# Patient Record
Sex: FEMALE | Race: WHITE | NOT HISPANIC OR LATINO | URBAN - METROPOLITAN AREA
[De-identification: names, ages, dates, MRNs, and addresses within clinical notes are randomized per-mention and may not be internally consistent; named-entity substitution may affect disease eponyms.]

---

## 2017-10-25 ENCOUNTER — INPATIENT (INPATIENT)
Facility: HOSPITAL | Age: 82
LOS: 12 days | Discharge: HOME | End: 2017-11-07
Attending: INTERNAL MEDICINE | Admitting: GENERAL PRACTICE
Payer: MEDICARE

## 2017-10-25 VITALS
OXYGEN SATURATION: 100 % | SYSTOLIC BLOOD PRESSURE: 176 MMHG | HEART RATE: 84 BPM | TEMPERATURE: 101 F | RESPIRATION RATE: 16 BRPM | DIASTOLIC BLOOD PRESSURE: 74 MMHG

## 2017-10-25 DIAGNOSIS — C34.90 MALIGNANT NEOPLASM OF UNSPECIFIED PART OF UNSPECIFIED BRONCHUS OR LUNG: Chronic | ICD-10-CM

## 2017-10-25 LAB
ALBUMIN SERPL ELPH-MCNC: 3.9 G/DL — SIGNIFICANT CHANGE UP (ref 3.3–5)
ALP SERPL-CCNC: 53 U/L — SIGNIFICANT CHANGE UP (ref 40–120)
ALT FLD-CCNC: 25 U/L — SIGNIFICANT CHANGE UP (ref 4–33)
AMYLASE P1 CFR SERPL: 139 U/L — HIGH (ref 25–125)
APPEARANCE UR: SIGNIFICANT CHANGE UP
AST SERPL-CCNC: 48 U/L — HIGH (ref 4–32)
BACTERIA # UR AUTO: HIGH
BASE EXCESS BLDV CALC-SCNC: -3.6 MMOL/L — SIGNIFICANT CHANGE UP
BASE EXCESS BLDV CALC-SCNC: -4.5 MMOL/L — SIGNIFICANT CHANGE UP
BASOPHILS # BLD AUTO: 0 K/UL — SIGNIFICANT CHANGE UP (ref 0–0.2)
BASOPHILS NFR BLD AUTO: 0 % — SIGNIFICANT CHANGE UP (ref 0–2)
BILIRUB SERPL-MCNC: 0.8 MG/DL — SIGNIFICANT CHANGE UP (ref 0.2–1.2)
BILIRUB UR-MCNC: NEGATIVE — SIGNIFICANT CHANGE UP
BLOOD GAS VENOUS - CREATININE: 1.46 MG/DL — HIGH (ref 0.5–1.3)
BLOOD GAS VENOUS - CREATININE: 1.57 MG/DL — HIGH (ref 0.5–1.3)
BLOOD UR QL VISUAL: HIGH
BUN SERPL-MCNC: 42 MG/DL — HIGH (ref 7–23)
CALCIUM SERPL-MCNC: 8.8 MG/DL — SIGNIFICANT CHANGE UP (ref 8.4–10.5)
CHLORIDE BLDV-SCNC: 101 MMOL/L — SIGNIFICANT CHANGE UP (ref 96–108)
CHLORIDE BLDV-SCNC: 105 MMOL/L — SIGNIFICANT CHANGE UP (ref 96–108)
CHLORIDE SERPL-SCNC: 98 MMOL/L — SIGNIFICANT CHANGE UP (ref 98–107)
CK MB BLD-MCNC: 2.65 NG/ML — SIGNIFICANT CHANGE UP (ref 1–4.7)
CK SERPL-CCNC: 96 U/L — SIGNIFICANT CHANGE UP (ref 25–170)
CO2 SERPL-SCNC: 19 MMOL/L — LOW (ref 22–31)
COLOR SPEC: YELLOW — SIGNIFICANT CHANGE UP
CREAT SERPL-MCNC: 1.63 MG/DL — HIGH (ref 0.5–1.3)
EOSINOPHIL # BLD AUTO: 0.01 K/UL — SIGNIFICANT CHANGE UP (ref 0–0.5)
EOSINOPHIL NFR BLD AUTO: 0.2 % — SIGNIFICANT CHANGE UP (ref 0–6)
GAS PNL BLDV: 134 MMOL/L — LOW (ref 136–146)
GAS PNL BLDV: 134 MMOL/L — LOW (ref 136–146)
GLUCOSE BLDV-MCNC: 213 — HIGH (ref 70–99)
GLUCOSE BLDV-MCNC: 244 — HIGH (ref 70–99)
GLUCOSE SERPL-MCNC: 236 MG/DL — HIGH (ref 70–99)
GLUCOSE UR-MCNC: SIGNIFICANT CHANGE UP
HCO3 BLDV-SCNC: 20 MMOL/L — SIGNIFICANT CHANGE UP (ref 20–27)
HCO3 BLDV-SCNC: 20 MMOL/L — SIGNIFICANT CHANGE UP (ref 20–27)
HCT VFR BLD CALC: 35.9 % — SIGNIFICANT CHANGE UP (ref 34.5–45)
HCT VFR BLDV CALC: 27.6 % — LOW (ref 34.5–45)
HCT VFR BLDV CALC: 40.6 % — SIGNIFICANT CHANGE UP (ref 34.5–45)
HGB BLD-MCNC: 11.4 G/DL — LOW (ref 11.5–15.5)
HGB BLDV-MCNC: 13.2 G/DL — SIGNIFICANT CHANGE UP (ref 11.5–15.5)
HGB BLDV-MCNC: 8.9 G/DL — LOW (ref 11.5–15.5)
IMM GRANULOCYTES # BLD AUTO: 0.07 # — SIGNIFICANT CHANGE UP
IMM GRANULOCYTES NFR BLD AUTO: 1.1 % — SIGNIFICANT CHANGE UP (ref 0–1.5)
KETONES UR-MCNC: NEGATIVE — SIGNIFICANT CHANGE UP
LACTATE BLDV-MCNC: 2.8 MMOL/L — HIGH (ref 0.5–2)
LACTATE BLDV-MCNC: 4.1 MMOL/L — CRITICAL HIGH (ref 0.5–2)
LEUKOCYTE ESTERASE UR-ACNC: HIGH
LYMPHOCYTES # BLD AUTO: 0.2 K/UL — LOW (ref 1–3.3)
LYMPHOCYTES # BLD AUTO: 3.3 % — LOW (ref 13–44)
MCHC RBC-ENTMCNC: 27.9 PG — SIGNIFICANT CHANGE UP (ref 27–34)
MCHC RBC-ENTMCNC: 31.8 % — LOW (ref 32–36)
MCV RBC AUTO: 87.8 FL — SIGNIFICANT CHANGE UP (ref 80–100)
MONOCYTES # BLD AUTO: 0.09 K/UL — SIGNIFICANT CHANGE UP (ref 0–0.9)
MONOCYTES NFR BLD AUTO: 1.5 % — LOW (ref 2–14)
MUCOUS THREADS # UR AUTO: SIGNIFICANT CHANGE UP
NEUTROPHILS # BLD AUTO: 5.76 K/UL — SIGNIFICANT CHANGE UP (ref 1.8–7.4)
NEUTROPHILS NFR BLD AUTO: 93.9 % — HIGH (ref 43–77)
NITRITE UR-MCNC: POSITIVE — HIGH
NRBC # FLD: 0 — SIGNIFICANT CHANGE UP
PCO2 BLDV: 35 MMHG — LOW (ref 41–51)
PCO2 BLDV: 41 MMHG — SIGNIFICANT CHANGE UP (ref 41–51)
PH BLDV: 7.34 PH — SIGNIFICANT CHANGE UP (ref 7.32–7.43)
PH BLDV: 7.37 PH — SIGNIFICANT CHANGE UP (ref 7.32–7.43)
PH UR: 5.5 — SIGNIFICANT CHANGE UP (ref 4.6–8)
PLATELET # BLD AUTO: 176 K/UL — SIGNIFICANT CHANGE UP (ref 150–400)
PMV BLD: 11 FL — SIGNIFICANT CHANGE UP (ref 7–13)
PO2 BLDV: 37 MMHG — SIGNIFICANT CHANGE UP (ref 35–40)
PO2 BLDV: < 24 MMHG — LOW (ref 35–40)
POTASSIUM BLDV-SCNC: 3.1 MMOL/L — LOW (ref 3.4–4.5)
POTASSIUM BLDV-SCNC: 3.5 MMOL/L — SIGNIFICANT CHANGE UP (ref 3.4–4.5)
POTASSIUM SERPL-MCNC: 3.7 MMOL/L — SIGNIFICANT CHANGE UP (ref 3.5–5.3)
POTASSIUM SERPL-SCNC: 3.7 MMOL/L — SIGNIFICANT CHANGE UP (ref 3.5–5.3)
PROT SERPL-MCNC: 7.9 G/DL — SIGNIFICANT CHANGE UP (ref 6–8.3)
PROT UR-MCNC: 100 — HIGH
RBC # BLD: 4.09 M/UL — SIGNIFICANT CHANGE UP (ref 3.8–5.2)
RBC # FLD: 13.9 % — SIGNIFICANT CHANGE UP (ref 10.3–14.5)
RBC CASTS # UR COMP ASSIST: SIGNIFICANT CHANGE UP (ref 0–?)
SAO2 % BLDV: 26.2 % — LOW (ref 60–85)
SAO2 % BLDV: 64.1 % — SIGNIFICANT CHANGE UP (ref 60–85)
SODIUM SERPL-SCNC: 136 MMOL/L — SIGNIFICANT CHANGE UP (ref 135–145)
SP GR SPEC: 1.01 — SIGNIFICANT CHANGE UP (ref 1–1.03)
SQUAMOUS # UR AUTO: SIGNIFICANT CHANGE UP
TROPONIN T SERPL-MCNC: 0.06 NG/ML — SIGNIFICANT CHANGE UP (ref 0–0.06)
UROBILINOGEN FLD QL: NORMAL E.U. — SIGNIFICANT CHANGE UP (ref 0.1–0.2)
WBC # BLD: 6.13 K/UL — SIGNIFICANT CHANGE UP (ref 3.8–10.5)
WBC # FLD AUTO: 6.13 K/UL — SIGNIFICANT CHANGE UP (ref 3.8–10.5)
WBC UR QL: SIGNIFICANT CHANGE UP (ref 0–?)

## 2017-10-25 PROCEDURE — 71250 CT THORAX DX C-: CPT | Mod: 26

## 2017-10-25 PROCEDURE — 74176 CT ABD & PELVIS W/O CONTRAST: CPT | Mod: 26

## 2017-10-25 PROCEDURE — 71010: CPT | Mod: 26

## 2017-10-25 RX ORDER — SODIUM CHLORIDE 9 MG/ML
1000 INJECTION INTRAMUSCULAR; INTRAVENOUS; SUBCUTANEOUS ONCE
Qty: 0 | Refills: 0 | Status: COMPLETED | OUTPATIENT
Start: 2017-10-25 | End: 2017-10-25

## 2017-10-25 RX ORDER — IPRATROPIUM/ALBUTEROL SULFATE 18-103MCG
3 AEROSOL WITH ADAPTER (GRAM) INHALATION ONCE
Qty: 0 | Refills: 0 | Status: COMPLETED | OUTPATIENT
Start: 2017-10-25 | End: 2017-10-25

## 2017-10-25 RX ORDER — AZITHROMYCIN 500 MG/1
500 TABLET, FILM COATED ORAL ONCE
Qty: 0 | Refills: 0 | Status: COMPLETED | OUTPATIENT
Start: 2017-10-25 | End: 2017-10-25

## 2017-10-25 RX ORDER — ONDANSETRON 8 MG/1
4 TABLET, FILM COATED ORAL ONCE
Qty: 0 | Refills: 0 | Status: COMPLETED | OUTPATIENT
Start: 2017-10-25 | End: 2017-10-25

## 2017-10-25 RX ORDER — CEFTRIAXONE 500 MG/1
1 INJECTION, POWDER, FOR SOLUTION INTRAMUSCULAR; INTRAVENOUS ONCE
Qty: 0 | Refills: 0 | Status: COMPLETED | OUTPATIENT
Start: 2017-10-25 | End: 2017-10-25

## 2017-10-25 RX ORDER — ACETAMINOPHEN 500 MG
650 TABLET ORAL ONCE
Qty: 0 | Refills: 0 | Status: COMPLETED | OUTPATIENT
Start: 2017-10-25 | End: 2017-10-25

## 2017-10-25 RX ADMIN — Medication 125 MILLIGRAM(S): at 20:06

## 2017-10-25 RX ADMIN — AZITHROMYCIN 250 MILLIGRAM(S): 500 TABLET, FILM COATED ORAL at 19:59

## 2017-10-25 RX ADMIN — SODIUM CHLORIDE 1000 MILLILITER(S): 9 INJECTION INTRAMUSCULAR; INTRAVENOUS; SUBCUTANEOUS at 19:17

## 2017-10-25 RX ADMIN — CEFTRIAXONE 100 GRAM(S): 500 INJECTION, POWDER, FOR SOLUTION INTRAMUSCULAR; INTRAVENOUS at 19:14

## 2017-10-25 RX ADMIN — SODIUM CHLORIDE 1000 MILLILITER(S): 9 INJECTION INTRAMUSCULAR; INTRAVENOUS; SUBCUTANEOUS at 20:14

## 2017-10-25 RX ADMIN — Medication 650 MILLIGRAM(S): at 19:16

## 2017-10-25 RX ADMIN — ONDANSETRON 4 MILLIGRAM(S): 8 TABLET, FILM COATED ORAL at 19:16

## 2017-10-25 RX ADMIN — Medication 3 MILLILITER(S): at 19:59

## 2017-10-25 NOTE — ED PROVIDER NOTE - PROGRESS NOTE DETAILS
Teri: Pt with upper lobe cavitary lesion on CXR not present on previous xray an dunclear if malignancy given h/o cancer. However pt has fever with more then a month of productive cough so Tb cannot be excluded. Pt placed on isolation. Teri: Pt with upper lobe cavitary lesion on CXR not present on previous xray an dunclear if malignancy given h/o cancer. However pt has fever with more then a month of productive cough so Tb cannot be excluded. Pt placed on isolation. 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 Gollogly: Discussed case with Dr Ferrell. Dr Telles had previously spoken with covering MD for Dr York regarding clinical findings and plan to admit.

## 2017-10-25 NOTE — ED PROVIDER NOTE - OBJECTIVE STATEMENT
87yo female with DM, HTN, lung ca s/p radiation, p/w fevers, chills, lower abdominal pain, lower back pain, urinary frequency and dysuria. Symptoms started 2 days ago, was seen by pmd and told to start cipro but did not. Today symptoms worsened and pt was rigoring, went to urgent care and was sent to ED. Also complaining of nausea and vomiting, decreased PO. + diarrhea. feels like prior UTI

## 2017-10-25 NOTE — ED PROVIDER NOTE - CARE PLAN
Principal Discharge DX:	Pneumonia of right upper lobe due to infectious organism  Secondary Diagnosis:	Pyelonephritis  Secondary Diagnosis:	Sepsis, due to unspecified organism

## 2017-10-25 NOTE — ED PROVIDER NOTE - NOTES
discussed with ID fellow, given previous h/o TB quantiferon will be low utility. recommends 3 sets of sputum cultures, ceftriaxone and azithromycin is sufficient for pna/uti for now, airborne isolation and admission. ID will consult tomorrow as inpatient.

## 2017-10-25 NOTE — ED ADULT NURSE NOTE - OBJECTIVE STATEMENT
Pt. received in room 5. Pt. is 85 yo female a&ox3 c/o cough, fever/chills nausea, vomiting, diarrhea, abdominal pain, urinary burning, urgency, and frequency. Pt. came to ED after being sent from [facility] this afternoon. Pt. reports symptoms began a few days ago but have gotten much worse today. Pt. was recently on amoxicillin  for cough and finished antibiotics about two weeks ago. Pt. appears to be SOB, respirations are even, satting 100% on 2L NC. Pt. has hx of lung CA in remission. Denies dizziness, lightheadedness, chest pain. Abdomen is tender, but soft and non distended. Skin is warm, dry, and intact. 20g IV lock in place from EMS. Labs sent. Will continue to monitor.

## 2017-10-25 NOTE — ED PROVIDER NOTE - MEDICAL DECISION MAKING DETAILS
85yo with lower back pain, abd pain and urinary sx, likely pyelo, will get CT abd/pelvis r/o intraabdominal infection

## 2017-10-25 NOTE — ED ADULT NURSE NOTE - CHIEF COMPLAINT QUOTE
Sent in by urgent care for lower abdominal pain, nausea and vomiting since morning. Pt was given zofran 4 mg and NS 1L.  PMH lung cancer in remission, DM type 2. Febrile in triage.

## 2017-10-25 NOTE — ED ADULT TRIAGE NOTE - CHIEF COMPLAINT QUOTE
Sent in by urgent care for lower abdominal pain, nausea and vomiting since morning. Pt was given zofran 4 mg and NS 1L.  PMH lung cancer on remission, DM type 2. Sent in by urgent care for lower abdominal pain, nausea and vomiting since morning. Pt was given zofran 4 mg and NS 1L.  PMH lung cancer on remission, DM type 2. Febrile in triage. Sent in by urgent care for lower abdominal pain, nausea and vomiting since morning. Pt was given zofran 4 mg and NS 1L.  PMH lung cancer in remission, DM type 2. Febrile in triage.

## 2017-10-25 NOTE — ED PROVIDER NOTE - ATTENDING CONTRIBUTION TO CARE
Teri: 87 yo female with a h/o DM c/o dysuria, frequency, and urgency x 24 hours. + b/l flank pain. Pt also c/o abdominal pain, and multiple episodes of non bloody, non bilious vomiting. +loose stool x 2 today. Pt also c/o several weeks of productive cough for which she was treated with amoxicillin. + associated chills but no objective fevers. No recent travel or sick contacts. Denies chest pain but reports SOB. Exam; well appearing, NAD, +mild tachypnea with scant wheeze at left base with decreased air moevment b/l. abdomen is soft, + suprapubic TTP, no CVA TTP, No Le edema or calf TTP. Plan: cbc, cmp, cardiac enzymes, cxr, ct abdomen, urinalysis and culture, abx, vbg, iv fluids, reassess

## 2017-10-26 ENCOUNTER — TRANSCRIPTION ENCOUNTER (OUTPATIENT)
Age: 82
End: 2017-10-26

## 2017-10-26 DIAGNOSIS — E11.9 TYPE 2 DIABETES MELLITUS WITHOUT COMPLICATIONS: ICD-10-CM

## 2017-10-26 DIAGNOSIS — E27.9 DISORDER OF ADRENAL GLAND, UNSPECIFIED: ICD-10-CM

## 2017-10-26 DIAGNOSIS — N18.3 CHRONIC KIDNEY DISEASE, STAGE 3 (MODERATE): ICD-10-CM

## 2017-10-26 DIAGNOSIS — C34.11 MALIGNANT NEOPLASM OF UPPER LOBE, RIGHT BRONCHUS OR LUNG: ICD-10-CM

## 2017-10-26 DIAGNOSIS — J18.9 PNEUMONIA, UNSPECIFIED ORGANISM: ICD-10-CM

## 2017-10-26 DIAGNOSIS — N12 TUBULO-INTERSTITIAL NEPHRITIS, NOT SPECIFIED AS ACUTE OR CHRONIC: ICD-10-CM

## 2017-10-26 DIAGNOSIS — I10 ESSENTIAL (PRIMARY) HYPERTENSION: ICD-10-CM

## 2017-10-26 LAB
BACTERIA BLD CULT: SIGNIFICANT CHANGE UP
E COLI DNA BLD POS QL NAA+NON-PROBE: SIGNIFICANT CHANGE UP
GLUCOSE BLDC GLUCOMTR-MCNC: 145 MG/DL — HIGH (ref 70–99)
GLUCOSE BLDC GLUCOMTR-MCNC: 211 MG/DL — HIGH (ref 70–99)
GLUCOSE BLDC GLUCOMTR-MCNC: 269 MG/DL — HIGH (ref 70–99)
GLUCOSE BLDC GLUCOMTR-MCNC: 295 MG/DL — HIGH (ref 70–99)
METHOD TYPE: SIGNIFICANT CHANGE UP
ORGANISM # SPEC MICROSCOPIC CNT: SIGNIFICANT CHANGE UP
ORGANISM # SPEC MICROSCOPIC CNT: SIGNIFICANT CHANGE UP
SPECIMEN SOURCE: SIGNIFICANT CHANGE UP

## 2017-10-26 PROCEDURE — 99222 1ST HOSP IP/OBS MODERATE 55: CPT

## 2017-10-26 PROCEDURE — 99223 1ST HOSP IP/OBS HIGH 75: CPT

## 2017-10-26 RX ORDER — METOPROLOL TARTRATE 50 MG
50 TABLET ORAL EVERY 12 HOURS
Qty: 0 | Refills: 0 | Status: DISCONTINUED | OUTPATIENT
Start: 2017-10-26 | End: 2017-11-07

## 2017-10-26 RX ORDER — SODIUM CHLORIDE 9 MG/ML
500 INJECTION INTRAMUSCULAR; INTRAVENOUS; SUBCUTANEOUS ONCE
Qty: 0 | Refills: 0 | Status: COMPLETED | OUTPATIENT
Start: 2017-10-26 | End: 2017-10-26

## 2017-10-26 RX ORDER — INSULIN LISPRO 100/ML
VIAL (ML) SUBCUTANEOUS
Qty: 0 | Refills: 0 | Status: DISCONTINUED | OUTPATIENT
Start: 2017-10-26 | End: 2017-11-07

## 2017-10-26 RX ORDER — PREDNISOLONE SODIUM PHOSPHATE 1 %
1 DROPS OPHTHALMIC (EYE) THREE TIMES A DAY
Qty: 0 | Refills: 0 | Status: DISCONTINUED | OUTPATIENT
Start: 2017-10-26 | End: 2017-11-07

## 2017-10-26 RX ORDER — NIFEDIPINE 30 MG
60 TABLET, EXTENDED RELEASE 24 HR ORAL AT BEDTIME
Qty: 0 | Refills: 0 | Status: DISCONTINUED | OUTPATIENT
Start: 2017-10-26 | End: 2017-11-07

## 2017-10-26 RX ORDER — ASPIRIN/CALCIUM CARB/MAGNESIUM 324 MG
81 TABLET ORAL DAILY
Qty: 0 | Refills: 0 | Status: DISCONTINUED | OUTPATIENT
Start: 2017-10-26 | End: 2017-11-07

## 2017-10-26 RX ORDER — INSULIN LISPRO 100/ML
VIAL (ML) SUBCUTANEOUS AT BEDTIME
Qty: 0 | Refills: 0 | Status: DISCONTINUED | OUTPATIENT
Start: 2017-10-26 | End: 2017-11-07

## 2017-10-26 RX ORDER — PREDNISOLONE 5 MG
1 TABLET ORAL THREE TIMES A DAY
Qty: 0 | Refills: 0 | Status: DISCONTINUED | OUTPATIENT
Start: 2017-10-26 | End: 2017-10-26

## 2017-10-26 RX ORDER — CEFTRIAXONE 500 MG/1
1 INJECTION, POWDER, FOR SOLUTION INTRAMUSCULAR; INTRAVENOUS EVERY 24 HOURS
Qty: 0 | Refills: 0 | Status: DISCONTINUED | OUTPATIENT
Start: 2017-10-26 | End: 2017-10-27

## 2017-10-26 RX ORDER — HEPARIN SODIUM 5000 [USP'U]/ML
5000 INJECTION INTRAVENOUS; SUBCUTANEOUS EVERY 8 HOURS
Qty: 0 | Refills: 0 | Status: DISCONTINUED | OUTPATIENT
Start: 2017-10-26 | End: 2017-11-07

## 2017-10-26 RX ORDER — ACETAMINOPHEN 500 MG
650 TABLET ORAL EVERY 6 HOURS
Qty: 0 | Refills: 0 | Status: DISCONTINUED | OUTPATIENT
Start: 2017-10-26 | End: 2017-11-07

## 2017-10-26 RX ORDER — IPRATROPIUM/ALBUTEROL SULFATE 18-103MCG
3 AEROSOL WITH ADAPTER (GRAM) INHALATION EVERY 6 HOURS
Qty: 0 | Refills: 0 | Status: COMPLETED | OUTPATIENT
Start: 2017-10-26 | End: 2017-10-28

## 2017-10-26 RX ORDER — NIFEDIPINE 30 MG
30 TABLET, EXTENDED RELEASE 24 HR ORAL DAILY
Qty: 0 | Refills: 0 | Status: DISCONTINUED | OUTPATIENT
Start: 2017-10-26 | End: 2017-11-07

## 2017-10-26 RX ORDER — FENOFIBRATE,MICRONIZED 130 MG
145 CAPSULE ORAL DAILY
Qty: 0 | Refills: 0 | Status: DISCONTINUED | OUTPATIENT
Start: 2017-10-26 | End: 2017-11-07

## 2017-10-26 RX ADMIN — Medication 81 MILLIGRAM(S): at 14:23

## 2017-10-26 RX ADMIN — CEFTRIAXONE 100 GRAM(S): 500 INJECTION, POWDER, FOR SOLUTION INTRAMUSCULAR; INTRAVENOUS at 17:26

## 2017-10-26 RX ADMIN — Medication 30 MILLIGRAM(S): at 04:43

## 2017-10-26 RX ADMIN — Medication 6: at 18:05

## 2017-10-26 RX ADMIN — Medication 145 MILLIGRAM(S): at 14:23

## 2017-10-26 RX ADMIN — Medication 50 MILLIGRAM(S): at 17:26

## 2017-10-26 RX ADMIN — SODIUM CHLORIDE 1500 MILLILITER(S): 9 INJECTION INTRAMUSCULAR; INTRAVENOUS; SUBCUTANEOUS at 00:30

## 2017-10-26 RX ADMIN — HEPARIN SODIUM 5000 UNIT(S): 5000 INJECTION INTRAVENOUS; SUBCUTANEOUS at 14:23

## 2017-10-26 RX ADMIN — HEPARIN SODIUM 5000 UNIT(S): 5000 INJECTION INTRAVENOUS; SUBCUTANEOUS at 22:33

## 2017-10-26 RX ADMIN — Medication 50 MILLIGRAM(S): at 04:43

## 2017-10-26 RX ADMIN — Medication 10 MILLIGRAM(S): at 12:19

## 2017-10-26 RX ADMIN — Medication 650 MILLIGRAM(S): at 22:37

## 2017-10-26 RX ADMIN — Medication 60 MILLIGRAM(S): at 22:34

## 2017-10-26 RX ADMIN — Medication 6: at 09:52

## 2017-10-26 RX ADMIN — Medication 1 DROP(S): at 22:33

## 2017-10-26 RX ADMIN — Medication 650 MILLIGRAM(S): at 23:15

## 2017-10-26 RX ADMIN — Medication 3 MILLILITER(S): at 22:07

## 2017-10-26 NOTE — H&P ADULT - NSHPPHYSICALEXAM_GEN_ALL_CORE
T(C): 36.8 (10-25-17 @ 21:55), Max: 38.2 (10-25-17 @ 17:55)  HR: 89 (10-25-17 @ 23:30) (80 - 89)  BP: 105/46 (10-25-17 @ 23:30) (105/46 - 176/74)  RR: 18 (10-25-17 @ 23:30) (16 - 18)  SpO2: 99% (10-25-17 @ 23:30) (92% - 100%)    Constitutional: NAD, well-developed, well-nourished  Ears, Nose, Mouth, and Throat: normal external ears and nose, normal hearing, moist oral mucosa  Eyes: normal conjunctiva, EOMI, PERRLA  Neck: supple, no JVD  Respiratory: Clear to auscultation bilaterally. No wheezes, rales or rhonchi. Normal respiratory effort  Cardiovascular: RRR, no M/R/G, no edema, 2+ Peripheral Pulses  Gastrointestinal: soft, nontender, nondistended, +BS, no hernia  Skin: warm, dry, no rash  Neurologic: sensation grossly intact, CN grossly intact, non-focal exam  Musculoskeletal: no clubbing, no cyanosis, no joint swelling  Psychiatric: AOX3, normal mood, affect

## 2017-10-26 NOTE — H&P ADULT - NSHPLABSRESULTS_GEN_ALL_CORE
10-25    136  |  98  |  42<H>  ----------------------------<  236<H>  3.7   |  19<L>  |  1.63<H>    Ca    8.8      25 Oct 2017 19:04    TPro  7.9  /  Alb  3.9  /  TBili  0.8  /  DBili  x   /  AST  48<H>  /  ALT  25  /  AlkPhos  53  10-25                          11.4   6.13  )-----------( 176      ( 25 Oct 2017 19:04 )             35.9     CARDIAC MARKERS ( 25 Oct 2017 20:00 )  x     / 0.06 ng/mL / 96 u/L / 2.65 ng/mL / x          LIVER FUNCTIONS - ( 25 Oct 2017 19:04 )  Alb: 3.9 g/dL / Pro: 7.9 g/dL / ALK PHOS: 53 u/L / ALT: 25 u/L / AST: 48 u/L / GGT: x           Urinalysis Basic - ( 25 Oct 2017 18:30 )    Color: YELLOW / Appearance: HAZY / S.011 / pH: 5.5  Gluc: TRACE / Ketone: NEGATIVE  / Bili: NEGATIVE / Urobili: NORMAL E.U.   Blood: MODERATE / Protein: 100 / Nitrite: POSITIVE   Leuk Esterase: LARGE / RBC: 25-50 / WBC 10-25   Sq Epi: OCC / Non Sq Epi: x / Bacteria: MANY    20:30 - VBG - pH: 7.37  | pCO2: 35    | pO2: 37    | Lactate: 2.8    19:12 - VBG - pH: 7.34  | pCO2: 41    | pO2: < 24  | Lactate: 4.1    EKG personally reviewed, NSR w/ 1st degree AVB and prolonged QTc

## 2017-10-26 NOTE — H&P ADULT - PROBLEM SELECTOR PLAN 5
Will f/u outpt Cr, may be progression of disease vs acute worsening, s/p 2 L IVNS, monitor I/Os, f/u BMP

## 2017-10-26 NOTE — H&P ADULT - NSHPREVIEWOFSYSTEMS_GEN_ALL_CORE
Constitutional Symptoms: No weakness, +fevers, chills  Eyes: No visual changes, headache, eye pain, double vision  Ears, Nose, Mouth, Throat: No runny nose, sinus pain, ear pain, tinnitus, sore throat, dysphagia, odynophagia  Cardiovascular: No chest pain, palpitations, edema  Respiratory: No cough, wheezing, hemoptysis, shortness of breath  Gastrointestinal: No abdominal pain, dysphagia, anorexia, +nausea/vomiting  Genitourinary: +dysuria, frequency, urgency  Musculoskeletal: No joint pain, joint swelling, decreased ROM  Skin: No pruritus, rashes, lesions, wounds  Neurologic:  No seizures, headache, paraesthesiae, numbness, limb weakness    Positives and pertinent negatives noted and all other systems negative.

## 2017-10-26 NOTE — ED ADULT NURSE REASSESSMENT NOTE - NS ED NURSE REASSESS COMMENT FT1
pt resting comfortably in bed, denies pain/discomfort at the present time.  Vital signs stable. Pt.'s O2 sat 92% on RA, placed on 2L via NC.  Pt. awaiting CT results and admission to the hospital.

## 2017-10-26 NOTE — H&P ADULT - PROBLEM SELECTOR PLAN 1
S/p 2 L IVNS and ceftriaxone in the ER, patient overall greatly improved in terms of symptoms. Will c/w IV Ceftriaxone, f/u cultures, supportive care

## 2017-10-26 NOTE — H&P ADULT - PROBLEM SELECTOR PLAN 2
Spiculated masses within the bilateral upper lobes, encasing suture material on the left. D/w patient. As per patient, she was in remission, had a recurrence of Lung mass in July s/p RT, and was told may still have a mass which is "controlled". F/u w/ outpt onc in AM.

## 2017-10-26 NOTE — H&P ADULT - HISTORY OF PRESENT ILLNESS
Patient is an 87 y/o F PMH Lung CA (unknown stage or type, Dx 2010 s/p L VATS, wedge resection, RT in July), HTN, DM2 p/w N/V, dysuria. Patient reports cough productive of sputum X2 weeks, s/p Antibiotics (unknown by pt but per ER records, amoxicillin). Patient felt well until 2 days ago, developed dysuria, urgency, increased frequency, progressing to suprapubic tenderness, F/C, and N/V. No recent travel, no sick contacts.

## 2017-10-26 NOTE — CHART NOTE - NSCHARTNOTEFT_GEN_A_CORE
Called by RN pt c/o shortness of breath states she had it off and on for the past several weeks. Patient has h/o lung CA since  s/p VATS and wedge resection,  admitted with UTI / Pyelonephritis started on antibiotics. CT Chest/ A/ P  shows Spiculated masses within the bilateral upper lobes, encasing suture material on the left, most compatible with bronchogenic malignancy.  Mediastinal lymphadenopathy.  Emphysema.  Right adrenal mass not significantly changed in size compared to previous   CT of 2010. Further characterization with MRI. Left-sided perinephric stranding and left renal edema without  hydronephrosis or obstructing stone. Findings may be related to recent  passage of a stone. Please note that without IV contrast, pyelonephritis,  a clinical diagnosis cannot be assessed.  Colonic diverticulosis.    Patient states she was having a cough few weeks ago seen by her OP physician treated with antibiotics. Denies using home oxygen supplementation. States her shortness of breath improves with nebulizer treatment prn.     VITAL SIGNS:  Vital Signs Last 24 Hrs  T(C): 36.6 (26 Oct 2017 22:37), Max: 36.7 (26 Oct 2017 17:25)  T(F): 97.9 (26 Oct 2017 22:37), Max: 98 (26 Oct 2017 17:25)  HR: 84 (26 Oct 2017 22:37) (73 - 84)  BP: 140/67 (26 Oct 2017 22:37) (115/71 - 140/67)    RR: 17 (26 Oct 2017 22:37) (16 - 18)  SpO2: 98% (26 Oct 2017 22:37) (96% - 100%)    O:   Well developed, well nourished elderly white  Female found sitting in bed  alert and oriented x 3 appeared in no acute distress with daughter at bedside     Chest / Lungs: few scattered rhonchi noted. Good air exchange, no accessory muscles of respiration were being used.     Heart: S1, S2, regular rate and rhythm, no murmurs, crackles or rales noted    Allergies    ACE inhibitors (Angioedema)      MEDICATIONS  (STANDING):  ALBUTerol/ipratropium for Nebulization 3 milliLiter(s) Nebulizer every 6 hours  aspirin enteric coated 81 milliGRAM(s) Oral daily  cefTRIAXone   IVPB 1 Gram(s) IV Intermittent every 24 hours  fenofibrate Tablet 145 milliGRAM(s) Oral daily  heparin  Injectable 5000 Unit(s) SubCutaneous every 8 hours  insulin lispro (HumaLOG) corrective regimen sliding scale   SubCutaneous three times a day before meals  insulin lispro (HumaLOG) corrective regimen sliding scale   SubCutaneous at bedtime  metoprolol     tartrate 50 milliGRAM(s) Oral every 12 hours  NIFEdipine XL 30 milliGRAM(s) Oral daily  NIFEdipine XL 60 milliGRAM(s) Oral at bedtime  prednisoLONE acetate 1% Suspension 1 Drop(s) Both EYES three times a day  predniSONE   Tablet 10 milliGRAM(s) Oral daily    MEDICATIONS  (PRN):  acetaminophen   Tablet 650 milliGRAM(s) Oral every 6 hours PRN For Temp greater than 38 C (100.4 F)  acetaminophen   Tablet. 650 milliGRAM(s) Oral every 6 hours PRN mild to moderate pain                            11.4   6.13  )-----------( 176      ( 25 Oct 2017 19:04 )             35.9     10-25    136  |  98  |  42<H>  ----------------------------<  236<H>  3.7   |  19<L>  |  1.63<H>    Ca    8.8      25 Oct 2017 19:04    TPro  7.9  /  Alb  3.9  /  TBili  0.8  /  DBili  x   /  AST  48<H>  /  ALT  25  /  AlkPhos  53  10-25    CARDIAC MARKERS ( 25 Oct 2017 20:00 )  x     / 0.06 ng/mL / 96 u/L / 2.65 ng/mL / x            CAPILLARY BLOOD GLUCOSE      POCT Blood Glucose.: 211 mg/dL (26 Oct 2017 22:52)  POCT Blood Glucose.: 269 mg/dL (26 Oct 2017 17:03)  POCT Blood Glucose.: 145 mg/dL (26 Oct 2017 13:00)  POCT Blood Glucose.: 295 mg/dL (26 Oct 2017 09:06)      Urinalysis Basic - ( 25 Oct 2017 18:30 )    Color: YELLOW / Appearance: HAZY / S.011 / pH: 5.5  Gluc: TRACE / Ketone: NEGATIVE  / Bili: NEGATIVE / Urobili: NORMAL E.U.   Blood: MODERATE / Protein: 100 / Nitrite: POSITIVE   Leuk Esterase: LARGE / RBC: 25-50 / WBC 10-25   Sq Epi: OCC / Non Sq Epi: x / Bacteria: MANY        Assessment: / Plan: 85 y/o w h/o lung CA s/p VATS, wedge resection a/w Pyelonephritis on antibiotics, has h/o Emphysema - c/o shortness of breath - pt's Sp02 in > 91 % on RA - pt placed on Oxygen supplementation 2 liters via NC, pt placed continuous pulse oximetry monitoring, duoneb treatments prn , vitals remain stable pt afebrile  CXR to evaluate etiology of shortness of breath. will continue to monitor Called by RN pt c/o shortness of breath states she had it off and on for the past several weeks. Patient has h/o lung CA since  s/p VATS and wedge resection,  admitted with UTI / Pyelonephritis started on antibiotics. CT Chest/ A/ P  shows Spiculated masses within the bilateral upper lobes, encasing suture material on the left, most compatible with bronchogenic malignancy.  Mediastinal lymphadenopathy.  Emphysema.  Right adrenal mass not significantly changed in size compared to previous   CT of 2010. Further characterization with MRI. Left-sided perinephric stranding and left renal edema without  hydronephrosis or obstructing stone. Findings may be related to recent  passage of a stone. Please note that without IV contrast, pyelonephritis,  a clinical diagnosis cannot be assessed.  Colonic diverticulosis.    Patient states she was having a cough few weeks ago seen by her OP physician treated with antibiotics. Denies using home oxygen supplementation. States her shortness of breath improves with nebulizer treatment prn.     VITAL SIGNS:  Vital Signs Last 24 Hrs  T(C): 36.6 (26 Oct 2017 22:37), Max: 36.7 (26 Oct 2017 17:25)  T(F): 97.9 (26 Oct 2017 22:37), Max: 98 (26 Oct 2017 17:25)  HR: 84 (26 Oct 2017 22:37) (73 - 84)  BP: 140/67 (26 Oct 2017 22:37) (115/71 - 140/67)    RR: 17 (26 Oct 2017 22:37) (16 - 18)  SpO2: 98% (26 Oct 2017 22:37) (96% - 100%)    O:   Well developed, well nourished elderly white  Female found sitting in bed  alert and oriented x 3 appeared in no acute distress with daughter at bedside     Chest / Lungs: few scattered rhonchi noted. Good air exchange, no accessory muscles of respiration were being used.     Heart: S1, S2, regular rate and rhythm, no murmurs, crackles or rales noted    Allergies    ACE inhibitors (Angioedema)      MEDICATIONS  (STANDING):  ALBUTerol/ipratropium for Nebulization 3 milliLiter(s) Nebulizer every 6 hours  aspirin enteric coated 81 milliGRAM(s) Oral daily  cefTRIAXone   IVPB 1 Gram(s) IV Intermittent every 24 hours  fenofibrate Tablet 145 milliGRAM(s) Oral daily  heparin  Injectable 5000 Unit(s) SubCutaneous every 8 hours  insulin lispro (HumaLOG) corrective regimen sliding scale   SubCutaneous three times a day before meals  insulin lispro (HumaLOG) corrective regimen sliding scale   SubCutaneous at bedtime  metoprolol     tartrate 50 milliGRAM(s) Oral every 12 hours  NIFEdipine XL 30 milliGRAM(s) Oral daily  NIFEdipine XL 60 milliGRAM(s) Oral at bedtime  prednisoLONE acetate 1% Suspension 1 Drop(s) Both EYES three times a day  predniSONE   Tablet 10 milliGRAM(s) Oral daily    MEDICATIONS  (PRN):  acetaminophen   Tablet 650 milliGRAM(s) Oral every 6 hours PRN For Temp greater than 38 C (100.4 F)  acetaminophen   Tablet. 650 milliGRAM(s) Oral every 6 hours PRN mild to moderate pain                            11.4   6.13  )-----------( 176      ( 25 Oct 2017 19:04 )             35.9     10-25    136  |  98  |  42<H>  ----------------------------<  236<H>  3.7   |  19<L>  |  1.63<H>    Ca    8.8      25 Oct 2017 19:04    TPro  7.9  /  Alb  3.9  /  TBili  0.8  /  DBili  x   /  AST  48<H>  /  ALT  25  /  AlkPhos  53  10-25    CARDIAC MARKERS ( 25 Oct 2017 20:00 )  x     / 0.06 ng/mL / 96 u/L / 2.65 ng/mL / x            CAPILLARY BLOOD GLUCOSE      POCT Blood Glucose.: 211 mg/dL (26 Oct 2017 22:52)  POCT Blood Glucose.: 269 mg/dL (26 Oct 2017 17:03)  POCT Blood Glucose.: 145 mg/dL (26 Oct 2017 13:00)  POCT Blood Glucose.: 295 mg/dL (26 Oct 2017 09:06)      Urinalysis Basic - ( 25 Oct 2017 18:30 )    Color: YELLOW / Appearance: HAZY / S.011 / pH: 5.5  Gluc: TRACE / Ketone: NEGATIVE  / Bili: NEGATIVE / Urobili: NORMAL E.U.   Blood: MODERATE / Protein: 100 / Nitrite: POSITIVE   Leuk Esterase: LARGE / RBC: 25-50 / WBC 10-25   Sq Epi: OCC / Non Sq Epi: x / Bacteria: MANY        Assessment: / Plan: 87 y/o w h/o lung CA s/p VATS, wedge resection a/w Pyelonephritis on antibiotics, has h/o Emphysema - c/o shortness of breath - pt's Sp02 in > 91 % on RA - pt placed on Oxygen supplementation 2 liters via NC, pt placed continuous pulse oximetry monitoring, duoneb treatments prn , vitals remain stable pt afebrile  CXR to evaluate etiology of shortness of breath. will continue to monitor     addendum Note:  pt evaluated again for shortness of breath - continuous Sp02 monitoring was 90 -97% on supplemental oxygen by NC pt received Duoneb treatment with some relief. Patient on Prednisone 10 mg PO daily  Chest / lungs - No accessory muscles of respiration were being used. Good air exchange, + mild wheezing with some rhonchi noted on auscultation.   Will give Xopenex neb x 1 dose and start Atrovent inhaler q 6 hrs . will give one dose of Solumedrol IV Called by RN pt c/o shortness of breath states she had it off and on for the past several weeks. Patient has h/o lung CA since  s/p VATS and wedge resection,  admitted with UTI / Pyelonephritis started on antibiotics. CT Chest/ A/ P  shows Spiculated masses within the bilateral upper lobes, encasing suture material on the left, most compatible with bronchogenic malignancy.  Mediastinal lymphadenopathy.  Emphysema.  Right adrenal mass not significantly changed in size compared to previous   CT of 2010. Further characterization with MRI. Left-sided perinephric stranding and left renal edema without  hydronephrosis or obstructing stone. Findings may be related to recent  passage of a stone. Please note that without IV contrast, pyelonephritis,  a clinical diagnosis cannot be assessed.  Colonic diverticulosis.    Patient states she was having a cough few weeks ago seen by her OP physician treated with antibiotics. Denies using home oxygen supplementation. States her shortness of breath improves with nebulizer treatment prn.     VITAL SIGNS:  Vital Signs Last 24 Hrs  T(C): 36.6 (26 Oct 2017 22:37), Max: 36.7 (26 Oct 2017 17:25)  T(F): 97.9 (26 Oct 2017 22:37), Max: 98 (26 Oct 2017 17:25)  HR: 84 (26 Oct 2017 22:37) (73 - 84)  BP: 140/67 (26 Oct 2017 22:37) (115/71 - 140/67)    RR: 17 (26 Oct 2017 22:37) (16 - 18)  SpO2: 98% (26 Oct 2017 22:37) (96% - 100%)    O:   Well developed, well nourished elderly white  Female found sitting in bed  alert and oriented x 3 appeared in no acute distress with daughter at bedside     Chest / Lungs: few scattered rhonchi noted. Good air exchange, no accessory muscles of respiration were being used.     Heart: S1, S2, regular rate and rhythm, no murmurs, crackles or rales noted    Allergies    ACE inhibitors (Angioedema)      MEDICATIONS  (STANDING):  ALBUTerol/ipratropium for Nebulization 3 milliLiter(s) Nebulizer every 6 hours  aspirin enteric coated 81 milliGRAM(s) Oral daily  cefTRIAXone   IVPB 1 Gram(s) IV Intermittent every 24 hours  fenofibrate Tablet 145 milliGRAM(s) Oral daily  heparin  Injectable 5000 Unit(s) SubCutaneous every 8 hours  insulin lispro (HumaLOG) corrective regimen sliding scale   SubCutaneous three times a day before meals  insulin lispro (HumaLOG) corrective regimen sliding scale   SubCutaneous at bedtime  metoprolol     tartrate 50 milliGRAM(s) Oral every 12 hours  NIFEdipine XL 30 milliGRAM(s) Oral daily  NIFEdipine XL 60 milliGRAM(s) Oral at bedtime  prednisoLONE acetate 1% Suspension 1 Drop(s) Both EYES three times a day  predniSONE   Tablet 10 milliGRAM(s) Oral daily    MEDICATIONS  (PRN):  acetaminophen   Tablet 650 milliGRAM(s) Oral every 6 hours PRN For Temp greater than 38 C (100.4 F)  acetaminophen   Tablet. 650 milliGRAM(s) Oral every 6 hours PRN mild to moderate pain                            11.4   6.13  )-----------( 176      ( 25 Oct 2017 19:04 )             35.9     10-25    136  |  98  |  42<H>  ----------------------------<  236<H>  3.7   |  19<L>  |  1.63<H>    Ca    8.8      25 Oct 2017 19:04    TPro  7.9  /  Alb  3.9  /  TBili  0.8  /  DBili  x   /  AST  48<H>  /  ALT  25  /  AlkPhos  53  10-25    CARDIAC MARKERS ( 25 Oct 2017 20:00 )  x     / 0.06 ng/mL / 96 u/L / 2.65 ng/mL / x            CAPILLARY BLOOD GLUCOSE      POCT Blood Glucose.: 211 mg/dL (26 Oct 2017 22:52)  POCT Blood Glucose.: 269 mg/dL (26 Oct 2017 17:03)  POCT Blood Glucose.: 145 mg/dL (26 Oct 2017 13:00)  POCT Blood Glucose.: 295 mg/dL (26 Oct 2017 09:06)      Urinalysis Basic - ( 25 Oct 2017 18:30 )    Color: YELLOW / Appearance: HAZY / S.011 / pH: 5.5  Gluc: TRACE / Ketone: NEGATIVE  / Bili: NEGATIVE / Urobili: NORMAL E.U.   Blood: MODERATE / Protein: 100 / Nitrite: POSITIVE   Leuk Esterase: LARGE / RBC: 25-50 / WBC 10-25   Sq Epi: OCC / Non Sq Epi: x / Bacteria: MANY        Assessment: / Plan: 85 y/o w h/o lung CA s/p VATS, wedge resection a/w Pyelonephritis on antibiotics, has h/o Emphysema - c/o shortness of breath - pt's Sp02 in > 91 % on RA - pt placed on Oxygen supplementation 2 liters via NC, pt placed continuous pulse oximetry monitoring, duoneb treatments prn , vitals remain stable pt afebrile  CXR to evaluate etiology of shortness of breath. will continue to monitor     addendum Note:  pt evaluated again for shortness of breath - continuous Sp02 monitoring was 90 -97% on supplemental oxygen by NC pt received Duoneb treatment with some relief. Patient on Prednisone 10 mg PO daily  Chest / lungs - No accessory muscles of respiration were being used. Good air exchange, + mild wheezing with some rhonchi noted on auscultation.   Will give Xopenex neb x 1 dose and start Atrovent inhaler q 6 hrs . will give one dose of Solumedrol IV    addendum Note: 6:15  am   Patient desatted Sp02 to high 80's on monitor, pt c/o intermittent shortness of breath - states nebulizer treatments helps but she feels shortness of breath again   Patient placed on partial non rebreather mask - will continue to monitor  Will call Dr Ferrell consider Pulm c/s

## 2017-10-26 NOTE — H&P ADULT - ASSESSMENT
Patient is an 85 y/o F PMH Lung CA (unknown stage or type, Dx 2010 s/p L VATS, wedge resection, RT in July), HTN, DM2 p/w pyelonephritis

## 2017-10-27 LAB
-  AMIKACIN: SIGNIFICANT CHANGE UP
-  AMPICILLIN/SULBACTAM: SIGNIFICANT CHANGE UP
-  AMPICILLIN: SIGNIFICANT CHANGE UP
-  AZTREONAM: SIGNIFICANT CHANGE UP
-  CEFAZOLIN: SIGNIFICANT CHANGE UP
-  CEFEPIME: SIGNIFICANT CHANGE UP
-  CEFOXITIN: SIGNIFICANT CHANGE UP
-  CEFTAZIDIME: SIGNIFICANT CHANGE UP
-  CEFTRIAXONE: SIGNIFICANT CHANGE UP
-  CIPROFLOXACIN: SIGNIFICANT CHANGE UP
-  ERTAPENEM: SIGNIFICANT CHANGE UP
-  GENTAMICIN: SIGNIFICANT CHANGE UP
-  IMIPENEM: SIGNIFICANT CHANGE UP
-  LEVOFLOXACIN: SIGNIFICANT CHANGE UP
-  MEROPENEM: SIGNIFICANT CHANGE UP
-  NITROFURANTOIN: SIGNIFICANT CHANGE UP
-  PIPERACILLIN/TAZOBACTAM: SIGNIFICANT CHANGE UP
-  TIGECYCLINE: SIGNIFICANT CHANGE UP
-  TOBRAMYCIN: SIGNIFICANT CHANGE UP
-  TRIMETHOPRIM/SULFAMETHOXAZOLE: SIGNIFICANT CHANGE UP
ALBUMIN SERPL ELPH-MCNC: 3.6 G/DL — SIGNIFICANT CHANGE UP (ref 3.3–5)
ALP SERPL-CCNC: 43 U/L — SIGNIFICANT CHANGE UP (ref 40–120)
ALT FLD-CCNC: 25 U/L — SIGNIFICANT CHANGE UP (ref 4–33)
AST SERPL-CCNC: 41 U/L — HIGH (ref 4–32)
B PERT DNA SPEC QL NAA+PROBE: SIGNIFICANT CHANGE UP
BACTERIA UR CULT: SIGNIFICANT CHANGE UP
BASE EXCESS BLDA CALC-SCNC: -4.7 MMOL/L — SIGNIFICANT CHANGE UP
BASOPHILS # BLD AUTO: 0.01 K/UL — SIGNIFICANT CHANGE UP (ref 0–0.2)
BASOPHILS # BLD AUTO: 0.01 K/UL — SIGNIFICANT CHANGE UP (ref 0–0.2)
BASOPHILS NFR BLD AUTO: 0.1 % — SIGNIFICANT CHANGE UP (ref 0–2)
BASOPHILS NFR BLD AUTO: 0.1 % — SIGNIFICANT CHANGE UP (ref 0–2)
BASOPHILS NFR SPEC: 0.9 % — SIGNIFICANT CHANGE UP (ref 0–2)
BILIRUB SERPL-MCNC: 0.4 MG/DL — SIGNIFICANT CHANGE UP (ref 0.2–1.2)
BUN SERPL-MCNC: 38 MG/DL — HIGH (ref 7–23)
BUN SERPL-MCNC: 40 MG/DL — HIGH (ref 7–23)
BUN SERPL-MCNC: 43 MG/DL — HIGH (ref 7–23)
C PNEUM DNA SPEC QL NAA+PROBE: NOT DETECTED — SIGNIFICANT CHANGE UP
CALCIUM SERPL-MCNC: 8.3 MG/DL — LOW (ref 8.4–10.5)
CALCIUM SERPL-MCNC: 8.3 MG/DL — LOW (ref 8.4–10.5)
CALCIUM SERPL-MCNC: 8.8 MG/DL — SIGNIFICANT CHANGE UP (ref 8.4–10.5)
CHLORIDE BLDA-SCNC: 106 MMOL/L — SIGNIFICANT CHANGE UP (ref 96–108)
CHLORIDE SERPL-SCNC: 101 MMOL/L — SIGNIFICANT CHANGE UP (ref 98–107)
CHLORIDE SERPL-SCNC: 103 MMOL/L — SIGNIFICANT CHANGE UP (ref 98–107)
CHLORIDE SERPL-SCNC: 103 MMOL/L — SIGNIFICANT CHANGE UP (ref 98–107)
CK MB BLD-MCNC: 4.73 NG/ML — HIGH (ref 1–4.7)
CK SERPL-CCNC: 76 U/L — SIGNIFICANT CHANGE UP (ref 25–170)
CO2 SERPL-SCNC: 19 MMOL/L — LOW (ref 22–31)
CO2 SERPL-SCNC: 19 MMOL/L — LOW (ref 22–31)
CO2 SERPL-SCNC: 20 MMOL/L — LOW (ref 22–31)
CREAT BLDA-MCNC: 1.18 MG/DL — SIGNIFICANT CHANGE UP (ref 0.5–1.3)
CREAT SERPL-MCNC: 1.13 MG/DL — SIGNIFICANT CHANGE UP (ref 0.5–1.3)
CREAT SERPL-MCNC: 1.24 MG/DL — SIGNIFICANT CHANGE UP (ref 0.5–1.3)
CREAT SERPL-MCNC: 1.26 MG/DL — SIGNIFICANT CHANGE UP (ref 0.5–1.3)
EOSINOPHIL # BLD AUTO: 0.04 K/UL — SIGNIFICANT CHANGE UP (ref 0–0.5)
EOSINOPHIL # BLD AUTO: 0.04 K/UL — SIGNIFICANT CHANGE UP (ref 0–0.5)
EOSINOPHIL NFR BLD AUTO: 0.2 % — SIGNIFICANT CHANGE UP (ref 0–6)
EOSINOPHIL NFR BLD AUTO: 0.2 % — SIGNIFICANT CHANGE UP (ref 0–6)
EOSINOPHIL NFR FLD: 0 % — SIGNIFICANT CHANGE UP (ref 0–6)
FLUAV H1 2009 PAND RNA SPEC QL NAA+PROBE: NOT DETECTED — SIGNIFICANT CHANGE UP
FLUAV H1 RNA SPEC QL NAA+PROBE: NOT DETECTED — SIGNIFICANT CHANGE UP
FLUAV H3 RNA SPEC QL NAA+PROBE: NOT DETECTED — SIGNIFICANT CHANGE UP
FLUAV SUBTYP SPEC NAA+PROBE: SIGNIFICANT CHANGE UP
FLUBV RNA SPEC QL NAA+PROBE: NOT DETECTED — SIGNIFICANT CHANGE UP
GIANT PLATELETS BLD QL SMEAR: PRESENT — SIGNIFICANT CHANGE UP
GLUCOSE BLDA-MCNC: 257 MG/DL — HIGH (ref 70–99)
GLUCOSE BLDC GLUCOMTR-MCNC: 169 MG/DL — HIGH (ref 70–99)
GLUCOSE BLDC GLUCOMTR-MCNC: 194 MG/DL — HIGH (ref 70–99)
GLUCOSE BLDC GLUCOMTR-MCNC: 223 MG/DL — HIGH (ref 70–99)
GLUCOSE BLDC GLUCOMTR-MCNC: 251 MG/DL — HIGH (ref 70–99)
GLUCOSE SERPL-MCNC: 245 MG/DL — HIGH (ref 70–99)
GLUCOSE SERPL-MCNC: 260 MG/DL — HIGH (ref 70–99)
GLUCOSE SERPL-MCNC: 271 MG/DL — HIGH (ref 70–99)
HADV DNA SPEC QL NAA+PROBE: NOT DETECTED — SIGNIFICANT CHANGE UP
HBA1C BLD-MCNC: 7.5 % — HIGH (ref 4–5.6)
HCO3 BLDA-SCNC: 20 MMOL/L — LOW (ref 22–26)
HCOV 229E RNA SPEC QL NAA+PROBE: NOT DETECTED — SIGNIFICANT CHANGE UP
HCOV HKU1 RNA SPEC QL NAA+PROBE: NOT DETECTED — SIGNIFICANT CHANGE UP
HCOV NL63 RNA SPEC QL NAA+PROBE: NOT DETECTED — SIGNIFICANT CHANGE UP
HCOV OC43 RNA SPEC QL NAA+PROBE: NOT DETECTED — SIGNIFICANT CHANGE UP
HCT VFR BLD CALC: 30.8 % — LOW (ref 34.5–45)
HCT VFR BLD CALC: 31 % — LOW (ref 34.5–45)
HCT VFR BLDA CALC: 31.6 % — LOW (ref 34.5–46.5)
HGB BLD-MCNC: 9.8 G/DL — LOW (ref 11.5–15.5)
HGB BLD-MCNC: 9.8 G/DL — LOW (ref 11.5–15.5)
HGB BLDA-MCNC: 10.2 G/DL — LOW (ref 11.5–15.5)
HMPV RNA SPEC QL NAA+PROBE: NOT DETECTED — SIGNIFICANT CHANGE UP
HPIV1 RNA SPEC QL NAA+PROBE: NOT DETECTED — SIGNIFICANT CHANGE UP
HPIV2 RNA SPEC QL NAA+PROBE: NOT DETECTED — SIGNIFICANT CHANGE UP
HPIV3 RNA SPEC QL NAA+PROBE: NOT DETECTED — SIGNIFICANT CHANGE UP
HPIV4 RNA SPEC QL NAA+PROBE: NOT DETECTED — SIGNIFICANT CHANGE UP
IMM GRANULOCYTES # BLD AUTO: 1.02 # — SIGNIFICANT CHANGE UP
IMM GRANULOCYTES # BLD AUTO: 1.2 # — SIGNIFICANT CHANGE UP
IMM GRANULOCYTES NFR BLD AUTO: 5.6 % — HIGH (ref 0–1.5)
IMM GRANULOCYTES NFR BLD AUTO: 6.8 % — HIGH (ref 0–1.5)
LACTATE BLDA-SCNC: 1.1 MMOL/L — SIGNIFICANT CHANGE UP (ref 0.5–2)
LYMPHOCYTES # BLD AUTO: 0.5 K/UL — LOW (ref 1–3.3)
LYMPHOCYTES # BLD AUTO: 0.52 K/UL — LOW (ref 1–3.3)
LYMPHOCYTES # BLD AUTO: 2.8 % — LOW (ref 13–44)
LYMPHOCYTES # BLD AUTO: 2.9 % — LOW (ref 13–44)
LYMPHOCYTES NFR SPEC AUTO: 0.9 % — LOW (ref 13–44)
M PNEUMO DNA SPEC QL NAA+PROBE: NOT DETECTED — SIGNIFICANT CHANGE UP
M TB TUBERC IFN-G BLD QL: 0 IU/ML — SIGNIFICANT CHANGE UP
M TB TUBERC IFN-G BLD QL: 0.04 IU/ML — SIGNIFICANT CHANGE UP
M TB TUBERC IFN-G BLD QL: SIGNIFICANT CHANGE UP
MAGNESIUM SERPL-MCNC: 1.8 MG/DL — SIGNIFICANT CHANGE UP (ref 1.6–2.6)
MAGNESIUM SERPL-MCNC: 1.9 MG/DL — SIGNIFICANT CHANGE UP (ref 1.6–2.6)
MAGNESIUM SERPL-MCNC: 1.9 MG/DL — SIGNIFICANT CHANGE UP (ref 1.6–2.6)
MCHC RBC-ENTMCNC: 27.9 PG — SIGNIFICANT CHANGE UP (ref 27–34)
MCHC RBC-ENTMCNC: 28.3 PG — SIGNIFICANT CHANGE UP (ref 27–34)
MCHC RBC-ENTMCNC: 31.6 % — LOW (ref 32–36)
MCHC RBC-ENTMCNC: 31.8 % — LOW (ref 32–36)
MCV RBC AUTO: 88.3 FL — SIGNIFICANT CHANGE UP (ref 80–100)
MCV RBC AUTO: 89 FL — SIGNIFICANT CHANGE UP (ref 80–100)
METHOD TYPE: SIGNIFICANT CHANGE UP
MITOGEN IGNF BCKGRD COR BLD-ACNC: 0.13 IU/ML — SIGNIFICANT CHANGE UP
MONOCYTES # BLD AUTO: 0.48 K/UL — SIGNIFICANT CHANGE UP (ref 0–0.9)
MONOCYTES # BLD AUTO: 0.53 K/UL — SIGNIFICANT CHANGE UP (ref 0–0.9)
MONOCYTES NFR BLD AUTO: 2.7 % — SIGNIFICANT CHANGE UP (ref 2–14)
MONOCYTES NFR BLD AUTO: 2.9 % — SIGNIFICANT CHANGE UP (ref 2–14)
MONOCYTES NFR BLD: 0 % — LOW (ref 2–9)
MORPHOLOGY BLD-IMP: NORMAL — SIGNIFICANT CHANGE UP
NEUTROPHIL AB SER-ACNC: 91.3 % — HIGH (ref 43–77)
NEUTROPHILS # BLD AUTO: 15.43 K/UL — HIGH (ref 1.8–7.4)
NEUTROPHILS # BLD AUTO: 16.08 K/UL — HIGH (ref 1.8–7.4)
NEUTROPHILS NFR BLD AUTO: 87.3 % — HIGH (ref 43–77)
NEUTROPHILS NFR BLD AUTO: 88.4 % — HIGH (ref 43–77)
NEUTS BAND # BLD: 5.2 % — SIGNIFICANT CHANGE UP (ref 0–6)
NRBC # FLD: 0 — SIGNIFICANT CHANGE UP
NRBC # FLD: 0 — SIGNIFICANT CHANGE UP
ORGANISM # SPEC MICROSCOPIC CNT: SIGNIFICANT CHANGE UP
PCO2 BLDA: 40 MMHG — SIGNIFICANT CHANGE UP (ref 32–48)
PH BLDA: 7.32 PH — LOW (ref 7.35–7.45)
PHOSPHATE SERPL-MCNC: 2.6 MG/DL — SIGNIFICANT CHANGE UP (ref 2.5–4.5)
PHOSPHATE SERPL-MCNC: 2.6 MG/DL — SIGNIFICANT CHANGE UP (ref 2.5–4.5)
PLATELET # BLD AUTO: 165 K/UL — SIGNIFICANT CHANGE UP (ref 150–400)
PLATELET # BLD AUTO: 166 K/UL — SIGNIFICANT CHANGE UP (ref 150–400)
PLATELET COUNT - ESTIMATE: NORMAL — SIGNIFICANT CHANGE UP
PMV BLD: 11.7 FL — SIGNIFICANT CHANGE UP (ref 7–13)
PMV BLD: 11.8 FL — SIGNIFICANT CHANGE UP (ref 7–13)
PO2 BLDA: 79 MMHG — LOW (ref 83–108)
POTASSIUM BLDA-SCNC: 3.9 MMOL/L — SIGNIFICANT CHANGE UP (ref 3.4–4.5)
POTASSIUM SERPL-MCNC: 3.8 MMOL/L — SIGNIFICANT CHANGE UP (ref 3.5–5.3)
POTASSIUM SERPL-MCNC: 4.1 MMOL/L — SIGNIFICANT CHANGE UP (ref 3.5–5.3)
POTASSIUM SERPL-MCNC: 4.1 MMOL/L — SIGNIFICANT CHANGE UP (ref 3.5–5.3)
POTASSIUM SERPL-SCNC: 3.8 MMOL/L — SIGNIFICANT CHANGE UP (ref 3.5–5.3)
POTASSIUM SERPL-SCNC: 4.1 MMOL/L — SIGNIFICANT CHANGE UP (ref 3.5–5.3)
POTASSIUM SERPL-SCNC: 4.1 MMOL/L — SIGNIFICANT CHANGE UP (ref 3.5–5.3)
PROT SERPL-MCNC: 7.7 G/DL — SIGNIFICANT CHANGE UP (ref 6–8.3)
RBC # BLD: 3.46 M/UL — LOW (ref 3.8–5.2)
RBC # BLD: 3.51 M/UL — LOW (ref 3.8–5.2)
RBC # FLD: 14.4 % — SIGNIFICANT CHANGE UP (ref 10.3–14.5)
RBC # FLD: 14.4 % — SIGNIFICANT CHANGE UP (ref 10.3–14.5)
REVIEW TO FOLLOW: YES — SIGNIFICANT CHANGE UP
RSV RNA SPEC QL NAA+PROBE: NOT DETECTED — SIGNIFICANT CHANGE UP
RV+EV RNA SPEC QL NAA+PROBE: NOT DETECTED — SIGNIFICANT CHANGE UP
SAO2 % BLDA: 95.3 % — SIGNIFICANT CHANGE UP (ref 95–99)
SODIUM BLDA-SCNC: 135 MMOL/L — LOW (ref 136–146)
SODIUM SERPL-SCNC: 139 MMOL/L — SIGNIFICANT CHANGE UP (ref 135–145)
TROPONIN T SERPL-MCNC: < 0.06 NG/ML — SIGNIFICANT CHANGE UP (ref 0–0.06)
VARIANT LYMPHS # BLD: 1.7 % — SIGNIFICANT CHANGE UP
WBC # BLD: 17.68 K/UL — HIGH (ref 3.8–10.5)
WBC # BLD: 18.18 K/UL — HIGH (ref 3.8–10.5)
WBC # FLD AUTO: 17.68 K/UL — HIGH (ref 3.8–10.5)
WBC # FLD AUTO: 18.18 K/UL — HIGH (ref 3.8–10.5)

## 2017-10-27 PROCEDURE — 99291 CRITICAL CARE FIRST HOUR: CPT

## 2017-10-27 PROCEDURE — 99232 SBSQ HOSP IP/OBS MODERATE 35: CPT

## 2017-10-27 RX ORDER — LEVALBUTEROL 1.25 MG/.5ML
0.63 SOLUTION, CONCENTRATE RESPIRATORY (INHALATION) ONCE
Qty: 0 | Refills: 0 | Status: COMPLETED | OUTPATIENT
Start: 2017-10-27 | End: 2017-10-27

## 2017-10-27 RX ORDER — PANTOPRAZOLE SODIUM 20 MG/1
40 TABLET, DELAYED RELEASE ORAL DAILY
Qty: 0 | Refills: 0 | Status: DISCONTINUED | OUTPATIENT
Start: 2017-10-27 | End: 2017-10-30

## 2017-10-27 RX ORDER — IPRATROPIUM BROMIDE 0.2 MG/ML
1 SOLUTION, NON-ORAL INHALATION EVERY 6 HOURS
Qty: 0 | Refills: 0 | Status: DISCONTINUED | OUTPATIENT
Start: 2017-10-27 | End: 2017-10-27

## 2017-10-27 RX ORDER — LANOLIN ALCOHOL/MO/W.PET/CERES
3 CREAM (GRAM) TOPICAL ONCE
Qty: 0 | Refills: 0 | Status: COMPLETED | OUTPATIENT
Start: 2017-10-27 | End: 2017-10-27

## 2017-10-27 RX ORDER — SODIUM CHLORIDE 0.65 %
1 AEROSOL, SPRAY (ML) NASAL THREE TIMES A DAY
Qty: 0 | Refills: 0 | Status: DISCONTINUED | OUTPATIENT
Start: 2017-10-27 | End: 2017-11-07

## 2017-10-27 RX ORDER — MEROPENEM 1 G/30ML
INJECTION INTRAVENOUS
Qty: 0 | Refills: 0 | Status: COMPLETED | OUTPATIENT
Start: 2017-10-27 | End: 2017-11-01

## 2017-10-27 RX ORDER — MEROPENEM 1 G/30ML
1000 INJECTION INTRAVENOUS EVERY 8 HOURS
Qty: 0 | Refills: 0 | Status: COMPLETED | OUTPATIENT
Start: 2017-10-27 | End: 2017-11-01

## 2017-10-27 RX ORDER — MEROPENEM 1 G/30ML
1000 INJECTION INTRAVENOUS ONCE
Qty: 0 | Refills: 0 | Status: COMPLETED | OUTPATIENT
Start: 2017-10-27 | End: 2017-10-27

## 2017-10-27 RX ADMIN — Medication 3 MILLIGRAM(S): at 03:59

## 2017-10-27 RX ADMIN — Medication 3 MILLILITER(S): at 23:03

## 2017-10-27 RX ADMIN — HEPARIN SODIUM 5000 UNIT(S): 5000 INJECTION INTRAVENOUS; SUBCUTANEOUS at 13:37

## 2017-10-27 RX ADMIN — Medication 1 DROP(S): at 05:15

## 2017-10-27 RX ADMIN — Medication 6: at 09:47

## 2017-10-27 RX ADMIN — Medication 81 MILLIGRAM(S): at 13:37

## 2017-10-27 RX ADMIN — Medication 1 DROP(S): at 22:04

## 2017-10-27 RX ADMIN — Medication 325 MILLIGRAM(S): at 20:40

## 2017-10-27 RX ADMIN — Medication 50 MILLIGRAM(S): at 18:19

## 2017-10-27 RX ADMIN — LEVALBUTEROL 0.63 MILLIGRAM(S): 1.25 SOLUTION, CONCENTRATE RESPIRATORY (INHALATION) at 04:36

## 2017-10-27 RX ADMIN — Medication 145 MILLIGRAM(S): at 15:47

## 2017-10-27 RX ADMIN — Medication 0.25 MILLIGRAM(S): at 20:35

## 2017-10-27 RX ADMIN — Medication 10 MILLIGRAM(S): at 05:14

## 2017-10-27 RX ADMIN — Medication 1 DROP(S): at 15:47

## 2017-10-27 RX ADMIN — Medication 650 MILLIGRAM(S): at 21:45

## 2017-10-27 RX ADMIN — Medication 1 SPRAY(S): at 22:03

## 2017-10-27 RX ADMIN — Medication 4: at 18:18

## 2017-10-27 RX ADMIN — Medication 3 MILLILITER(S): at 03:34

## 2017-10-27 RX ADMIN — PANTOPRAZOLE SODIUM 40 MILLIGRAM(S): 20 TABLET, DELAYED RELEASE ORAL at 14:30

## 2017-10-27 RX ADMIN — Medication 0.5 MILLIGRAM(S): at 18:25

## 2017-10-27 RX ADMIN — Medication 60 MILLIGRAM(S): at 22:05

## 2017-10-27 RX ADMIN — Medication 2: at 13:35

## 2017-10-27 RX ADMIN — Medication 50 MILLIGRAM(S): at 05:14

## 2017-10-27 RX ADMIN — HEPARIN SODIUM 5000 UNIT(S): 5000 INJECTION INTRAVENOUS; SUBCUTANEOUS at 05:14

## 2017-10-27 RX ADMIN — Medication 3 MILLILITER(S): at 16:48

## 2017-10-27 RX ADMIN — Medication 30 MILLIGRAM(S): at 05:14

## 2017-10-27 RX ADMIN — HEPARIN SODIUM 5000 UNIT(S): 5000 INJECTION INTRAVENOUS; SUBCUTANEOUS at 22:04

## 2017-10-27 RX ADMIN — Medication 1 SPRAY(S): at 15:48

## 2017-10-27 RX ADMIN — MEROPENEM 200 MILLIGRAM(S): 1 INJECTION INTRAVENOUS at 22:03

## 2017-10-27 RX ADMIN — Medication 3 MILLILITER(S): at 09:24

## 2017-10-27 RX ADMIN — Medication 40 MILLIGRAM(S): at 05:22

## 2017-10-27 RX ADMIN — MEROPENEM 200 MILLIGRAM(S): 1 INJECTION INTRAVENOUS at 13:37

## 2017-10-27 NOTE — PROGRESS NOTE ADULT - SUBJECTIVE AND OBJECTIVE BOX
Follow Up:      Inverval History/ROS: Patient is a 86y old  Female who presents with a chief complaint of abd, back pain, n/v (26 Oct 2017 03:46)    Has E coli bacteremia/ gram negative UTI.    Had increased work of breathing- transferred to ICU.    On BiPAP.  Afebrile.      Allergies    ACE inhibitors (Angioedema)    Intolerances        ANTIMICROBIALS:  cefTRIAXone   IVPB 1 every 24 hours      OTHER MEDS:  acetaminophen   Tablet 650 milliGRAM(s) Oral every 6 hours PRN  acetaminophen   Tablet. 650 milliGRAM(s) Oral every 6 hours PRN  ALBUTerol/ipratropium for Nebulization 3 milliLiter(s) Nebulizer every 6 hours  aspirin enteric coated 81 milliGRAM(s) Oral daily  fenofibrate Tablet 145 milliGRAM(s) Oral daily  heparin  Injectable 5000 Unit(s) SubCutaneous every 8 hours  insulin lispro (HumaLOG) corrective regimen sliding scale   SubCutaneous three times a day before meals  insulin lispro (HumaLOG) corrective regimen sliding scale   SubCutaneous at bedtime  metoprolol     tartrate 50 milliGRAM(s) Oral every 12 hours  NIFEdipine XL 30 milliGRAM(s) Oral daily  NIFEdipine XL 60 milliGRAM(s) Oral at bedtime  prednisoLONE acetate 1% Suspension 1 Drop(s) Both EYES three times a day  predniSONE   Tablet 10 milliGRAM(s) Oral daily  sodium chloride 0.65% Nasal 1 Spray(s) Both Nostrils three times a day      Vital Signs Last 24 Hrs  T(C): 36.8 (27 Oct 2017 09:16), Max: 36.8 (27 Oct 2017 09:16)  T(F): 98.2 (27 Oct 2017 09:16), Max: 98.2 (27 Oct 2017 09:16)  HR: 81 (27 Oct 2017 11:15) (73 - 90)  BP: 131/63 (27 Oct 2017 11:00) (115/71 - 164/83)  BP(mean): 80 (27 Oct 2017 11:00) (79 - 105)  RR: 26 (27 Oct 2017 11:00) (17 - 29)  SpO2: 95% (27 Oct 2017 11:15) (93% - 100%)    PHYSICAL EXAM:  General: [x ] BIPAP  HEAD/EYES: [ ] PERRL x[ ] white sclera [ ] icterus  ENT:  [ ] normal [x ] supple [ ] thrush [ ] pharyngeal exudate  Cardiovascular:   [ ] murmur [x ] normal [ ] PPM/AICD  Respiratory:  [x ] course BS  GI:  [ x] soft, non-tender, normal bowel sounds  :  [ ] swenson [ ] no CVA tenderness   Musculoskeletal:  [ ] no synovitis  Neurologic:  [ ] non-focal exam   Skin:  [x ] no rash  Lymph: [ ] no lymphadenopathy  Psychiatric:  [ ] appropriate affect [ ] alert & oriented  Lines:  [x ] no phlebitis [ ] central line                                9.8    18.18 )-----------( 166      ( 27 Oct 2017 07:00 )             31.0       10    139  |  103  |  43<H>  ----------------------------<  271<H>  4.1   |  19<L>  |  1.26    Ca    8.3<L>      27 Oct 2017 07:00  Phos  2.6     10-  Mg     1.8     1027    TPro  7.7  /  Alb  3.6  /  TBili  0.4  /  DBili  x   /  AST  41<H>  /  ALT  25  /  AlkPhos  43  10-27      Urinalysis Basic - ( 25 Oct 2017 18:30 )    Color: YELLOW / Appearance: HAZY / S.011 / pH: 5.5  Gluc: TRACE / Ketone: NEGATIVE  / Bili: NEGATIVE / Urobili: NORMAL E.U.   Blood: MODERATE / Protein: 100 / Nitrite: POSITIVE   Leuk Esterase: LARGE / RBC: 25-50 / WBC 10-25   Sq Epi: OCC / Non Sq Epi: x / Bacteria: MANY        MICROBIOLOGY:  Urine Cx: GNR  Blood Cx: E coli        RADIOLOGY:

## 2017-10-27 NOTE — CHART NOTE - NSCHARTNOTEFT_GEN_A_CORE
RRT called at 7:03 for respiratory distress.  Patient is an 85 y/o F w/ PMHx Lung CA (unknown stage or type, Dx 2010 s/p L VATS, wedge resection, RT in July), HTN, DM2 p/w N/V, dysuria. Patient reports cough productive of sputum X2 weeks, s/p Antibiotics (unknown by pt but per ER records, amoxicillin). Patient felt well until 2 days pta, developed dysuria, urgency, increased frequency, progressing to suprapubic tenderness, F/C, and N/V. On admission, patient found to have Pyelonephritis, with Urine Cx growing >100,000 GNR, and Blood cultures growing EColi.  Patient started on Ceftriaxone, s/p Azithromycin X 1 dose, and was doing well until this morning when she developed acute onset shortness of breath, placed on 50% NRB, and RRT called.  At onset of RRT, vitals: /80, HR 90s, RR 18, SP02 91% on 50% NRB.  Patient in mild Respiratory distress, A&Ox3, lungs clear to auscultation BL, abdomen distended, no LE edema.  MICU at bedside, bedside US showing RLL atelectasis with moderate sized pleural effusion, and small Pl Eff on the left side.  Patient's SP02 stable 91-93%, BP trending up to 190s/90s, likely 2/2 anxiety, per assessment by MICU attending, patient likely developing ARDS 2/2 pyelonephritis, decision made to start patient on CPAP, and transfer to MICU for further management.     Selena Heath, PGY-3  [facility]- [id number]

## 2017-10-27 NOTE — PROGRESS NOTE ADULT - ASSESSMENT
86 year old with lung cancer (VATS in past/ recent radiation ) and PMR on low dose steroid as an outpatient presented  with E coli bacteremia likely due to UTI/ pyelonephritis.     Her CT chest findings are noted- ? if this is all due to her malignancy.  I do not have a clinical suspicion for Tuberculosis.  Acute bacterial pulmonary process is less likely given E coli bacteremia.      Tx to ICU for respiratory distress/ ? ARDS.    I called the micro lab and spoke with the micro tech to expediate sensitivity of isolates.    Consider broadening to meropenem pending these results.    I would repeat blood cultures.     Call the ID service with questions or concerns over the weekend.  [phone]

## 2017-10-27 NOTE — CHART NOTE - NSCHARTNOTEFT_GEN_A_CORE
Transfer to MICU note:    Patient is an 85 y/o F PMH Lung CA (unknown stage or type, Dx 2010 s/p L VATS, wedge resection, RT in July), HTN, DM2 initially present with Nausea, vomiting, dysuria. Patient previously had cough productive of sputum for 2 weeks, s/p Antibiotics (unknown by pt but per ER records, amoxicillin). Patient felt well until 3 days ago, developed dysuria, urgency, increased frequency, progressing to suprapubic tenderness, fever, chills, nausea and vomiting. No recent travel, no sick contacts. On admission, patient found to have Pyelonephritis, with Urine Cx growing >100,000 gram negative rods. Blood cultures growing E. Coli.  Patient started on Ceftriaxone, s/p Azithromycin X 1 dose, and was doing well until this morning when she developed acute onset shortness of breath, placed on 50% NRB, and RRT called.  At onset of RRT, vitals: /80, HR 90s, RR 18, SP02 91% on 50% NRB.  Patient in mild Respiratory distress. Bedside US showing RLL atelectasis with moderate sized pleural effusion, and small Pleural effusion on the left side.  Patient's SP02 stable 91-93%. Decision made to start patient on CPAP, and transfer to MICU for further management of her hypoxic respiratory failure.                     Assessment and plan    85 y/o F PMH Lung CA (unknown stage or type, Dx 2010 s/p L VATS, wedge resection, RT in July), HTN, DM2 admitted for pyelonephritis complicated by acute respiratory failure    # Neuro: Alert and oriented x3    #Resp: On CPAP. Transfer to MICU note:    Patient is an 87 y/o F PMH Lung CA (unknown stage or type, Dx 2010 s/p L VATS, wedge resection, RT in July), HTN, DM2 initially present with Nausea, vomiting, dysuria. Patient previously had cough productive of sputum for 2 weeks, s/p Antibiotics (unknown by pt but per ER records, amoxicillin). Patient felt well until 3 days ago, developed dysuria, urgency, increased frequency, progressing to suprapubic tenderness, fever, chills, nausea and vomiting. No recent travel, no sick contacts. On admission, patient found to have Pyelonephritis, with Urine Cx growing >100,000 gram negative rods. Blood cultures growing E. Coli.  Patient started on Ceftriaxone, s/p Azithromycin X 1 dose, and was doing well until this morning when she developed acute onset shortness of breath, placed on 50% NRB, and RRT called.  At onset of RRT, vitals: /80, HR 90s, RR 18, SP02 91% on 50% NRB.  Patient in mild Respiratory distress. Bedside US showing RLL atelectasis with moderate sized pleural effusion, and small Pleural effusion on the left side.  Patient's SP02 stable 91-93%. Decision made to start patient on CPAP, and transfer to MICU for further management of her hypoxic respiratory failure.                     Assessment and plan    87 y/o F PMH Lung CA (unknown stage or type, Dx 2010 s/p L VATS, wedge resection, RT in July), HTN, DM2 admitted for pyelonephritis complicated by acute respiratory failure    # Neuro: Alert and oriented x3    #Resp: On CPAP.     # CVS: No RV Transfer to MICU note:    Patient is an 87 y/o F PMH Lung CA (unknown stage or type, Dx 2010 s/p L VATS, wedge resection, RT in July), HTN, DM2 initially present with Nausea, vomiting, dysuria. Patient previously had cough productive of sputum for 2 weeks, s/p Antibiotics (unknown by pt but per ER records, amoxicillin). Patient felt well until 3 days ago, developed dysuria, urgency, increased frequency, progressing to suprapubic tenderness, fever, chills, nausea and vomiting. No recent travel, no sick contacts. On admission, patient found to have Pyelonephritis, with Urine Cx growing >100,000 gram negative rods. Blood cultures growing E. Coli.  Patient started on Ceftriaxone, s/p Azithromycin X 1 dose, and was doing well until this morning when she developed acute onset shortness of breath, placed on 50% NRB, and RRT called.  At onset of RRT, vitals: /80, HR 90s, RR 18, SP02 91% on 50% NRB.  Patient in mild Respiratory distress. Bedside US showing RLL atelectasis with moderate sized pleural effusion, and small Pleural effusion on the left side.  Patient's SP02 stable 91-93%. Decision made to start patient on CPAP, and transfer to MICU for further management of her hypoxic respiratory failure.                     Assessment and plan    87 y/o F PMH Lung CA (unknown stage or type, Dx 2010 s/p L VATS, wedge resection, RT in July), HTN, DM2 admitted for pyelonephritis complicated by acute respiratory failure    # Neuro: Alert and oriented x3    # Resp: On CPAP. No consolidation on CXR and POCUS. Continue Solumedrol, duoneb    # CVS: Hx of HTN. No RV enlargement on POCUS, not tachycardic - low suspicion for PE. Continue Nifedipine.    # Renal: Electrolytes wnl. BUN/Cr stable.    # Heme/Oc: Hx of lung cancer on remission. H & H stable.    # Endo: Hx of DM2. Q6 FS. SSI    # GI: Protonix of GI PPx    #. ID: [id number] E. coli bacteremia, leukocytosis. Afebrile. Switched Ceftriaxone to Meropenem. Repeat blood culture if febrile.     # DVT PPx: Heparin SubQ Transfer to MICU note:    Patient is an 85 y/o F PMH Lung CA (unknown stage or type, Dx 2010 s/p L VATS, wedge resection, RT in July), HTN, DM2 initially present with Nausea, vomiting, dysuria. Patient previously had cough productive of sputum for 2 weeks, s/p Antibiotics (unknown by pt but per ER records, amoxicillin). Patient felt well until 3 days ago, developed dysuria, urgency, increased frequency, progressing to suprapubic tenderness, fever, chills, nausea and vomiting. No recent travel, no sick contacts. On admission, patient found to have Pyelonephritis, with Urine Cx growing >100,000 gram negative rods. Blood cultures growing E. Coli.  Patient started on Ceftriaxone, s/p Azithromycin X 1 dose, and was doing well until this morning when she developed acute onset shortness of breath, placed on 50% NRB, and RRT called.  At onset of RRT, vitals: /80, HR 90s, RR 18, SP02 91% on 50% NRB.  Patient in mild Respiratory distress. Bedside US showing RLL atelectasis with moderate sized pleural effusion, and small Pleural effusion on the left side.  Patient's SP02 stable 91-93%. Decision made to start patient on CPAP, and transfer to MICU for further management of her hypoxic respiratory failure.       PHYSICAL EXAM:    General: NAD. On CAP  HEENT: NC/AT; PERRL, clear conjunctiva  Neck: supple  Respiratory: CTA b/l. No wheezes  Cardiovascular: +S1/S2; RRR  Abdomen: Distended, soft, nontender ; +BS x4  Extremities: WWP, 2+ peripheral pulses b/l; no LE edema  Skin: normal color and turgor; no rash  Neurological: No focal deficits      Assessment and plan    85 y/o F PMH Lung CA (unknown stage or type, Dx 2010 s/p L VATS, wedge resection, RT in July), HTN, DM2 admitted for pyelonephritis complicated by acute respiratory failure    # Neuro: Alert and oriented x3    # Resp: On CPAP. No consolidation on CXR and POCUS. No pleural effusion. Continue Solumedrol, duoneb    # CVS: Hx of HTN. No RV enlargement on POCUS, not tachycardic - low suspicion for PE. Continue metoprolol, Nifedipine.    # Renal: Electrolytes wnl. BUN/Cr stable.    # Heme/Oc: Hx of lung cancer on remission. H & H stable.    # Endo: Hx of DM2. Q6 FS. SSI    # GI: Protonix of GI PPx    #. ID: [id number] E. coli bacteremia, leukocytosis. Afebrile. Switched Ceftriaxone to Meropenem. Repeat blood culture if febrile.     # DVT PPx: Heparin SubQ Transfer to MICU note:    Patient is an 85 y/o F PMH Lung CA (unknown stage or type, Dx 2010 s/p L VATS, wedge resection, RT in July), HTN, DM2 initially present with Nausea, vomiting, dysuria. Patient previously had cough productive of sputum for 2 weeks, s/p Antibiotics (unknown by pt but per ER records, amoxicillin). Patient felt well until 3 days ago, developed dysuria, urgency, increased frequency, progressing to suprapubic tenderness, fever, chills, nausea and vomiting. No recent travel, no sick contacts. On admission, patient found to have Pyelonephritis, with Urine Cx growing >100,000 gram negative rods. Blood cultures growing E. Coli.  Patient started on Ceftriaxone, s/p Azithromycin X 1 dose, and was doing well until this morning when she developed acute onset shortness of breath, placed on 50% NRB, and RRT called.  At onset of RRT, vitals: /80, HR 90s, RR 18, SP02 91% on 50% NRB.  Patient in mild Respiratory distress. Bedside US showing RLL atelectasis with moderate sized pleural effusion, and small Pleural effusion on the left side.  Patient's SP02 stable 91-93%. Decision made to start patient on CPAP, and transfer to MICU for further management of her hypoxic respiratory failure.       ROS:    Denies chest pain, abdominal pain, N/V,     PHYSICAL EXAM:    General: NAD. On CAP  HEENT: NC/AT; PERRL, clear conjunctiva  Neck: supple  Respiratory: CTA b/l. No wheezes  Cardiovascular: +S1/S2; RRR  Abdomen: Distended, soft, nontender ; +BS x4. No CVA tenderness  Extremities: WWP, 2+ peripheral pulses b/l; no LE edema  Skin: normal color and turgor; no rash  Neurological: No focal deficits      Assessment and plan    85 y/o F PMH Lung CA (unknown stage or type, Dx 2010 s/p L VATS, wedge resection, RT in July), HTN, DM2 admitted for pyelonephritis complicated by acute respiratory failure    # Neuro: Alert and oriented x3    # Resp: On CPAP. No consolidation on CXR and POCUS. No pleural effusion. Continue Solumedrol, duoneb    # CVS: Hx of HTN. No RV enlargement on POCUS, not tachycardic - low suspicion for PE. Continue metoprolol, Nifedipine.    # Renal: Electrolytes wnl. BUN/Cr stable.    # Heme/Oc: Hx of lung cancer on remission. H & H stable.    # Endo: Hx of DM2. Q6 FS. SSI    # GI: Protonix of GI PPx    #. ID: [id number] E. coli bacteremia, leukocytosis. Afebrile. Switched Ceftriaxone to Meropenem. Repeat blood culture if febrile.     # DVT PPx: Heparin SubQ Transfer to MICU note:    Patient is an 85 y/o F PMH Lung CA (unknown stage or type, Dx 2010 s/p L VATS, wedge resection, RT in July), HTN, DM2 initially present with Nausea, vomiting, dysuria. Patient previously had cough productive of sputum for 2 weeks, s/p Antibiotics (unknown by pt but per ER records, amoxicillin). Patient felt well until 3 days ago, developed dysuria, urgency, increased frequency, progressing to suprapubic tenderness, fever, chills, nausea and vomiting. No recent travel, no sick contacts. On admission, patient found to have Pyelonephritis, with Urine Cx growing >100,000 gram negative rods. Blood cultures growing E. Coli.  Patient started on Ceftriaxone, s/p Azithromycin X 1 dose, and was doing well until this morning when she developed acute onset shortness of breath, placed on 50% NRB, and RRT called.  At onset of RRT, vitals: /80, HR 90s, RR 18, SP02 91% on 50% NRB.  Patient in mild Respiratory distress. Bedside US showing RLL atelectasis with moderate sized pleural effusion, and small Pleural effusion on the left side.  Patient's SP02 stable 91-93%. Decision made to start patient on CPAP, and transfer to MICU for further management of her hypoxic respiratory failure.       ROS:    Denies chest pain, abdominal pain, N/V,     PHYSICAL EXAM:    General: NAD. On CAP  HEENT: NC/AT; PERRL, clear conjunctiva  Neck: supple  Respiratory: CTA b/l. No wheezes  Cardiovascular: +S1/S2; RRR  Abdomen: Distended, soft, nontender ; +BS x4. No CVA tenderness  Extremities: WWP, 2+ peripheral pulses b/l; no LE edema  Skin: normal color and turgor; no rash  Neurological: No focal deficits      Assessment and plan    85 y/o F PMH Lung CA (unknown stage or type, Dx 2010 s/p L VATS, wedge resection, RT in July), HTN, DM2 admitted for pyelonephritis complicated by acute respiratory failure    # Neuro: Alert and oriented x3    # Resp: On CPAP. No consolidation on CXR and POCUS. No pleural effusion. Continue Solumedrol, duoneb    # CVS: Hx of HTN. No RV enlargement on POCUS, not tachycardic - low suspicion for PE. Continue metoprolol, Nifedipine.    # Renal: Electrolytes wnl. BUN/Cr stable.    # Heme/Oc: Hx of lung cancer on remission. H & H stable.    # Endo: Hx of DM2. Q6 FS. SSI    # GI: Protonix of GI PPx    #. ID: [id number] E. coli bacteremia, leukocytosis. Afebrile. Switched Ceftriaxone to Meropenem. Repeat blood culture if febrile.     # DVT PPx: Heparin SubQ    Critical Care Attending Attestation:    Pt seen and examined, agree with above. 86 F with lung Ca, admitted with pyelonephritis, now with acute hypoxic resp failure likely due to atelectasis. UCx showing ESBL E coli, will broaden coverage to Meropenem IV. Resp status greatly improved on CPAP. No RV dilatation on goal directed echo, gabe OLEA dopplers.     Critical Care Time Spent: 35 mins Transfer to MICU note:    Patient is an 87 y/o F PMH Lung CA (unknown stage or type, Dx 2010 s/p L VATS, wedge resection, RT in July), HTN, DM2 initially present with Nausea, vomiting, dysuria. Patient previously had cough productive of sputum for 2 weeks, s/p Antibiotics (unknown by pt but per ER records, amoxicillin). Patient felt well until 3 days ago, developed dysuria, urgency, increased frequency, progressing to suprapubic tenderness, fever, chills, nausea and vomiting. No recent travel, no sick contacts. On admission, patient found to have Pyelonephritis, with Urine Cx growing >100,000 gram negative rods. Blood cultures growing E. Coli.  Patient started on Ceftriaxone, s/p Azithromycin X 1 dose, and was doing well until this morning when she developed acute onset shortness of breath, placed on 50% NRB, and RRT called.  At onset of RRT, vitals: /80, HR 90s, RR 18, SP02 91% on 50% NRB.  Patient in mild Respiratory distress. Bedside US showing RLL atelectasis with moderate sized pleural effusion, and small Pleural effusion on the left side.  Patient's SP02 stable 91-93%. Decision made to start patient on CPAP, and transfer to MICU for further management of her hypoxic respiratory failure.       ROS:    Denies chest pain, abdominal pain, N/V,     PHYSICAL EXAM:    General: NAD. On CAP  HEENT: NC/AT; PERRL, clear conjunctiva  Neck: supple  Respiratory: CTA b/l. No wheezes  Cardiovascular: +S1/S2; RRR  Abdomen: Distended, soft, nontender ; +BS x4. No CVA tenderness  Extremities: WWP, 2+ peripheral pulses b/l; no LE edema  Skin: normal color and turgor; no rash  Neurological: No focal deficits      Assessment and plan    87 y/o F PMH Lung CA (unknown stage or type, Dx 2010 s/p L VATS, wedge resection, RT in July), HTN, DM2 admitted for pyelonephritis complicated by acute respiratory failure    # Neuro: Alert and oriented x3    # Resp: On CPAP. No consolidation on CXR and POCUS. No pleural effusion. Continue Solumedrol, duoneb    # CVS: Hx of HTN. No RV enlargement on POCUS, not tachycardic - low suspicion for PE. Continue metoprolol, Nifedipine.    # Renal: Electrolytes wnl. BUN/Cr stable.    # Heme/Oc: Hx of lung cancer on remission. H & H stable.    # Endo: Hx of DM2. Q6 FS. SSI    # GI: Protonix of GI PPx    #. ID: [id number] E. coli bacteremia, leukocytosis. Afebrile. Switched Ceftriaxone to Meropenem. Repeat blood culture if febrile.     # DVT PPx: Heparin SubQ    Critical Care Attending Attestation:    Pt seen and examined, agree with above. 86 F with lung Ca, admitted with pyelonephritis and bacteremia, now with acute hypoxic resp failure likely due to atelectasis. UCx showing ESBL E coli, will broaden coverage to Meropenem IV. Resp status greatly improved on CPAP. No RV dilatation on goal directed echo, will check LE dopplers.     Critical Care Time Spent: 35 mins

## 2017-10-28 LAB
-  AMIKACIN: SIGNIFICANT CHANGE UP
-  AMPICILLIN/SULBACTAM: SIGNIFICANT CHANGE UP
-  AMPICILLIN: SIGNIFICANT CHANGE UP
-  AZTREONAM: SIGNIFICANT CHANGE UP
-  CEFAZOLIN: SIGNIFICANT CHANGE UP
-  CEFEPIME: SIGNIFICANT CHANGE UP
-  CEFOXITIN: SIGNIFICANT CHANGE UP
-  CEFTAZIDIME: SIGNIFICANT CHANGE UP
-  CEFTRIAXONE: SIGNIFICANT CHANGE UP
-  CIPROFLOXACIN: SIGNIFICANT CHANGE UP
-  ERTAPENEM: SIGNIFICANT CHANGE UP
-  GENTAMICIN: SIGNIFICANT CHANGE UP
-  IMIPENEM: SIGNIFICANT CHANGE UP
-  LEVOFLOXACIN: SIGNIFICANT CHANGE UP
-  MEROPENEM: SIGNIFICANT CHANGE UP
-  PIPERACILLIN/TAZOBACTAM: SIGNIFICANT CHANGE UP
-  TIGECYCLINE: SIGNIFICANT CHANGE UP
-  TOBRAMYCIN: SIGNIFICANT CHANGE UP
-  TRIMETHOPRIM/SULFAMETHOXAZOLE: SIGNIFICANT CHANGE UP
ALBUMIN SERPL ELPH-MCNC: 3.4 G/DL — SIGNIFICANT CHANGE UP (ref 3.3–5)
ALP SERPL-CCNC: 50 U/L — SIGNIFICANT CHANGE UP (ref 40–120)
ALT FLD-CCNC: 22 U/L — SIGNIFICANT CHANGE UP (ref 4–33)
AST SERPL-CCNC: 25 U/L — SIGNIFICANT CHANGE UP (ref 4–32)
BACTERIA BLD CULT: SIGNIFICANT CHANGE UP
BACTERIA BLD CULT: SIGNIFICANT CHANGE UP
BASOPHILS # BLD AUTO: 0.01 K/UL — SIGNIFICANT CHANGE UP (ref 0–0.2)
BASOPHILS NFR BLD AUTO: 0.1 % — SIGNIFICANT CHANGE UP (ref 0–2)
BILIRUB SERPL-MCNC: 0.4 MG/DL — SIGNIFICANT CHANGE UP (ref 0.2–1.2)
BUN SERPL-MCNC: 33 MG/DL — HIGH (ref 7–23)
CALCIUM SERPL-MCNC: 8.5 MG/DL — SIGNIFICANT CHANGE UP (ref 8.4–10.5)
CHLORIDE SERPL-SCNC: 103 MMOL/L — SIGNIFICANT CHANGE UP (ref 98–107)
CK MB BLD-MCNC: 3.57 NG/ML — SIGNIFICANT CHANGE UP (ref 1–4.7)
CK MB BLD-MCNC: SIGNIFICANT CHANGE UP (ref 0–2.5)
CK SERPL-CCNC: 52 U/L — SIGNIFICANT CHANGE UP (ref 25–170)
CO2 SERPL-SCNC: 25 MMOL/L — SIGNIFICANT CHANGE UP (ref 22–31)
CREAT SERPL-MCNC: 1.14 MG/DL — SIGNIFICANT CHANGE UP (ref 0.5–1.3)
E COLI DNA BLD POS QL NAA+NON-PROBE: SIGNIFICANT CHANGE UP
EOSINOPHIL # BLD AUTO: 0 K/UL — SIGNIFICANT CHANGE UP (ref 0–0.5)
EOSINOPHIL NFR BLD AUTO: 0 % — SIGNIFICANT CHANGE UP (ref 0–6)
GLUCOSE BLDC GLUCOMTR-MCNC: 191 MG/DL — HIGH (ref 70–99)
GLUCOSE BLDC GLUCOMTR-MCNC: 242 MG/DL — HIGH (ref 70–99)
GLUCOSE BLDC GLUCOMTR-MCNC: 258 MG/DL — HIGH (ref 70–99)
GLUCOSE BLDC GLUCOMTR-MCNC: 301 MG/DL — HIGH (ref 70–99)
GLUCOSE SERPL-MCNC: 165 MG/DL — HIGH (ref 70–99)
HCT VFR BLD CALC: 30.1 % — LOW (ref 34.5–45)
HGB BLD-MCNC: 9.6 G/DL — LOW (ref 11.5–15.5)
IMM GRANULOCYTES # BLD AUTO: 0.42 # — SIGNIFICANT CHANGE UP
IMM GRANULOCYTES NFR BLD AUTO: 4 % — HIGH (ref 0–1.5)
LYMPHOCYTES # BLD AUTO: 0.68 K/UL — LOW (ref 1–3.3)
LYMPHOCYTES # BLD AUTO: 6.5 % — LOW (ref 13–44)
MAGNESIUM SERPL-MCNC: 2.1 MG/DL — SIGNIFICANT CHANGE UP (ref 1.6–2.6)
MCHC RBC-ENTMCNC: 28.2 PG — SIGNIFICANT CHANGE UP (ref 27–34)
MCHC RBC-ENTMCNC: 31.9 % — LOW (ref 32–36)
MCV RBC AUTO: 88.5 FL — SIGNIFICANT CHANGE UP (ref 80–100)
METHOD TYPE: SIGNIFICANT CHANGE UP
MONOCYTES # BLD AUTO: 0.37 K/UL — SIGNIFICANT CHANGE UP (ref 0–0.9)
MONOCYTES NFR BLD AUTO: 3.6 % — SIGNIFICANT CHANGE UP (ref 2–14)
NEUTROPHILS # BLD AUTO: 8.92 K/UL — HIGH (ref 1.8–7.4)
NEUTROPHILS NFR BLD AUTO: 85.8 % — HIGH (ref 43–77)
NRBC # FLD: 0 — SIGNIFICANT CHANGE UP
ORGANISM # SPEC MICROSCOPIC CNT: SIGNIFICANT CHANGE UP
ORGANISM # SPEC MICROSCOPIC CNT: SIGNIFICANT CHANGE UP
PHOSPHATE SERPL-MCNC: 2.5 MG/DL — SIGNIFICANT CHANGE UP (ref 2.5–4.5)
PLATELET # BLD AUTO: 135 K/UL — LOW (ref 150–400)
PMV BLD: 11.7 FL — SIGNIFICANT CHANGE UP (ref 7–13)
POTASSIUM SERPL-MCNC: 3.6 MMOL/L — SIGNIFICANT CHANGE UP (ref 3.5–5.3)
POTASSIUM SERPL-SCNC: 3.6 MMOL/L — SIGNIFICANT CHANGE UP (ref 3.5–5.3)
PROT SERPL-MCNC: 7.4 G/DL — SIGNIFICANT CHANGE UP (ref 6–8.3)
RBC # BLD: 3.4 M/UL — LOW (ref 3.8–5.2)
RBC # FLD: 14.2 % — SIGNIFICANT CHANGE UP (ref 10.3–14.5)
SODIUM SERPL-SCNC: 142 MMOL/L — SIGNIFICANT CHANGE UP (ref 135–145)
TROPONIN T SERPL-MCNC: 0.06 NG/ML — SIGNIFICANT CHANGE UP (ref 0–0.06)
WBC # BLD: 10.4 K/UL — SIGNIFICANT CHANGE UP (ref 3.8–10.5)
WBC # FLD AUTO: 10.4 K/UL — SIGNIFICANT CHANGE UP (ref 3.8–10.5)

## 2017-10-28 PROCEDURE — 71010: CPT | Mod: 26

## 2017-10-28 PROCEDURE — 99232 SBSQ HOSP IP/OBS MODERATE 35: CPT | Mod: GC

## 2017-10-28 PROCEDURE — 99291 CRITICAL CARE FIRST HOUR: CPT

## 2017-10-28 RX ORDER — FUROSEMIDE 40 MG
40 TABLET ORAL ONCE
Qty: 0 | Refills: 0 | Status: COMPLETED | OUTPATIENT
Start: 2017-10-28 | End: 2017-10-28

## 2017-10-28 RX ORDER — IPRATROPIUM/ALBUTEROL SULFATE 18-103MCG
3 AEROSOL WITH ADAPTER (GRAM) INHALATION EVERY 6 HOURS
Qty: 0 | Refills: 0 | Status: DISCONTINUED | OUTPATIENT
Start: 2017-10-28 | End: 2017-10-29

## 2017-10-28 RX ADMIN — Medication 3 MILLILITER(S): at 20:40

## 2017-10-28 RX ADMIN — Medication 1 SPRAY(S): at 21:21

## 2017-10-28 RX ADMIN — Medication 1 SPRAY(S): at 14:30

## 2017-10-28 RX ADMIN — HEPARIN SODIUM 5000 UNIT(S): 5000 INJECTION INTRAVENOUS; SUBCUTANEOUS at 05:18

## 2017-10-28 RX ADMIN — MEROPENEM 200 MILLIGRAM(S): 1 INJECTION INTRAVENOUS at 05:19

## 2017-10-28 RX ADMIN — Medication 1 DROP(S): at 21:21

## 2017-10-28 RX ADMIN — Medication 650 MILLIGRAM(S): at 21:29

## 2017-10-28 RX ADMIN — Medication 145 MILLIGRAM(S): at 12:17

## 2017-10-28 RX ADMIN — HEPARIN SODIUM 5000 UNIT(S): 5000 INJECTION INTRAVENOUS; SUBCUTANEOUS at 14:18

## 2017-10-28 RX ADMIN — Medication 50 MILLIGRAM(S): at 17:34

## 2017-10-28 RX ADMIN — Medication 1 DROP(S): at 05:20

## 2017-10-28 RX ADMIN — Medication 3 MILLILITER(S): at 13:37

## 2017-10-28 RX ADMIN — Medication 3 MILLILITER(S): at 04:21

## 2017-10-28 RX ADMIN — Medication 325 MILLIGRAM(S): at 21:17

## 2017-10-28 RX ADMIN — Medication 0.25 MILLIGRAM(S): at 20:14

## 2017-10-28 RX ADMIN — Medication 60 MILLIGRAM(S): at 21:19

## 2017-10-28 RX ADMIN — Medication 2: at 05:40

## 2017-10-28 RX ADMIN — Medication 10 MILLIGRAM(S): at 05:18

## 2017-10-28 RX ADMIN — Medication 8: at 12:03

## 2017-10-28 RX ADMIN — MEROPENEM 200 MILLIGRAM(S): 1 INJECTION INTRAVENOUS at 14:18

## 2017-10-28 RX ADMIN — MEROPENEM 200 MILLIGRAM(S): 1 INJECTION INTRAVENOUS at 21:23

## 2017-10-28 RX ADMIN — Medication 1 DROP(S): at 14:30

## 2017-10-28 RX ADMIN — HEPARIN SODIUM 5000 UNIT(S): 5000 INJECTION INTRAVENOUS; SUBCUTANEOUS at 21:19

## 2017-10-28 RX ADMIN — Medication 3 MILLILITER(S): at 09:21

## 2017-10-28 RX ADMIN — PANTOPRAZOLE SODIUM 40 MILLIGRAM(S): 20 TABLET, DELAYED RELEASE ORAL at 11:57

## 2017-10-28 RX ADMIN — Medication 1 SPRAY(S): at 05:20

## 2017-10-28 RX ADMIN — Medication 6: at 17:34

## 2017-10-28 RX ADMIN — Medication 81 MILLIGRAM(S): at 11:57

## 2017-10-28 RX ADMIN — Medication 40 MILLIGRAM(S): at 20:15

## 2017-10-28 RX ADMIN — Medication 20 MILLIGRAM(S): at 05:19

## 2017-10-28 RX ADMIN — Medication 30 MILLIGRAM(S): at 05:18

## 2017-10-28 RX ADMIN — Medication 50 MILLIGRAM(S): at 05:18

## 2017-10-28 NOTE — PROGRESS NOTE ADULT - ASSESSMENT
87 y/o F PMH Lung CA (unknown stage or type, Dx 2010 s/p L VATS, wedge resection, RT in July), HTN, DM2 admitted for pyelonephritis complicated by acute respiratory failure likely 2/2 to atelectasis     # Neuro: Alert and oriented x3    # Resp: On high flow oxygen. No consolidation on CXR and POCUS. No pleural effusion. Continue Solumedrol, duoneb    # CVS: ST depression overnight. No chest pain, negative cardiac enzymes. Cardiology consulted. Hx of HTN. No RV enlargement on POCUS, not tachycardic - low suspicion for PE. Continue metoprolol, Nifedipine. Will continue to monitor.     # Renal: Electrolytes wnl. BUN/Cr stable.    # Heme/Oc: Hx of lung cancer on remission. H & H stable.    # Endo: Hx of DM2. Q6 FS. SSI    # GI: Protonix of GI PPx    #. ID: [id number] E. coli bacteremia, leukocytosis. Afebrile. Switched Ceftriaxone to Meropenem. Repeat blood culture if febrile.     # DVT PPx: Heparin SubQ

## 2017-10-28 NOTE — CHART NOTE - NSCHARTNOTEFT_GEN_A_CORE
Patient seen and examined, discussed with the multidisciplinary MICU team.    Patient with worsening hypoxia tonight. O2 sat in the low 80s on 100% HFNC. She denies CP or SOB, RR high 20s.     I/O - net negative 1.3 liters for this admission (although unclear if accurate)    On exam, AAO x3, speaking in full sentences, mildly dyspneic.  Lungs cta b/l  Heart S1S2, RRR  Abd wnl  Ext trace LE edema b/l    No new labs  CT chest reviewed by me - b/l spiculated masses, small b/l pleural effusions, b/l emphysematous changes  CXR - bi-basilar atelectasis    POCUS showing normal appearing LV function, normal appearing RV size and function, predominant B-line pattern in all lung fields b/l, small b/l pleural effusions    Impression  1.) Acute hypoxic resp failure which is multifactorial - chronic emphysema + bi-basilar atelectasis +/- pulm edema +/- COPD exacerbation in this patient with known NSCLC  2.) Pulm edema possibly secondary to volume resuscitation in the setting of sepsis, no known hx of CHF  3.) ESBL Ecoli UTI  4.) Sepsis secondary to above    Plan  1.) Will attempt diuresis with lasix 40 mg IV x 1 now.  2.) Strict I/O, monitor UO closely. Aim to keep fluid balance net negative.  3.) Will place on bipap tonight for positive pressure ventilation which would help with possible pulm edema as well as atelectasis. Goal O2 sat > 88%.  4.) Cont nebs, steroids as planned. Repeat CXR now.  5.) Cont meropenem, follow cultures.  6.) Monitor resp status, low threshold for intubation if resp status worsens/doesn't improve.  7.) Cont care in the MICU for further management of above.  8.) Full code. Prognosis guarded.    45 minutes of critical care time exclusive of all procedures.

## 2017-10-28 NOTE — PROGRESS NOTE ADULT - SUBJECTIVE AND OBJECTIVE BOX
INTERVAL HPI:    Patient is an 87 y/o F PMH Lung CA (unknown stage or type, Dx 2010 s/p L VATS, wedge resection, RT in July), HTN, DM2 initially present with Nausea, vomiting, dysuria. Patient previously had cough productive of sputum for 2 weeks, s/p Antibiotics (unknown by pt but per ER records, amoxicillin). Patient felt well until 3 days ago, developed dysuria, urgency, increased frequency, progressing to suprapubic tenderness, fever, chills, nausea and vomiting. No recent travel, no sick contacts. On admission, patient found to have Pyelonephritis, with Urine Cx growing >100,000 gram negative rods. Blood cultures growing E. Coli.  Patient started on Ceftriaxone, s/p Azithromycin X 1 dose, and was doing well until this morning when she developed acute onset shortness of breath, placed on 50% NRB, and RRT called.  At onset of RRT, vitals: /80, HR 90s, RR 18, SP02 91% on 50% NRB.  Patient in mild Respiratory distress. Bedside US showing RLL atelectasis with moderate sized pleural effusion, and small Pleural effusion on the left side.  Patient's SP02 stable 91-93%. Decision made to start patient on CPAP, and transfer to MICU for further management of her hypoxic respiratory failure.     OVERNIGHT EVENTS: ST depression, negative cardiac enzyme. Cardiology consult    SUBJECTIVE: Patient seen and examined at bedside.     CONSTITUTIONAL: No weakness, fevers or chills  EYES/ENT: No visual changes;  No vertigo or throat pain   NECK: No pain or stiffness  RESPIRATORY: Shortness of breath  CARDIOVASCULAR: No chest pain or palpitations  GASTROINTESTINAL: No abdominal or epigastric pain. No nausea, vomiting, or hematemesis; No diarrhea or constipation. No melena or hematochezia.  GENITOURINARY: No dysuria, frequency or hematuria  NEUROLOGICAL: No numbness or weakness  SKIN: No itching, rashes    OBJECTIVE:    VITAL SIGNS:  ICU Vital Signs Last 24 Hrs  T(C): 36.8 (28 Oct 2017 04:00), Max: 37.6 (27 Oct 2017 20:00)  T(F): 98.3 (28 Oct 2017 04:00), Max: 99.7 (27 Oct 2017 20:00)  HR: 81 (28 Oct 2017 07:02) (78 - 120)  BP: 146/54 (28 Oct 2017 07:00) (102/80 - 166/118)  BP(mean): 78 (28 Oct 2017 07:00) (72 - 129)  ABP: --  ABP(mean): --  RR: 22 (28 Oct 2017 07:03) (21 - 29)  SpO2: 87% (28 Oct 2017 07:03) (87% - 100%)        10-27 @ 07:01  -  10-28 @ 07:00  --------------------------------------------------------  IN: 400 mL / OUT: 850 mL / NET: -450 mL      CAPILLARY BLOOD GLUCOSE  191 (28 Oct 2017 06:00)      POCT Blood Glucose.: 191 mg/dL (28 Oct 2017 05:15)      PHYSICAL EXAM:    General: NAD. On high flow oxygen  HEENT: NC/AT; PERRL, clear conjunctiva  Neck: supple  Respiratory: CTA b/l. No wheezes  Cardiovascular: +S1/S2; RRR  Abdomen: Distended, soft, nontender ; +BS x4. No CVA tenderness  Extremities: WWP, 2+ peripheral pulses b/l; no LE edema  Skin: normal color and turgor; no rash  Neurological: No focal deficits      MEDICATIONS:  MEDICATIONS  (STANDING):  ALBUTerol/ipratropium for Nebulization 3 milliLiter(s) Nebulizer every 6 hours  aspirin enteric coated 81 milliGRAM(s) Oral daily  fenofibrate Tablet 145 milliGRAM(s) Oral daily  heparin  Injectable 5000 Unit(s) SubCutaneous every 8 hours  insulin lispro (HumaLOG) corrective regimen sliding scale   SubCutaneous three times a day before meals  insulin lispro (HumaLOG) corrective regimen sliding scale   SubCutaneous at bedtime  meropenem IVPB      meropenem IVPB 1000 milliGRAM(s) IV Intermittent every 8 hours  metoprolol     tartrate 50 milliGRAM(s) Oral every 12 hours  NIFEdipine XL 30 milliGRAM(s) Oral daily  NIFEdipine XL 60 milliGRAM(s) Oral at bedtime  pantoprazole  Injectable 40 milliGRAM(s) IV Push daily  prednisoLONE acetate 1% Suspension 1 Drop(s) Both EYES three times a day  predniSONE   Tablet 10 milliGRAM(s) Oral daily  sodium chloride 0.65% Nasal 1 Spray(s) Both Nostrils three times a day    MEDICATIONS  (PRN):  acetaminophen   Tablet 650 milliGRAM(s) Oral every 6 hours PRN For Temp greater than 38 C (100.4 F)  acetaminophen   Tablet. 650 milliGRAM(s) Oral every 6 hours PRN mild to moderate pain      ALLERGIES:  Allergies    ACE inhibitors (Angioedema)    Intolerances        LABS:                        9.6    10.40 )-----------( 135      ( 28 Oct 2017 02:45 )             30.1     10-28    142  |  103  |  33<H>  ----------------------------<  165<H>  3.6   |  25  |  1.14    Ca    8.5      28 Oct 2017 03:40  Phos  2.5     10-28  Mg     2.1     10-28    TPro  7.4  /  Alb  3.4  /  TBili  0.4  /  DBili  x   /  AST  25  /  ALT  22  /  AlkPhos  50  10-28          RADIOLOGY & ADDITIONAL TESTS: Reviewed.

## 2017-10-28 NOTE — PROGRESS NOTE ADULT - ASSESSMENT
UTI/ Bacteremia/ Pyelonephritis/  86 year old with lung cancer (VATS in past/ recent radiation ) and PMR on low dose steroid as an outpatient presented  with E coli bacteremia  due to UTI/ pyelonephritis.   Her CT chest findings show findings s/o bronchogenic ca, though pt has resp failure, pt currently on cpap   Pt receivinf meropenem , white count improving, no pressors.   bcx    and ucx growing esbl coli,   on meropenem, could switch to ertapenem     For resp issues, pt was admiited to micu for resp failure.   Will follow up, ct s/p bronchogenic cancer.   Will dw attending. UTI/ Bacteremia/ Pyelonephritis/  86 year old with lung cancer (VATS in past/ recent radiation ) and PMR on low dose steroid as an outpatient presented  with E coli bacteremia  due to UTI/ pyelonephritis.   Her CT chest findings show findings s/o bronchogenic ca, though pt has resp failure, pt currently on cpap   Pt receivinf meropenem , white count improving, no pressors.   bcx    and ucx growing esbl coli,   on meropenem     For resp issues, pt was admiited to micu for resp failure.   Will follow up, ct s/p bronchogenic cancer.   Will dw attending.

## 2017-10-29 LAB
-  AMIKACIN: SIGNIFICANT CHANGE UP
-  AMIKACIN: SIGNIFICANT CHANGE UP
-  AMPICILLIN/SULBACTAM: SIGNIFICANT CHANGE UP
-  AMPICILLIN/SULBACTAM: SIGNIFICANT CHANGE UP
-  AMPICILLIN: SIGNIFICANT CHANGE UP
-  AMPICILLIN: SIGNIFICANT CHANGE UP
-  AZTREONAM: SIGNIFICANT CHANGE UP
-  AZTREONAM: SIGNIFICANT CHANGE UP
-  CEFAZOLIN: SIGNIFICANT CHANGE UP
-  CEFAZOLIN: SIGNIFICANT CHANGE UP
-  CEFEPIME: SIGNIFICANT CHANGE UP
-  CEFEPIME: SIGNIFICANT CHANGE UP
-  CEFOXITIN: SIGNIFICANT CHANGE UP
-  CEFOXITIN: SIGNIFICANT CHANGE UP
-  CEFTAZIDIME: SIGNIFICANT CHANGE UP
-  CEFTAZIDIME: SIGNIFICANT CHANGE UP
-  CEFTRIAXONE: SIGNIFICANT CHANGE UP
-  CEFTRIAXONE: SIGNIFICANT CHANGE UP
-  CIPROFLOXACIN: SIGNIFICANT CHANGE UP
-  CIPROFLOXACIN: SIGNIFICANT CHANGE UP
-  ERTAPENEM: SIGNIFICANT CHANGE UP
-  ERTAPENEM: SIGNIFICANT CHANGE UP
-  GENTAMICIN: SIGNIFICANT CHANGE UP
-  GENTAMICIN: SIGNIFICANT CHANGE UP
-  IMIPENEM: SIGNIFICANT CHANGE UP
-  IMIPENEM: SIGNIFICANT CHANGE UP
-  LEVOFLOXACIN: SIGNIFICANT CHANGE UP
-  LEVOFLOXACIN: SIGNIFICANT CHANGE UP
-  MEROPENEM: SIGNIFICANT CHANGE UP
-  MEROPENEM: SIGNIFICANT CHANGE UP
-  NITROFURANTOIN: SIGNIFICANT CHANGE UP
-  NITROFURANTOIN: SIGNIFICANT CHANGE UP
-  PIPERACILLIN/TAZOBACTAM: SIGNIFICANT CHANGE UP
-  PIPERACILLIN/TAZOBACTAM: SIGNIFICANT CHANGE UP
-  TIGECYCLINE: SIGNIFICANT CHANGE UP
-  TIGECYCLINE: SIGNIFICANT CHANGE UP
-  TOBRAMYCIN: SIGNIFICANT CHANGE UP
-  TOBRAMYCIN: SIGNIFICANT CHANGE UP
-  TRIMETHOPRIM/SULFAMETHOXAZOLE: SIGNIFICANT CHANGE UP
-  TRIMETHOPRIM/SULFAMETHOXAZOLE: SIGNIFICANT CHANGE UP
BACTERIA UR CULT: SIGNIFICANT CHANGE UP
BASOPHILS # BLD AUTO: 0.02 K/UL — SIGNIFICANT CHANGE UP (ref 0–0.2)
BASOPHILS NFR BLD AUTO: 0.2 % — SIGNIFICANT CHANGE UP (ref 0–2)
BUN SERPL-MCNC: 29 MG/DL — HIGH (ref 7–23)
CALCIUM SERPL-MCNC: 9 MG/DL — SIGNIFICANT CHANGE UP (ref 8.4–10.5)
CHLORIDE SERPL-SCNC: 101 MMOL/L — SIGNIFICANT CHANGE UP (ref 98–107)
CO2 SERPL-SCNC: 24 MMOL/L — SIGNIFICANT CHANGE UP (ref 22–31)
CREAT SERPL-MCNC: 1 MG/DL — SIGNIFICANT CHANGE UP (ref 0.5–1.3)
EOSINOPHIL # BLD AUTO: 0 K/UL — SIGNIFICANT CHANGE UP (ref 0–0.5)
EOSINOPHIL NFR BLD AUTO: 0 % — SIGNIFICANT CHANGE UP (ref 0–6)
GLUCOSE BLDC GLUCOMTR-MCNC: 132 MG/DL — HIGH (ref 70–99)
GLUCOSE BLDC GLUCOMTR-MCNC: 189 MG/DL — HIGH (ref 70–99)
GLUCOSE BLDC GLUCOMTR-MCNC: 247 MG/DL — HIGH (ref 70–99)
GLUCOSE SERPL-MCNC: 211 MG/DL — HIGH (ref 70–99)
HCT VFR BLD CALC: 31.5 % — LOW (ref 34.5–45)
HGB BLD-MCNC: 10.1 G/DL — LOW (ref 11.5–15.5)
IMM GRANULOCYTES # BLD AUTO: 0.19 # — SIGNIFICANT CHANGE UP
IMM GRANULOCYTES NFR BLD AUTO: 2.1 % — HIGH (ref 0–1.5)
LYMPHOCYTES # BLD AUTO: 0.91 K/UL — LOW (ref 1–3.3)
LYMPHOCYTES # BLD AUTO: 10.2 % — LOW (ref 13–44)
MAGNESIUM SERPL-MCNC: 1.9 MG/DL — SIGNIFICANT CHANGE UP (ref 1.6–2.6)
MCHC RBC-ENTMCNC: 27.3 PG — SIGNIFICANT CHANGE UP (ref 27–34)
MCHC RBC-ENTMCNC: 32.1 % — SIGNIFICANT CHANGE UP (ref 32–36)
MCV RBC AUTO: 85.1 FL — SIGNIFICANT CHANGE UP (ref 80–100)
METHOD TYPE: SIGNIFICANT CHANGE UP
MONOCYTES # BLD AUTO: 0.44 K/UL — SIGNIFICANT CHANGE UP (ref 0–0.9)
MONOCYTES NFR BLD AUTO: 4.9 % — SIGNIFICANT CHANGE UP (ref 2–14)
NEUTROPHILS # BLD AUTO: 7.36 K/UL — SIGNIFICANT CHANGE UP (ref 1.8–7.4)
NEUTROPHILS NFR BLD AUTO: 82.6 % — HIGH (ref 43–77)
NRBC # FLD: 0 — SIGNIFICANT CHANGE UP
ORGANISM # SPEC MICROSCOPIC CNT: SIGNIFICANT CHANGE UP
ORGANISM # SPEC MICROSCOPIC CNT: SIGNIFICANT CHANGE UP
PHOSPHATE SERPL-MCNC: 1.9 MG/DL — LOW (ref 2.5–4.5)
PLATELET # BLD AUTO: 173 K/UL — SIGNIFICANT CHANGE UP (ref 150–400)
PMV BLD: 11.2 FL — SIGNIFICANT CHANGE UP (ref 7–13)
POTASSIUM SERPL-MCNC: 3.5 MMOL/L — SIGNIFICANT CHANGE UP (ref 3.5–5.3)
POTASSIUM SERPL-SCNC: 3.5 MMOL/L — SIGNIFICANT CHANGE UP (ref 3.5–5.3)
RBC # BLD: 3.7 M/UL — LOW (ref 3.8–5.2)
RBC # FLD: 14 % — SIGNIFICANT CHANGE UP (ref 10.3–14.5)
SODIUM SERPL-SCNC: 141 MMOL/L — SIGNIFICANT CHANGE UP (ref 135–145)
SPECIMEN SOURCE: SIGNIFICANT CHANGE UP
WBC # BLD: 8.92 K/UL — SIGNIFICANT CHANGE UP (ref 3.8–10.5)
WBC # FLD AUTO: 8.92 K/UL — SIGNIFICANT CHANGE UP (ref 3.8–10.5)

## 2017-10-29 PROCEDURE — 76937 US GUIDE VASCULAR ACCESS: CPT | Mod: 26

## 2017-10-29 PROCEDURE — 99291 CRITICAL CARE FIRST HOUR: CPT

## 2017-10-29 RX ORDER — FUROSEMIDE 40 MG
40 TABLET ORAL ONCE
Qty: 0 | Refills: 0 | Status: COMPLETED | OUTPATIENT
Start: 2017-10-29 | End: 2017-10-29

## 2017-10-29 RX ORDER — IPRATROPIUM/ALBUTEROL SULFATE 18-103MCG
3 AEROSOL WITH ADAPTER (GRAM) INHALATION EVERY 6 HOURS
Qty: 0 | Refills: 0 | Status: DISCONTINUED | OUTPATIENT
Start: 2017-10-29 | End: 2017-11-07

## 2017-10-29 RX ORDER — POTASSIUM CHLORIDE 20 MEQ
40 PACKET (EA) ORAL ONCE
Qty: 0 | Refills: 0 | Status: COMPLETED | OUTPATIENT
Start: 2017-10-29 | End: 2017-10-29

## 2017-10-29 RX ORDER — FUROSEMIDE 40 MG
40 TABLET ORAL ONCE
Qty: 0 | Refills: 0 | Status: DISCONTINUED | OUTPATIENT
Start: 2017-10-29 | End: 2017-10-29

## 2017-10-29 RX ORDER — SODIUM,POTASSIUM PHOSPHATES 278-250MG
1 POWDER IN PACKET (EA) ORAL
Qty: 0 | Refills: 0 | Status: COMPLETED | OUTPATIENT
Start: 2017-10-29 | End: 2017-10-29

## 2017-10-29 RX ADMIN — Medication 3 MILLILITER(S): at 21:24

## 2017-10-29 RX ADMIN — PANTOPRAZOLE SODIUM 40 MILLIGRAM(S): 20 TABLET, DELAYED RELEASE ORAL at 12:12

## 2017-10-29 RX ADMIN — Medication 1 DROP(S): at 06:34

## 2017-10-29 RX ADMIN — MEROPENEM 200 MILLIGRAM(S): 1 INJECTION INTRAVENOUS at 06:34

## 2017-10-29 RX ADMIN — Medication 40 MILLIEQUIVALENT(S): at 12:00

## 2017-10-29 RX ADMIN — HEPARIN SODIUM 5000 UNIT(S): 5000 INJECTION INTRAVENOUS; SUBCUTANEOUS at 13:54

## 2017-10-29 RX ADMIN — Medication 50 MILLIGRAM(S): at 06:33

## 2017-10-29 RX ADMIN — Medication 40 MILLIGRAM(S): at 19:48

## 2017-10-29 RX ADMIN — Medication 1 TABLET(S): at 12:00

## 2017-10-29 RX ADMIN — Medication 81 MILLIGRAM(S): at 12:00

## 2017-10-29 RX ADMIN — HEPARIN SODIUM 5000 UNIT(S): 5000 INJECTION INTRAVENOUS; SUBCUTANEOUS at 21:17

## 2017-10-29 RX ADMIN — Medication 650 MILLIGRAM(S): at 21:16

## 2017-10-29 RX ADMIN — Medication 1 SPRAY(S): at 13:54

## 2017-10-29 RX ADMIN — Medication 3 MILLILITER(S): at 14:41

## 2017-10-29 RX ADMIN — Medication 60 MILLIGRAM(S): at 21:15

## 2017-10-29 RX ADMIN — HEPARIN SODIUM 5000 UNIT(S): 5000 INJECTION INTRAVENOUS; SUBCUTANEOUS at 06:33

## 2017-10-29 RX ADMIN — Medication 1 DROP(S): at 15:20

## 2017-10-29 RX ADMIN — Medication 20 MILLIGRAM(S): at 17:50

## 2017-10-29 RX ADMIN — Medication 1 TABLET(S): at 17:50

## 2017-10-29 RX ADMIN — Medication 1 SPRAY(S): at 06:34

## 2017-10-29 RX ADMIN — Medication 50 MILLIGRAM(S): at 17:50

## 2017-10-29 RX ADMIN — Medication 1 TABLET(S): at 21:15

## 2017-10-29 RX ADMIN — Medication: at 12:12

## 2017-10-29 RX ADMIN — Medication 145 MILLIGRAM(S): at 21:15

## 2017-10-29 RX ADMIN — Medication: at 18:04

## 2017-10-29 RX ADMIN — Medication 2: at 06:38

## 2017-10-29 RX ADMIN — Medication 1 DROP(S): at 21:16

## 2017-10-29 RX ADMIN — Medication 0.25 MILLIGRAM(S): at 22:00

## 2017-10-29 RX ADMIN — MEROPENEM 200 MILLIGRAM(S): 1 INJECTION INTRAVENOUS at 21:33

## 2017-10-29 RX ADMIN — MEROPENEM 200 MILLIGRAM(S): 1 INJECTION INTRAVENOUS at 13:54

## 2017-10-29 RX ADMIN — Medication 30 MILLIGRAM(S): at 06:33

## 2017-10-29 RX ADMIN — Medication 1 SPRAY(S): at 21:16

## 2017-10-29 RX ADMIN — Medication 10 MILLIGRAM(S): at 06:33

## 2017-10-29 NOTE — PROGRESS NOTE ADULT - SUBJECTIVE AND OBJECTIVE BOX
INTERVAL HPI:    Patient is an 85 y/o F PMH Lung CA (unknown stage or type, Dx 2010 s/p L VATS, wedge resection, RT in July), HTN, DM2 initially present with Nausea, vomiting, dysuria. Patient previously had cough productive of sputum for 2 weeks, s/p Antibiotics (unknown by pt but per ER records, amoxicillin). Patient felt well until 3 days ago, developed dysuria, urgency, increased frequency, progressing to suprapubic tenderness, fever, chills, nausea and vomiting. No recent travel, no sick contacts. On admission, patient found to have Pyelonephritis, with Urine Cx growing >100,000 gram negative rods. Blood cultures growing E. Coli.  Patient started on Ceftriaxone, s/p Azithromycin X 1 dose, and was doing well until this morning when she developed acute onset shortness of breath, placed on 50% NRB, and RRT called.  At onset of RRT, vitals: /80, HR 90s, RR 18, SP02 91% on 50% NRB.  Patient in mild Respiratory distress. Bedside US showing RLL atelectasis with moderate sized pleural effusion, and small Pleural effusion on the left side.  Patient's SP02 stable 91-93%. Decision made to start patient on CPAP, and transfer to MICU for further management of her hypoxic respiratory failure.     OVERNIGHT EVENTS: Hypoxic overnight - CPAP and trial of Lasix     SUBJECTIVE: Patient seen and examined at bedside.      CONSTITUTIONAL: No weakness, fevers or chills  EYES/ENT: No visual changes;  No vertigo or throat pain   NECK: No pain or stiffness  RESPIRATORY: No cough, wheezing, hemoptysis; No shortness of breath  CARDIOVASCULAR: No chest pain or palpitations  GASTROINTESTINAL: No abdominal or epigastric pain. No nausea, vomiting, or hematemesis; No diarrhea or constipation. No melena or hematochezia.  GENITOURINARY: No dysuria, frequency or hematuria  NEUROLOGICAL: No numbness or weakness  SKIN: No itching, rashes    OBJECTIVE:    VITAL SIGNS:  ICU Vital Signs Last 24 Hrs  T(C): 36.9 (29 Oct 2017 00:00), Max: 37.3 (28 Oct 2017 20:00)  T(F): 98.4 (29 Oct 2017 00:00), Max: 99.1 (28 Oct 2017 20:00)  HR: 96 (29 Oct 2017 06:00) (83 - 107)  BP: 134/47 (29 Oct 2017 06:00) (126/46 - 173/68)  BP(mean): 69 (29 Oct 2017 06:00) (67 - 99)  ABP: --  ABP(mean): --  RR: 28 (29 Oct 2017 06:00) (23 - 32)  SpO2: 93% (29 Oct 2017 06:00) (77% - 96%)        10-28 @ 07:01  -  10-29 @ 07:00  --------------------------------------------------------  IN: 200 mL / OUT: 2525 mL / NET: -2325 mL      CAPILLARY BLOOD GLUCOSE  242 (28 Oct 2017 22:00)      POCT Blood Glucose.: 189 mg/dL (29 Oct 2017 06:29)      PHYSICAL EXAM:    General: NAD  HEENT: NC/AT; PERRL, clear conjunctiva  Neck: supple  Respiratory: CTA b/l  Cardiovascular: +S1/S2; RRR  Abdomen: soft, NT/ND; +BS x4  Extremities: WWP, 2+ peripheral pulses b/l; no LE edema  Skin: normal color and turgor; no rash  Neurological:    MEDICATIONS:  MEDICATIONS  (STANDING):  aspirin enteric coated 81 milliGRAM(s) Oral daily  fenofibrate Tablet 145 milliGRAM(s) Oral daily  heparin  Injectable 5000 Unit(s) SubCutaneous every 8 hours  insulin lispro (HumaLOG) corrective regimen sliding scale   SubCutaneous three times a day before meals  insulin lispro (HumaLOG) corrective regimen sliding scale   SubCutaneous at bedtime  meropenem IVPB      meropenem IVPB 1000 milliGRAM(s) IV Intermittent every 8 hours  metoprolol     tartrate 50 milliGRAM(s) Oral every 12 hours  NIFEdipine XL 30 milliGRAM(s) Oral daily  NIFEdipine XL 60 milliGRAM(s) Oral at bedtime  pantoprazole  Injectable 40 milliGRAM(s) IV Push daily  potassium acid phosphate/sodium acid phosphate tablet (K-PHOS No. 2) 1 Tablet(s) Oral four times a day with meals  prednisoLONE acetate 1% Suspension 1 Drop(s) Both EYES three times a day  predniSONE   Tablet 10 milliGRAM(s) Oral daily  sodium chloride 0.65% Nasal 1 Spray(s) Both Nostrils three times a day    MEDICATIONS  (PRN):  acetaminophen   Tablet 650 milliGRAM(s) Oral every 6 hours PRN For Temp greater than 38 C (100.4 F)  acetaminophen   Tablet. 650 milliGRAM(s) Oral every 6 hours PRN mild to moderate pain  ALBUTerol/ipratropium for Nebulization 3 milliLiter(s) Nebulizer every 6 hours PRN Shortness of Breath and/or Wheezing      ALLERGIES:  Allergies    ACE inhibitors (Angioedema)    Intolerances        LABS:                        10.1   8.92  )-----------( 173      ( 29 Oct 2017 04:30 )             31.5     10-29    141  |  101  |  29<H>  ----------------------------<  211<H>  3.5   |  24  |  1.00    Ca    9.0      29 Oct 2017 04:30  Phos  1.9     10-29  Mg     1.9     10-29    TPro  7.4  /  Alb  3.4  /  TBili  0.4  /  DBili  x   /  AST  25  /  ALT  22  /  AlkPhos  50  10-28          RADIOLOGY & ADDITIONAL TESTS: Reviewed.

## 2017-10-29 NOTE — PROGRESS NOTE ADULT - ASSESSMENT
85 y/o F PMH Lung NSCLC ( Dx 2010 s/p L VATS, wedge resection, RT in July), HTN, DM2 admitted for pyelonephritis complicated by acute respiratory failure likely 2/2 to atelectasis 87 yo woman with hx of NSCLC s/p wedge resection, with recent recurrence s/p chemo/RT admitted to the MICU with hypoxemic respiratory failure in the setting of sepsis secondary to ESBL Ecoli bacteremia and urinary tract infection, new bilateral lower lobe atelectasis, and ?COPD exacerbation.    # Neuro: Alert and oriented x3    # Resp: On high flow oxygen. New bilateral lower lobe atelectesis, b/l pleural effusion. Continue Solumedrol, duoneb. s/p lasix    # CVS: ST depression. No chest pain, negative cardiac enzymes. Cardiology consulted. Hx of HTN. No RV enlargement or DVT on POCUS, not tachycardic - low suspicion for PE. Continue metoprolol, Nifedipine. Will continue to monitor.     # Renal: Electrolytes wnl. BUN/Cr stable.    # Heme/Oc: Hx of lung cancer on remission. H & H stable.    # Endo: Hx of DM2. Q6 FS. SSI    # GI: Protonix of GI PPx    #. ID: Sepsis 2/2 ESBL E. coli bacteremia, leukocytosis. Afebrile. Switched Ceftriaxone to Meropenem. Repeat blood culture if febrile.     # DVT PPx: Heparin SubQ

## 2017-10-29 NOTE — CHART NOTE - NSCHARTNOTEFT_GEN_A_CORE
MICU Transfer Note    Transfer from: MICU  Transfer to:  (  ) Medicine    (  ) Telemetry    (X ) RCU    (  ) Palliative    (  ) Stroke Unit    (  ) _______________  Accepting physician:      MICU COURSE:  Patient is an 86 year old woman with PMH of Lung CA (unknown stage or type, Dx 2010 s/p L VATS, wedge resection, RT in July), HTN, and DM2 initially presented with nausea, vomiting, dysuria, and fever, and chills, and increased urinary frequency. Patient previously had cough productive of sputum for 2 weeks, s/p Antibiotics (unknown by pt but per ER records, amoxicillin). No recent travel, no sick contacts. On admission, patient found to have Pyelonephritis, with Urine Cx growing >100,000 gram negative rods. Blood cultures growing E. Coli.  Patient started on Ceftriaxone, s/p Azithromycin X 1 dose, and was doing well until  she developed acute onset shortness of breath, placed on 50% NRB, and RRT called prior to MICU admission.  At onset of RRT, vitals: /80, HR 90s, RR 18, SP02 91% on 50% NRB.  Patient in mild Respiratory distress. Bedside US showing RLL atelectasis with moderate sized pleural effusion, and small pleural effusion on the left side.  Patient's SP02 stable 91-93%. Decision made to start patient on CPAP, and transfer to MICU for further management of her hypoxic respiratory failure. Patient weaned off CPAP and switched to high flow. While in the MICU BCx/ UCx grew ESBL E.coli and Abx switched to meropenem. Has been dropping O2 saturation to the 70s while on high flow with unclear etiology for recurrent hypoxia. Has been requiring Ativan 0.25mg for sleeping due to anxiety. Has maintained saturation in the 90s today. ECG showed ST depression in leads II, III, AVF, and V4-V6 with negative cardiac enzymes and downtrending CK.        ASSESSMENT & PLAN:   87 y/o F PMH Lung NSCLC ( Dx 2010 s/p L VATS, wedge resection, RT in July), HTN, DM2 admitted for pyelonephritis complicated by acute respiratory failure likely 2/2 to atelectasis 87 yo woman with hx of NSCLC s/p wedge resection, with recent recurrence s/p chemo/RT admitted to the MICU with hypoxemic respiratory failure in the setting of sepsis secondary to ESBL Ecoli bacteremia and urinary tract infection, new bilateral lower lobe atelectasis, and ?COPD exacerbation.    # Neuro: Alert and oriented x3    # Resp: On high flow oxygen. New bilateral lower lobe atelectesis, b/l pleural effusion. Continue Solumedrol, duoneb. s/p lasix. Saturation above 90s on high flow.    # CVS: ST depression. No chest pain, negative cardiac enzymes. Cardiology consulted. Hx of HTN. No RV enlargement or DVT on POCUS, not tachycardic - low suspicion for PE. Continue metoprolol, Nifedipine. Will continue to monitor. ECG showed ST depressions in leads II III and AVF.    # Renal: Electrolytes wnl. BUN/Cr stable.    # Heme/Oc: Hx of lung cancer on remission. H & H stable.    # Endo: Hx of DM2. Q6 FS. SSI    # GI: Protonix of GI PPx    #. ID: Sepsis 2/2 ESBL E. coli bacteremia, leukocytosis. Afebrile. Switched Ceftriaxone to Meropenem. Repeat blood culture if febrile.     # DVT PPx: Heparin SubQ      For Follow-Up:   -Repeat BCx results  -Cards rec  -ID recs  -Continue to monitor O2 Sat          Vital Signs Last 24 Hrs  T(C): 35.6 (29 Oct 2017 20:00), Max: 37.1 (29 Oct 2017 08:00)  T(F): 96 (29 Oct 2017 20:00), Max: 98.8 (29 Oct 2017 08:00)  HR: 82 (29 Oct 2017 21:25) (73 - 100)  BP: 156/90 (29 Oct 2017 21:00) (126/46 - 158/63)  BP(mean): 108 (29 Oct 2017 21:00) (67 - 110)  RR: 26 (29 Oct 2017 21:00) (21 - 32)  SpO2: 94% (29 Oct 2017 21:25) (83% - 97%)  I&O's Summary    28 Oct 2017 07:01  -  29 Oct 2017 07:00  --------------------------------------------------------  IN: 200 mL / OUT: 2525 mL / NET: -2325 mL    29 Oct 2017 07:01  -  29 Oct 2017 21:29  --------------------------------------------------------  IN: 0 mL / OUT: 880 mL / NET: -880 mL          MEDICATIONS  (STANDING):  ALBUTerol/ipratropium for Nebulization 3 milliLiter(s) Nebulizer every 6 hours  aspirin enteric coated 81 milliGRAM(s) Oral daily  fenofibrate Tablet 145 milliGRAM(s) Oral daily  heparin  Injectable 5000 Unit(s) SubCutaneous every 8 hours  insulin lispro (HumaLOG) corrective regimen sliding scale   SubCutaneous three times a day before meals  insulin lispro (HumaLOG) corrective regimen sliding scale   SubCutaneous at bedtime  meropenem IVPB      meropenem IVPB 1000 milliGRAM(s) IV Intermittent every 8 hours  methylPREDNISolone sodium succinate Injectable 20 milliGRAM(s) IV Push every 12 hours  metoprolol     tartrate 50 milliGRAM(s) Oral every 12 hours  NIFEdipine XL 30 milliGRAM(s) Oral daily  NIFEdipine XL 60 milliGRAM(s) Oral at bedtime  pantoprazole  Injectable 40 milliGRAM(s) IV Push daily  prednisoLONE acetate 1% Suspension 1 Drop(s) Both EYES three times a day  predniSONE   Tablet 10 milliGRAM(s) Oral daily  sodium chloride 0.65% Nasal 1 Spray(s) Both Nostrils three times a day    MEDICATIONS  (PRN):  acetaminophen   Tablet 650 milliGRAM(s) Oral every 6 hours PRN For Temp greater than 38 C (100.4 F)  acetaminophen   Tablet. 650 milliGRAM(s) Oral every 6 hours PRN mild to moderate pain        LABS                                            10.1                  Neurophils% (auto):   82.6   (10-29 @ 04:30):    8.92 )-----------(173          Lymphocytes% (auto):  10.2                                          31.5                   Eosinphils% (auto):   0.0      Manual%: Neutrophils x    ; Lymphocytes x    ; Eosinophils x    ; Bands%: x    ; Blasts x                                    141    |  101    |  29                  Calcium: 9.0   / iCa: x      (10-29 @ 04:30)    ----------------------------<  211       Magnesium: 1.9                              3.5     |  24     |  1.00             Phosphorous: 1.9

## 2017-10-30 LAB
ALBUMIN SERPL ELPH-MCNC: 2.9 G/DL — LOW (ref 3.3–5)
ALP SERPL-CCNC: 71 U/L — SIGNIFICANT CHANGE UP (ref 40–120)
ALT FLD-CCNC: 20 U/L — SIGNIFICANT CHANGE UP (ref 4–33)
AST SERPL-CCNC: 22 U/L — SIGNIFICANT CHANGE UP (ref 4–32)
BASOPHILS # BLD AUTO: 0.01 K/UL — SIGNIFICANT CHANGE UP (ref 0–0.2)
BASOPHILS NFR BLD AUTO: 0.2 % — SIGNIFICANT CHANGE UP (ref 0–2)
BILIRUB SERPL-MCNC: 0.6 MG/DL — SIGNIFICANT CHANGE UP (ref 0.2–1.2)
BUN SERPL-MCNC: 38 MG/DL — HIGH (ref 7–23)
CALCIUM SERPL-MCNC: 8.6 MG/DL — SIGNIFICANT CHANGE UP (ref 8.4–10.5)
CHLORIDE SERPL-SCNC: 100 MMOL/L — SIGNIFICANT CHANGE UP (ref 98–107)
CO2 SERPL-SCNC: 26 MMOL/L — SIGNIFICANT CHANGE UP (ref 22–31)
CREAT SERPL-MCNC: 1.13 MG/DL — SIGNIFICANT CHANGE UP (ref 0.5–1.3)
EOSINOPHIL # BLD AUTO: 0 K/UL — SIGNIFICANT CHANGE UP (ref 0–0.5)
EOSINOPHIL NFR BLD AUTO: 0 % — SIGNIFICANT CHANGE UP (ref 0–6)
GLUCOSE BLDC GLUCOMTR-MCNC: 191 MG/DL — HIGH (ref 70–99)
GLUCOSE BLDC GLUCOMTR-MCNC: 244 MG/DL — HIGH (ref 70–99)
GLUCOSE BLDC GLUCOMTR-MCNC: 283 MG/DL — HIGH (ref 70–99)
GLUCOSE BLDC GLUCOMTR-MCNC: 431 MG/DL — HIGH (ref 70–99)
GLUCOSE SERPL-MCNC: 211 MG/DL — HIGH (ref 70–99)
HCT VFR BLD CALC: 31.7 % — LOW (ref 34.5–45)
HGB BLD-MCNC: 10 G/DL — LOW (ref 11.5–15.5)
IMM GRANULOCYTES # BLD AUTO: 0.23 # — SIGNIFICANT CHANGE UP
IMM GRANULOCYTES NFR BLD AUTO: 4.5 % — HIGH (ref 0–1.5)
LYMPHOCYTES # BLD AUTO: 0.76 K/UL — LOW (ref 1–3.3)
LYMPHOCYTES # BLD AUTO: 15 % — SIGNIFICANT CHANGE UP (ref 13–44)
MAGNESIUM SERPL-MCNC: 1.9 MG/DL — SIGNIFICANT CHANGE UP (ref 1.6–2.6)
MCHC RBC-ENTMCNC: 27.1 PG — SIGNIFICANT CHANGE UP (ref 27–34)
MCHC RBC-ENTMCNC: 31.5 % — LOW (ref 32–36)
MCV RBC AUTO: 85.9 FL — SIGNIFICANT CHANGE UP (ref 80–100)
MONOCYTES # BLD AUTO: 0.32 K/UL — SIGNIFICANT CHANGE UP (ref 0–0.9)
MONOCYTES NFR BLD AUTO: 6.3 % — SIGNIFICANT CHANGE UP (ref 2–14)
NEUTROPHILS # BLD AUTO: 3.74 K/UL — SIGNIFICANT CHANGE UP (ref 1.8–7.4)
NEUTROPHILS NFR BLD AUTO: 74 % — SIGNIFICANT CHANGE UP (ref 43–77)
NRBC # FLD: 0 — SIGNIFICANT CHANGE UP
PHOSPHATE SERPL-MCNC: 3.7 MG/DL — SIGNIFICANT CHANGE UP (ref 2.5–4.5)
PLATELET # BLD AUTO: 198 K/UL — SIGNIFICANT CHANGE UP (ref 150–400)
PMV BLD: 11.2 FL — SIGNIFICANT CHANGE UP (ref 7–13)
POTASSIUM SERPL-MCNC: 4 MMOL/L — SIGNIFICANT CHANGE UP (ref 3.5–5.3)
POTASSIUM SERPL-SCNC: 4 MMOL/L — SIGNIFICANT CHANGE UP (ref 3.5–5.3)
PREALB SERPL-MCNC: 8 MG/DL — LOW (ref 20–40)
PROT SERPL-MCNC: 6.8 G/DL — SIGNIFICANT CHANGE UP (ref 6–8.3)
RBC # BLD: 3.69 M/UL — LOW (ref 3.8–5.2)
RBC # FLD: 13.8 % — SIGNIFICANT CHANGE UP (ref 10.3–14.5)
SODIUM SERPL-SCNC: 138 MMOL/L — SIGNIFICANT CHANGE UP (ref 135–145)
WBC # BLD: 5.06 K/UL — SIGNIFICANT CHANGE UP (ref 3.8–10.5)
WBC # FLD AUTO: 5.06 K/UL — SIGNIFICANT CHANGE UP (ref 3.8–10.5)

## 2017-10-30 PROCEDURE — 99232 SBSQ HOSP IP/OBS MODERATE 35: CPT

## 2017-10-30 PROCEDURE — 99233 SBSQ HOSP IP/OBS HIGH 50: CPT | Mod: GC

## 2017-10-30 RX ORDER — INSULIN LISPRO 100/ML
3 VIAL (ML) SUBCUTANEOUS ONCE
Qty: 0 | Refills: 0 | Status: COMPLETED | OUTPATIENT
Start: 2017-10-30 | End: 2017-10-30

## 2017-10-30 RX ORDER — PANTOPRAZOLE SODIUM 20 MG/1
40 TABLET, DELAYED RELEASE ORAL
Qty: 0 | Refills: 0 | Status: DISCONTINUED | OUTPATIENT
Start: 2017-10-30 | End: 2017-11-07

## 2017-10-30 RX ADMIN — Medication 3 MILLILITER(S): at 03:24

## 2017-10-30 RX ADMIN — Medication 145 MILLIGRAM(S): at 12:52

## 2017-10-30 RX ADMIN — Medication 81 MILLIGRAM(S): at 12:52

## 2017-10-30 RX ADMIN — Medication 20 MILLIGRAM(S): at 06:51

## 2017-10-30 RX ADMIN — Medication 50 MILLIGRAM(S): at 06:51

## 2017-10-30 RX ADMIN — Medication 2: at 18:08

## 2017-10-30 RX ADMIN — Medication 10 MILLIGRAM(S): at 06:51

## 2017-10-30 RX ADMIN — HEPARIN SODIUM 5000 UNIT(S): 5000 INJECTION INTRAVENOUS; SUBCUTANEOUS at 06:51

## 2017-10-30 RX ADMIN — Medication 20 MILLIGRAM(S): at 18:48

## 2017-10-30 RX ADMIN — MEROPENEM 200 MILLIGRAM(S): 1 INJECTION INTRAVENOUS at 07:02

## 2017-10-30 RX ADMIN — Medication 1 SPRAY(S): at 06:52

## 2017-10-30 RX ADMIN — Medication 3 MILLILITER(S): at 16:55

## 2017-10-30 RX ADMIN — Medication 4: at 09:15

## 2017-10-30 RX ADMIN — PANTOPRAZOLE SODIUM 40 MILLIGRAM(S): 20 TABLET, DELAYED RELEASE ORAL at 12:52

## 2017-10-30 RX ADMIN — Medication 3 MILLILITER(S): at 11:19

## 2017-10-30 RX ADMIN — HEPARIN SODIUM 5000 UNIT(S): 5000 INJECTION INTRAVENOUS; SUBCUTANEOUS at 22:56

## 2017-10-30 RX ADMIN — Medication 650 MILLIGRAM(S): at 23:02

## 2017-10-30 RX ADMIN — HEPARIN SODIUM 5000 UNIT(S): 5000 INJECTION INTRAVENOUS; SUBCUTANEOUS at 13:44

## 2017-10-30 RX ADMIN — Medication 3 UNIT(S): at 13:44

## 2017-10-30 RX ADMIN — MEROPENEM 200 MILLIGRAM(S): 1 INJECTION INTRAVENOUS at 13:10

## 2017-10-30 RX ADMIN — Medication 1 SPRAY(S): at 13:09

## 2017-10-30 RX ADMIN — Medication 60 MILLIGRAM(S): at 23:02

## 2017-10-30 RX ADMIN — Medication 50 MILLIGRAM(S): at 18:08

## 2017-10-30 RX ADMIN — Medication 3 MILLILITER(S): at 21:09

## 2017-10-30 RX ADMIN — Medication 1 SPRAY(S): at 22:58

## 2017-10-30 RX ADMIN — Medication 30 MILLIGRAM(S): at 06:51

## 2017-10-30 RX ADMIN — Medication 1 DROP(S): at 13:09

## 2017-10-30 RX ADMIN — Medication 1 DROP(S): at 22:54

## 2017-10-30 RX ADMIN — MEROPENEM 200 MILLIGRAM(S): 1 INJECTION INTRAVENOUS at 22:57

## 2017-10-30 RX ADMIN — Medication 1 DROP(S): at 06:52

## 2017-10-30 RX ADMIN — Medication 1: at 23:19

## 2017-10-30 RX ADMIN — Medication 12: at 12:52

## 2017-10-30 RX ADMIN — Medication 0.25 MILLIGRAM(S): at 22:56

## 2017-10-30 NOTE — PROCEDURE NOTE - NSPROCDETAILS_GEN_ALL_CORE
sterile technique, catheter placed/ultrasound utilization/dressing applied/flushes easily/secured in place/location identified, draped/prepped, sterile technique used/blood seen on insertion

## 2017-10-30 NOTE — PROGRESS NOTE ADULT - SUBJECTIVE AND OBJECTIVE BOX
Follow Up:      Inverval History/ROS:Patient is a 86y old  Female who presents with a chief complaint of abd, back pain, n/v (26 Oct 2017 03:46)    On high sapphire )2. Breathing feels better.     Allergies    ACE inhibitors (Angioedema)    Intolerances        ANTIMICROBIALS:  meropenem IVPB    meropenem IVPB 1000 every 8 hours      OTHER MEDS:  acetaminophen   Tablet 650 milliGRAM(s) Oral every 6 hours PRN  acetaminophen   Tablet. 650 milliGRAM(s) Oral every 6 hours PRN  ALBUTerol/ipratropium for Nebulization 3 milliLiter(s) Nebulizer every 6 hours  aspirin enteric coated 81 milliGRAM(s) Oral daily  fenofibrate Tablet 145 milliGRAM(s) Oral daily  heparin  Injectable 5000 Unit(s) SubCutaneous every 8 hours  insulin lispro (HumaLOG) corrective regimen sliding scale   SubCutaneous three times a day before meals  insulin lispro (HumaLOG) corrective regimen sliding scale   SubCutaneous at bedtime  methylPREDNISolone sodium succinate Injectable 20 milliGRAM(s) IV Push every 12 hours  metoprolol     tartrate 50 milliGRAM(s) Oral every 12 hours  NIFEdipine XL 30 milliGRAM(s) Oral daily  NIFEdipine XL 60 milliGRAM(s) Oral at bedtime  pantoprazole  Injectable 40 milliGRAM(s) IV Push daily  prednisoLONE acetate 1% Suspension 1 Drop(s) Both EYES three times a day  sodium chloride 0.65% Nasal 1 Spray(s) Both Nostrils three times a day      Vital Signs Last 24 Hrs  T(C): 36.4 (30 Oct 2017 14:00), Max: 36.7 (30 Oct 2017 10:04)  T(F): 97.5 (30 Oct 2017 14:00), Max: 98 (30 Oct 2017 10:04)  HR: 90 (30 Oct 2017 14:00) (73 - 95)  BP: 147/67 (30 Oct 2017 14:00) (128/47 - 156/90)  BP(mean): 87 (29 Oct 2017 22:00) (83 - 110)  RR: 18 (30 Oct 2017 14:00) (18 - 28)  SpO2: 98% (30 Oct 2017 14:00) (88% - 99%)    PHYSICAL EXAM:  General: [x ] non-toxic  HEAD/EYES: [ ] PERRL [ x] white sclera [ ] icterus  ENT:  [ x] normal [ ] supple [ ] thrush [ ] pharyngeal exudate  Cardiovascular:   [ ] murmur [ x] normal [ ] PPM/AICD  Respiratory:  [x ] clear to ausculation bilaterally  GI:  [ x] soft, non-tender, normal bowel sounds  :  [ ] swenson [ ] no CVA tenderness   Musculoskeletal:  [x ] no synovitis  Neurologic:  [x ] non-focal exam   Skin:  [ ] no rash  Lymph: [ ] no lymphadenopathy  Psychiatric:  x[ ] appropriate affect [ ] alert & oriented  Lines:  [x ] no phlebitis [ ] central line                                10.0   5.06  )-----------( 198      ( 30 Oct 2017 06:30 )             31.7       10-30    138  |  100  |  38<H>  ----------------------------<  211<H>  4.0   |  26  |  1.13    Ca    8.6      30 Oct 2017 06:30  Phos  3.7     10-30  Mg     1.9     10-30    TPro  6.8  /  Alb  2.9<L>  /  TBili  0.6  /  DBili  x   /  AST  22  /  ALT  20  /  AlkPhos  71  10-30          MICROBIOLOGY:    RADIOLOGY:

## 2017-10-30 NOTE — PROGRESS NOTE ADULT - SUBJECTIVE AND OBJECTIVE BOX
_________________________________________________________________________________________  ========>>  M E D I C A L   A T T E N D I N G    F O L L O W  U P  N O T E  <<=========  -----------------------------------------------------------------------------------------------------    - Patient seen and examined by me approximately thirty minutes ago.  - In summary, patient is a 86y year old woman who originally presented with UTI / pyelonephritis, developed Bacteremia, then had acute respiratory failure, post Bipap, and now on High flow out of the ICU.  - Patient today overall doing ok, comfortable, eating OK.  doing well on High flow O2.    ==================>> MEDICATIONS <<====================    MEDICATIONS  (STANDING):  ALBUTerol/ipratropium for Nebulization 3 milliLiter(s) Nebulizer every 6 hours  aspirin enteric coated 81 milliGRAM(s) Oral daily  fenofibrate Tablet 145 milliGRAM(s) Oral daily  heparin  Injectable 5000 Unit(s) SubCutaneous every 8 hours  insulin lispro (HumaLOG) corrective regimen sliding scale   SubCutaneous three times a day before meals  insulin lispro (HumaLOG) corrective regimen sliding scale   SubCutaneous at bedtime  insulin lispro Injectable (HumaLOG). 3 Unit(s) SubCutaneous once  meropenem IVPB      meropenem IVPB 1000 milliGRAM(s) IV Intermittent every 8 hours  methylPREDNISolone sodium succinate Injectable 20 milliGRAM(s) IV Push every 12 hours  metoprolol     tartrate 50 milliGRAM(s) Oral every 12 hours  NIFEdipine XL 30 milliGRAM(s) Oral daily  NIFEdipine XL 60 milliGRAM(s) Oral at bedtime  pantoprazole  Injectable 40 milliGRAM(s) IV Push daily  prednisoLONE acetate 1% Suspension 1 Drop(s) Both EYES three times a day  sodium chloride 0.65% Nasal 1 Spray(s) Both Nostrils three times a day    MEDICATIONS  (PRN):  acetaminophen   Tablet 650 milliGRAM(s) Oral every 6 hours PRN For Temp greater than 38 C (100.4 F)  acetaminophen   Tablet. 650 milliGRAM(s) Oral every 6 hours PRN mild to moderate pain    ==================>> REVIEW OF SYSTEM <<=================    GEN: no fever, no chills, no pain  RESP: no SOB now cough, no sputum  CVS: no chest pain, no palpitations, no edema  GI: no abdominal pain, no nausea, no constipation, no diarrhea  : no dysuria, no frequency, no hematuria  Neuro: no headache, no dizziness  Derm : no itching, no rash    ==================>> VITAL SIGNS <<==================    T(C): 36.7 (10-30-17 @ 10:04), Max: 36.7 (10-30-17 @ 10:04)  HR: 83 (10-30-17 @ 11:19) (73 - 95)  BP: 137/79 (10-30-17 @ 10:04) (128/47 - 156/90)  RR: 20 (10-30-17 @ 10:04) (20 - 28)  SpO2: 97% (10-30-17 @ 11:19) (88% - 99%)  Wt(kg): --   CAPILLARY BLOOD GLUCOSE  132 (29 Oct 2017 21:24)  257 (29 Oct 2017 18:00)      POCT Blood Glucose.: 431 mg/dL (30 Oct 2017 12:46)  POCT Blood Glucose.: 244 mg/dL (30 Oct 2017 09:06)  POCT Blood Glucose.: 132 mg/dL (29 Oct 2017 21:24)  POCT Blood Glucose.: 247 mg/dL (29 Oct 2017 17:34)  I&O's Summary    29 Oct 2017 07:01  -  30 Oct 2017 07:00  --------------------------------------------------------  IN: 220 mL / OUT: 1805 mL / NET: -1585 mL    ==================>> PHYSICAL EXAM <<=================    GEN: A&O X 3 , NAD , comfortable in chair, on High flow  HEENT: NCAT, PERRL, MMM, hearing intact  Neck: supple , no JVD  CVS: S1S2 , regular , No M/R/G appreciated  PULM: CTA B/L,  no W/R/R appreciated  ABD.: soft. non tender, non distended,  bowel sounds present  Extrem: intact pulses , no edema   PSYCH : normal mood,  not anxious     ==================>> LAB AND IMAGING <<==================                        10.0   5.06  )-----------( 198      ( 30 Oct 2017 06:30 )             31.7        WBC:  5.06    (10-30-17 @ 06:30)  WBC:  8.92    (10-29-17 @ 04:30)  WBC:  10.40    (10-28-17 @ 02:45)  WBC:  18.18    (10-27-17 @ 07:00)  WBC:  17.68    (10-27-17 @ 06:47)    Hemoglobin:   10.0  (10-30 @ 06:30)   Hemoglobin:   10.1  (10-29 @ 04:30)   Hemoglobin:   9.6  (10-28 @ 02:45)   Hemoglobin:   9.8  (10-27 @ 07:00)   Hemoglobin:   9.8  (10-27 @ 06:47)       138  |  100  |  38<H>  ----------------------------<  211<H>  4.0   |  26  |  1.13    Ca    8.6      30 Oct 2017 06:30  Phos  3.7     10-30  Mg     1.9     10-30    TPro  6.8  /  Alb  2.9<L>  /  TBili  0.6  /  DBili  x   /  AST  22  /  ALT  20  /  AlkPhos  71  10-30    Creatinine:  1.13   (10-30 @ 06:30)  Creatinine:  1.00   (10-29 @ 04:30)  Creatinine:  1.14   (10-28 @ 03:40)  Creatinine:  1.13   (10-27 @ 18:30)              HgA1C: 7.5  (10-27-17)            _______________________  C U L T U R E S :    Culture - Blood (collected 28 Oct 2017 04:19)  Source: BLOOD  Preliminary Report (30 Oct 2017 04:18):    NO ORGANISMS ISOLATED    NO ORGANISMS ISOLATED AT 48 HRS.    Culture - Urine (collected 25 Oct 2017 20:04)  Source: URINE MIDSTREAM  Preliminary Report (26 Oct 2017 10:56):    GNR^Gram Neg Rods    COLONY COUNT: > = 100,000 CFU/ML  Final Report (29 Oct 2017 15:05):    RESULT CALLED TO: THELMA SALINAS MD./Y    DATE / TIME CALLED: 10/27/17 1215    CALLED BY: JEFE MORE                  *******************************                  * This is an appended result. *                  *******************************    A prior result that was reported as final has been changed.  Organism: E.COLI ESBL POSITIVE  Escherichia coli (29 Oct 2017 15:05)  Organism: Escherichia coli  COLONY COUNT: > = 100,000 CFU/ML (29 Oct 2017 15:05)    Sensitivities:      -  Amikacin: S <=16 BRADEN      -  Ampicillin: S <=8 BRADEN      -  Ampicillin/Sulbactam: S <=8/4 BRADEN      -  Aztreonam: I 8 BRADEN      -  Cefazolin: S <=8 BRADEN      -  Cefepime: S <=4 BRADEN      -  Cefoxitin: S <=8 BRADEN      -  Ceftazidime: S <=1 BRADEN      -  Ceftriaxone: S <=1 BRADEN      -  Ciprofloxacin: R >2 BRADEN      -  Ertapenem: S <=1 BRADEN      -  Gentamicin: S <=4 BRADEN      -  Imipenem: S <=1 BRADEN      -  Levofloxacin: R >4 BRADEN      -  Meropenem: S <=1 BRADEN      -  Nitrofurantoin: S <=32 BRADEN      -  Piperacillin/Tazobactam: S <=16 BRADEN      -  Tigecycline: S <=2 BRADEN      -  Tobramycin: S <=4 BRADEN      -  Trimethoprim/Sulfamethoxazole: R >2/38 BRADEN      Method Type: MICROSCAN NEG URINE COMBO 61  Organism: E.COLI ESBL POSITIVE  COLONY COUNT: > = 100,000 CFU/ML (27 Oct 2017 12:15)    Sensitivities:      -  Amikacin: S <=16 BRADEN      -  Ampicillin: R >16 BRADEN      -  Ampicillin/Sulbactam: R <=8/4 BRADEN      -  Aztreonam: R 16 BRADEN      -  Cefazolin: R >16 BRADEN      -  Cefepime: R >16 BRADEN      -  Cefoxitin: S <=8 BRADEN      -  Ceftazidime: R 8 BRADEN      -  Ceftriaxone: R      -  Ciprofloxacin: R >2 BRADEN      -  Ertapenem: S <=1 BRADEN      -  Gentamicin: S <=4 BRADEN      -  Imipenem: S <=1 BRADEN      -  Levofloxacin: R >4 BRADEN      -  Meropenem: S <=1 BRADEN      -  Nitrofurantoin: S <=32 BRADEN      -  Piperacillin/Tazobactam: R <=16 BRADEN      -  Tigecycline: S <=2 BRADEN      -  Tobramycin: S <=4 BRADEN      -  Trimethoprim/Sulfamethoxazole: S <=2/38 BRADEN      Method Type: MICROSCAN NEG URINE COMBO 61    Culture - Blood (collected 25 Oct 2017 19:40)  Source: BLOOD VENOUS  Preliminary Report (27 Oct 2017 12:33):    EC^Escherichia coli    Culture - Blood (collected 25 Oct 2017 19:40)  Source: BLOOD PERIPHERAL  Final Report (28 Oct 2017 11:53):    RESULT CALLED TO: BYRON BOSWELL/MAYELA    DATE / TIME CALLED: 10/28/17 1151    CALLED BY: KIRT MATIAS  Final Report (26 Oct 2017 07:17):    ***Blood Panel PCR results on this specimen are available    approximately 3 hours after the Gram stain result***    Gram stain, PCR, and/or culture results may not always    correspond due to difference in methodologies    ------------------------------------------------------------    This PCR assay was performed using [employer].  The    following targets are tested for:  Enterococcus, vancomycin    resistant enterococci, Listeria monocytogenes,  coagulase    negative staphylococci, S. aureus, methicillin resistant S.    aureus, Streptococcus agalactiae (Group B), S. pneumoniae,    S. pyogenes (Group A), Acinetobacter baumannii, Enterobacter    cloacae, E. coli, Klebsiella oxytoca, K. pneumoniae, Proteus    sp., Serratia marcescens, Haemophilus influenzae, Neisseria    meningitidis, Pseudomonas aeruginosa, Candida albicans, C.    glabrata, C. krusei, C. parapsilosis, C. tropicalis and the    KPC resistance gene.  Organism: BLOOD CULTURE PCR  E.COLI ESBL POSITIVE (28 Oct 2017 11:53)  Organism: E.COLI ESBL POSITIVE (28 Oct 2017 11:53)    Sensitivities:      -  Amikacin: S <=16 BRADEN      -  Ampicillin: R >16 BRADEN      -  Ampicillin/Sulbactam: R >16/8 BRADEN      -  Aztreonam: R >16 BRADEN      -  Cefazolin: R >16 BRADEN      -  Cefepime: R >16 BRADEN      -  Cefoxitin: S <=8 BRADEN      -  Ceftazidime: R >16 BRADEN      -  Ceftriaxone: R      -  Ciprofloxacin: R >2 BRADEN      -  Ertapenem: S <=1 BRADEN      -  Gentamicin: S <=4 BRADEN      -  Imipenem: S <=1 BRADEN      -  Levofloxacin: R >4 BRADEN      -  Meropenem: S <=1 BRADEN      -  Piperacillin/Tazobactam: R 64 BRADEN      -  Tigecycline: S <=2 BRADEN      -  Tobramycin: R >8 BRADEN      -  Trimethoprim/Sulfamethoxazole: R >2/38 BRADEN      Method Type: MICROSCAN NEG URINE COMBO 61  Organism: BLOOD CULTURE PCR  ***Blood Panel PCR results on this specimen are available  approximately 3 hours after the Gram stain result***  Gram stain, PCR, and/or culture results may not always  correspond due to difference in methodologies  ------------------------------------------------------------  This PCR assay was performed using [employer].  The  following targets are tested for:  Enterococcus, vancomycin  resistant enterococci, Listeria monocytogenes,  coagulase  negative staphylococci, S. aureus, methicillin resistant S.  aureus, Streptococcus agalactiae (Group B), S. pneumoniae,  S. pyogenes (Group A), Acinetobacter baumannii, Enterobacter  cloacae, E. coli, Klebsiella oxytoca, K. pneumoniae, Proteus  sp., Serratia marcescens, Haemophilus influenzae, Neisseria  meningitidis, Pseudomonas aeruginosa, Candida albicans, C.  glabrata, C. krusei, C. parapsilosis, C. tropicalis and the  KPC resistance gene. (27 Oct 2017 09:15)    Sensitivities:      -  Escherichia coli: + DETECT BRADEN      Method Type: PCR    ___________________________________________________________________________________  ===============>>  A S S E S S M E N T   A N D   P L A N <<===============  ------------------------------------------------------------------------------------------    **UTI/ Pyelonephritis/ ESBL bacteremia  ---continue Meropenem  ---ID F/U   ---most recent BCX negative so far  ---monitor vitals, labs closely    **Acute hypoxemic respiratory failure on High flow, in pt with emphysema and lung cancer  ---ICU team f/u and mgmt appreciated  ---Pt's pulmonologist is Dr Mahoney  ---wean off as able  ---nebs PRN    **Lung cancer, with recurrence  ---pt follows with oncologist at [facility]  ---recurrence is knwon and pt has had radiation therapy t them recently per Dtr  ---pt to follow up post discharge    **Diabetes II, stable  ---monitor finger sticks closely  ---Continue Insulin regimen as above monitor  ---Diabetic diet  ---A1c 7.5    ** CKD III, overall stable  ---monitor BMP closely    **Hypertension h/o  ---Continue with antihypertensive medications as above  ---DASH diet   ---close monitoring of vitals    **Anemia, overall stale  ---monitor CBC    -GI/DVT Prophylaxis.    --------------------------------------------  Case discussed with pt, Dtr, Rn, Pulm  Education given on plan of care, increase activity, deep breathing / IS, Nutrition  ___________________________  H. GEN Ferrell.  Pager: [phone]

## 2017-10-30 NOTE — PROGRESS NOTE ADULT - SUBJECTIVE AND OBJECTIVE BOX
CHIEF COMPLAINT:    Interval Events:    REVIEW OF SYSTEMS:  Constitutional:   Eyes:  ENT:  CV:  Resp:  GI:  :  MSK:  Integumentary:  Neurological:  Psychiatric:  Endocrine:  Hematologic/Lymphatic:  Allergic/Immunologic:  [ ] All other systems negative  [ ] Unable to assess ROS because ________    OBJECTIVE:  ICU Vital Signs Last 24 Hrs  T(C): 36.7 (30 Oct 2017 10:04), Max: 36.9 (29 Oct 2017 12:00)  T(F): 98 (30 Oct 2017 10:04), Max: 98.5 (29 Oct 2017 12:00)  HR: 85 (30 Oct 2017 10:04) (73 - 95)  BP: 137/79 (30 Oct 2017 10:04) (128/47 - 156/90)  BP(mean): 87 (29 Oct 2017 22:00) (79 - 110)  ABP: --  ABP(mean): --  RR: 20 (30 Oct 2017 10:04) (20 - 28)  SpO2: 99% (30 Oct 2017 10:04) (88% - 99%)        10-29 @ 07:01  -  10-30 @ 07:00  --------------------------------------------------------  IN: 220 mL / OUT: 1805 mL / NET: -1585 mL      CAPILLARY BLOOD GLUCOSE  132 (29 Oct 2017 21:24)      POCT Blood Glucose.: 244 mg/dL (30 Oct 2017 09:06)      PHYSICAL EXAM:  General:   HEENT:   Lymph Nodes:  Neck:   Respiratory:   Cardiovascular:   Abdomen:   Extremities:   Skin:   Neurological:  Psychiatry:    HOSPITAL MEDICATIONS:  MEDICATIONS  (STANDING):  ALBUTerol/ipratropium for Nebulization 3 milliLiter(s) Nebulizer every 6 hours  aspirin enteric coated 81 milliGRAM(s) Oral daily  fenofibrate Tablet 145 milliGRAM(s) Oral daily  heparin  Injectable 5000 Unit(s) SubCutaneous every 8 hours  insulin lispro (HumaLOG) corrective regimen sliding scale   SubCutaneous three times a day before meals  insulin lispro (HumaLOG) corrective regimen sliding scale   SubCutaneous at bedtime  meropenem IVPB      meropenem IVPB 1000 milliGRAM(s) IV Intermittent every 8 hours  methylPREDNISolone sodium succinate Injectable 20 milliGRAM(s) IV Push every 12 hours  metoprolol     tartrate 50 milliGRAM(s) Oral every 12 hours  NIFEdipine XL 30 milliGRAM(s) Oral daily  NIFEdipine XL 60 milliGRAM(s) Oral at bedtime  pantoprazole  Injectable 40 milliGRAM(s) IV Push daily  prednisoLONE acetate 1% Suspension 1 Drop(s) Both EYES three times a day  predniSONE   Tablet 10 milliGRAM(s) Oral daily  sodium chloride 0.65% Nasal 1 Spray(s) Both Nostrils three times a day    MEDICATIONS  (PRN):  acetaminophen   Tablet 650 milliGRAM(s) Oral every 6 hours PRN For Temp greater than 38 C (100.4 F)  acetaminophen   Tablet. 650 milliGRAM(s) Oral every 6 hours PRN mild to moderate pain      LABS:                        10.0   5.06  )-----------( 198      ( 30 Oct 2017 06:30 )             31.7     10-30    138  |  100  |  38<H>  ----------------------------<  211<H>  4.0   |  26  |  1.13    Ca    8.6      30 Oct 2017 06:30  Phos  3.7     10-30  Mg     1.9     10-30    TPro  6.8  /  Alb  2.9<L>  /  TBili  0.6  /  DBili  x   /  AST  22  /  ALT  20  /  AlkPhos  71  10-30              MICROBIOLOGY:     RADIOLOGY:  [ ] Reviewed and interpreted by me    PULMONARY FUNCTION TESTS:    EKG: CHIEF COMPLAINT: Patient is a 86y old  Female who presents with a chief complaint of abd, back pain, n/v (26 Oct 2017 03:46)    Interval Events: Admitted to RCU overnight    REVIEW OF SYSTEMS:  Constitutional:   Eyes:  ENT:  CV:  Resp:  GI:  :  MSK:  Integumentary:  Neurological:  Psychiatric:  Endocrine:  Hematologic/Lymphatic:  Allergic/Immunologic:  [ ] All other systems negative  [ ] Unable to assess ROS because ________    OBJECTIVE:  ICU Vital Signs Last 24 Hrs  T(C): 36.7 (30 Oct 2017 10:04), Max: 36.9 (29 Oct 2017 12:00)  T(F): 98 (30 Oct 2017 10:04), Max: 98.5 (29 Oct 2017 12:00)  HR: 85 (30 Oct 2017 10:04) (73 - 95)  BP: 137/79 (30 Oct 2017 10:04) (128/47 - 156/90)  BP(mean): 87 (29 Oct 2017 22:00) (79 - 110)  ABP: --  ABP(mean): --  RR: 20 (30 Oct 2017 10:04) (20 - 28)  SpO2: 99% (30 Oct 2017 10:04) (88% - 99%)        10-29 @ 07:01  -  10-30 @ 07:00  --------------------------------------------------------  IN: 220 mL / OUT: 1805 mL / NET: -1585 mL      CAPILLARY BLOOD GLUCOSE  132 (29 Oct 2017 21:24)      POCT Blood Glucose.: 244 mg/dL (30 Oct 2017 09:06)      PHYSICAL EXAM:  General:   HEENT:   Lymph Nodes:  Neck:   Respiratory:   Cardiovascular:   Abdomen:   Extremities:   Skin:   Neurological:  Psychiatry:    HOSPITAL MEDICATIONS:  MEDICATIONS  (STANDING):  ALBUTerol/ipratropium for Nebulization 3 milliLiter(s) Nebulizer every 6 hours  aspirin enteric coated 81 milliGRAM(s) Oral daily  fenofibrate Tablet 145 milliGRAM(s) Oral daily  heparin  Injectable 5000 Unit(s) SubCutaneous every 8 hours  insulin lispro (HumaLOG) corrective regimen sliding scale   SubCutaneous three times a day before meals  insulin lispro (HumaLOG) corrective regimen sliding scale   SubCutaneous at bedtime  meropenem IVPB      meropenem IVPB 1000 milliGRAM(s) IV Intermittent every 8 hours  methylPREDNISolone sodium succinate Injectable 20 milliGRAM(s) IV Push every 12 hours  metoprolol     tartrate 50 milliGRAM(s) Oral every 12 hours  NIFEdipine XL 30 milliGRAM(s) Oral daily  NIFEdipine XL 60 milliGRAM(s) Oral at bedtime  pantoprazole  Injectable 40 milliGRAM(s) IV Push daily  prednisoLONE acetate 1% Suspension 1 Drop(s) Both EYES three times a day  predniSONE   Tablet 10 milliGRAM(s) Oral daily  sodium chloride 0.65% Nasal 1 Spray(s) Both Nostrils three times a day    MEDICATIONS  (PRN):  acetaminophen   Tablet 650 milliGRAM(s) Oral every 6 hours PRN For Temp greater than 38 C (100.4 F)  acetaminophen   Tablet. 650 milliGRAM(s) Oral every 6 hours PRN mild to moderate pain      LABS:                        10.0   5.06  )-----------( 198      ( 30 Oct 2017 06:30 )             31.7     10-30    138  |  100  |  38<H>  ----------------------------<  211<H>  4.0   |  26  |  1.13    Ca    8.6      30 Oct 2017 06:30  Phos  3.7     10-30  Mg     1.9     10-30    TPro  6.8  /  Alb  2.9<L>  /  TBili  0.6  /  DBili  x   /  AST  22  /  ALT  20  /  AlkPhos  71  10-30              MICROBIOLOGY:     RADIOLOGY:  [ ] Reviewed and interpreted by me    PULMONARY FUNCTION TESTS:    EKG:

## 2017-10-30 NOTE — PROGRESS NOTE ADULT - ASSESSMENT
86 year old with lung cancer (VATS in past/ recent radiation ) and PMR on low dose steroid as an outpatient presented  with E coli bacteremia likely due to UTI/ pyelonephritis.     Her CT chest findings are noted- ? if this is all due to her malignancy.  I do not have a clinical suspicion for Tuberculosis.  Acute bacterial pulmonary process is less likely given E coli bacteremia.    Continue Meropenem through 11/1.  On 11/1, can change to ertapenem 1 gm iv daily though 11/10.    Will follow

## 2017-10-31 DIAGNOSIS — R78.81 BACTEREMIA: ICD-10-CM

## 2017-10-31 DIAGNOSIS — J96.91 RESPIRATORY FAILURE, UNSPECIFIED WITH HYPOXIA: ICD-10-CM

## 2017-10-31 DIAGNOSIS — Z29.9 ENCOUNTER FOR PROPHYLACTIC MEASURES, UNSPECIFIED: ICD-10-CM

## 2017-10-31 DIAGNOSIS — M35.3 POLYMYALGIA RHEUMATICA: ICD-10-CM

## 2017-10-31 DIAGNOSIS — C34.90 MALIGNANT NEOPLASM OF UNSPECIFIED PART OF UNSPECIFIED BRONCHUS OR LUNG: ICD-10-CM

## 2017-10-31 LAB
BUN SERPL-MCNC: 35 MG/DL — HIGH (ref 7–23)
CALCIUM SERPL-MCNC: 8.8 MG/DL — SIGNIFICANT CHANGE UP (ref 8.4–10.5)
CHLORIDE SERPL-SCNC: 100 MMOL/L — SIGNIFICANT CHANGE UP (ref 98–107)
CO2 SERPL-SCNC: 26 MMOL/L — SIGNIFICANT CHANGE UP (ref 22–31)
CREAT SERPL-MCNC: 1.09 MG/DL — SIGNIFICANT CHANGE UP (ref 0.5–1.3)
GLUCOSE BLDC GLUCOMTR-MCNC: 218 MG/DL — HIGH (ref 70–99)
GLUCOSE BLDC GLUCOMTR-MCNC: 233 MG/DL — HIGH (ref 70–99)
GLUCOSE BLDC GLUCOMTR-MCNC: 262 MG/DL — HIGH (ref 70–99)
GLUCOSE BLDC GLUCOMTR-MCNC: 375 MG/DL — HIGH (ref 70–99)
GLUCOSE SERPL-MCNC: 240 MG/DL — HIGH (ref 70–99)
HCT VFR BLD CALC: 31.7 % — LOW (ref 34.5–45)
HGB BLD-MCNC: 10.2 G/DL — LOW (ref 11.5–15.5)
MCHC RBC-ENTMCNC: 27.8 PG — SIGNIFICANT CHANGE UP (ref 27–34)
MCHC RBC-ENTMCNC: 32.2 % — SIGNIFICANT CHANGE UP (ref 32–36)
MCV RBC AUTO: 86.4 FL — SIGNIFICANT CHANGE UP (ref 80–100)
NRBC # FLD: 0 — SIGNIFICANT CHANGE UP
PLATELET # BLD AUTO: 202 K/UL — SIGNIFICANT CHANGE UP (ref 150–400)
PMV BLD: 11 FL — SIGNIFICANT CHANGE UP (ref 7–13)
POTASSIUM SERPL-MCNC: 3.9 MMOL/L — SIGNIFICANT CHANGE UP (ref 3.5–5.3)
POTASSIUM SERPL-SCNC: 3.9 MMOL/L — SIGNIFICANT CHANGE UP (ref 3.5–5.3)
RBC # BLD: 3.67 M/UL — LOW (ref 3.8–5.2)
RBC # FLD: 14.1 % — SIGNIFICANT CHANGE UP (ref 10.3–14.5)
SODIUM SERPL-SCNC: 139 MMOL/L — SIGNIFICANT CHANGE UP (ref 135–145)
WBC # BLD: 6.48 K/UL — SIGNIFICANT CHANGE UP (ref 3.8–10.5)
WBC # FLD AUTO: 6.48 K/UL — SIGNIFICANT CHANGE UP (ref 3.8–10.5)

## 2017-10-31 PROCEDURE — 99233 SBSQ HOSP IP/OBS HIGH 50: CPT | Mod: GC

## 2017-10-31 RX ORDER — LABETALOL HCL 100 MG
5 TABLET ORAL ONCE
Qty: 0 | Refills: 0 | Status: DISCONTINUED | OUTPATIENT
Start: 2017-10-31 | End: 2017-10-31

## 2017-10-31 RX ORDER — LANOLIN ALCOHOL/MO/W.PET/CERES
3 CREAM (GRAM) TOPICAL AT BEDTIME
Qty: 0 | Refills: 0 | Status: DISCONTINUED | OUTPATIENT
Start: 2017-10-31 | End: 2017-11-07

## 2017-10-31 RX ORDER — FUROSEMIDE 40 MG
20 TABLET ORAL ONCE
Qty: 0 | Refills: 0 | Status: COMPLETED | OUTPATIENT
Start: 2017-10-31 | End: 2017-10-31

## 2017-10-31 RX ADMIN — MEROPENEM 200 MILLIGRAM(S): 1 INJECTION INTRAVENOUS at 21:18

## 2017-10-31 RX ADMIN — MEROPENEM 200 MILLIGRAM(S): 1 INJECTION INTRAVENOUS at 15:34

## 2017-10-31 RX ADMIN — Medication 3 MILLIGRAM(S): at 23:37

## 2017-10-31 RX ADMIN — Medication 50 MILLIGRAM(S): at 18:33

## 2017-10-31 RX ADMIN — Medication 1 DROP(S): at 21:18

## 2017-10-31 RX ADMIN — Medication 10: at 12:49

## 2017-10-31 RX ADMIN — Medication 6: at 09:18

## 2017-10-31 RX ADMIN — Medication 30 MILLIGRAM(S): at 05:35

## 2017-10-31 RX ADMIN — Medication 3 MILLILITER(S): at 09:58

## 2017-10-31 RX ADMIN — Medication 3 MILLILITER(S): at 22:38

## 2017-10-31 RX ADMIN — Medication 1 SPRAY(S): at 15:33

## 2017-10-31 RX ADMIN — Medication 145 MILLIGRAM(S): at 11:35

## 2017-10-31 RX ADMIN — Medication 20 MILLIGRAM(S): at 05:34

## 2017-10-31 RX ADMIN — Medication 1 DROP(S): at 15:33

## 2017-10-31 RX ADMIN — Medication 1 DROP(S): at 05:35

## 2017-10-31 RX ADMIN — Medication 1 SPRAY(S): at 21:18

## 2017-10-31 RX ADMIN — Medication 3 MILLILITER(S): at 16:14

## 2017-10-31 RX ADMIN — MEROPENEM 200 MILLIGRAM(S): 1 INJECTION INTRAVENOUS at 05:34

## 2017-10-31 RX ADMIN — PANTOPRAZOLE SODIUM 40 MILLIGRAM(S): 20 TABLET, DELAYED RELEASE ORAL at 05:35

## 2017-10-31 RX ADMIN — HEPARIN SODIUM 5000 UNIT(S): 5000 INJECTION INTRAVENOUS; SUBCUTANEOUS at 15:33

## 2017-10-31 RX ADMIN — HEPARIN SODIUM 5000 UNIT(S): 5000 INJECTION INTRAVENOUS; SUBCUTANEOUS at 21:18

## 2017-10-31 RX ADMIN — Medication 1 SPRAY(S): at 05:34

## 2017-10-31 RX ADMIN — Medication 650 MILLIGRAM(S): at 21:17

## 2017-10-31 RX ADMIN — Medication 60 MILLIGRAM(S): at 21:16

## 2017-10-31 RX ADMIN — Medication 50 MILLIGRAM(S): at 05:35

## 2017-10-31 RX ADMIN — Medication 3 MILLILITER(S): at 03:32

## 2017-10-31 RX ADMIN — HEPARIN SODIUM 5000 UNIT(S): 5000 INJECTION INTRAVENOUS; SUBCUTANEOUS at 05:34

## 2017-10-31 RX ADMIN — Medication 4: at 18:32

## 2017-10-31 RX ADMIN — Medication 81 MILLIGRAM(S): at 11:35

## 2017-10-31 RX ADMIN — Medication 20 MILLIGRAM(S): at 12:49

## 2017-10-31 NOTE — PROGRESS NOTE ADULT - SUBJECTIVE AND OBJECTIVE BOX
Subjective: Patient seen and examined. No new events except as noted.   Breathing better no cp or palpitations  uncomfortable last nite as passed large amounts of urine but denied cp overnight    MEDICATIONS:  MEDICATIONS  (STANDING):  ALBUTerol/ipratropium for Nebulization 3 milliLiter(s) Nebulizer every 6 hours  aspirin enteric coated 81 milliGRAM(s) Oral daily  fenofibrate Tablet 145 milliGRAM(s) Oral daily  heparin  Injectable 5000 Unit(s) SubCutaneous every 8 hours  insulin lispro (HumaLOG) corrective regimen sliding scale   SubCutaneous three times a day before meals  insulin lispro (HumaLOG) corrective regimen sliding scale   SubCutaneous at bedtime  meropenem IVPB      meropenem IVPB 1000 milliGRAM(s) IV Intermittent every 8 hours  methylPREDNISolone sodium succinate Injectable 20 milliGRAM(s) IV Push every 12 hours  metoprolol     tartrate 50 milliGRAM(s) Oral every 12 hours  NIFEdipine XL 30 milliGRAM(s) Oral daily  NIFEdipine XL 60 milliGRAM(s) Oral at bedtime  pantoprazole    Tablet 40 milliGRAM(s) Oral before breakfast  prednisoLONE acetate 1% Suspension 1 Drop(s) Both EYES three times a day  sodium chloride 0.65% Nasal 1 Spray(s) Both Nostrils three times a day      PHYSICAL EXAM:  T(C): 36.7 (10-31-17 @ 05:31), Max: 36.7 (10-30-17 @ 10:04)  HR: 85 (10-31-17 @ 07:08) (82 - 95)  BP: 160/81 (10-31-17 @ 05:31) (137/79 - 170/92)  RR: 19 (10-31-17 @ 05:31) (18 - 20)  SpO2: 94% (10-31-17 @ 07:08) (92% - 100%)  Wt(kg): --  I&O's Summary        Appearance: Normal on high flow O2	  HEENT:   Normal oral mucosa, PERRL, EOMI	  Cardiovascular: Normal S1 S2, No JVD, No murmurs ,  Respiratory: decreased BS bilaterally no wheezes or rhochi	  Gastrointestinal:  Soft, Non-tender, + BS	  Skin: No rashes, No ecchymoses, No cyanosis, warm to touch  Musculoskeletal: Normal range of motion, normal strength  Psychiatry:  Mood & affect appropriate  Ext: No edema  Peripheral pulses palpable 2+ bilaterally      LABS:    CARDIAC MARKERS:                                10.2   6.48  )-----------( 202      ( 31 Oct 2017 06:40 )             31.7     10-31    139  |  100  |  35<H>  ----------------------------<  240<H>  3.9   |  26  |  1.09    Ca    8.8      31 Oct 2017 06:40  Phos  3.7     10-30  Mg     1.9     10-30    TPro  6.8  /  Alb  2.9<L>  /  TBili  0.6  /  DBili  x   /  AST  22  /  ALT  20  /  AlkPhos  71  10-30        TELEMETRY: 	    ECG:  < from: 12 Lead ECG (10.25.17 @ 23:55) >  Diagnosis Line Sinus rhythm with 1st degree A-V block  Prolonged QT  Abnormal EC

## 2017-10-31 NOTE — CONSULT NOTE ADULT - ASSESSMENT
1. Respiratory failure secondary to lung ca/emphysema/copd  2. no evidence of CHF  3. improved on bipap  4. sepsis?    Recommendion:  1. bipap, antibitics   2. no further cardiac workup
86 year old with E coli bacteremia likely due to UTI/ pyelonephritis.  She is improving on ceftriazxone so I would continue that pending senstivty.     If she were to have fever, could broaden to zosyn.    Her CT findings are noted- ? if this is all due to her malignancy.  I do not have a clinical suspicion for Tuberculosis.  Acute bacterial pulmonary process is less likely given E coli bacteremia.
Assessment:          s/p MICU for GNR UTI, E coli bacteremia, acute respiratory failure in patient with emphysema and adenoCA of lung    The patient is presently on high flow O2    Patient was seen by ID - on meropenem through 11/1;  then to likely change to ertapenem.   Continue attempts at reducing supplemental O2, while maintaining sats >90%  Continue bronchodilators and try to reduce prednisone dose.

## 2017-10-31 NOTE — PROGRESS NOTE ADULT - ASSESSMENT
87 y/o F PMH Lung CA (unknown stage or type, Dx 2010 s/p L VATS, wedge resection, RT in July), HTN, DM2  recent dx of emphysema, former smoker, polymyalgia rheumatica (chronic prednisone 2.5 QD),  a/w N/V and dysuria 2/2 pyelonephritis c/b ESBL E coli bacteremia with course c/b hypoxia requiring CPAP and high flow NC. Currently stable on high flow NC, IV abx.

## 2017-10-31 NOTE — PROGRESS NOTE ADULT - ASSESSMENT
1. severe COPD/ emphysema  2. lung carcinoma  3. cardiac stable no cardiac issues  4. hypertension    Recommend  proceed as per pulmonary and medicine  no further cardiac workup

## 2017-10-31 NOTE — PROGRESS NOTE ADULT - SUBJECTIVE AND OBJECTIVE BOX
CHIEF COMPLAINT:    Interval Events:    REVIEW OF SYSTEMS:  Constitutional:   Eyes:  ENT:  CV:  Resp:  GI:  :  MSK:  Integumentary:  Neurological:  Psychiatric:  Endocrine:  Hematologic/Lymphatic:  Allergic/Immunologic:  [ ] All other systems negative  [ ] Unable to assess ROS because ________    OBJECTIVE:  ICU Vital Signs Last 24 Hrs  T(C): 36.7 (31 Oct 2017 05:31), Max: 36.7 (30 Oct 2017 10:04)  T(F): 98 (31 Oct 2017 05:31), Max: 98 (30 Oct 2017 10:04)  HR: 85 (31 Oct 2017 07:08) (82 - 95)  BP: 160/81 (31 Oct 2017 05:31) (137/79 - 170/92)  BP(mean): --  ABP: --  ABP(mean): --  RR: 19 (31 Oct 2017 05:31) (18 - 20)  SpO2: 94% (31 Oct 2017 07:08) (92% - 100%)        CAPILLARY BLOOD GLUCOSE  132 (29 Oct 2017 21:24)      POCT Blood Glucose.: 283 mg/dL (30 Oct 2017 21:57)        HOSPITAL MEDICATIONS:  MEDICATIONS  (STANDING):  ALBUTerol/ipratropium for Nebulization 3 milliLiter(s) Nebulizer every 6 hours  aspirin enteric coated 81 milliGRAM(s) Oral daily  fenofibrate Tablet 145 milliGRAM(s) Oral daily  heparin  Injectable 5000 Unit(s) SubCutaneous every 8 hours  insulin lispro (HumaLOG) corrective regimen sliding scale   SubCutaneous three times a day before meals  insulin lispro (HumaLOG) corrective regimen sliding scale   SubCutaneous at bedtime  meropenem IVPB      meropenem IVPB 1000 milliGRAM(s) IV Intermittent every 8 hours  methylPREDNISolone sodium succinate Injectable 20 milliGRAM(s) IV Push every 12 hours  metoprolol     tartrate 50 milliGRAM(s) Oral every 12 hours  NIFEdipine XL 30 milliGRAM(s) Oral daily  NIFEdipine XL 60 milliGRAM(s) Oral at bedtime  pantoprazole    Tablet 40 milliGRAM(s) Oral before breakfast  prednisoLONE acetate 1% Suspension 1 Drop(s) Both EYES three times a day  sodium chloride 0.65% Nasal 1 Spray(s) Both Nostrils three times a day    MEDICATIONS  (PRN):  acetaminophen   Tablet 650 milliGRAM(s) Oral every 6 hours PRN For Temp greater than 38 C (100.4 F)  acetaminophen   Tablet. 650 milliGRAM(s) Oral every 6 hours PRN mild to moderate pain      LABS:                        10.2   6.48  )-----------( 202      ( 31 Oct 2017 06:40 )             31.7     10-31    139  |  100  |  35<H>  ----------------------------<  240<H>  3.9   |  26  |  1.09    Ca    8.8      31 Oct 2017 06:40  Phos  3.7     10-30  Mg     1.9     10-30    TPro  6.8  /  Alb  2.9<L>  /  TBili  0.6  /  DBili  x   /  AST  22  /  ALT  20  /  AlkPhos  71  10-30              MICROBIOLOGY:     RADIOLOGY:  [ ] Reviewed and interpreted by me    PULMONARY FUNCTION TESTS:    EKG: CHIEF COMPLAINT: no complaints    Interval Events: no overnight events    REVIEW OF SYSTEMS:  CV: denies chest pain  Resp: c/o CARRANZA  [x] All other systems negative      OBJECTIVE:  ICU Vital Signs Last 24 Hrs  T(C): 36.7 (31 Oct 2017 05:31), Max: 36.7 (30 Oct 2017 10:04)  T(F): 98 (31 Oct 2017 05:31), Max: 98 (30 Oct 2017 10:04)  HR: 85 (31 Oct 2017 07:08) (82 - 95)  BP: 160/81 (31 Oct 2017 05:31) (137/79 - 170/92)  RR: 19 (31 Oct 2017 05:31) (18 - 20)  SpO2: 94% (31 Oct 2017 07:08) (92% - 100%)        CAPILLARY BLOOD GLUCOSE  132 (29 Oct 2017 21:24)      POCT Blood Glucose.: 283 mg/dL (30 Oct 2017 21:57)        HOSPITAL MEDICATIONS:  MEDICATIONS  (STANDING):  ALBUTerol/ipratropium for Nebulization 3 milliLiter(s) Nebulizer every 6 hours  aspirin enteric coated 81 milliGRAM(s) Oral daily  fenofibrate Tablet 145 milliGRAM(s) Oral daily  heparin  Injectable 5000 Unit(s) SubCutaneous every 8 hours  insulin lispro (HumaLOG) corrective regimen sliding scale   SubCutaneous three times a day before meals  insulin lispro (HumaLOG) corrective regimen sliding scale   SubCutaneous at bedtime  meropenem IVPB      meropenem IVPB 1000 milliGRAM(s) IV Intermittent every 8 hours  methylPREDNISolone sodium succinate Injectable 20 milliGRAM(s) IV Push every 12 hours  metoprolol     tartrate 50 milliGRAM(s) Oral every 12 hours  NIFEdipine XL 30 milliGRAM(s) Oral daily  NIFEdipine XL 60 milliGRAM(s) Oral at bedtime  pantoprazole    Tablet 40 milliGRAM(s) Oral before breakfast  prednisoLONE acetate 1% Suspension 1 Drop(s) Both EYES three times a day  sodium chloride 0.65% Nasal 1 Spray(s) Both Nostrils three times a day    MEDICATIONS  (PRN):  acetaminophen   Tablet 650 milliGRAM(s) Oral every 6 hours PRN For Temp greater than 38 C (100.4 F)  acetaminophen   Tablet. 650 milliGRAM(s) Oral every 6 hours PRN mild to moderate pain      LABS:                        10.2   6.48  )-----------( 202      ( 31 Oct 2017 06:40 )             31.7     10-31    139  |  100  |  35<H>  ----------------------------<  240<H>  3.9   |  26  |  1.09    Ca    8.8      31 Oct 2017 06:40  Phos  3.7     10-30  Mg     1.9     10-30    TPro  6.8  /  Alb  2.9<L>  /  TBili  0.6  /  DBili  x   /  AST  22  /  ALT  20  /  AlkPhos  71  10-30

## 2017-10-31 NOTE — PROGRESS NOTE ADULT - PROBLEM SELECTOR PLAN 1
ID note appreciated  Will continue on Meropenem through 11/1, then start ertapenem daily dosing  May need PICC line

## 2017-10-31 NOTE — PROGRESS NOTE ADULT - SUBJECTIVE AND OBJECTIVE BOX
_________________________________________________________________________________________  ========>>  M E D I C A L   A T T E N D I N G    F O L L O W  U P  N O T E  <<=========  -----------------------------------------------------------------------------------------------------    - Patient seen and examined by me approximately thirty minutes ago.  - In summary, patient is a 86y year old woman who originally presented with UTI / pyelonephritis, developed Bacteremia, then had acute respiratory failure, post Bipap, and now on High flow out of the ICU.  - Patient today overall doing ok, comfortable, eating OK.  doing well on High flow O2, having increased urination post lasix    ==================>> MEDICATIONS <<====================    ALBUTerol/ipratropium for Nebulization 3 milliLiter(s) Nebulizer every 6 hours  aspirin enteric coated 81 milliGRAM(s) Oral daily  fenofibrate Tablet 145 milliGRAM(s) Oral daily  heparin  Injectable 5000 Unit(s) SubCutaneous every 8 hours  insulin lispro (HumaLOG) corrective regimen sliding scale   SubCutaneous three times a day before meals  insulin lispro (HumaLOG) corrective regimen sliding scale   SubCutaneous at bedtime  meropenem IVPB      meropenem IVPB 1000 milliGRAM(s) IV Intermittent every 8 hours  metoprolol     tartrate 50 milliGRAM(s) Oral every 12 hours  NIFEdipine XL 30 milliGRAM(s) Oral daily  NIFEdipine XL 60 milliGRAM(s) Oral at bedtime  pantoprazole    Tablet 40 milliGRAM(s) Oral before breakfast  prednisoLONE acetate 1% Suspension 1 Drop(s) Both EYES three times a day  predniSONE   Tablet 40 milliGRAM(s) Oral daily  sodium chloride 0.65% Nasal 1 Spray(s) Both Nostrils three times a day    MEDICATIONS  (PRN):  acetaminophen   Tablet 650 milliGRAM(s) Oral every 6 hours PRN For Temp greater than 38 C (100.4 F)  acetaminophen   Tablet. 650 milliGRAM(s) Oral every 6 hours PRN mild to moderate pain  melatonin 3 milliGRAM(s) Oral at bedtime PRN Sleep    ==================>> REVIEW OF SYSTEM <<=================    GEN: no fever, no chills, no pain  RESP: no SOB now cough, no sputum  CVS: no chest pain, no palpitations, no edema  GI: no abdominal pain, no nausea, no constipation, no diarrhea  : no dysuria, no frequency, no hematuria  Neuro: no headache, no dizziness  Derm : no itching, no rash    ==================>> VITAL SIGNS <<==================    Vital Signs Last 24 Hrs  T(C): 36.3 (10-31-17 @ 14:30)  T(F): 97.4 (10-31-17 @ 14:30), Max: 98 (10-31-17 @ 01:01)  HR: 87 (10-31-17 @ 16:15) (82 - 95)  BP: 154/78 (10-31-17 @ 14:30)  BP(mean): --  RR: 20 (10-31-17 @ 14:30) (19 - 20)  SpO2: 95% (10-31-17 @ 15:42) (92% - 100%)    CAPILLARY BLOOD GLUCOSE      POCT Blood Glucose.: 233 mg/dL (31 Oct 2017 17:42)  POCT Blood Glucose.: 375 mg/dL (31 Oct 2017 11:59)  POCT Blood Glucose.: 262 mg/dL (31 Oct 2017 08:55)  POCT Blood Glucose.: 283 mg/dL (30 Oct 2017 21:57)    ==================>> PHYSICAL EXAM <<=================    GEN: A&O X 3 , NAD , comfortable in bed, on High flow  HEENT: NCAT, PERRL, MMM, hearing intact  Neck: supple , no JVD  CVS: S1S2 , regular , No M/R/G appreciated  PULM: CTA B/L,  no W/R/R appreciated  ABD.: soft. non tender, non distended,  bowel sounds present  Extrem: intact pulses , no edema   PSYCH : normal mood,  not anxious     ==================>> LAB AND IMAGING <<==================                                     10.2   6.48  )-----------( 202      ( 31 Oct 2017 06:40 )             31.7        10-31    139  |  100  |  35<H>  ----------------------------<  240<H>  3.9   |  26  |  1.09    Ca    8.8      31 Oct 2017 06:40  Phos  3.7     10-30  Mg     1.9     10-30    TPro  6.8  /  Alb  2.9<L>  /  TBili  0.6  /  DBili  x   /  AST  22  /  ALT  20  /  AlkPhos  71  10-30  _______________________  C U L T U R E S :    Culture - Blood (collected 28 Oct 2017 04:19)  Source: BLOOD  Preliminary Report (30 Oct 2017 04:18):    NO ORGANISMS ISOLATED    NO ORGANISMS ISOLATED AT 48 HRS.    Culture - Urine (collected 25 Oct 2017 20:04)  Source: URINE MIDSTREAM  Preliminary Report (26 Oct 2017 10:56):    GNR^Gram Neg Rods    COLONY COUNT: > = 100,000 CFU/ML  Final Report (29 Oct 2017 15:05):    RESULT CALLED TO: THELMA SALINAS MD./Y    DATE / TIME CALLED: 10/27/17 1215    CALLED BY: JEFE MORE                  *******************************                  * This is an appended result. *                  *******************************    A prior result that was reported as final has been changed.  Organism: E.COLI ESBL POSITIVE  Escherichia coli (29 Oct 2017 15:05)  Organism: Escherichia coli  COLONY COUNT: > = 100,000 CFU/ML (29 Oct 2017 15:05)    Sensitivities:      -  Amikacin: S <=16 BRADEN      -  Ampicillin: S <=8 BRADEN      -  Ampicillin/Sulbactam: S <=8/4 BRADEN      -  Aztreonam: I 8 BRADEN      -  Cefazolin: S <=8 BRADEN      -  Cefepime: S <=4 BRADEN      -  Cefoxitin: S <=8 BRADEN      -  Ceftazidime: S <=1 BRADEN      -  Ceftriaxone: S <=1 BRADEN      -  Ciprofloxacin: R >2 BRADEN      -  Ertapenem: S <=1 BRADEN      -  Gentamicin: S <=4 BRADEN      -  Imipenem: S <=1 BRADEN      -  Levofloxacin: R >4 BRADEN      -  Meropenem: S <=1 BRADEN      -  Nitrofurantoin: S <=32 BRADEN      -  Piperacillin/Tazobactam: S <=16 BRADEN      -  Tigecycline: S <=2 BRADEN      -  Tobramycin: S <=4 BRADEN      -  Trimethoprim/Sulfamethoxazole: R >2/38 BRADEN      Method Type: MICROSCAN NEG URINE COMBO 61  Organism: E.COLI ESBL POSITIVE  COLONY COUNT: > = 100,000 CFU/ML (27 Oct 2017 12:15)    Sensitivities:      -  Amikacin: S <=16 BRADEN      -  Ampicillin: R >16 BRADEN      -  Ampicillin/Sulbactam: R <=8/4 BRADNE      -  Aztreonam: R 16 BRADEN      -  Cefazolin: R >16 BRADEN      -  Cefepime: R >16 BRADEN      -  Cefoxitin: S <=8 BRADEN      -  Ceftazidime: R 8 BRADEN      -  Ceftriaxone: R      -  Ciprofloxacin: R >2 BRADEN      -  Ertapenem: S <=1 BRADEN      -  Gentamicin: S <=4 BRADEN      -  Imipenem: S <=1 BRADEN      -  Levofloxacin: R >4 BRADEN      -  Meropenem: S <=1 BRADEN      -  Nitrofurantoin: S <=32 BRADEN      -  Piperacillin/Tazobactam: R <=16 BRADEN      -  Tigecycline: S <=2 BRADEN      -  Tobramycin: S <=4 BRADEN      -  Trimethoprim/Sulfamethoxazole: S <=2/38 BRADEN      Method Type: MICROSCAN NEG URINE COMBO 61    Culture - Blood (collected 25 Oct 2017 19:40)  Source: BLOOD VENOUS  Preliminary Report (27 Oct 2017 12:33):    EC^Escherichia coli    Culture - Blood (collected 25 Oct 2017 19:40)  Source: BLOOD PERIPHERAL  Final Report (28 Oct 2017 11:53):    RESULT CALLED TO: BYRON LUX    DATE / TIME CALLED: 10/28/17 1151    CALLED BY: KIRT MATIAS  Final Report (26 Oct 2017 07:17):    ***Blood Panel PCR results on this specimen are available    approximately 3 hours after the Gram stain result***    Gram stain, PCR, and/or culture results may not always    correspond due to difference in methodologies    ------------------------------------------------------------    This PCR assay was performed using [employer].  The    following targets are tested for:  Enterococcus, vancomycin    resistant enterococci, Listeria monocytogenes,  coagulase    negative staphylococci, S. aureus, methicillin resistant S.    aureus, Streptococcus agalactiae (Group B), S. pneumoniae,    S. pyogenes (Group A), Acinetobacter baumannii, Enterobacter    cloacae, E. coli, Klebsiella oxytoca, K. pneumoniae, Proteus    sp., Serratia marcescens, Haemophilus influenzae, Neisseria    meningitidis, Pseudomonas aeruginosa, Candida albicans, C.    glabrata, C. krusei, C. parapsilosis, C. tropicalis and the    KPC resistance gene.  Organism: BLOOD CULTURE PCR  E.COLI ESBL POSITIVE (28 Oct 2017 11:53)  Organism: E.COLI ESBL POSITIVE (28 Oct 2017 11:53)    Sensitivities:      -  Amikacin: S <=16 BRADEN      -  Ampicillin: R >16 BRADEN      -  Ampicillin/Sulbactam: R >16/8 BRADEN      -  Aztreonam: R >16 BRADEN      -  Cefazolin: R >16 BRADEN      -  Cefepime: R >16 BRADEN      -  Cefoxitin: S <=8 BRADEN      -  Ceftazidime: R >16 BRADEN      -  Ceftriaxone: R      -  Ciprofloxacin: R >2 BRADEN      -  Ertapenem: S <=1 BRADEN      -  Gentamicin: S <=4 BRADEN      -  Imipenem: S <=1 BRADEN      -  Levofloxacin: R >4 BRADEN      -  Meropenem: S <=1 BRADEN      -  Piperacillin/Tazobactam: R 64 BRADEN      -  Tigecycline: S <=2 BRADEN      -  Tobramycin: R >8 BRADEN      -  Trimethoprim/Sulfamethoxazole: R >2/38 BRADEN      Method Type: MICROSCAN NEG URINE COMBO 61  Organism: BLOOD CULTURE PCR  ***Blood Panel PCR results on this specimen are available  approximately 3 hours after the Gram stain result***  Gram stain, PCR, and/or culture results may not always  correspond due to difference in methodologies  ------------------------------------------------------------  This PCR assay was performed using [employer].  The  following targets are tested for:  Enterococcus, vancomycin  resistant enterococci, Listeria monocytogenes,  coagulase  negative staphylococci, S. aureus, methicillin resistant S.  aureus, Streptococcus agalactiae (Group B), S. pneumoniae,  S. pyogenes (Group A), Acinetobacter baumannii, Enterobacter  cloacae, E. coli, Klebsiella oxytoca, K. pneumoniae, Proteus  sp., Serratia marcescens, Haemophilus influenzae, Neisseria  meningitidis, Pseudomonas aeruginosa, Candida albicans, C.  glabrata, C. krusei, C. parapsilosis, C. tropicalis and the  KPC resistance gene. (27 Oct 2017 09:15)    Sensitivities:      -  Escherichia coli: + DETECT BRADEN      Method Type: PCR    ___________________________________________________________________________________  ===============>>  A S S E S S M E N T   A N D   P L A N <<===============  ------------------------------------------------------------------------------------------    **UTI/ Pyelonephritis/ ESBL bacteremia  ---continue Meropenem per ID >>tomorrow with possible Change to Ertapenem  ---ID F/U appreciated  ---most recent BCX negative so far  ---monitor vitals, labs closely    **Acute hypoxemic respiratory failure on High flow, in pt with emphysema and lung cancer  ---pulm f/u appreciated  ---wean off as able  ---nebs PRN  ---lasix as ordered  ---may need thoracentesis    **Lung cancer, with recurrence  ---pt follows with oncologist at [facility]  ---recurrence is known and pt completed radiation therapy t them recently per Dtr  ---pt to follow up post discharge    **Diabetes II, stable  ---monitor finger sticks closely  ---Continue Insulin regimen as above monitor  ---Diabetic diet  ---A1c 7.5    ** CKD III, overall stable  ---monitor BMP closely    **Hypertension h/o  ---Continue with antihypertensive medications as above  ---DASH diet   ---close monitoring of vitals    **Anemia, overall stale  ---monitor CBC    -GI/DVT Prophylaxis.  - PT, OOB  --------------------------------------------  Case discussed with pt, Dtr, Rn  Education given on plan of care, increase activity, deep breathing / IS, Nutrition  ___________________________  HLima Ferrell D.O.  Pager: [phone] _________________________________________________________________________________________  ========>>  M E D I C A L   A T T E N D I N G    F O L L O W  U P  N O T E  <<=========  -----------------------------------------------------------------------------------------------------    - Patient seen and examined by me approximately sixty minutes ago.  - In summary, patient is a 86y year old woman who originally presented with UTI / pyelonephritis, developed Bacteremia, then had acute respiratory failure, post Bipap, and now on High flow out of the ICU.  - Patient today overall doing ok, comfortable, eating OK.  doing well on High flow O2, having increased urination post lasix    ==================>> MEDICATIONS <<====================    ALBUTerol/ipratropium for Nebulization 3 milliLiter(s) Nebulizer every 6 hours  aspirin enteric coated 81 milliGRAM(s) Oral daily  fenofibrate Tablet 145 milliGRAM(s) Oral daily  heparin  Injectable 5000 Unit(s) SubCutaneous every 8 hours  insulin lispro (HumaLOG) corrective regimen sliding scale   SubCutaneous three times a day before meals  insulin lispro (HumaLOG) corrective regimen sliding scale   SubCutaneous at bedtime  meropenem IVPB      meropenem IVPB 1000 milliGRAM(s) IV Intermittent every 8 hours  metoprolol     tartrate 50 milliGRAM(s) Oral every 12 hours  NIFEdipine XL 30 milliGRAM(s) Oral daily  NIFEdipine XL 60 milliGRAM(s) Oral at bedtime  pantoprazole    Tablet 40 milliGRAM(s) Oral before breakfast  prednisoLONE acetate 1% Suspension 1 Drop(s) Both EYES three times a day  predniSONE   Tablet 40 milliGRAM(s) Oral daily  sodium chloride 0.65% Nasal 1 Spray(s) Both Nostrils three times a day    MEDICATIONS  (PRN):  acetaminophen   Tablet 650 milliGRAM(s) Oral every 6 hours PRN For Temp greater than 38 C (100.4 F)  acetaminophen   Tablet. 650 milliGRAM(s) Oral every 6 hours PRN mild to moderate pain  melatonin 3 milliGRAM(s) Oral at bedtime PRN Sleep    ==================>> REVIEW OF SYSTEM <<=================    GEN: no fever, no chills, no pain  RESP: no SOB now cough, no sputum  CVS: no chest pain, no palpitations, no edema  GI: no abdominal pain, no nausea, no constipation, no diarrhea  : no dysuria, no frequency, no hematuria  Neuro: no headache, no dizziness  Derm : no itching, no rash    ==================>> VITAL SIGNS <<==================    Vital Signs Last 24 Hrs  T(C): 36.3 (10-31-17 @ 14:30)  T(F): 97.4 (10-31-17 @ 14:30), Max: 98 (10-31-17 @ 01:01)  HR: 87 (10-31-17 @ 16:15) (82 - 95)  BP: 154/78 (10-31-17 @ 14:30)  BP(mean): --  RR: 20 (10-31-17 @ 14:30) (19 - 20)  SpO2: 95% (10-31-17 @ 15:42) (92% - 100%)    CAPILLARY BLOOD GLUCOSE      POCT Blood Glucose.: 233 mg/dL (31 Oct 2017 17:42)  POCT Blood Glucose.: 375 mg/dL (31 Oct 2017 11:59)  POCT Blood Glucose.: 262 mg/dL (31 Oct 2017 08:55)  POCT Blood Glucose.: 283 mg/dL (30 Oct 2017 21:57)    ==================>> PHYSICAL EXAM <<=================    GEN: A&O X 3 , NAD , comfortable in bed, on High flow  HEENT: NCAT, PERRL, MMM, hearing intact  Neck: supple , no JVD  CVS: S1S2 , regular , No M/R/G appreciated  PULM: CTA B/L,  no W/R/R appreciated  ABD.: soft. non tender, non distended,  bowel sounds present  Extrem: intact pulses , no edema   PSYCH : normal mood,  not anxious     ==================>> LAB AND IMAGING <<==================                                     10.2   6.48  )-----------( 202      ( 31 Oct 2017 06:40 )             31.7        10-31    139  |  100  |  35<H>  ----------------------------<  240<H>  3.9   |  26  |  1.09    Ca    8.8      31 Oct 2017 06:40  Phos  3.7     10-30  Mg     1.9     10-30    TPro  6.8  /  Alb  2.9<L>  /  TBili  0.6  /  DBili  x   /  AST  22  /  ALT  20  /  AlkPhos  71  10-30  _______________________  C U L T U R E S :    Culture - Blood (collected 28 Oct 2017 04:19)  Source: BLOOD  Preliminary Report (30 Oct 2017 04:18):    NO ORGANISMS ISOLATED    NO ORGANISMS ISOLATED AT 48 HRS.    Culture - Urine (collected 25 Oct 2017 20:04)  Source: URINE MIDSTREAM  Preliminary Report (26 Oct 2017 10:56):    GNR^Gram Neg Rods    COLONY COUNT: > = 100,000 CFU/ML  Final Report (29 Oct 2017 15:05):    RESULT CALLED TO: THELMA SALINAS MD./Y    DATE / TIME CALLED: 10/27/17 1215    CALLED BY: JEFE MORE                  *******************************                  * This is an appended result. *                  *******************************    A prior result that was reported as final has been changed.  Organism: E.COLI ESBL POSITIVE  Escherichia coli (29 Oct 2017 15:05)  Organism: Escherichia coli  COLONY COUNT: > = 100,000 CFU/ML (29 Oct 2017 15:05)    Sensitivities:      -  Amikacin: S <=16 BRADEN      -  Ampicillin: S <=8 BRADEN      -  Ampicillin/Sulbactam: S <=8/4 BRADEN      -  Aztreonam: I 8 BRADEN      -  Cefazolin: S <=8 BRADEN      -  Cefepime: S <=4 BRADEN      -  Cefoxitin: S <=8 BRADEN      -  Ceftazidime: S <=1 BRADEN      -  Ceftriaxone: S <=1 BRADEN      -  Ciprofloxacin: R >2 BRADEN      -  Ertapenem: S <=1 BRADEN      -  Gentamicin: S <=4 BRADEN      -  Imipenem: S <=1 BRADEN      -  Levofloxacin: R >4 BRADEN      -  Meropenem: S <=1 BRADEN      -  Nitrofurantoin: S <=32 BRADEN      -  Piperacillin/Tazobactam: S <=16 BRADEN      -  Tigecycline: S <=2 BRADEN      -  Tobramycin: S <=4 BRADEN      -  Trimethoprim/Sulfamethoxazole: R >2/38 BRADEN      Method Type: MICROSCAN NEG URINE COMBO 61  Organism: E.COLI ESBL POSITIVE  COLONY COUNT: > = 100,000 CFU/ML (27 Oct 2017 12:15)    Sensitivities:      -  Amikacin: S <=16 BRADEN      -  Ampicillin: R >16 BRADEN      -  Ampicillin/Sulbactam: R <=8/4 BRADEN      -  Aztreonam: R 16 BRADEN      -  Cefazolin: R >16 BRDAEN      -  Cefepime: R >16 BRADEN      -  Cefoxitin: S <=8 BRADEN      -  Ceftazidime: R 8 BRADEN      -  Ceftriaxone: R      -  Ciprofloxacin: R >2 BRADEN      -  Ertapenem: S <=1 BRADEN      -  Gentamicin: S <=4 BRADEN      -  Imipenem: S <=1 BRADEN      -  Levofloxacin: R >4 BRADEN      -  Meropenem: S <=1 BRADEN      -  Nitrofurantoin: S <=32 BRADEN      -  Piperacillin/Tazobactam: R <=16 BRADEN      -  Tigecycline: S <=2 BRADEN      -  Tobramycin: S <=4 BRADEN      -  Trimethoprim/Sulfamethoxazole: S <=2/38 BRADEN      Method Type: MICROSCAN NEG URINE COMBO 61    Culture - Blood (collected 25 Oct 2017 19:40)  Source: BLOOD VENOUS  Preliminary Report (27 Oct 2017 12:33):    EC^Escherichia coli    Culture - Blood (collected 25 Oct 2017 19:40)  Source: BLOOD PERIPHERAL  Final Report (28 Oct 2017 11:53):    RESULT CALLED TO: BYRON LUX    DATE / TIME CALLED: 10/28/17 1151    CALLED BY: KIRT MATIAS  Final Report (26 Oct 2017 07:17):    ***Blood Panel PCR results on this specimen are available    approximately 3 hours after the Gram stain result***    Gram stain, PCR, and/or culture results may not always    correspond due to difference in methodologies    ------------------------------------------------------------    This PCR assay was performed using [employer].  The    following targets are tested for:  Enterococcus, vancomycin    resistant enterococci, Listeria monocytogenes,  coagulase    negative staphylococci, S. aureus, methicillin resistant S.    aureus, Streptococcus agalactiae (Group B), S. pneumoniae,    S. pyogenes (Group A), Acinetobacter baumannii, Enterobacter    cloacae, E. coli, Klebsiella oxytoca, K. pneumoniae, Proteus    sp., Serratia marcescens, Haemophilus influenzae, Neisseria    meningitidis, Pseudomonas aeruginosa, Candida albicans, C.    glabrata, C. krusei, C. parapsilosis, C. tropicalis and the    KPC resistance gene.  Organism: BLOOD CULTURE PCR  E.COLI ESBL POSITIVE (28 Oct 2017 11:53)  Organism: E.COLI ESBL POSITIVE (28 Oct 2017 11:53)    Sensitivities:      -  Amikacin: S <=16 BRADEN      -  Ampicillin: R >16 BRADEN      -  Ampicillin/Sulbactam: R >16/8 BRADEN      -  Aztreonam: R >16 BRADEN      -  Cefazolin: R >16 BRADEN      -  Cefepime: R >16 BRADEN      -  Cefoxitin: S <=8 BRADEN      -  Ceftazidime: R >16 BRADEN      -  Ceftriaxone: R      -  Ciprofloxacin: R >2 BRADEN      -  Ertapenem: S <=1 BRADEN      -  Gentamicin: S <=4 BRADEN      -  Imipenem: S <=1 BRADEN      -  Levofloxacin: R >4 BRADEN      -  Meropenem: S <=1 BRADEN      -  Piperacillin/Tazobactam: R 64 BRADEN      -  Tigecycline: S <=2 BRADEN      -  Tobramycin: R >8 BRADEN      -  Trimethoprim/Sulfamethoxazole: R >2/38 BRADEN      Method Type: MICROSCAN NEG URINE COMBO 61  Organism: BLOOD CULTURE PCR  ***Blood Panel PCR results on this specimen are available  approximately 3 hours after the Gram stain result***  Gram stain, PCR, and/or culture results may not always  correspond due to difference in methodologies  ------------------------------------------------------------  This PCR assay was performed using [employer].  The  following targets are tested for:  Enterococcus, vancomycin  resistant enterococci, Listeria monocytogenes,  coagulase  negative staphylococci, S. aureus, methicillin resistant S.  aureus, Streptococcus agalactiae (Group B), S. pneumoniae,  S. pyogenes (Group A), Acinetobacter baumannii, Enterobacter  cloacae, E. coli, Klebsiella oxytoca, K. pneumoniae, Proteus  sp., Serratia marcescens, Haemophilus influenzae, Neisseria  meningitidis, Pseudomonas aeruginosa, Candida albicans, C.  glabrata, C. krusei, C. parapsilosis, C. tropicalis and the  KPC resistance gene. (27 Oct 2017 09:15)    Sensitivities:      -  Escherichia coli: + DETECT BRADEN      Method Type: PCR    ___________________________________________________________________________________  ===============>>  A S S E S S M E N T   A N D   P L A N <<===============  ------------------------------------------------------------------------------------------    **UTI/ Pyelonephritis/ ESBL bacteremia  ---continue Meropenem per ID >>tomorrow with possible Change to Ertapenem  ---ID F/U appreciated  ---most recent BCX negative so far  ---monitor vitals, labs closely    **Acute hypoxemic respiratory failure on High flow, in pt with emphysema and lung cancer  ---pulm f/u appreciated  ---wean off as able  ---nebs PRN  ---lasix as ordered  ---may need thoracentesis    **Lung cancer, with recurrence  ---pt follows with oncologist at [facility]  ---recurrence is known and pt completed radiation therapy t them recently per Dtr  ---pt to follow up post discharge    **Diabetes II, stable  ---monitor finger sticks closely  ---Continue Insulin regimen as above monitor  ---Diabetic diet  ---A1c 7.5    ** CKD III, overall stable  ---monitor BMP closely    **Hypertension h/o  ---Continue with antihypertensive medications as above  ---DASH diet   ---close monitoring of vitals    **Anemia, overall stale  ---monitor CBC    -GI/DVT Prophylaxis.  - PT, OOB  --------------------------------------------  Case discussed with pt, Dtr, Rn  Education given on plan of care, increase activity, deep breathing / IS, Nutrition  ___________________________  HLima Ferrell D.O.  Pager: [phone]

## 2017-10-31 NOTE — PROGRESS NOTE ADULT - PROBLEM SELECTOR PLAN 4
Will continue outpatient care with oncology  If remains hypoxic, will need to arrange for home O2, BIPAP

## 2017-10-31 NOTE — DIETITIAN INITIAL EVALUATION ADULT. - OTHER INFO
Nutrition assessment initiated for LOS. Pt. alert, oriented, tolerating diet, able to take > 75% of the  meals , no issues w/ taking PO nutrition.  Followed diabetic diet prior to admission .

## 2017-10-31 NOTE — CONSULT NOTE ADULT - SUBJECTIVE AND OBJECTIVE BOX
CHIEF COMPLAINT:  sob respiratory failure  HISTORY OF PRESENT ILLNESS:  HPI:  Patient is an 85 y/o F PMH Lung CA (unknown stage or type, Dx 2010 s/p L VATS, wedge resection, RT in July), HTN, DM2 p/w N/V, dysuria. Patient reports cough productive of sputum X2 weeks, s/p Antibiotics (unknown by pt but per ER records, amoxicillin). Patient felt well until 2 days ago, developed dysuria, urgency, increased frequency, progressing to suprapubic tenderness, F/C, and N/V. No recent travel, no sick contacts. (26 Oct 2017 03:09)  on floor developed increased respirtory distress tranferred to MICU    PAST MEDICAL & SURGICAL HISTORY:  Dyslipidemia  Hypertension  Diabetes  Lung cancer  Lung cancer: diagnosed 2010  S/P cholecystectomy  H/O carotid endarterectomy: left          MEDICATIONS:  aspirin enteric coated 81 milliGRAM(s) Oral daily  heparin  Injectable 5000 Unit(s) SubCutaneous every 8 hours  metoprolol     tartrate 50 milliGRAM(s) Oral every 12 hours  NIFEdipine XL 30 milliGRAM(s) Oral daily  NIFEdipine XL 60 milliGRAM(s) Oral at bedtime    meropenem IVPB      meropenem IVPB 1000 milliGRAM(s) IV Intermittent every 8 hours    ALBUTerol/ipratropium for Nebulization 3 milliLiter(s) Nebulizer every 6 hours    acetaminophen   Tablet 650 milliGRAM(s) Oral every 6 hours PRN  acetaminophen   Tablet. 650 milliGRAM(s) Oral every 6 hours PRN    pantoprazole  Injectable 40 milliGRAM(s) IV Push daily    fenofibrate Tablet 145 milliGRAM(s) Oral daily  insulin lispro (HumaLOG) corrective regimen sliding scale   SubCutaneous three times a day before meals  insulin lispro (HumaLOG) corrective regimen sliding scale   SubCutaneous at bedtime  methylPREDNISolone sodium succinate Injectable 20 milliGRAM(s) IV Push every 12 hours  predniSONE   Tablet 10 milliGRAM(s) Oral daily    prednisoLONE acetate 1% Suspension 1 Drop(s) Both EYES three times a day  sodium chloride 0.65% Nasal 1 Spray(s) Both Nostrils three times a day      FAMILY HISTORY:  No pertinent family history in first degree relatives      Allergies    ACE inhibitors (Angioedema)    Intolerances    	    PHYSICAL EXAM:  T(C): 37.4 (10-27-17 @ 16:00), Max: 37.5 (10-27-17 @ 1200)  HR: 85 (10-27-17 @ 20:05) (76 - 120)  BP: 166/66 (10-27-17 @ 19:00) (118/84 - 166/118)  RR: 26 (10-27-17 @ 20:05) (17 - 29)  SpO2: 96% (10-27-17 @ 20:05) (90% - 100%)  Wt(kg): --  I&O's Summary    26 Oct 2017 07:01  -  27 Oct 2017 07:00  --------------------------------------------------------  IN: 100 mL / OUT: 400 mL / NET: -300 mL    27 Oct 2017 07:01  -  27 Oct 2017 20:54  --------------------------------------------------------  IN: 200 mL / OUT: 500 mL / NET: -300 mL        Appearance: Normal, awake alert on bipap	  HEENT:   Normal oral mucosa, PERRL, EOMI	  Cardiovascular: Normal S1 S2, No JVD, No murmurs  Respiratory: Lungs BS bilatetally	  Psychiatry: A & O x 3, Mood & affect appropriate  Gastrointestinal:  Soft, Non-tender, + BS	  Neurologic: Non-focal  Extremities: No clubbing, cyanosis or edema    TELEMETRY: 	    ECG:NSR no acute change  OTHER: 	  ct chest  < from: CT Chest No Cont (10.25.17 @ 23:34) >  MPRESSION:     Spiculated masses within the bilateral upper lobes, encasing suture   material on the left, most compatible with bronchogenic malignancy.   Mediastinal lymphadenopathy.  Emphysema.    Right adrenal mass not significantly changed in size compared to previous   CT of January 11, 2010. Further characterization with MRI.    Left-sided perinephric stranding and left renal edema without   hydronephrosis or obstructing stone. Findings may be related to recent   passage of a stone. Please note that without IV contrast, pyelonephritis,   a clinical diagnosis cannot be assessed.  Colonic diverticulosis.        	  LABS:	 	    CARDIAC MARKERS:  Troponin T, Serum: < 0.06 ng/mL (10-27 @ 18:30)  Troponin T, Serum: 0.06 ng/mL (10-25 @ 20:00)      CKMB: 4.73 ng/mL (10-27 @ 18:30)  CKMB: 2.65 ng/mL (10-25 @ 20:00)    cath 2016 june nonobstrctive calcified coronaries normal LV function and LVEDP                          9.8    18.18 )-----------( 166      ( 27 Oct 2017 07:00 )             31.0     10-27    139  |  101  |  38<H>  ----------------------------<  245<H>  3.8   |  20<L>  |  1.13    Ca    8.8      27 Oct 2017 18:30  Phos  2.6     10-27  Mg     1.9     10-27    TPro  7.7  /  Alb  3.6  /  TBili  0.4  /  DBili  x   /  AST  41<H>  /  ALT  25  /  AlkPhos  43  10-27    Blood Gas Arterial Comprehensive (10.27.17 @ 07:00)    pH, Arterial: 7.32 pH    pCO2, Arterial: 40 mmHg    pO2, Arterial: 79 mmHg    HCO3, Arterial: 20 mmol/L    Base Excess, Arterial: -4.7: BASE EXCESS: REFERENCE RANGE = 0 (+/-) 2 mmol/l mmol/L    Oxygen Saturation, Arterial: 95.3 %    Blood Gas Arterial - Sodium: 135 mmol/L    Blood Gas Arterial - Potassium: 3.9 mmol/L    Blood Gas Arterial - Glucose: 257 mg/dL    Blood Gas Arterial - Chloride: 106 mmol/L    Blood Gas Arterial - Creatinine: 1.18 mg/dL    Blood Gas Arterial - Lactate: 1.1: Please note updated reference range. mmol/L    Blood Gas Arterial - Hemoglobin: 10.2 g/dL    Blood Gas Arterial - Hematocrit: 31.6 %
HPI:  Patient is an 85 y/o F PMH Lung CA (unknown stage or type, Dx  s/p L VATS, wedge resection, RT in July), HTN, DM2who presented tot he ed with n/v and associated dysuria.  She notes a few days of increasing urinary frequency.  She had suprapubic tenderness.      She has had some increased cough. She has a hisotry of lung cancer- s/p wedge resectiona dn radiation.  No recent/ prior chemo.        Overall, feels better after antibiotics        PAST MEDICAL & SURGICAL HISTORY:  Dyslipidemia  Hypertension  Diabetes  Lung cancer  Lung cancer: diagnosed   S/P cholecystectomy  H/O carotid endarterectomy: left      Allergies    ACE inhibitors (Angioedema)    Intolerances        ANTIMICROBIALS:  cefTRIAXone   IVPB 1 every 24 hours      OTHER MEDS:  acetaminophen   Tablet 650 milliGRAM(s) Oral every 6 hours PRN  acetaminophen   Tablet. 650 milliGRAM(s) Oral every 6 hours PRN  aspirin enteric coated 81 milliGRAM(s) Oral daily  fenofibrate Tablet 145 milliGRAM(s) Oral daily  heparin  Injectable 5000 Unit(s) SubCutaneous every 8 hours  insulin lispro (HumaLOG) corrective regimen sliding scale   SubCutaneous three times a day before meals  insulin lispro (HumaLOG) corrective regimen sliding scale   SubCutaneous at bedtime  metoprolol     tartrate 50 milliGRAM(s) Oral every 12 hours  NIFEdipine XL 30 milliGRAM(s) Oral daily  NIFEdipine XL 60 milliGRAM(s) Oral at bedtime  prednisoLONE sodium phosphate 1% Solution 1 Drop(s) Both EYES three times a day  predniSONE   Tablet 10 milliGRAM(s) Oral daily      SOCIAL HISTORY:  marries, former smoker,    FAMILY HISTORY:  No pertinent family history in first degree relatives      REVIEW OF SYSTEMS  [  ] ROS unobtainable because:    [ x ] All other systems negative except as noted below:	    Constitutional:  [x ] fever [ ] weight loss  Skin:  [ ] rash [ ] phlebitis	  Eyes: [ ] icterus [ ] inflammation	  ENMT: [ ] discharge [ ] thrush [ ] ulcers [ ] exudates  Respiratory: [ ] dyspnea [ ] hemoptysis [ ] cough [ ] sputum	  Cardiovascular:  [ ] chest pain [ ] palpitations [ ] edema	  Gastrointestinal:  [x ] nausea [x ] vomiting [ ] diarrhea [ ] constipation [ ] pain	  Genitourinary:  [ x] dysuria [x ] frequency [ ] hematuria [ ] discharge [ ] flank pain  Musculoskeletal:  [ ] myalgias [ ] arthralgias [ ] arthritis	  Neurological:  [ ] headache [ ] seizures	  Psychiatric:  [ ] anxiety [ ] depression	  Hematology/Lymphatics:  [ ] lymphadenopathy  Endocrine:  [ ] adrenal [ ] thyroid  Allergic/Immunologic:	 [ ] transplant [ ] seasonal    PHYSICAL EXAM:  General: [ x] non-toxic  HEAD/EYES: [ ] PERRL [x ] white sclera [ ] icterus  ENT:  [ ] normal x[ ] supple [ ] thrush [ ] pharyngeal exudate  Cardiovascular:   [ ] murmur x[ ] normal [ ] PPM/AICD  Respiratory:  [x ] clear to ausculation bilaterally  GI:  [ x] soft, non-tender, normal bowel sounds  :  [ ] swenson [ ] no CVA tenderness   Musculoskeletal:  [x ] no synovitis  Neurologic:  [ x] non-focal exam   Skin:  [x ] no rash  Lymph: [ ] no lymphadenopathy  Psychiatric:  [ ] appropriate affect [ ] alert & oriented  Lines:  [x ] no phlebitis [ ] central line          Drug Dosing Weight  Height (cm): 165.1 (26 Oct 2017 03:54)  Weight (kg): 65.8 (26 Oct 2017 03:54)  BMI (kg/m2): 24.1 (26 Oct 2017 03:54)  BSA (m2): 1.73 (26 Oct 2017 03:54)    Vital Signs Last 24 Hrs  T(F): 97.4 (10-26-17 @ 13:46), Max: 100.7 (10-25-17 @ 17:55)    Vital Signs Last 24 Hrs  HR: 73 (10-26-17 @ 13:46) (73 - 89)  BP: 115/71 (10-26-17 @ 13:46) (105/46 - 176/74)  RR: 17 (10-26-17 @ 13:46)  SpO2: 100% (10-26-17 @ 13:46) (92% - 100%)  Wt(kg): --                          11.4   6.13  )-----------( 176      ( 25 Oct 2017 19:04 )             35.9       10-25    136  |  98  |  42<H>  ----------------------------<  236<H>  3.7   |  19<L>  |  1.63<H>    Ca    8.8      25 Oct 2017 19:04    TPro  7.9  /  Alb  3.9  /  TBili  0.8  /  DBili  x   /  AST  48<H>  /  ALT  25  /  AlkPhos  53  10-25      Urinalysis Basic - ( 25 Oct 2017 18:30 )    Color: YELLOW / Appearance: HAZY / S.011 / pH: 5.5  Gluc: TRACE / Ketone: NEGATIVE  / Bili: NEGATIVE / Urobili: NORMAL E.U.   Blood: MODERATE / Protein: 100 / Nitrite: POSITIVE   Leuk Esterase: LARGE / RBC: 25-50 / WBC 10-25   Sq Epi: OCC / Non Sq Epi: x / Bacteria: MANY        MICROBIOLOGY:  Culture - Urine (10.25.17 @ 20:04)    Culture - Urine:   GNR^Gram Neg Rods  COLONY COUNT: > = 100,000 CFU/ML    Specimen Source: URINE MIDSTREAM      Culture - Blood (10.25.17 @ 19:40)    Specimen Source: BLOOD VENOUS    Gram Stain Blood:   ***** CRITICAL RESULT *****  PERSON CALLED / READ-BACK: TRISTA WESLEY/Y  DATE / TIME CALLED: 10/26/17 0629  CALLED BY: GILMER FLANNERY  GNR^Gram Neg Rods  AFTER: 10 HOURS INCUBATION  BOTTLE: ANAEROBIC BOTTLE        RADIOLOGY:  < from: CT Chest No Cont (10.25.17 @ 23:34) >  IMPRESSION:     Spiculated masses within the bilateral upper lobes, encasing suture   material on the left, most compatible with bronchogenic malignancy.   Mediastinal lymphadenopathy.  Emphysema.    Right adrenal mass not significantly changed in size compared to previous   CT of 2010. Further characterization with MRI.    Left-sided perinephric stranding and left renal edema without   hydronephrosis or obstructing stone. Findings may be related to recent   passage of a stone. Please note that without IV contrast, pyelonephritis,   a clinical diagnosis cannot be assessed.  Colonic diverticulosis.    < end of copied text >
PULM/MED CONSULT NOTE:    Patient is a former smoker with a cough for he two weeks prior to admission along with gram negative UTI and E coli bacteremia.  The patient was treated in the MICU with high flow oxygen for respiratory failure.     Her chest CT 10/25/17 showed a RUL masslike consolidation  5.5 cm x 2.8, a TERESA spiculated mass 3.8 x 2.3 cm a RLL consolidation and emphysema.  A right adrenal mass is without change from 5/2009.  She had a left VATS wedge resection 2010 at [facility] by Dr. Loving (now at [facility] where patient goes for followup).  The mass was adenocarcinoma.    She subsequently in 2013 had RT to the left and in 2016 RT to right mass.          HPI:  Patient is an 85 y/o F PMH Lung CA (unknown stage or type, Dx 2010 s/p L VATS, wedge resection, RT in July), HTN, DM2 p/w N/V, dysuria. Patient reports cough productive of sputum X2 weeks, s/p Antibiotics (unknown by pt but per ER records, amoxicillin). Patient felt well until 2 days ago, developed dysuria, urgency, increased frequency, progressing to suprapubic tenderness, F/C, and N/V. No recent travel, no sick contacts. (26 Oct 2017 03:09)      MEDICATIONS  (STANDING):  ALBUTerol/ipratropium for Nebulization 3 milliLiter(s) Nebulizer every 6 hours  aspirin enteric coated 81 milliGRAM(s) Oral daily  fenofibrate Tablet 145 milliGRAM(s) Oral daily  heparin  Injectable 5000 Unit(s) SubCutaneous every 8 hours  insulin lispro (HumaLOG) corrective regimen sliding scale   SubCutaneous three times a day before meals  insulin lispro (HumaLOG) corrective regimen sliding scale   SubCutaneous at bedtime  meropenem IVPB      meropenem IVPB 1000 milliGRAM(s) IV Intermittent every 8 hours  metoprolol     tartrate 50 milliGRAM(s) Oral every 12 hours  NIFEdipine XL 30 milliGRAM(s) Oral daily  NIFEdipine XL 60 milliGRAM(s) Oral at bedtime  pantoprazole    Tablet 40 milliGRAM(s) Oral before breakfast  prednisoLONE acetate 1% Suspension 1 Drop(s) Both EYES three times a day  predniSONE   Tablet 40 milliGRAM(s) Oral daily  sodium chloride 0.65% Nasal 1 Spray(s) Both Nostrils three times a day    MEDICATIONS  (PRN):  acetaminophen   Tablet 650 milliGRAM(s) Oral every 6 hours PRN For Temp greater than 38 C (100.4 F)  acetaminophen   Tablet. 650 milliGRAM(s) Oral every 6 hours PRN mild to moderate pain  melatonin 3 milliGRAM(s) Oral at bedtime PRN Sleep              PAST MEDICAL & SURGICAL HISTORY:  Dyslipidemia  Hypertension  Diabetes  Lung cancer  Lung cancer: diagnosed 2010  S/P cholecystectomy  H/O carotid endarterectomy: left            SOCIAL HISTORY  Smoking History: smoked 1 PPD x 35-40 years, quitting around 1990              PHYSICAL EXAM:    Awake/alert, on high flow O2; sitting inc hair comfortably  Vital Signs Last 24 Hrs  T(C): 36.3 (31 Oct 2017 14:30), Max: 36.7 (31 Oct 2017 01:01)  T(F): 97.4 (31 Oct 2017 14:30), Max: 98 (31 Oct 2017 01:01)  HR: 87 (31 Oct 2017 16:15) (82 - 95)  BP: 154/78 (31 Oct 2017 14:30) (151/75 - 170/92)  BP(mean): --  RR: 20 (31 Oct 2017 14:30) (19 - 20)  SpO2: 95% (31 Oct 2017 15:42) (93% - 100%)  GENERAL: NAD, on high flow O2    ENMT: White material on tongue - ? thrush   Moist mucous membranes, Good dentition, No lesions  NECK: Supple,     CHEST/LUNG: Bilateral rhonchi.  No wheezing; left healed VATS scars;   HEART: Regular rate and rhythm;   ABDOMEN: Soft, Nontender, Nondistended; Bowel sounds present  EXTREMITIES: No edema        LABS:                        10.2   6.48  )-----------( 202      ( 31 Oct 2017 06:40 )             31.7     10-31    139  |  100  |  35<H>  ----------------------------<  240<H>  3.9   |  26  |  1.09    Ca    8.8      31 Oct 2017 06:40  Phos  3.7     10-30  Mg     1.9     10-30    TPro  6.8  /  Alb  2.9<L>  /  TBili  0.6  /  DBili  x   /  AST  22  /  ALT  20  /  AlkPhos  71  10-30                          Microbiology  Culture - Blood:   NO ORGANISMS ISOLATED  NO ORGANISMS ISOLATED AT 72 HRS. (10.28.17 @ 04:19)    Culture - Urine (10.25.17 @ 20:04)    -  Amikacin: S <=16 BRADEN    -  Amikacin: S <=16 BRADEN    -  Ampicillin: R >16 BRADEN    -  Ampicillin: S <=8 BRADEN    -  Ampicillin/Sulbactam: R <=8/4 BRADEN    -  Ampicillin/Sulbactam: S <=8/4 BRADEN    -  Aztreonam: R 16 BRADEN    -  Aztreonam: I 8 BRADEN    -  Cefazolin: R >16 BRADEN    -  Cefazolin: S <=8 BRADEN    -  Cefepime: R >16 BRADEN    -  Cefepime: S <=4 BRADEN    -  Cefoxitin: S <=8 BRADEN    -  Cefoxitin: S <=8 BRADEN    -  Ceftazidime: R 8 BRADEN    -  Ceftazidime: S <=1 BRADEN    -  Ceftriaxone: R    -  Ceftriaxone: S <=1 BRADEN    -  Ciprofloxacin: R >2 BRADEN    -  Ciprofloxacin: R >2 BRADEN    -  Ertapenem: S <=1 BRADEN    -  Ertapenem: S <=1 BRADEN    -  Gentamicin: S <=4 BRADEN    -  Gentamicin: S <=4 BRADEN    -  Imipenem: S <=1 BRADEN    -  Imipenem: S <=1 BRADEN    -  Levofloxacin: R >4 BRADEN    -  Levofloxacin: R >4 BRADEN    -  Meropenem: S <=1 BRADEN    -  Meropenem: S <=1 BRADEN    -  Nitrofurantoin: S <=32 BRADEN    -  Nitrofurantoin: S <=32 BRADEN    -  Piperacillin/Tazobactam: R <=16 BRADEN    -  Piperacillin/Tazobactam: S <=16 BRADEN    -  Tigecycline: S <=2 BRADEN    -  Tigecycline: S <=2 BRADEN    -  Tobramycin: S <=4 BRADEN    -  Tobramycin: S <=4 BRADEN    -  Trimethoprim/Sulfamethoxazole: S <=2/38 BRADEN    -  Trimethoprim/Sulfamethoxazole: R >2/38 BRADEN    Culture - Urine:   GNR^Gram Neg Rods  COLONY COUNT: > = 100,000 CFU/ML    Culture - Urine:   RESULT CALLED TO: THELMA SALINAS MD./Y  DATE / TIME CALLED: 10/27/17 1215  CALLED BY: JEFE MORE                *******************************                * This is an appended result. *                *******************************  A prior result that was reported as final has been changed.    Specimen Source: URINE MIDSTREAM    Organism Identification: E.COLI ESBL POSITIVE  Escherichia coli    Organism: E.COLI ESBL POSITIVE  COLONY COUNT: > = 100,000 CFU/ML    Organism: Escherichia coli  COLONY COUNT: > = 100,000 CFU/ML    Method Type: MICROSCAN NEG URINE COMBO 61    Method Type: MICROSCAN NEG URINE COMBO 61              RADIOLOGY & ADDITIONAL STUDIES:      Xray:     from: Xray Chest 1 View AP- PORTABLE-Urgent (10.28.17 @ 20:46) >  EXAM:  RAD CHEST PORTABLE URGENT        PROCEDURE DATE:  Oct 28 2017         INTERPRETATION:  TIME OF EXAM: October 28, 2017 at 8:40 PM    CLINICAL INFORMATION: Respiratory failure; lung cancer in remission.    TECHNIQUE:   Portable chest    INTERPRETATION:     Since the last exam, bibasilar opacities have developed representing   acute pulmonary edema or multifocal pneumonia.   Elevation of the   horizontal fissure with opacity above consistent with atelectasis.    Heart size is stable and no definite effusions.      COMPARISON:  October 25      IMPRESSION:  Acute pulmonary edema versus multifocal pneumonia with   former impression more likely.      SHERRILL IRIZARRY M.D.; ATTENDING RADIOLOGIST  This document has been electronically signed. Oct 29 2017 10:33AM    < end of copied text >      EXAM:  CT ABDOMEN AND PELVIS      EXAM:  CT CHEST        PROCEDURE DATE:  Oct 25 2017         INTERPRETATION:  CLINICAL INFORMATION: Abdominal pain    COMPARISON: CT chest 1/11/2010    PROCEDURE:   CT of the Chest, Abdomen and Pelvis was performed without intravenous   contrast.   Intravenous contrast: None.  Oral contrast: None.  Sagittal and coronal reformats were performed.    FINDINGS:    CHEST:     LUNGS AND LARGE AIRWAYS: Patent central airways. Masslike consolidation   within the right upper lung measuring proximally 5.5 x 2.8 cm. Spiculated   mass within the left upper lung which surrounds high density suture   material which measures 3.8 x 2.3 cm. Additional right lower lobe   consolidation. Centrilobular emphysema.  PLEURA: Trace bilateral pleural effusions.   VESSELS: Atherosclerotic calcifications of the aorta and coronary   arteries.  HEART: Heart size is normal. No pericardial effusion.  MEDIASTINUM AND BRENDON: There is mediastinal lymphadenopathy. For   reference, a right pretracheal node measures 1.5 x 1.4 cm (series 2,   image 35)  CHEST WALL AND LOWER NECK: Within normal limits.    ABDOMEN AND PELVIS:    LIVER: Fatty.  BILE DUCTS: Normal caliber.  GALLBLADDER: Cholecystectomy.  SPLEEN: Within normal limits.  PANCREAS: Within normal limits.  ADRENALS: Right adrenal gland mass measuring 3.5 x 1.5 cm. It is   unchanged compared to previous studies dating back to May 27, 2009. Mild,   nodular thickening of the left adrenal gland.  KIDNEYS/URETERS: No hydronephrosis or nephrolithiasis. Bilateral renal   cysts. There is a thin, peripheral calcification the right sided   interpolar cyst. Nonspecific subcentimeter high density focus at the   lower pole the left kidney. The left kidney is slightly larger on the   right with nonspecific perinephric fluid.    BLADDER: Within normal limits.  REPRODUCTIVE ORGANS: The uterus and adnexa are unremarkable.    BOWEL: No bowel obstruction. Appendix is normal. Pancolonic   diverticulosis without diverticulitis.  PERITONEUM: No ascites.  VESSELS:  Vascular calcifications. Focal dilatation of the infrarenal   abdominal aorta, just above the iliac bifurcation which measures up to   2.6 cm in diameter.  RETROPERITONEUM: No lymphadenopathy.    ABDOMINAL WALL: Fat-containing right spigelian hernia.  BONES: Degenerative changes of the lumbar spine.    IMPRESSION:     Spiculated masses within the bilateral upper lobes, encasing suture   material on the left, most compatible with bronchogenic malignancy.   Mediastinal lymphadenopathy.  Emphysema.    Right adrenal mass not significantly changed in size compared to previous   CT of January 11, 2010. Further characterization with MRI.    Left-sided perinephric stranding and left renal edema without   hydronephrosis or obstructing stone. Findings may be related to recent   passage of a stone. Please note that without IV contrast, pyelonephritis,   a clinical diagnosis cannot be assessed.  Colonic diverticulosis.              KRYSTYNA KOCH M.D., RADIOLOGY RESIDENT  This document has been electronically signed.  CLAUDIA CARRERA M.D, ATTENDING RADIOLOGIST  This document has been electronically signed. Oct 26 2017 12:35AM

## 2017-10-31 NOTE — PROVIDER CONTACT NOTE (OTHER) - ACTION/TREATMENT ORDERED:
administer insulin according to sliding scale and in addition 3units of Humalog.
continue to monitor
douneb given early; CXR ordered
Will enter a medication to control BP. Labetalol 5mg IV. Continue to monitor

## 2017-11-01 ENCOUNTER — TRANSCRIPTION ENCOUNTER (OUTPATIENT)
Age: 82
End: 2017-11-01

## 2017-11-01 LAB
BUN SERPL-MCNC: 32 MG/DL — HIGH (ref 7–23)
CALCIUM SERPL-MCNC: 8.3 MG/DL — LOW (ref 8.4–10.5)
CHLORIDE SERPL-SCNC: 98 MMOL/L — SIGNIFICANT CHANGE UP (ref 98–107)
CO2 SERPL-SCNC: 26 MMOL/L — SIGNIFICANT CHANGE UP (ref 22–31)
CREAT SERPL-MCNC: 0.9 MG/DL — SIGNIFICANT CHANGE UP (ref 0.5–1.3)
GLUCOSE BLDC GLUCOMTR-MCNC: 169 MG/DL — HIGH (ref 70–99)
GLUCOSE BLDC GLUCOMTR-MCNC: 328 MG/DL — HIGH (ref 70–99)
GLUCOSE BLDC GLUCOMTR-MCNC: 364 MG/DL — HIGH (ref 70–99)
GLUCOSE BLDC GLUCOMTR-MCNC: 382 MG/DL — HIGH (ref 70–99)
GLUCOSE BLDC GLUCOMTR-MCNC: 73 MG/DL — SIGNIFICANT CHANGE UP (ref 70–99)
GLUCOSE SERPL-MCNC: 263 MG/DL — HIGH (ref 70–99)
HCT VFR BLD CALC: 29.5 % — LOW (ref 34.5–45)
HGB BLD-MCNC: 9.5 G/DL — LOW (ref 11.5–15.5)
MAGNESIUM SERPL-MCNC: 1.7 MG/DL — SIGNIFICANT CHANGE UP (ref 1.6–2.6)
MCHC RBC-ENTMCNC: 27.7 PG — SIGNIFICANT CHANGE UP (ref 27–34)
MCHC RBC-ENTMCNC: 32.2 % — SIGNIFICANT CHANGE UP (ref 32–36)
MCV RBC AUTO: 86 FL — SIGNIFICANT CHANGE UP (ref 80–100)
NRBC # FLD: 0 — SIGNIFICANT CHANGE UP
PHOSPHATE SERPL-MCNC: 2.6 MG/DL — SIGNIFICANT CHANGE UP (ref 2.5–4.5)
PLATELET # BLD AUTO: 175 K/UL — SIGNIFICANT CHANGE UP (ref 150–400)
PMV BLD: 11.1 FL — SIGNIFICANT CHANGE UP (ref 7–13)
POTASSIUM SERPL-MCNC: 3.9 MMOL/L — SIGNIFICANT CHANGE UP (ref 3.5–5.3)
POTASSIUM SERPL-SCNC: 3.9 MMOL/L — SIGNIFICANT CHANGE UP (ref 3.5–5.3)
RBC # BLD: 3.43 M/UL — LOW (ref 3.8–5.2)
RBC # FLD: 14.2 % — SIGNIFICANT CHANGE UP (ref 10.3–14.5)
SODIUM SERPL-SCNC: 135 MMOL/L — SIGNIFICANT CHANGE UP (ref 135–145)
WBC # BLD: 5.72 K/UL — SIGNIFICANT CHANGE UP (ref 3.8–10.5)
WBC # FLD AUTO: 5.72 K/UL — SIGNIFICANT CHANGE UP (ref 3.8–10.5)

## 2017-11-01 PROCEDURE — 99233 SBSQ HOSP IP/OBS HIGH 50: CPT | Mod: GC

## 2017-11-01 PROCEDURE — 99232 SBSQ HOSP IP/OBS MODERATE 35: CPT

## 2017-11-01 RX ORDER — INSULIN LISPRO 100/ML
5 VIAL (ML) SUBCUTANEOUS
Qty: 0 | Refills: 0 | Status: DISCONTINUED | OUTPATIENT
Start: 2017-11-01 | End: 2017-11-07

## 2017-11-01 RX ORDER — INSULIN GLARGINE 100 [IU]/ML
5 INJECTION, SOLUTION SUBCUTANEOUS AT BEDTIME
Qty: 0 | Refills: 0 | Status: DISCONTINUED | OUTPATIENT
Start: 2017-11-01 | End: 2017-11-07

## 2017-11-01 RX ORDER — ERTAPENEM SODIUM 1 G/1
1000 INJECTION, POWDER, LYOPHILIZED, FOR SOLUTION INTRAMUSCULAR; INTRAVENOUS EVERY 24 HOURS
Qty: 0 | Refills: 0 | Status: CANCELLED | OUTPATIENT
Start: 2017-11-02 | End: 2017-11-07

## 2017-11-01 RX ADMIN — Medication 3 MILLILITER(S): at 21:27

## 2017-11-01 RX ADMIN — Medication 10: at 09:18

## 2017-11-01 RX ADMIN — Medication 40 MILLIGRAM(S): at 05:30

## 2017-11-01 RX ADMIN — PANTOPRAZOLE SODIUM 40 MILLIGRAM(S): 20 TABLET, DELAYED RELEASE ORAL at 05:29

## 2017-11-01 RX ADMIN — Medication 3 MILLIGRAM(S): at 21:40

## 2017-11-01 RX ADMIN — Medication 30 MILLIGRAM(S): at 05:30

## 2017-11-01 RX ADMIN — Medication 3 MILLILITER(S): at 10:40

## 2017-11-01 RX ADMIN — Medication 1 DROP(S): at 21:40

## 2017-11-01 RX ADMIN — Medication 650 MILLIGRAM(S): at 23:43

## 2017-11-01 RX ADMIN — Medication 1 SPRAY(S): at 21:39

## 2017-11-01 RX ADMIN — Medication 81 MILLIGRAM(S): at 14:26

## 2017-11-01 RX ADMIN — Medication 10: at 14:25

## 2017-11-01 RX ADMIN — Medication 3 MILLILITER(S): at 15:16

## 2017-11-01 RX ADMIN — MEROPENEM 200 MILLIGRAM(S): 1 INJECTION INTRAVENOUS at 21:41

## 2017-11-01 RX ADMIN — Medication 5 UNIT(S): at 14:25

## 2017-11-01 RX ADMIN — Medication 1 DROP(S): at 14:26

## 2017-11-01 RX ADMIN — INSULIN GLARGINE 5 UNIT(S): 100 INJECTION, SOLUTION SUBCUTANEOUS at 21:41

## 2017-11-01 RX ADMIN — Medication 650 MILLIGRAM(S): at 23:13

## 2017-11-01 RX ADMIN — Medication 1 SPRAY(S): at 14:26

## 2017-11-01 RX ADMIN — Medication 1 DROP(S): at 05:30

## 2017-11-01 RX ADMIN — Medication 145 MILLIGRAM(S): at 14:26

## 2017-11-01 RX ADMIN — Medication 2: at 18:27

## 2017-11-01 RX ADMIN — Medication 3 MILLILITER(S): at 04:58

## 2017-11-01 RX ADMIN — HEPARIN SODIUM 5000 UNIT(S): 5000 INJECTION INTRAVENOUS; SUBCUTANEOUS at 14:26

## 2017-11-01 RX ADMIN — MEROPENEM 200 MILLIGRAM(S): 1 INJECTION INTRAVENOUS at 05:30

## 2017-11-01 RX ADMIN — Medication 50 MILLIGRAM(S): at 05:30

## 2017-11-01 RX ADMIN — MEROPENEM 200 MILLIGRAM(S): 1 INJECTION INTRAVENOUS at 15:12

## 2017-11-01 RX ADMIN — Medication 5 UNIT(S): at 18:27

## 2017-11-01 RX ADMIN — Medication 1 SPRAY(S): at 05:30

## 2017-11-01 RX ADMIN — HEPARIN SODIUM 5000 UNIT(S): 5000 INJECTION INTRAVENOUS; SUBCUTANEOUS at 05:30

## 2017-11-01 RX ADMIN — HEPARIN SODIUM 5000 UNIT(S): 5000 INJECTION INTRAVENOUS; SUBCUTANEOUS at 21:39

## 2017-11-01 RX ADMIN — Medication 50 MILLIGRAM(S): at 18:28

## 2017-11-01 RX ADMIN — Medication 60 MILLIGRAM(S): at 21:40

## 2017-11-01 NOTE — DISCHARGE NOTE ADULT - PLAN OF CARE
Continue medications as directed.  Monitor blood pressure. Your a1c is 7.5. resolution Please continue with IV antibiotics through 11/10/17.  Monitor PICC site and dressing.  Follow up with infectious disease doctor upon completion of antibiotics.  Monitor for any fevers, chills. resolved Off oxygen at this time.  Monitor respiratory status. Continue outpatient follow up with oncology. Please continue with IV antibiotics through 11/10/17.  Follow up with infectious disease doctor upon completion of antibiotics.  Monitor for any fevers, chills. Your a1c is 7.5.  Continue medications as directed.  Continue diet control.

## 2017-11-01 NOTE — PROGRESS NOTE ADULT - PROBLEM SELECTOR PLAN 6
Monitor blood glucose and continue corrective scale - a1 c 7.5  - on oral medications at home  - hyperglycemia likely 2/2 increased prednisone dose  - will start lantus and premeals for now  - monitor FSs

## 2017-11-01 NOTE — PROGRESS NOTE ADULT - SUBJECTIVE AND OBJECTIVE BOX
Follow Up:      Inverval History/ROS:Patient is a 86y old  Female with ESBL bacteremia/ UTI      Allergies    ACE inhibitors (Angioedema)    Intolerances        ANTIMICROBIALS:  meropenem IVPB    meropenem IVPB 1000 every 8 hours      OTHER MEDS:  acetaminophen   Tablet 650 milliGRAM(s) Oral every 6 hours PRN  acetaminophen   Tablet. 650 milliGRAM(s) Oral every 6 hours PRN  ALBUTerol/ipratropium for Nebulization 3 milliLiter(s) Nebulizer every 6 hours  aspirin enteric coated 81 milliGRAM(s) Oral daily  fenofibrate Tablet 145 milliGRAM(s) Oral daily  heparin  Injectable 5000 Unit(s) SubCutaneous every 8 hours  insulin glargine Injectable (LANTUS) 5 Unit(s) SubCutaneous at bedtime  insulin lispro (HumaLOG) corrective regimen sliding scale   SubCutaneous three times a day before meals  insulin lispro (HumaLOG) corrective regimen sliding scale   SubCutaneous at bedtime  melatonin 3 milliGRAM(s) Oral at bedtime PRN  metoprolol     tartrate 50 milliGRAM(s) Oral every 12 hours  NIFEdipine XL 30 milliGRAM(s) Oral daily  NIFEdipine XL 60 milliGRAM(s) Oral at bedtime  pantoprazole    Tablet 40 milliGRAM(s) Oral before breakfast  prednisoLONE acetate 1% Suspension 1 Drop(s) Both EYES three times a day  predniSONE   Tablet 40 milliGRAM(s) Oral daily  sodium chloride 0.65% Nasal 1 Spray(s) Both Nostrils three times a day      Vital Signs Last 24 Hrs  T(C): 36.7 (01 Nov 2017 09:22), Max: 36.8 (01 Nov 2017 01:00)  T(F): 98.1 (01 Nov 2017 09:22), Max: 98.2 (01 Nov 2017 01:00)  HR: 80 (01 Nov 2017 10:42) (77 - 106)  BP: 153/73 (01 Nov 2017 09:22) (152/79 - 167/80)  BP(mean): --  RR: 18 (01 Nov 2017 10:44) (16 - 21)  SpO2: 100% (01 Nov 2017 10:44) (93% - 100%)    PHYSICAL EXAM:  General: [x ] on high flow oxygen  HEAD/EYES: [ ] PERRL [x ] white sclera [ ] icterus  ENT:  [ ] normal x[ ] supple [ ] thrush [ ] pharyngeal exudate  Cardiovascular:   [ ] murmur [x ] normal [ ] PPM/AICD  Respiratory:  [x ] clear to ausculation bilaterally  GI:  [x ] soft, non-tender, normal bowel sounds  :  [ ] swenson [ ] no CVA tenderness   Musculoskeletal:  [ ] no synovitis  Neurologic:  [ ] non-focal exam   Skin:  [ x] no rash  Lymph: [ ] no lymphadenopathy  Psychiatric:  [x ] appropriate affect [ ] alert & oriented  Lines:  [x ] no phlebitis [ ] central line                                9.5    5.72  )-----------( 175      ( 01 Nov 2017 07:10 )             29.5       11-01    135  |  98  |  32<H>  ----------------------------<  263<H>  3.9   |  26  |  0.90    Ca    8.3<L>      01 Nov 2017 07:10  Phos  2.6     11-01  Mg     1.7     11-01            MICROBIOLOGY:    RADIOLOGY:

## 2017-11-01 NOTE — DISCHARGE NOTE ADULT - HOME CARE AGENCY
[facility]: [phone] for Administration teaching of Home IV Antibiotics through 11/10/17. The 1st Infusion RN Visit will be for Wed 11/8/17. [facility] will coordinate the Visit time & Delivery of Medications & Supplies. [facility] [phone] for Visiting Nurse Services. The 1st Visiting Nurse Visit will be within 24 Hours of your Discharge Home. The Nurse will call to arrange the Visit time. [facility]: [phone] for Administration teaching of Home IV Antibiotics through 11/10/17. The 1st Infusion RN Visit will be for Wed 11/8/17. [facility] will coordinate the Visit time & Delivery of Medications & Supplies. [facility] [phone] for Visiting Nurse Services, Home Physical Therapy, & Evaluation for A Home Health Aide.  The 1st Visiting Nurse Visit will be within 24 Hours of your Discharge Home. The Nurse will call to arrange the Visit time.

## 2017-11-01 NOTE — DISCHARGE NOTE ADULT - CARE PLAN
Principal Discharge DX:	Bacteremia due to Escherichia coli  Secondary Diagnosis:	Respiratory failure with hypoxia  Secondary Diagnosis:	Malignant neoplasm of upper lobe of right lung  Secondary Diagnosis:	Essential hypertension  Instructions for follow-up, activity and diet:	Continue medications as directed.  Monitor blood pressure.  Secondary Diagnosis:	Diabetes mellitus type 2 in nonobese  Instructions for follow-up, activity and diet:	Your a1c is 7.5. Principal Discharge DX:	Bacteremia due to Escherichia coli  Goal:	resolution  Instructions for follow-up, activity and diet:	Please continue with IV antibiotics through 11/10/17.  Monitor PICC site and dressing.  Follow up with infectious disease doctor upon completion of antibiotics.  Monitor for any fevers, chills.  Secondary Diagnosis:	Respiratory failure with hypoxia  Goal:	resolved  Instructions for follow-up, activity and diet:	Off oxygen at this time.  Monitor respiratory status.  Secondary Diagnosis:	Malignant neoplasm of upper lobe of right lung  Instructions for follow-up, activity and diet:	Continue outpatient follow up with oncology.  Secondary Diagnosis:	Essential hypertension  Instructions for follow-up, activity and diet:	Continue medications as directed.  Monitor blood pressure.  Secondary Diagnosis:	Diabetes mellitus type 2 in nonobese  Instructions for follow-up, activity and diet:	Your a1c is 7.5. Principal Discharge DX:	Bacteremia due to Escherichia coli  Goal:	resolution  Instructions for follow-up, activity and diet:	Please continue with IV antibiotics through 11/10/17.  Follow up with infectious disease doctor upon completion of antibiotics.  Monitor for any fevers, chills.  Secondary Diagnosis:	Respiratory failure with hypoxia  Goal:	resolved  Instructions for follow-up, activity and diet:	Off oxygen at this time.  Monitor respiratory status.  Secondary Diagnosis:	Malignant neoplasm of upper lobe of right lung  Instructions for follow-up, activity and diet:	Continue outpatient follow up with oncology.  Secondary Diagnosis:	Essential hypertension  Instructions for follow-up, activity and diet:	Continue medications as directed.  Monitor blood pressure.  Secondary Diagnosis:	Diabetes mellitus type 2 in nonobese  Instructions for follow-up, activity and diet:	Your a1c is 7.5. Principal Discharge DX:	Bacteremia due to Escherichia coli  Goal:	resolution  Instructions for follow-up, activity and diet:	Please continue with IV antibiotics through 11/10/17.  Follow up with infectious disease doctor upon completion of antibiotics.  Monitor for any fevers, chills.  Secondary Diagnosis:	Respiratory failure with hypoxia  Goal:	resolved  Instructions for follow-up, activity and diet:	Off oxygen at this time.  Monitor respiratory status.  Secondary Diagnosis:	Malignant neoplasm of upper lobe of right lung  Instructions for follow-up, activity and diet:	Continue outpatient follow up with oncology.  Secondary Diagnosis:	Essential hypertension  Instructions for follow-up, activity and diet:	Continue medications as directed.  Monitor blood pressure.  Secondary Diagnosis:	Diabetes mellitus type 2 in nonobese  Instructions for follow-up, activity and diet:	Your a1c is 7.5.  Continue medications as directed.  Continue diet control.

## 2017-11-01 NOTE — DISCHARGE NOTE ADULT - SECONDARY DIAGNOSIS.
Respiratory failure with hypoxia Malignant neoplasm of upper lobe of right lung Essential hypertension Diabetes mellitus type 2 in nonobese

## 2017-11-01 NOTE — PROGRESS NOTE ADULT - ASSESSMENT
86 year old with lung cancer (VATS in past/ recent radiation ) and PMR on low dose steroid as an outpatient presented  with E coli bacteremia likely due to UTI/ pyelonephritis.     Her CT chest findings are noted- ? if this is all due to her malignancy.  I do not have a clinical suspicion for Tuberculosis.  Acute bacterial pulmonary process is less likely given E coli bacteremia.    Continue Meropenem through 11/1.  On 11/1, can change to ertapenem 1 gm iv daily though 11/10.    Call with questions

## 2017-11-01 NOTE — DISCHARGE NOTE ADULT - CARE PROVIDERS DIRECT ADDRESSES
,DirectAddress_Unknown ,DirectAddress_Unknown,[email or web address]

## 2017-11-01 NOTE — PROGRESS NOTE ADULT - PROBLEM SELECTOR PLAN 3
Transition to oral steroids today  Slow taper to home dose of prednisone 2.5 mg daily - cont oral pred  - slow taper back to home dose 2.5mg daily

## 2017-11-01 NOTE — PROGRESS NOTE ADULT - PROBLEM SELECTOR PLAN 4
Will continue outpatient care with oncology  If remains hypoxic, will need to arrange for home O2, BIPAP - will continue outpatient care with oncology  - if remains hypoxic, will need to arrange for home O2, BIPAP

## 2017-11-01 NOTE — PROGRESS NOTE ADULT - PROBLEM SELECTOR PLAN 1
- ID note appreciated  - will continue with meropenem through today  - start ertapenem q24 tomorrow through 10/11  - may need PICC

## 2017-11-01 NOTE — DISCHARGE NOTE ADULT - MEDICATION SUMMARY - MEDICATIONS TO TAKE
I will START or STAY ON the medications listed below when I get home from the hospital:    peripheral line for IV antibiotics infusion  -- 1 application intravenous once   -- Indication: For Bacteremia due to Escherichia coli    remove IV  -- remove PIV on 11/10 after completion of antibiotic administration  -- Indication: For Bacteremia due to Escherichia coli    predniSONE 5 mg oral delayed release tablet  -- 3 tab(s) by mouth once a day x 3 days  2 tab(s) by mouth once a day x 3 days  1 tab(s) by mouth once a day x 3 days  -- Do not chew, break, or crush.  It is very important that you take or use this exactly as directed.  Do not skip doses or discontinue unless directed by your doctor.  Obtain medical advice before taking any non-prescription drugs as some may affect the action of this medication.  Swallow whole.  Do not crush.  Take with food.    -- Indication: For Respiratory failure with hypoxia    aspirin 81 mg oral tablet  -- 1 tab(s) by mouth once a day  -- Indication: For Essential hypertension    glimepiride  -- 2.5 milligram(s) by mouth once a day  -- Indication: For Diabetes mellitus type 2 in nonobese    Actos 15 mg oral tablet  -- 1 tab(s) by mouth once a day  -- Indication: For Diabetes mellitus type 2 in nonobese    Januvia 100 mg oral tablet  -- 1 tab(s) by mouth once a day  -- Indication: For Diabetes mellitus type 2 in nonobese    TriCor 145 mg oral tablet  -- 1 tab(s) by mouth once a day  -- Indication: For Hyperlipidemia    metoprolol tartrate 25 mg oral tablet  -- 50 milligram(s) by mouth every 12 hours  -- 4 tabs  -- Indication: For Essential hypertension    NIFEdipine 60 mg oral tablet, extended release  -- 1 tab(s) by mouth once a day (at bedtime)  -- Indication: For Essential hypertension    NIFEdipine 30 mg oral tablet, extended release  -- 1 tab(s) by mouth once a day  -- Indication: For Essential hypertension    ertapenem 1 g injection  -- 1 gram(s) injectable every 24 hours   Ertapenem 1g IVPB every 24hrs through 11/10/17  -- Indication: For Bacteremia due to Escherichia coli    Normal Saline Flush 0.9% injectable solution  -- 10 milliliter(s) intravenous once a day     Please flush before and after each antibiotics dose  -- Indication: For Bacteremia due to Escherichia coli    potassium chloride 10 mEq oral capsule, extended release  -- 1 cap(s) by mouth once a day  -- Indication: For Prophylactic measure    sodium chloride 0.65% nasal spray  -- 1 spray(s) into nose 4 times a day, As Needed  -- Indication: For Respiratory failure with hypoxia    prednisoLONE acetate 1% ophthalmic suspension  -- 1 drop(s) to each affected eye 3 times a day  -- Indication: For Prophylactic measure    pantoprazole 40 mg oral delayed release tablet  -- 1 tab(s) by mouth once a day (before a meal)  -- Indication: For Prophylactic measure

## 2017-11-01 NOTE — DISCHARGE NOTE ADULT - HOSPITAL COURSE
Patient is an 86 year old woman with PMH of Lung CA (unknown stage or type, Dx 2010 s/p L VATS, wedge resection, RT in July), HTN, and DM2 initially presented with nausea, vomiting, dysuria, and fever, and chills, and increased urinary frequency. Patient previously had cough productive of sputum for 2 weeks, s/p Antibiotics (unknown by pt but per ER records, amoxicillin). No recent travel, no sick contacts. On admission, patient found to have Pyelonephritis, with Urine Cx growing >100,000 gram negative rods. Blood cultures growing E. Coli.  Patient started on Ceftriaxone, s/p Azithromycin X 1 dose, and was doing well until  she developed acute onset shortness of breath, placed on 50% NRB, and RRT called prior to MICU admission.  At onset of RRT, vitals: /80, HR 90s, RR 18, SP02 91% on 50% NRB.  Patient in mild Respiratory distress. Bedside US showing RLL atelectasis with moderate sized pleural effusion, and small pleural effusion on the left side.  Patient's SP02 stable 91-93%. Decision made to start patient on CPAP, and transfer to MICU for further management of her hypoxic respiratory failure. Patient weaned off CPAP and switched to high flow. While in the MICU BCx/ UCx grew ESBL E.coli and Abx switched to meropenem. Has been dropping O2 saturation to the 70s while on high flow with unclear etiology for recurrent hypoxia. Has been requiring Ativan 0.25mg for sleeping due to anxiety. Has maintained saturation in the 90s today. ECG showed ST depression in leads II, III, AVF, and V4-V6 with negative cardiac enzymes and downtrending CK.  Pt was tx to RCU requiring high flow. Found with pleural effusions, s/p diuresis with lasix, improved.  Pt was able to be weaned off oxygen.  PICC line inserted on *****  PT recs home PT    dispo: Patient is an 86 year old woman with PMH of Lung CA (unknown stage or type, Dx 2010 s/p L VATS, wedge resection, RT in July), HTN, and DM2 initially presented with nausea, vomiting, dysuria, and fever, and chills, and increased urinary frequency. Patient previously had cough productive of sputum for 2 weeks, s/p Antibiotics (unknown by pt but per ER records, amoxicillin). No recent travel, no sick contacts. On admission, patient found to have Pyelonephritis, with Urine Cx growing >100,000 gram negative rods. Blood cultures growing E. Coli.  Patient started on Ceftriaxone, s/p Azithromycin X 1 dose, and was doing well until  she developed acute onset shortness of breath, placed on 50% NRB, and RRT called prior to MICU admission.  At onset of RRT, vitals: /80, HR 90s, RR 18, SP02 91% on 50% NRB.  Patient in mild Respiratory distress. Bedside US showing RLL atelectasis with moderate sized pleural effusion, and small pleural effusion on the left side.  Patient's SP02 stable 91-93%. Decision made to start patient on CPAP, and transfer to MICU for further management of her hypoxic respiratory failure. Patient weaned off CPAP and switched to high flow. While in the MICU BCx/ UCx grew ESBL E.coli and Abx switched to meropenem. Has been dropping O2 saturation to the 70s while on high flow with unclear etiology for recurrent hypoxia. Has been requiring Ativan 0.25mg for sleeping due to anxiety. Has maintained saturation in the 90s today. ECG showed ST depression in leads II, III, AVF, and V4-V6 with negative cardiac enzymes and downtrending CK.  Pt was tx to RCU requiring high flow. Found with pleural effusions, s/p diuresis with lasix, improved.  Pt was able to be weaned off oxygen.  PT recs home PT    dispo: Patient is an 86 year old woman with PMH of Lung CA (unknown stage or type, Dx 2010 s/p L VATS, wedge resection, RT in July), HTN, and DM2 initially presented with nausea, vomiting, dysuria, and fever, and chills, and increased urinary frequency. Patient previously had cough productive of sputum for 2 weeks, s/p Antibiotics (unknown by pt but per ER records, amoxicillin). No recent travel, no sick contacts. On admission, patient found to have Pyelonephritis, with Urine Cx growing >100,000 gram negative rods. Blood cultures growing E. Coli.  Patient started on Ceftriaxone, s/p Azithromycin X 1 dose, and was doing well until  she developed acute onset shortness of breath, placed on 50% NRB, and RRT called prior to MICU admission.  At onset of RRT, vitals: /80, HR 90s, RR 18, SP02 91% on 50% NRB.  Patient in mild Respiratory distress. Bedside US showing RLL atelectasis with moderate sized pleural effusion, and small pleural effusion on the left side.  Patient's SP02 stable 91-93%. Decision made to start patient on CPAP, and transfer to MICU for further management of her hypoxic respiratory failure. Patient weaned off CPAP and switched to high flow. While in the MICU BCx/ UCx grew ESBL E.coli and Abx switched to meropenem. Has been dropping O2 saturation to the 70s while on high flow with unclear etiology for recurrent hypoxia. Has been requiring Ativan 0.25mg for sleeping due to anxiety. Has maintained saturation in the 90s today. ECG showed ST depression in leads II, III, AVF, and V4-V6 with negative cardiac enzymes and downtrending CK.  Pt was tx to RCU requiring high flow. Found with pleural effusions, s/p diuresis with lasix, improved.  Pt was able to be weaned off oxygen.  PT recs home PT    dispo:home Patient is an 86 year old woman with PMH of Lung CA (unknown stage or type, Dx 2010 s/p L VATS, wedge resection, RT in July), HTN, and DM2 initially presented with nausea, vomiting, dysuria, and fever, and chills, and increased urinary frequency. Patient previously had cough productive of sputum for 2 weeks, s/p Antibiotics (unknown by pt but per ER records, amoxicillin). No recent travel, no sick contacts. On admission, patient found to have Pyelonephritis, with Urine Cx growing >100,000 gram negative rods. Blood cultures growing E. Coli.  Patient started on Ceftriaxone, s/p Azithromycin X 1 dose, and was doing well until  she developed acute onset shortness of breath, placed on 50% NRB, and RRT called prior to MICU admission.  At onset of RRT, vitals: /80, HR 90s, RR 18, SP02 91% on 50% NRB.  Patient in mild Respiratory distress. Bedside US showing RLL atelectasis with moderate sized pleural effusion, and small pleural effusion on the left side.  Patient's SP02 stable 91-93%. Decision made to start patient on CPAP, and transfer to MICU for further management of her hypoxic respiratory failure. Patient weaned off CPAP and switched to high flow. While in the MICU BCx/ UCx grew ESBL E.coli and Abx switched to meropenem. Has been dropping O2 saturation to the 70s while on high flow with unclear etiology for recurrent hypoxia. Has been requiring Ativan 0.25mg for sleeping due to anxiety. Has maintained saturation in the 90s today. ECG showed ST depression in leads II, III, AVF, and V4-V6 with negative cardiac enzymes and downtrending CK.  Pt was tx to RCU requiring high flow. Found with pleural effusions, s/p diuresis with lasix, improved.  Pt was able to be weaned off oxygen.  Abx changed to ertapenem for completion - to go home with PIV x 3 doses with visiting nurse.  PT recs home PT    dispo: home

## 2017-11-01 NOTE — PROGRESS NOTE ADULT - SUBJECTIVE AND OBJECTIVE BOX
_________________________________________________________________________________________  ========>>  M E D I C A L   A T T E N D I N G    F O L L O W  U P  N O T E  <<=========  -----------------------------------------------------------------------------------------------------    - Patient seen and examined by me approximately thirty minutes ago.  - In summary, patient is a 86y year old woman who originally presented with UTI / pyelonephritis, developed Bacteremia, then had acute respiratory failure, post Bipap, and now on High flow out of the ICU.  - Patient today overall doing ok, comfortable, eating OK.  doing well on High flow O2, having increased urination post lasix    ==================>> MEDICATIONS <<====================    ALBUTerol/ipratropium for Nebulization 3 milliLiter(s) Nebulizer every 6 hours  aspirin enteric coated 81 milliGRAM(s) Oral daily  fenofibrate Tablet 145 milliGRAM(s) Oral daily  heparin  Injectable 5000 Unit(s) SubCutaneous every 8 hours  insulin glargine Injectable (LANTUS) 5 Unit(s) SubCutaneous at bedtime  insulin lispro (HumaLOG) corrective regimen sliding scale   SubCutaneous three times a day before meals  insulin lispro (HumaLOG) corrective regimen sliding scale   SubCutaneous at bedtime  meropenem IVPB      meropenem IVPB 1000 milliGRAM(s) IV Intermittent every 8 hours  metoprolol     tartrate 50 milliGRAM(s) Oral every 12 hours  NIFEdipine XL 30 milliGRAM(s) Oral daily  NIFEdipine XL 60 milliGRAM(s) Oral at bedtime  pantoprazole    Tablet 40 milliGRAM(s) Oral before breakfast  prednisoLONE acetate 1% Suspension 1 Drop(s) Both EYES three times a day  predniSONE   Tablet 40 milliGRAM(s) Oral daily  sodium chloride 0.65% Nasal 1 Spray(s) Both Nostrils three times a day    MEDICATIONS  (PRN):  acetaminophen   Tablet 650 milliGRAM(s) Oral every 6 hours PRN For Temp greater than 38 C (100.4 F)  acetaminophen   Tablet. 650 milliGRAM(s) Oral every 6 hours PRN mild to moderate pain  melatonin 3 milliGRAM(s) Oral at bedtime PRN Sleep    ==================>> REVIEW OF SYSTEM <<=================    GEN: no fever, no chills, no pain  RESP: no SOB now cough, no sputum  CVS: no chest pain, no palpitations, no edema  GI: no abdominal pain, no nausea, no constipation, no diarrhea  : no dysuria, no frequency, no hematuria  Neuro: no headache, no dizziness  Derm : no itching, no rash    ==================>> VITAL SIGNS <<==================    Vital Signs Last 24 Hrs  T(C): 36.7 (11-01-17 @ 09:22)  T(F): 98.1 (11-01-17 @ 09:22), Max: 98.2 (11-01-17 @ 01:00)  HR: 80 (11-01-17 @ 10:42) (77 - 106)  BP: 153/73 (11-01-17 @ 09:22)  BP(mean): --  RR: 18 (11-01-17 @ 10:44) (16 - 21)  SpO2: 100% (11-01-17 @ 10:44) (93% - 100%)    CAPILLARY BLOOD GLUCOSE      POCT Blood Glucose.: 364 mg/dL (01 Nov 2017 09:02)  POCT Blood Glucose.: 218 mg/dL (31 Oct 2017 21:23)  POCT Blood Glucose.: 233 mg/dL (31 Oct 2017 17:42)  POCT Blood Glucose.: 375 mg/dL (31 Oct 2017 11:59)    ==================>> PHYSICAL EXAM <<=================    GEN: A&O X 3 , NAD , comfortable in bed, on High flow, eating   HEENT: NCAT, PERRL, MMM, hearing intact  Neck: supple , no JVD  CVS: S1S2 , regular , No M/R/G appreciated  PULM: CTA B/L,  no W/R/R appreciated, decreased breath sounds  ABD.: soft. non tender, non distended,  bowel sounds present  Extrem: intact pulses , no edema   PSYCH : normal mood,  not anxious     ==================>> LAB AND IMAGING <<==================                                              9.5    5.72  )-----------( 175      ( 01 Nov 2017 07:10 )             29.5        11-01    135  |  98  |  32<H>  ----------------------------<  263<H>  3.9   |  26  |  0.90    Ca    8.3<L>      01 Nov 2017 07:10  Phos  2.6     11-01  Mg     1.7     11-01    _______________________  C U L T U R E S :    Culture - Blood (collected 28 Oct 2017 04:19)  Source: BLOOD  Preliminary Report (30 Oct 2017 04:18):    NO ORGANISMS ISOLATED    NO ORGANISMS ISOLATED AT 48 HRS.    Culture - Urine (collected 25 Oct 2017 20:04)  Source: URINE MIDSTREAM  Preliminary Report (26 Oct 2017 10:56):    GNR^Gram Neg Rods    COLONY COUNT: > = 100,000 CFU/ML  Final Report (29 Oct 2017 15:05):    RESULT CALLED TO: THELMA SALINAS MD./Y    DATE / TIME CALLED: 10/27/17 1215    CALLED BY: JEFE MORE                  *******************************                  * This is an appended result. *                  *******************************    A prior result that was reported as final has been changed.  Organism: E.COLI ESBL POSITIVE  Escherichia coli (29 Oct 2017 15:05)  Organism: Escherichia coli  COLONY COUNT: > = 100,000 CFU/ML (29 Oct 2017 15:05)    Sensitivities:      -  Amikacin: S <=16 BRADEN      -  Ampicillin: S <=8 BRADEN      -  Ampicillin/Sulbactam: S <=8/4 BRADEN      -  Aztreonam: I 8 BRADEN      -  Cefazolin: S <=8 BRADEN      -  Cefepime: S <=4 BRADEN      -  Cefoxitin: S <=8 BRADEN      -  Ceftazidime: S <=1 BRADEN      -  Ceftriaxone: S <=1 BRADEN      -  Ciprofloxacin: R >2 BRADEN      -  Ertapenem: S <=1 BRADEN      -  Gentamicin: S <=4 BRADEN      -  Imipenem: S <=1 BRADEN      -  Levofloxacin: R >4 BRADEN      -  Meropenem: S <=1 BRADEN      -  Nitrofurantoin: S <=32 BRADEN      -  Piperacillin/Tazobactam: S <=16 BRADEN      -  Tigecycline: S <=2 BRADEN      -  Tobramycin: S <=4 BRADEN      -  Trimethoprim/Sulfamethoxazole: R >2/38 BRADEN      Method Type: MICROSCAN NEG URINE COMBO 61  Organism: E.COLI ESBL POSITIVE  COLONY COUNT: > = 100,000 CFU/ML (27 Oct 2017 12:15)    Sensitivities:      -  Amikacin: S <=16 BRADEN      -  Ampicillin: R >16 BRADEN      -  Ampicillin/Sulbactam: R <=8/4 BRADEN      -  Aztreonam: R 16 BRADEN      -  Cefazolin: R >16 BRADEN      -  Cefepime: R >16 BRADEN      -  Cefoxitin: S <=8 BRADEN      -  Ceftazidime: R 8 BRADEN      -  Ceftriaxone: R      -  Ciprofloxacin: R >2 BRADEN      -  Ertapenem: S <=1 BRADEN      -  Gentamicin: S <=4 BRADEN      -  Imipenem: S <=1 BRADEN      -  Levofloxacin: R >4 BRADEN      -  Meropenem: S <=1 BRADEN      -  Nitrofurantoin: S <=32 BRADEN      -  Piperacillin/Tazobactam: R <=16 BRADEN      -  Tigecycline: S <=2 BRADEN      -  Tobramycin: S <=4 BRADEN      -  Trimethoprim/Sulfamethoxazole: S <=2/38 BRADEN      Method Type: MICROSCAN NEG URINE COMBO 61    Culture - Blood (collected 25 Oct 2017 19:40)  Source: BLOOD VENOUS  Preliminary Report (27 Oct 2017 12:33):    EC^Escherichia coli    Culture - Blood (collected 25 Oct 2017 19:40)  Source: BLOOD PERIPHERAL  Final Report (28 Oct 2017 11:53):    RESULT CALLED TO: BYRON BOSWELL/MAYELA    DATE / TIME CALLED: 10/28/17 1151    CALLED BY: KIRT MATIAS  Final Report (26 Oct 2017 07:17):    ***Blood Panel PCR results on this specimen are available    approximately 3 hours after the Gram stain result***    Gram stain, PCR, and/or culture results may not always    correspond due to difference in methodologies    ------------------------------------------------------------    This PCR assay was performed using [employer].  The    following targets are tested for:  Enterococcus, vancomycin    resistant enterococci, Listeria monocytogenes,  coagulase    negative staphylococci, S. aureus, methicillin resistant S.    aureus, Streptococcus agalactiae (Group B), S. pneumoniae,    S. pyogenes (Group A), Acinetobacter baumannii, Enterobacter    cloacae, E. coli, Klebsiella oxytoca, K. pneumoniae, Proteus    sp., Serratia marcescens, Haemophilus influenzae, Neisseria    meningitidis, Pseudomonas aeruginosa, Candida albicans, C.    glabrata, C. krusei, C. parapsilosis, C. tropicalis and the    KPC resistance gene.  Organism: BLOOD CULTURE PCR  E.COLI ESBL POSITIVE (28 Oct 2017 11:53)  Organism: E.COLI ESBL POSITIVE (28 Oct 2017 11:53)    Sensitivities:      -  Amikacin: S <=16 BRADEN      -  Ampicillin: R >16 BRADEN      -  Ampicillin/Sulbactam: R >16/8 BRADEN      -  Aztreonam: R >16 BRADEN      -  Cefazolin: R >16 BRADEN      -  Cefepime: R >16 BRADEN      -  Cefoxitin: S <=8 BRADEN      -  Ceftazidime: R >16 BRADEN      -  Ceftriaxone: R      -  Ciprofloxacin: R >2 BRADEN      -  Ertapenem: S <=1 BRADEN      -  Gentamicin: S <=4 BRADEN      -  Imipenem: S <=1 BRADEN      -  Levofloxacin: R >4 BRADEN      -  Meropenem: S <=1 BRADEN      -  Piperacillin/Tazobactam: R 64 BRADEN      -  Tigecycline: S <=2 BRADEN      -  Tobramycin: R >8 BRADEN      -  Trimethoprim/Sulfamethoxazole: R >2/38 BRADEN      Method Type: MICROSCAN NEG URINE COMBO 61  Organism: BLOOD CULTURE PCR  ***Blood Panel PCR results on this specimen are available  approximately 3 hours after the Gram stain result***  Gram stain, PCR, and/or culture results may not always  correspond due to difference in methodologies  ------------------------------------------------------------  This PCR assay was performed using [employer].  The  following targets are tested for:  Enterococcus, vancomycin  resistant enterococci, Listeria monocytogenes,  coagulase  negative staphylococci, S. aureus, methicillin resistant S.  aureus, Streptococcus agalactiae (Group B), S. pneumoniae,  S. pyogenes (Group A), Acinetobacter baumannii, Enterobacter  cloacae, E. coli, Klebsiella oxytoca, K. pneumoniae, Proteus  sp., Serratia marcescens, Haemophilus influenzae, Neisseria  meningitidis, Pseudomonas aeruginosa, Candida albicans, C.  glabrata, C. krusei, C. parapsilosis, C. tropicalis and the  KPC resistance gene. (27 Oct 2017 09:15)    Sensitivities:      -  Escherichia coli: + DETECT BRADEN      Method Type: PCR    ___________________________________________________________________________________  ===============>>  A S S E S S M E N T   A N D   P L A N <<===============  ------------------------------------------------------------------------------------------    **UTI/ Pyelonephritis/ ESBL bacteremia  ---continue Meropenem per ID >>tomorrow with possible Change to Ertapenem  ---ID F/U appreciated  ---most recent BCX   ---monitor vitals, labs closely    **Acute hypoxemic respiratory failure on High flow, in pt with emphysema and lung cancer  ---wean off as able  ---nebs PRN  ---lasix as ordered / needed  ---may need thoracentesis (pt not ameanable)    **Lung cancer, with recurrence  ---pt follows with oncologist at [facility]  ---recurrence is known and pt completed radiation therapy to them recently per Dtr  ---pt to follow up post discharge    **Diabetes II, stable  ---monitor finger sticks closely  ---Continue Insulin regimen as above monitor  ---Diabetic diet  ---A1c 7.5    ** CKD III, overall stable  ---monitor BMP closely    **Hypertension h/o  ---Continue with antihypertensive medications as above  ---DASH diet   ---close monitoring of vitals    **Anemia, overall stale  ---monitor CBC    -GI/DVT Prophylaxis.  - PT, OOB  --------------------------------------------  Case discussed with pt  Education given on plan of care, increase activity, deep breathing / IS, Nutrition  ___________________________  HLima Ferrell D.O.  Pager: [phone]

## 2017-11-01 NOTE — DISCHARGE NOTE ADULT - CARE PROVIDER_API CALL
PCP,   Phone: (   )    -  Fax: (   )    - PCP,   Phone: (   )    -  Fax: (   )    -    Dion Martin), Infectious Disease  [address] [place] [id number]  Phone: [phone]  Fax: [phone]

## 2017-11-01 NOTE — PROGRESS NOTE ADULT - PROBLEM SELECTOR PLAN 2
likely multifactorial 2/2 PNA (completed treatment) and underlying lung cancer  Oxygenation stable on high flow today  will trial nasal cannula  Nocturnal BIPAP - likely multifactorial 2/2 PNA (completed treatment) and underlying lung cancer  - will trial nasal cannula today, 5-6L  - BIPAP HS/PRN

## 2017-11-01 NOTE — DISCHARGE NOTE ADULT - PATIENT PORTAL LINK FT
“You can access the [facility] Patient Portal, offered by [facility], by registering with the following website: [email or web address]”

## 2017-11-01 NOTE — DISCHARGE NOTE ADULT - PROVIDER TOKENS
FREE:[LAST:[PCP],PHONE:[(   )    -],FAX:[(   )    -]] FREE:[LAST:[PCP],PHONE:[(   )    -],FAX:[(   )    -]],TOKEN:'[id number]'

## 2017-11-02 LAB
BACTERIA BLD CULT: SIGNIFICANT CHANGE UP
GLUCOSE BLDC GLUCOMTR-MCNC: 114 MG/DL — HIGH (ref 70–99)
GLUCOSE BLDC GLUCOMTR-MCNC: 177 MG/DL — HIGH (ref 70–99)
GLUCOSE BLDC GLUCOMTR-MCNC: 282 MG/DL — HIGH (ref 70–99)

## 2017-11-02 PROCEDURE — 99233 SBSQ HOSP IP/OBS HIGH 50: CPT | Mod: GC

## 2017-11-02 PROCEDURE — 99232 SBSQ HOSP IP/OBS MODERATE 35: CPT

## 2017-11-02 RX ADMIN — Medication 5 UNIT(S): at 17:56

## 2017-11-02 RX ADMIN — Medication 1 DROP(S): at 23:01

## 2017-11-02 RX ADMIN — Medication 1 DROP(S): at 13:21

## 2017-11-02 RX ADMIN — Medication 5 UNIT(S): at 13:20

## 2017-11-02 RX ADMIN — HEPARIN SODIUM 5000 UNIT(S): 5000 INJECTION INTRAVENOUS; SUBCUTANEOUS at 22:59

## 2017-11-02 RX ADMIN — Medication 40 MILLIGRAM(S): at 06:58

## 2017-11-02 RX ADMIN — HEPARIN SODIUM 5000 UNIT(S): 5000 INJECTION INTRAVENOUS; SUBCUTANEOUS at 13:21

## 2017-11-02 RX ADMIN — Medication 1 SPRAY(S): at 23:01

## 2017-11-02 RX ADMIN — Medication 50 MILLIGRAM(S): at 06:58

## 2017-11-02 RX ADMIN — Medication 50 MILLIGRAM(S): at 17:55

## 2017-11-02 RX ADMIN — Medication 6: at 17:55

## 2017-11-02 RX ADMIN — Medication 650 MILLIGRAM(S): at 22:59

## 2017-11-02 RX ADMIN — ERTAPENEM SODIUM 120 MILLIGRAM(S): 1 INJECTION, POWDER, LYOPHILIZED, FOR SOLUTION INTRAMUSCULAR; INTRAVENOUS at 06:57

## 2017-11-02 RX ADMIN — PANTOPRAZOLE SODIUM 40 MILLIGRAM(S): 20 TABLET, DELAYED RELEASE ORAL at 06:58

## 2017-11-02 RX ADMIN — Medication 1 SPRAY(S): at 13:20

## 2017-11-02 RX ADMIN — Medication 3 MILLIGRAM(S): at 23:00

## 2017-11-02 RX ADMIN — Medication 1 DROP(S): at 06:58

## 2017-11-02 RX ADMIN — Medication 81 MILLIGRAM(S): at 13:21

## 2017-11-02 RX ADMIN — Medication 3 MILLILITER(S): at 15:58

## 2017-11-02 RX ADMIN — Medication 60 MILLIGRAM(S): at 23:01

## 2017-11-02 RX ADMIN — Medication 5 UNIT(S): at 09:09

## 2017-11-02 RX ADMIN — Medication 145 MILLIGRAM(S): at 13:21

## 2017-11-02 RX ADMIN — Medication 30 MILLIGRAM(S): at 06:58

## 2017-11-02 RX ADMIN — Medication 1 SPRAY(S): at 06:58

## 2017-11-02 RX ADMIN — Medication 3 MILLILITER(S): at 10:38

## 2017-11-02 RX ADMIN — INSULIN GLARGINE 5 UNIT(S): 100 INJECTION, SOLUTION SUBCUTANEOUS at 22:59

## 2017-11-02 RX ADMIN — HEPARIN SODIUM 5000 UNIT(S): 5000 INJECTION INTRAVENOUS; SUBCUTANEOUS at 06:58

## 2017-11-02 RX ADMIN — Medication 3 MILLILITER(S): at 22:21

## 2017-11-02 RX ADMIN — Medication 6: at 13:20

## 2017-11-02 RX ADMIN — Medication 3 MILLILITER(S): at 03:52

## 2017-11-02 RX ADMIN — Medication 650 MILLIGRAM(S): at 23:29

## 2017-11-02 RX ADMIN — Medication 2: at 09:09

## 2017-11-02 NOTE — PROGRESS NOTE ADULT - SUBJECTIVE AND OBJECTIVE BOX
CHIEF COMPLAINT:    Interval Events:    REVIEW OF SYSTEMS:  Constitutional:   Eyes:  ENT:  CV:  Resp:  GI:  :  MSK:  Integumentary:  Neurological:  Psychiatric:  Endocrine:  Hematologic/Lymphatic:  Allergic/Immunologic:  [ ] All other systems negative  [ ] Unable to assess ROS because ________    OBJECTIVE:  ICU Vital Signs Last 24 Hrs  T(C): 36.3 (02 Nov 2017 05:40), Max: 36.8 (02 Nov 2017 01:26)  T(F): 97.4 (02 Nov 2017 05:40), Max: 98.2 (02 Nov 2017 01:26)  HR: 82 (02 Nov 2017 06:24) (8 - 92)  BP: 150/70 (02 Nov 2017 05:40) (150/70 - 171/79)  BP(mean): --  ABP: --  ABP(mean): --  RR: 20 (02 Nov 2017 05:40) (18 - 20)  SpO2: 100% (02 Nov 2017 06:24) (96% - 100%)        CAPILLARY BLOOD GLUCOSE      POCT Blood Glucose.: 73 mg/dL (01 Nov 2017 21:41)        HOSPITAL MEDICATIONS:  MEDICATIONS  (STANDING):  ALBUTerol/ipratropium for Nebulization 3 milliLiter(s) Nebulizer every 6 hours  aspirin enteric coated 81 milliGRAM(s) Oral daily  fenofibrate Tablet 145 milliGRAM(s) Oral daily  heparin  Injectable 5000 Unit(s) SubCutaneous every 8 hours  insulin glargine Injectable (LANTUS) 5 Unit(s) SubCutaneous at bedtime  insulin lispro (HumaLOG) corrective regimen sliding scale   SubCutaneous three times a day before meals  insulin lispro (HumaLOG) corrective regimen sliding scale   SubCutaneous at bedtime  insulin lispro Injectable (HumaLOG) 5 Unit(s) SubCutaneous three times a day before meals  metoprolol     tartrate 50 milliGRAM(s) Oral every 12 hours  NIFEdipine XL 30 milliGRAM(s) Oral daily  NIFEdipine XL 60 milliGRAM(s) Oral at bedtime  pantoprazole    Tablet 40 milliGRAM(s) Oral before breakfast  prednisoLONE acetate 1% Suspension 1 Drop(s) Both EYES three times a day  predniSONE   Tablet 40 milliGRAM(s) Oral daily  sodium chloride 0.65% Nasal 1 Spray(s) Both Nostrils three times a day    MEDICATIONS  (PRN):  acetaminophen   Tablet 650 milliGRAM(s) Oral every 6 hours PRN For Temp greater than 38 C (100.4 F)  acetaminophen   Tablet. 650 milliGRAM(s) Oral every 6 hours PRN mild to moderate pain  melatonin 3 milliGRAM(s) Oral at bedtime PRN Sleep      LABS:                        9.5    5.72  )-----------( 175      ( 01 Nov 2017 07:10 )             29.5     11-01    135  |  98  |  32<H>  ----------------------------<  263<H>  3.9   |  26  |  0.90    Ca    8.3<L>      01 Nov 2017 07:10  Phos  2.6     11-01  Mg     1.7     11-01                MICROBIOLOGY:     RADIOLOGY:  [ ] Reviewed and interpreted by me    PULMONARY FUNCTION TESTS:    EKG: CHIEF COMPLAINT: feels well, no complaints    Interval Events: stable on 5l NC overnight    REVIEW OF SYSTEMS:  CV: denies  Resp: denies  [x] All other systems negative      OBJECTIVE:  ICU Vital Signs Last 24 Hrs  T(C): 36.3 (02 Nov 2017 05:40), Max: 36.8 (02 Nov 2017 01:26)  T(F): 97.4 (02 Nov 2017 05:40), Max: 98.2 (02 Nov 2017 01:26)  HR: 82 (02 Nov 2017 06:24) (8 - 92)  BP: 150/70 (02 Nov 2017 05:40) (150/70 - 171/79)  RR: 20 (02 Nov 2017 05:40) (18 - 20)  SpO2: 100% (02 Nov 2017 06:24) (96% - 100%)    POCT Blood Glucose.: 73 mg/dL (01 Nov 2017 21:41)        HOSPITAL MEDICATIONS:  MEDICATIONS  (STANDING):  ALBUTerol/ipratropium for Nebulization 3 milliLiter(s) Nebulizer every 6 hours  aspirin enteric coated 81 milliGRAM(s) Oral daily  fenofibrate Tablet 145 milliGRAM(s) Oral daily  heparin  Injectable 5000 Unit(s) SubCutaneous every 8 hours  insulin glargine Injectable (LANTUS) 5 Unit(s) SubCutaneous at bedtime  insulin lispro (HumaLOG) corrective regimen sliding scale   SubCutaneous three times a day before meals  insulin lispro (HumaLOG) corrective regimen sliding scale   SubCutaneous at bedtime  insulin lispro Injectable (HumaLOG) 5 Unit(s) SubCutaneous three times a day before meals  metoprolol     tartrate 50 milliGRAM(s) Oral every 12 hours  NIFEdipine XL 30 milliGRAM(s) Oral daily  NIFEdipine XL 60 milliGRAM(s) Oral at bedtime  pantoprazole    Tablet 40 milliGRAM(s) Oral before breakfast  prednisoLONE acetate 1% Suspension 1 Drop(s) Both EYES three times a day  predniSONE   Tablet 40 milliGRAM(s) Oral daily  sodium chloride 0.65% Nasal 1 Spray(s) Both Nostrils three times a day    MEDICATIONS  (PRN):  acetaminophen   Tablet 650 milliGRAM(s) Oral every 6 hours PRN For Temp greater than 38 C (100.4 F)  acetaminophen   Tablet. 650 milliGRAM(s) Oral every 6 hours PRN mild to moderate pain  melatonin 3 milliGRAM(s) Oral at bedtime PRN Sleep

## 2017-11-02 NOTE — PROGRESS NOTE ADULT - ASSESSMENT
86 year old with lung cancer (VATS in past/ recent radiation ) and PMR on low dose steroid as an outpatient presented  with E coli bacteremia likely due to UTI/ pyelonephritis.     Her CT chest findings are noted- ? if this is all due to her malignancy.  I do not have a clinical suspicion for Tuberculosis.  Acute bacterial pulmonary process is less likely given E coli bacteremia.    Continue ertapenem 1 gm iv daily though 11/10.  No need for outpatient labs if discharged    Call with questions

## 2017-11-02 NOTE — PROGRESS NOTE ADULT - PROBLEM SELECTOR PLAN 1
- ID note appreciated  - will continue with meropenem through today  - start ertapenem q24 tomorrow through 10/11  - may need PICC - ID note appreciated  - started ertapenem q24  through 11/10  - family amenable to home infusions, will need PICC

## 2017-11-02 NOTE — PROGRESS NOTE ADULT - PROBLEM SELECTOR PLAN 6
- a1 c 7.5  - on oral medications at home  - hyperglycemia likely 2/2 increased prednisone dose  - will start lantus and premeals for now  - monitor FSs - a1 c 7.5  - on oral medications at home  - hyperglycemia likely 2/2 increased prednisone dose  - continue lantus and premeals for now  - monitor FSs

## 2017-11-02 NOTE — PROGRESS NOTE ADULT - ASSESSMENT
85 y/o F PMH Lung CA (unknown stage or type, Dx 2010 s/p L VATS, wedge resection, RT in July), HTN, DM2  recent dx of emphysema, former smoker, polymyalgia rheumatica (chronic prednisone 2.5 QD),  a/w N/V and dysuria 2/2 pyelonephritis c/b ESBL E coli bacteremia with course c/b hypoxia requiring CPAP and high flow NC. Currently stable on high flow NC, IV abx.

## 2017-11-02 NOTE — PROGRESS NOTE ADULT - PROBLEM SELECTOR PLAN 4
- will continue outpatient care with oncology  - if remains hypoxic, will need to arrange for home O2, BIPAP - will continue outpatient care with oncology

## 2017-11-02 NOTE — PROGRESS NOTE ADULT - PROBLEM SELECTOR PLAN 2
- likely multifactorial 2/2 PNA (completed treatment) and underlying lung cancer  - will trial nasal cannula today, 5-6L  - BIPAP HS/PRN - likely multifactorial 2/2 PNA (completed treatment) and underlying lung cancer  - oxygenation stable, will continue to wean. No longer requires BIPAP

## 2017-11-03 LAB
BUN SERPL-MCNC: 29 MG/DL — HIGH (ref 7–23)
CALCIUM SERPL-MCNC: 8.8 MG/DL — SIGNIFICANT CHANGE UP (ref 8.4–10.5)
CHLORIDE SERPL-SCNC: 100 MMOL/L — SIGNIFICANT CHANGE UP (ref 98–107)
CO2 SERPL-SCNC: 26 MMOL/L — SIGNIFICANT CHANGE UP (ref 22–31)
CREAT SERPL-MCNC: 1.08 MG/DL — SIGNIFICANT CHANGE UP (ref 0.5–1.3)
GLUCOSE BLDC GLUCOMTR-MCNC: 181 MG/DL — HIGH (ref 70–99)
GLUCOSE BLDC GLUCOMTR-MCNC: 186 MG/DL — HIGH (ref 70–99)
GLUCOSE BLDC GLUCOMTR-MCNC: 214 MG/DL — HIGH (ref 70–99)
GLUCOSE BLDC GLUCOMTR-MCNC: 316 MG/DL — HIGH (ref 70–99)
GLUCOSE SERPL-MCNC: 152 MG/DL — HIGH (ref 70–99)
HCT VFR BLD CALC: 31.5 % — LOW (ref 34.5–45)
HGB BLD-MCNC: 9.9 G/DL — LOW (ref 11.5–15.5)
MAGNESIUM SERPL-MCNC: 1.7 MG/DL — SIGNIFICANT CHANGE UP (ref 1.6–2.6)
MCHC RBC-ENTMCNC: 27.9 PG — SIGNIFICANT CHANGE UP (ref 27–34)
MCHC RBC-ENTMCNC: 31.4 % — LOW (ref 32–36)
MCV RBC AUTO: 88.7 FL — SIGNIFICANT CHANGE UP (ref 80–100)
NRBC # FLD: 0 — SIGNIFICANT CHANGE UP
PHOSPHATE SERPL-MCNC: 3.3 MG/DL — SIGNIFICANT CHANGE UP (ref 2.5–4.5)
PLATELET # BLD AUTO: 236 K/UL — SIGNIFICANT CHANGE UP (ref 150–400)
PMV BLD: 11.1 FL — SIGNIFICANT CHANGE UP (ref 7–13)
POTASSIUM SERPL-MCNC: 4.1 MMOL/L — SIGNIFICANT CHANGE UP (ref 3.5–5.3)
POTASSIUM SERPL-SCNC: 4.1 MMOL/L — SIGNIFICANT CHANGE UP (ref 3.5–5.3)
RBC # BLD: 3.55 M/UL — LOW (ref 3.8–5.2)
RBC # FLD: 14.2 % — SIGNIFICANT CHANGE UP (ref 10.3–14.5)
SODIUM SERPL-SCNC: 138 MMOL/L — SIGNIFICANT CHANGE UP (ref 135–145)
WBC # BLD: 4.76 K/UL — SIGNIFICANT CHANGE UP (ref 3.8–10.5)
WBC # FLD AUTO: 4.76 K/UL — SIGNIFICANT CHANGE UP (ref 3.8–10.5)

## 2017-11-03 PROCEDURE — 99233 SBSQ HOSP IP/OBS HIGH 50: CPT | Mod: GC

## 2017-11-03 RX ADMIN — Medication 1 SPRAY(S): at 22:46

## 2017-11-03 RX ADMIN — Medication 145 MILLIGRAM(S): at 11:35

## 2017-11-03 RX ADMIN — HEPARIN SODIUM 5000 UNIT(S): 5000 INJECTION INTRAVENOUS; SUBCUTANEOUS at 06:46

## 2017-11-03 RX ADMIN — Medication 5 UNIT(S): at 09:28

## 2017-11-03 RX ADMIN — Medication 3 MILLILITER(S): at 21:19

## 2017-11-03 RX ADMIN — PANTOPRAZOLE SODIUM 40 MILLIGRAM(S): 20 TABLET, DELAYED RELEASE ORAL at 06:45

## 2017-11-03 RX ADMIN — Medication 1 DROP(S): at 13:01

## 2017-11-03 RX ADMIN — Medication 50 MILLIGRAM(S): at 17:26

## 2017-11-03 RX ADMIN — Medication 1 SPRAY(S): at 13:00

## 2017-11-03 RX ADMIN — Medication 3 MILLILITER(S): at 03:52

## 2017-11-03 RX ADMIN — ERTAPENEM SODIUM 120 MILLIGRAM(S): 1 INJECTION, POWDER, LYOPHILIZED, FOR SOLUTION INTRAMUSCULAR; INTRAVENOUS at 06:46

## 2017-11-03 RX ADMIN — Medication 8: at 12:58

## 2017-11-03 RX ADMIN — Medication 3 MILLILITER(S): at 10:54

## 2017-11-03 RX ADMIN — Medication 50 MILLIGRAM(S): at 06:46

## 2017-11-03 RX ADMIN — Medication 3 MILLILITER(S): at 16:50

## 2017-11-03 RX ADMIN — Medication 1 SPRAY(S): at 06:46

## 2017-11-03 RX ADMIN — Medication 2: at 18:21

## 2017-11-03 RX ADMIN — Medication 1 DROP(S): at 06:47

## 2017-11-03 RX ADMIN — Medication 1 DROP(S): at 22:48

## 2017-11-03 RX ADMIN — Medication 5 UNIT(S): at 18:21

## 2017-11-03 RX ADMIN — INSULIN GLARGINE 5 UNIT(S): 100 INJECTION, SOLUTION SUBCUTANEOUS at 22:37

## 2017-11-03 RX ADMIN — HEPARIN SODIUM 5000 UNIT(S): 5000 INJECTION INTRAVENOUS; SUBCUTANEOUS at 13:00

## 2017-11-03 RX ADMIN — Medication 60 MILLIGRAM(S): at 22:47

## 2017-11-03 RX ADMIN — Medication 2: at 09:28

## 2017-11-03 RX ADMIN — Medication 3 MILLIGRAM(S): at 22:47

## 2017-11-03 RX ADMIN — HEPARIN SODIUM 5000 UNIT(S): 5000 INJECTION INTRAVENOUS; SUBCUTANEOUS at 22:47

## 2017-11-03 RX ADMIN — Medication 81 MILLIGRAM(S): at 11:35

## 2017-11-03 RX ADMIN — Medication 30 MILLIGRAM(S): at 06:46

## 2017-11-03 RX ADMIN — Medication 5 UNIT(S): at 12:58

## 2017-11-03 NOTE — PROGRESS NOTE ADULT - PROBLEM SELECTOR PLAN 1
- ID note appreciated  - started ertapenem q24  through 11/10  - family amenable to home infusions, will need PICC, scheduled for Monday

## 2017-11-03 NOTE — PROGRESS NOTE ADULT - ASSESSMENT
87 y/o F PMH Lung CA (unknown stage or type, Dx 2010 s/p L VATS, wedge resection, RT in July), HTN, DM2  recent dx of emphysema, former smoker, polymyalgia rheumatica (chronic prednisone 2.5 QD),  a/w N/V and dysuria 2/2 pyelonephritis c/b ESBL E coli bacteremia with course c/b hypoxia requiring CPAP and high flow NC. Now weaned to 2L NC, tolerating. Pending PICC.

## 2017-11-03 NOTE — PROGRESS NOTE ADULT - SUBJECTIVE AND OBJECTIVE BOX
_________________________________________________________________________________________  ========>>  M E D I C A L   A T T E N D I N G    F O L L O W  U P  N O T E  <<=========  -----------------------------------------------------------------------------------------------------    - Patient seen and examined by me approximately thirty minutes ago.  - In summary, patient is a 86y year old woman who originally presented with UTI / pyelonephritis, developed Bacteremia, then had acute respiratory failure, post Bipap, and now on High flow out of the ICU.  - Patient today overall doing ok, comfortable, eating OK.  doing well off High flow and on O2 via NC, having increased urination overnight, eating well    ==================>> MEDICATIONS <<====================    ALBUTerol/ipratropium for Nebulization 3 milliLiter(s) Nebulizer every 6 hours  aspirin enteric coated 81 milliGRAM(s) Oral daily  fenofibrate Tablet 145 milliGRAM(s) Oral daily  heparin  Injectable 5000 Unit(s) SubCutaneous every 8 hours  insulin glargine Injectable (LANTUS) 5 Unit(s) SubCutaneous at bedtime  insulin lispro (HumaLOG) corrective regimen sliding scale   SubCutaneous three times a day before meals  insulin lispro (HumaLOG) corrective regimen sliding scale   SubCutaneous at bedtime  insulin lispro Injectable (HumaLOG) 5 Unit(s) SubCutaneous three times a day before meals  metoprolol     tartrate 50 milliGRAM(s) Oral every 12 hours  NIFEdipine XL 30 milliGRAM(s) Oral daily  NIFEdipine XL 60 milliGRAM(s) Oral at bedtime  pantoprazole    Tablet 40 milliGRAM(s) Oral before breakfast  prednisoLONE acetate 1% Suspension 1 Drop(s) Both EYES three times a day  sodium chloride 0.65% Nasal 1 Spray(s) Both Nostrils three times a day    MEDICATIONS  (PRN):  acetaminophen   Tablet 650 milliGRAM(s) Oral every 6 hours PRN For Temp greater than 38 C (100.4 F)  acetaminophen   Tablet. 650 milliGRAM(s) Oral every 6 hours PRN mild to moderate pain  melatonin 3 milliGRAM(s) Oral at bedtime PRN Sleep    ==================>> REVIEW OF SYSTEM <<=================    GEN: no fever, no chills, no pain  RESP: no SOB now cough, no sputum  CVS: no chest pain, no palpitations, no edema  GI: no abdominal pain, no nausea, no constipation, no diarrhea  : no dysuria, no frequency, no hematuria  Neuro: no headache, no dizziness  Derm : no itching, no rash    ==================>> VITAL SIGNS <<==================    Vital Signs Last 24 Hrs  T(C): 36.5 (11-03-17 @ 05:40)  T(F): 97.7 (11-03-17 @ 05:40), Max: 98.7 (11-02-17 @ 21:40)  HR: 73 (11-03-17 @ 07:18) (70 - 88)  BP: 133/80 (11-03-17 @ 05:40)  BP(mean): --  RR: 19 (11-03-17 @ 07:18) (17 - 20)  SpO2: 95% (11-03-17 @ 07:18) (95% - 100%)    CAPILLARY BLOOD GLUCOSE  262 (02 Nov 2017 17:30)      POCT Blood Glucose.: 181 mg/dL (03 Nov 2017 09:22)  POCT Blood Glucose.: 114 mg/dL (02 Nov 2017 22:52)  POCT Blood Glucose.: 282 mg/dL (02 Nov 2017 12:31)    ==================>> PHYSICAL EXAM <<=================    GEN: A&O X 3 , NAD , comfortable in bed, on High flow, eating   HEENT: NCAT, PERRL, MMM, hearing intact  Neck: supple , no JVD  CVS: S1S2 , regular , No M/R/G appreciated  PULM: CTA B/L,  no W/R/R appreciated, decreased breath sounds  ABD.: soft. non tender, non distended,  bowel sounds present  Extrem: intact pulses , no edema   PSYCH : normal mood,  not anxious     ==================>> LAB AND IMAGING <<==================                                                     9.9    4.76  )-----------( 236      ( 03 Nov 2017 07:18 )             31.5        11-03    138  |  100  |  29<H>  ----------------------------<  152<H>  4.1   |  26  |  1.08    Ca    8.8      03 Nov 2017 07:18  Phos  3.3     11-03  Mg     1.7     11-03    ___________________________________________________________________________________  ===============>>  A S S E S S M E N T   A N D   P L A N <<===============  ------------------------------------------------------------------------------------------    **UTI/ Pyelonephritis/ ESBL bacteremia  ---continue Abx per ID >> til 11-10-17  ---ID F/U appreciated  ---monitor vitals, labs closely    **Acute hypoxemic respiratory failure on High flow, in pt with emphysema and lung cancer  ---wean off O2 as able  ---nebs PRN  ---lasix as needed  ---PT / OOB as able    **Lung cancer, with recurrence  ---pt follows with oncologist at [facility]  ---recurrence is known and pt completed radiation therapy to them recently  ---pt to follow up post discharge    **Diabetes II, stable  ---monitor finger sticks closely  ---Continue Insulin regimen as above monitor  ---Diabetic diet  ---A1c 7.5    **Hypertension h/o  ---Continue with antihypertensive medications as above  ---DASH diet   ---close monitoring of vitals    **Anemia, overall stale  ---monitor CBC    -GI/DVT Prophylaxis.  - PT, OOB  --------------------------------------------  Case discussed with pt, RN  Education given on plan of care, increase activity, deep breathing / IS, Nutrition  ___________________________  H. GEN Ferrell.  Pager: [phone]

## 2017-11-03 NOTE — PROGRESS NOTE ADULT - SUBJECTIVE AND OBJECTIVE BOX
CHIEF COMPLAINT:    Interval Events:    REVIEW OF SYSTEMS:  Constitutional:   Eyes:  ENT:  CV:  Resp:  GI:  :  MSK:  Integumentary:  Neurological:  Psychiatric:  Endocrine:  Hematologic/Lymphatic:  Allergic/Immunologic:  [ ] All other systems negative  [ ] Unable to assess ROS because ________    OBJECTIVE:  ICU Vital Signs Last 24 Hrs  T(C): 36.5 (03 Nov 2017 05:40), Max: 37.1 (02 Nov 2017 21:40)  T(F): 97.7 (03 Nov 2017 05:40), Max: 98.7 (02 Nov 2017 21:40)  HR: 73 (03 Nov 2017 07:18) (70 - 88)  BP: 133/80 (03 Nov 2017 05:40) (133/80 - 158/89)  BP(mean): --  ABP: --  ABP(mean): --  RR: 19 (03 Nov 2017 07:18) (17 - 20)  SpO2: 95% (03 Nov 2017 07:18) (95% - 100%)        CAPILLARY BLOOD GLUCOSE  262 (02 Nov 2017 17:30)      POCT Blood Glucose.: 114 mg/dL (02 Nov 2017 22:52)        HOSPITAL MEDICATIONS:  MEDICATIONS  (STANDING):  ALBUTerol/ipratropium for Nebulization 3 milliLiter(s) Nebulizer every 6 hours  aspirin enteric coated 81 milliGRAM(s) Oral daily  fenofibrate Tablet 145 milliGRAM(s) Oral daily  heparin  Injectable 5000 Unit(s) SubCutaneous every 8 hours  insulin glargine Injectable (LANTUS) 5 Unit(s) SubCutaneous at bedtime  insulin lispro (HumaLOG) corrective regimen sliding scale   SubCutaneous three times a day before meals  insulin lispro (HumaLOG) corrective regimen sliding scale   SubCutaneous at bedtime  insulin lispro Injectable (HumaLOG) 5 Unit(s) SubCutaneous three times a day before meals  metoprolol     tartrate 50 milliGRAM(s) Oral every 12 hours  NIFEdipine XL 30 milliGRAM(s) Oral daily  NIFEdipine XL 60 milliGRAM(s) Oral at bedtime  pantoprazole    Tablet 40 milliGRAM(s) Oral before breakfast  prednisoLONE acetate 1% Suspension 1 Drop(s) Both EYES three times a day  sodium chloride 0.65% Nasal 1 Spray(s) Both Nostrils three times a day    MEDICATIONS  (PRN):  acetaminophen   Tablet 650 milliGRAM(s) Oral every 6 hours PRN For Temp greater than 38 C (100.4 F)  acetaminophen   Tablet. 650 milliGRAM(s) Oral every 6 hours PRN mild to moderate pain  melatonin 3 milliGRAM(s) Oral at bedtime PRN Sleep      LABS:                        9.9    4.76  )-----------( 236      ( 03 Nov 2017 07:18 )             31.5                     MICROBIOLOGY:     RADIOLOGY:  [ ] Reviewed and interpreted by me    PULMONARY FUNCTION TESTS:    EKG: CHIEF COMPLAINT: Patient is a 86y old  Female who presents with a chief complaint of abd, back pain, n/v (01 Nov 2017 15:26)    Interval Events: none overnight      REVIEW OF SYSTEMS:  CV: denies  Resp: denies  GI: denies  [x] All other systems negative  [ ] Unable to assess ROS because ________      OBJECTIVE:  ICU Vital Signs Last 24 Hrs  T(C): 36.5 (03 Nov 2017 05:40), Max: 37.1 (02 Nov 2017 21:40)  T(F): 97.7 (03 Nov 2017 05:40), Max: 98.7 (02 Nov 2017 21:40)  HR: 73 (03 Nov 2017 07:18) (70 - 88)  BP: 133/80 (03 Nov 2017 05:40) (133/80 - 158/89)  BP(mean): --  ABP: --  ABP(mean): --  RR: 19 (03 Nov 2017 07:18) (17 - 20)  SpO2: 95% (03 Nov 2017 07:18) (95% - 100%)    CAPILLARY BLOOD GLUCOSE  262 (02 Nov 2017 17:30)      POCT Blood Glucose.: 114 mg/dL (02 Nov 2017 22:52)    HOSPITAL MEDICATIONS:  MEDICATIONS  (STANDING):  ALBUTerol/ipratropium for Nebulization 3 milliLiter(s) Nebulizer every 6 hours  aspirin enteric coated 81 milliGRAM(s) Oral daily  fenofibrate Tablet 145 milliGRAM(s) Oral daily  heparin  Injectable 5000 Unit(s) SubCutaneous every 8 hours  insulin glargine Injectable (LANTUS) 5 Unit(s) SubCutaneous at bedtime  insulin lispro (HumaLOG) corrective regimen sliding scale   SubCutaneous three times a day before meals  insulin lispro (HumaLOG) corrective regimen sliding scale   SubCutaneous at bedtime  insulin lispro Injectable (HumaLOG) 5 Unit(s) SubCutaneous three times a day before meals  metoprolol     tartrate 50 milliGRAM(s) Oral every 12 hours  NIFEdipine XL 30 milliGRAM(s) Oral daily  NIFEdipine XL 60 milliGRAM(s) Oral at bedtime  pantoprazole    Tablet 40 milliGRAM(s) Oral before breakfast  prednisoLONE acetate 1% Suspension 1 Drop(s) Both EYES three times a day  sodium chloride 0.65% Nasal 1 Spray(s) Both Nostrils three times a day    MEDICATIONS  (PRN):  acetaminophen   Tablet 650 milliGRAM(s) Oral every 6 hours PRN For Temp greater than 38 C (100.4 F)  acetaminophen   Tablet. 650 milliGRAM(s) Oral every 6 hours PRN mild to moderate pain  melatonin 3 milliGRAM(s) Oral at bedtime PRN Sleep      LABS:                        9.9    4.76  )-----------( 236      ( 03 Nov 2017 07:18 )             31.5       Basic Metabolic Panel w/Mg &amp; Inorg Phos (11.03.17 @ 07:18)    eGFR if : 54 mL/min    eGFR if Non : 46: The units for eGFR are ml/min/1.73m2 (normalized body  surface area). The eGFR is calculated from a serum  creatinine using the CKD-EPI equation. Other variables  required for calculation are race, age and sex. Among  patients with chronic kidney disease (CKD), the eGFR is  useful in determining the stage of disease according to  KDOQI CKD classification. All eGFR results are reported  numerically with the following interpretation.    GFR  (ml/min/1.73 m2)          W/KIDNEY DAMAGE    W/O KIDNEY DMG  ==========================================================  >= 90.......................Stage 1..............Normal  60-89.......................Stage 2...........Decreased GFR  30-59.......................Stage 3..............Stage 3  15-29.......................Stage 4..............Stage 4  < 15........................Stage 5..............Stage 5    Each stage of CKD assumes that the associated GFR level  has been in effect for at least 3 months. Determination of  stages one and two (with eGFR > 59ml/min/m2) requires  estimation of kidney damage for at least 3 months as  defined by structural or functional abnormalities.    Limitations: All estimates of GFR will be less accurate  for patients at extremes of muscle mass (including but  not limited to frail elderly, critically ill, or cancer  patients), those with unusual diets, and those with  conditions associated with reduced secretion or  extrarenal elimination of creatinine. The eGFR equation  is not recommended for use in patients with unstable  creatinine levels. mL/min    Calcium, Total Serum: 8.8 mg/dL    Phosphorus Level, Serum: 3.3: Delta: 2.6 on 11/01/  Delta: 2.6 on 11/01/ mg/dL    Sodium, Serum: 138 mmol/L    Potassium, Serum: 4.1: SPECIMEN SLIGHTLY HEMOLYZED mmol/L    Chloride, Serum: 100 mmol/L    Carbon Dioxide, Serum: 26 mmol/L    Blood Urea Nitrogen, Serum: 29 mg/dL    Creatinine, Serum: 1.08 mg/dL    Glucose, Serum: 152 mg/dL    Magnesium, Serum: 1.7 mg/dL

## 2017-11-03 NOTE — CHART NOTE - NSCHARTNOTEFT_GEN_A_CORE
Surveillance culture preformed. Verbal consent obtained at bedside. Culture sent. Surveillance culture preformed. Verbal consent obtained at bedside by patient. Culture sent.

## 2017-11-03 NOTE — PROGRESS NOTE ADULT - PROBLEM SELECTOR PLAN 2
- likely multifactorial 2/2 PNA (completed treatment) and underlying lung cancer  - oxygenation stable on 2L NC  - cont to wean as tolerated

## 2017-11-03 NOTE — PROGRESS NOTE ADULT - PROBLEM SELECTOR PLAN 6
- a1 c 7.5  - on oral medications at home  - hyperglycemia likely 2/2 increased prednisone dose  - continue lantus and premeals for now  - monitor FSs, improved

## 2017-11-04 LAB
GLUCOSE BLDC GLUCOMTR-MCNC: 193 MG/DL — HIGH (ref 70–99)
GLUCOSE BLDC GLUCOMTR-MCNC: 258 MG/DL — HIGH (ref 70–99)
GLUCOSE BLDC GLUCOMTR-MCNC: 306 MG/DL — HIGH (ref 70–99)
GLUCOSE BLDC GLUCOMTR-MCNC: 95 MG/DL — SIGNIFICANT CHANGE UP (ref 70–99)

## 2017-11-04 PROCEDURE — 99232 SBSQ HOSP IP/OBS MODERATE 35: CPT

## 2017-11-04 RX ADMIN — HEPARIN SODIUM 5000 UNIT(S): 5000 INJECTION INTRAVENOUS; SUBCUTANEOUS at 06:12

## 2017-11-04 RX ADMIN — Medication 1 DROP(S): at 13:15

## 2017-11-04 RX ADMIN — Medication 81 MILLIGRAM(S): at 13:14

## 2017-11-04 RX ADMIN — Medication 5 UNIT(S): at 13:15

## 2017-11-04 RX ADMIN — Medication 3 MILLIGRAM(S): at 21:41

## 2017-11-04 RX ADMIN — HEPARIN SODIUM 5000 UNIT(S): 5000 INJECTION INTRAVENOUS; SUBCUTANEOUS at 21:41

## 2017-11-04 RX ADMIN — INSULIN GLARGINE 5 UNIT(S): 100 INJECTION, SOLUTION SUBCUTANEOUS at 21:43

## 2017-11-04 RX ADMIN — Medication 1 SPRAY(S): at 13:15

## 2017-11-04 RX ADMIN — Medication 3 MILLILITER(S): at 10:05

## 2017-11-04 RX ADMIN — Medication 145 MILLIGRAM(S): at 13:14

## 2017-11-04 RX ADMIN — Medication 30 MILLIGRAM(S): at 06:04

## 2017-11-04 RX ADMIN — Medication 50 MILLIGRAM(S): at 18:08

## 2017-11-04 RX ADMIN — Medication 60 MILLIGRAM(S): at 21:41

## 2017-11-04 RX ADMIN — Medication 3 MILLILITER(S): at 22:58

## 2017-11-04 RX ADMIN — Medication 2: at 09:12

## 2017-11-04 RX ADMIN — Medication 5 UNIT(S): at 09:12

## 2017-11-04 RX ADMIN — Medication 1 SPRAY(S): at 06:02

## 2017-11-04 RX ADMIN — Medication 50 MILLIGRAM(S): at 06:03

## 2017-11-04 RX ADMIN — HEPARIN SODIUM 5000 UNIT(S): 5000 INJECTION INTRAVENOUS; SUBCUTANEOUS at 13:15

## 2017-11-04 RX ADMIN — PANTOPRAZOLE SODIUM 40 MILLIGRAM(S): 20 TABLET, DELAYED RELEASE ORAL at 06:05

## 2017-11-04 RX ADMIN — Medication 6: at 18:08

## 2017-11-04 RX ADMIN — ERTAPENEM SODIUM 120 MILLIGRAM(S): 1 INJECTION, POWDER, LYOPHILIZED, FOR SOLUTION INTRAMUSCULAR; INTRAVENOUS at 06:01

## 2017-11-04 RX ADMIN — Medication 3 MILLILITER(S): at 16:05

## 2017-11-04 RX ADMIN — Medication 3 MILLILITER(S): at 03:48

## 2017-11-04 RX ADMIN — Medication 1 DROP(S): at 21:41

## 2017-11-04 RX ADMIN — Medication 8: at 13:15

## 2017-11-04 RX ADMIN — Medication 1 SPRAY(S): at 21:40

## 2017-11-04 RX ADMIN — Medication 5 UNIT(S): at 18:09

## 2017-11-04 NOTE — PROGRESS NOTE ADULT - SUBJECTIVE AND OBJECTIVE BOX
CHIEF COMPLAINT:    Interval Events:      REVIEW OF SYSTEMS:  Constitutional:   Eyes:  ENT:  CV:  Resp:  GI:  :  MSK:  Integumentary:  Neurological:  Psychiatric:  Endocrine:  Hematologic/Lymphatic:  Allergic/Immunologic:  [ ] All other systems negative  [ ] Unable to assess ROS because ________      OBJECTIVE:  ICU Vital Signs Last 24 Hrs  T(C): 36.4 (04 Nov 2017 05:57), Max: 36.8 (03 Nov 2017 22:42)  T(F): 97.6 (04 Nov 2017 05:57), Max: 98.3 (03 Nov 2017 22:42)  HR: 88 (04 Nov 2017 05:57) (66 - 94)  BP: 148/78 (04 Nov 2017 05:57) (125/78 - 148/78)  BP(mean): --  ABP: --  ABP(mean): --  RR: 20 (04 Nov 2017 05:57) (18 - 20)  SpO2: 99% (04 Nov 2017 05:57) (95% - 100%)    CAPILLARY BLOOD GLUCOSE  262 (02 Nov 2017 17:30)    POCT Blood Glucose.: 214 mg/dL (03 Nov 2017 22:20)      HOSPITAL MEDICATIONS:  MEDICATIONS  (STANDING):  ALBUTerol/ipratropium for Nebulization 3 milliLiter(s) Nebulizer every 6 hours  aspirin enteric coated 81 milliGRAM(s) Oral daily  fenofibrate Tablet 145 milliGRAM(s) Oral daily  heparin  Injectable 5000 Unit(s) SubCutaneous every 8 hours  insulin glargine Injectable (LANTUS) 5 Unit(s) SubCutaneous at bedtime  insulin lispro (HumaLOG) corrective regimen sliding scale   SubCutaneous three times a day before meals  insulin lispro (HumaLOG) corrective regimen sliding scale   SubCutaneous at bedtime  insulin lispro Injectable (HumaLOG) 5 Unit(s) SubCutaneous three times a day before meals  metoprolol     tartrate 50 milliGRAM(s) Oral every 12 hours  NIFEdipine XL 30 milliGRAM(s) Oral daily  NIFEdipine XL 60 milliGRAM(s) Oral at bedtime  pantoprazole    Tablet 40 milliGRAM(s) Oral before breakfast  prednisoLONE acetate 1% Suspension 1 Drop(s) Both EYES three times a day  sodium chloride 0.65% Nasal 1 Spray(s) Both Nostrils three times a day    MEDICATIONS  (PRN):  acetaminophen   Tablet 650 milliGRAM(s) Oral every 6 hours PRN For Temp greater than 38 C (100.4 F)  acetaminophen   Tablet. 650 milliGRAM(s) Oral every 6 hours PRN mild to moderate pain  melatonin 3 milliGRAM(s) Oral at bedtime PRN Sleep CHIEF COMPLAINT: Patient is a 86y old  Female who presents with a chief complaint of abd, back pain, n/v (01 Nov 2017 15:26)    Interval Events: none overnight      REVIEW OF SYSTEMS:  CV: denies  Resp: denies  GI: denies  [x] All other systems negative  [ ] Unable to assess ROS because ________      OBJECTIVE:  ICU Vital Signs Last 24 Hrs  T(C): 36.4 (04 Nov 2017 05:57), Max: 36.8 (03 Nov 2017 22:42)  T(F): 97.6 (04 Nov 2017 05:57), Max: 98.3 (03 Nov 2017 22:42)  HR: 88 (04 Nov 2017 05:57) (66 - 94)  BP: 148/78 (04 Nov 2017 05:57) (125/78 - 148/78)  BP(mean): --  ABP: --  ABP(mean): --  RR: 20 (04 Nov 2017 05:57) (18 - 20)  SpO2: 99% (04 Nov 2017 05:57) (95% - 100%)    CAPILLARY BLOOD GLUCOSE  262 (02 Nov 2017 17:30)    POCT Blood Glucose.: 214 mg/dL (03 Nov 2017 22:20)      HOSPITAL MEDICATIONS:  MEDICATIONS  (STANDING):  ALBUTerol/ipratropium for Nebulization 3 milliLiter(s) Nebulizer every 6 hours  aspirin enteric coated 81 milliGRAM(s) Oral daily  fenofibrate Tablet 145 milliGRAM(s) Oral daily  heparin  Injectable 5000 Unit(s) SubCutaneous every 8 hours  insulin glargine Injectable (LANTUS) 5 Unit(s) SubCutaneous at bedtime  insulin lispro (HumaLOG) corrective regimen sliding scale   SubCutaneous three times a day before meals  insulin lispro (HumaLOG) corrective regimen sliding scale   SubCutaneous at bedtime  insulin lispro Injectable (HumaLOG) 5 Unit(s) SubCutaneous three times a day before meals  metoprolol     tartrate 50 milliGRAM(s) Oral every 12 hours  NIFEdipine XL 30 milliGRAM(s) Oral daily  NIFEdipine XL 60 milliGRAM(s) Oral at bedtime  pantoprazole    Tablet 40 milliGRAM(s) Oral before breakfast  prednisoLONE acetate 1% Suspension 1 Drop(s) Both EYES three times a day  sodium chloride 0.65% Nasal 1 Spray(s) Both Nostrils three times a day    MEDICATIONS  (PRN):  acetaminophen   Tablet 650 milliGRAM(s) Oral every 6 hours PRN For Temp greater than 38 C (100.4 F)  acetaminophen   Tablet. 650 milliGRAM(s) Oral every 6 hours PRN mild to moderate pain  melatonin 3 milliGRAM(s) Oral at bedtime PRN Sleep

## 2017-11-04 NOTE — CHART NOTE - NSCHARTNOTEFT_GEN_A_CORE
PRE-INTERVENTIONAL RADIOLOGY PROCEDURE NOTE    Patient Age: 87 yo  Patient Gender:  female  Procedure (including site / side if known): PICC single lumen  Diagnosis / Indication: bacteremia  Interventional Radiology Attending Physician:   Ordering Attending Physician: lisker  Pertinent medical history:   Pertinent labs:   Patient and Family aware? Yes       Attending / Resident / NP / PA   Print Sign Sarika Sims NP  Contact #: [phone]

## 2017-11-04 NOTE — PROGRESS NOTE ADULT - PROBLEM SELECTOR PLAN 2
- likely multifactorial 2/2 PNA (completed treatment) and underlying lung cancer  - oxygenation stable on 2L NC  - will try on room air today

## 2017-11-04 NOTE — PROGRESS NOTE ADULT - SUBJECTIVE AND OBJECTIVE BOX
_________________________________________________________________________________________  ========>>  M E D I C A L   A T T E N D I N G    F O L L O W  U P  N O T E  <<=========  -----------------------------------------------------------------------------------------------------    - Patient seen and examined by me approximately thirty minutes ago.  - In summary, patient is a 86y year old woman who originally presented with UTI / pyelonephritis, developed Bacteremia, then had acute respiratory failure, post Bipap, and now on High flow out of the ICU.  - Patient today overall doing ok, comfortable, eating OK.  doing well off O2 all together!! eating well, ambulating..    ==================>> MEDICATIONS <<====================    ALBUTerol/ipratropium for Nebulization 3 milliLiter(s) Nebulizer every 6 hours  aspirin enteric coated 81 milliGRAM(s) Oral daily  fenofibrate Tablet 145 milliGRAM(s) Oral daily  heparin  Injectable 5000 Unit(s) SubCutaneous every 8 hours  insulin glargine Injectable (LANTUS) 5 Unit(s) SubCutaneous at bedtime  insulin lispro (HumaLOG) corrective regimen sliding scale   SubCutaneous three times a day before meals  insulin lispro (HumaLOG) corrective regimen sliding scale   SubCutaneous at bedtime  insulin lispro Injectable (HumaLOG) 5 Unit(s) SubCutaneous three times a day before meals  metoprolol     tartrate 50 milliGRAM(s) Oral every 12 hours  NIFEdipine XL 30 milliGRAM(s) Oral daily  NIFEdipine XL 60 milliGRAM(s) Oral at bedtime  pantoprazole    Tablet 40 milliGRAM(s) Oral before breakfast  prednisoLONE acetate 1% Suspension 1 Drop(s) Both EYES three times a day  sodium chloride 0.65% Nasal 1 Spray(s) Both Nostrils three times a day    MEDICATIONS  (PRN):  acetaminophen   Tablet 650 milliGRAM(s) Oral every 6 hours PRN For Temp greater than 38 C (100.4 F)  acetaminophen   Tablet. 650 milliGRAM(s) Oral every 6 hours PRN mild to moderate pain  melatonin 3 milliGRAM(s) Oral at bedtime PRN Sleep    ==================>> REVIEW OF SYSTEM <<=================    GEN: no fever, no chills, no pain  RESP: no SOB now cough, no sputum  CVS: no chest pain, no palpitations, no edema  GI: no abdominal pain, no nausea, no constipation, no diarrhea  : no dysuria, no frequency, no hematuria  Neuro: no headache, no dizziness  Derm : no itching, no rash    ==================>> VITAL SIGNS <<==================    T(C): 36.6 (11-04-17 @ 14:38), Max: 36.8 (11-03-17 @ 22:42)  HR: 84 (11-04-17 @ 16:05) (71 - 94)  BP: 139/68 (11-04-17 @ 14:38) (133/75 - 148/78)  RR: 16 (11-04-17 @ 14:38) (16 - 20)  SpO2: 94% (11-04-17 @ 16:05) (94% - 100%)    POCT Blood Glucose.: 306 mg/dL (04 Nov 2017 12:56)  POCT Blood Glucose.: 193 mg/dL (04 Nov 2017 08:57)  POCT Blood Glucose.: 214 mg/dL (03 Nov 2017 22:20)  POCT Blood Glucose.: 186 mg/dL (03 Nov 2017 18:13)    ==================>> PHYSICAL EXAM <<=================    GEN: A&O X 3 , NAD , comfortable in bed  HEENT: NCAT, PERRL, MMM, hearing intact  Neck: supple , no JVD  CVS: S1S2 , regular , No M/R/G appreciated  PULM: CTA B/L,  no W/R/R appreciated, decreased breath sounds  ABD.: soft. non tender, non distended,  bowel sounds present  Extrem: intact pulses , no edema   PSYCH : normal mood,  not anxious     ==================>> LAB AND IMAGING <<==================                                                 9.9    4.76  )-----------( 236      ( 03 Nov 2017 07:18 )             31.5        11-03    138  |  100  |  29<H>  ----------------------------<  152<H>  4.1   |  26  |  1.08    Ca    8.8      03 Nov 2017 07:18  Phos  3.3     11-03  Mg     1.7     11-03    ___________________________________________________________________________________  ===============>>  A S S E S S M E N T   A N D   P L A N <<===============  ------------------------------------------------------------------------------------------    **UTI/ Pyelonephritis/ ESBL bacteremia  ---continue Abx per ID >> til 11-10-17  ---ID F/U appreciated  ---monitor vitals, labs closely  ---plan for MIDD line Monday    **Acute hypoxemic respiratory failure on High flow, in pt with emphysema and lung cancer  ---monitor off O2   ---nebs PRN  ---lasix as needed  ---PT / OOB as able    **Lung cancer, with recurrence  ---pt follows with oncologist at [facility]  ---recurrence is known and pt completed radiation therapy to them recently  ---pt to follow up post discharge    **Diabetes II, stable  ---monitor finger sticks closely  ---Continue Insulin regimen as above monitor  ---Diabetic diet  ---A1c 7.5    **Hypertension h/o  ---Continue with antihypertensive medications as above  ---DASH diet   ---close monitoring of vitals    **Anemia, overall stale  ---monitor CBC    -GI/DVT Prophylaxis.  - PT, OOB  - DC planing home per RCu team  --------------------------------------------  Case discussed with pt, RN  Education given on plan of care, increase activity, deep breathing / IS, Nutrition  ___________________________  H. GEN Ferrell.  Pager: [phone]

## 2017-11-05 LAB
GLUCOSE BLDC GLUCOMTR-MCNC: 151 MG/DL — HIGH (ref 70–99)
GLUCOSE BLDC GLUCOMTR-MCNC: 218 MG/DL — HIGH (ref 70–99)
GLUCOSE BLDC GLUCOMTR-MCNC: 298 MG/DL — HIGH (ref 70–99)
GLUCOSE BLDC GLUCOMTR-MCNC: 75 MG/DL — SIGNIFICANT CHANGE UP (ref 70–99)

## 2017-11-05 PROCEDURE — 99232 SBSQ HOSP IP/OBS MODERATE 35: CPT

## 2017-11-05 RX ADMIN — Medication 650 MILLIGRAM(S): at 22:01

## 2017-11-05 RX ADMIN — Medication 5 UNIT(S): at 13:07

## 2017-11-05 RX ADMIN — Medication 60 MILLIGRAM(S): at 21:30

## 2017-11-05 RX ADMIN — Medication 2: at 18:25

## 2017-11-05 RX ADMIN — Medication 145 MILLIGRAM(S): at 13:07

## 2017-11-05 RX ADMIN — Medication 5 UNIT(S): at 18:25

## 2017-11-05 RX ADMIN — Medication 30 MILLIGRAM(S): at 05:15

## 2017-11-05 RX ADMIN — Medication 650 MILLIGRAM(S): at 21:31

## 2017-11-05 RX ADMIN — Medication 3 MILLILITER(S): at 04:16

## 2017-11-05 RX ADMIN — ERTAPENEM SODIUM 120 MILLIGRAM(S): 1 INJECTION, POWDER, LYOPHILIZED, FOR SOLUTION INTRAMUSCULAR; INTRAVENOUS at 05:16

## 2017-11-05 RX ADMIN — Medication 81 MILLIGRAM(S): at 13:07

## 2017-11-05 RX ADMIN — HEPARIN SODIUM 5000 UNIT(S): 5000 INJECTION INTRAVENOUS; SUBCUTANEOUS at 13:08

## 2017-11-05 RX ADMIN — Medication 1 SPRAY(S): at 13:07

## 2017-11-05 RX ADMIN — INSULIN GLARGINE 5 UNIT(S): 100 INJECTION, SOLUTION SUBCUTANEOUS at 21:30

## 2017-11-05 RX ADMIN — Medication 1 SPRAY(S): at 21:30

## 2017-11-05 RX ADMIN — Medication 4: at 09:17

## 2017-11-05 RX ADMIN — Medication 5 UNIT(S): at 09:17

## 2017-11-05 RX ADMIN — HEPARIN SODIUM 5000 UNIT(S): 5000 INJECTION INTRAVENOUS; SUBCUTANEOUS at 21:30

## 2017-11-05 RX ADMIN — PANTOPRAZOLE SODIUM 40 MILLIGRAM(S): 20 TABLET, DELAYED RELEASE ORAL at 05:14

## 2017-11-05 RX ADMIN — Medication 50 MILLIGRAM(S): at 17:39

## 2017-11-05 RX ADMIN — Medication 50 MILLIGRAM(S): at 05:15

## 2017-11-05 RX ADMIN — Medication 6: at 13:06

## 2017-11-05 RX ADMIN — Medication 3 MILLILITER(S): at 16:08

## 2017-11-05 RX ADMIN — Medication 3 MILLIGRAM(S): at 21:30

## 2017-11-05 RX ADMIN — HEPARIN SODIUM 5000 UNIT(S): 5000 INJECTION INTRAVENOUS; SUBCUTANEOUS at 05:11

## 2017-11-05 RX ADMIN — Medication 3 MILLILITER(S): at 21:19

## 2017-11-05 RX ADMIN — Medication 3 MILLILITER(S): at 10:49

## 2017-11-05 RX ADMIN — Medication 1 SPRAY(S): at 05:13

## 2017-11-05 NOTE — PROGRESS NOTE ADULT - PROBLEM SELECTOR PLAN 1
- ID note appreciated  - started ertapenem q24  through 11/10  - family amenable to home infusions, will need PICC, scheduled for Monday - ID note appreciated  - ertapenem q24  through 11/10  - discharge planning for infusion care in progress

## 2017-11-05 NOTE — PROGRESS NOTE ADULT - ASSESSMENT
87 y/o F PMH Lung CA (unknown stage or type, Dx 2010 s/p L VATS, wedge resection, RT in July), HTN, DM2  recent dx of emphysema, former smoker, polymyalgia rheumatica (chronic prednisone 2.5 QD),  a/w N/V and dysuria 2/2 pyelonephritis c/b ESBL E coli bacteremia with course c/b hypoxia requiring CPAP and high flow NC. Now weaned to 2L NC, tolerating. Pending PICC. 87 y/o F PMH Lung CA (unknown stage or type, Dx 2010 s/p L VATS, wedge resection, RT in July), HTN, DM2  recent dx of emphysema, former smoker, polymyalgia rheumatica (chronic prednisone 2.5 QD),  a/w N/V and dysuria 2/2 pyelonephritis c/b ESBL E coli bacteremia with course c/b hypoxia requiring CPAP and high flow NC. Now weaned to 2L NC, tolerating. Will need abx until 11/10, discharge planning in progress

## 2017-11-05 NOTE — PROGRESS NOTE ADULT - SUBJECTIVE AND OBJECTIVE BOX
_________________________________________________________________________________________  ========>>  M E D I C A L   A T T E N D I N G    F O L L O W  U P  N O T E  <<=========  -----------------------------------------------------------------------------------------------------    - Patient seen and examined by me approximately thirty minutes ago.  - In summary, patient is a 86y year old woman who originally presented with UTI / pyelonephritis, developed Bacteremia, then had acute respiratory failure, post Bipap, and now on High flow out of the ICU.  - Patient today overall doing ok, comfortable, eating OK.  doing well off O2 all together!! eating well, ambulating..    ==================>> MEDICATIONS <<====================    ALBUTerol/ipratropium for Nebulization 3 milliLiter(s) Nebulizer every 6 hours  aspirin enteric coated 81 milliGRAM(s) Oral daily  fenofibrate Tablet 145 milliGRAM(s) Oral daily  heparin  Injectable 5000 Unit(s) SubCutaneous every 8 hours  insulin glargine Injectable (LANTUS) 5 Unit(s) SubCutaneous at bedtime  insulin lispro (HumaLOG) corrective regimen sliding scale   SubCutaneous three times a day before meals  insulin lispro (HumaLOG) corrective regimen sliding scale   SubCutaneous at bedtime  insulin lispro Injectable (HumaLOG) 5 Unit(s) SubCutaneous three times a day before meals  metoprolol     tartrate 50 milliGRAM(s) Oral every 12 hours  NIFEdipine XL 30 milliGRAM(s) Oral daily  NIFEdipine XL 60 milliGRAM(s) Oral at bedtime  pantoprazole    Tablet 40 milliGRAM(s) Oral before breakfast  prednisoLONE acetate 1% Suspension 1 Drop(s) Both EYES three times a day  sodium chloride 0.65% Nasal 1 Spray(s) Both Nostrils three times a day    MEDICATIONS  (PRN):  acetaminophen   Tablet 650 milliGRAM(s) Oral every 6 hours PRN For Temp greater than 38 C (100.4 F)  acetaminophen   Tablet. 650 milliGRAM(s) Oral every 6 hours PRN mild to moderate pain  melatonin 3 milliGRAM(s) Oral at bedtime PRN Sleep    ==================>> REVIEW OF SYSTEM <<=================    GEN: no fever, no chills, no pain  RESP: no SOB now cough, no sputum  CVS: no chest pain, no palpitations, no edema  GI: no abdominal pain, no nausea, no constipation, no diarrhea  : no dysuria, no frequency, no hematuria  Neuro: no headache, no dizziness  Derm : no itching, no rash    ==================>> VITAL SIGNS <<==================    T(C): 36.6 (11-05-17 @ 09:58), Max: 36.7 (11-05-17 @ 01:49)  HR: 70 (11-05-17 @ 09:58) (70 - 96)  BP: 120/61 (11-05-17 @ 09:58) (120/61 - 146/71)  RR: 18 (11-05-17 @ 09:58) (16 - 18)  SpO2: 96% (11-05-17 @ 09:58) (94% - 100%)    POCT Blood Glucose.: 218 mg/dL (05 Nov 2017 09:13)  POCT Blood Glucose.: 95 mg/dL (04 Nov 2017 21:28)  POCT Blood Glucose.: 258 mg/dL (04 Nov 2017 18:04)  POCT Blood Glucose.: 306 mg/dL (04 Nov 2017 12:56)    ==================>> PHYSICAL EXAM <<=================    GEN: A&O X 3 , NAD , comfortable in chair  HEENT: NCAT, PERRL, MMM, hearing intact  Neck: supple , no JVD  CVS: S1S2 , regular , No M/R/G appreciated  PULM: CTA B/L,  no W/R/R appreciated, decreased breath sounds at bases  ABD.: soft. non tender, non distended,  bowel sounds present  Extrem: intact pulses , no edema   PSYCH : normal mood,  not anxious     ==================>> LAB AND IMAGING <<==================                                    no labs today  ___________________________________________________________________________________  ===============>>  A S S E S S M E N T   A N D   P L A N <<===============  ------------------------------------------------------------------------------------------    **UTI/ Pyelonephritis/ ESBL bacteremia  ---continue Abx per ID >> til 11-10-17  ---ID F/U appreciated  ---monitor vitals, labs closely  ---plan for MIDD line tomorrow and Discharge planing    **Acute hypoxemic respiratory failure resolved  ---monitor off O2   ---nebs PRN  ---lasix as needed  ---PT / OOB as able    **Lung cancer, with recurrence  ---pt follows with oncologist at [facility]  ---recurrence is known and pt completed radiation therapy to them recently  ---pt to follow up post discharge    **Diabetes II, stable  ---monitor finger sticks closely  ---Continue Insulin regimen as above monitor    **Hypertension h/o  ---Continue with antihypertensive medications as above  ---DASH diet   ---close monitoring of vitals    **Anemia, overall stale  ---monitor CBC    -GI/DVT Prophylaxis.  - PT, OOB  - DC planing home per RCu team  --------------------------------------------  Case discussed with pt, RN  Education given on plan of care, increase activity, deep breathing / IS, Nutrition  ___________________________  H. GEN Ferrell.  Pager: [phone]

## 2017-11-05 NOTE — PROGRESS NOTE ADULT - SUBJECTIVE AND OBJECTIVE BOX
CHIEF COMPLAINT:    Interval Events:    REVIEW OF SYSTEMS:  Constitutional:   Eyes:  ENT:  CV:  Resp:  GI:  :  MSK:  Integumentary:  Neurological:  Psychiatric:  Endocrine:  Hematologic/Lymphatic:  Allergic/Immunologic:  [ ] All other systems negative  [ ] Unable to assess ROS because ________    OBJECTIVE:  ICU Vital Signs Last 24 Hrs  T(C): 36.6 (05 Nov 2017 05:08), Max: 36.7 (04 Nov 2017 10:12)  T(F): 97.8 (05 Nov 2017 05:08), Max: 98 (04 Nov 2017 10:12)  HR: 96 (05 Nov 2017 05:08) (71 - 96)  BP: 141/67 (05 Nov 2017 05:08) (139/68 - 146/71)  BP(mean): --  ABP: --  ABP(mean): --  RR: 18 (05 Nov 2017 05:08) (16 - 18)  SpO2: 94% (05 Nov 2017 05:08) (94% - 100%)        CAPILLARY BLOOD GLUCOSE      POCT Blood Glucose.: 95 mg/dL (04 Nov 2017 21:28)        HOSPITAL MEDICATIONS:  MEDICATIONS  (STANDING):  ALBUTerol/ipratropium for Nebulization 3 milliLiter(s) Nebulizer every 6 hours  aspirin enteric coated 81 milliGRAM(s) Oral daily  fenofibrate Tablet 145 milliGRAM(s) Oral daily  heparin  Injectable 5000 Unit(s) SubCutaneous every 8 hours  insulin glargine Injectable (LANTUS) 5 Unit(s) SubCutaneous at bedtime  insulin lispro (HumaLOG) corrective regimen sliding scale   SubCutaneous three times a day before meals  insulin lispro (HumaLOG) corrective regimen sliding scale   SubCutaneous at bedtime  insulin lispro Injectable (HumaLOG) 5 Unit(s) SubCutaneous three times a day before meals  metoprolol     tartrate 50 milliGRAM(s) Oral every 12 hours  NIFEdipine XL 30 milliGRAM(s) Oral daily  NIFEdipine XL 60 milliGRAM(s) Oral at bedtime  pantoprazole    Tablet 40 milliGRAM(s) Oral before breakfast  prednisoLONE acetate 1% Suspension 1 Drop(s) Both EYES three times a day  sodium chloride 0.65% Nasal 1 Spray(s) Both Nostrils three times a day    MEDICATIONS  (PRN):  acetaminophen   Tablet 650 milliGRAM(s) Oral every 6 hours PRN For Temp greater than 38 C (100.4 F)  acetaminophen   Tablet. 650 milliGRAM(s) Oral every 6 hours PRN mild to moderate pain  melatonin 3 milliGRAM(s) Oral at bedtime PRN Sleep      LABS:                    MICROBIOLOGY:     RADIOLOGY:  [ ] Reviewed and interpreted by me    PULMONARY FUNCTION TESTS:    EKG: CHIEF COMPLAINT: no complaints    Interval Events: stable overnight    REVIEW OF SYSTEMS:  CV: denies  Resp: denies  [x] All other systems negative      OBJECTIVE:  ICU Vital Signs Last 24 Hrs  T(C): 36.6 (05 Nov 2017 05:08), Max: 36.7 (04 Nov 2017 10:12)  T(F): 97.8 (05 Nov 2017 05:08), Max: 98 (04 Nov 2017 10:12)  HR: 96 (05 Nov 2017 05:08) (71 - 96)  BP: 141/67 (05 Nov 2017 05:08) (139/68 - 146/71)  RR: 18 (05 Nov 2017 05:08) (16 - 18)  SpO2: 94% (05 Nov 2017 05:08) (94% - 100%)      POCT Blood Glucose.: 95 mg/dL (04 Nov 2017 21:28)        HOSPITAL MEDICATIONS:  MEDICATIONS  (STANDING):  ALBUTerol/ipratropium for Nebulization 3 milliLiter(s) Nebulizer every 6 hours  aspirin enteric coated 81 milliGRAM(s) Oral daily  fenofibrate Tablet 145 milliGRAM(s) Oral daily  heparin  Injectable 5000 Unit(s) SubCutaneous every 8 hours  insulin glargine Injectable (LANTUS) 5 Unit(s) SubCutaneous at bedtime  insulin lispro (HumaLOG) corrective regimen sliding scale   SubCutaneous three times a day before meals  insulin lispro (HumaLOG) corrective regimen sliding scale   SubCutaneous at bedtime  insulin lispro Injectable (HumaLOG) 5 Unit(s) SubCutaneous three times a day before meals  metoprolol     tartrate 50 milliGRAM(s) Oral every 12 hours  NIFEdipine XL 30 milliGRAM(s) Oral daily  NIFEdipine XL 60 milliGRAM(s) Oral at bedtime  pantoprazole    Tablet 40 milliGRAM(s) Oral before breakfast  prednisoLONE acetate 1% Suspension 1 Drop(s) Both EYES three times a day  sodium chloride 0.65% Nasal 1 Spray(s) Both Nostrils three times a day    MEDICATIONS  (PRN):  acetaminophen   Tablet 650 milliGRAM(s) Oral every 6 hours PRN For Temp greater than 38 C (100.4 F)  acetaminophen   Tablet. 650 milliGRAM(s) Oral every 6 hours PRN mild to moderate pain  melatonin 3 milliGRAM(s) Oral at bedtime PRN Sleep

## 2017-11-05 NOTE — PROGRESS NOTE ADULT - PROBLEM SELECTOR PLAN 2
- likely multifactorial 2/2 PNA (completed treatment) and underlying lung cancer  - oxygenation stable on 2L NC  - will try on room air today - likely multifactorial 2/2 PNA (completed treatment) and underlying lung cancer  - O2 sat stable on room air

## 2017-11-05 NOTE — PROGRESS NOTE ADULT - PROBLEM SELECTOR PLAN 6
- a1 c 7.5  - on oral medications at home  - hyperglycemia likely 2/2 increased prednisone dose  - continue lantus and premeals for now  - monitor FSs, improved - a1 c 7.5  - on oral medications at home  - hyperglycemia likely 2/2 increased prednisone dose  - continue lantus and premeals   - monitor FSs, improved

## 2017-11-06 LAB
BUN SERPL-MCNC: 30 MG/DL — HIGH (ref 7–23)
CALCIUM SERPL-MCNC: 8.4 MG/DL — SIGNIFICANT CHANGE UP (ref 8.4–10.5)
CHLORIDE SERPL-SCNC: 102 MMOL/L — SIGNIFICANT CHANGE UP (ref 98–107)
CO2 SERPL-SCNC: 25 MMOL/L — SIGNIFICANT CHANGE UP (ref 22–31)
CREAT SERPL-MCNC: 1.21 MG/DL — SIGNIFICANT CHANGE UP (ref 0.5–1.3)
GLUCOSE BLDC GLUCOMTR-MCNC: 209 MG/DL — HIGH (ref 70–99)
GLUCOSE BLDC GLUCOMTR-MCNC: 214 MG/DL — HIGH (ref 70–99)
GLUCOSE BLDC GLUCOMTR-MCNC: 267 MG/DL — HIGH (ref 70–99)
GLUCOSE BLDC GLUCOMTR-MCNC: 63 MG/DL — LOW (ref 70–99)
GLUCOSE SERPL-MCNC: 114 MG/DL — HIGH (ref 70–99)
HCT VFR BLD CALC: 28.3 % — LOW (ref 34.5–45)
HGB BLD-MCNC: 8.9 G/DL — LOW (ref 11.5–15.5)
MAGNESIUM SERPL-MCNC: 1.7 MG/DL — SIGNIFICANT CHANGE UP (ref 1.6–2.6)
MCHC RBC-ENTMCNC: 27.4 PG — SIGNIFICANT CHANGE UP (ref 27–34)
MCHC RBC-ENTMCNC: 31.4 % — LOW (ref 32–36)
MCV RBC AUTO: 87.1 FL — SIGNIFICANT CHANGE UP (ref 80–100)
NRBC # FLD: 0 — SIGNIFICANT CHANGE UP
PHOSPHATE SERPL-MCNC: 3.3 MG/DL — SIGNIFICANT CHANGE UP (ref 2.5–4.5)
PLATELET # BLD AUTO: 268 K/UL — SIGNIFICANT CHANGE UP (ref 150–400)
PMV BLD: 11.1 FL — SIGNIFICANT CHANGE UP (ref 7–13)
POTASSIUM SERPL-MCNC: 4.1 MMOL/L — SIGNIFICANT CHANGE UP (ref 3.5–5.3)
POTASSIUM SERPL-SCNC: 4.1 MMOL/L — SIGNIFICANT CHANGE UP (ref 3.5–5.3)
RBC # BLD: 3.25 M/UL — LOW (ref 3.8–5.2)
RBC # FLD: 14.6 % — HIGH (ref 10.3–14.5)
SODIUM SERPL-SCNC: 140 MMOL/L — SIGNIFICANT CHANGE UP (ref 135–145)
WBC # BLD: 3.88 K/UL — SIGNIFICANT CHANGE UP (ref 3.8–10.5)
WBC # FLD AUTO: 3.88 K/UL — SIGNIFICANT CHANGE UP (ref 3.8–10.5)

## 2017-11-06 PROCEDURE — 99233 SBSQ HOSP IP/OBS HIGH 50: CPT | Mod: GC

## 2017-11-06 RX ORDER — ERTAPENEM SODIUM 1 G/1
1 INJECTION, POWDER, LYOPHILIZED, FOR SOLUTION INTRAMUSCULAR; INTRAVENOUS
Qty: 4 | Refills: 0 | OUTPATIENT
Start: 2017-11-06 | End: 2017-11-10

## 2017-11-06 RX ORDER — SODIUM CHLORIDE 9 MG/ML
10 INJECTION INTRAMUSCULAR; INTRAVENOUS; SUBCUTANEOUS
Qty: 10 | Refills: 0 | OUTPATIENT
Start: 2017-11-06 | End: 2017-11-10

## 2017-11-06 RX ADMIN — Medication 50 MILLIGRAM(S): at 05:21

## 2017-11-06 RX ADMIN — HEPARIN SODIUM 5000 UNIT(S): 5000 INJECTION INTRAVENOUS; SUBCUTANEOUS at 13:26

## 2017-11-06 RX ADMIN — Medication 3 MILLILITER(S): at 22:10

## 2017-11-06 RX ADMIN — Medication 5 UNIT(S): at 13:26

## 2017-11-06 RX ADMIN — Medication 50 MILLIGRAM(S): at 18:19

## 2017-11-06 RX ADMIN — INSULIN GLARGINE 5 UNIT(S): 100 INJECTION, SOLUTION SUBCUTANEOUS at 22:30

## 2017-11-06 RX ADMIN — ERTAPENEM SODIUM 120 MILLIGRAM(S): 1 INJECTION, POWDER, LYOPHILIZED, FOR SOLUTION INTRAMUSCULAR; INTRAVENOUS at 05:24

## 2017-11-06 RX ADMIN — Medication 6: at 13:26

## 2017-11-06 RX ADMIN — Medication 60 MILLIGRAM(S): at 22:03

## 2017-11-06 RX ADMIN — Medication 81 MILLIGRAM(S): at 09:59

## 2017-11-06 RX ADMIN — Medication 3 MILLILITER(S): at 10:03

## 2017-11-06 RX ADMIN — Medication 30 MILLIGRAM(S): at 05:21

## 2017-11-06 RX ADMIN — Medication 4: at 18:18

## 2017-11-06 RX ADMIN — Medication 145 MILLIGRAM(S): at 09:59

## 2017-11-06 RX ADMIN — Medication 650 MILLIGRAM(S): at 22:01

## 2017-11-06 RX ADMIN — Medication 20 MILLIGRAM(S): at 05:21

## 2017-11-06 RX ADMIN — Medication 1 SPRAY(S): at 05:21

## 2017-11-06 RX ADMIN — HEPARIN SODIUM 5000 UNIT(S): 5000 INJECTION INTRAVENOUS; SUBCUTANEOUS at 05:21

## 2017-11-06 RX ADMIN — Medication 5 UNIT(S): at 18:18

## 2017-11-06 RX ADMIN — Medication 1 SPRAY(S): at 22:03

## 2017-11-06 RX ADMIN — Medication 3 MILLILITER(S): at 04:43

## 2017-11-06 RX ADMIN — HEPARIN SODIUM 5000 UNIT(S): 5000 INJECTION INTRAVENOUS; SUBCUTANEOUS at 22:01

## 2017-11-06 RX ADMIN — Medication 3 MILLIGRAM(S): at 22:03

## 2017-11-06 RX ADMIN — Medication 650 MILLIGRAM(S): at 22:31

## 2017-11-06 RX ADMIN — Medication 4: at 09:48

## 2017-11-06 RX ADMIN — PANTOPRAZOLE SODIUM 40 MILLIGRAM(S): 20 TABLET, DELAYED RELEASE ORAL at 05:21

## 2017-11-06 RX ADMIN — Medication 3 MILLILITER(S): at 15:44

## 2017-11-06 RX ADMIN — Medication 5 UNIT(S): at 09:49

## 2017-11-06 RX ADMIN — Medication 1 SPRAY(S): at 13:27

## 2017-11-06 NOTE — PROGRESS NOTE ADULT - SUBJECTIVE AND OBJECTIVE BOX
CHIEF COMPLAINT:    Interval Events:    REVIEW OF SYSTEMS:  Constitutional:   Eyes:  ENT:  CV:  Resp:  GI:  :  MSK:  Integumentary:  Neurological:  Psychiatric:  Endocrine:  Hematologic/Lymphatic:  Allergic/Immunologic:  [ ] All other systems negative  [ ] Unable to assess ROS because ________    OBJECTIVE:  ICU Vital Signs Last 24 Hrs  T(C): 36.3 (06 Nov 2017 05:18), Max: 36.6 (05 Nov 2017 09:58)  T(F): 97.4 (06 Nov 2017 05:18), Max: 97.8 (05 Nov 2017 09:58)  HR: 78 (06 Nov 2017 05:18) (70 - 88)  BP: 149/79 (06 Nov 2017 05:18) (120/61 - 149/79)  BP(mean): --  ABP: --  ABP(mean): --  RR: 18 (06 Nov 2017 05:18) (17 - 18)  SpO2: 98% (06 Nov 2017 05:18) (96% - 99%)        CAPILLARY BLOOD GLUCOSE      POCT Blood Glucose.: 75 mg/dL (05 Nov 2017 21:13)      PHYSICAL EXAM:  General:   HEENT:   Lymph Nodes:  Neck:   Respiratory:   Cardiovascular:   Abdomen:   Extremities:   Skin:   Neurological:  Psychiatry:    HOSPITAL MEDICATIONS:  MEDICATIONS  (STANDING):  ALBUTerol/ipratropium for Nebulization 3 milliLiter(s) Nebulizer every 6 hours  aspirin enteric coated 81 milliGRAM(s) Oral daily  fenofibrate Tablet 145 milliGRAM(s) Oral daily  heparin  Injectable 5000 Unit(s) SubCutaneous every 8 hours  insulin glargine Injectable (LANTUS) 5 Unit(s) SubCutaneous at bedtime  insulin lispro (HumaLOG) corrective regimen sliding scale   SubCutaneous three times a day before meals  insulin lispro (HumaLOG) corrective regimen sliding scale   SubCutaneous at bedtime  insulin lispro Injectable (HumaLOG) 5 Unit(s) SubCutaneous three times a day before meals  metoprolol     tartrate 50 milliGRAM(s) Oral every 12 hours  NIFEdipine XL 30 milliGRAM(s) Oral daily  NIFEdipine XL 60 milliGRAM(s) Oral at bedtime  pantoprazole    Tablet 40 milliGRAM(s) Oral before breakfast  prednisoLONE acetate 1% Suspension 1 Drop(s) Both EYES three times a day  sodium chloride 0.65% Nasal 1 Spray(s) Both Nostrils three times a day    MEDICATIONS  (PRN):  acetaminophen   Tablet 650 milliGRAM(s) Oral every 6 hours PRN For Temp greater than 38 C (100.4 F)  acetaminophen   Tablet. 650 milliGRAM(s) Oral every 6 hours PRN mild to moderate pain  melatonin 3 milliGRAM(s) Oral at bedtime PRN Sleep      LABS:                        8.9    3.88  )-----------( 268      ( 06 Nov 2017 05:50 )             28.3                     MICROBIOLOGY:         RADIOLOGY:  [ ] Reviewed and interpreted by me    PULMONARY FUNCTION TESTS:    EKG: CHIEF COMPLAINT: SOB    Interval Events: No events overnight    REVIEW OF SYSTEMS:  Constitutional: WNL  Eyes:WNL  ENT:WNL  CV:WNL  Resp: no cough,no SOB  GI: BS+ in 4 quad  : WNL  MSK: intack    Hematologic/Lymphatic:  Allergic/Immunologic:  [ x] All other systems negative  [ ] Unable to assess ROS because ________    OBJECTIVE:  ICU Vital Signs Last 24 Hrs  T(C): 36.3 (06 Nov 2017 05:18), Max: 36.6 (05 Nov 2017 09:58)  T(F): 97.4 (06 Nov 2017 05:18), Max: 97.8 (05 Nov 2017 09:58)  HR: 78 (06 Nov 2017 05:18) (70 - 88)  BP: 149/79 (06 Nov 2017 05:18) (120/61 - 149/79)  BP(mean): --  ABP: --  ABP(mean): --  RR: 18 (06 Nov 2017 05:18) (17 - 18)  SpO2: 98% (06 Nov 2017 05:18) (96% - 99%)        CAPILLARY BLOOD GLUCOSE      POCT Blood Glucose.: 75 mg/dL (05 Nov 2017 21:13)          HOSPITAL MEDICATIONS:  MEDICATIONS  (STANDING):  ALBUTerol/ipratropium for Nebulization 3 milliLiter(s) Nebulizer every 6 hours  aspirin enteric coated 81 milliGRAM(s) Oral daily  fenofibrate Tablet 145 milliGRAM(s) Oral daily  heparin  Injectable 5000 Unit(s) SubCutaneous every 8 hours  insulin glargine Injectable (LANTUS) 5 Unit(s) SubCutaneous at bedtime  insulin lispro (HumaLOG) corrective regimen sliding scale   SubCutaneous three times a day before meals  insulin lispro (HumaLOG) corrective regimen sliding scale   SubCutaneous at bedtime  insulin lispro Injectable (HumaLOG) 5 Unit(s) SubCutaneous three times a day before meals  metoprolol     tartrate 50 milliGRAM(s) Oral every 12 hours  NIFEdipine XL 30 milliGRAM(s) Oral daily  NIFEdipine XL 60 milliGRAM(s) Oral at bedtime  pantoprazole    Tablet 40 milliGRAM(s) Oral before breakfast  prednisoLONE acetate 1% Suspension 1 Drop(s) Both EYES three times a day  sodium chloride 0.65% Nasal 1 Spray(s) Both Nostrils three times a day    MEDICATIONS  (PRN):  acetaminophen   Tablet 650 milliGRAM(s) Oral every 6 hours PRN For Temp greater than 38 C (100.4 F)  acetaminophen   Tablet. 650 milliGRAM(s) Oral every 6 hours PRN mild to moderate pain  melatonin 3 milliGRAM(s) Oral at bedtime PRN Sleep      LABS:                        8.9    3.88  )-----------( 268      ( 06 Nov 2017 05:50 )             28.3                     MICROBIOLOGY:         RADIOLOGY:  [ ] Reviewed and interpreted by me    PULMONARY FUNCTION TESTS:    EKG:

## 2017-11-06 NOTE — PROGRESS NOTE ADULT - ASSESSMENT
85 y/o F PMH Lung CA (unknown stage or type, Dx 2010 s/p L VATS, wedge resection, RT in July), HTN, DM2  recent dx of emphysema, former smoker, polymyalgia rheumatica (chronic prednisone 2.5 QD),  a/w N/V and dysuria 2/2 pyelonephritis c/b ESBL E coli bacteremia with course c/b hypoxia requiring CPAP and high flow NC. Now weaned to 2L NC, tolerating. Will need abx until 11/10, discharge planning in progress

## 2017-11-06 NOTE — PROGRESS NOTE ADULT - PROBLEM SELECTOR PLAN 6
- a1 c 7.5  - on oral medications at home  - hyperglycemia likely 2/2 increased prednisone dose  - continue lantus and premeals   - monitor FSs, improved

## 2017-11-06 NOTE — PROGRESS NOTE ADULT - PROBLEM SELECTOR PLAN 2
- likely multifactorial 2/2 PNA (completed treatment) and underlying lung cancer  - O2 sat stable on room air  - NO SOB

## 2017-11-06 NOTE — PROGRESS NOTE ADULT - PROBLEM SELECTOR PLAN 1
- ID note appreciated  - ertapenem q24  through 11/10  - discharge planning for infusion care in progress  - poss PICC line placement

## 2017-11-06 NOTE — PROGRESS NOTE ADULT - SUBJECTIVE AND OBJECTIVE BOX
_________________________________________________________________________________________  ========>>  M E D I C A L   A T T E N D I N G    F O L L O W  U P  N O T E  <<=========  -----------------------------------------------------------------------------------------------------    - Patient seen and examined by me approximately thirty minutes ago.  - In summary, patient is a 86y year old woman who originally presented with UTI / pyelonephritis, developed Bacteremia, then had acute respiratory failure, post Bipap, and now on High flow out of the ICU.  - Patient today overall doing ok, comfortable, eating OK.  doing well off O2 all together!! eating well, ambulating..    ==================>> MEDICATIONS <<====================    ALBUTerol/ipratropium for Nebulization 3 milliLiter(s) Nebulizer every 6 hours  aspirin enteric coated 81 milliGRAM(s) Oral daily  fenofibrate Tablet 145 milliGRAM(s) Oral daily  heparin  Injectable 5000 Unit(s) SubCutaneous every 8 hours  insulin glargine Injectable (LANTUS) 5 Unit(s) SubCutaneous at bedtime  insulin lispro (HumaLOG) corrective regimen sliding scale   SubCutaneous three times a day before meals  insulin lispro (HumaLOG) corrective regimen sliding scale   SubCutaneous at bedtime  insulin lispro Injectable (HumaLOG) 5 Unit(s) SubCutaneous three times a day before meals  metoprolol     tartrate 50 milliGRAM(s) Oral every 12 hours  NIFEdipine XL 30 milliGRAM(s) Oral daily  NIFEdipine XL 60 milliGRAM(s) Oral at bedtime  pantoprazole    Tablet 40 milliGRAM(s) Oral before breakfast  prednisoLONE acetate 1% Suspension 1 Drop(s) Both EYES three times a day  sodium chloride 0.65% Nasal 1 Spray(s) Both Nostrils three times a day    MEDICATIONS  (PRN):  acetaminophen   Tablet 650 milliGRAM(s) Oral every 6 hours PRN For Temp greater than 38 C (100.4 F)  acetaminophen   Tablet. 650 milliGRAM(s) Oral every 6 hours PRN mild to moderate pain  melatonin 3 milliGRAM(s) Oral at bedtime PRN Sleep    ==================>> REVIEW OF SYSTEM <<=================    GEN: no fever, no chills, no pain  RESP: no SOB now cough, no sputum  CVS: no chest pain, no palpitations, no edema  GI: no abdominal pain, no nausea, no constipation, no diarrhea  : no dysuria, no frequency, no hematuria  Neuro: no headache, no dizziness  Derm : no itching, no rash    ==================>> VITAL SIGNS <<==================    Vital Signs Last 24 Hrs  T(C): 36.3 (11-06-17 @ 05:18)  T(F): 97.4 (11-06-17 @ 05:18), Max: 97.8 (11-05-17 @ 13:12)  HR: 71 (11-06-17 @ 10:04) (71 - 88)  BP: 149/79 (11-06-17 @ 05:18)  BP(mean): --  RR: 18 (11-06-17 @ 05:18) (17 - 18)  SpO2: 100% (11-06-17 @ 10:04) (97% - 100%)    CAPILLARY BLOOD GLUCOSE      POCT Blood Glucose.: 267 mg/dL (06 Nov 2017 12:48)  POCT Blood Glucose.: 209 mg/dL (06 Nov 2017 09:28)  POCT Blood Glucose.: 75 mg/dL (05 Nov 2017 21:13)  POCT Blood Glucose.: 151 mg/dL (05 Nov 2017 17:55)  POCT Blood Glucose.: 298 mg/dL (05 Nov 2017 12:58)    ==================>> PHYSICAL EXAM <<=================    GEN: A&O X 3 , NAD , comfortable in chair  HEENT: NCAT, PERRL, MMM, hearing intact  Neck: supple , no JVD  CVS: S1S2 , regular , No M/R/G appreciated  PULM: CTA B/L,  no W/R/R appreciated, decreased breath sounds at bases  ABD.: soft. non tender, non distended,  bowel sounds present  Extrem: intact pulses , no edema   PSYCH : normal mood,  not anxious     ==================>> LAB AND IMAGING <<==================                                                8.9    3.88  )-----------( 268      ( 06 Nov 2017 05:50 )             28.3        11-06    140  |  102  |  30<H>  ----------------------------<  114<H>  4.1   |  25  |  1.21    Ca    8.4      06 Nov 2017 05:50  Phos  3.3     11-06  Mg     1.7     11-06    ___________________________________________________________________________________  ===============>>  A S S E S S M E N T   A N D   P L A N <<===============  ------------------------------------------------------------------------------------------    **UTI/ Pyelonephritis/ ESBL bacteremia  ---continue Abx per ID >> til 11-10-17  ---ID F/U appreciated  ---monitor vitals, labs closely  ---plan for MIDD line and Discharge planing as being arranged by CM    **Acute hypoxemic respiratory failure resolved  ---monitor off O2   ---nebs PRN  ---lasix as needed  ---PT / OOB as able    **Lung cancer, with recurrence  ---pt follows with oncologist at [facility]  ---recurrence is known and pt completed radiation therapy to them recently  ---pt to follow up post discharge    **Diabetes II, stable  ---monitor finger sticks closely  ---Continue Insulin regimen as above monitor    **Hypertension h/o  ---Continue with antihypertensive medications as above  ---DASH diet   ---close monitoring of vitals    **Anemia, overall stale  ---monitor CBC    -GI/DVT Prophylaxis.  - PT, OOB  - DC planing home per RCu team  --------------------------------------------  Case discussed with pt, CM  Education given on plan of care, increase activity, deep breathing / IS, Nutrition  ___________________________  H. GEN Ferrell.  Pager: [phone]

## 2017-11-06 NOTE — PROGRESS NOTE ADULT - VASCULAR
detailed exam
Equal and normal pulses (carotid, femoral, dorsalis pedis)
detailed exam
detailed exam

## 2017-11-07 VITALS
SYSTOLIC BLOOD PRESSURE: 134 MMHG | RESPIRATION RATE: 17 BRPM | TEMPERATURE: 98 F | HEART RATE: 77 BPM | OXYGEN SATURATION: 97 % | DIASTOLIC BLOOD PRESSURE: 55 MMHG

## 2017-11-07 LAB
GLUCOSE BLDC GLUCOMTR-MCNC: 241 MG/DL — HIGH (ref 70–99)
GLUCOSE BLDC GLUCOMTR-MCNC: 303 MG/DL — HIGH (ref 70–99)

## 2017-11-07 PROCEDURE — 99238 HOSP IP/OBS DSCHRG MGMT 30/<: CPT

## 2017-11-07 RX ORDER — PREDNISOLONE SODIUM PHOSPHATE 1 %
1 DROPS OPHTHALMIC (EYE)
Qty: 1 | Refills: 0 | OUTPATIENT
Start: 2017-11-07 | End: 2017-11-21

## 2017-11-07 RX ORDER — SODIUM CHLORIDE 0.65 %
1 AEROSOL, SPRAY (ML) NASAL
Qty: 0 | Refills: 0 | COMMUNITY
Start: 2017-11-07

## 2017-11-07 RX ORDER — PANTOPRAZOLE SODIUM 20 MG/1
1 TABLET, DELAYED RELEASE ORAL
Qty: 30 | Refills: 0 | OUTPATIENT
Start: 2017-11-07 | End: 2017-12-07

## 2017-11-07 RX ADMIN — Medication 5 UNIT(S): at 09:39

## 2017-11-07 RX ADMIN — Medication 81 MILLIGRAM(S): at 11:29

## 2017-11-07 RX ADMIN — Medication 50 MILLIGRAM(S): at 05:38

## 2017-11-07 RX ADMIN — Medication 3 MILLILITER(S): at 04:13

## 2017-11-07 RX ADMIN — PANTOPRAZOLE SODIUM 40 MILLIGRAM(S): 20 TABLET, DELAYED RELEASE ORAL at 05:38

## 2017-11-07 RX ADMIN — Medication 1 SPRAY(S): at 05:38

## 2017-11-07 RX ADMIN — ERTAPENEM SODIUM 120 MILLIGRAM(S): 1 INJECTION, POWDER, LYOPHILIZED, FOR SOLUTION INTRAMUSCULAR; INTRAVENOUS at 06:10

## 2017-11-07 RX ADMIN — HEPARIN SODIUM 5000 UNIT(S): 5000 INJECTION INTRAVENOUS; SUBCUTANEOUS at 05:38

## 2017-11-07 RX ADMIN — Medication 3 MILLILITER(S): at 10:05

## 2017-11-07 RX ADMIN — Medication 8: at 13:01

## 2017-11-07 RX ADMIN — Medication 30 MILLIGRAM(S): at 05:38

## 2017-11-07 RX ADMIN — Medication 4: at 09:39

## 2017-11-07 RX ADMIN — Medication 20 MILLIGRAM(S): at 05:38

## 2017-11-07 RX ADMIN — Medication 5 UNIT(S): at 13:02

## 2017-11-07 RX ADMIN — Medication 1 SPRAY(S): at 13:10

## 2017-11-07 RX ADMIN — Medication 145 MILLIGRAM(S): at 11:29

## 2017-11-07 NOTE — PROGRESS NOTE ADULT - CONSTITUTIONAL
detailed exam
Well-developed, well nourished
detailed exam

## 2017-11-07 NOTE — PROGRESS NOTE ADULT - RS GEN PE MLT RESP DETAILS PC
breath sounds equal/airway patent
breath sounds equal/airway patent
airway patent/breath sounds equal
Using high flow NC
clear to auscultation bilaterally/respirations non-labored
respirations non-labored/clear to auscultation bilaterally
airway patent/breath sounds equal/rhonchi

## 2017-11-07 NOTE — PROGRESS NOTE ADULT - ENMT
No oral lesions; no gross abnormalities

## 2017-11-07 NOTE — PROGRESS NOTE ADULT - CONSTITUTIONAL DETAILS
no distress
no distress
no distress/well-developed/well-groomed
no distress
well-groomed
well-groomed/no distress
no distress

## 2017-11-07 NOTE — PROGRESS NOTE ADULT - ATTENDING COMMENTS
NSCLC, E.coli bacteremia, PNA, hypoxemia on hi flow NC, weaning as tolerated  Continue meropenem
Tapering steroids, on meropenem for E.coli bacteremia
esbl bacteremia secondary to pyelo  Pt stable for d/c home, with IV ertapenem through 11/10  f/u her PMD
87 yo woman with hx of NSCLC s/p wedge resection, with recent recurrence s/p chemo/RT admitted to the MICU with hypoxemic respiratory failure in the setting of sepsis secondary to ESBL Ecoli bacteremia and urinary tract infection, new bilateral lower lobe atelectasis, and ?COPD exacerbation. Patient is improving on HiFlow; decrease O2 as tolerated. Continue with antibiotics. Incentive spirometer. Continue with nebulizers. DVT ppx.    35 minutes critical time spent.
87 yo woman with hx of NSCLC s/p wedge resection, with recent recurrence s/p chemo/RT admitted to the MICU with hypoxemic respiratory failure in the setting of sepsis secondary to ESBL Ecoli bacteremia and urinary tract infection, new bilateral lower lobe atelectasis, and ?COPD exacerbation. Patient is improving on HiFlow; decrease O2 as tolerated. Continue with antibiotics. Incentive spirometer. Continue with nebulizers. DVT ppx.    35 minutes critical time spent.
ESBL E coli urosepsis in 87 yo woman with lung cancer. clinically improving.   Suggest: continue meropenem                 follow repest blood cultures
Agree with above.    Remains clinically well.  Plan for PICC line on Monday with plan to complete course of abx through 11/10 as per ID.
Doing well on NC -- 4L, will continue to wean  Continuing antibiotics for 14 day course for E.coli bacteremia  Will place PICC
NSCLC s/p resections, emphysema, undergoing treatment for PNA, hypoxemic respiratory failure -- weaning FIO2 from hi flow to NC
ESBL e. coli bacteremia,  completing iv abx on 11/10.  hemodyn stable, afebrile.  pt is medically stable for discharge with abx, however may not have insurance coverage for home IV abx, and pt reports she has no one to administer home IV abx. refuses rehab.
Agree with above.    Clinically well.  Plan for PICC line on Monday with plan to complete course of abx through 11/10 as per ID.
Continue Abx through the 10th  PICC and d/c next week -- home versus rehab

## 2017-11-07 NOTE — PROGRESS NOTE ADULT - PROBLEM SELECTOR PROBLEM 1
Bacteremia due to Escherichia coli

## 2017-11-07 NOTE — PROGRESS NOTE ADULT - GASTROINTESTINAL DETAILS
soft/nontender
soft/bowel sounds normal/nontender/no distention
nontender/soft/bowel sounds normal/no distention
soft/nontender

## 2017-11-07 NOTE — PROGRESS NOTE ADULT - [facility] PE MLT D E PC
regular rate and rhythm/no murmur/no rub
no rub/no murmur/regular rate and rhythm
no murmur/regular rate and rhythm/no rub
regular rate and rhythm
regular rate and rhythm/no murmur/no rub

## 2017-11-07 NOTE — PROGRESS NOTE ADULT - PROBLEM SELECTOR PLAN 2
- likely multifactorial 2/2 PNA (completed treatment) and underlying lung cancer  - O2 sat stable on room air

## 2017-11-07 NOTE — PROGRESS NOTE ADULT - MS EXT PE MLT D E PC
no cyanosis/no pedal edema/no clubbing
no pedal edema/no cyanosis/no clubbing
no cyanosis/no clubbing/no pedal edema
no cyanosis/no pedal edema/no clubbing
no cyanosis/no pedal edema/no clubbing

## 2017-11-07 NOTE — PROGRESS NOTE ADULT - NEUROLOGICAL DETAILS
responds to pain/alert and oriented x 3/responds to verbal commands
alert and oriented x 3/responds to pain/responds to verbal commands
responds to pain/responds to verbal commands/alert and oriented x 3
alert and oriented x 3
normal strength/alert and oriented x 3
alert and oriented x 3/normal strength
alert and oriented x 3/responds to pain/responds to verbal commands

## 2017-11-07 NOTE — PROGRESS NOTE ADULT - PROBLEM SELECTOR PROBLEM 6
Diabetes mellitus type 2 in nonobese

## 2017-11-07 NOTE — PROGRESS NOTE ADULT - GASTROINTESTINAL
detailed exam
Soft, non-tender, no hepatosplenomegaly, normal bowel sounds
detailed exam
detailed exam

## 2017-11-07 NOTE — PROGRESS NOTE ADULT - NS NEC GEN PE MLT EXAM PC
No bruits; no thyromegaly or nodules
No bruits; no thyromegaly or nodules
detailed exam
No bruits; no thyromegaly or nodules
No bruits; no thyromegaly or nodules
detailed exam
detailed exam
No bruits; no thyromegaly or nodules

## 2017-11-07 NOTE — PROGRESS NOTE ADULT - PROBLEM SELECTOR PROBLEM 3
Polymyalgia rheumatica

## 2017-11-07 NOTE — PROGRESS NOTE ADULT - PROBLEM SELECTOR PLAN 1
- ID note appreciated  - ertapenem q24  through 11/10  - discharge planning for infusion care in progress  - pt ok to go home with PIV to complete abx thru this week

## 2017-11-07 NOTE — PROGRESS NOTE ADULT - EXTREMITIES
detailed exam
No cyanosis, clubbing or edema
detailed exam

## 2017-11-07 NOTE — PROGRESS NOTE ADULT - SUBJECTIVE AND OBJECTIVE BOX
_________________________________________________________________________________________  ========>>  M E D I C A L   A T T E N D I N G    F O L L O W  U P  N O T E  <<=========  -----------------------------------------------------------------------------------------------------    - Patient seen and examined by me approximately thirty minutes ago.  - In summary, patient is a 86y year old woman who originally presented with UTI / pyelonephritis, developed Bacteremia, then had acute respiratory failure, post Bipap, and now on High flow out of the ICU.  - Patient today overall doing ok, comfortable, eating OK.  doing well off O2 all together!! eating well, ambulating..    ==================>> MEDICATIONS <<====================    ALBUTerol/ipratropium for Nebulization 3 milliLiter(s) Nebulizer every 6 hours  aspirin enteric coated 81 milliGRAM(s) Oral daily  fenofibrate Tablet 145 milliGRAM(s) Oral daily  heparin  Injectable 5000 Unit(s) SubCutaneous every 8 hours  insulin glargine Injectable (LANTUS) 5 Unit(s) SubCutaneous at bedtime  insulin lispro (HumaLOG) corrective regimen sliding scale   SubCutaneous three times a day before meals  insulin lispro (HumaLOG) corrective regimen sliding scale   SubCutaneous at bedtime  insulin lispro Injectable (HumaLOG) 5 Unit(s) SubCutaneous three times a day before meals  metoprolol     tartrate 50 milliGRAM(s) Oral every 12 hours  NIFEdipine XL 30 milliGRAM(s) Oral daily  NIFEdipine XL 60 milliGRAM(s) Oral at bedtime  pantoprazole    Tablet 40 milliGRAM(s) Oral before breakfast  prednisoLONE acetate 1% Suspension 1 Drop(s) Both EYES three times a day  sodium chloride 0.65% Nasal 1 Spray(s) Both Nostrils three times a day    MEDICATIONS  (PRN):  acetaminophen   Tablet 650 milliGRAM(s) Oral every 6 hours PRN For Temp greater than 38 C (100.4 F)  acetaminophen   Tablet. 650 milliGRAM(s) Oral every 6 hours PRN mild to moderate pain  melatonin 3 milliGRAM(s) Oral at bedtime PRN Sleep    ==================>> REVIEW OF SYSTEM <<=================    GEN: no fever, no chills, no pain  RESP: no SOB now cough, no sputum  CVS: no chest pain, no palpitations, no edema  GI: no abdominal pain, no nausea, no constipation, no diarrhea  : no dysuria, no frequency, no hematuria  Neuro: no headache, no dizziness  Derm : no itching, no rash    ==================>> VITAL SIGNS <<==================    Vital Signs Last 24 Hrs  T(C): 36.7 (11-07-17 @ 05:35)  T(F): 98 (11-07-17 @ 05:35), Max: 98.1 (11-06-17 @ 21:00)  HR: 67 (11-07-17 @ 10:06) (67 - 87)  BP: 140/65 (11-07-17 @ 05:35)  BP(mean): --  RR: 18 (11-07-17 @ 05:35) (17 - 19)  SpO2: 95% (11-07-17 @ 05:35) (95% - 100%)    CAPILLARY BLOOD GLUCOSE  214 (06 Nov 2017 18:17)    POCT Blood Glucose.: 241 mg/dL (07 Nov 2017 09:23)  POCT Blood Glucose.: 63 mg/dL (06 Nov 2017 22:08)  POCT Blood Glucose.: 214 mg/dL (06 Nov 2017 18:17)  POCT Blood Glucose.: 267 mg/dL (06 Nov 2017 12:48)    ==================>> PHYSICAL EXAM <<=================    GEN: A&O X 3 , NAD , comfortable in chair  HEENT: NCAT, PERRL, MMM, hearing intact  Neck: supple , no JVD  CVS: S1S2 , regular , No M/R/G appreciated  PULM: CTA B/L,  no W/R/R appreciated, decreased breath sounds at bases  ABD.: soft. non tender, non distended,  bowel sounds present  Extrem: intact pulses , no edema   PSYCH : normal mood,  not anxious     ==================>> LAB AND IMAGING <<==================                        8.9    3.88  )-----------( 268      ( 06 Nov 2017 05:50 )             28.3        11-06    140  |  102  |  30<H>  ----------------------------<  114<H>  4.1   |  25  |  1.21    Ca    8.4      06 Nov 2017 05:50  Phos  3.3     11-06  Mg     1.7     11-06      ___________________________________________________________________________________  ===============>>  A S S E S S M E N T   A N D   P L A N <<===============  ------------------------------------------------------------------------------------------    **UTI/ Pyelonephritis/ ESBL bacteremia  ---continue Abx per ID >> til 11-10-17:: home care is set up  ---ID F/U appreciated  ---monitor vitals, labs closely    **Acute hypoxemic respiratory failure resolved  ---monitor off O2   ---nebs PRN  ---lasix as needed  ---PT / OOB as able    **Lung cancer, with recurrence  ---pt follows with oncologist at [facility]  ---recurrence is known and pt completed radiation therapy to them recently  ---pt to follow up post discharge    **Diabetes II, stable  ---monitor finger sticks closely  ---Continue Insulin regimen as above monitor    **Hypertension h/o  ---Continue with antihypertensive medications as above  ---DASH diet   ---close monitoring of vitals  ---to f/u with PMD / Cardio: Dr York as outpatient    **Anemia, overall stale  ---monitor CBC    -GI/DVT Prophylaxis.  - PT, OOB  - DC planing home per RCu team  --------------------------------------------  Case discussed with pt, Dtr  Education given on plan of care, increase activity, deep breathing / IS, Nutrition  ___________________________  H. GEN Ferrell.  Pager: [phone]

## 2017-11-07 NOTE — PROGRESS NOTE ADULT - PROBLEM SELECTOR PLAN 7
- heparin SQ
- heparin SQ
- heparin SQ  - ambulates
- heparin SQ  - ambulates
- heparin SQ
heparin SC

## 2017-11-07 NOTE — PROGRESS NOTE ADULT - SUBJECTIVE AND OBJECTIVE BOX
CHIEF COMPLAINT:    Interval Events:    REVIEW OF SYSTEMS:  Constitutional:   Eyes:  ENT:  CV:  Resp:  GI:  :  MSK:  Integumentary:  Neurological:  Psychiatric:  Endocrine:  Hematologic/Lymphatic:  Allergic/Immunologic:  [ ] All other systems negative  [ ] Unable to assess ROS because ________    OBJECTIVE:  ICU Vital Signs Last 24 Hrs  T(C): 36.7 (07 Nov 2017 05:35), Max: 36.7 (06 Nov 2017 21:00)  T(F): 98 (07 Nov 2017 05:35), Max: 98.1 (06 Nov 2017 21:00)  HR: 83 (07 Nov 2017 05:35) (71 - 87)  BP: 140/65 (07 Nov 2017 05:35) (140/65 - 151/69)  BP(mean): --  ABP: --  ABP(mean): --  RR: 18 (07 Nov 2017 05:35) (17 - 19)  SpO2: 95% (07 Nov 2017 05:35) (95% - 100%)      CAPILLARY BLOOD GLUCOSE  214 (06 Nov 2017 18:17)      POCT Blood Glucose.: 63 mg/dL (06 Nov 2017 22:08)    HOSPITAL MEDICATIONS:  MEDICATIONS  (STANDING):  ALBUTerol/ipratropium for Nebulization 3 milliLiter(s) Nebulizer every 6 hours  aspirin enteric coated 81 milliGRAM(s) Oral daily  fenofibrate Tablet 145 milliGRAM(s) Oral daily  heparin  Injectable 5000 Unit(s) SubCutaneous every 8 hours  insulin glargine Injectable (LANTUS) 5 Unit(s) SubCutaneous at bedtime  insulin lispro (HumaLOG) corrective regimen sliding scale   SubCutaneous three times a day before meals  insulin lispro (HumaLOG) corrective regimen sliding scale   SubCutaneous at bedtime  insulin lispro Injectable (HumaLOG) 5 Unit(s) SubCutaneous three times a day before meals  metoprolol     tartrate 50 milliGRAM(s) Oral every 12 hours  NIFEdipine XL 30 milliGRAM(s) Oral daily  NIFEdipine XL 60 milliGRAM(s) Oral at bedtime  pantoprazole    Tablet 40 milliGRAM(s) Oral before breakfast  prednisoLONE acetate 1% Suspension 1 Drop(s) Both EYES three times a day  sodium chloride 0.65% Nasal 1 Spray(s) Both Nostrils three times a day    MEDICATIONS  (PRN):  acetaminophen   Tablet 650 milliGRAM(s) Oral every 6 hours PRN For Temp greater than 38 C (100.4 F)  acetaminophen   Tablet. 650 milliGRAM(s) Oral every 6 hours PRN mild to moderate pain  melatonin 3 milliGRAM(s) Oral at bedtime PRN Sleep      LABS:                        8.9    3.88  )-----------( 268      ( 06 Nov 2017 05:50 )             28.3     11-06    140  |  102  |  30<H>  ----------------------------<  114<H>  4.1   |  25  |  1.21    Ca    8.4      06 Nov 2017 05:50  Phos  3.3     11-06  Mg     1.7     11-06                MICROBIOLOGY:     RADIOLOGY:  [ ] Reviewed and interpreted by me    PULMONARY FUNCTION TESTS:    EKG: CHIEF COMPLAINT: Patient is a 86y old  Female who presents with a chief complaint of abd, back pain, n/v (01 Nov 2017 15:26)    Interval Events: none overnight      REVIEW OF SYSTEMS:  Constitutional: feels good today  CV: denies  Resp: denies  GI: denies  [x] All other systems negative  [ ] Unable to assess ROS because ________      OBJECTIVE:  ICU Vital Signs Last 24 Hrs  T(C): 36.7 (07 Nov 2017 05:35), Max: 36.7 (06 Nov 2017 21:00)  T(F): 98 (07 Nov 2017 05:35), Max: 98.1 (06 Nov 2017 21:00)  HR: 83 (07 Nov 2017 05:35) (71 - 87)  BP: 140/65 (07 Nov 2017 05:35) (140/65 - 151/69)  BP(mean): --  ABP: --  ABP(mean): --  RR: 18 (07 Nov 2017 05:35) (17 - 19)  SpO2: 95% (07 Nov 2017 05:35) (95% - 100%)      CAPILLARY BLOOD GLUCOSE  214 (06 Nov 2017 18:17)      POCT Blood Glucose.: 63 mg/dL (06 Nov 2017 22:08)    HOSPITAL MEDICATIONS:  MEDICATIONS  (STANDING):  ALBUTerol/ipratropium for Nebulization 3 milliLiter(s) Nebulizer every 6 hours  aspirin enteric coated 81 milliGRAM(s) Oral daily  fenofibrate Tablet 145 milliGRAM(s) Oral daily  heparin  Injectable 5000 Unit(s) SubCutaneous every 8 hours  insulin glargine Injectable (LANTUS) 5 Unit(s) SubCutaneous at bedtime  insulin lispro (HumaLOG) corrective regimen sliding scale   SubCutaneous three times a day before meals  insulin lispro (HumaLOG) corrective regimen sliding scale   SubCutaneous at bedtime  insulin lispro Injectable (HumaLOG) 5 Unit(s) SubCutaneous three times a day before meals  metoprolol     tartrate 50 milliGRAM(s) Oral every 12 hours  NIFEdipine XL 30 milliGRAM(s) Oral daily  NIFEdipine XL 60 milliGRAM(s) Oral at bedtime  pantoprazole    Tablet 40 milliGRAM(s) Oral before breakfast  prednisoLONE acetate 1% Suspension 1 Drop(s) Both EYES three times a day  sodium chloride 0.65% Nasal 1 Spray(s) Both Nostrils three times a day    MEDICATIONS  (PRN):  acetaminophen   Tablet 650 milliGRAM(s) Oral every 6 hours PRN For Temp greater than 38 C (100.4 F)  acetaminophen   Tablet. 650 milliGRAM(s) Oral every 6 hours PRN mild to moderate pain  melatonin 3 milliGRAM(s) Oral at bedtime PRN Sleep

## 2017-11-07 NOTE — PROGRESS NOTE ADULT - SKIN
No lesions; no rash

## 2017-11-07 NOTE — PROGRESS NOTE ADULT - CARDIOVASCULAR
detailed exam
Regular rate & rhythm, normal S1, S2; no murmurs, gallops or rubs; no S3, S4
detailed exam

## 2017-11-07 NOTE — PROGRESS NOTE ADULT - PROBLEM SELECTOR PROBLEM 2
Respiratory failure with hypoxia

## 2017-11-07 NOTE — PROGRESS NOTE ADULT - RESPIRATORY
detailed exam
Breath Sounds equal & clear to percussion & auscultation, no accessory muscle use
detailed exam

## 2017-11-07 NOTE — PROGRESS NOTE ADULT - PROVIDER SPECIALTY LIST ADULT
Cardiology
Infectious Disease
Internal Medicine
MICU
MICU
Pulmonology
Internal Medicine
Pulmonology

## 2017-12-04 ENCOUNTER — EMERGENCY (EMERGENCY)
Facility: HOSPITAL | Age: 82
LOS: 1 days | Discharge: HOME | End: 2017-12-04
Admitting: EMERGENCY MEDICINE
Payer: MEDICARE

## 2017-12-04 VITALS
HEART RATE: 72 BPM | RESPIRATION RATE: 16 BRPM | OXYGEN SATURATION: 100 % | TEMPERATURE: 98 F | SYSTOLIC BLOOD PRESSURE: 152 MMHG | DIASTOLIC BLOOD PRESSURE: 57 MMHG

## 2017-12-04 DIAGNOSIS — C34.90 MALIGNANT NEOPLASM OF UNSPECIFIED PART OF UNSPECIFIED BRONCHUS OR LUNG: Chronic | ICD-10-CM

## 2017-12-04 PROCEDURE — 99284 EMERGENCY DEPT VISIT MOD MDM: CPT

## 2017-12-04 RX ORDER — TETANUS TOXOID, REDUCED DIPHTHERIA TOXOID AND ACELLULAR PERTUSSIS VACCINE, ADSORBED 5; 2.5; 8; 8; 2.5 [IU]/.5ML; [IU]/.5ML; UG/.5ML; UG/.5ML; UG/.5ML
0.5 SUSPENSION INTRAMUSCULAR ONCE
Qty: 0 | Refills: 0 | Status: COMPLETED | OUTPATIENT
Start: 2017-12-04 | End: 2017-12-04

## 2017-12-04 RX ADMIN — TETANUS TOXOID, REDUCED DIPHTHERIA TOXOID AND ACELLULAR PERTUSSIS VACCINE, ADSORBED 0.5 MILLILITER(S): 5; 2.5; 8; 8; 2.5 SUSPENSION INTRAMUSCULAR at 23:38

## 2017-12-04 NOTE — ED PROVIDER NOTE - OBJECTIVE STATEMENT
87 y/o female hx lung CA, DM HTN on aspirin presents to ED c/o skin tear to right hadn x 1 day. Earlier today pt was helping her  off of his wheelchair and her hand got caught between wheelchair and wall causing tear to skin. Pt. went to urgent care center and was sent to ED for further evaluation. Pt. denies pain swelling active bleeding weankess numbness.  Cannot recall last tetanus vaccination.

## 2017-12-04 NOTE — ED ADULT TRIAGE NOTE - CHIEF COMPLAINT QUOTE
" I was sent from [place] urgent care to get stitches , my skin peeled off on my rt hand after it got stuck between a walker and stair master."  Patient appears well in triage. Hand Unrapped in triage skin tear observed on top of right hand, not actively bleeding. no anti-coag use Hx Lung CA (in remission since 2010, not on chemo), DM, HTN

## 2017-12-04 NOTE — ED PROVIDER NOTE - MEDICAL DECISION MAKING DETAILS
85 y/o female c/o right hand skin tear  -adacel  -steristrips/tegaderm due to friable skin  -wound check with pmd within 72 hours

## 2017-12-04 NOTE — ED PROVIDER NOTE - SKIN WOUND TYPE
+ skin tear to dorsum of right hand - skin flap intact with some underlying ecchymosis. non tender to palpation

## 2017-12-22 NOTE — PROGRESS NOTE ADULT - SUBJECTIVE AND OBJECTIVE BOX
12/22/2017    CC: Complete History and Physical    HPI:  Florinda Villavicencio presents for his routine physical exam. He has no current complaints or concerns.       Past Medical History:   Diagnosis Date   • Allergic Rhinitis    • Backache, unspecified    • Negative History of CA, HTN, DM, CAD, CVA, DVT, Asthma         Past Surgical History:   Procedure Laterality Date   • REPAIR ING HERNIA,5+Y/O,REDUCIBL  2002    Hernia, inguinal left        MEDICATIONS are reviewed as per the patient's list. (Recorded at the end of the Progress Note).    ALLERGIES are reviewed as per the patient's list. (Recorded at the top of the Progress Notes).    Family History   Problem Relation Age of Onset   • Diabetes Father    • Hypertension Maternal Grandmother    • Hypertension Maternal Grandfather    • Diabetes Maternal Grandfather         HEALTH CARE MAINTENANCE:  immunizations, screening tests, and routine follow-up testing related to the patient medical history were reviewed in detail.  Needed items are as per the order summary.    Social History     Social History   • Marital status: Single     Spouse name: N/A   • Number of children: 0   • Years of education: N/A     Occupational History   • part time      college    • graduate school-divinity studies      [place]   •  basketball ref      Social History Main Topics   • Smoking status: Never Smoker   • Smokeless tobacco: Never Used   • Alcohol use No   • Drug use: No   • Sexual activity: Not Currently     Partners: Female     Birth control/ protection: Condom     Other Topics Concern   • Caffeine Concern No     occasional    • Exercise Yes     runs, core, weights 2-3 times per week   • Seat Belt No     improving     Social History Narrative   • No narrative on file      Exercise:  is exercising regularly.  ROS:  General:  No significant weight change.  No fevers, chills, sweats.        Skin: No significant rashes or new lesions        HEENT:  No recent visual changes, No  CHIEF COMPLAINT:    Interval Events:    REVIEW OF SYSTEMS:  Constitutional:   Eyes:  ENT:  CV:  Resp:  GI:  :  MSK:  Integumentary:  Neurological:  Psychiatric:  Endocrine:  Hematologic/Lymphatic:  Allergic/Immunologic:  [ ] All other systems negative  [ ] Unable to assess ROS because ________    OBJECTIVE:  ICU Vital Signs Last 24 Hrs  T(C): 36.8 (01 Nov 2017 05:25), Max: 36.8 (01 Nov 2017 01:00)  T(F): 98.2 (01 Nov 2017 05:25), Max: 98.2 (01 Nov 2017 01:00)  HR: 77 (01 Nov 2017 06:49) (77 - 106)  BP: 152/79 (01 Nov 2017 05:25) (151/75 - 167/80)  BP(mean): --  ABP: --  ABP(mean): --  RR: 18 (01 Nov 2017 06:50) (16 - 21)  SpO2: 98% (01 Nov 2017 06:50) (93% - 100%)        10-31 @ 07:01  -  11-01 @ 07:00  --------------------------------------------------------  IN: 454 mL / OUT: 800 mL / NET: -346 mL    POCT Blood Glucose.: 218 mg/dL (31 Oct 2017 21:23)    HOSPITAL MEDICATIONS:  MEDICATIONS  (STANDING):  ALBUTerol/ipratropium for Nebulization 3 milliLiter(s) Nebulizer every 6 hours  aspirin enteric coated 81 milliGRAM(s) Oral daily  fenofibrate Tablet 145 milliGRAM(s) Oral daily  heparin  Injectable 5000 Unit(s) SubCutaneous every 8 hours  insulin glargine Injectable (LANTUS) 5 Unit(s) SubCutaneous at bedtime  insulin lispro (HumaLOG) corrective regimen sliding scale   SubCutaneous three times a day before meals  insulin lispro (HumaLOG) corrective regimen sliding scale   SubCutaneous at bedtime  meropenem IVPB      meropenem IVPB 1000 milliGRAM(s) IV Intermittent every 8 hours  metoprolol     tartrate 50 milliGRAM(s) Oral every 12 hours  NIFEdipine XL 30 milliGRAM(s) Oral daily  NIFEdipine XL 60 milliGRAM(s) Oral at bedtime  pantoprazole    Tablet 40 milliGRAM(s) Oral before breakfast  prednisoLONE acetate 1% Suspension 1 Drop(s) Both EYES three times a day  predniSONE   Tablet 40 milliGRAM(s) Oral daily  sodium chloride 0.65% Nasal 1 Spray(s) Both Nostrils three times a day    MEDICATIONS  (PRN):  acetaminophen   Tablet 650 milliGRAM(s) Oral every 6 hours PRN For Temp greater than 38 C (100.4 F)  acetaminophen   Tablet. 650 milliGRAM(s) Oral every 6 hours PRN mild to moderate pain  melatonin 3 milliGRAM(s) Oral at bedtime PRN Sleep      LABS:                        9.5    5.72  )-----------( 175      ( 01 Nov 2017 07:10 )             29.5     10-31    139  |  100  |  35<H>  ----------------------------<  240<H>  3.9   |  26  |  1.09    Ca    8.8      31 Oct 2017 06:40                MICROBIOLOGY:     RADIOLOGY:  [ ] Reviewed and interpreted by me    PULMONARY FUNCTION TESTS:    EKG: headaches., No oropharyngeal problems. and No otalgia or hearing loss.        Cardiovascular: no chest pain, shortness of breath, palpitations, edema, leg pains with walking        Respiratory: No chronic coughing.        Gastrointestinal No abdominal pain, N, V, change in bowel habits, melena or rectal bleeding.        Genitourinary: No difficulty urinating.        Musculoskeletal:  No myalgias, joint pains or swelling.        Endocrinology: chronically feels cold        Hemetology: No bruising or easy bleeding.        Neurologic: No paresthesias or focal weakness.        Psychiatric: No insomnia, anxiety, or depression.                       PHYSICAL EXAM:    VITAL SIGNS: Vital signs taken and documented at the top of the Progress Note.  GENERAL APPEARANCE:well-developed, well-nourished man in no acute distress..  SKIN:warm, dry.  No significant lesions.  HEAD:normocephalic, atraumatic.  No lesions.  EARS:External ears normal. Canals clear. Tympanic membranes are clear with all landmarks noted.  EYES:Pupils equal, round, reactive to light, extraocular movements intact, sclerae white and nonicteric, no erythema exudate or swelling .  NOSE:normal  MOUTH AND THROAT:Lips, mucosa, and tongue normal. Teeth and gums normal. Oropharynx clear.  NECK:Full range of motion, supple. No adenopathy, thyromegaly. No jugular veinous distension. Carotids 2+, no bruits  CHEST:symmetrical, no chest deformities noted and no chest wall tenderness  LUNGS: clear to auscultation and percussion  BACK:straight, symmetric and nontender to palpation  HEART:Regular rate and rhythm.  S1, S2, no gallops or murmurs.  ABDOMEN:soft, non-tender, bowel sounds normal, no masses, hepatomegaly or splenomegaly noted  RECTAL:not done  GENITALIA: Circumcised male.  Phallus, testes and scrotum normal. No hernia.  EXTREMITIES:No cyanosis, clubbing or edema.  Pulse 2+ and symmetric.  NEUROLOGIC:negative findings: muscle strength normal, sensation to light  touch normal, reflexes normal and symmetric    ASSESSMENT: (Z00.00) Health maintenance examination  (primary encounter diagnosis)  Comment: Overall he has been doing well. Good exercise program. He is up-to-date on the Tdap vaccination. He declined an influenza vaccination. He is due for a second and final hepatitis B vaccination. He did not want this today. He has eye and dental exams scheduled for today. His blood pressure and cardiopulmonary exam are normal. No inguinal hernias or testicle lumps on exam. He does not smoke. Infrequent caffeine and no alcohol intake.  Plan: He should continue a regular exercise program. He will continue to follow a healthy diet. He will call back when he is ready to schedule the second hepatitis B vaccination.      CHIEF COMPLAINT: Patient is a 86y old  Female who presents with a chief complaint of abd, back pain, n/v (26 Oct 2017 03:46)    Interval Events: none overnight      REVIEW OF SYSTEMS:  CV: denies  Resp: denies  GI: denies  [x] All other systems negative  [ ] Unable to assess ROS because ________      OBJECTIVE:  ICU Vital Signs Last 24 Hrs  T(C): 36.8 (01 Nov 2017 05:25), Max: 36.8 (01 Nov 2017 01:00)  T(F): 98.2 (01 Nov 2017 05:25), Max: 98.2 (01 Nov 2017 01:00)  HR: 77 (01 Nov 2017 06:49) (77 - 106)  BP: 152/79 (01 Nov 2017 05:25) (151/75 - 167/80)  BP(mean): --  ABP: --  ABP(mean): --  RR: 18 (01 Nov 2017 06:50) (16 - 21)  SpO2: 98% (01 Nov 2017 06:50) (93% - 100%)        10-31 @ 07:01  -  11-01 @ 07:00  --------------------------------------------------------  IN: 454 mL / OUT: 800 mL / NET: -346 mL    POCT Blood Glucose.: 218 mg/dL (31 Oct 2017 21:23)    HOSPITAL MEDICATIONS:  MEDICATIONS  (STANDING):  ALBUTerol/ipratropium for Nebulization 3 milliLiter(s) Nebulizer every 6 hours  aspirin enteric coated 81 milliGRAM(s) Oral daily  fenofibrate Tablet 145 milliGRAM(s) Oral daily  heparin  Injectable 5000 Unit(s) SubCutaneous every 8 hours  insulin glargine Injectable (LANTUS) 5 Unit(s) SubCutaneous at bedtime  insulin lispro (HumaLOG) corrective regimen sliding scale   SubCutaneous three times a day before meals  insulin lispro (HumaLOG) corrective regimen sliding scale   SubCutaneous at bedtime  meropenem IVPB      meropenem IVPB 1000 milliGRAM(s) IV Intermittent every 8 hours  metoprolol     tartrate 50 milliGRAM(s) Oral every 12 hours  NIFEdipine XL 30 milliGRAM(s) Oral daily  NIFEdipine XL 60 milliGRAM(s) Oral at bedtime  pantoprazole    Tablet 40 milliGRAM(s) Oral before breakfast  prednisoLONE acetate 1% Suspension 1 Drop(s) Both EYES three times a day  predniSONE   Tablet 40 milliGRAM(s) Oral daily  sodium chloride 0.65% Nasal 1 Spray(s) Both Nostrils three times a day    MEDICATIONS  (PRN):  acetaminophen   Tablet 650 milliGRAM(s) Oral every 6 hours PRN For Temp greater than 38 C (100.4 F)  acetaminophen   Tablet. 650 milliGRAM(s) Oral every 6 hours PRN mild to moderate pain  melatonin 3 milliGRAM(s) Oral at bedtime PRN Sleep      LABS:                        9.5    5.72  )-----------( 175      ( 01 Nov 2017 07:10 )             29.5     Basic Metabolic Panel w/Mg &amp; Inorg Phos (11.01.17 @ 07:10)    eGFR if : 67 mL/min    eGFR if Non : 58: The units for eGFR are ml/min/1.73m2 (normalized body  surface area). The eGFR is calculated from a serum  creatinine using the CKD-EPI equation. Other variables  required for calculation are race, age and sex. Among  patients with chronic kidney disease (CKD), the eGFR is  useful in determining the stage of disease according to  KDOQI CKD classification. All eGFR results are reported  numerically with the following interpretation.    GFR  (ml/min/1.73 m2)          W/KIDNEY DAMAGE    W/O KIDNEY DMG  ==========================================================  >= 90.......................Stage 1..............Normal  60-89.......................Stage 2...........Decreased GFR  30-59.......................Stage 3..............Stage 3  15-29.......................Stage 4..............Stage 4  < 15........................Stage 5..............Stage 5    Each stage of CKD assumes that the associated GFR level  has been in effect for at least 3 months. Determination of  stages one and two (with eGFR > 59ml/min/m2) requires  estimation of kidney damage for at least 3 months as  defined by structural or functional abnormalities.    Limitations: All estimates of GFR will be less accurate  for patients at extremes of muscle mass (including but  not limited to frail elderly, critically ill, or cancer  patients), those with unusual diets, and those with  conditions associated with reduced secretion or  extrarenal elimination of creatinine. The eGFR equation  is not recommended for use in patients with unstable  creatinine levels. mL/min    Calcium, Total Serum: 8.3 mg/dL    Phosphorus Level, Serum: 2.6: Delta: 3.7 on 10/30/  Delta: 3.7 on 10/30/ mg/dL    Sodium, Serum: 135 mmol/L    Potassium, Serum: 3.9 mmol/L    Chloride, Serum: 98 mmol/L    Carbon Dioxide, Serum: 26 mmol/L    Blood Urea Nitrogen, Serum: 32 mg/dL    Creatinine, Serum: 0.90 mg/dL    Glucose, Serum: 263 mg/dL    Magnesium, Serum: 1.7 mg/dL

## 2018-04-17 ENCOUNTER — APPOINTMENT (OUTPATIENT)
Dept: PULMONOLOGY | Facility: CLINIC | Age: 83
End: 2018-04-17
Payer: MEDICARE

## 2018-04-17 VITALS
OXYGEN SATURATION: 95 % | DIASTOLIC BLOOD PRESSURE: 70 MMHG | HEIGHT: 65 IN | RESPIRATION RATE: 15 BRPM | BODY MASS INDEX: 23.32 KG/M2 | SYSTOLIC BLOOD PRESSURE: 132 MMHG | WEIGHT: 140 LBS | TEMPERATURE: 98.6 F | HEART RATE: 68 BPM

## 2018-04-17 DIAGNOSIS — C34.90 MALIGNANT NEOPLASM OF UNSPECIFIED PART OF UNSPECIFIED BRONCHUS OR LUNG: ICD-10-CM

## 2018-04-17 DIAGNOSIS — Z80.9 FAMILY HISTORY OF MALIGNANT NEOPLASM, UNSPECIFIED: ICD-10-CM

## 2018-04-17 DIAGNOSIS — E11.8 TYPE 2 DIABETES MELLITUS WITH UNSPECIFIED COMPLICATIONS: ICD-10-CM

## 2018-04-17 DIAGNOSIS — Z87.891 PERSONAL HISTORY OF NICOTINE DEPENDENCE: ICD-10-CM

## 2018-04-17 DIAGNOSIS — Z87.09 PERSONAL HISTORY OF OTHER DISEASES OF THE RESPIRATORY SYSTEM: ICD-10-CM

## 2018-04-17 DIAGNOSIS — Z82.49 FAMILY HISTORY OF ISCHEMIC HEART DISEASE AND OTHER DISEASES OF THE CIRCULATORY SYSTEM: ICD-10-CM

## 2018-04-17 DIAGNOSIS — I10 ESSENTIAL (PRIMARY) HYPERTENSION: ICD-10-CM

## 2018-04-17 PROCEDURE — 94060 EVALUATION OF WHEEZING: CPT | Mod: GC

## 2018-04-17 PROCEDURE — 99205 OFFICE O/P NEW HI 60 MIN: CPT | Mod: 25,GC

## 2018-04-17 PROCEDURE — 94729 DIFFUSING CAPACITY: CPT | Mod: GC

## 2018-04-17 PROCEDURE — ZZZZZ: CPT

## 2018-04-17 PROCEDURE — 94726 PLETHYSMOGRAPHY LUNG VOLUMES: CPT | Mod: GC

## 2018-04-17 RX ORDER — FENOFIBRATE 145 MG/1
145 TABLET, COATED ORAL
Refills: 0 | Status: ACTIVE | COMMUNITY
Start: 2018-04-17

## 2018-04-17 RX ORDER — SITAGLIPTIN 100 MG/1
100 TABLET, FILM COATED ORAL
Refills: 0 | Status: ACTIVE | COMMUNITY
Start: 2018-04-17

## 2018-04-17 RX ORDER — METOPROLOL TARTRATE 25 MG/1
25 TABLET, FILM COATED ORAL
Refills: 0 | Status: ACTIVE | COMMUNITY
Start: 2018-04-17

## 2018-04-17 RX ORDER — PREDNISONE 20 MG/1
20 TABLET ORAL
Refills: 0 | Status: ACTIVE | COMMUNITY
Start: 2018-04-17

## 2018-04-17 RX ORDER — ALBUTEROL SULFATE 2.5 MG/3ML
(2.5 MG/3ML) SOLUTION RESPIRATORY (INHALATION)
Qty: 1 | Refills: 0 | Status: ACTIVE | COMMUNITY
Start: 2018-04-17 | End: 1900-01-01

## 2018-04-17 RX ORDER — POTASSIUM CHLORIDE 750 MG/1
10 CAPSULE, EXTENDED RELEASE ORAL
Refills: 0 | Status: ACTIVE | COMMUNITY
Start: 2018-04-17

## 2018-04-17 RX ORDER — ASPIRIN ENTERIC COATED TABLETS 81 MG 81 MG/1
81 TABLET, DELAYED RELEASE ORAL
Refills: 0 | Status: ACTIVE | COMMUNITY
Start: 2018-04-17

## 2018-04-17 RX ORDER — HYDROCHLOROTHIAZIDE 12.5 MG/1
12.5 CAPSULE ORAL
Refills: 0 | Status: ACTIVE | COMMUNITY
Start: 2018-04-17

## 2018-04-17 RX ORDER — GLIPIZIDE 2.5 MG/1
2.5 TABLET, FILM COATED, EXTENDED RELEASE ORAL
Refills: 0 | Status: ACTIVE | COMMUNITY
Start: 2018-04-17

## 2018-04-17 RX ORDER — NIFEDIPINE 90 MG/1
90 TABLET, EXTENDED RELEASE ORAL
Refills: 0 | Status: ACTIVE | COMMUNITY
Start: 2018-04-17

## 2018-04-17 RX ORDER — ALBUTEROL SULFATE 90 UG/1
108 (90 BASE) AEROSOL, METERED RESPIRATORY (INHALATION)
Qty: 1 | Refills: 3 | Status: ACTIVE | COMMUNITY
Start: 2018-04-17 | End: 1900-01-01

## 2018-04-17 RX ORDER — PIOGLITAZONE HYDROCHLORIDE 15 MG/1
15 TABLET ORAL
Refills: 0 | Status: ACTIVE | COMMUNITY
Start: 2018-04-17

## 2018-04-17 RX ORDER — FLUTICASONE PROPIONATE AND SALMETEROL 50; 250 UG/1; UG/1
250-50 POWDER RESPIRATORY (INHALATION) TWICE DAILY
Qty: 1 | Refills: 0 | Status: ACTIVE | COMMUNITY
Start: 2018-04-17 | End: 1900-01-01

## 2018-06-22 ENCOUNTER — APPOINTMENT (OUTPATIENT)
Dept: SPEECH THERAPY | Facility: CLINIC | Age: 83
End: 2018-06-22

## 2018-06-22 ENCOUNTER — OUTPATIENT (OUTPATIENT)
Dept: OUTPATIENT SERVICES | Facility: HOSPITAL | Age: 83
LOS: 1 days | Discharge: HOME | End: 2018-06-22

## 2018-06-22 DIAGNOSIS — C34.90 MALIGNANT NEOPLASM OF UNSPECIFIED PART OF UNSPECIFIED BRONCHUS OR LUNG: Chronic | ICD-10-CM

## 2018-06-28 ENCOUNTER — APPOINTMENT (OUTPATIENT)
Dept: SPEECH THERAPY | Facility: CLINIC | Age: 83
End: 2018-06-28

## 2018-07-02 DIAGNOSIS — R49.0 DYSPHONIA: ICD-10-CM

## 2018-07-26 ENCOUNTER — APPOINTMENT (OUTPATIENT)
Dept: SPEECH THERAPY | Facility: CLINIC | Age: 83
End: 2018-07-26

## 2018-07-31 ENCOUNTER — APPOINTMENT (OUTPATIENT)
Dept: OTOLARYNGOLOGY | Facility: CLINIC | Age: 83
End: 2018-07-31
Payer: MEDICARE

## 2018-07-31 VITALS
SYSTOLIC BLOOD PRESSURE: 172 MMHG | BODY MASS INDEX: 23.32 KG/M2 | WEIGHT: 140 LBS | DIASTOLIC BLOOD PRESSURE: 71 MMHG | HEIGHT: 65 IN | HEART RATE: 70 BPM

## 2018-07-31 DIAGNOSIS — R49.0 DYSPHONIA: ICD-10-CM

## 2018-07-31 DIAGNOSIS — C34.90 MALIGNANT NEOPLASM OF UNSPECIFIED PART OF UNSPECIFIED BRONCHUS OR LUNG: ICD-10-CM

## 2018-07-31 DIAGNOSIS — J38.01 PARALYSIS OF VOCAL CORDS AND LARYNX, UNILATERAL: ICD-10-CM

## 2018-07-31 DIAGNOSIS — J43.9 EMPHYSEMA, UNSPECIFIED: ICD-10-CM

## 2018-07-31 PROCEDURE — 31575 DIAGNOSTIC LARYNGOSCOPY: CPT

## 2018-07-31 PROCEDURE — 99204 OFFICE O/P NEW MOD 45 MIN: CPT | Mod: 25

## 2018-08-09 ENCOUNTER — APPOINTMENT (OUTPATIENT)
Dept: SPEECH THERAPY | Facility: CLINIC | Age: 83
End: 2018-08-09

## 2018-09-01 ENCOUNTER — EMERGENCY (EMERGENCY)
Facility: HOSPITAL | Age: 83
LOS: 1 days | Discharge: HOME | End: 2018-09-01
Attending: EMERGENCY MEDICINE | Admitting: EMERGENCY MEDICINE
Payer: MEDICARE

## 2018-09-01 VITALS
TEMPERATURE: 98 F | RESPIRATION RATE: 16 BRPM | SYSTOLIC BLOOD PRESSURE: 143 MMHG | DIASTOLIC BLOOD PRESSURE: 67 MMHG | OXYGEN SATURATION: 100 % | HEART RATE: 78 BPM

## 2018-09-01 VITALS
TEMPERATURE: 98 F | HEART RATE: 77 BPM | SYSTOLIC BLOOD PRESSURE: 163 MMHG | RESPIRATION RATE: 17 BRPM | DIASTOLIC BLOOD PRESSURE: 65 MMHG | OXYGEN SATURATION: 97 %

## 2018-09-01 DIAGNOSIS — C34.90 MALIGNANT NEOPLASM OF UNSPECIFIED PART OF UNSPECIFIED BRONCHUS OR LUNG: Chronic | ICD-10-CM

## 2018-09-01 LAB
ALBUMIN SERPL ELPH-MCNC: 3.3 G/DL — SIGNIFICANT CHANGE UP (ref 3.3–5)
ALP SERPL-CCNC: 59 U/L — SIGNIFICANT CHANGE UP (ref 40–120)
ALT FLD-CCNC: 19 U/L — SIGNIFICANT CHANGE UP (ref 4–33)
AST SERPL-CCNC: 16 U/L — SIGNIFICANT CHANGE UP (ref 4–32)
BASOPHILS # BLD AUTO: 0.01 K/UL — SIGNIFICANT CHANGE UP (ref 0–0.2)
BASOPHILS NFR BLD AUTO: 0.2 % — SIGNIFICANT CHANGE UP (ref 0–2)
BILIRUB SERPL-MCNC: 0.3 MG/DL — SIGNIFICANT CHANGE UP (ref 0.2–1.2)
BUN SERPL-MCNC: 31 MG/DL — HIGH (ref 7–23)
CALCIUM SERPL-MCNC: 9.2 MG/DL — SIGNIFICANT CHANGE UP (ref 8.4–10.5)
CHLORIDE SERPL-SCNC: 94 MMOL/L — LOW (ref 98–107)
CO2 SERPL-SCNC: 24 MMOL/L — SIGNIFICANT CHANGE UP (ref 22–31)
CREAT SERPL-MCNC: 1.01 MG/DL — SIGNIFICANT CHANGE UP (ref 0.5–1.3)
EOSINOPHIL # BLD AUTO: 0.02 K/UL — SIGNIFICANT CHANGE UP (ref 0–0.5)
EOSINOPHIL NFR BLD AUTO: 0.3 % — SIGNIFICANT CHANGE UP (ref 0–6)
ERYTHROCYTE [SEDIMENTATION RATE] IN BLOOD: 95 MM/HR — HIGH (ref 4–25)
GLUCOSE SERPL-MCNC: 293 MG/DL — HIGH (ref 70–99)
HCT VFR BLD CALC: 33.5 % — LOW (ref 34.5–45)
HGB BLD-MCNC: 10.5 G/DL — LOW (ref 11.5–15.5)
IMM GRANULOCYTES # BLD AUTO: 0.15 # — SIGNIFICANT CHANGE UP
IMM GRANULOCYTES NFR BLD AUTO: 2.4 % — HIGH (ref 0–1.5)
LYMPHOCYTES # BLD AUTO: 0.55 K/UL — LOW (ref 1–3.3)
LYMPHOCYTES # BLD AUTO: 8.7 % — LOW (ref 13–44)
MCHC RBC-ENTMCNC: 25.5 PG — LOW (ref 27–34)
MCHC RBC-ENTMCNC: 31.3 % — LOW (ref 32–36)
MCV RBC AUTO: 81.5 FL — SIGNIFICANT CHANGE UP (ref 80–100)
MONOCYTES # BLD AUTO: 0.4 K/UL — SIGNIFICANT CHANGE UP (ref 0–0.9)
MONOCYTES NFR BLD AUTO: 6.3 % — SIGNIFICANT CHANGE UP (ref 2–14)
NEUTROPHILS # BLD AUTO: 5.21 K/UL — SIGNIFICANT CHANGE UP (ref 1.8–7.4)
NEUTROPHILS NFR BLD AUTO: 82.1 % — HIGH (ref 43–77)
NRBC # FLD: 0 — SIGNIFICANT CHANGE UP
PLATELET # BLD AUTO: 284 K/UL — SIGNIFICANT CHANGE UP (ref 150–400)
PMV BLD: 10.1 FL — SIGNIFICANT CHANGE UP (ref 7–13)
POTASSIUM SERPL-MCNC: 3.9 MMOL/L — SIGNIFICANT CHANGE UP (ref 3.5–5.3)
POTASSIUM SERPL-SCNC: 3.9 MMOL/L — SIGNIFICANT CHANGE UP (ref 3.5–5.3)
PROT SERPL-MCNC: 7.6 G/DL — SIGNIFICANT CHANGE UP (ref 6–8.3)
RBC # BLD: 4.11 M/UL — SIGNIFICANT CHANGE UP (ref 3.8–5.2)
RBC # FLD: 15.5 % — HIGH (ref 10.3–14.5)
SODIUM SERPL-SCNC: 133 MMOL/L — LOW (ref 135–145)
WBC # BLD: 6.34 K/UL — SIGNIFICANT CHANGE UP (ref 3.8–10.5)
WBC # FLD AUTO: 6.34 K/UL — SIGNIFICANT CHANGE UP (ref 3.8–10.5)

## 2018-09-01 PROCEDURE — 73030 X-RAY EXAM OF SHOULDER: CPT | Mod: 26,RT

## 2018-09-01 PROCEDURE — 99284 EMERGENCY DEPT VISIT MOD MDM: CPT | Mod: GC

## 2018-09-01 PROCEDURE — 71250 CT THORAX DX C-: CPT | Mod: 26

## 2018-09-01 PROCEDURE — 73200 CT UPPER EXTREMITY W/O DYE: CPT | Mod: 26,RT

## 2018-09-01 RX ORDER — OXYCODONE HYDROCHLORIDE 5 MG/1
5 TABLET ORAL ONCE
Qty: 0 | Refills: 0 | Status: DISCONTINUED | OUTPATIENT
Start: 2018-09-01 | End: 2018-09-01

## 2018-09-01 RX ORDER — OXYCODONE HYDROCHLORIDE 5 MG/1
1 TABLET ORAL
Qty: 16 | Refills: 0 | OUTPATIENT
Start: 2018-09-01 | End: 2018-09-04

## 2018-09-01 RX ORDER — CYCLOBENZAPRINE HYDROCHLORIDE 10 MG/1
5 TABLET, FILM COATED ORAL ONCE
Qty: 0 | Refills: 0 | Status: DISCONTINUED | OUTPATIENT
Start: 2018-09-01 | End: 2018-09-01

## 2018-09-01 RX ADMIN — OXYCODONE HYDROCHLORIDE 5 MILLIGRAM(S): 5 TABLET ORAL at 13:31

## 2018-09-01 RX ADMIN — OXYCODONE HYDROCHLORIDE 5 MILLIGRAM(S): 5 TABLET ORAL at 16:37

## 2018-09-01 RX ADMIN — OXYCODONE HYDROCHLORIDE 5 MILLIGRAM(S): 5 TABLET ORAL at 12:46

## 2018-09-01 NOTE — ED PROVIDER NOTE - MEDICAL DECISION MAKING DETAILS
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 Suspect pain with ROM of internal rotation and adduction suspicious for rotator cuff vs. labral tear. Discussed with PMD Johnathan York - arranged o/p cervical MRI at 6 PM and plan to CT non con chest/shoulder to evaluate for acute pathology & trend ESR. Patient and Dr. York aware that if negative w/u here, recommend Ortho follow up +/- o/p shoulder MRI.

## 2018-09-01 NOTE — ED ADULT NURSE NOTE - OBJECTIVE STATEMENT
Pt ambulated to room 26 a/o x 3 c/o constant right shoulder pain that radiates to her neck x 2 months but has been getting worse. pt noted to have pulses and sensation to bilateral upper extremities. Respirations even and unlabored. Lung sounds clear with equal chest rise bilaterally. ABD is soft, non tender, non distended with normal active bowel sounds. Pt denies any urinary symptoms. Skin is intact with no pressure ulcers noted. Dr. looney evaluating pt.

## 2018-09-01 NOTE — ED PROVIDER NOTE - OBJECTIVE STATEMENT
Alison Rousesau, DO: 86F with hx of lung CA, EMR in remission here for R shoulder pain x 2 month, worse over last several days, not controlled with Motrin (last taken 5 hours pta). Pain worse when putting on bra. No focal motor or sensory deficits. No recent trauma. Patient was primary caretaker for recently  . Saw chiropractor yesterday & had xray reportedly with arthritis per patient & daughter at bedside. No vision changes, neck pain, chest pain, CP, SOB. Alison Rousseau, DO: 86F with hx of lung CA, polymyalgia rheumatica, in remission from lung cancer, here for R shoulder pain x 2 month, worse over last several days, not controlled with Motrin (last taken 5 hours pta). Pain worse when putting on bra. No focal motor or sensory deficits. No recent trauma. Patient was primary caretaker for recently  . Saw chiropractor yesterday & had xray reportedly with arthritis per patient & daughter at bedside. No vision changes, neck pain, chest pain, CP, SOB.

## 2018-09-01 NOTE — ED PROVIDER NOTE - ATTENDING CONTRIBUTION TO CARE
Dr. Tejeda: 85 yo female with PMH lung cancer in remission, polymyalgia rheumatica (recent , higher than her baseline), in ED with 2 months of pain in right shoulder, worse with movement.  No fever, cough, CP/SOB, N/V/D or abdominal pain.  No numbness, tingling or focal weakness.  Had outpatient XR right should recently that was reportedly normal.  Sent to ED by PMD Dr. York due to worsening pain.  On exam pt uncomfortable appearing due to pain, heart RRR, lungs CTAB, abd NTND, extremities without swelling, strength 5/5 in all extremities and skin without rash.  Right shoulder normal appearing and will full ROM but TTP right anterior shoulder.  Neck and spine midline no TTP.  At baseline pt has hoarse voice s/p radiation for lung cancer.

## 2018-09-01 NOTE — ED ADULT NURSE REASSESSMENT NOTE - NS ED NURSE REASSESS COMMENT FT1
Pain assessment as noted. Pt states her pain in her right shoulder is coming back again. Dr. nieto made aware. administered pain medication as ordered. Pt awaiting to be discharged. Will continue to monitor.

## 2018-09-01 NOTE — ED ADULT TRIAGE NOTE - CHIEF COMPLAINT QUOTE
r shoulder pain radiating to neck , r arm,r upper clavicular area x 2 mos, reports pain increasing. seen yesterday by chiropracter.   [facility]- lung ca 2010,   last radiation 7/2015. r shoulder pain radiating to neck , r arm,r upper clavicular area x 2 mos, reports pain increasing. seen yesterday by chiropracter.   [facility]- lung ca 2010,   last radiation 7/2015.  last saw oncologist 2 wks ago  " told scar tissue from radiation"

## 2018-09-01 NOTE — ED ADULT NURSE NOTE - NSIMPLEMENTINTERV_GEN_ALL_ED
Implemented All Fall with Harm Risk Interventions:  [place] to call system. Call bell, personal items and telephone within reach. Instruct patient to call for assistance. Room bathroom lighting operational. Non-slip footwear when patient is off stretcher. Physically safe environment: no spills, clutter or unnecessary equipment. Stretcher in lowest position, wheels locked, appropriate side rails in place. Provide visual cue, wrist band, yellow gown, etc. Monitor gait and stability. Monitor for mental status changes and reorient to person, place, and time. Review medications for side effects contributing to fall risk. Reinforce activity limits and safety measures with patient and family. Provide visual clues: red socks.

## 2018-09-01 NOTE — ED ADULT NURSE REASSESSMENT NOTE - NS ED NURSE REASSESS COMMENT FT1
Pt was discharged by DR. Rousseau iv sl was removed written and verbal instructions where given pt left the ED with her daughter.

## 2018-09-01 NOTE — ED PROVIDER NOTE - PROGRESS NOTE DETAILS
Dr. Tejeda: spoke to pt's PMD Dr. York, states that pt is scheduled to have an MRI c-spine this evening at 6 pm but would benefit from CT chest and CT right shoulder to evaluate; also recommending checking ESR because pt has polymyalgia rheumatica and ESR was recently 100, which is higher than her baseline Alison Rousseau DO: Updated patient of CT results, provided patient with copy, will also provide copy of old CT for comparison. Discussed with Dr. York. Patient declining o/p cervical MRI this afternoon. Pt has hem/onc follow up, last seen 2 weeks ago & was offered PET scan but previously declined & has planned MRI in 4 days. Patient well pain controlled with Oxy. Alison Rousseau DO: Updated patient of CT results, provided patient with copy, will also provide copy of old CT for comparison. Patient declining o/p cervical MRI this afternoon. Pt has hem/onc follow up, last seen 2 weeks ago & was offered PET scan but previously declined & has planned MRI in 4 days. Patient well pain controlled with Oxy. Pending callback. Alison Rousseau, : Updated patient of CT results, provided patient with copy, will also provide copy of old CT for comparison. Patient declining o/p cervical MRI this afternoon. Pt has hem/onc follow up, last seen 2 weeks ago & was offered PET scan but previously declined & has planned MRI in 4 days. Patient well pain controlled with Oxy. Pending callback from Dr. York - left message for answering service, personal cell phone admitted. Alison Rousseau DO: Patient refusing to stay & wait for Dr. York's call.

## 2018-09-01 NOTE — ED ADULT NURSE NOTE - CHIEF COMPLAINT QUOTE
r shoulder pain radiating to neck , r arm,r upper clavicular area x 2 mos, reports pain increasing. seen yesterday by chiropracter.   [facility]- lung ca 2010,   last radiation 7/2015.  last saw oncologist 2 wks ago  " told scar tissue from radiation"

## 2018-09-01 NOTE — ED PROVIDER NOTE - PHYSICAL EXAMINATION
Alison Rousseau, DO:   Gen: Well appearing, NAD  Head: NCAT  HEENT: PERRL, MMM, normal conjunctiva, anicteric, neck supple  Lung: CTAB, no adventitious sounds  CV: RRR, no murmurs  Abd: soft, NTND, no rebound or guarding  MSK: No edema, no visible deformities, no focal TTP of R shoulder, glenohumeral joint, clavicle or chest wall. +yasmani test on R.   Neuro: 5/5 strength and normal sensation in all extremities. Ambulatory with stable gait.   Skin: Warm and dry, no evidence of rash  Psych: normal mood and affect

## 2018-09-02 ENCOUNTER — OUTPATIENT (OUTPATIENT)
Dept: OUTPATIENT SERVICES | Facility: HOSPITAL | Age: 83
LOS: 1 days | End: 2018-09-02
Payer: MEDICARE

## 2018-09-02 ENCOUNTER — APPOINTMENT (OUTPATIENT)
Dept: MRI IMAGING | Facility: CLINIC | Age: 83
End: 2018-09-02
Payer: MEDICARE

## 2018-09-02 DIAGNOSIS — Z00.8 ENCOUNTER FOR OTHER GENERAL EXAMINATION: ICD-10-CM

## 2018-09-02 DIAGNOSIS — C34.90 MALIGNANT NEOPLASM OF UNSPECIFIED PART OF UNSPECIFIED BRONCHUS OR LUNG: Chronic | ICD-10-CM

## 2018-09-02 PROCEDURE — 72141 MRI NECK SPINE W/O DYE: CPT

## 2018-09-02 PROCEDURE — 73221 MRI JOINT UPR EXTREM W/O DYE: CPT

## 2018-09-02 PROCEDURE — 73221 MRI JOINT UPR EXTREM W/O DYE: CPT | Mod: 26,RT

## 2018-09-02 PROCEDURE — 72141 MRI NECK SPINE W/O DYE: CPT | Mod: 26

## 2018-09-03 ENCOUNTER — INPATIENT (INPATIENT)
Facility: HOSPITAL | Age: 83
LOS: 11 days | Discharge: HOME HEALTH | End: 2018-09-15
Attending: GENERAL PRACTICE | Admitting: GENERAL PRACTICE
Payer: MEDICARE

## 2018-09-03 VITALS
RESPIRATION RATE: 20 BRPM | OXYGEN SATURATION: 97 % | HEART RATE: 75 BPM | SYSTOLIC BLOOD PRESSURE: 180 MMHG | DIASTOLIC BLOOD PRESSURE: 85 MMHG | TEMPERATURE: 98 F

## 2018-09-03 DIAGNOSIS — C34.90 MALIGNANT NEOPLASM OF UNSPECIFIED PART OF UNSPECIFIED BRONCHUS OR LUNG: Chronic | ICD-10-CM

## 2018-09-03 LAB
ALBUMIN SERPL ELPH-MCNC: 3.4 G/DL — SIGNIFICANT CHANGE UP (ref 3.3–5)
ALP SERPL-CCNC: 69 U/L — SIGNIFICANT CHANGE UP (ref 40–120)
ALT FLD-CCNC: 20 U/L — SIGNIFICANT CHANGE UP (ref 4–33)
ANISOCYTOSIS BLD QL: SLIGHT — SIGNIFICANT CHANGE UP
APPEARANCE UR: CLEAR — SIGNIFICANT CHANGE UP
APTT BLD: 24.7 SEC — LOW (ref 27.5–37.4)
AST SERPL-CCNC: 17 U/L — SIGNIFICANT CHANGE UP (ref 4–32)
BACTERIA # UR AUTO: NEGATIVE — SIGNIFICANT CHANGE UP
BASE EXCESS BLDV CALC-SCNC: 2.5 MMOL/L — SIGNIFICANT CHANGE UP
BASOPHILS # BLD AUTO: 0.02 K/UL — SIGNIFICANT CHANGE UP (ref 0–0.2)
BASOPHILS NFR BLD AUTO: 0.2 % — SIGNIFICANT CHANGE UP (ref 0–2)
BILIRUB SERPL-MCNC: 0.4 MG/DL — SIGNIFICANT CHANGE UP (ref 0.2–1.2)
BILIRUB UR-MCNC: NEGATIVE — SIGNIFICANT CHANGE UP
BLD GP AB SCN SERPL QL: NEGATIVE — SIGNIFICANT CHANGE UP
BLOOD GAS VENOUS - CREATININE: 1.08 MG/DL — SIGNIFICANT CHANGE UP (ref 0.5–1.3)
BLOOD UR QL VISUAL: NEGATIVE — SIGNIFICANT CHANGE UP
BUN SERPL-MCNC: 36 MG/DL — HIGH (ref 7–23)
CALCIUM SERPL-MCNC: 9.5 MG/DL — SIGNIFICANT CHANGE UP (ref 8.4–10.5)
CHLORIDE BLDV-SCNC: 91 MMOL/L — LOW (ref 96–108)
CHLORIDE SERPL-SCNC: 86 MMOL/L — LOW (ref 98–107)
CO2 SERPL-SCNC: 23 MMOL/L — SIGNIFICANT CHANGE UP (ref 22–31)
COLOR SPEC: SIGNIFICANT CHANGE UP
CREAT SERPL-MCNC: 1.02 MG/DL — SIGNIFICANT CHANGE UP (ref 0.5–1.3)
EOSINOPHIL # BLD AUTO: 0 K/UL — SIGNIFICANT CHANGE UP (ref 0–0.5)
EOSINOPHIL NFR BLD AUTO: 0 % — SIGNIFICANT CHANGE UP (ref 0–6)
GAS PNL BLDV: 123 MMOL/L — LOW (ref 136–146)
GLUCOSE BLDV-MCNC: 472 — CRITICAL HIGH (ref 70–99)
GLUCOSE SERPL-MCNC: 462 MG/DL — CRITICAL HIGH (ref 70–99)
GLUCOSE UR-MCNC: >1000 — HIGH
HCO3 BLDV-SCNC: 26 MMOL/L — SIGNIFICANT CHANGE UP (ref 20–27)
HCT VFR BLD CALC: 31.9 % — LOW (ref 34.5–45)
HCT VFR BLDV CALC: 33.7 % — LOW (ref 34.5–45)
HGB BLD-MCNC: 10.2 G/DL — LOW (ref 11.5–15.5)
HGB BLDV-MCNC: 10.9 G/DL — LOW (ref 11.5–15.5)
HYALINE CASTS # UR AUTO: NEGATIVE — SIGNIFICANT CHANGE UP
HYPOCHROMIA BLD QL: SLIGHT — SIGNIFICANT CHANGE UP
IMM GRANULOCYTES # BLD AUTO: 0.2 # — SIGNIFICANT CHANGE UP
IMM GRANULOCYTES NFR BLD AUTO: 2.4 % — HIGH (ref 0–1.5)
INR BLD: 1.15 — SIGNIFICANT CHANGE UP (ref 0.88–1.17)
KETONES UR-MCNC: NEGATIVE — SIGNIFICANT CHANGE UP
LACTATE BLDV-MCNC: 1.5 MMOL/L — SIGNIFICANT CHANGE UP (ref 0.5–2)
LEUKOCYTE ESTERASE UR-ACNC: NEGATIVE — SIGNIFICANT CHANGE UP
LG PLATELETS BLD QL AUTO: SLIGHT — SIGNIFICANT CHANGE UP
LYMPHOCYTES # BLD AUTO: 0.25 K/UL — LOW (ref 1–3.3)
LYMPHOCYTES # BLD AUTO: 3 % — LOW (ref 13–44)
MANUAL SMEAR VERIFICATION: SIGNIFICANT CHANGE UP
MCHC RBC-ENTMCNC: 25.4 PG — LOW (ref 27–34)
MCHC RBC-ENTMCNC: 32 % — SIGNIFICANT CHANGE UP (ref 32–36)
MCV RBC AUTO: 79.6 FL — LOW (ref 80–100)
MONOCYTES # BLD AUTO: 0.26 K/UL — SIGNIFICANT CHANGE UP (ref 0–0.9)
MONOCYTES NFR BLD AUTO: 3.1 % — SIGNIFICANT CHANGE UP (ref 2–14)
NEUTROPHILS # BLD AUTO: 7.56 K/UL — HIGH (ref 1.8–7.4)
NEUTROPHILS NFR BLD AUTO: 91.3 % — HIGH (ref 43–77)
NITRITE UR-MCNC: NEGATIVE — SIGNIFICANT CHANGE UP
NRBC # FLD: 0 — SIGNIFICANT CHANGE UP
OVALOCYTES BLD QL SMEAR: SLIGHT — SIGNIFICANT CHANGE UP
PCO2 BLDV: 40 MMHG — LOW (ref 41–51)
PH BLDV: 7.43 PH — SIGNIFICANT CHANGE UP (ref 7.32–7.43)
PH UR: 6 — SIGNIFICANT CHANGE UP (ref 5–8)
PLATELET # BLD AUTO: 302 K/UL — SIGNIFICANT CHANGE UP (ref 150–400)
PLATELET COUNT - ESTIMATE: NORMAL — SIGNIFICANT CHANGE UP
PMV BLD: 10 FL — SIGNIFICANT CHANGE UP (ref 7–13)
PO2 BLDV: 30 MMHG — LOW (ref 35–40)
POTASSIUM BLDV-SCNC: 4.1 MMOL/L — SIGNIFICANT CHANGE UP (ref 3.4–4.5)
POTASSIUM SERPL-MCNC: 4.4 MMOL/L — SIGNIFICANT CHANGE UP (ref 3.5–5.3)
POTASSIUM SERPL-SCNC: 4.4 MMOL/L — SIGNIFICANT CHANGE UP (ref 3.5–5.3)
PROT SERPL-MCNC: 7.7 G/DL — SIGNIFICANT CHANGE UP (ref 6–8.3)
PROT UR-MCNC: HIGH
PROTHROM AB SERPL-ACNC: 12.8 SEC — SIGNIFICANT CHANGE UP (ref 9.8–13.1)
RBC # BLD: 4.01 M/UL — SIGNIFICANT CHANGE UP (ref 3.8–5.2)
RBC # FLD: 14.6 % — HIGH (ref 10.3–14.5)
RBC CASTS # UR COMP ASSIST: SIGNIFICANT CHANGE UP (ref 0–?)
RH IG SCN BLD-IMP: POSITIVE — SIGNIFICANT CHANGE UP
SAO2 % BLDV: 51.6 % — LOW (ref 60–85)
SODIUM SERPL-SCNC: 125 MMOL/L — LOW (ref 135–145)
SP GR SPEC: 1.02 — SIGNIFICANT CHANGE UP (ref 1–1.04)
SQUAMOUS # UR AUTO: SIGNIFICANT CHANGE UP
UROBILINOGEN FLD QL: NORMAL — SIGNIFICANT CHANGE UP
WBC # BLD: 8.29 K/UL — SIGNIFICANT CHANGE UP (ref 3.8–10.5)
WBC # FLD AUTO: 8.29 K/UL — SIGNIFICANT CHANGE UP (ref 3.8–10.5)
WBC UR QL: SIGNIFICANT CHANGE UP (ref 0–?)

## 2018-09-03 PROCEDURE — 71045 X-RAY EXAM CHEST 1 VIEW: CPT | Mod: 26

## 2018-09-03 PROCEDURE — 70450 CT HEAD/BRAIN W/O DYE: CPT | Mod: 26

## 2018-09-03 RX ORDER — ONDANSETRON 8 MG/1
4 TABLET, FILM COATED ORAL ONCE
Qty: 0 | Refills: 0 | Status: COMPLETED | OUTPATIENT
Start: 2018-09-03 | End: 2018-09-03

## 2018-09-03 RX ORDER — MORPHINE SULFATE 50 MG/1
4 CAPSULE, EXTENDED RELEASE ORAL ONCE
Qty: 0 | Refills: 0 | Status: DISCONTINUED | OUTPATIENT
Start: 2018-09-03 | End: 2018-09-03

## 2018-09-03 RX ORDER — ALBUTEROL 90 UG/1
2.5 AEROSOL, METERED ORAL ONCE
Qty: 0 | Refills: 0 | Status: COMPLETED | OUTPATIENT
Start: 2018-09-03 | End: 2018-09-03

## 2018-09-03 RX ORDER — INSULIN LISPRO 100/ML
2 VIAL (ML) SUBCUTANEOUS ONCE
Qty: 0 | Refills: 0 | Status: COMPLETED | OUTPATIENT
Start: 2018-09-03 | End: 2018-09-03

## 2018-09-03 RX ORDER — SODIUM CHLORIDE 9 MG/ML
1000 INJECTION, SOLUTION INTRAVENOUS ONCE
Qty: 0 | Refills: 0 | Status: COMPLETED | OUTPATIENT
Start: 2018-09-03 | End: 2018-09-03

## 2018-09-03 RX ADMIN — SODIUM CHLORIDE 1000 MILLILITER(S): 9 INJECTION, SOLUTION INTRAVENOUS at 19:53

## 2018-09-03 RX ADMIN — MORPHINE SULFATE 4 MILLIGRAM(S): 50 CAPSULE, EXTENDED RELEASE ORAL at 21:25

## 2018-09-03 RX ADMIN — MORPHINE SULFATE 4 MILLIGRAM(S): 50 CAPSULE, EXTENDED RELEASE ORAL at 19:53

## 2018-09-03 RX ADMIN — SODIUM CHLORIDE 1000 MILLILITER(S): 9 INJECTION, SOLUTION INTRAVENOUS at 17:23

## 2018-09-03 RX ADMIN — MORPHINE SULFATE 4 MILLIGRAM(S): 50 CAPSULE, EXTENDED RELEASE ORAL at 17:22

## 2018-09-03 RX ADMIN — MORPHINE SULFATE 4 MILLIGRAM(S): 50 CAPSULE, EXTENDED RELEASE ORAL at 22:25

## 2018-09-03 RX ADMIN — ONDANSETRON 4 MILLIGRAM(S): 8 TABLET, FILM COATED ORAL at 17:23

## 2018-09-03 RX ADMIN — SODIUM CHLORIDE 1000 MILLILITER(S): 9 INJECTION, SOLUTION INTRAVENOUS at 20:30

## 2018-09-03 RX ADMIN — SODIUM CHLORIDE 1000 MILLILITER(S): 9 INJECTION, SOLUTION INTRAVENOUS at 19:56

## 2018-09-03 RX ADMIN — Medication 2 UNIT(S): at 20:45

## 2018-09-03 RX ADMIN — ALBUTEROL 2.5 MILLIGRAM(S): 90 AEROSOL, METERED ORAL at 21:09

## 2018-09-03 NOTE — ED PROVIDER NOTE - PHYSICAL EXAMINATION
Gen: Well appearing, NAD  Head: NCAT  HEENT: PERRL, MMM, normal conjunctiva, anicteric, neck supple  Lung: CTAB, no adventitious sounds  CV: RRR, no murmurs  Abd: soft, NTND, no rebound or guarding  MSK: No edema, no visible deformities, no focal TTP of R shoulder, glenohumeral joint, clavicle or chest wall.   Neuro: 5/5 strength and normal sensation in all extremities. Ambulatory with stable gait.   Skin: Warm and dry, no evidence of rash  Psych: normal mood and affect Gen: Well appearing,   Head: NCAT  HEENT: PERRL, MMM, normal conjunctiva, anicteric, neck supple  Lung: CTAB, no adventitious sounds  CV: RRR, no murmurs  Abd: soft, NTND, no rebound or guarding  MSK: No edema, no visible deformities, no focal TTP of R shoulder, glenohumeral joint, clavicle or chest wall.   Neuro: 5/5 strength and normal sensation in all extremities. Ambulatory with stable gait.   Skin: Warm and dry, no evidence of rash  Psych: normal mood and affect

## 2018-09-03 NOTE — ED PROVIDER NOTE - MEDICAL DECISION MAKING DETAILS
Pt with shoulder and arm pain, does not appear to be mechanical/functional, consider possibly neuropathic.  Pain control, labs, attempt to obtain MRI c spine report, reassess. d/w PMD.

## 2018-09-03 NOTE — ED PROVIDER NOTE - OBJECTIVE STATEMENT
86F with hx of lung CA, polymyalgia rheumatica, in remission from lung cancer, here for progressive R shoulder pain x 2 month, worse over last several days. Had MRI in last 2d showing suprapspinatus tear as well as apical lung mass, unclear of brachial plexus involvement.  No focal motor or sensory deficits. No recent trauma. Patient was primary caretaker for recently  .  No vision changes, neck pain, chest pain, CP, SOB. 86F with c lung CA (not currently on Tx), polymyalgia rheumatica, here for progressive R shoulder pain x 2 month, worse over last several days. Pain is severe, radiates down to entire arm and up neck on left side.  Not associated with specific movements.  Had MRI in last 2d showing partial suprapspinatus tear as well as apical lung mass, unclear of brachial plexus involvement.  No focal motor or sensory deficits. No recent trauma. Patient was primary caretaker for recently  .  No vision changes, neck pain, chest pain, CP, SOB.  MRI c spine was not but not read yet.

## 2018-09-03 NOTE — ED PROVIDER NOTE - NS ED ROS FT
Constitutional: no fever  Eyes: no conjunctivitis  Ears: no ear pain   Nose: no nasal congestion, Mouth/Throat: no throat pain, Neck: no stiffness  Cardiovascular: no chest pain  Chest: As noted in hpi   Gastrointestinal: no abdominal pain, no vomiting and diarrhea  MSK: As noted in hpi   : no dysuria  Skin: no rash  Neuro: no LOC  All other review of systems negative except as noted in HPI

## 2018-09-03 NOTE — ED ADULT TRIAGE NOTE - CHIEF COMPLAINT QUOTE
pt coming from home with right shoulder/R. upper back area x 2 months was seen and D/C from [facility] 2 days ago.   MRI done =Tear of the Supraspinatus tendon.    Fs= [id number] at triage

## 2018-09-03 NOTE — ED PROVIDER NOTE - ATTENDING CONTRIBUTION TO CARE
Attending Attestation: Dr. Perez  I have personally performed a history and physical examination of the patient and discussed management with the resident as well as the patient.  I reviewed the resident's note and agree with the documented findings and plan of care.  I have authored and modified critical sections of the Provider Note, including but not limited to HPI, Physical Exam and MDM. Pt with shoulder and arm pain, does not appear to be mechanical/functional, consider possibly neuropathic.  Pain control, labs, attempt to obtain MRI c spine report, reassess. d/w PMD.

## 2018-09-03 NOTE — ED ADULT NURSE REASSESSMENT NOTE - NS ED NURSE REASSESS COMMENT FT1
Pt reassessed by overnight covering RN - pt having dry cough, pt states normally unable to cough 2/2 Lung CA, vitals taken, noted o2sat to be 84%. MD Garner called to bedside, advised, will give albuterol treatment and call for stat port chest xr. 3L O2 NC placed, pt o2 sat improving to 96%, Pt denies SOB / CP at this time, only pain c/o is in R Shoulder/Back. WIll CTM closely.

## 2018-09-03 NOTE — ED PROVIDER NOTE - PROGRESS NOTE DETAILS
Chris: I had several d/w pt's PMD by phone and her family who were at bedside. We reviewed all images.  Pain improved greatly with morphine.  Unclear origin of pain, possibly d/t small rotator cuff tear but may also be neurpathic in origin.  Per verbal report by rads for MR c spine no evidence of cord compression at c spine despite significant stenosis.  HOwever, evidence of intracranial lesion "with blood components" and CTH recommended.  Despite intial intent for DC plan to change for admit for fuerther eval of intracranial lesion and also for MR brachial plexus. Pt in agreemnt. Chris: I had several d/w pt's PMD by phone and her family who were at bedside. We reviewed all images.  Pain improved greatly with morphine.  Unclear origin of pain, possibly d/t small rotator cuff tear but may also be neurpathic in origin.  Per verbal report by rads for MR c spine no evidence of cord compression at c spine despite significant stenosis.  HOwever, evidence of intracranial lesion "with blood components" and CTH recommended. I explained to pt and famil this may be hemorrhagic met vs AVM vs other. Despite initial intent for DC plan to change for admit for fuerther eval of intracranial lesion and also for MR brachial plexus. Pt in agreemnt.

## 2018-09-03 NOTE — ED PROVIDER NOTE - MUSCULOSKELETAL, MLM
Spine appears normal, range of motion is not limited, no muscle or joint tenderness - negative impingement signs.

## 2018-09-04 DIAGNOSIS — G93.9 DISORDER OF BRAIN, UNSPECIFIED: ICD-10-CM

## 2018-09-04 DIAGNOSIS — E11.65 TYPE 2 DIABETES MELLITUS WITH HYPERGLYCEMIA: ICD-10-CM

## 2018-09-04 DIAGNOSIS — M25.519 PAIN IN UNSPECIFIED SHOULDER: ICD-10-CM

## 2018-09-04 DIAGNOSIS — E87.1 HYPO-OSMOLALITY AND HYPONATREMIA: ICD-10-CM

## 2018-09-04 DIAGNOSIS — Z71.89 OTHER SPECIFIED COUNSELING: ICD-10-CM

## 2018-09-04 DIAGNOSIS — Z29.8 ENCOUNTER FOR OTHER SPECIFIED PROPHYLACTIC MEASURES: ICD-10-CM

## 2018-09-04 DIAGNOSIS — I10 ESSENTIAL (PRIMARY) HYPERTENSION: ICD-10-CM

## 2018-09-04 DIAGNOSIS — E78.5 HYPERLIPIDEMIA, UNSPECIFIED: ICD-10-CM

## 2018-09-04 DIAGNOSIS — E11.9 TYPE 2 DIABETES MELLITUS WITHOUT COMPLICATIONS: ICD-10-CM

## 2018-09-04 DIAGNOSIS — M35.3 POLYMYALGIA RHEUMATICA: ICD-10-CM

## 2018-09-04 DIAGNOSIS — J43.9 EMPHYSEMA, UNSPECIFIED: ICD-10-CM

## 2018-09-04 DIAGNOSIS — C34.90 MALIGNANT NEOPLASM OF UNSPECIFIED PART OF UNSPECIFIED BRONCHUS OR LUNG: ICD-10-CM

## 2018-09-04 LAB
B PERT DNA SPEC QL NAA+PROBE: SIGNIFICANT CHANGE UP
BASE EXCESS BLDA CALC-SCNC: 0 MMOL/L — SIGNIFICANT CHANGE UP
BUN SERPL-MCNC: 30 MG/DL — HIGH (ref 7–23)
C PNEUM DNA SPEC QL NAA+PROBE: NOT DETECTED — SIGNIFICANT CHANGE UP
CALCIUM SERPL-MCNC: 9 MG/DL — SIGNIFICANT CHANGE UP (ref 8.4–10.5)
CHLORIDE SERPL-SCNC: 86 MMOL/L — LOW (ref 98–107)
CK MB BLD-MCNC: 3.26 NG/ML — SIGNIFICANT CHANGE UP (ref 1–4.7)
CK MB BLD-MCNC: SIGNIFICANT CHANGE UP (ref 0–2.5)
CK SERPL-CCNC: 38 U/L — SIGNIFICANT CHANGE UP (ref 25–170)
CO2 SERPL-SCNC: 24 MMOL/L — SIGNIFICANT CHANGE UP (ref 22–31)
CREAT SERPL-MCNC: 0.91 MG/DL — SIGNIFICANT CHANGE UP (ref 0.5–1.3)
FLUAV H1 2009 PAND RNA SPEC QL NAA+PROBE: NOT DETECTED — SIGNIFICANT CHANGE UP
FLUAV H1 RNA SPEC QL NAA+PROBE: NOT DETECTED — SIGNIFICANT CHANGE UP
FLUAV H3 RNA SPEC QL NAA+PROBE: NOT DETECTED — SIGNIFICANT CHANGE UP
FLUAV SUBTYP SPEC NAA+PROBE: SIGNIFICANT CHANGE UP
FLUBV RNA SPEC QL NAA+PROBE: NOT DETECTED — SIGNIFICANT CHANGE UP
GLUCOSE BLDC GLUCOMTR-MCNC: 277 MG/DL — HIGH (ref 70–99)
GLUCOSE BLDC GLUCOMTR-MCNC: 278 MG/DL — HIGH (ref 70–99)
GLUCOSE BLDC GLUCOMTR-MCNC: 292 MG/DL — HIGH (ref 70–99)
GLUCOSE BLDC GLUCOMTR-MCNC: 337 MG/DL — HIGH (ref 70–99)
GLUCOSE BLDC GLUCOMTR-MCNC: 356 MG/DL — HIGH (ref 70–99)
GLUCOSE SERPL-MCNC: 343 MG/DL — HIGH (ref 70–99)
HADV DNA SPEC QL NAA+PROBE: NOT DETECTED — SIGNIFICANT CHANGE UP
HCO3 BLDA-SCNC: 25 MMOL/L — SIGNIFICANT CHANGE UP (ref 22–26)
HCOV 229E RNA SPEC QL NAA+PROBE: NOT DETECTED — SIGNIFICANT CHANGE UP
HCOV HKU1 RNA SPEC QL NAA+PROBE: NOT DETECTED — SIGNIFICANT CHANGE UP
HCOV NL63 RNA SPEC QL NAA+PROBE: NOT DETECTED — SIGNIFICANT CHANGE UP
HCOV OC43 RNA SPEC QL NAA+PROBE: NOT DETECTED — SIGNIFICANT CHANGE UP
HCT VFR BLD CALC: 30.4 % — LOW (ref 34.5–45)
HGB BLD-MCNC: 9.6 G/DL — LOW (ref 11.5–15.5)
HMPV RNA SPEC QL NAA+PROBE: NOT DETECTED — SIGNIFICANT CHANGE UP
HPIV1 RNA SPEC QL NAA+PROBE: NOT DETECTED — SIGNIFICANT CHANGE UP
HPIV2 RNA SPEC QL NAA+PROBE: NOT DETECTED — SIGNIFICANT CHANGE UP
HPIV3 RNA SPEC QL NAA+PROBE: NOT DETECTED — SIGNIFICANT CHANGE UP
HPIV4 RNA SPEC QL NAA+PROBE: NOT DETECTED — SIGNIFICANT CHANGE UP
M PNEUMO DNA SPEC QL NAA+PROBE: NOT DETECTED — SIGNIFICANT CHANGE UP
MAGNESIUM SERPL-MCNC: 1.6 MG/DL — SIGNIFICANT CHANGE UP (ref 1.6–2.6)
MCHC RBC-ENTMCNC: 24.9 PG — LOW (ref 27–34)
MCHC RBC-ENTMCNC: 31.6 % — LOW (ref 32–36)
MCV RBC AUTO: 79 FL — LOW (ref 80–100)
NRBC # FLD: 0 — SIGNIFICANT CHANGE UP
OSMOLALITY SERPL: 279 MOSMO/KG — SIGNIFICANT CHANGE UP (ref 275–295)
PCO2 BLDA: 39 MMHG — SIGNIFICANT CHANGE UP (ref 32–48)
PH BLDA: 7.41 PH — SIGNIFICANT CHANGE UP (ref 7.35–7.45)
PHOSPHATE SERPL-MCNC: 2.4 MG/DL — LOW (ref 2.5–4.5)
PLATELET # BLD AUTO: 266 K/UL — SIGNIFICANT CHANGE UP (ref 150–400)
PMV BLD: 9.8 FL — SIGNIFICANT CHANGE UP (ref 7–13)
PO2 BLDA: 75 MMHG — LOW (ref 83–108)
POTASSIUM SERPL-MCNC: 3.9 MMOL/L — SIGNIFICANT CHANGE UP (ref 3.5–5.3)
POTASSIUM SERPL-SCNC: 3.9 MMOL/L — SIGNIFICANT CHANGE UP (ref 3.5–5.3)
RBC # BLD: 3.85 M/UL — SIGNIFICANT CHANGE UP (ref 3.8–5.2)
RBC # FLD: 14.9 % — HIGH (ref 10.3–14.5)
RSV RNA SPEC QL NAA+PROBE: NOT DETECTED — SIGNIFICANT CHANGE UP
RV+EV RNA SPEC QL NAA+PROBE: NOT DETECTED — SIGNIFICANT CHANGE UP
SAO2 % BLDA: 94.6 % — LOW (ref 95–99)
SODIUM SERPL-SCNC: 124 MMOL/L — LOW (ref 135–145)
TROPONIN T, HIGH SENSITIVITY: 63 NG/L — CRITICAL HIGH (ref ?–14)
WBC # BLD: 8.1 K/UL — SIGNIFICANT CHANGE UP (ref 3.8–10.5)
WBC # FLD AUTO: 8.1 K/UL — SIGNIFICANT CHANGE UP (ref 3.8–10.5)

## 2018-09-04 PROCEDURE — 70553 MRI BRAIN STEM W/O & W/DYE: CPT | Mod: 26

## 2018-09-04 PROCEDURE — 93010 ELECTROCARDIOGRAM REPORT: CPT

## 2018-09-04 PROCEDURE — 70544 MR ANGIOGRAPHY HEAD W/O DYE: CPT | Mod: 26,59

## 2018-09-04 PROCEDURE — 99231 SBSQ HOSP IP/OBS SF/LOW 25: CPT

## 2018-09-04 PROCEDURE — 71045 X-RAY EXAM CHEST 1 VIEW: CPT | Mod: 26

## 2018-09-04 PROCEDURE — 99497 ADVNCD CARE PLAN 30 MIN: CPT | Mod: 25

## 2018-09-04 PROCEDURE — 99291 CRITICAL CARE FIRST HOUR: CPT

## 2018-09-04 PROCEDURE — 71552 MRI CHEST W/O & W/DYE: CPT | Mod: 26

## 2018-09-04 PROCEDURE — 99223 1ST HOSP IP/OBS HIGH 75: CPT

## 2018-09-04 PROCEDURE — 99223 1ST HOSP IP/OBS HIGH 75: CPT | Mod: GC

## 2018-09-04 RX ORDER — ACETAMINOPHEN 500 MG
1000 TABLET ORAL ONCE
Qty: 0 | Refills: 0 | Status: COMPLETED | OUTPATIENT
Start: 2018-09-04 | End: 2018-09-05

## 2018-09-04 RX ORDER — SENNA PLUS 8.6 MG/1
2 TABLET ORAL AT BEDTIME
Qty: 0 | Refills: 0 | Status: DISCONTINUED | OUTPATIENT
Start: 2018-09-04 | End: 2018-09-05

## 2018-09-04 RX ORDER — NIFEDIPINE 30 MG
30 TABLET, EXTENDED RELEASE 24 HR ORAL DAILY
Qty: 0 | Refills: 0 | Status: DISCONTINUED | OUTPATIENT
Start: 2018-09-04 | End: 2018-09-04

## 2018-09-04 RX ORDER — ONDANSETRON 8 MG/1
4 TABLET, FILM COATED ORAL ONCE
Qty: 0 | Refills: 0 | Status: COMPLETED | OUTPATIENT
Start: 2018-09-04 | End: 2018-09-04

## 2018-09-04 RX ORDER — ACETAMINOPHEN 500 MG
650 TABLET ORAL EVERY 6 HOURS
Qty: 0 | Refills: 0 | Status: DISCONTINUED | OUTPATIENT
Start: 2018-09-04 | End: 2018-09-04

## 2018-09-04 RX ORDER — SODIUM CHLORIDE 9 MG/ML
1000 INJECTION, SOLUTION INTRAVENOUS
Qty: 0 | Refills: 0 | Status: DISCONTINUED | OUTPATIENT
Start: 2018-09-04 | End: 2018-09-15

## 2018-09-04 RX ORDER — FUROSEMIDE 40 MG
20 TABLET ORAL ONCE
Qty: 0 | Refills: 0 | Status: COMPLETED | OUTPATIENT
Start: 2018-09-04 | End: 2018-09-04

## 2018-09-04 RX ORDER — MORPHINE SULFATE 50 MG/1
4 CAPSULE, EXTENDED RELEASE ORAL EVERY 4 HOURS
Qty: 0 | Refills: 0 | Status: DISCONTINUED | OUTPATIENT
Start: 2018-09-04 | End: 2018-09-04

## 2018-09-04 RX ORDER — METOPROLOL TARTRATE 50 MG
25 TABLET ORAL EVERY 12 HOURS
Qty: 0 | Refills: 0 | Status: DISCONTINUED | OUTPATIENT
Start: 2018-09-04 | End: 2018-09-04

## 2018-09-04 RX ORDER — DEXTROSE 50 % IN WATER 50 %
25 SYRINGE (ML) INTRAVENOUS ONCE
Qty: 0 | Refills: 0 | Status: DISCONTINUED | OUTPATIENT
Start: 2018-09-04 | End: 2018-09-15

## 2018-09-04 RX ORDER — FENOFIBRATE,MICRONIZED 130 MG
145 CAPSULE ORAL DAILY
Qty: 0 | Refills: 0 | Status: DISCONTINUED | OUTPATIENT
Start: 2018-09-04 | End: 2018-09-15

## 2018-09-04 RX ORDER — METOPROLOL TARTRATE 50 MG
25 TABLET ORAL EVERY 12 HOURS
Qty: 0 | Refills: 0 | Status: DISCONTINUED | OUTPATIENT
Start: 2018-09-04 | End: 2018-09-05

## 2018-09-04 RX ORDER — INSULIN LISPRO 100/ML
VIAL (ML) SUBCUTANEOUS EVERY 6 HOURS
Qty: 0 | Refills: 0 | Status: DISCONTINUED | OUTPATIENT
Start: 2018-09-04 | End: 2018-09-05

## 2018-09-04 RX ORDER — DEXTROSE 50 % IN WATER 50 %
15 SYRINGE (ML) INTRAVENOUS ONCE
Qty: 0 | Refills: 0 | Status: DISCONTINUED | OUTPATIENT
Start: 2018-09-04 | End: 2018-09-15

## 2018-09-04 RX ORDER — ONDANSETRON 8 MG/1
4 TABLET, FILM COATED ORAL EVERY 6 HOURS
Qty: 0 | Refills: 0 | Status: DISCONTINUED | OUTPATIENT
Start: 2018-09-04 | End: 2018-09-04

## 2018-09-04 RX ORDER — METOPROLOL TARTRATE 50 MG
50 TABLET ORAL
Qty: 0 | Refills: 0 | COMMUNITY

## 2018-09-04 RX ORDER — METOCLOPRAMIDE HCL 10 MG
10 TABLET ORAL ONCE
Qty: 0 | Refills: 0 | Status: COMPLETED | OUTPATIENT
Start: 2018-09-04 | End: 2018-09-04

## 2018-09-04 RX ORDER — INSULIN GLARGINE 100 [IU]/ML
5 INJECTION, SOLUTION SUBCUTANEOUS ONCE
Qty: 0 | Refills: 0 | Status: COMPLETED | OUTPATIENT
Start: 2018-09-04 | End: 2018-09-04

## 2018-09-04 RX ORDER — METOPROLOL TARTRATE 50 MG
5 TABLET ORAL ONCE
Qty: 0 | Refills: 0 | Status: DISCONTINUED | OUTPATIENT
Start: 2018-09-04 | End: 2018-09-04

## 2018-09-04 RX ORDER — INSULIN LISPRO 100/ML
4 VIAL (ML) SUBCUTANEOUS
Qty: 0 | Refills: 0 | Status: DISCONTINUED | OUTPATIENT
Start: 2018-09-04 | End: 2018-09-04

## 2018-09-04 RX ORDER — LABETALOL HCL 100 MG
5 TABLET ORAL ONCE
Qty: 0 | Refills: 0 | Status: DISCONTINUED | OUTPATIENT
Start: 2018-09-04 | End: 2018-09-04

## 2018-09-04 RX ORDER — IPRATROPIUM/ALBUTEROL SULFATE 18-103MCG
3 AEROSOL WITH ADAPTER (GRAM) INHALATION ONCE
Qty: 0 | Refills: 0 | Status: COMPLETED | OUTPATIENT
Start: 2018-09-04 | End: 2018-09-04

## 2018-09-04 RX ORDER — DOCUSATE SODIUM 100 MG
100 CAPSULE ORAL THREE TIMES A DAY
Qty: 0 | Refills: 0 | Status: DISCONTINUED | OUTPATIENT
Start: 2018-09-04 | End: 2018-09-15

## 2018-09-04 RX ORDER — NIFEDIPINE 30 MG
60 TABLET, EXTENDED RELEASE 24 HR ORAL AT BEDTIME
Qty: 0 | Refills: 0 | Status: DISCONTINUED | OUTPATIENT
Start: 2018-09-04 | End: 2018-09-04

## 2018-09-04 RX ORDER — DILTIAZEM HCL 120 MG
7.5 CAPSULE, EXT RELEASE 24 HR ORAL
Qty: 125 | Refills: 0 | Status: DISCONTINUED | OUTPATIENT
Start: 2018-09-04 | End: 2018-09-05

## 2018-09-04 RX ORDER — INSULIN GLARGINE 100 [IU]/ML
10 INJECTION, SOLUTION SUBCUTANEOUS AT BEDTIME
Qty: 0 | Refills: 0 | Status: DISCONTINUED | OUTPATIENT
Start: 2018-09-04 | End: 2018-09-05

## 2018-09-04 RX ORDER — SODIUM,POTASSIUM PHOSPHATES 278-250MG
1 POWDER IN PACKET (EA) ORAL
Qty: 0 | Refills: 0 | Status: DISCONTINUED | OUTPATIENT
Start: 2018-09-04 | End: 2018-09-05

## 2018-09-04 RX ORDER — CHLORHEXIDINE GLUCONATE 213 G/1000ML
1 SOLUTION TOPICAL
Qty: 0 | Refills: 0 | Status: DISCONTINUED | OUTPATIENT
Start: 2018-09-04 | End: 2018-09-08

## 2018-09-04 RX ORDER — GLUCAGON INJECTION, SOLUTION 0.5 MG/.1ML
1 INJECTION, SOLUTION SUBCUTANEOUS ONCE
Qty: 0 | Refills: 0 | Status: DISCONTINUED | OUTPATIENT
Start: 2018-09-04 | End: 2018-09-15

## 2018-09-04 RX ORDER — INSULIN LISPRO 100/ML
VIAL (ML) SUBCUTANEOUS AT BEDTIME
Qty: 0 | Refills: 0 | Status: DISCONTINUED | OUTPATIENT
Start: 2018-09-04 | End: 2018-09-04

## 2018-09-04 RX ORDER — DEXTROSE 50 % IN WATER 50 %
12.5 SYRINGE (ML) INTRAVENOUS ONCE
Qty: 0 | Refills: 0 | Status: DISCONTINUED | OUTPATIENT
Start: 2018-09-04 | End: 2018-09-15

## 2018-09-04 RX ORDER — INSULIN LISPRO 100/ML
VIAL (ML) SUBCUTANEOUS
Qty: 0 | Refills: 0 | Status: DISCONTINUED | OUTPATIENT
Start: 2018-09-04 | End: 2018-09-04

## 2018-09-04 RX ORDER — MORPHINE SULFATE 50 MG/1
2 CAPSULE, EXTENDED RELEASE ORAL EVERY 4 HOURS
Qty: 0 | Refills: 0 | Status: DISCONTINUED | OUTPATIENT
Start: 2018-09-04 | End: 2018-09-04

## 2018-09-04 RX ORDER — SODIUM CHLORIDE 9 MG/ML
1000 INJECTION INTRAMUSCULAR; INTRAVENOUS; SUBCUTANEOUS
Qty: 0 | Refills: 0 | Status: DISCONTINUED | OUTPATIENT
Start: 2018-09-04 | End: 2018-09-06

## 2018-09-04 RX ADMIN — MORPHINE SULFATE 2 MILLIGRAM(S): 50 CAPSULE, EXTENDED RELEASE ORAL at 09:46

## 2018-09-04 RX ADMIN — Medication 40 MILLIGRAM(S): at 18:50

## 2018-09-04 RX ADMIN — MORPHINE SULFATE 2 MILLIGRAM(S): 50 CAPSULE, EXTENDED RELEASE ORAL at 09:22

## 2018-09-04 RX ADMIN — ONDANSETRON 4 MILLIGRAM(S): 8 TABLET, FILM COATED ORAL at 15:41

## 2018-09-04 RX ADMIN — Medication 25 MILLIGRAM(S): at 07:08

## 2018-09-04 RX ADMIN — ONDANSETRON 4 MILLIGRAM(S): 8 TABLET, FILM COATED ORAL at 09:40

## 2018-09-04 RX ADMIN — Medication 3: at 12:58

## 2018-09-04 RX ADMIN — MORPHINE SULFATE 4 MILLIGRAM(S): 50 CAPSULE, EXTENDED RELEASE ORAL at 13:18

## 2018-09-04 RX ADMIN — Medication 100 MILLIGRAM(S): at 12:48

## 2018-09-04 RX ADMIN — ONDANSETRON 4 MILLIGRAM(S): 8 TABLET, FILM COATED ORAL at 17:36

## 2018-09-04 RX ADMIN — Medication 25 MILLIGRAM(S): at 18:50

## 2018-09-04 RX ADMIN — Medication 20 MILLIGRAM(S): at 17:34

## 2018-09-04 RX ADMIN — SODIUM CHLORIDE 50 MILLILITER(S): 9 INJECTION INTRAMUSCULAR; INTRAVENOUS; SUBCUTANEOUS at 19:00

## 2018-09-04 RX ADMIN — MORPHINE SULFATE 4 MILLIGRAM(S): 50 CAPSULE, EXTENDED RELEASE ORAL at 12:48

## 2018-09-04 RX ADMIN — Medication 10 MILLIGRAM(S): at 07:05

## 2018-09-04 RX ADMIN — Medication 3 MILLILITER(S): at 17:03

## 2018-09-04 RX ADMIN — SODIUM CHLORIDE 50 MILLILITER(S): 9 INJECTION INTRAMUSCULAR; INTRAVENOUS; SUBCUTANEOUS at 15:00

## 2018-09-04 RX ADMIN — Medication 5: at 18:59

## 2018-09-04 RX ADMIN — Medication 7.5 MG/HR: at 19:35

## 2018-09-04 RX ADMIN — Medication 30 MILLIGRAM(S): at 10:09

## 2018-09-04 RX ADMIN — Medication 10 MILLIGRAM(S): at 19:40

## 2018-09-04 RX ADMIN — Medication 145 MILLIGRAM(S): at 12:48

## 2018-09-04 RX ADMIN — Medication 650 MILLIGRAM(S): at 14:34

## 2018-09-04 RX ADMIN — Medication 650 MILLIGRAM(S): at 15:04

## 2018-09-04 RX ADMIN — ONDANSETRON 4 MILLIGRAM(S): 8 TABLET, FILM COATED ORAL at 12:56

## 2018-09-04 RX ADMIN — Medication 3 MILLILITER(S): at 16:35

## 2018-09-04 RX ADMIN — INSULIN GLARGINE 5 UNIT(S): 100 INJECTION, SOLUTION SUBCUTANEOUS at 22:56

## 2018-09-04 RX ADMIN — Medication 4: at 09:49

## 2018-09-04 NOTE — CONSULT NOTE ADULT - PROBLEM SELECTOR RECOMMENDATION 9
Primary team to reach out to patients primary radiation oncologist at [facility].   Unclear whether imaging is suggestive of worsening malignancy vs post radiation changes. However, imaging of the head also suggestive of hemorrhagic neoplasm met.   Given acute respiratory failure, decreased functional status and poor nutritional status, overall prognosis is extremely guarded.

## 2018-09-04 NOTE — H&P ADULT - PROBLEM SELECTOR PLAN 3
hx of lung Ca diagnosed in 2010, s/p VATS, wedge resection, RT   follows with Dr. Saini from [facility], not receiving treatment at this time  in setting of possible met to brain, will c/s palliative care  Call oncologist in AM to inform of admission

## 2018-09-04 NOTE — H&P ADULT - PROBLEM SELECTOR PLAN 7
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

## 2018-09-04 NOTE — H&P ADULT - PROBLEM SELECTOR PLAN 6
Hyperglycemic to 462 on BMP   On multiple oral DM medications, also on prednisone 10 mg twice daily  Will order ISS AC/HS for now  Consider adding long acting insulin, give persistent hyperglycemia while inpatient   Consistent carb diet  F/u HbA1c in AM Hyperglycemic to 462 on BMP   Endocrine c/s   On multiple oral DM medications, also on prednisone 10 mg twice daily  Will order ISS AC/HS for now  Consider adding long acting insulin, give persistent hyperglycemia while inpatient   Consistent carb diet  F/u HbA1c in AM

## 2018-09-04 NOTE — CONSULT NOTE ADULT - SUBJECTIVE AND OBJECTIVE BOX
86F with hx of lung CA, polymyalgia rheumatica, in remission from lung cancer, here for progressive R shoulder pain x 2 month, worse over last several days. Had MRI in last 2d showing suprapspinatus tear as well as apical lung mass, unclear of brachial plexus involvement.  No focal motor or sensory deficits. No recent trauma. Patient had noncontrast head CT preformed to evaluate a cerebellar lesion noted on cervical spine MRI. Denies headache, nausea, vomiting, loss of coordination or gait instability    WDWN female in NAD  Vital Signs Last 24 Hrs  T(C): 36.6 (03 Sep 2018 22:15), Max: 36.7 (03 Sep 2018 17:20)  T(F): 97.8 (03 Sep 2018 22:15), Max: 98 (03 Sep 2018 17:20)  HR: 80 (03 Sep 2018 22:15) (72 - 80)  BP: 176/67 (03 Sep 2018 22:15) (150/66 - 199/90)  BP(mean): --  RR: 17 (03 Sep 2018 22:15) (15 - 20)  SpO2: 99% (03 Sep 2018 22:15) (84% - 100%)    AAO X 3  PERRLA, EOMI  CN 2-12 grossly intact  SANDHU strength 5/5  No pronator drift  coordination intact                          10.2   8.29  )-----------( 302      ( 03 Sep 2018 17:30 )             31.9   09-03    125<L>  |  86<L>  |  36<H>  ----------------------------<  462<HH>  4.4   |  23  |  1.02    Ca    9.5      03 Sep 2018 17:30    TPro  7.7  /  Alb  3.4  /  TBili  0.4  /  DBili  x   /  AST  17  /  ALT  20  /  AlkPhos  69  09-03    < from: CT Head No Cont (09.03.18 @ 21:54) >  There is a hyperdense lesion in the left cerebellum measuring 1.5 x 1.3   cm with thin rim of surrounding low attenuation. There is no significant   associated mass effect. No additional lesions are appreciated on CT.   There is moderate cerebral volume loss with commensurate prominence of   the ventricles and sulci. There are mild chronic ischemic changes in the   frontoparietal white matter. There is a chronic lacunar infarct in the   left basal ganglia. There are atherosclerotic calcifications of the   intracranial carotid arteries and intradural vertebral arteries.    The regional soft tissues and osseous structures are unremarkable. The   visualized paranasal sinuses and tympanic/mastoid cavities are clear   apart from minimal bilateral ethmoid mucosal thickening.    IMPRESSION:     Hyperdense left cerebellar lesion measuring 1.5 x 1.3 cm for which   considerations again include cavernoma, evolving hemorrhage, or   hemorrhagic metastasis. Contrast-enhanced MRI of the brain is recommended   for further evaluation.    < end of copied text >

## 2018-09-04 NOTE — H&P ADULT - PROBLEM SELECTOR PLAN 1
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 no vasogenic edema seen on CTH

## 2018-09-04 NOTE — H&P ADULT - PROBLEM SELECTOR PLAN 2
Found to have partial supraspinatus tear on MRI shoulder   p/w worsening pain  Pain control w/ IV morphine 2 mg IVP q4 as needed   MRI of brachial plexus ordered for possible impingent of apical lung mass   PT c/s

## 2018-09-04 NOTE — H&P ADULT - NSHPLABSRESULTS_GEN_ALL_CORE
10.2   8.29  )-----------( 302      ( 03 Sep 2018 17:30 )             31.9           125<L>  |  86<L>  |  36<H>  ----------------------------<  462<HH>  4.4   |  23  |  1.02    Ca    9.5      03 Sep 2018 17:30    TPro  7.7  /  Alb  3.4  /  TBili  0.4  /  DBili  x   /  AST  17  /  ALT  20  /  AlkPhos  69                Urinalysis Basic - ( 03 Sep 2018 20:00 )    Color: LIGHT YELLOW / Appearance: CLEAR / S.023 / pH: 6.0  Gluc: >1000 / Ketone: NEGATIVE  / Bili: NEGATIVE / Urobili: NORMAL   Blood: NEGATIVE / Protein: MODERATE / Nitrite: NEGATIVE   Leuk Esterase: NEGATIVE / RBC: 0-2 / WBC 3-5   Sq Epi: OCC / Non Sq Epi: x / Bacteria: NEGATIVE        PT/INR - ( 03 Sep 2018 17:30 )   PT: 12.8 SEC;   INR: 1.15          PTT - ( 03 Sep 2018 17:30 )  PTT:24.7 SEC    Lactate Trend            CAPILLARY BLOOD GLUCOSE      POCT Blood Glucose.: 338 mg/dL (03 Sep 2018 23:14)      < from: CT Head No Cont (18 @ 21:54) >      EXAM:  CT BRAIN        PROCEDURE DATE:  Sep  3 2018         INTERPRETATION:  CLINICAL INFORMATION: Evaluate cerebellar lesion in   patient with history of lung cancer.    TECHNIQUE: Noncontrast axial CT images were acquired through the head.   Two-dimensional sagittal and coronal reformats were generated.    COMPARISON STUDY: None    FINDINGS:     There is a hyperdense lesion in the left cerebellum measuring 1.5 x 1.3   cm with thin rim of surrounding low attenuation. There is no significant   associated mass effect. No additional lesions are appreciated on CT.   There is moderate cerebral volume loss with commensurate prominence of   the ventricles and sulci. There are mild chronic ischemic changes in the   frontoparietal white matter. There is a chronic lacunar infarct in the   left basal ganglia. There are atherosclerotic calcifications of the   intracranial carotid arteries and intradural vertebral arteries.    The regional soft tissues and osseous structures are unremarkable. The   visualized paranasal sinuses and tympanic/mastoid cavities are clear   apart from minimal bilateral ethmoid mucosal thickening.    IMPRESSION:     Hyperdense left cerebellar lesion measuring 1.5 x 1.3 cm for which   considerations again include cavernoma, evolving hemorrhage, or   hemorrhagic metastasis. Contrast-enhanced MRI of the brain is recommended   for further evaluation.

## 2018-09-04 NOTE — CHART NOTE - NSCHARTNOTEFT_GEN_A_CORE
Notified neuro sx and attending Dr. Ferrell that the results were back. Neuro sx will review, will follow up any further recs.       EXAM:  MR ANGIO BRAIN      EXAM:  MR BRACHIAL PLEXUS CHEST IC RT      EXAM:  MR BRAIN WAW IC        PROCEDURE DATE:  Sep  4 2018         INTERPRETATION:  History: Lung cancer. Severe right arm and shoulder pain.    MRI of the brain was performed using sagittal T1, axial T 1 fast and echo   T2-weighted sequence with FLAIR diffusion and susceptibility weighted   sequence. The patient was injected with approximately 7 cc Gadavist IV   with 0.5 cc of contrast discarded. Sagittal coronal and axial T1-weighted   sequences were performed.    Parenchymal volume loss and chronic microvascular ischemic changes are   identified.    Old chronic infarct involving the left frontal subcortical region is   identified.    There is evidence of abnormal lesion which does demonstrate abnormal T1   shortening in the left cerebellar region. This finding corresponds to   high attenuated lesion seen on prior noncontrast head CT and demonstrates   associated susceptibility as well. This finding is likely compatible with   an underlying hemorrhagic neoplastic lesion such as underlying metastasis   given patient's history. Clinical correlation continued close interval   follow-up is recommended. Surrounding edema is identified. This lesion   measures approximately 1.2 x 1.4 x 2.1 cm. No significant shift or   herniation is seen.    The large vessels demonstrate normal flow voids.    The visualized paranasal sinuses mastoid and middle ear regions appear   clear.    The patient is status post bilateral cataract surgery.    Hypoplastic right A1 segment is seen. Both distal internal carotid,   anterior cerebral, middle cerebral, basilar and posterior cerebral   arteries appear normal without evidence of an aneurysm or significant   stenosis.    Impression: Abnormal lesion seen involving left cerebellar region which   was identified on on prior noncontrast head CT.    MRA of the [place] demonstrates no evidence of aneurysms or   significant stenosis.    MRA of both brachioplexus was performed using coronal T1 and STIR   sequence. Axial T1 STIR sequences performed as well. Sagittal T2-weighted   sequence was performed with fat suppression bilaterally. The patient was   injected with Gadavist IV and coronal and axial T1-weighted sequence was   performed as well.    Large soft tissue lesion is seen involving the right lung apex which   measures approximately 5.9 x 5.7 x 5.9 cm. There is also a pleural-based   soft tissue lesion seen involving the left lung apex which does cause   deformity of the adjacent lung parenchyma. This could be compatible with   scarring versus lesion as well. The imaging lungs can be done clinically   indicated.    Small bilateral pleural thickening versus effusions are identified.    Evaluation of the brachioplexus bilaterally demonstrates close proximity   to the right lung apex lesion though no intrinsic or extrinsic   abnormality is seen bilaterally.    Degenerative change involving the cervical spine region with disc bulging   and osteophytes seen at multiple levels. Dedicated imaging of the   cervical spine with MRI can be done if clinically indicated and no contra   indications.    Impression: Abnormal lung pathology described above.    No definite intrinsic or extrinsic abnormality of the brachioplexus is   seen.

## 2018-09-04 NOTE — CONSULT NOTE ADULT - PROBLEM SELECTOR RECOMMENDATION 3
Dilaudid 1mg IV Q4 hours prn.   Can increase dose if 1mg is not working.  Likely multifactorial, given extremely close proximity of right upper lobe mass to brachial plexus.

## 2018-09-04 NOTE — H&P ADULT - NSHPSOCIALHISTORY_GEN_ALL_CORE
Former smoker, quit > 20 years ago, 1 ppd > 30 years, no alcohol, illicit drug use  Lives alone at home, independent in ADLs

## 2018-09-04 NOTE — H&P ADULT - NSHPOUTPATIENTPROVIDERS_GEN_ALL_CORE
Dr. Rosendo Saini ([facility] oncology) Dr. Rosendo Saini ([facility] oncology)  PMD / cardio: Dr York

## 2018-09-04 NOTE — CONSULT NOTE ADULT - SUBJECTIVE AND OBJECTIVE BOX
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 The patient's daughters were at bedside. Patient reported no difficulty breathing prior to admission. She received one dose of albuterol followed by 3L O2 by NC in the ED when she developed cough and desat to 84%. She responded and was taken off supp O2. Overnight patient again became hypoxemic to 83% on RA and improved on 2L NC to 100% sat. She was not on home oxygen previously, but reported requiring supplemental oxygen during a previous admission in [place] in March and here in November. She had recently been diagnosed with COPD with emphysema, and had been taking advair once a day AM.  PFT's in April showed mild obstructive airway disease with decreased DLCO for which she was recommended to take advair 250 BID, and albuterol PRN. She has not used the albuterol recently. She denied recent fevers or chills, lower extremity swelling, and recent cough. She had been following her lung ca with Dr. Lieberman at [facility], a radiation oncologist, but no further treatment was planned. She was considering transferring care to [facility] for consultation with a medical oncologist as per her last visit with Dr. Singer.       PAST MEDICAL & SURGICAL HISTORY:  Dyslipidemia  Hypertension  Diabetes  Lung cancer  Lung cancer: diagnosed   S/P cholecystectomy  H/O carotid endarterectomy: left      FAMILY HISTORY:  No pertinent family history in first degree relatives      SOCIAL HISTORY:  Smokin pack per day > 30 years, last over 20 years ago  EtOH Use: denies  Marital Status: , lives alone  Recent Travel: [place] in March      Allergies    ACE inhibitors (Angioedema)    Intolerances        HOME MEDICATIONS:    REVIEW OF SYSTEMS:  Constitutional: No weakness, fevers or chills  Eyes: No visual changes;  ENT: No throat pain  CV: No chest pain or palpitations  Resp: No difficulty breathing  GI: No abdominal pain or distension  : No dysuria  MSK: R shoulder pain  Neurological: No weakness  SKIN: No rashes    [ ] All other systems negative  [ ] Unable to assess ROS because ________    OBJECTIVE:  ICU Vital Signs Last 24 Hrs  T(C): 37.8 (04 Sep 2018 14:14), Max: 37.8 (04 Sep 2018 14:14)  T(F): 100.1 (04 Sep 2018 14:14), Max: 100.1 (04 Sep 2018 14:14)  HR: 88 (04 Sep 2018 14:14) (72 - 98)  BP: 171/72 (04 Sep 2018 14:14) (150/66 - 199/90)  BP(mean): --  ABP: --  ABP(mean): --  RR: 18 (04 Sep 2018 14:14) (15 - 20)  SpO2: 95% (04 Sep 2018 14:14) (83% - 100%)        CAPILLARY BLOOD GLUCOSE      POCT Blood Glucose.: 277 mg/dL (04 Sep 2018 12:39)      PHYSICAL EXAM:  General:  AAOx3  HEENT: EOMI, PERRL  Lymph Nodes: No LAD  Neck: JVP 4-6cm  Respiratory: CTA B/L except RUL at apex where breath sounds were decreased  Cardiovascular: RRR, no m/r/g  Abdomen: soft, NT/ND, no HSM  Extremities: no c/c/e  Skin: nl. skin turgor  Neurological: no deficitis      HOSPITAL MEDICATIONS:  MEDICATIONS  (STANDING):  dextrose 5%. 1000 milliLiter(s) (50 mL/Hr) IV Continuous <Continuous>  dextrose 50% Injectable 12.5 Gram(s) IV Push once  dextrose 50% Injectable 25 Gram(s) IV Push once  dextrose 50% Injectable 25 Gram(s) IV Push once  docusate sodium 100 milliGRAM(s) Oral three times a day  fenofibrate Tablet 145 milliGRAM(s) Oral daily  insulin lispro (HumaLOG) corrective regimen sliding scale   SubCutaneous three times a day before meals  insulin lispro (HumaLOG) corrective regimen sliding scale   SubCutaneous at bedtime  labetalol Injectable 5 milliGRAM(s) IV Push once  metoprolol tartrate 25 milliGRAM(s) Oral every 12 hours  NIFEdipine XL 60 milliGRAM(s) Oral at bedtime  NIFEdipine XL 30 milliGRAM(s) Oral daily  predniSONE   Tablet 10 milliGRAM(s) Oral two times a day    MEDICATIONS  (PRN):  dextrose 40% Gel 15 Gram(s) Oral once PRN Blood Glucose LESS THAN 70 milliGRAM(s)/deciliter  glucagon  Injectable 1 milliGRAM(s) IntraMuscular once PRN Glucose LESS THAN 70 milligrams/deciliter  morphine  - Injectable 4 milliGRAM(s) IV Push every 4 hours PRN Severe Pain (7 - 10)  ondansetron Injectable 4 milliGRAM(s) IV Push every 6 hours PRN Nausea and/or Vomiting  senna 2 Tablet(s) Oral at bedtime PRN Constipation      LABS:                        9.6    8.10  )-----------( 266      ( 04 Sep 2018 09:50 )             30.4     09-04    124<L>  |  86<L>  |  30<H>  ----------------------------<  343<H>  3.9   |  24  |  0.91    Ca    9.0      04 Sep 2018 09:50  Phos  2.4     -  Mg     1.6         TPro  7.7  /  Alb  3.4  /  TBili  0.4  /  DBili  x   /  AST  17  /  ALT  20  /  AlkPhos  69  -03    PT/INR - ( 03 Sep 2018 17:30 )   PT: 12.8 SEC;   INR: 1.15          PTT - ( 03 Sep 2018 17:30 )  PTT:24.7 SEC  Urinalysis Basic - ( 03 Sep 2018 20:00 )    Color: LIGHT YELLOW / Appearance: CLEAR / S.023 / pH: 6.0  Gluc: >1000 / Ketone: NEGATIVE  / Bili: NEGATIVE / Urobili: NORMAL   Blood: NEGATIVE / Protein: MODERATE / Nitrite: NEGATIVE   Leuk Esterase: NEGATIVE / RBC: 0-2 / WBC 3-5   Sq Epi: OCC / Non Sq Epi: x / Bacteria: NEGATIVE        Venous Blood Gas:   @ 17:15  7.43/40/30/26/51.6  VBG Lactate: 1.5      MICROBIOLOGY:     RADIOLOGY:  [X] Reviewed and interpreted by me    PULMONARY FUNCTION TESTS:    2018: Mild obstructive ventilatory impairment with a moderate to severe loss of functioning alveolar units.      EKG:

## 2018-09-04 NOTE — CONSULT NOTE ADULT - SUBJECTIVE AND OBJECTIVE BOX
HPI:  85 y/o F PMH  of Type 2 DM, Lung CA (unknown stage or type, Dx 2010 s/p L VATS, wedge resection, RT in July 2017), PMR on chronic steroids, HTN, p/w worsening Rt shoulder pain for last two months. Pt states pain was worse over last several days, starting last Thursday. Pt was seen in ER on Saturday and had MRI showing supraspinatus tear as well as apical lung mass, unclear of brachial plexus involvement. Pt was discharged from ED with oxycodone, but that failed to alleviate her pain. Denies: recent trauma, no vision changes, neck pain, chest pain, SOB, fevers, N/V/D.  Pt received 2 L LR, morphine 4 mg IVP x 2, zofran 4mg IVP, albuterol x 1 CTH: showed Hyperdense left cerebellar lesion measuring 1.5 x 1.3 cm possibly cavernoma, evolving hemorrhage, or hemorrhagic metastasis.     Endocrine History:  Patient reports a hx of Type 2 DM for >10 years. Endocrinologist Dr. Alcocer. Takes januvia 100mg, pioglitazone 15mg daily, nateglinide 120mg TID. Unknown A1C, but endocrinologist recently recommended for patient to start Tresiba, but patient has not had chance to start yet. Was on glimeperide 2.5mg daily but was discontinued. Reports has hx of PMR and is on chronic steroids, prednisone 10mg. Does was recently increased to 40mg a few weeks ago. Checks FS 3X/day. Ranges 200s-300s over the last month both in AM and pre-meals. Last went to optho 1 month ago, no retinopathy, only cataracts. Denies neuropathy. No known nephropathy.         PAST MEDICAL & SURGICAL HISTORY:  Dyslipidemia  Hypertension  Diabetes  Lung cancer  Lung cancer: diagnosed 2010  S/P cholecystectomy  H/O carotid endarterectomy: left      FAMILY HISTORY:  No family hx of DM      Social History: Former smoker, quit 20 years ago, no alcohol use    Outpatient Medications:  · 	oxyCODONE 5 mg oral tablet: 1 tab(s) orally every 4-6 hours x 4 days MDD:6 tabs, Last Dose Taken:    · 	ertapenem 1 g injection: 1 gram(s) injectable every 24 hours   	Ertapenem 1g IVPB every 24hrs through 11/10/17, Last Dose Taken:    · 	NIFEdipine 60 mg oral tablet, extended release: 1 tab(s) orally once a day (at bedtime), Last Dose Taken:    · 	NIFEdipine 30 mg oral tablet, extended release: 1 tab(s) orally once a day, Last Dose Taken:    · 	Januvia 100 mg oral tablet: 1 tab(s) orally once a day, Last Dose Taken:    · 	Actos 15 mg oral tablet: 1 tab(s) orally once a day, Last Dose Taken:    · 	TriCor 145 mg oral tablet: 1 tab(s) orally once a day, Last Dose Taken:    · 	aspirin 81 mg oral tablet: 1 tab(s) orally once a day, Last Dose Taken:    · 	potassium chloride 10 mEq oral capsule, extended release: 1 tab(s) orally once a day, Last Dose Taken:    · 	predniSONE 10 mg oral tablet: 1 tab(s) orally 2 times a day, Last Dose Taken:    · 	hydroCHLOROthiazide 12.5 mg oral capsule: 1 cap(s) orally once a day, Last Dose Taken:    · 	nateglinide 120 mg oral tablet: 1 tab(s) orally 3 times a day (before meals)  · 	metoprolol tartrate 25 mg oral tablet: 25 milligram(s) orally every 12 hours    MEDICATIONS  (STANDING):  dextrose 5%. 1000 milliLiter(s) (50 mL/Hr) IV Continuous <Continuous>  dextrose 50% Injectable 12.5 Gram(s) IV Push once  dextrose 50% Injectable 25 Gram(s) IV Push once  dextrose 50% Injectable 25 Gram(s) IV Push once  docusate sodium 100 milliGRAM(s) Oral three times a day  fenofibrate Tablet 145 milliGRAM(s) Oral daily  insulin lispro (HumaLOG) corrective regimen sliding scale   SubCutaneous three times a day before meals  insulin lispro (HumaLOG) corrective regimen sliding scale   SubCutaneous at bedtime  labetalol Injectable 5 milliGRAM(s) IV Push once  metoprolol tartrate 25 milliGRAM(s) Oral every 12 hours  NIFEdipine XL 60 milliGRAM(s) Oral at bedtime  NIFEdipine XL 30 milliGRAM(s) Oral daily  predniSONE   Tablet 10 milliGRAM(s) Oral two times a day    MEDICATIONS  (PRN):  dextrose 40% Gel 15 Gram(s) Oral once PRN Blood Glucose LESS THAN 70 milliGRAM(s)/deciliter  glucagon  Injectable 1 milliGRAM(s) IntraMuscular once PRN Glucose LESS THAN 70 milligrams/deciliter  morphine  - Injectable 4 milliGRAM(s) IV Push every 4 hours PRN Severe Pain (7 - 10)  ondansetron Injectable 4 milliGRAM(s) IV Push every 6 hours PRN Nausea and/or Vomiting  senna 2 Tablet(s) Oral at bedtime PRN Constipation      Allergies    ACE inhibitors (Angioedema)    Intolerances      Review of Systems:  Constitutional: No fever  Eyes: No blurry vision  Neuro: No tremors. Pain/ weakness in the right shoulder  HEENT: No pain  Cardiovascular: No chest pain, palpitations  Respiratory: No SOB, no cough  GI: No nausea, vomiting, abdominal pain  : No dysuria  Skin: no rash  Endocrine: no polyuria, polydipsia  Hem/lymph: no swelling      PHYSICAL EXAM:  VITALS: T(C): 37.8 (09-04-18 @ 14:14)  T(F): 100.1 (09-04-18 @ 14:14), Max: 100.1 (09-04-18 @ 14:14)  HR: 88 (09-04-18 @ 14:14) (72 - 98)  BP: 171/72 (09-04-18 @ 14:14) (150/66 - 199/90)  RR:  (15 - 20)  SpO2:  (83% - 100%)  Wt(kg): --  GENERAL: well-groomed, well-developed, patient in mild distress 2/2 shoulder pain.  EYES: No proptosis, no lid lag, anicteric  HEENT:  Atraumatic, Normocephalic, moist mucous membranes  THYROID: Normal size, no palpable nodules  RESPIRATORY: Clear to auscultation bilaterally; No rales, rhonchi, wheezing  CARDIOVASCULAR: Regular rate and rhythm; No murmurs; no peripheral edema  GI: Soft, nontender, non distended, normal bowel sounds  SKIN: Dry, intact, No rashes or lesions  MUSCULOSKELETAL: Full range of motion, normal strength  PSYCH: Alert and oriented x 3, normal affect, normal mood    POCT Blood Glucose.: 277 mg/dL (09-04-18 @ 12:39)  POCT Blood Glucose.: 337 mg/dL (09-04-18 @ 09:43)  POCT Blood Glucose.: 338 mg/dL (09-03-18 @ 23:14)  POCT Blood Glucose.: 378 mg/dL (09-03-18 @ 20:08)  POCT Blood Glucose.: 407 mg/dL (09-03-18 @ 19:20)  POCT Blood Glucose.: 458 mg/dL (09-03-18 @ 15:33)                            9.6    8.10  )-----------( 266      ( 04 Sep 2018 09:50 )             30.4       09-04    124<L>  |  86<L>  |  30<H>  ----------------------------<  343<H>  3.9   |  24  |  0.91    EGFR if : 66  EGFR if non : 57    Ca    9.0      09-04  Mg     1.6     09-04  Phos  2.4     09-04    TPro  7.7  /  Alb  3.4  /  TBili  0.4  /  DBili  x   /  AST  17  /  ALT  20  /  AlkPhos  69  09-03

## 2018-09-04 NOTE — CONSULT NOTE ADULT - PROBLEM SELECTOR RECOMMENDATION 9
acute exacerbation overnight to 83% on RA, after previous dose of albuterol with response, now on ~2L NC resting comfortably without difficulty breathing  Apr PFTs showed mild obstructive airway disease with decreased DLCO  -nebs PRN  -c/w supp o2, wean as tolerated acute exacerbation overnight to 83% on RA, after previous dose of albuterol with response, now on ~2L NC resting comfortably without difficulty breathing  Apr PFTs showed mild obstructive airway disease with decreased DLCO, pt has been taking advair once a day instead of recommended twice a day, poor control may be cause of current exacerbation  -nebs PRN  -c/w supp o2, wean as tolerated

## 2018-09-04 NOTE — CONSULT NOTE ADULT - ASSESSMENT
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 The patient's daughters were at bedside. Patient reported no difficulty breathing prior to admission. She received one dose of albuterol followed by 3L O2 by NC in the ED when she developed cough and desat to 84%. She responded and was taken off supp O2. Overnight patient again became hypoxemic to 83% on RA and improved on 2L NC to 100% sat. She was not on home oxygen previously, but reported requiring supplemental oxygen during a previous admission in [place] in March and here in November. She had recently been diagnosed with COPD with emphysema, and had been taking advair once a day AM.  PFT's in April showed mild obstructive airway disease with decreased DLCO for which she was recommended to take advair 250 BID, and albuterol PRN. She has not used the albuterol recently. She denied recent fevers or chills, lower extremity swelling, and recent cough. She had been following her lung ca with Dr. Lieberman at [facility], a radiation oncologist, but no further treatment was planned. She was considering transferring care to [facility] for consultation with a medical oncologist as per her last visit with Dr. Singer.       PAST MEDICAL & SURGICAL HISTORY:  Dyslipidemia  Hypertension  Diabetes  Lung cancer  Lung cancer: diagnosed   S/P cholecystectomy  H/O carotid endarterectomy: left      FAMILY HISTORY:  No pertinent family history in first degree relatives      SOCIAL HISTORY:  Smokin pack per day > 30 years, last over 20 years ago  EtOH Use: denies  Marital Status: , lives alone  Recent Travel: [place] in March      Allergies    ACE inhibitors (Angioedema)    Intolerances        HOME MEDICATIONS:    REVIEW OF SYSTEMS:  Constitutional: No weakness, fevers or chills  Eyes: No visual changes;  ENT: No throat pain  CV: No chest pain or palpitations  Resp: No difficulty breathing  GI: No abdominal pain or distension  : No dysuria  MSK: R shoulder pain  Neurological: No weakness  SKIN: No rashes    [ ] All other systems negative  [ ] Unable to assess ROS because ________    OBJECTIVE:  ICU Vital Signs Last 24 Hrs  T(C): 37.8 (04 Sep 2018 14:14), Max: 37.8 (04 Sep 2018 14:14)  T(F): 100.1 (04 Sep 2018 14:14), Max: 100.1 (04 Sep 2018 14:14)  HR: 88 (04 Sep 2018 14:14) (72 - 98)  BP: 171/72 (04 Sep 2018 14:14) (150/66 - 199/90)  BP(mean): --  ABP: --  ABP(mean): --  RR: 18 (04 Sep 2018 14:14) (15 - 20)  SpO2: 95% (04 Sep 2018 14:14) (83% - 100%)        CAPILLARY BLOOD GLUCOSE      POCT Blood Glucose.: 277 mg/dL (04 Sep 2018 12:39)      PHYSICAL EXAM:  General:  AAOx3  HEENT: EOMI, PERRL  Lymph Nodes: No LAD  Neck: JVP 4-6cm  Respiratory: CTA B/L except RUL at apex where breath sounds were decreased  Cardiovascular: RRR, no m/r/g  Abdomen: soft, NT/ND, no HSM  Extremities: no c/c/e  Skin: nl. skin turgor  Neurological: no deficitis      HOSPITAL MEDICATIONS:  MEDICATIONS  (STANDING):  dextrose 5%. 1000 milliLiter(s) (50 mL/Hr) IV Continuous <Continuous>  dextrose 50% Injectable 12.5 Gram(s) IV Push once  dextrose 50% Injectable 25 Gram(s) IV Push once  dextrose 50% Injectable 25 Gram(s) IV Push once  docusate sodium 100 milliGRAM(s) Oral three times a day  fenofibrate Tablet 145 milliGRAM(s) Oral daily  insulin lispro (HumaLOG) corrective regimen sliding scale   SubCutaneous three times a day before meals  insulin lispro (HumaLOG) corrective regimen sliding scale   SubCutaneous at bedtime  labetalol Injectable 5 milliGRAM(s) IV Push once  metoprolol tartrate 25 milliGRAM(s) Oral every 12 hours  NIFEdipine XL 60 milliGRAM(s) Oral at bedtime  NIFEdipine XL 30 milliGRAM(s) Oral daily  predniSONE   Tablet 10 milliGRAM(s) Oral two times a day    MEDICATIONS  (PRN):  dextrose 40% Gel 15 Gram(s) Oral once PRN Blood Glucose LESS THAN 70 milliGRAM(s)/deciliter  glucagon  Injectable 1 milliGRAM(s) IntraMuscular once PRN Glucose LESS THAN 70 milligrams/deciliter  morphine  - Injectable 4 milliGRAM(s) IV Push every 4 hours PRN Severe Pain (7 - 10)  ondansetron Injectable 4 milliGRAM(s) IV Push every 6 hours PRN Nausea and/or Vomiting  senna 2 Tablet(s) Oral at bedtime PRN Constipation      LABS:                        9.6    8.10  )-----------( 266      ( 04 Sep 2018 09:50 )             30.4     09-04    124<L>  |  86<L>  |  30<H>  ----------------------------<  343<H>  3.9   |  24  |  0.91    Ca    9.0      04 Sep 2018 09:50  Phos  2.4     -  Mg     1.6         TPro  7.7  /  Alb  3.4  /  TBili  0.4  /  DBili  x   /  AST  17  /  ALT  20  /  AlkPhos  69  -03    PT/INR - ( 03 Sep 2018 17:30 )   PT: 12.8 SEC;   INR: 1.15          PTT - ( 03 Sep 2018 17:30 )  PTT:24.7 SEC  Urinalysis Basic - ( 03 Sep 2018 20:00 )    Color: LIGHT YELLOW / Appearance: CLEAR / S.023 / pH: 6.0  Gluc: >1000 / Ketone: NEGATIVE  / Bili: NEGATIVE / Urobili: NORMAL   Blood: NEGATIVE / Protein: MODERATE / Nitrite: NEGATIVE   Leuk Esterase: NEGATIVE / RBC: 0-2 / WBC 3-5   Sq Epi: OCC / Non Sq Epi: x / Bacteria: NEGATIVE        Venous Blood Gas:   @ 17:15  7.43/40/30/26/51.6  VBG Lactate: 1.5      MICROBIOLOGY:     RADIOLOGY:  [X] Reviewed and interpreted by me    PULMONARY FUNCTION TESTS:    2018: Mild obstructive ventilatory impairment with a moderate to severe loss of functioning alveolar units.      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 Pulm consulted for hypoxemia, likely 2/2 COPD exacerbation from insufficient medical adherence.

## 2018-09-04 NOTE — CHART NOTE - NSCHARTNOTEFT_GEN_A_CORE
Patient hypoxic overnight 83% on RA, 2 L NC applied, O2 sat stable 99% on  2L, continuous pulse ox ordered. Pulmonary house consulted.

## 2018-09-04 NOTE — ED ADULT NURSE NOTE - ED STAT RN HANDOFF DETAILS
Rpt to [facility] - pt vitally stable, PM BGFS 338, awaits admission orders.  Stable and in no distress at this time.  Brought to [facility] by UNIQUE Rojas.

## 2018-09-04 NOTE — H&P ADULT - NSHPPHYSICALEXAM_GEN_ALL_CORE
PHYSICAL EXAM:  GENERAL: NAD, well-developed  HEAD:  Atraumatic, Normocephalic  EYES: EOMI, PERRLA, conjunctiva and sclera clear  NECK: Supple, No JVD  CHEST/LUNG: Clear to auscultation bilaterally; No wheeze  HEART: Regular rate and rhythm; No murmurs, rubs, or gallops  ABDOMEN: Soft, Nontender, Nondistended; Bowel sounds present  EXTREMITIES:  2+ Peripheral Pulses, No clubbing, cyanosis, or edema. R arm, not TTP, pain with active ROM  PSYCH: AAOx3  NEUROLOGY: non-focal  SKIN: No rashes or lesions Vital Signs Last 24 Hrs  T(C): 36.7 (09-04-18 @ 10:14)  T(F): 98 (09-04-18 @ 10:14), Max: 98.9 (09-04-18 @ 02:11)  HR: 88 (09-04-18 @ 10:14) (72 - 98)  BP: 176/58 (09-04-18 @ 10:14)  BP(mean): --  RR: 20 (09-04-18 @ 10:14) (15 - 20)  SpO2: 99% (09-04-18 @ 10:14) (83% - 100%)      POCT Blood Glucose.: 337 mg/dL (04 Sep 2018 09:43)  POCT Blood Glucose.: 338 mg/dL (03 Sep 2018 23:14)  POCT Blood Glucose.: 378 mg/dL (03 Sep 2018 20:08)  POCT Blood Glucose.: 407 mg/dL (03 Sep 2018 19:20)  POCT Blood Glucose.: 458 mg/dL (03 Sep 2018 15:33)      PHYSICAL EXAM:  GENERAL: NAD, well-developed  HEAD:  Atraumatic, Normocephalic  EYES: EOMI, PERRLA, conjunctiva and sclera clear  NECK: Supple, No JVD  CHEST/LUNG: Clear to auscultation bilaterally; No wheeze  HEART: Regular rate and rhythm; No murmurs, rubs, or gallops  ABDOMEN: Soft, Nontender, Nondistended; Bowel sounds present  EXTREMITIES:  2+ Peripheral Pulses, No clubbing, cyanosis, or edema. R arm, not TTP, pain with active ROM  PSYCH: AAOx3  NEUROLOGY: non-focal  SKIN: No rashes or lesions

## 2018-09-04 NOTE — CONSULT NOTE ADULT - SUBJECTIVE AND OBJECTIVE BOX
CHIEF COMPLAINT: right shoulder pain    HISTORY OF PRESENT ILLNESS:  85 y/o F PMH Lung CA (unknown stage or type, Dx 2010 s/p L VATS, wedge resection, RT in July 2017), PMR, HTN, DM2 p/w worsening Rt shoulder pain for last two months. Pt states pain was worse over last several days, starting last Thursday. Pt was seen in ER on Saturday and had MRI showing supraspinatus tear as well as apical lung mass, unclear of brachial plexus involvement. Pt was discharged from ED with oxycodone, but that failed to alleviate her pain. Denies any cp/sob/pnd/orthopnea/palpitations. Denies any focal neuro symptoms.    In ED, vitals: Tm: 98 HR: 72-75 BP: 150-199/66-90, RR: 17 SpO2: 99% RA  Pt received 2 L LR, morphine 4 mg IVP x 2, zofran 4mg IVP, albuterol x 1   CTH: showed Hyperdense left cerebellar lesion measuring 1.5 x 1.3 cm possibly cavernoma, evolving hemorrhage, or   hemorrhagic metastasis.   Neuro surgery was c/s, rec MRI/MRA head w/ and w/o contrast        Allergies  ACE inhibitors (Angioedema)      MEDICATIONS:  labetalol Injectable 5 milliGRAM(s) IV Push once  metoprolol tartrate 25 milliGRAM(s) Oral every 12 hours  NIFEdipine XL 60 milliGRAM(s) Oral at bedtime  NIFEdipine XL 30 milliGRAM(s) Oral daily  morphine  - Injectable 2 milliGRAM(s) IV Push every 4 hours PRN  docusate sodium 100 milliGRAM(s) Oral three times a day  senna 2 Tablet(s) Oral at bedtime PRN  dextrose 40% Gel 15 Gram(s) Oral once PRN  dextrose 50% Injectable 12.5 Gram(s) IV Push once  dextrose 50% Injectable 25 Gram(s) IV Push once  dextrose 50% Injectable 25 Gram(s) IV Push once  fenofibrate Tablet 145 milliGRAM(s) Oral daily  glucagon  Injectable 1 milliGRAM(s) IntraMuscular once PRN  insulin lispro (HumaLOG) corrective regimen sliding scale   SubCutaneous three times a day before meals  insulin lispro (HumaLOG) corrective regimen sliding scale   SubCutaneous at bedtime  predniSONE   Tablet 10 milliGRAM(s) Oral two times a day  dextrose 5%. 1000 milliLiter(s) IV Continuous <Continuous>      PAST MEDICAL & SURGICAL HISTORY:  Dyslipidemia  Hypertension  Diabetes  Lung cancer  Lung cancer: diagnosed 2010  S/P cholecystectomy  H/O carotid endarterectomy: left      FAMILY HISTORY:  No pertinent family history in first degree relatives      SOCIAL HISTORY:    former smoker. indepdent in ADL's    REVIEW OF SYSTEMS:  See HPI, otherwise complete 10 point review of systems negative    [ ] All others negative	    PHYSICAL EXAM:  T(C): 37.2 (09-04-18 @ 02:11), Max: 37.2 (09-04-18 @ 02:11)  HR: 91 (09-04-18 @ 02:11) (72 - 91)  BP: 171/61 (09-04-18 @ 02:11) (150/66 - 199/90)  RR: 20 (09-04-18 @ 02:11) (15 - 20)  SpO2: 83% (09-04-18 @ 02:11) (83% - 100%)  Wt(kg): --  I&O's Summary      Appearance: No Acute Distress	  HEENT:  Normal oral mucosa, PERRL, EOMI	  Cardiovascular: Normal S1 S2, No JVD, No murmurs/rubs/gallops  Respiratory: Lungs clear to auscultation bilaterally  Gastrointestinal:  Soft, Non-tender, + BS	  Skin: No rashes, No ecchymoses, No cyanosis	  Neurologic: Non-focal  Extremities: No clubbing, cyanosis or edema  Vascular: Peripheral pulses palpable 2+ bilaterally  Psychiatry: A & O x 3, Mood & affect appropriate    Laboratory Data:	 	    CBC Full  -  ( 03 Sep 2018 17:30 )  WBC Count : 8.29 K/uL  Hemoglobin : 10.2 g/dL  Hematocrit : 31.9 %  Platelet Count - Automated : 302 K/uL  Mean Cell Volume : 79.6 fL  Mean Cell Hemoglobin : 25.4 pg  Mean Cell Hemoglobin Concentration : 32.0 %  Auto Neutrophil # : 7.56 K/uL  Auto Lymphocyte # : 0.25 K/uL  Auto Monocyte # : 0.26 K/uL  Auto Eosinophil # : 0.00 K/uL  Auto Basophil # : 0.02 K/uL  Auto Neutrophil % : 91.3 %  Auto Lymphocyte % : 3.0 %  Auto Monocyte % : 3.1 %  Auto Eosinophil % : 0.0 %  Auto Basophil % : 0.2 %    09-03    125<L>  |  86<L>  |  36<H>  ----------------------------<  462<HH>  4.4   |  23  |  1.02    Ca    9.5      03 Sep 2018 17:30    TPro  7.7  /  Alb  3.4  /  TBili  0.4  /  DBili  x   /  AST  17  /  ALT  20  /  AlkPhos  69  09-03        Assessment:  85 y/o F PMH Lung CA (unknown stage or type, Dx 2010 s/p L VATS, wedge resection, RT in July 2017), PMR, HTN, DM2 p/w worsening Rt shoulder pain for last two months s/p MRI showing supraspinatus tear as well as apical lung mass, unclear of brachial plexus involvement . also noted to have incidental hyperdense left cerebellar lesion measuring 1.5 x 1.3 cm possibly cavernoma, evolving hemorrhage, or hemorrhagic metastasis.     1) hypoxia  -likely related to primary lung pathology. appears euvolemic, doubt cardiac.  no evidence of infection.   -consider further pulmonary workup as indicated including CTA PE rule out if high index of suspicion  -would place on continuous pulse ox monitoring    2) HTN   -likely exacerbated by pain  -cont with current meds (metoprolol and nifedipine) and uptitrate as needed    3) right should pain, PMR  -c/w prednisone and morphine  -f/u additional imaging studies    4) cerebellar lesion  -appreciate nsgy recs  -mri/mra pending        Greater than 60 minutes spent on total encounter; more than 50% of the visit was spent counseling and/or coordinating care by the attending physician.   	  Slick York MD   Cardiovascular Diseases  [phone]

## 2018-09-04 NOTE — H&P ADULT - PROBLEM SELECTOR PLAN 4
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

## 2018-09-04 NOTE — ED ADULT NURSE NOTE - NSIMPLEMENTINTERV_GEN_ALL_ED
Implemented All Fall Risk Interventions:  [place] to call system. Call bell, personal items and telephone within reach. Instruct patient to call for assistance. Room bathroom lighting operational. Non-slip footwear when patient is off stretcher. Physically safe environment: no spills, clutter or unnecessary equipment. Stretcher in lowest position, wheels locked, appropriate side rails in place. Provide visual cue, wrist band, yellow gown, etc. Monitor gait and stability. Monitor for mental status changes and reorient to person, place, and time. Review medications for side effects contributing to fall risk. Reinforce activity limits and safety measures with patient and family.

## 2018-09-04 NOTE — PROGRESS NOTE ADULT - ASSESSMENT
87 y/o F PMH Lung CA (unknown stage or type, Dx 2010 s/p L VATS, wedge resection, RT in July 2017), PMR, HTN, DM2 p/w worsening Rt shoulder pain from partial supraspinatus tear, found to have possible hemorrhagic met.    #Neuro  -Neuro exam wnl  -Neuro checks q6  Neurosurgery c/s, recommended MRI/MRA head w/ and w/o contrast   -Neuro also rec MRI brain w/ contrast    #Resp, COPD excasterbation, hx of lung Ca diagnosed in 2010, s/p VATS, wedge resection, RT   follows with Dr. Saini from [facility], not receiving treatment at this time  -On Bipap  -started on levofloxocin 750mg Qdaily  -D5  -Q6 duonebs  -Q2 Albuterol PRN    #Endo, DM  -Insulin SS  -4 units lispro before meals  -10 units glargline qHS  -Was on 10 methylprednisolone mg BID for polymyalgia rheumatica chronically    #Metabolic  -Hyponatremia in context of hyperglycemia (corrected to 134)  -Trend BMP    #CV  -C/W home meds (metoprolol 25 mg BID)  -C/W 30mg Nifedipine 30 AM, 60 at night    #Ethics  -Palliative care consult in context of possible met to brain    #DVT  -Holding AC in context of possible brain met  -SCD's  -Full code 85 y/o F PMH Lung CA (unknown stage or type, Dx 2010 s/p L VATS, wedge resection, RT in July 2017), PMR, HTN, DM2 p/w worsening Rt shoulder pain from partial supraspinatus tear, found to have possible hemorrhagic met.    #Neuro  -Neuro exam wnl  -Neuro checks q6  -Neurosurgery c/s, recommended MRI/MRA head w/ and w/o contrast , no other recs as thist huber  -Neuro also rec MRI brain w/ contrast    #Resp, COPD exacerbation hx of lung Ca diagnosed in 2010, s/p VATS, wedge resection, RT   follows with Dr. Saini from [facility], not receiving treatment at this time  -On Bipap  -started on levofloxocin 750mg Qdaily  -D5  -Q6 duonebs  -Q2 Albuterol PRN  -40mg methylprednisolone qDaily    #Endo, DM  -Insulin SS  -4 units lispro before meals  -10 units glargline qHS  -Was on 10 methylprednisolone mg BID for polymyalgia rheumatica chronically    #Metabolic  -Hyponatremia in context of hyperglycemia (corrected to 134)  -Trend BMP    #CV  -C/W home meds (metoprolol 25 mg BID)  -C/W 30mg Nifedipine 30 AM, 60 at night    #Ethics  -Palliative care consult in context of possible met to brain    #DVT  -Holding AC in context of possible brain met  -SCD's  -Full code 87 y/o F PMH Lung CA (unknown stage or type, Dx 2010 s/p L VATS, wedge resection, RT in July 2017),  PMR, HTN, COPD with emphysema, DM2 p/w worsening Rt shoulder pain from partial supraspinatus tear, found to have possible hemorrhagic met.    #Neuro  -Neuro exam wnl  -Neuro checks q6  -Neurosurgery c/s, recommended MRI/MRA head w/ and w/o contrast , no other recs as thist huber  -Neuro also rec MRI brain w/ contrast    #Resp, COPD exacerbation hx of lung Ca diagnosed in 2010, s/p VATS, wedge resection,  -Apr PFTs showed mild obstructive airway disease with decreased DLCO  -On Bipap  -started on levofloxocin 750mg Qdaily  -D5  -Q6 duonebs  -Q2 Albuterol PRN  -40mg methylprednisolone qDaily  -c/w supp o2, wean as tolerated.  -Lungs cancer showed increase in size of multiple b/l pulmonary nodules/massess, needs f/u w/ oncologist as per pulm     #Endo, DM  -Insulin SS  -4 units lispro before meals  -10 units glargline qHS  -Was on 10 methylprednisolone mg BID for polymyalgia rheumatica chronically    #Metabolic  -Hyponatremia in context of hyperglycemia (corrected to 134)  -Trend BMP    #CV  -C/W home meds (metoprolol 25 mg BID)  -C/W 30mg Nifedipine 30 AM, 60 at night    #Ethics  -Palliative care consult in context of possible met to brain    #DVT  -Holding AC in context of possible brain met  -SCD's  -Full code 87 y/o F PMH Lung CA (unknown stage or type, Dx 2010 s/p L VATS, wedge resection, RT in July 2017),  PMR, HTN, COPD with emphysema, DM2 p/w worsening Rt shoulder pain from partial supraspinatus tear, found to have possible hemorrhagic met and new onset a-fib.     #Neuro  -Neuro exam wnl  -Neuro checks q6  -Neurosurgery c/s, recommended MRI/MRA head w/ and w/o contrast , no other recs as thist huber  -Neuro also rec MRI brain w/ contrast    #Resp, COPD exacerbation hx of lung Ca diagnosed in 2010, s/p VATS, wedge resection,  -Apr PFTs showed mild obstructive airway disease with decreased DLCO  -On Bipap  -started on levofloxocin 750mg Qdaily  -D5  -Q6 duonebs  -Q2 Albuterol PRN  -40mg methylprednisolone qDaily  -c/w supp o2, wean as tolerated.  -Lungs cancer showed increase in size of multiple b/l pulmonary nodules/massess, needs f/u w/ oncologist as per pulm   -Will need to get more records     #Endo, DM  -Insulin SS  -4 units lispro before meals  -10 units glargline qHS  -Was on 10 methylprednisolone mg BID for polymyalgia rheumatica chronically    #Metabolic  -Hyponatremia in context of hyperglycemia (corrected to 134)  -Trend BMP    #CV, no onset A-fib based on EKG (no history)  -Cardizem trip starting at 7.5mg/hr  -hold AC at this time in context of concern for hemhorhagic met to brain    #Ethics  -Palliative care consult in context of possible met to brain    #DVT  -Holding AC in context of possible brain met  -SCD's  -Full code 87 y/o F PMH Lung CA (unknown stage or type, Dx 2010 s/p L VATS, wedge resection, RT in July 2017),  PMR, HTN, COPD with emphysema, DM2 p/w worsening Rt shoulder pain from partial supraspinatus tear, found to have possible hemorrhagic met and new onset a-fib.     #Neuro:  Cerebellar lesion, likely hemorrhagic lung Cancer metastasis 1cm x 2 cm  -Neuro exam wnl  -Neuro checks q6  -F/U Neurosurgery, appreciate recs  -Avoid anticoagulation at this time      #Resp, COPD exacerbation hx of lung Ca diagnosed in 2010, s/p VATS, wedge resection,  -Apr PFTs showed mild obstructive airway disease with decreased DLCO  -On Bipap PRN  -started on levofloxocin 750mg Qdaily  -D5  -Q6 duonebs  -Q2 Albuterol PRN  -40mg methylprednisolone qDaily  -c/w supp o2, wean as tolerated.  -Trial of IV lasix in case there is component of pulmonary edema in setting of rapid Afib.  -Lungs cancer showed increase in size of multiple b/l pulmonary nodules/massess, needs f/u w/ oncologist as per pulm   -Will need to get more records     #Endo, DM  -Insulin SS  -4 units lispro before meals  -10 units glargline qHS  -Was on 10 methylprednisolone mg BID for polymyalgia rheumatica chronically    #Metabolic  -Hyponatremia in context of hyperglycemia (corrected to 134)  -Trend BMP    #GI  -NPO for now given BiPAP PRN and tenuous respiratory status.      #CV, new onset A-fib based on EKG (no history)  -Cardizem trip starting at 7.5mg/hr  -hold AC at this time in context of concern for hemorrhagic met to brain    #Ethics  -Palliative care consult in context of possible met to brain, meeting tomorrow 11 am.    #DVT  -Holding AC in context of possible brain met  -SCD's  -Full code

## 2018-09-04 NOTE — H&P ADULT - ATTENDING COMMENTS
Patient seen, examined, and interviewed by me, case discussed with me, chart reviewed, agree with above H/P as reviewed and edited by me.  See  full note above.  noted specialists notes  pt also wih episode of hypoxemia this morning: better post O2 ( does not have O2 at home)  reviewed CT chest, possible PNA?   pulm consulted   may need home O2 on DC?

## 2018-09-04 NOTE — CONSULT NOTE ADULT - SUBJECTIVE AND OBJECTIVE BOX
HPI:  87 y/o F PMH Lung CA (unknown stage or type, Dx 2010 s/p L VATS, wedge resection, RT in 2017), PMR, HTN, DM2 p/w worsening Rt shoulder pain for last two months. Pt states pain was worse over last several days, starting last Thursday. Pt was seen in ER on Saturday and had MRI showing supraspinatus tear as well as apical lung mass, unclear of brachial plexus involvement. Pt was discharged from ED with oxycodone, but that failed to alleviate her pain.   Denies: recent trauma, no vision changes, neck pain, chest pain, SOB, fevers, N/V/D.     In ED, vitals: Tm: 98 HR: 72-75 BP: 150-199/66-90, RR: 17 SpO2: 99% RA  Pt received 2 L LR, morphine 4 mg IVP x 2, zofran 4mg IVP, albuterol x 1   CTH: showed Hyperdense left cerebellar lesion measuring 1.5 x 1.3 cm possibly cavernoma, evolving hemorrhage, or   hemorrhagic metastasis.   Neuro surgery was c/s, rec MRI/MRA head w/ and w/o contrast (04 Sep 2018 02:52)    PERTINENT PM/SXH:   Dyslipidemia  Hypertension  Diabetes  Lung cancer    Lung cancer  S/P cholecystectomy  H/O carotid endarterectomy    FAMILY HISTORY:  No pertinent family history in first degree relatives    ITEMS NOT CHECKED ARE NOT PRESENT    SOCIAL HISTORY:   Significant other/partner:  [ ]  Children:  [ ]  Hindu/Spirituality:  Substance hx:  [ ]   Tobacco hx:  [ ]   Alcohol hx: [ ]   Home Opioid hx:  [ ] I-Stop Reference No:  Living Situation: [ ]Home  [ ]Long term care  [ ]Rehab [ ]Other    ADVANCE DIRECTIVES:    DNR  MOLST  [ ]  Living Will  [ ]   DECISION MAKER(s):  [ ] Health Care Proxy(s)  [ ] Surrogate(s)  [ ] Guardian           Name(s): Phone Number(s):    BASELINE (I)ADL(s) (prior to admission):  [place]: [ ]Total  [ ] Moderate [ ]Dependent    Allergies    ACE inhibitors (Angioedema)    Intolerances    MEDICATIONS  (STANDING):  chlorhexidine 4% Liquid 1 Application(s) Topical <User Schedule>  dextrose 5%. 1000 milliLiter(s) (50 mL/Hr) IV Continuous <Continuous>  dextrose 50% Injectable 12.5 Gram(s) IV Push once  dextrose 50% Injectable 25 Gram(s) IV Push once  dextrose 50% Injectable 25 Gram(s) IV Push once  diltiazem Infusion 7.5 mG/Hr (7.5 mL/Hr) IV Continuous <Continuous>  docusate sodium 100 milliGRAM(s) Oral three times a day  fenofibrate Tablet 145 milliGRAM(s) Oral daily  influenza   Vaccine 0.5 milliLiter(s) IntraMuscular once  insulin glargine Injectable (LANTUS) 10 Unit(s) SubCutaneous at bedtime  insulin lispro (HumaLOG) corrective regimen sliding scale   SubCutaneous every 6 hours  levoFLOXacin IVPB 250 milliGRAM(s) IV Intermittent every 24 hours  levoFLOXacin IVPB      methylPREDNISolone sodium succinate Injectable 40 milliGRAM(s) IV Push daily  sodium chloride 0.9%. 1000 milliLiter(s) (50 mL/Hr) IV Continuous <Continuous>    MEDICATIONS  (PRN):  dextrose 40% Gel 15 Gram(s) Oral once PRN Blood Glucose LESS THAN 70 milliGRAM(s)/deciliter  glucagon  Injectable 1 milliGRAM(s) IntraMuscular once PRN Glucose LESS THAN 70 milligrams/deciliter  HYDROmorphone  Injectable 1 milliGRAM(s) IV Push every 6 hours PRN Mild to severe pain  senna 2 Tablet(s) Oral at bedtime PRN Constipation    PRESENT SYMPTOMS: [ ]Unable to obtain due to poor mentation   Source if other than patient:  [ ]Family   [ ]Team     Pain (Impact on QOL):    Location -         Minimal acceptable level (0-10 scale):                    Aggravating factors -  Quality -  Radiation -  Severity (0-10 scale) -    Timing -    PAIN AD Score:     [email or web address] (press ctrl +  left click to view)    Dyspnea:                           [ ]Mild [ ]Moderate [ ]Severe  Anxiety:                             [ ]Mild [ ]Moderate [ ]Severe  Fatigue:                             [ ]Mild [ ]Moderate [ ]Severe  Nausea:                             [ ]Mild [ ]Moderate [ ]Severe  Loss of appetite:              [ ]Mild [ ]Moderate [ ]Severe  Constipation:                    [ ]Mild [ ]Moderate [ ]Severe    Other Symptoms:  [ ]All other review of systems negative     Karnofsky Performance Score/Palliative Performance Status Version 2:         %    http://palliative.info/resource_material/[email or web address].pdf  PHYSICAL EXAM:  Vital Signs Last 24 Hrs  T(C): 36.4 (05 Sep 2018 08:00), Max: 38.6 (05 Sep 2018 00:00)  T(F): 97.5 (05 Sep 2018 08:00), Max: 101.4 (05 Sep 2018 00:00)  HR: 79 (05 Sep 2018 10:00) (75 - 151)  BP: 135/51 (05 Sep 2018 10:00) (98/78 - 195/101)  BP(mean): 74 (05 Sep 2018 10:) (69 - 128)  RR: 24 (05 Sep 2018 10:00) (18 - 34)  SpO2: 97% (05 Sep 2018 10:00) (90% - 100%) I&O's Summary    04 Sep 2018 07:  -  05 Sep 2018 07:00  --------------------------------------------------------  IN: 1377.5 mL / OUT: 1775 mL / NET: -397.5 mL    05 Sep 2018 07:01  -  05 Sep 2018 10:37  --------------------------------------------------------  IN: 160 mL / OUT: 100 mL / NET: 60 mL    GENERAL:  [ ]Alert  [ ]Oriented x   [ ]Lethargic  [ ]Cachexia  [ ]Unarousable  [ ]Verbal  [ ]Non-Verbal  Behavioral:   [ ] Anxiety  [ ] Delirium [ ] Agitation [ ] Other  HEENT:  [ ]Normal   [ ]Dry mouth   [ ]ET Tube/Trach  [ ]Oral lesions  PULMONARY:   [ ]Clear [ ]Tachypnea  [ ]Audible excessive secretions   [ ]Rhonchi        [ ]Right [ ]Left [ ]Bilateral  [ ]Crackles        [ ]Right [ ]Left [ ]Bilateral  [ ]Wheezing     [ ]Right [ ]Left [ ]Bilateral  CARDIOVASCULAR:    [ ]Regular [ ]Irregular [ ]Tachy  [ ]Blane [ ]Murmur [ ]Other  GASTROINTESTINAL:  [ ]Soft  [ ]Distended   [ ]+BS  [ ]Non tender [ ]Tender  [ ]PEG [ ]OGT/ NGT  Last BM:   GENITOURINARY:  [ ]Normal [ ] Incontinent   [ ]Oliguria/Anuria   [ ]Alcaraz  MUSCULOSKELETAL:   [ ]Normal   [ ]Weakness  [ ]Bed/Wheelchair bound [ ]Edema  NEUROLOGIC:   [ ]No focal deficits  [ ] Cognitive impairment  [ ] Dysphagia [ ]Dysarthria [ ] Paresis [ ]Other   SKIN:   [ ]Normal   [ ]Pressure ulcer(s)  [ ]Rash    CRITICAL CARE:  [ ] Shock Present  [ ]Septic [ ]Cardiogenic [ ]Neurologic [ ]Hypovolemic  [ ]  Vasopressors [ ]  Inotropes   [ ] Respiratory failure present  [ ] Acute  [ ] Chronic [ ] Hypoxic  [ ] Hypercarbic [ ] Other  [ ] Other organ failure     LABS:                        9.6    12.63 )-----------( 265      ( 05 Sep 2018 02:55 )             29.0   09-05    126<L>  |  87<L>  |  31<H>  ----------------------------<  238<H>  3.0<L>   |  24  |  1.05    Ca    8.7      05 Sep 2018 02:55  Phos  3.1     -  Mg     1.4     -    TPro  7.7  /  Alb  3.4  /  TBili  0.4  /  DBili  x   /  AST  17  /  ALT  20  /  AlkPhos  69  09-03  PT/INR - ( 03 Sep 2018 17:30 )   PT: 12.8 SEC;   INR: 1.15          PTT - ( 03 Sep 2018 17:30 )  PTT:24.7 SEC    Urinalysis Basic - ( 05 Sep 2018 01:30 )    Color: YELLOW / Appearance: CLEAR / S.014 / pH: 6.0  Gluc: LARGE / Ketone: NEGATIVE  / Bili: NEGATIVE / Urobili: NORMAL   Blood: NEGATIVE / Protein: MODERATE / Nitrite: NEGATIVE   Leuk Esterase: TRACE / RBC: 6-10 / WBC 3-5   Sq Epi: FEW / Non Sq Epi: x / Bacteria: NEGATIVE      RADIOLOGY & ADDITIONAL STUDIES:    PROTEIN CALORIE MALNUTRITION PRESENT: [ ] Yes [ ] No  [ ] PPSV2 < or = to 30% [ ] significant weight loss  [ ] poor nutritional intake [ ] catabolic state [ ] anasarca     Albumin, Serum: 3.4 g/dL (18 @ 17:30)  Artificial Nutrition [ ]     REFERRALS:   [ ]Chaplaincy  [ ] Hospice  [ ]Child Life  [ ]Social Work  [ ]Case management [ ]Holistic Therapy   Goals of Care Discussion Document: HPI:  85 y/o F PMH Lung CA (unknown stage or type, Dx 2010 s/p L VATS, wedge resection, RT in 2017), PMR, HTN, DM2 p/w worsening Rt shoulder pain for last two months. Pt states pain was worse over last several days, starting last Thursday. Pt was seen in ER on Saturday and had MRI showing supraspinatus tear as well as apical lung mass, unclear of brachial plexus involvement. Pt was discharged from ED with oxycodone, but that failed to alleviate her pain.   Denies: recent trauma, no vision changes, neck pain, chest pain, SOB, fevers, N/V/D.     In ED, vitals: Tm: 98 HR: 72-75 BP: 150-199/66-90, RR: 17 SpO2: 99% RA  Pt received 2 L LR, morphine 4 mg IVP x 2, zofran 4mg IVP, albuterol x 1   CTH: showed Hyperdense left cerebellar lesion measuring 1.5 x 1.3 cm possibly cavernoma, evolving hemorrhage, or   hemorrhagic metastasis.   Neuro surgery was c/s, rec MRI/MRA head w/ and w/o contrast (04 Sep 2018 02:52)    PERTINENT PM/SXH:   Dyslipidemia  Hypertension  Diabetes  Lung cancer    Lung cancer  S/P cholecystectomy  H/O carotid endarterectomy    FAMILY HISTORY:  No pertinent family history in first degree relatives    ITEMS NOT CHECKED ARE NOT PRESENT    SOCIAL HISTORY:   Significant other/partner:  [ ]  Children:  [ x]  Bahai/Spirituality: Jainism  Substance hx:  [x ]   Tobacco hx:  [ ]   Alcohol hx: [ ]   Home Opioid hx:  [ ] I-Stop Reference No:  Living Situation: [x ]Home  [ ]Long term care  [ ]Rehab [ ]Other    ADVANCE DIRECTIVES:    DNR  MOLST  [ ]  Living Will  [ ]   DECISION MAKER(s):  [ x] Health Care Proxy(s)  [ ] Surrogate(s)  [ ] Guardian           Name(s): Phone Number(s): Gia Sol (daughter)    BASELINE (I)ADL(s) (prior to admission):  [place]: [ ]Total  [ x] Moderate [ ]Dependent    Allergies    ACE inhibitors (Angioedema)    Intolerances    MEDICATIONS  (STANDING):  chlorhexidine 4% Liquid 1 Application(s) Topical <User Schedule>  dextrose 5%. 1000 milliLiter(s) (50 mL/Hr) IV Continuous <Continuous>  dextrose 50% Injectable 12.5 Gram(s) IV Push once  dextrose 50% Injectable 25 Gram(s) IV Push once  dextrose 50% Injectable 25 Gram(s) IV Push once  diltiazem Infusion 7.5 mG/Hr (7.5 mL/Hr) IV Continuous <Continuous>  docusate sodium 100 milliGRAM(s) Oral three times a day  fenofibrate Tablet 145 milliGRAM(s) Oral daily  influenza   Vaccine 0.5 milliLiter(s) IntraMuscular once  insulin glargine Injectable (LANTUS) 10 Unit(s) SubCutaneous at bedtime  insulin lispro (HumaLOG) corrective regimen sliding scale   SubCutaneous every 6 hours  levoFLOXacin IVPB 250 milliGRAM(s) IV Intermittent every 24 hours  levoFLOXacin IVPB      methylPREDNISolone sodium succinate Injectable 40 milliGRAM(s) IV Push daily  sodium chloride 0.9%. 1000 milliLiter(s) (50 mL/Hr) IV Continuous <Continuous>    MEDICATIONS  (PRN):  dextrose 40% Gel 15 Gram(s) Oral once PRN Blood Glucose LESS THAN 70 milliGRAM(s)/deciliter  glucagon  Injectable 1 milliGRAM(s) IntraMuscular once PRN Glucose LESS THAN 70 milligrams/deciliter  HYDROmorphone  Injectable 1 milliGRAM(s) IV Push every 6 hours PRN Mild to severe pain  senna 2 Tablet(s) Oral at bedtime PRN Constipation    PRESENT SYMPTOMS: [ ]Unable to obtain due to poor mentation   Source if other than patient:  [ ]Family   [ ]Team     Pain (Impact on QOL):  right arm, shoulder, unable to do ADLS.  Location - Right shoulder         Minimal acceptable level (0-10 scale): 5                   Aggravating factors - movement  Quality - dull, achy  Radiation - none  Severity (0-10 scale) - 10    Timing - on movement    PAIN AD Score:     [email or web address] (press ctrl +  left click to view)    Dyspnea:                           [ ]Mild [ ]Moderate [x ]Severe  Anxiety:                             [ ]Mild [ x]Moderate [ ]Severe  Fatigue:                             [ ]Mild [ ]Moderate [ x]Severe  Nausea:                             [ ]Mild [ x]Moderate [ ]Severe  Loss of appetite:              [ ]Mild [ ]Moderate [x ]Severe  Constipation:                    [ ]Mild [ x]Moderate [ ]Severe    Other Symptoms:  [x ]All other review of systems negative     Karnofsky Performance Score/Palliative Performance Status Version 2:    40     %    http://palliative.info/resource_material/[email or web address].pdf  PHYSICAL EXAM:  Vital Signs Last 24 Hrs  T(C): 36.4 (05 Sep 2018 08:00), Max: 38.6 (05 Sep 2018 00:00)  T(F): 97.5 (05 Sep 2018 08:00), Max: 101.4 (05 Sep 2018 00:00)  HR: 79 (05 Sep 2018 10:00) (75 - 151)  BP: 135/51 (05 Sep 2018 10:00) (98/78 - 195/101)  BP(mean): 74 (05 Sep 2018 10:00) (69 - 128)  RR: 24 (05 Sep 2018 10:00) (18 - 34)  SpO2: 97% (05 Sep 2018 10:00) (90% - 100%) I&O's Summary    04 Sep 2018 07:  -  05 Sep 2018 07:00  --------------------------------------------------------  IN: 1377.5 mL / OUT: 1775 mL / NET: -397.5 mL    05 Sep 2018 07:01  -  05 Sep 2018 10:37  --------------------------------------------------------  IN: 160 mL / OUT: 100 mL / NET: 60 mL    GENERAL:  [ x]Alert  [ ]Oriented x  3 [ ]Lethargic  [ ]Cachexia  [ ]Unarousable  [ ]Verbal  [ ]Non-Verbal  Behavioral:   [ x] Anxiety  [ ] Delirium [ ] Agitation [ ] Other  HEENT:  [ ]Normal   [x ]Dry mouth   [ ]ET Tube/Trach  [ ]Oral lesions  PULMONARY:   [ ]Clear [ ]Tachypnea  [ ]Audible excessive secretions   [x ]Rhonchi        [x ]Right [ ]Left [ ]Bilateral  [x ]Crackles        [x ]Right [ ]Left [ ]Bilateral  [ ]Wheezing     [ ]Right [ ]Left [ ]Bilateral  CARDIOVASCULAR:    [ ]Regular [x ]Irregular [ ]Tachy  [ ]Blane [ ]Murmur [ ]Other  GASTROINTESTINAL:  [x ]Soft  [ ]Distended   [ x]+BS  [x ]Non tender [ ]Tender  [ ]PEG [ ]OGT/ NGT  Last BM:   GENITOURINARY:  [ ]Normal [ ] Incontinent   [ ]Oliguria/Anuria   [x ]Alcaraz  MUSCULOSKELETAL:   [ ]Normal   [ x]Weakness  [ ]Bed/Wheelchair bound [ ]Edema  NEUROLOGIC:   [x ]No focal deficits  [ ] Cognitive impairment  [ ] Dysphagia [ ]Dysarthria [ ] Paresis [ ]Other   SKIN:   [ x]Normal   [ ]Pressure ulcer(s)  [ ]Rash    CRITICAL CARE:  [ ] Shock Present  [ ]Septic [ ]Cardiogenic [ ]Neurologic [ ]Hypovolemic  [ ]  Vasopressors [ ]  Inotropes   [x ] Respiratory failure present  [ ] Acute  [ ] Chronic [ ] Hypoxic  [ ] Hypercarbic [ ] Other  [ ] Other organ failure     LABS:                        9.6    12.63 )-----------( 265      ( 05 Sep 2018 02:55 )             29.0   09-05    126<L>  |  87<L>  |  31<H>  ----------------------------<  238<H>  3.0<L>   |  24  |  1.05    Ca    8.7      05 Sep 2018 02:55  Phos  3.1     09-05  Mg     1.4     09-05    TPro  7.7  /  Alb  3.4  /  TBili  0.4  /  DBili  x   /  AST  17  /  ALT  20  /  AlkPhos  69  09-03  PT/INR - ( 03 Sep 2018 17:30 )   PT: 12.8 SEC;   INR: 1.15          PTT - ( 03 Sep 2018 17:30 )  PTT:24.7 SEC    Urinalysis Basic - ( 05 Sep 2018 01:30 )    Color: YELLOW / Appearance: CLEAR / S.014 / pH: 6.0  Gluc: LARGE / Ketone: NEGATIVE  / Bili: NEGATIVE / Urobili: NORMAL   Blood: NEGATIVE / Protein: MODERATE / Nitrite: NEGATIVE   Leuk Esterase: TRACE / RBC: 6-10 / WBC 3-5   Sq Epi: FEW / Non Sq Epi: x / Bacteria: NEGATIVE      RADIOLOGY & ADDITIONAL STUDIES:    PROTEIN CALORIE MALNUTRITION PRESENT: [ ] Yes [ ] No  [ ] PPSV2 < or = to 30% [ ] significant weight loss  [ ] poor nutritional intake [ ] catabolic state [ ] anasarca     Albumin, Serum: 3.4 g/dL (18 @ 17:30)  Artificial Nutrition [ ]     REFERRALS:   [ ]Chaplaincy  [ ] Hospice  [ ]Child Life  [ ]Social Work  [ ]Case management [ ]Holistic Therapy   Goals of Care Discussion Document:

## 2018-09-04 NOTE — CONSULT NOTE ADULT - ASSESSMENT
86 F with advanced lung cancer (suspected stage 4), acute respiratory failure, right shoulder pain, slow transit constipation.

## 2018-09-04 NOTE — CONSULT NOTE ADULT - ASSESSMENT
85 y/o F PMH  of Type 2 DM, Lung CA (unknown stage or type, Dx 2010 s/p L VATS, wedge resection, RT in July 2017), PMR on chronic steroids, HTN, p/w worsening Rt shoulder pain for last two months. Admitted with concerning lesion on MRI head for brain met. On steroids with hyperglycemia.

## 2018-09-04 NOTE — H&P ADULT - PROBLEM SELECTOR PLAN 9
Holding DVT ppx in the setting of poss hemorrhagic brain met  SCDs  Diet: DASH/CC (lactose free)   Dispo: Home

## 2018-09-04 NOTE — CONSULT NOTE ADULT - ASSESSMENT
86 year old female presents with pain in right shoulder and scapular region, incidentally found to have left cerebellar lesion.  While a brachial plexopathy is definitely a consideration given proximity of pulmonary lesion, her symptoms are not definite she denies sensory changes and there are no distal motor changes in the right upper extremity.  Imaging will be useful, however given the localization of her pain we should be wary that much of her discomfort is of orthopedic origin.  In regards to her cerebellar lesion, it is asymptomatic and I would recommend MRI brain w/ contrast prior to making formal recommendations.

## 2018-09-04 NOTE — PROGRESS NOTE ADULT - SUBJECTIVE AND OBJECTIVE BOX
Bienvenido Martínez, PGY1  MICU Team  [phone]    HPI:  85 y/o F PMH Lung CA (unknown stage or type, Dx 2010 s/p L VATS, wedge resection, RT in 2017), COPD, PMR, HTN, DM2 p/w worsening Rt shoulder pain for last two months.     Pt was last in ER Saturday, 3 days before admission, where she was found to have an apical lung mass. Pt was then discharged w/ oxycodone, however due to continuing pain came back to the ER again today and admitted to [facility] for pain control as this was patient's second ER visit in 3 days. MRI-C-spine was done for shoulder pain, in context of previous malignancy, found lesion in cerebellum which on subsequent head CT showed Hyperdense left cerebellar lesion measuring 1.5 x 1.3 cm concerning for hemorrhagic met. Patient then developed vomiting in the [facility] s/p morphine, MICU was consulted.    OBJECTIVE:    VITAL SIGNS:  ICU Vital Signs Last 24 Hrs  T(C): 35.6 (04 Sep 2018 18:11), Max: 38 (04 Sep 2018 14:14)  T(F): 96 (04 Sep 2018 18:11), Max: 100.4 (04 Sep 2018 14:14)  HR: 145 (04 Sep 2018 18:17) (72 - 145)  BP: 168/85 (04 Sep 2018 18:11) (165/90 - 199/90)  BP(mean): 94 (04 Sep 2018 18:11) (94 - 94)  ABP: --  ABP(mean): --  RR: 27 (04 Sep 2018 18:11) (16 - 27)  SpO2: 93% (04 Sep 2018 18:17) (83% - 100%)         @ 07:01  -   @ 18:24  --------------------------------------------------------  IN: 250 mL / OUT: 0 mL / NET: 250 mL      CAPILLARY BLOOD GLUCOSE      POCT Blood Glucose.: 278 mg/dL (04 Sep 2018 17:23)      PHYSICAL EXAM:  	GENERAL: tachypnic  	HEAD:  Atraumatic, Normocephalic  	EYES: EOMI, PERRLA, conjunctiva and sclera clear  	NECK: Supple, No JVD  	CHEST/LUNG: Rales in lower fields, wheezing in upper fields  	HEART: Regular rate and rhythm; No murmurs, rubs, or gallops  	ABDOMEN: Soft, Nontender, Nondistended; Bowel sounds present  	EXTREMITIES:  2+ Peripheral Pulses, No clubbing, cyanosis, or edema. R arm, not TTP, pain with active ROM  	PSYCH: AAOx3  	NEUROLOGY: non-focal  SKIN: No rashes or lesions    MEDICATIONS:  MEDICATIONS  (STANDING):  dextrose 5%. 1000 milliLiter(s) (50 mL/Hr) IV Continuous <Continuous>  dextrose 50% Injectable 12.5 Gram(s) IV Push once  dextrose 50% Injectable 25 Gram(s) IV Push once  dextrose 50% Injectable 25 Gram(s) IV Push once  docusate sodium 100 milliGRAM(s) Oral three times a day  fenofibrate Tablet 145 milliGRAM(s) Oral daily  insulin glargine Injectable (LANTUS) 10 Unit(s) SubCutaneous at bedtime  insulin lispro (HumaLOG) corrective regimen sliding scale   SubCutaneous three times a day before meals  insulin lispro (HumaLOG) corrective regimen sliding scale   SubCutaneous at bedtime  insulin lispro Injectable (HumaLOG) 4 Unit(s) SubCutaneous three times a day before meals  levoFLOXacin IVPB      levoFLOXacin IVPB 500 milliGRAM(s) IV Intermittent once  methylPREDNISolone sodium succinate Injectable 40 milliGRAM(s) IV Push daily  metoprolol tartrate 25 milliGRAM(s) Oral every 12 hours  metoprolol tartrate Injectable 5 milliGRAM(s) IV Push once  NIFEdipine XL 60 milliGRAM(s) Oral at bedtime  NIFEdipine XL 30 milliGRAM(s) Oral daily  potassium acid phosphate/sodium acid phosphate tablet (K-PHOS No. 2) 1 Tablet(s) Oral four times a day with meals  sodium chloride 0.9%. 1000 milliLiter(s) (50 mL/Hr) IV Continuous <Continuous>    MEDICATIONS  (PRN):  acetaminophen   Tablet .. 650 milliGRAM(s) Oral every 6 hours PRN Temp greater or equal to 38C (100.4F)  dextrose 40% Gel 15 Gram(s) Oral once PRN Blood Glucose LESS THAN 70 milliGRAM(s)/deciliter  glucagon  Injectable 1 milliGRAM(s) IntraMuscular once PRN Glucose LESS THAN 70 milligrams/deciliter  senna 2 Tablet(s) Oral at bedtime PRN Constipation      ALLERGIES:  Allergies    ACE inhibitors (Angioedema)    Intolerances        LABS:                        9.6    8.10  )-----------( 266      ( 04 Sep 2018 09:50 )             30.4     CBC Full  -  ( 04 Sep 2018 09:50 )  WBC Count : 8.10 K/uL  Hemoglobin : 9.6 g/dL  Hematocrit : 30.4 %  Platelet Count - Automated : 266 K/uL  Mean Cell Volume : 79.0 fL  Mean Cell Hemoglobin : 24.9 pg  Mean Cell Hemoglobin Concentration : 31.6 %  Auto Neutrophil # : x  Auto Lymphocyte # : x  Auto Monocyte # : x  Auto Eosinophil # : x  Auto Basophil # : x  Auto Neutrophil % : x  Auto Lymphocyte % : x  Auto Monocyte % : x  Auto Eosinophil % : x  Auto Basophil % : x    -    124<L>  |  86<L>  |  30<H>  ----------------------------<  343<H>  3.9   |  24  |  0.91    Ca    9.0      04 Sep 2018 09:50  Phos  2.4     09-04  Mg     1.6     09-04    TPro  7.7  /  Alb  3.4  /  TBili  0.4  /  DBili  x   /  AST  17  /  ALT  20  /  AlkPhos  69  09-03    Creatinine Trend: 0.91<--, 1.02<--, 1.01<--  LIVER FUNCTIONS - ( 03 Sep 2018 17:30 )  Alb: 3.4 g/dL / Pro: 7.7 g/dL / ALK PHOS: 69 u/L / ALT: 20 u/L / AST: 17 u/L / GGT: x           PT/INR - ( 03 Sep 2018 17:30 )   PT: 12.8 SEC;   INR: 1.15          PTT - ( 03 Sep 2018 17:30 )  PTT:24.7 SEC      ABG - ( 04 Sep 2018 17:28 )  pH, Arterial: 7.41  pH, Blood: x     /  pCO2: 39    /  pO2: 75    / HCO3: 25    / Base Excess: 0.0   /  SaO2: 94.6              Urinalysis Basic - ( 03 Sep 2018 20:00 )    Color: LIGHT YELLOW / Appearance: CLEAR / S.023 / pH: 6.0  Gluc: >1000 / Ketone: NEGATIVE  / Bili: NEGATIVE / Urobili: NORMAL   Blood: NEGATIVE / Protein: MODERATE / Nitrite: NEGATIVE   Leuk Esterase: NEGATIVE / RBC: 0-2 / WBC 3-5   Sq Epi: OCC / Non Sq Epi: x / Bacteria: NEGATIVE        MICROBIOLOGY:    IMAGING:    RADIOLOGY & ADDITIONAL TESTS: Reviewed. Bienvenido Martínez, PGY1  MICU Team  [phone]    HPI:  87 y/o F PMH Lung CA (unknown stage or type, Dx 2010 s/p L VATS, wedge resection, , RT in 2017), COPD with emphesema, PMR, HTN, DM2 p/w worsening Rt shoulder pain for last two months.     Pt was last in ER Saturday, 3 days before admission, where she was found to have an apical lung mass. Pt was then discharged w/ oxycodone, however due to continuing pain came back to the ER again today and admitted to [facility] for pain control as this was patient's second ER visit in 3 days. MRI-C-spine was done for shoulder pain, in context of previous malignancy, found lesion in cerebellum which on subsequent head CT showed Hyperdense left cerebellar lesion measuring 1.5 x 1.3 cm concerning for hemorrhagic met.  Patient then developed vomiting in the [facility] s/p morphine, MICU was consulted.    OBJECTIVE:    VITAL SIGNS:  ICU Vital Signs Last 24 Hrs  T(C): 35.6 (04 Sep 2018 18:11), Max: 38 (04 Sep 2018 14:14)  T(F): 96 (04 Sep 2018 18:11), Max: 100.4 (04 Sep 2018 14:14)  HR: 145 (04 Sep 2018 18:17) (72 - 145)  BP: 168/85 (04 Sep 2018 18:11) (165/90 - 199/90)  BP(mean): 94 (04 Sep 2018 18:11) (94 - 94)  ABP: --  ABP(mean): --  RR: 27 (04 Sep 2018 18:11) (16 - 27)  SpO2: 93% (04 Sep 2018 18:17) (83% - 100%)         @ 07:01  -   @ 18:24  --------------------------------------------------------  IN: 250 mL / OUT: 0 mL / NET: 250 mL      CAPILLARY BLOOD GLUCOSE      POCT Blood Glucose.: 278 mg/dL (04 Sep 2018 17:23)      PHYSICAL EXAM:  	GENERAL: tachypnic  	HEAD:  Atraumatic, Normocephalic  	EYES: EOMI, PERRLA, conjunctiva and sclera clear  	NECK: Supple, No JVD  	CHEST/LUNG: Rales in lower fields, wheezing in upper fields  	HEART: Regular rate and rhythm; No murmurs, rubs, or gallops  	ABDOMEN: Soft, Nontender, Nondistended; Bowel sounds present  	EXTREMITIES:  2+ Peripheral Pulses, No clubbing, cyanosis, or edema. R arm, not TTP, pain with active ROM  	PSYCH: AAOx3  	NEUROLOGY: non-focal  SKIN: No rashes or lesions    MEDICATIONS:  MEDICATIONS  (STANDING):  dextrose 5%. 1000 milliLiter(s) (50 mL/Hr) IV Continuous <Continuous>  dextrose 50% Injectable 12.5 Gram(s) IV Push once  dextrose 50% Injectable 25 Gram(s) IV Push once  dextrose 50% Injectable 25 Gram(s) IV Push once  docusate sodium 100 milliGRAM(s) Oral three times a day  fenofibrate Tablet 145 milliGRAM(s) Oral daily  insulin glargine Injectable (LANTUS) 10 Unit(s) SubCutaneous at bedtime  insulin lispro (HumaLOG) corrective regimen sliding scale   SubCutaneous three times a day before meals  insulin lispro (HumaLOG) corrective regimen sliding scale   SubCutaneous at bedtime  insulin lispro Injectable (HumaLOG) 4 Unit(s) SubCutaneous three times a day before meals  levoFLOXacin IVPB      levoFLOXacin IVPB 500 milliGRAM(s) IV Intermittent once  methylPREDNISolone sodium succinate Injectable 40 milliGRAM(s) IV Push daily  metoprolol tartrate 25 milliGRAM(s) Oral every 12 hours  metoprolol tartrate Injectable 5 milliGRAM(s) IV Push once  NIFEdipine XL 60 milliGRAM(s) Oral at bedtime  NIFEdipine XL 30 milliGRAM(s) Oral daily  potassium acid phosphate/sodium acid phosphate tablet (K-PHOS No. 2) 1 Tablet(s) Oral four times a day with meals  sodium chloride 0.9%. 1000 milliLiter(s) (50 mL/Hr) IV Continuous <Continuous>    MEDICATIONS  (PRN):  acetaminophen   Tablet .. 650 milliGRAM(s) Oral every 6 hours PRN Temp greater or equal to 38C (100.4F)  dextrose 40% Gel 15 Gram(s) Oral once PRN Blood Glucose LESS THAN 70 milliGRAM(s)/deciliter  glucagon  Injectable 1 milliGRAM(s) IntraMuscular once PRN Glucose LESS THAN 70 milligrams/deciliter  senna 2 Tablet(s) Oral at bedtime PRN Constipation      ALLERGIES:  Allergies    ACE inhibitors (Angioedema)    Intolerances        LABS:                        9.6    8.10  )-----------( 266      ( 04 Sep 2018 09:50 )             30.4     CBC Full  -  ( 04 Sep 2018 09:50 )  WBC Count : 8.10 K/uL  Hemoglobin : 9.6 g/dL  Hematocrit : 30.4 %  Platelet Count - Automated : 266 K/uL  Mean Cell Volume : 79.0 fL  Mean Cell Hemoglobin : 24.9 pg  Mean Cell Hemoglobin Concentration : 31.6 %  Auto Neutrophil # : x  Auto Lymphocyte # : x  Auto Monocyte # : x  Auto Eosinophil # : x  Auto Basophil # : x  Auto Neutrophil % : x  Auto Lymphocyte % : x  Auto Monocyte % : x  Auto Eosinophil % : x  Auto Basophil % : x    -    124<L>  |  86<L>  |  30<H>  ----------------------------<  343<H>  3.9   |  24  |  0.91    Ca    9.0      04 Sep 2018 09:50  Phos  2.4     09-  Mg     1.6     09-04    TPro  7.7  /  Alb  3.4  /  TBili  0.4  /  DBili  x   /  AST  17  /  ALT  20  /  AlkPhos  69  09-03    Creatinine Trend: 0.91<--, 1.02<--, 1.01<--  LIVER FUNCTIONS - ( 03 Sep 2018 17:30 )  Alb: 3.4 g/dL / Pro: 7.7 g/dL / ALK PHOS: 69 u/L / ALT: 20 u/L / AST: 17 u/L / GGT: x           PT/INR - ( 03 Sep 2018 17:30 )   PT: 12.8 SEC;   INR: 1.15          PTT - ( 03 Sep 2018 17:30 )  PTT:24.7 SEC      ABG - ( 04 Sep 2018 17:28 )  pH, Arterial: 7.41  pH, Blood: x     /  pCO2: 39    /  pO2: 75    / HCO3: 25    / Base Excess: 0.0   /  SaO2: 94.6              Urinalysis Basic - ( 03 Sep 2018 20:00 )    Color: LIGHT YELLOW / Appearance: CLEAR / S.023 / pH: 6.0  Gluc: >1000 / Ketone: NEGATIVE  / Bili: NEGATIVE / Urobili: NORMAL   Blood: NEGATIVE / Protein: MODERATE / Nitrite: NEGATIVE   Leuk Esterase: NEGATIVE / RBC: 0-2 / WBC 3-5   Sq Epi: OCC / Non Sq Epi: x / Bacteria: NEGATIVE        MICROBIOLOGY:    IMAGING:    RADIOLOGY & ADDITIONAL TESTS: Reviewed.

## 2018-09-04 NOTE — ED ADULT NURSE NOTE - CHIEF COMPLAINT QUOTE
pt coming from home with right shoulder/R. upper back area x 2 months was seen and D/C from [facility] 2 days ago.   MRI done =Tear of the Supraspinatus tendon.    Fs= [phone] at triage

## 2018-09-04 NOTE — CONSULT NOTE ADULT - PROBLEM SELECTOR RECOMMENDATION 9
-patient with Type 2 DM. Unknown current A1C, but with hyperglycemia on steroids  -check A1C  -currently just on humalog correctional scale, while on prednisone 10mg BID. Would start basal/bolus. Can start lantus 10 units and humalog 4 units TID. c/w low dose humalog correctional scale.  -insulin regimen will need to be adjusted if steroid doses increase.  -Discharge regimen to be determined pending A1C  -Will follow-up with outpatient endocrinologist Dr. Alcocer. -patient with Type 2 DM. Unknown current A1C, but with hyperglycemia on steroids  -check A1C  -currently just on humalog correctional scale, while on prednisone 10mg BID. Would start basal/bolus. Can start lantus 10 units and humalog 4 units TID. c/w low dose humalog correctional scale. Plan for patient to go to MICU for respiratory distress now. If patient NPO, can c/w lantus 10 units and do moderate humalog correctional q6hrs.  -insulin regimen will need to be adjusted if steroid doses increase.  -Discharge regimen to be determined pending A1C  -Will follow-up with outpatient endocrinologist Dr. Alcocer.

## 2018-09-04 NOTE — H&P ADULT - HISTORY OF PRESENT ILLNESS
85 y/o F PMH Lung CA (unknown stage or type, Dx 2010 s/p L VATS, wedge resection, RT in July 2017), PMR, HTN, DM2 p/w worsening Rt shoulder pain for last two months. Pt states pain was worse over last several days, starting last Thursday. Pt was seen in ER on Saturday and had MRI showing supraspinatus tear as well as apical lung mass, unclear of brachial plexus involvement. Pt was discharged from ED with oxycodone, but that failed to alleviate her pain. Denies: recent trauma, no vision changes, neck pain, chest pain, SOB, fevers, N/V/D.     In ED, vitals: Tm: 98 HR: 72-75 BP: 150-199/66-90, RR: 17 SpO2: 99% RA  Pt received 2 L LR, morphine 4 mg IVP x 2, zofran 4mg IVP, albuterol x 1   CTH: showed Hyperdense left cerebellar lesion measuring 1.5 x 1.3 cm possibly cavernoma, evolving hemorrhage, or   hemorrhagic metastasis.   Neuro surgery was c/s, rec MRI/MRA head w/ and w/o contrast 85 y/o F PMH Lung CA (unknown stage or type, Dx 2010 s/p L VATS, wedge resection, RT in July 2017), PMR, HTN, DM2 p/w worsening Rt shoulder pain for last two months. Pt states pain was worse over last several days, starting last Thursday. Pt was seen in ER on Saturday and had MRI showing supraspinatus tear as well as apical lung mass, unclear of brachial plexus involvement. Pt was discharged from ED with oxycodone, but that failed to alleviate her pain.   Denies: recent trauma, no vision changes, neck pain, chest pain, SOB, fevers, N/V/D.     In ED, vitals: Tm: 98 HR: 72-75 BP: 150-199/66-90, RR: 17 SpO2: 99% RA  Pt received 2 L LR, morphine 4 mg IVP x 2, zofran 4mg IVP, albuterol x 1   CTH: showed Hyperdense left cerebellar lesion measuring 1.5 x 1.3 cm possibly cavernoma, evolving hemorrhage, or   hemorrhagic metastasis.   Neuro surgery was c/s, rec MRI/MRA head w/ and w/o contrast

## 2018-09-04 NOTE — H&P ADULT - ASSESSMENT
87 y/o F PMH Lung CA (unknown stage or type, Dx 2010 s/p L VATS, wedge resection, RT in July 2017), PMR, HTN, DM2 p/w worsening Rt shoulder pain from partial supraspinatus tear, found to have poss hemorrhagic met to brain pending MRI.

## 2018-09-04 NOTE — H&P ADULT - PROBLEM SELECTOR PLAN 8
Pt is full code at this time, daughters Nicole, and Gia at bedside. Family is still deciding whether they want resuscitation and intubation. As per patient, she would likely not want to be resuscitated, as her  was not, when he passed last year .  Will c/s palliative care for further C conversations

## 2018-09-04 NOTE — CONSULT NOTE ADULT - SUBJECTIVE AND OBJECTIVE BOX
Neurology Consult    Patient is a 86y old  Female who presents with a chief complaint of Patient is a 86y old  Female who presents with a chief complaint of R. Shoulder Pain (04 Sep 2018 02:52)      HPI:  85 y/o F PMH Lung CA (unknown stage or type, Dx  s/p L VATS, wedge resection, RT in 2017), PMR, HTN, DM2 p/w worsening Rt shoulder pain for last two months. Pt states pain was worse over last several days, starting last Thursday. Pt was seen in ER on Saturday and had MRI showing supraspinatus tear as well as apical lung mass, unclear of brachial plexus involvement. Pt was discharged from ED with oxycodone, but that failed to alleviate her pain.     The patient had CT scan of the head revealing a left cerebellar lesion.    Denies: recent trauma, no vision changes, neck pain, chest pain, SOB, fevers, N/V/D.     In ED, vitals: Tm: 98 HR: 72-75 BP: 150-199/66-90, RR: 17 SpO2: 99% RA  Pt received 2 L LR, morphine 4 mg IVP x 2, zofran 4mg IVP, albuterol x 1   CTH: showed Hyperdense left cerebellar lesion measuring 1.5 x 1.3 cm possibly cavernoma, evolving hemorrhage, or   hemorrhagic metastasis.   Neuro surgery was c/s, rec MRI/MRA head w/ and w/o contrast (04 Sep 2018 02:52)      PAST MEDICAL & SURGICAL HISTORY:  Dyslipidemia  Hypertension  Diabetes  Lung cancer  Lung cancer: diagnosed   S/P cholecystectomy  H/O carotid endarterectomy: left      Allergies    ACE inhibitors (Angioedema)    Intolerances        MEDICATIONS  (STANDING):  dextrose 5%. 1000 milliLiter(s) (50 mL/Hr) IV Continuous <Continuous>  dextrose 50% Injectable 12.5 Gram(s) IV Push once  dextrose 50% Injectable 25 Gram(s) IV Push once  dextrose 50% Injectable 25 Gram(s) IV Push once  docusate sodium 100 milliGRAM(s) Oral three times a day  fenofibrate Tablet 145 milliGRAM(s) Oral daily  insulin lispro (HumaLOG) corrective regimen sliding scale   SubCutaneous three times a day before meals  insulin lispro (HumaLOG) corrective regimen sliding scale   SubCutaneous at bedtime  labetalol Injectable 5 milliGRAM(s) IV Push once  metoprolol tartrate 25 milliGRAM(s) Oral every 12 hours  NIFEdipine XL 60 milliGRAM(s) Oral at bedtime  NIFEdipine XL 30 milliGRAM(s) Oral daily  predniSONE   Tablet 10 milliGRAM(s) Oral two times a day    MEDICATIONS  (PRN):  dextrose 40% Gel 15 Gram(s) Oral once PRN Blood Glucose LESS THAN 70 milliGRAM(s)/deciliter  glucagon  Injectable 1 milliGRAM(s) IntraMuscular once PRN Glucose LESS THAN 70 milligrams/deciliter  morphine  - Injectable 2 milliGRAM(s) IV Push every 4 hours PRN Severe Pain (7 - 10)  ondansetron Injectable 4 milliGRAM(s) IV Push every 6 hours PRN Nausea and/or Vomiting  senna 2 Tablet(s) Oral at bedtime PRN Constipation      Social History: Denies tob, EtOH use     FAMILY HISTORY:  No pertinent family history in first degree relatives          Physical Exam:   Vital Signs Last 24 Hrs  T(C): 36.7 (04 Sep 2018 10:14), Max: 37.2 (04 Sep 2018 02:11)  T(F): 98 (04 Sep 2018 10:14), Max: 98.9 (04 Sep 2018 02:11)  HR: 88 (04 Sep 2018 10:14) (72 - 98)  BP: 176/58 (04 Sep 2018 10:14) (150/66 - 199/90)  BP(mean): --  RR: 20 (04 Sep 2018 10:14) (15 - 20)  SpO2: 99% (04 Sep 2018 10:14) (83% - 100%)    General Exam:   General appearance: No acute distress                   Neurological Exam:  Mental Status: AAOx3, fluent speech, follows simple commands, able to name    Cranial Nerves: EOMI, VFF, V1-V3 intact, facial symmetric, no dysarthria, tongue midline    Motor: No drift in UE  Grossly 5/5 the right rotator cuff muscles were not examined due to pain.  There was tenderness in pec chong, delt, and supraspinatus    Sensation:   Intact to LT throughout  Intact to PP throughout      Coordination: FTN intact b/l           Labs:     CBC Full  -  ( 04 Sep 2018 09:50 )  WBC Count : 8.10 K/uL  Hemoglobin : 9.6 g/dL  Hematocrit : 30.4 %  Platelet Count - Automated : 266 K/uL  Mean Cell Volume : 79.0 fL  Mean Cell Hemoglobin : 24.9 pg  Mean Cell Hemoglobin Concentration : 31.6 %  Auto Neutrophil # : x  Auto Lymphocyte # : x  Auto Monocyte # : x  Auto Eosinophil # : x  Auto Basophil # : x  Auto Neutrophil % : x  Auto Lymphocyte % : x  Auto Monocyte % : x  Auto Eosinophil % : x  Auto Basophil % : x        124<L>  |  86<L>  |  30<H>  ----------------------------<  343<H>  3.9   |  24  |  0.91    Ca    9.0      04 Sep 2018 09:50  Phos  2.4       Mg     1.6         TPro  7.7  /  Alb  3.4  /  TBili  0.4  /  DBili  x   /  AST  17  /  ALT  20  /  AlkPhos  69  -03    LIVER FUNCTIONS - ( 03 Sep 2018 17:30 )  Alb: 3.4 g/dL / Pro: 7.7 g/dL / ALK PHOS: 69 u/L / ALT: 20 u/L / AST: 17 u/L / GGT: x           PT/INR - ( 03 Sep 2018 17:30 )   PT: 12.8 SEC;   INR: 1.15          PTT - ( 03 Sep 2018 17:30 )  PTT:24.7 SEC  Urinalysis Basic - ( 03 Sep 2018 20:00 )    Color: LIGHT YELLOW / Appearance: CLEAR / S.023 / pH: 6.0  Gluc: >1000 / Ketone: NEGATIVE  / Bili: NEGATIVE / Urobili: NORMAL   Blood: NEGATIVE / Protein: MODERATE / Nitrite: NEGATIVE   Leuk Esterase: NEGATIVE / RBC: 0-2 / WBC 3-5   Sq Epi: OCC / Non Sq Epi: x / Bacteria: NEGATIVE        Radiology:

## 2018-09-05 DIAGNOSIS — J96.01 ACUTE RESPIRATORY FAILURE WITH HYPOXIA: ICD-10-CM

## 2018-09-05 DIAGNOSIS — M25.511 PAIN IN RIGHT SHOULDER: ICD-10-CM

## 2018-09-05 DIAGNOSIS — K59.01 SLOW TRANSIT CONSTIPATION: ICD-10-CM

## 2018-09-05 DIAGNOSIS — C34.11 MALIGNANT NEOPLASM OF UPPER LOBE, RIGHT BRONCHUS OR LUNG: ICD-10-CM

## 2018-09-05 LAB
ALBUMIN SERPL ELPH-MCNC: 2.6 G/DL — LOW (ref 3.3–5)
ALP SERPL-CCNC: 86 U/L — SIGNIFICANT CHANGE UP (ref 40–120)
ALT FLD-CCNC: 38 U/L — HIGH (ref 4–33)
APPEARANCE UR: CLEAR — SIGNIFICANT CHANGE UP
AST SERPL-CCNC: 41 U/L — HIGH (ref 4–32)
BACTERIA # UR AUTO: NEGATIVE — SIGNIFICANT CHANGE UP
BASE EXCESS BLDA CALC-SCNC: -0.2 MMOL/L — SIGNIFICANT CHANGE UP
BASOPHILS # BLD AUTO: 0.01 K/UL — SIGNIFICANT CHANGE UP (ref 0–0.2)
BASOPHILS NFR BLD AUTO: 0.1 % — SIGNIFICANT CHANGE UP (ref 0–2)
BILIRUB SERPL-MCNC: 0.9 MG/DL — SIGNIFICANT CHANGE UP (ref 0.2–1.2)
BILIRUB UR-MCNC: NEGATIVE — SIGNIFICANT CHANGE UP
BLOOD UR QL VISUAL: NEGATIVE — SIGNIFICANT CHANGE UP
BUN SERPL-MCNC: 31 MG/DL — HIGH (ref 7–23)
BUN SERPL-MCNC: 31 MG/DL — HIGH (ref 7–23)
CALCIUM SERPL-MCNC: 8.5 MG/DL — SIGNIFICANT CHANGE UP (ref 8.4–10.5)
CALCIUM SERPL-MCNC: 8.7 MG/DL — SIGNIFICANT CHANGE UP (ref 8.4–10.5)
CHLORIDE BLDA-SCNC: 95 MMOL/L — LOW (ref 96–108)
CHLORIDE SERPL-SCNC: 87 MMOL/L — LOW (ref 98–107)
CHLORIDE SERPL-SCNC: 88 MMOL/L — LOW (ref 98–107)
CHLORIDE UR-SCNC: 52 MMOL/L — SIGNIFICANT CHANGE UP
CK MB BLD-MCNC: 4.52 NG/ML — SIGNIFICANT CHANGE UP (ref 1–4.7)
CK MB BLD-MCNC: SIGNIFICANT CHANGE UP (ref 0–2.5)
CK SERPL-CCNC: 48 U/L — SIGNIFICANT CHANGE UP (ref 25–170)
CO2 SERPL-SCNC: 19 MMOL/L — LOW (ref 22–31)
CO2 SERPL-SCNC: 24 MMOL/L — SIGNIFICANT CHANGE UP (ref 22–31)
COLOR SPEC: YELLOW — SIGNIFICANT CHANGE UP
CREAT ?TM UR-MCNC: 48.6 MG/DL — SIGNIFICANT CHANGE UP
CREAT SERPL-MCNC: 0.93 MG/DL — SIGNIFICANT CHANGE UP (ref 0.5–1.3)
CREAT SERPL-MCNC: 1.05 MG/DL — SIGNIFICANT CHANGE UP (ref 0.5–1.3)
EOSINOPHIL # BLD AUTO: 0.02 K/UL — SIGNIFICANT CHANGE UP (ref 0–0.5)
EOSINOPHIL NFR BLD AUTO: 0.3 % — SIGNIFICANT CHANGE UP (ref 0–6)
GLUCOSE BLDA-MCNC: 221 MG/DL — HIGH (ref 70–99)
GLUCOSE BLDC GLUCOMTR-MCNC: 145 MG/DL — HIGH (ref 70–99)
GLUCOSE BLDC GLUCOMTR-MCNC: 209 MG/DL — HIGH (ref 70–99)
GLUCOSE BLDC GLUCOMTR-MCNC: 245 MG/DL — HIGH (ref 70–99)
GLUCOSE BLDC GLUCOMTR-MCNC: 253 MG/DL — HIGH (ref 70–99)
GLUCOSE BLDC GLUCOMTR-MCNC: 279 MG/DL — HIGH (ref 70–99)
GLUCOSE SERPL-MCNC: 219 MG/DL — HIGH (ref 70–99)
GLUCOSE SERPL-MCNC: 238 MG/DL — HIGH (ref 70–99)
GLUCOSE UR-MCNC: HIGH
HBA1C BLD-MCNC: 9.8 % — HIGH (ref 4–5.6)
HCO3 BLDA-SCNC: 25 MMOL/L — SIGNIFICANT CHANGE UP (ref 22–26)
HCT VFR BLD CALC: 28.9 % — LOW (ref 34.5–45)
HCT VFR BLD CALC: 29 % — LOW (ref 34.5–45)
HCT VFR BLDA CALC: 30.7 % — LOW (ref 34.5–46.5)
HGB BLD-MCNC: 9.5 G/DL — LOW (ref 11.5–15.5)
HGB BLD-MCNC: 9.6 G/DL — LOW (ref 11.5–15.5)
HGB BLDA-MCNC: 9.9 G/DL — LOW (ref 11.5–15.5)
HYALINE CASTS # UR AUTO: HIGH
IMM GRANULOCYTES # BLD AUTO: 0.1 # — SIGNIFICANT CHANGE UP
IMM GRANULOCYTES NFR BLD AUTO: 1.4 % — SIGNIFICANT CHANGE UP (ref 0–1.5)
KETONES UR-MCNC: NEGATIVE — SIGNIFICANT CHANGE UP
LACTATE BLDA-SCNC: 0.9 MMOL/L — SIGNIFICANT CHANGE UP (ref 0.5–2)
LEUKOCYTE ESTERASE UR-ACNC: SIGNIFICANT CHANGE UP
LYMPHOCYTES # BLD AUTO: 0.3 K/UL — LOW (ref 1–3.3)
LYMPHOCYTES # BLD AUTO: 4.1 % — LOW (ref 13–44)
MAGNESIUM SERPL-MCNC: 1.4 MG/DL — LOW (ref 1.6–2.6)
MAGNESIUM SERPL-MCNC: 1.8 MG/DL — SIGNIFICANT CHANGE UP (ref 1.6–2.6)
MCHC RBC-ENTMCNC: 25.7 PG — LOW (ref 27–34)
MCHC RBC-ENTMCNC: 25.9 PG — LOW (ref 27–34)
MCHC RBC-ENTMCNC: 32.9 % — SIGNIFICANT CHANGE UP (ref 32–36)
MCHC RBC-ENTMCNC: 33.1 % — SIGNIFICANT CHANGE UP (ref 32–36)
MCV RBC AUTO: 78.2 FL — LOW (ref 80–100)
MCV RBC AUTO: 78.3 FL — LOW (ref 80–100)
MONOCYTES # BLD AUTO: 0.28 K/UL — SIGNIFICANT CHANGE UP (ref 0–0.9)
MONOCYTES NFR BLD AUTO: 3.8 % — SIGNIFICANT CHANGE UP (ref 2–14)
NEUTROPHILS # BLD AUTO: 6.59 K/UL — SIGNIFICANT CHANGE UP (ref 1.8–7.4)
NEUTROPHILS NFR BLD AUTO: 90.3 % — HIGH (ref 43–77)
NITRITE UR-MCNC: NEGATIVE — SIGNIFICANT CHANGE UP
NRBC # FLD: 0 — SIGNIFICANT CHANGE UP
NRBC # FLD: 0 — SIGNIFICANT CHANGE UP
OSMOLALITY SERPL: 290 MOSMO/KG — SIGNIFICANT CHANGE UP (ref 275–295)
OSMOLALITY UR: 455 MOSMO/KG — SIGNIFICANT CHANGE UP (ref 50–1200)
PCO2 BLDA: 33 MMHG — SIGNIFICANT CHANGE UP (ref 32–48)
PH BLDA: 7.46 PH — HIGH (ref 7.35–7.45)
PH UR: 6 — SIGNIFICANT CHANGE UP (ref 5–8)
PHOSPHATE SERPL-MCNC: 2 MG/DL — LOW (ref 2.5–4.5)
PHOSPHATE SERPL-MCNC: 3.1 MG/DL — SIGNIFICANT CHANGE UP (ref 2.5–4.5)
PLATELET # BLD AUTO: 248 K/UL — SIGNIFICANT CHANGE UP (ref 150–400)
PLATELET # BLD AUTO: 265 K/UL — SIGNIFICANT CHANGE UP (ref 150–400)
PMV BLD: 9.7 FL — SIGNIFICANT CHANGE UP (ref 7–13)
PMV BLD: 9.9 FL — SIGNIFICANT CHANGE UP (ref 7–13)
PO2 BLDA: 193 MMHG — HIGH (ref 83–108)
POTASSIUM BLDA-SCNC: 3.6 MMOL/L — SIGNIFICANT CHANGE UP (ref 3.4–4.5)
POTASSIUM SERPL-MCNC: 3 MMOL/L — LOW (ref 3.5–5.3)
POTASSIUM SERPL-MCNC: 3.8 MMOL/L — SIGNIFICANT CHANGE UP (ref 3.5–5.3)
POTASSIUM SERPL-SCNC: 3 MMOL/L — LOW (ref 3.5–5.3)
POTASSIUM SERPL-SCNC: 3.8 MMOL/L — SIGNIFICANT CHANGE UP (ref 3.5–5.3)
POTASSIUM UR-SCNC: 48.8 MMOL/L — SIGNIFICANT CHANGE UP
PROT SERPL-MCNC: 6.8 G/DL — SIGNIFICANT CHANGE UP (ref 6–8.3)
PROT UR-MCNC: HIGH
RBC # BLD: 3.69 M/UL — LOW (ref 3.8–5.2)
RBC # BLD: 3.71 M/UL — LOW (ref 3.8–5.2)
RBC # FLD: 14.6 % — HIGH (ref 10.3–14.5)
RBC # FLD: 15.1 % — HIGH (ref 10.3–14.5)
RBC CASTS # UR COMP ASSIST: HIGH (ref 0–?)
SAO2 % BLDA: 99 % — SIGNIFICANT CHANGE UP (ref 95–99)
SODIUM BLDA-SCNC: 123 MMOL/L — LOW (ref 136–146)
SODIUM SERPL-SCNC: 125 MMOL/L — LOW (ref 135–145)
SODIUM SERPL-SCNC: 126 MMOL/L — LOW (ref 135–145)
SODIUM UR-SCNC: 47 MMOL/L — SIGNIFICANT CHANGE UP
SP GR SPEC: 1.01 — SIGNIFICANT CHANGE UP (ref 1–1.04)
SPECIMEN SOURCE: SIGNIFICANT CHANGE UP
SQUAMOUS # UR AUTO: SIGNIFICANT CHANGE UP
TROPONIN T, HIGH SENSITIVITY: 132 NG/L — CRITICAL HIGH (ref ?–14)
URATE SERPL-MCNC: 5 MG/DL — SIGNIFICANT CHANGE UP (ref 2.5–7)
UROBILINOGEN FLD QL: NORMAL — SIGNIFICANT CHANGE UP
WBC # BLD: 12.63 K/UL — HIGH (ref 3.8–10.5)
WBC # BLD: 7.3 K/UL — SIGNIFICANT CHANGE UP (ref 3.8–10.5)
WBC # FLD AUTO: 12.63 K/UL — HIGH (ref 3.8–10.5)
WBC # FLD AUTO: 7.3 K/UL — SIGNIFICANT CHANGE UP (ref 3.8–10.5)
WBC UR QL: SIGNIFICANT CHANGE UP (ref 0–?)

## 2018-09-05 PROCEDURE — 99232 SBSQ HOSP IP/OBS MODERATE 35: CPT

## 2018-09-05 PROCEDURE — 99233 SBSQ HOSP IP/OBS HIGH 50: CPT

## 2018-09-05 PROCEDURE — 99231 SBSQ HOSP IP/OBS SF/LOW 25: CPT

## 2018-09-05 PROCEDURE — 99222 1ST HOSP IP/OBS MODERATE 55: CPT

## 2018-09-05 PROCEDURE — 99291 CRITICAL CARE FIRST HOUR: CPT

## 2018-09-05 RX ORDER — MAGNESIUM SULFATE 500 MG/ML
2 VIAL (ML) INJECTION ONCE
Qty: 0 | Refills: 0 | Status: COMPLETED | OUTPATIENT
Start: 2018-09-05 | End: 2018-09-05

## 2018-09-05 RX ORDER — INSULIN GLARGINE 100 [IU]/ML
12 INJECTION, SOLUTION SUBCUTANEOUS AT BEDTIME
Qty: 0 | Refills: 0 | Status: DISCONTINUED | OUTPATIENT
Start: 2018-09-05 | End: 2018-09-07

## 2018-09-05 RX ORDER — KETOROLAC TROMETHAMINE 30 MG/ML
15 SYRINGE (ML) INJECTION ONCE
Qty: 0 | Refills: 0 | Status: DISCONTINUED | OUTPATIENT
Start: 2018-09-05 | End: 2018-09-05

## 2018-09-05 RX ORDER — FUROSEMIDE 40 MG
40 TABLET ORAL ONCE
Qty: 0 | Refills: 0 | Status: COMPLETED | OUTPATIENT
Start: 2018-09-05 | End: 2018-09-05

## 2018-09-05 RX ORDER — POTASSIUM CHLORIDE 20 MEQ
40 PACKET (EA) ORAL ONCE
Qty: 0 | Refills: 0 | Status: COMPLETED | OUTPATIENT
Start: 2018-09-05 | End: 2018-09-05

## 2018-09-05 RX ORDER — NIFEDIPINE 30 MG
60 TABLET, EXTENDED RELEASE 24 HR ORAL DAILY
Qty: 0 | Refills: 0 | Status: DISCONTINUED | OUTPATIENT
Start: 2018-09-05 | End: 2018-09-05

## 2018-09-05 RX ORDER — INSULIN LISPRO 100/ML
3 VIAL (ML) SUBCUTANEOUS
Qty: 0 | Refills: 0 | Status: DISCONTINUED | OUTPATIENT
Start: 2018-09-05 | End: 2018-09-06

## 2018-09-05 RX ORDER — METOPROLOL TARTRATE 50 MG
25 TABLET ORAL
Qty: 0 | Refills: 0 | Status: DISCONTINUED | OUTPATIENT
Start: 2018-09-05 | End: 2018-09-05

## 2018-09-05 RX ORDER — HYDROMORPHONE HYDROCHLORIDE 2 MG/ML
0.5 INJECTION INTRAMUSCULAR; INTRAVENOUS; SUBCUTANEOUS EVERY 4 HOURS
Qty: 0 | Refills: 0 | Status: DISCONTINUED | OUTPATIENT
Start: 2018-09-05 | End: 2018-09-05

## 2018-09-05 RX ORDER — MORPHINE SULFATE 50 MG/1
1 CAPSULE, EXTENDED RELEASE ORAL ONCE
Qty: 0 | Refills: 0 | Status: DISCONTINUED | OUTPATIENT
Start: 2018-09-05 | End: 2018-09-05

## 2018-09-05 RX ORDER — HYDROMORPHONE HYDROCHLORIDE 2 MG/ML
1 INJECTION INTRAMUSCULAR; INTRAVENOUS; SUBCUTANEOUS EVERY 4 HOURS
Qty: 0 | Refills: 0 | Status: DISCONTINUED | OUTPATIENT
Start: 2018-09-05 | End: 2018-09-07

## 2018-09-05 RX ORDER — INSULIN GLARGINE 100 [IU]/ML
6 INJECTION, SOLUTION SUBCUTANEOUS ONCE
Qty: 0 | Refills: 0 | Status: COMPLETED | OUTPATIENT
Start: 2018-09-05 | End: 2018-09-05

## 2018-09-05 RX ORDER — INSULIN LISPRO 100/ML
VIAL (ML) SUBCUTANEOUS
Qty: 0 | Refills: 0 | Status: DISCONTINUED | OUTPATIENT
Start: 2018-09-05 | End: 2018-09-15

## 2018-09-05 RX ORDER — POLYETHYLENE GLYCOL 3350 17 G/17G
17 POWDER, FOR SOLUTION ORAL DAILY
Qty: 0 | Refills: 0 | Status: DISCONTINUED | OUTPATIENT
Start: 2018-09-05 | End: 2018-09-15

## 2018-09-05 RX ORDER — HYDROMORPHONE HYDROCHLORIDE 2 MG/ML
1 INJECTION INTRAMUSCULAR; INTRAVENOUS; SUBCUTANEOUS EVERY 6 HOURS
Qty: 0 | Refills: 0 | Status: DISCONTINUED | OUTPATIENT
Start: 2018-09-05 | End: 2018-09-05

## 2018-09-05 RX ORDER — SENNA PLUS 8.6 MG/1
2 TABLET ORAL AT BEDTIME
Qty: 0 | Refills: 0 | Status: DISCONTINUED | OUTPATIENT
Start: 2018-09-05 | End: 2018-09-15

## 2018-09-05 RX ORDER — ONDANSETRON 8 MG/1
4 TABLET, FILM COATED ORAL ONCE
Qty: 0 | Refills: 0 | Status: DISCONTINUED | OUTPATIENT
Start: 2018-09-05 | End: 2018-09-05

## 2018-09-05 RX ORDER — ONDANSETRON 8 MG/1
4 TABLET, FILM COATED ORAL ONCE
Qty: 0 | Refills: 0 | Status: COMPLETED | OUTPATIENT
Start: 2018-09-05 | End: 2018-09-05

## 2018-09-05 RX ORDER — ACETAMINOPHEN 500 MG
1000 TABLET ORAL ONCE
Qty: 0 | Refills: 0 | Status: DISCONTINUED | OUTPATIENT
Start: 2018-09-05 | End: 2018-09-05

## 2018-09-05 RX ORDER — POTASSIUM CHLORIDE 20 MEQ
10 PACKET (EA) ORAL
Qty: 0 | Refills: 0 | Status: COMPLETED | OUTPATIENT
Start: 2018-09-05 | End: 2018-09-05

## 2018-09-05 RX ORDER — ACETAMINOPHEN 500 MG
650 TABLET ORAL ONCE
Qty: 0 | Refills: 0 | Status: COMPLETED | OUTPATIENT
Start: 2018-09-05 | End: 2018-09-05

## 2018-09-05 RX ORDER — HYDRALAZINE HCL 50 MG
10 TABLET ORAL ONCE
Qty: 0 | Refills: 0 | Status: DISCONTINUED | OUTPATIENT
Start: 2018-09-05 | End: 2018-09-06

## 2018-09-05 RX ORDER — LIDOCAINE 4 G/100G
1 CREAM TOPICAL ONCE
Qty: 0 | Refills: 0 | Status: COMPLETED | OUTPATIENT
Start: 2018-09-05 | End: 2018-09-05

## 2018-09-05 RX ORDER — INFLUENZA VIRUS VACCINE 15; 15; 15; 15 UG/.5ML; UG/.5ML; UG/.5ML; UG/.5ML
0.5 SUSPENSION INTRAMUSCULAR ONCE
Qty: 0 | Refills: 0 | Status: COMPLETED | OUTPATIENT
Start: 2018-09-05 | End: 2018-09-05

## 2018-09-05 RX ORDER — INSULIN LISPRO 100/ML
VIAL (ML) SUBCUTANEOUS AT BEDTIME
Qty: 0 | Refills: 0 | Status: DISCONTINUED | OUTPATIENT
Start: 2018-09-05 | End: 2018-09-15

## 2018-09-05 RX ORDER — METOPROLOL TARTRATE 50 MG
5 TABLET ORAL ONCE
Qty: 0 | Refills: 0 | Status: DISCONTINUED | OUTPATIENT
Start: 2018-09-05 | End: 2018-09-05

## 2018-09-05 RX ADMIN — Medication 15 MILLIGRAM(S): at 02:59

## 2018-09-05 RX ADMIN — HYDROMORPHONE HYDROCHLORIDE 1 MILLIGRAM(S): 2 INJECTION INTRAMUSCULAR; INTRAVENOUS; SUBCUTANEOUS at 17:10

## 2018-09-05 RX ADMIN — LIDOCAINE 1 PATCH: 4 CREAM TOPICAL at 05:24

## 2018-09-05 RX ADMIN — Medication 4: at 05:16

## 2018-09-05 RX ADMIN — Medication 650 MILLIGRAM(S): at 06:15

## 2018-09-05 RX ADMIN — Medication 15 MILLIGRAM(S): at 01:22

## 2018-09-05 RX ADMIN — Medication 100 MILLIEQUIVALENT(S): at 06:09

## 2018-09-05 RX ADMIN — Medication 145 MILLIGRAM(S): at 15:19

## 2018-09-05 RX ADMIN — HYDROMORPHONE HYDROCHLORIDE 1 MILLIGRAM(S): 2 INJECTION INTRAMUSCULAR; INTRAVENOUS; SUBCUTANEOUS at 10:45

## 2018-09-05 RX ADMIN — ONDANSETRON 4 MILLIGRAM(S): 8 TABLET, FILM COATED ORAL at 22:23

## 2018-09-05 RX ADMIN — INSULIN GLARGINE 6 UNIT(S): 100 INJECTION, SOLUTION SUBCUTANEOUS at 22:05

## 2018-09-05 RX ADMIN — Medication 40 MILLIEQUIVALENT(S): at 05:16

## 2018-09-05 RX ADMIN — Medication 6: at 12:42

## 2018-09-05 RX ADMIN — Medication 40 MILLIGRAM(S): at 05:16

## 2018-09-05 RX ADMIN — Medication 50 GRAM(S): at 04:34

## 2018-09-05 RX ADMIN — Medication 100 MILLIEQUIVALENT(S): at 04:21

## 2018-09-05 RX ADMIN — CHLORHEXIDINE GLUCONATE 1 APPLICATION(S): 213 SOLUTION TOPICAL at 12:43

## 2018-09-05 RX ADMIN — LIDOCAINE 1 PATCH: 4 CREAM TOPICAL at 17:13

## 2018-09-05 RX ADMIN — Medication 100 MILLIGRAM(S): at 05:16

## 2018-09-05 RX ADMIN — Medication 7.5 MG/HR: at 10:51

## 2018-09-05 RX ADMIN — Medication 400 MILLIGRAM(S): at 00:08

## 2018-09-05 RX ADMIN — Medication 650 MILLIGRAM(S): at 05:46

## 2018-09-05 RX ADMIN — Medication 6: at 00:13

## 2018-09-05 RX ADMIN — HYDROMORPHONE HYDROCHLORIDE 1 MILLIGRAM(S): 2 INJECTION INTRAMUSCULAR; INTRAVENOUS; SUBCUTANEOUS at 11:00

## 2018-09-05 RX ADMIN — Medication 40 MILLIGRAM(S): at 23:11

## 2018-09-05 RX ADMIN — Medication 100 MILLIEQUIVALENT(S): at 05:14

## 2018-09-05 RX ADMIN — Medication 15 MILLIGRAM(S): at 03:14

## 2018-09-05 RX ADMIN — Medication 1 MILLIGRAM(S): at 23:00

## 2018-09-05 RX ADMIN — HYDROMORPHONE HYDROCHLORIDE 1 MILLIGRAM(S): 2 INJECTION INTRAMUSCULAR; INTRAVENOUS; SUBCUTANEOUS at 17:25

## 2018-09-05 RX ADMIN — Medication 3 UNIT(S): at 17:37

## 2018-09-05 RX ADMIN — Medication 4: at 17:37

## 2018-09-05 RX ADMIN — Medication 15 MILLIGRAM(S): at 01:07

## 2018-09-05 RX ADMIN — Medication 100 MILLIGRAM(S): at 15:20

## 2018-09-05 RX ADMIN — SODIUM CHLORIDE 50 MILLILITER(S): 9 INJECTION INTRAMUSCULAR; INTRAVENOUS; SUBCUTANEOUS at 10:48

## 2018-09-05 NOTE — PROGRESS NOTE ADULT - SUBJECTIVE AND OBJECTIVE BOX
86F with hx of lung CA, polymyalgia rheumatica, in remission from lung cancer, here for progressive R shoulder pain x 2 month, worse over last several days. Had MRI in last 2d showing suprapspinatus tear as well as apical lung mass, unclear of brachial plexus involvement.  No focal motor or sensory deficits. No recent trauma. Patient had noncontrast head CT preformed to evaluate a cerebellar lesion noted on cervical spine MRI. Denies headache, nausea, vomiting, loss of coordination or gait instability    MRI brain shows a solitary L cerebellar lesion.     WDWN female in NAD  Vital Signs Last 24 Hrs  T(C): 36.6 (03 Sep 2018 22:15), Max: 36.7 (03 Sep 2018 17:20)  T(F): 97.8 (03 Sep 2018 22:15), Max: 98 (03 Sep 2018 17:20)  HR: 80 (03 Sep 2018 22:15) (72 - 80)  BP: 176/67 (03 Sep 2018 22:15) (150/66 - 199/90)  BP(mean): --  RR: 17 (03 Sep 2018 22:15) (15 - 20)  SpO2: 99% (03 Sep 2018 22:15) (84% - 100%)    AAO X 3  PERRLA, EOMI  CN 2-12 grossly intact  SANDHU strength 5/5 except R shoulder secondary to supraspinatus tear   No pronator drift  coordination intact                          10.2   8.29  )-----------( 302      ( 03 Sep 2018 17:30 )             31.9   09-03    125<L>  |  86<L>  |  36<H>  ----------------------------<  462<HH>  4.4   |  23  |  1.02    Ca    9.5      03 Sep 2018 17:30    TPro  7.7  /  Alb  3.4  /  TBili  0.4  /  DBili  x   /  AST  17  /  ALT  20  /  AlkPhos  69  09-03    < from: CT Head No Cont (09.03.18 @ 21:54) >  There is a hyperdense lesion in the left cerebellum measuring 1.5 x 1.3   cm with thin rim of surrounding low attenuation. There is no significant   associated mass effect. No additional lesions are appreciated on CT.   There is moderate cerebral volume loss with commensurate prominence of   the ventricles and sulci. There are mild chronic ischemic changes in the   frontoparietal white matter. There is a chronic lacunar infarct in the   left basal ganglia. There are atherosclerotic calcifications of the   intracranial carotid arteries and intradural vertebral arteries.    The regional soft tissues and osseous structures are unremarkable. The   visualized paranasal sinuses and tympanic/mastoid cavities are clear   apart from minimal bilateral ethmoid mucosal thickening.    IMPRESSION:     Hyperdense left cerebellar lesion measuring 1.5 x 1.3 cm for which   considerations again include cavernoma, evolving hemorrhage, or   hemorrhagic metastasis. Contrast-enhanced MRI of the brain is recommended   for further evaluation.    < end of copied text >    < from: MR Head w/wo IV Cont (09.04.18 @ 12:20) >  MRI of the brain was performed using sagittal T1, axial T 1 fast and echo   T2-weighted sequence with FLAIR diffusion and susceptibility weighted   sequence. The patient was injected with approximately 7 cc Gadavist IV   with 0.5 cc of contrast discarded. Sagittal coronal and axial T1-weighted   sequences were performed.    Parenchymal volume loss and chronic microvascular ischemic changes are   identified.    Old chronic infarct involving the left frontal subcortical region is   identified.    There is evidence of abnormal lesion which does demonstrate abnormal T1   shortening in the left cerebellar region. This finding corresponds to   high attenuated lesion seen on prior noncontrast head CT and demonstrates   associated susceptibility as well. This finding is likely compatible with   an underlying hemorrhagic neoplastic lesion such as underlying metastasis   given patient's history. Clinical correlation continued close interval   follow-up is recommended. Surrounding edema is identified. This lesion   measures approximately 1.2 x 1.4 x 2.1 cm. No significant shift or   herniation is seen.    The large vessels demonstrate normal flow voids.    The visualized paranasal sinuses mastoid and middle ear regions appear   clear.    < end of copied text >  < from: MR Head w/wo IV Cont (09.04.18 @ 12:20) >  MRI of the brain was performed using sagittal T1, axial T 1 fast and echo   T2-weighted sequence with FLAIR diffusion and susceptibility weighted   sequence. The patient was injected with approximately 7 cc Gadavist IV   with 0.5 cc of contrast discarded. Sagittal coronal and axial T1-weighted   sequences were performed.    Parenchymal volume loss and chronic microvascular ischemic changes are   identified.    Old chronic infarct involving the left frontal subcortical region is   identified.    There is evidence of abnormal lesion which does demonstrate abnormal T1   shortening in the left cerebellar region. This finding corresponds to   high attenuated lesion seen on prior noncontrast head CT and demonstrates   associated susceptibility as well. This finding is likely compatible with   an underlying hemorrhagic neoplastic lesion such as underlying metastasis   given patient's history. Clinical correlation continued close interval   follow-up is recommended. Surrounding edema is identified. This lesion   measures approximately 1.2 x 1.4 x 2.1 cm. No significant shift or   herniation is seen.    The large vessels demonstrate normal flow voids.    The visualized paranasal sinuses mastoid and middle ear regions appear   clear.    < end of copied text >

## 2018-09-05 NOTE — CONSULT NOTE ADULT - PROBLEM SELECTOR PROBLEM 2
Lung cancer
Acute respiratory failure with hypoxia
Hypertension
Malignant neoplasm of upper lobe of right lung

## 2018-09-05 NOTE — CONSULT NOTE ADULT - ASSESSMENT
Right shoulder pain - CT showing an enlarging RUL mass, MRI showing a right rotator cuff tear. Pain  and  chest wall tenderness suggest pain is related to the lung mass. She had a CT scan in [place] in April 2018 which should be compared to the present scan . If enlarging mass is confirmed, this represents recurrence of the  previous malignancy. Would need to get  records regarding histology, results of any molecular testing if  it was done, details of prior treatment. She  has a cerebellar lesion , likely a metastasis but the immediate focus should be  management of the pain she is having.    Cerebellar mass - neurosurgery following - recommending gamma knife - agree with this recommendation.    COPD/respiratory failure  improved, ? precipitated by sedation from narcotic analgesics.

## 2018-09-05 NOTE — PROGRESS NOTE ADULT - ASSESSMENT
· Assessment		  87 y/o F PMH Lung CA (unknown stage or type, Dx 2010 s/p L VATS, wedge resection, RT in July 2017),  PMR, HTN, COPD with emphysema, DM2 p/w worsening Rt shoulder pain from partial supraspinatus tear, found to have possible hemorrhagic met.    #Neuro:  Cerebellar lesion, likely hemorrhagic lung Cancer metastasis 1cm x 2 cm  -Neuro exam wnl  -Neuro checks q6  -F/U Neurosurgery O/P w/ Dr. Bello oM once discharged from the hospital. , no need for acute intervention at this time as per neuroseurgery      #Resp, COPD exacerbation hx of lung Ca diagnosed in 2010, s/p VATS, wedge resection,  -Apr PFTs showed mild obstructive airway disease with decreased DLCO  -On Bipap PRN  -started on levofloxocin 750mg Qdaily  -NS  -Q6 duonebs  -Q2 Albuterol PRN  -40mg methylprednisolone qDaily  -c/w supp o2, wean as tolerated.  -Lungs cancer showed increase in size of multiple b/l pulmonary nodules/massess from april (3.7x3.9 to 5.9x5.7 in 5 months), to follow up with patients oncologist, can be reached at [phone]    #Endo, DM  -Insulin SS  -4 units lispro before meals  -10 units glargline qHS  -Was on 10 methylprednisolone mg BID for polymyalgia rheumatica chronically    #Metabolic  -Hyponatremia to 124, sending/serum osms and urine electrolytes to further workup  -Potassium   -Trend BMP    #GI  -NPO for now given BiPAP PRN and tenuous respiratory status.      #ID, Febrile to 38.6C overnight  -Tylenol PRN  -F/U on BCX  -U/A negative  -no focal consolidations on CXR  -RVP negative    #CV, was tachycardic to 150  -EKG showed MAT  -oral diltiazam 60mg Q6 hours in context of MAT, will likely need new medication on D/C to manage     #MSK  -Dilauded 1mg Q6HRS PRN as per palliative for pain control   -Tylenol for fever PRN     #Ethics  -Palliative care consult in context of possible met to brain, meeting today 11    #DVT  -SCD's

## 2018-09-05 NOTE — CONSULT NOTE ADULT - SUBJECTIVE AND OBJECTIVE BOX
Patient is a 86y old  Female who presents with a chief complaint of Patient is a 86y old  Female who presents with a chief complaint of R. Shoulder Pain (05 Sep 2018 08:16)      HPI:  85 y/o F PMH Lung CA (unknown stage or type, Dx 2010 s/p L VATS, wedge resection, RT in July 2017), PMR, HTN, DM2 p/w worsening Rt shoulder pain for last two months. Pt states pain was worse over last several days, starting last Thursday. Pt was seen in ER on Saturday and had MRI showing supraspinatus tear as well as apical lung mass, unclear of brachial plexus involvement. Pt was discharged from ED with oxycodone, but that failed to alleviate her pain.   Denies: recent trauma, no vision changes, neck pain, chest pain, SOB, fevers, N/V/D.     In ED, vitals: Tm: 98 HR: 72-75 BP: 150-199/66-90, RR: 17 SpO2: 99% RA  Pt received 2 L LR, morphine 4 mg IVP x 2, zofran 4mg IVP, albuterol x 1   CTH: showed Hyperdense left cerebellar lesion measuring 1.5 x 1.3 cm possibly cavernoma, evolving hemorrhage, or   hemorrhagic metastasis.   Neuro surgery was c/s, rec MRI/MRA head w/ and w/o contrast (04 Sep 2018 02:52)       ROS:  Negative except for:    PAST MEDICAL & SURGICAL HISTORY:  Dyslipidemia  Hypertension  Diabetes  Lung cancer  Lung cancer: diagnosed 2010  S/P cholecystectomy  H/O carotid endarterectomy: left      SOCIAL HISTORY:    FAMILY HISTORY:  No pertinent family history in first degree relatives      MEDICATIONS  (STANDING):  chlorhexidine 4% Liquid 1 Application(s) Topical <User Schedule>  dextrose 5%. 1000 milliLiter(s) (50 mL/Hr) IV Continuous <Continuous>  dextrose 50% Injectable 12.5 Gram(s) IV Push once  dextrose 50% Injectable 25 Gram(s) IV Push once  dextrose 50% Injectable 25 Gram(s) IV Push once  diltiazem Infusion 7.5 mG/Hr (7.5 mL/Hr) IV Continuous <Continuous>  docusate sodium 100 milliGRAM(s) Oral three times a day  fenofibrate Tablet 145 milliGRAM(s) Oral daily  influenza   Vaccine 0.5 milliLiter(s) IntraMuscular once  insulin glargine Injectable (LANTUS) 10 Unit(s) SubCutaneous at bedtime  insulin lispro (HumaLOG) corrective regimen sliding scale   SubCutaneous every 6 hours  levoFLOXacin IVPB 250 milliGRAM(s) IV Intermittent every 24 hours  levoFLOXacin IVPB      methylPREDNISolone sodium succinate Injectable 40 milliGRAM(s) IV Push daily  sodium chloride 0.9%. 1000 milliLiter(s) (50 mL/Hr) IV Continuous <Continuous>    MEDICATIONS  (PRN):  dextrose 40% Gel 15 Gram(s) Oral once PRN Blood Glucose LESS THAN 70 milliGRAM(s)/deciliter  glucagon  Injectable 1 milliGRAM(s) IntraMuscular once PRN Glucose LESS THAN 70 milligrams/deciliter  HYDROmorphone  Injectable 1 milliGRAM(s) IV Push every 6 hours PRN Mild to severe pain  senna 2 Tablet(s) Oral at bedtime PRN Constipation      Allergies    ACE inhibitors (Angioedema)    Intolerances        Vital Signs Last 24 Hrs  T(C): 36 (05 Sep 2018 12:00), Max: 38.6 (05 Sep 2018 00:00)  T(F): 96.8 (05 Sep 2018 12:00), Max: 101.4 (05 Sep 2018 00:00)  HR: 78 (05 Sep 2018 12:00) (75 - 151)  BP: 138/51 (05 Sep 2018 12:00) (98/78 - 195/101)  BP(mean): 70 (05 Sep 2018 12:00) (69 - 128)  RR: 14 (05 Sep 2018 12:00) (14 - 34)  SpO2: 96% (05 Sep 2018 12:00) (90% - 100%)    PHYSICAL EXAM  General: adult in distress secondary to pain in the anterior rt upper chest and right shoulder  HEENT: anicteric sclera, pink conjunctiva  Neck: supple  CV: normal S1/S2 with no murmur rubs or gallops  Lungs: tender over right upper anterior chest, positive air movement b/l ant lungs,clear to auscultation, no wheezes, no rales  Abdomen: soft non-tender non-distended, no hepatosplenomegaly  Ext: no clubbing cyanosis or edema  Skin: no rashes and no petechiae  Neuro: alert and oriented X 4, no focal deficits      LABS:                          9.6    12.63 )-----------( 265      ( 05 Sep 2018 02:55 )             29.0         Mean Cell Volume : 78.2 fL  Mean Cell Hemoglobin : 25.9 pg  Mean Cell Hemoglobin Concentration : 33.1 %  Auto Neutrophil # : x  Auto Lymphocyte # : x  Auto Monocyte # : x  Auto Eosinophil # : x  Auto Basophil # : x  Auto Neutrophil % : x  Auto Lymphocyte % : x  Auto Monocyte % : x  Auto Eosinophil % : x  Auto Basophil % : x      09-05    126<L>  |  87<L>  |  31<H>  ----------------------------<  238<H>  3.0<L>   |  24  |  1.05    Ca    8.7      05 Sep 2018 02:55  Phos  3.1     09-05  Mg     1.4     09-05    TPro  7.7  /  Alb  3.4  /  TBili  0.4  /  DBili  x   /  AST  17  /  ALT  20  /  AlkPhos  69  09-03      PT/INR - ( 03 Sep 2018 17:30 )   PT: 12.8 SEC;   INR: 1.15          PTT - ( 03 Sep 2018 17:30 )  PTT:24.7 SEC    RADIOLOGY & ADDITIONAL STUDIES:  < from: MR Head w/wo IV Cont (09.04.18 @ 12:20) >  EXAM:  MR ANGIO BRAIN      EXAM:  MR BRACHIAL PLEXUS CHEST IC RT      EXAM:  MR BRAIN WAW IC        PROCEDURE DATE:  Sep  4 2018         INTERPRETATION:  History: Lung cancer. Severe right arm and shoulder pain.    MRI of the brain was performed using sagittal T1, axial T 1 fast and echo   T2-weighted sequence with FLAIR diffusion and susceptibility weighted   sequence. The patient was injected with approximately 7 cc Gadavist IV   with 0.5 cc of contrast discarded. Sagittal coronal and axial T1-weighted   sequences were performed.    Parenchymal volume loss and chronic microvascular ischemic changes are   identified.    Old chronic infarct involving the left frontal subcortical region is   identified.    There is evidence of abnormal lesion which does demonstrate abnormal T1   shortening in the left cerebellar region. This finding corresponds to   high attenuated lesion seen on prior noncontrast head CT and demonstrates   associated susceptibility as well. This finding is likely compatible with   an underlying hemorrhagic neoplastic lesion such as underlying metastasis   given patient's history. Clinical correlation continued close interval   follow-up is recommended. Surrounding edema is identified. This lesion   measures approximately 1.2 x 1.4 x 2.1 cm. No significant shift or   herniation is seen.    The large vessels demonstrate normal flow voids.    The visualized paranasal sinuses mastoid and middle ear regions appear   clear.    The patient is status post bilateral cataract surgery.    Hypoplastic right A1 segment is seen. Both distal internal carotid,   anterior cerebral, middle cerebral, basilar and posterior cerebral   arteries appear normal without evidence of an aneurysm or significant   stenosis.    Impression: Abnormal lesion seen involving left cerebellar region which   was identified on on prior noncontrast head CT.    MRA of the [facility] Naqvi demonstrates no evidence of aneurysms or   significant stenosis.    MRA of both brachioplexus was performed using coronal T1 and STIR   sequence. Axial T1 STIR sequences performed as well. Sagittal T2-weighted   sequence was performed with fat suppression bilaterally. The patient was   injected with Gadavist IV and coronal and axial T1-weighted sequence was   performed as well.    Large soft tissue lesion is seen involving the right lung apex which   measures approximately 5.9 x 5.7 x 5.9 cm. There is also a pleural-based   soft tissue lesion seen involving the left lung apex which does cause   deformity of the adjacent lung parenchyma. This could be compatible with   scarring versus lesion as well. The imaging lungs can be done clinically   indicated.    Small bilateral pleural thickening versus effusions are identified.    Evaluation of the brachioplexus bilaterally demonstrates close proximity   to the right lung apex lesion though no intrinsic or extrinsic   abnormality is seen bilaterally.    Degenerative change involving the cervical spine region with disc bulging   and osteophytes seen at multiple levels. Dedicated imaging of the   cervical spine with MRI can be done if clinically indicated and no contra   indications.    Impression: Abnormal lung pathology described above.    No definite intrinsic or extrinsic abnormality of the brachioplexus is   seen.    < from: CT Chest No Cont (09.01.18 @ 14:50) >  XAM:  CT CHEST        PROCEDURE DATE:  Sep  1 2018         INTERPRETATION:  CLINICAL INFORMATION: Lung cancer in the past now chest   and shoulder pain    COMPARISON: CT 10/25/2017    PROCEDURE:   CT of the Chest was performed without intravenous contrast.  Sagittal and coronal reformats were performed.    FINDINGS:    CHEST:     LUNGS AND LARGE AIRWAYS: Patent central airways. And 15. There is an   enlarging right upper lobe mass now measuring 6.1 x 5.5 cm previously   measuring 1.6 x 1.6 cm.Left upper lobe irregular opacity surrounding   high density suture material appears decreased in size adjacent ground   glass opacities, question infection versus tumor. Bandlike left upper   lobe opacities likely reflect scarring. There is additional left lower   lobe traction bronchiectasis and thickening intervally improved from   prior examination. There is a new right lower lobe nodule measuring 1.4 x   1.1 cm. Left lower lobe nodule measuring 0.8 x 0.7, unchanged. Bilateral   lower lobe subsegmental atelectasis.    PLEURA: No pleural effusion.    VESSELS: Aortic valvular and coronary artery calcifications.    HEART: Heart size is normal. No pericardial effusion. Coronary   calcifications.    MEDIASTINUM AND BRENDON: Stable mediastinal lymphadenopathy for example   paratracheal node measuring 1.5 x 1.3 cm (2:52), unchanged and   paratracheal node measuring 1.8 x 1.2 cm (2:39).    CHEST WALL AND LOWER NECK: A 1.5 cm rounded soft tissue density in the   inferior left breast is new since the previous study and not well   evaluated on CT..    VISUALIZED UPPER ABDOMEN: 2.8 cm right adrenal mass is without   significant change Cholecystectomy clips.    BONES: Hill-Sachs lesion, chronic. Nonacute right third, fourth and fifth   anterolateral rib fractures.    IMPRESSION:    Emphysema. Large, enlarging right upper lobe mass and right lower lobe   pulmonary nodule, suspicious for malignancy.    Decreased irregular opacity surrounding suture material in the left upper   lobe. Adjacent ground glass opacities may reflect infection versus tumor.    Chronic appearing right-sided Hill-Sachs deformity in the right shoulder;   correlate with separately dictated CT of the shoulder for further   evaluation.    New 1.5 cm left breast nodule, not well evaluated on CT. Correlate with   mammography.    Stable 2.8 cm right adrenal mass.      < end of copied text >

## 2018-09-05 NOTE — PROGRESS NOTE ADULT - PROBLEM SELECTOR PLAN 1
Patient seen in rounds with Dr. Mo, will be treated as outpatient for the brain met with Gamma knife.   patient to follow up with Dr. Bello Mo once discharged from the hospital.

## 2018-09-05 NOTE — PROGRESS NOTE ADULT - SUBJECTIVE AND OBJECTIVE BOX
Cardiovascular Disease Progress Note    Overnight events: No acute events overnight.  admitted to MICU for hypoxic respiratory failure requiring bipap. still complaining of right shoulder pain. febrile overnight. denies any cp/sob/pnd/orthopnea/palpitations. saturating well on bipap  Otherwise review of systems negative    Objective Findings:  T(C): 38.6 (18 @ 00:00), Max: 38.6 (18 @ 00:00)  HR: 77 (18 @ 07:27) (77 - 151)  BP: 124/51 (18 @ 07:00) (98/78 - 195/101)  RR: 21 (18 @ 07:27) (18 - 34)  SpO2: 98% (18 @ 07:27) (90% - 100%)  Wt(kg): --  Daily     Daily Weight in k.8 (05 Sep 2018 02:00)      Physical Exam:  Gen: NAD  HEENT: EOMI  CV: RRR, normal S1 + S2, no m/r/g  Lungs: CTAB  Abd: soft, non-tender  Ext: No edema    Telemetry: NSR, ST, APC's    Laboratory Data:                        9.6    12.63 )-----------( 265      ( 05 Sep 2018 02:55 )             29.0     -    126<L>  |  87<L>  |  31<H>  ----------------------------<  238<H>  3.0<L>   |  24  |  1.05    Ca    8.7      05 Sep 2018 02:55  Phos  3.1     -  Mg     1.4     -    TPro  7.7  /  Alb  3.4  /  TBili  0.4  /  DBili  x   /  AST  17  /  ALT  20  /  AlkPhos  69  09-03    PT/INR - ( 03 Sep 2018 17:30 )   PT: 12.8 SEC;   INR: 1.15          PTT - ( 03 Sep 2018 17:30 )  PTT:24.7 SEC  CARDIAC MARKERS ( 05 Sep 2018 02:55 )  x     / x     / 48 u/L / 4.52 ng/mL / x      CARDIAC MARKERS ( 04 Sep 2018 20:10 )  x     / x     / 38 u/L / 3.26 ng/mL / x              Inpatient Medications:  MEDICATIONS  (STANDING):  chlorhexidine 4% Liquid 1 Application(s) Topical <User Schedule>  dextrose 5%. 1000 milliLiter(s) (50 mL/Hr) IV Continuous <Continuous>  dextrose 50% Injectable 12.5 Gram(s) IV Push once  dextrose 50% Injectable 25 Gram(s) IV Push once  dextrose 50% Injectable 25 Gram(s) IV Push once  diltiazem Infusion 7.5 mG/Hr (7.5 mL/Hr) IV Continuous <Continuous>  docusate sodium 100 milliGRAM(s) Oral three times a day  fenofibrate Tablet 145 milliGRAM(s) Oral daily  influenza   Vaccine 0.5 milliLiter(s) IntraMuscular once  insulin glargine Injectable (LANTUS) 10 Unit(s) SubCutaneous at bedtime  insulin lispro (HumaLOG) corrective regimen sliding scale   SubCutaneous every 6 hours  levoFLOXacin IVPB 250 milliGRAM(s) IV Intermittent every 24 hours  levoFLOXacin IVPB      methylPREDNISolone sodium succinate Injectable 40 milliGRAM(s) IV Push daily  sodium chloride 0.9%. 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       1) hypoxic respiratory failure requiring BiPAP, COPD, emphysema  -doesnt appear cardiac mediated. euvolemic appearing  -possibly infectious vs progression of disease vs copd-e  -would obtain LE duplex to r/o venous thromboembolism as that is also in the differential  -given fevers, can consider broad spectrum abx  -agree with steroids and nebs for possible copd-e    2) right should pain, hx of PMR  -patient would benefit from improved pain control  -consider anesthesia/ortho consult for possible local therapy  -given fevers and severe pain, can consider arthrocentesis to r/o septic joint if clinically indicated    3) HTN, APC's  -currently on diltiazem drip  -no evidence of atrial fibrillation on visualized ecg's and telemetry  -agree with holding heparin drip  -agree with holding beta blockers given copd-e      4) cerebellar lesion  -appreciate nsgy recs  -mri/mra results noted         Slick York MD   Cardiovascular Disease  [phone] Cardiovascular Disease Progress Note    Overnight events: No acute events overnight.  admitted to MICU for hypoxic respiratory failure requiring bipap. still complaining of right shoulder pain. febrile overnight. denies any cp/sob/pnd/orthopnea/palpitations. saturating well on bipap  Otherwise review of systems negative    Objective Findings:  T(C): 38.6 (18 @ 00:00), Max: 38.6 (18 @ 00:00)  HR: 77 (18 @ 07:27) (77 - 151)  BP: 124/51 (18 @ 07:00) (98/78 - 195/101)  RR: 21 (18 @ 07:27) (18 - 34)  SpO2: 98% (18 @ 07:27) (90% - 100%)  Wt(kg): --  Daily     Daily Weight in k.8 (05 Sep 2018 02:00)      Physical Exam:  Gen: NAD  HEENT: EOMI  CV: RRR, normal S1 + S2, no m/r/g  Lungs: CTAB  Abd: soft, non-tender  Ext: No edema    Telemetry: NSR, ST, APC's    Laboratory Data:                        9.6    12.63 )-----------( 265      ( 05 Sep 2018 02:55 )             29.0     -    126<L>  |  87<L>  |  31<H>  ----------------------------<  238<H>  3.0<L>   |  24  |  1.05    Ca    8.7      05 Sep 2018 02:55  Phos  3.1     -  Mg     1.4     -    TPro  7.7  /  Alb  3.4  /  TBili  0.4  /  DBili  x   /  AST  17  /  ALT  20  /  AlkPhos  69  09-03    PT/INR - ( 03 Sep 2018 17:30 )   PT: 12.8 SEC;   INR: 1.15          PTT - ( 03 Sep 2018 17:30 )  PTT:24.7 SEC  CARDIAC MARKERS ( 05 Sep 2018 02:55 )  x     / x     / 48 u/L / 4.52 ng/mL / x      CARDIAC MARKERS ( 04 Sep 2018 20:10 )  x     / x     / 38 u/L / 3.26 ng/mL / x              Inpatient Medications:  MEDICATIONS  (STANDING):  chlorhexidine 4% Liquid 1 Application(s) Topical <User Schedule>  dextrose 5%. 1000 milliLiter(s) (50 mL/Hr) IV Continuous <Continuous>  dextrose 50% Injectable 12.5 Gram(s) IV Push once  dextrose 50% Injectable 25 Gram(s) IV Push once  dextrose 50% Injectable 25 Gram(s) IV Push once  diltiazem Infusion 7.5 mG/Hr (7.5 mL/Hr) IV Continuous <Continuous>  docusate sodium 100 milliGRAM(s) Oral three times a day  fenofibrate Tablet 145 milliGRAM(s) Oral daily  influenza   Vaccine 0.5 milliLiter(s) IntraMuscular once  insulin glargine Injectable (LANTUS) 10 Unit(s) SubCutaneous at bedtime  insulin lispro (HumaLOG) corrective regimen sliding scale   SubCutaneous every 6 hours  levoFLOXacin IVPB 250 milliGRAM(s) IV Intermittent every 24 hours  levoFLOXacin IVPB      methylPREDNISolone sodium succinate Injectable 40 milliGRAM(s) IV Push daily  sodium chloride 0.9%. 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       1) hypoxic respiratory failure requiring BiPAP, COPD, emphysema  -doesnt appear cardiac mediated. euvolemic appearing  -possibly infectious vs progression of disease vs copd-e  -would obtain LE duplex to r/o venous thromboembolism as that is also in the differential  -given fevers, can consider broad spectrum abx  -agree with steroids and nebs for possible copd-e    3) elevated troponin, without overt ischemic sx or ischemic changes on ecg  -would defer ischemic workup for now given overall prognosis and inability to tolerate anti-platelet agents/AC given recent imaging findings    4) right should pain, hx of PMR  -patient would benefit from improved pain control  -consider anesthesia/ortho consult for possible local therapy  -given fevers and severe pain, can consider arthrocentesis to r/o septic joint if clinically indicated    5) HTN, APC's  -currently on diltiazem drip  -no evidence of atrial fibrillation on visualized ecg's and telemetry  -agree with holding heparin drip  -agree with holding beta blockers given copd-e    6) cerebellar lesion  -appreciate nsgy recs  -mri/mra results noted         Slick York MD   Cardiovascular Disease  [phone]

## 2018-09-05 NOTE — CHART NOTE - NSCHARTNOTEFT_GEN_A_CORE
MICU Transfer Note    Transfer from: MICU  Transfer to:  (  ) Medicine    (  ) Telemetry    (X  ) RCU    (  ) Palliative    (  ) Stroke Unit    (  ) _______________  Bed: 641B  Accepting physican:      MICU COURSE:  MICU was consulted on 9/4/18 for sudden onset SOB and respiratory distress. Pt vomited after being given morphine and then developed wheezing and given duonebs x2 w/ mild improvement. EKG showed MAT, patient placed on Bipap for SOB and then admitted to MICU for airway monitoring. Concern in context of vomiting and possible new brain metastasis found on MRI, neurosurgery consulted with recs that patient can be follow up outpatient. Cardiology consulted, believes patient's trops were due to demand ischemia and that EKG not afib. Patient was started on prednisone 40mg PO QD and levofloxocin 750mg Qd for COPD excasterbation and then transitioned to high flow nasal cannula and Levofloxacin w/ improvement in resp status however pt continued to complain of R shoulder pain. Over night she was given Toradol and Tylenol w/ little improvement. Given 1mg Dilauded with little improvement. POCUS shows LLL consolidation. Patient's respiratory status was considered stable and transferred to the RCU. Patient's Oncologist was called, in april imaging had showed Right apical mass to be 3.9 x 3.7cm. In context of growth and new possible met in brain, stated that patient would likely not receive targeted therapy and more systemic therapy would be initiated and that they will evaluate patient for this outpatient. Patient has known adenocarcinoma diagnosed of biopsy on previous left lung mass treated with radiation.         ASSESSMENT & PLAN:   87 y/o F PMH Lung CA (Adenocarcinoma, Dx 2010 s/p L VATS, wedge resection, RT in July 2017),  PMR, HTN, COPD with emphysema, DM2 p/w worsening Rt shoulder pain from partial supraspinatus tear, found to have possible met in brain.    #Neuro:  Cerebellar lesion, 1cm x 2cm, possible met of known adenocarcinoma  -Neuro exam wnl  -Neuro checks q6  -Neurosurgery evaluated patient, to see outpatient for possible gamma knife surgery  -Avoid anticoagulation at this time      #Resp, COPD exacerbation hx of lung Ca diagnosed in 2010, s/p VATS, wedge resection,  -Apr PFTs showed mild obstructive airway disease with decreased DLCO  -On Bipap PRN  -started on levofloxocin 750mg Qdaily  -NS  -Q6 duonebs  -Q2 Albuterol PRN  -40mg methylprednisolone qDaily  -c/w supp o2, wean as tolerated.  -Lungs cancer showed increase in size of multiple b/l pulmonary nodules/massess from april (3.7x3.9 to 5.9x5.7 in 5 months), to follow up with patients oncologist, can be reached at [phone]    #Endo, DM  -Insulin SS  -4 units lispro before meals  -10 units glargline qHS  -Was on 10 methylprednisolone mg BID for polymyalgia rheumatica chronically    #Metabolic  -Hyponatremia to 124, sending/serum osms and urine electrolytes to further workup  -Potassium   -Trend BMP    #GI  -NPO for now given BiPAP PRN and tenuous respiratory status.      #Febrile to 38.6C overnight  -Tylenol PRN  -F/U on BCX  -U/A negative  -no focal consolidations on CXR  -RVP negative    #CV, was tachycardic to 150  -EKG showed MAT  -Cardizem drip at 7.5mg/hr in context of MAT, will likely need new medication on D/C to manage (propanolol patient has just for BP)  -hold AC at this time in context of concern for hemorrhagic met to brain    #Ethics  -Palliative care consult in context of possible met to brain, meeting today 11    #DVT  -Holding AC in context of possible brain met  -SCD's      For Follow-Up:          Vital Signs Last 24 Hrs  T(C): 36 (05 Sep 2018 12:00), Max: 38.6 (05 Sep 2018 00:00)  T(F): 96.8 (05 Sep 2018 12:00), Max: 101.4 (05 Sep 2018 00:00)  HR: 78 (05 Sep 2018 12:00) (75 - 151)  BP: 138/51 (05 Sep 2018 12:00) (98/78 - 195/101)  BP(mean): 70 (05 Sep 2018 12:00) (69 - 128)  RR: 14 (05 Sep 2018 12:00) (14 - 34)  SpO2: 96% (05 Sep 2018 12:00) (90% - 100%)  I&O's Summary    04 Sep 2018 07:01  -  05 Sep 2018 07:00  --------------------------------------------------------  IN: 1377.5 mL / OUT: 1775 mL / NET: -397.5 mL    05 Sep 2018 07:01  -  05 Sep 2018 13:17  --------------------------------------------------------  IN: 325 mL / OUT: 300 mL / NET: 25 mL          MEDICATIONS  (STANDING):  chlorhexidine 4% Liquid 1 Application(s) Topical <User Schedule>  dextrose 5%. 1000 milliLiter(s) (50 mL/Hr) IV Continuous <Continuous>  dextrose 50% Injectable 12.5 Gram(s) IV Push once  dextrose 50% Injectable 25 Gram(s) IV Push once  dextrose 50% Injectable 25 Gram(s) IV Push once  diltiazem Infusion 7.5 mG/Hr (7.5 mL/Hr) IV Continuous <Continuous>  docusate sodium 100 milliGRAM(s) Oral three times a day  fenofibrate Tablet 145 milliGRAM(s) Oral daily  influenza   Vaccine 0.5 milliLiter(s) IntraMuscular once  insulin glargine Injectable (LANTUS) 10 Unit(s) SubCutaneous at bedtime  insulin lispro (HumaLOG) corrective regimen sliding scale   SubCutaneous every 6 hours  levoFLOXacin IVPB 250 milliGRAM(s) IV Intermittent every 24 hours  levoFLOXacin IVPB      methylPREDNISolone sodium succinate Injectable 40 milliGRAM(s) IV Push daily  sodium chloride 0.9%. 1000 milliLiter(s) (50 mL/Hr) IV Continuous <Continuous>    MEDICATIONS  (PRN):  dextrose 40% Gel 15 Gram(s) Oral once PRN Blood Glucose LESS THAN 70 milliGRAM(s)/deciliter  glucagon  Injectable 1 milliGRAM(s) IntraMuscular once PRN Glucose LESS THAN 70 milligrams/deciliter  HYDROmorphone  Injectable 1 milliGRAM(s) IV Push every 6 hours PRN Mild to severe pain  senna 2 Tablet(s) Oral at bedtime PRN Constipation        LABS                                            9.6                   Neurophils% (auto):   x      (09-05 @ 02:55):    12.63)-----------(265          Lymphocytes% (auto):  x                                             29.0                   Eosinphils% (auto):   x        Manual%: Neutrophils x    ; Lymphocytes x    ; Eosinophils x    ; Bands%: x    ; Blasts x                                    126    |  87     |  31                  Calcium: 8.7   / iCa: x      (09-05 @ 02:55)    ----------------------------<  238       Magnesium: 1.4                              3.0     |  24     |  1.05             Phosphorous: 3.1 MICU Transfer Note    Transfer from: MICU  Transfer to:  (  ) Medicine    (  ) Telemetry    (X  ) RCU    (  ) Palliative    (  ) Stroke Unit    (  ) _______________  Bed: 641B  Accepting physican:      MICU COURSE:  MICU was consulted on 9/4/18 for sudden onset SOB and respiratory distress. Pt vomited after being given morphine and then developed wheezing and given duonebs x2 w/ mild improvement. EKG showed MAT, patient placed on Bipap for SOB and then admitted to MICU for airway monitoring. Concern in context of vomiting and possible new brain metastasis found on MRI, neurosurgery consulted with recs that patient can be follow up outpatient. Cardiology consulted, believes patient's trops were due to demand ischemia and that EKG not afib. Patient was started on prednisone 40mg PO QD and levofloxocin 750mg Qd for COPD excasterbation and then transitioned to high flow nasal cannula and Levofloxacin w/ improvement in resp status however pt continued to complain of R shoulder pain. Over night she was given Toradol and Tylenol w/ little improvement. Given 1mg Dilauded with little improvement. POCUS shows LLL consolidation. Patient's respiratory status was considered stable and transferred to the RCU. Patient's Oncologist was called, in april imaging had showed Right apical mass to be 3.9 x 3.7cm. In context of growth and new possible met in brain, stated that patient would likely not receive targeted therapy and more systemic therapy would be initiated and that they will evaluate patient for this outpatient. Patient has known adenocarcinoma diagnosed of biopsy on previous left lung mass treated with radiation.         ASSESSMENT & PLAN:   85 y/o F PMH Lung CA (Adenocarcinoma, Dx 2010 s/p L VATS, wedge resection, RT in July 2017),  PMR, HTN, COPD with emphysema, DM2 p/w worsening Rt shoulder pain from partial supraspinatus tear, found to have possible met in brain.    #Neuro:  Cerebellar lesion, 1cm x 2cm, possible met of known adenocarcinoma  -Neuro exam wnl  -Neuro checks q6  -Neurosurgery evaluated patient, to see outpatient for possible gamma knife surgery  -Avoid anticoagulation at this time      #Resp, COPD exacerbation hx of lung Ca diagnosed in 2010, s/p VATS, wedge resection,  -Apr PFTs showed mild obstructive airway disease with decreased DLCO  -On Bipap PRN  -started on levofloxocin 750mg Qdaily  -NS  -Q6 duonebs  -Q2 Albuterol PRN  -40mg methylprednisolone qDaily  -c/w supp o2, wean as tolerated.  -Lungs cancer showed increase in size of multiple b/l pulmonary nodules/massess from april (3.7x3.9 to 5.9x5.7 in 5 months), to follow up with patients oncologist, can be reached at [phone]    #Endo, DM  -Insulin SS  -4 units lispro before meals  -10 units glargline qHS  -Was on 10 methylprednisolone mg BID for polymyalgia rheumatica chronically    #Metabolic  -Hyponatremia to 124, sending/serum osms and urine electrolytes to further workup  -Potassium   -Trend BMP    #GI  -NPO for now given BiPAP PRN and tenuous respiratory status.      #Febrile to 38.6C overnight  -Tylenol PRN  -F/U on BCX  -U/A negative  -no focal consolidations on CXR  -RVP negative    #CV, was tachycardic to 150  -EKG showed MAT  -Cardizem drip at 7.5mg/hr in context of MAT, will likely need new medication on D/C to manage (propanolol patient has just for BP) transitioned to oral diltiazam 60mg Q6 hours    #Ethics  -Palliative care consult in context of possible met to brain, meeting today 11    #DVT  -SCD's      For Follow-Up:          Vital Signs Last 24 Hrs  T(C): 36 (05 Sep 2018 12:00), Max: 38.6 (05 Sep 2018 00:00)  T(F): 96.8 (05 Sep 2018 12:00), Max: 101.4 (05 Sep 2018 00:00)  HR: 78 (05 Sep 2018 12:00) (75 - 151)  BP: 138/51 (05 Sep 2018 12:00) (98/78 - 195/101)  BP(mean): 70 (05 Sep 2018 12:00) (69 - 128)  RR: 14 (05 Sep 2018 12:00) (14 - 34)  SpO2: 96% (05 Sep 2018 12:00) (90% - 100%)  I&O's Summary    04 Sep 2018 07:01  -  05 Sep 2018 07:00  --------------------------------------------------------  IN: 1377.5 mL / OUT: 1775 mL / NET: -397.5 mL    05 Sep 2018 07:01  -  05 Sep 2018 13:17  --------------------------------------------------------  IN: 325 mL / OUT: 300 mL / NET: 25 mL          MEDICATIONS  (STANDING):  chlorhexidine 4% Liquid 1 Application(s) Topical <User Schedule>  dextrose 5%. 1000 milliLiter(s) (50 mL/Hr) IV Continuous <Continuous>  dextrose 50% Injectable 12.5 Gram(s) IV Push once  dextrose 50% Injectable 25 Gram(s) IV Push once  dextrose 50% Injectable 25 Gram(s) IV Push once  diltiazem Infusion 7.5 mG/Hr (7.5 mL/Hr) IV Continuous <Continuous>  docusate sodium 100 milliGRAM(s) Oral three times a day  fenofibrate Tablet 145 milliGRAM(s) Oral daily  influenza   Vaccine 0.5 milliLiter(s) IntraMuscular once  insulin glargine Injectable (LANTUS) 10 Unit(s) SubCutaneous at bedtime  insulin lispro (HumaLOG) corrective regimen sliding scale   SubCutaneous every 6 hours  levoFLOXacin IVPB 250 milliGRAM(s) IV Intermittent every 24 hours  levoFLOXacin IVPB      methylPREDNISolone sodium succinate Injectable 40 milliGRAM(s) IV Push daily  sodium chloride 0.9%. 1000 milliLiter(s) (50 mL/Hr) IV Continuous <Continuous>    MEDICATIONS  (PRN):  dextrose 40% Gel 15 Gram(s) Oral once PRN Blood Glucose LESS THAN 70 milliGRAM(s)/deciliter  glucagon  Injectable 1 milliGRAM(s) IntraMuscular once PRN Glucose LESS THAN 70 milligrams/deciliter  HYDROmorphone  Injectable 1 milliGRAM(s) IV Push every 6 hours PRN Mild to severe pain  senna 2 Tablet(s) Oral at bedtime PRN Constipation        LABS                                            9.6                   Neurophils% (auto):   x      (09-05 @ 02:55):    12.63)-----------(265          Lymphocytes% (auto):  x                                             29.0                   Eosinphils% (auto):   x        Manual%: Neutrophils x    ; Lymphocytes x    ; Eosinophils x    ; Bands%: x    ; Blasts x                                    126    |  87     |  31                  Calcium: 8.7   / iCa: x      (09-05 @ 02:55)    ----------------------------<  238       Magnesium: 1.4                              3.0     |  24     |  1.05             Phosphorous: 3.1 MICU Transfer Note    Transfer from: MICU  Transfer to:  (  ) Medicine    (  ) Telemetry    (X  ) RCU    (  ) Palliative    (  ) Stroke Unit    (  ) _______________  Bed: 641B  Accepting physican:      MICU COURSE:  MICU was consulted on 9/4/18 for sudden onset SOB and respiratory distress. Pt vomited after being given morphine and then developed wheezing and given duonebs x2 w/ mild improvement. EKG showed MAT, patient placed on Bipap for SOB and then admitted to MICU for airway monitoring. Concern in context of vomiting and possible new brain metastasis found on MRI, neurosurgery consulted with recs that patient can be follow up outpatient. Cardiology consulted, believes patient's trops were due to demand ischemia and that EKG not afib. Patient was started on prednisone 40mg PO QD and levofloxocin 750mg Qd for COPD excasterbation and then transitioned to high flow nasal cannula and Levofloxacin w/ improvement in resp status however pt continued to complain of R shoulder pain. Over night she was given Toradol and Tylenol w/ little improvement. Given 1mg Dilauded with little improvement. POCUS shows LLL consolidation. Patient's respiratory status was considered stable and transferred to the RCU. Patient's Oncologist was called, in april imaging had showed Right apical mass to be 3.9 x 3.7cm. In context of growth and new possible met in brain, stated that patient would likely not receive targeted therapy and more systemic therapy would be initiated and that they will evaluate patient for this outpatient. Patient has known adenocarcinoma diagnosed of biopsy on previous left lung mass treated with radiation.         ASSESSMENT & PLAN:   85 y/o F PMH Lung CA (Adenocarcinoma, Dx 2010 s/p L VATS, wedge resection, RT in July 2017),  PMR, HTN, COPD with emphysema, DM2 p/w worsening Rt shoulder pain from partial supraspinatus tear, found to have possible met in brain.    #Neuro:  Cerebellar lesion, 1cm x 2cm, possible met of known adenocarcinoma  -Neuro exam wnl  -Neuro checks q6  -Neurosurgery evaluated patient, to see outpatient for possible gamma knife surgery  -Avoid anticoagulation at this time      #Resp, COPD exacerbation hx of lung Ca diagnosed in 2010, s/p VATS, wedge resection,  -Apr PFTs showed mild obstructive airway disease with decreased DLCO  -On Bipap PRN  -started on levofloxocin 750mg Qdaily  -NS  -Q6 duonebs  -Q2 Albuterol PRN  -40mg methylprednisolone qDaily  -c/w supp o2, wean as tolerated.  -Lungs cancer showed increase in size of multiple b/l pulmonary nodules/massess from april (3.7x3.9 to 5.9x5.7 in 5 months), to follow up with patients oncologist, can be reached at [phone]    #Endo, DM  -Insulin SS  -4 units lispro before meals  -10 units glargline qHS  -Was on 10 methylprednisolone mg BID for polymyalgia rheumatica chronically    #Metabolic  -Hyponatremia to 124, sending/serum osms and urine electrolytes to further workup  -Potassium   -Trend BMP    #GI  -NPO for now given BiPAP PRN and tenuous respiratory status.      #Febrile to 38.6C overnight  -Tylenol PRN  -F/U on BCX  -U/A negative  -no focal consolidations on CXR  -RVP negative    #CV, was tachycardic to 150  -EKG showed MAT  -Cardizem drip at 7.5mg/hr in context of MAT, will likely need new medication on D/C to manage (propanolol patient has just for BP) transitioned to oral diltiazam 60mg Q6 hours    #Ethics  -Palliative care consult in context of possible met to brain, meeting today 11    #DVT  -SCD's      For Follow-Up:          Vital Signs Last 24 Hrs  T(C): 36 (05 Sep 2018 12:00), Max: 38.6 (05 Sep 2018 00:00)  T(F): 96.8 (05 Sep 2018 12:00), Max: 101.4 (05 Sep 2018 00:00)  HR: 78 (05 Sep 2018 12:00) (75 - 151)  BP: 138/51 (05 Sep 2018 12:00) (98/78 - 195/101)  BP(mean): 70 (05 Sep 2018 12:00) (69 - 128)  RR: 14 (05 Sep 2018 12:00) (14 - 34)  SpO2: 96% (05 Sep 2018 12:00) (90% - 100%)  I&O's Summary    04 Sep 2018 07:01  -  05 Sep 2018 07:00  --------------------------------------------------------  IN: 1377.5 mL / OUT: 1775 mL / NET: -397.5 mL    05 Sep 2018 07:01  -  05 Sep 2018 13:17  --------------------------------------------------------  IN: 325 mL / OUT: 300 mL / NET: 25 mL          MEDICATIONS  (STANDING):  chlorhexidine 4% Liquid 1 Application(s) Topical <User Schedule>  dextrose 5%. 1000 milliLiter(s) (50 mL/Hr) IV Continuous <Continuous>  dextrose 50% Injectable 12.5 Gram(s) IV Push once  dextrose 50% Injectable 25 Gram(s) IV Push once  dextrose 50% Injectable 25 Gram(s) IV Push once  diltiazem Infusion 7.5 mG/Hr (7.5 mL/Hr) IV Continuous <Continuous>  docusate sodium 100 milliGRAM(s) Oral three times a day  fenofibrate Tablet 145 milliGRAM(s) Oral daily  influenza   Vaccine 0.5 milliLiter(s) IntraMuscular once  insulin glargine Injectable (LANTUS) 10 Unit(s) SubCutaneous at bedtime  insulin lispro (HumaLOG) corrective regimen sliding scale   SubCutaneous every 6 hours  levoFLOXacin IVPB 250 milliGRAM(s) IV Intermittent every 24 hours  levoFLOXacin IVPB      methylPREDNISolone sodium succinate Injectable 40 milliGRAM(s) IV Push daily  sodium chloride 0.9%. 1000 milliLiter(s) (50 mL/Hr) IV Continuous <Continuous>    MEDICATIONS  (PRN):  dextrose 40% Gel 15 Gram(s) Oral once PRN Blood Glucose LESS THAN 70 milliGRAM(s)/deciliter  glucagon  Injectable 1 milliGRAM(s) IntraMuscular once PRN Glucose LESS THAN 70 milligrams/deciliter  HYDROmorphone  Injectable 1 milliGRAM(s) IV Push every 6 hours PRN Mild to severe pain  senna 2 Tablet(s) Oral at bedtime PRN Constipation        LABS                                            9.6                   Neurophils% (auto):   x      (09-05 @ 02:55):    12.63)-----------(265          Lymphocytes% (auto):  x                                             29.0                   Eosinphils% (auto):   x        Manual%: Neutrophils x    ; Lymphocytes x    ; Eosinophils x    ; Bands%: x    ; Blasts x                                    126    |  87     |  31                  Calcium: 8.7   / iCa: x      (09-05 @ 02:55)    ----------------------------<  238       Magnesium: 1.4                              3.0     |  24     |  1.05             Phosphorous: 3.1                            Addendum:  Reached out to patient's radiation oncologist  Dr. Mcfadden  for collateral information. He is currently out of office and will be back  on 9/6.  Initial plan is to discuss whether palliative RT can be offered to shoulder area to reduce pain. Per speaking with daughter Dr. Mcfadden is waiting on imaging tomorrow to decided plan and next steps. Recommend reaching out to Dr. Mcfadden in the AM for definitive plan. MICU Transfer Note    Transfer from: MICU  Transfer to:  (  ) Medicine    (  ) Telemetry    (X  ) RCU    (  ) Palliative    (  ) Stroke Unit    (  ) _______________  Bed:   Accepting physican:      MICU COURSE:  MICU was consulted on 9/4/18 for sudden onset SOB and respiratory distress. Pt vomited after being given morphine and then developed wheezing and given duonebs x2 w/ mild improvement. EKG showed MAT, patient placed on Bipap for SOB and then admitted to MICU for airway monitoring. Concern in context of vomiting and possible new brain metastasis found on MRI, neurosurgery consulted with recs that patient can be follow up outpatient. Cardiology consulted, believes patient's trops were due to demand ischemia and that EKG not afib. Patient was started on prednisone 40mg PO QD and levofloxocin 750mg Qd for COPD excasterbation and then transitioned to high flow nasal cannula and Levofloxacin w/ improvement in resp status however pt continued to complain of R shoulder pain. Over night she was given Toradol and Tylenol w/ little improvement. Given 1mg Dilauded with little improvement. POCUS shows LLL consolidation. Patient's respiratory status was considered stable and transferred to the RCU. Patient's Oncologist was called, in april imaging had showed Right apical mass to be 3.9 x 3.7cm. In context of growth and new possible met in brain, stated that patient would likely not receive targeted therapy and more systemic therapy would be initiated and that they will evaluate patient for this outpatient. Patient has known adenocarcinoma diagnosed of biopsy on previous left lung mass treated with radiation. Rad-Onc saw patient, and agreed that radiation to the R lung mass will offer some palliation, plan to evaluate patient and give radiation treatment.         ASSESSMENT & PLAN:   85 y/o F PMH Lung CA (Adenocarcinoma, Dx 2010 s/p L VATS, wedge resection, RT in July 2017),  PMR, HTN, COPD with emphysema, DM2 p/w worsening Rt shoulder pain from partial supraspinatus tear, found to have possible met in brain.    #Neuro:  Cerebellar lesion, 1cm x 2cm, possible met of known adenocarcinoma  -Neuro exam wnl  -Neuro checks qdaily  -Neurosurgery evaluated patient, to see outpatient for possible gamma knife surgery  -Avoid anticoagulation at this time    #Resp, COPD exacerbation hx of lung Ca diagnosed in 2010, s/p VATS, wedge resection,  -Apr PFTs showed mild obstructive airway disease with decreased DLCO  -On nasal canula   -started on levofloxocin 750mg Qdaily  -NS  -Q6 duonebs  -Q2 Albuterol PRN  -40mg methylprednisolone qDaily  -c/w supp o2, wean as tolerated.  -Lungs cancer showed increase in size of multiple b/l pulmonary nodules/massess from april (3.7x3.9 to 5.9x5.7 in 5 months), to follow up with patients oncologist, can be reached at [phone]  -Rad-Onc to radiate patients mass for palliation, unclear if they can do this before the weekend, can do O/P    #Endo, DM  -Insulin SS  -4 units lispro before meals  -10 units glargline qHS  -Was on 10 methylprednisolone mg BID for polymyalgia rheumatica chronically    #Metabolic  -Hyponatremia to 124, sending/serum osms and urine electrolytes to further workup  -Potassium   -Trend BMP    #GI  -NPO for now given BiPAP PRN and tenuous respiratory status.      #Febrile to 38.6C overnight  -Tylenol PRN  -F/U on BCX  -U/A negative  -no focal consolidations on CXR  -RVP negative    #CV, was tachycardic to 150  -EKG showed MAT  -Lopressor 25mg BID, unclear if patient was taking 2 pills BID (total 100mg)    #MSK  -Ortho F/U on D/C    #Ethics  -Palliative care consult in context of possible met to brain, meeting today 11    #DVT  -SCD's      For Follow-Up:          Vital Signs Last 24 Hrs  T(C): 36 (05 Sep 2018 12:00), Max: 38.6 (05 Sep 2018 00:00)  T(F): 96.8 (05 Sep 2018 12:00), Max: 101.4 (05 Sep 2018 00:00)  HR: 78 (05 Sep 2018 12:00) (75 - 151)  BP: 138/51 (05 Sep 2018 12:00) (98/78 - 195/101)  BP(mean): 70 (05 Sep 2018 12:00) (69 - 128)  RR: 14 (05 Sep 2018 12:00) (14 - 34)  SpO2: 96% (05 Sep 2018 12:00) (90% - 100%)  I&O's Summary    04 Sep 2018 07:01  -  05 Sep 2018 07:00  --------------------------------------------------------  IN: 1377.5 mL / OUT: 1775 mL / NET: -397.5 mL    05 Sep 2018 07:01  -  05 Sep 2018 13:17  --------------------------------------------------------  IN: 325 mL / OUT: 300 mL / NET: 25 mL          MEDICATIONS  (STANDING):  chlorhexidine 4% Liquid 1 Application(s) Topical <User Schedule>  dextrose 5%. 1000 milliLiter(s) (50 mL/Hr) IV Continuous <Continuous>  dextrose 50% Injectable 12.5 Gram(s) IV Push once  dextrose 50% Injectable 25 Gram(s) IV Push once  dextrose 50% Injectable 25 Gram(s) IV Push once  diltiazem Infusion 7.5 mG/Hr (7.5 mL/Hr) IV Continuous <Continuous>  docusate sodium 100 milliGRAM(s) Oral three times a day  fenofibrate Tablet 145 milliGRAM(s) Oral daily  influenza   Vaccine 0.5 milliLiter(s) IntraMuscular once  insulin glargine Injectable (LANTUS) 10 Unit(s) SubCutaneous at bedtime  insulin lispro (HumaLOG) corrective regimen sliding scale   SubCutaneous every 6 hours  levoFLOXacin IVPB 250 milliGRAM(s) IV Intermittent every 24 hours  levoFLOXacin IVPB      methylPREDNISolone sodium succinate Injectable 40 milliGRAM(s) IV Push daily  sodium chloride 0.9%. 1000 milliLiter(s) (50 mL/Hr) IV Continuous <Continuous>    MEDICATIONS  (PRN):  dextrose 40% Gel 15 Gram(s) Oral once PRN Blood Glucose LESS THAN 70 milliGRAM(s)/deciliter  glucagon  Injectable 1 milliGRAM(s) IntraMuscular once PRN Glucose LESS THAN 70 milligrams/deciliter  HYDROmorphone  Injectable 1 milliGRAM(s) IV Push every 6 hours PRN Mild to severe pain  senna 2 Tablet(s) Oral at bedtime PRN Constipation        LABS                                            9.6                   Neurophils% (auto):   x      (09-05 @ 02:55):    12.63)-----------(265          Lymphocytes% (auto):  x                                             29.0                   Eosinphils% (auto):   x        Manual%: Neutrophils x    ; Lymphocytes x    ; Eosinophils x    ; Bands%: x    ; Blasts x                                    126    |  87     |  31                  Calcium: 8.7   / iCa: x      (09-05 @ 02:55)    ----------------------------<  238       Magnesium: 1.4                              3.0     |  24     |  1.05             Phosphorous: 3.1                            Addendum:  Reached out to patient's radiation oncologist  Dr. Mcfadden  for collateral information. He is currently out of office and will be back  on 9/6.  Initial plan is to discuss whether palliative RT can be offered to shoulder area to reduce pain. Per speaking with daughter Dr. Mcfadden is waiting on imaging tomorrow to decided plan and next steps. Recommend reaching out to Dr. Mcfadden in the AM for definitive plan. MICU Transfer Note    Transfer from: MICU  Transfer to:  (  ) Medicine    (  ) Telemetry    (X  ) RCU    (  ) Palliative    (  ) Stroke Unit    (  ) _______________  Bed:   Accepting physican:      MICU COURSE:  MICU was consulted on 9/4/18 for sudden onset SOB and respiratory distress. Pt vomited after being given morphine and then developed wheezing and given duonebs x2 w/ mild improvement. EKG showed MAT, patient placed on Bipap for SOB and then admitted to MICU for airway monitoring. Concern in context of vomiting and possible new brain metastasis found on MRI, neurosurgery consulted with recs that patient can be follow up outpatient. Cardiology consulted, believes patient's trops were due to demand ischemia and that EKG not afib. Patient was started on prednisone 40mg PO QD and levofloxocin 750mg Qd for COPD excasterbation and then transitioned to high flow nasal cannula and Levofloxacin w/ improvement in resp status however pt continued to complain of R shoulder pain. Over night she was given Toradol and Tylenol w/ little improvement. Given 1mg Dilauded with little improvement. POCUS shows LLL consolidation. Patient's respiratory status was considered stable and transferred to the RCU. Patient's Oncologist was called, in april imaging had showed Right apical mass to be 3.9 x 3.7cm. In context of growth and new possible met in brain, stated that patient would likely not receive targeted therapy and more systemic therapy would be initiated and that they will evaluate patient for this outpatient. Patient has known adenocarcinoma diagnosed of biopsy on previous left lung mass treated with radiation. Rad-Onc saw patient, and agreed that radiation to the R lung mass will offer some palliation, plan to evaluate patient and give radiation treatment.         ASSESSMENT & PLAN:   85 y/o F PMH Lung CA (Adenocarcinoma, Dx 2010 s/p L VATS, wedge resection, RT in July 2017),  PMR, HTN, COPD with emphysema, DM2 p/w worsening Rt shoulder pain from partial supraspinatus tear, found to have possible met in brain.    #Neuro:  Cerebellar lesion, 1cm x 2cm, possible met of known adenocarcinoma  -Neuro exam wnl  -Neuro checks qdaily  -Neurosurgery evaluated patient, to see outpatient for possible gamma knife surgery  -Avoid anticoagulation at this time    #Resp, COPD exacerbation hx of lung Ca diagnosed in 2010, s/p VATS, wedge resection,  -Apr PFTs showed mild obstructive airway disease with decreased DLCO  -On nasal canula   -started on levofloxocin 750mg Qdaily  -NS  -Q6 duonebs  -Q2 Albuterol PRN  -40mg methylprednisolone qDaily  -c/w supp o2, wean as tolerated.  -Lungs cancer showed increase in size of multiple b/l pulmonary nodules/massess from april (3.7x3.9 to 5.9x5.7 in 5 months), to follow up with patients oncologist, can be reached at [phone]  -Rad-Onc to radiate patients mass for palliation, unclear if they can do this before the weekend, can do O/P    #Endo, DM  -Insulin SS  -4 units lispro before meals  -10 units glargline qHS  -Was on 10 methylprednisolone mg BID for polymyalgia rheumatica chronically    #Metabolic  -Hyponatremia to 124, sending/serum osms and urine electrolytes to further workup  -Potassium   -Trend BMP    #GI  -Full diet    #Febrile to 38.6C overnight  -Tylenol PRN  -F/U on BCX  -U/A negative  -no focal consolidations on CXR  -RVP negative    #CV, was tachycardic to 150  -EKG showed MAT  -Lopressor 25mg BID, unclear if patient was taking 2 pills BID (total 100mg)    #MSK, supraspinatous tear  -Ortho F/U on D/C    #Ethics  -dilauded 1mg Q4hrs PRN as per palliative car for shoulder pain    #DVT  -SCD's      For Follow-Up:          Vital Signs Last 24 Hrs  T(C): 36 (05 Sep 2018 12:00), Max: 38.6 (05 Sep 2018 00:00)  T(F): 96.8 (05 Sep 2018 12:00), Max: 101.4 (05 Sep 2018 00:00)  HR: 78 (05 Sep 2018 12:00) (75 - 151)  BP: 138/51 (05 Sep 2018 12:00) (98/78 - 195/101)  BP(mean): 70 (05 Sep 2018 12:00) (69 - 128)  RR: 14 (05 Sep 2018 12:00) (14 - 34)  SpO2: 96% (05 Sep 2018 12:00) (90% - 100%)  I&O's Summary    04 Sep 2018 07:01  -  05 Sep 2018 07:00  --------------------------------------------------------  IN: 1377.5 mL / OUT: 1775 mL / NET: -397.5 mL    05 Sep 2018 07:01  -  05 Sep 2018 13:17  --------------------------------------------------------  IN: 325 mL / OUT: 300 mL / NET: 25 mL          MEDICATIONS  (STANDING):  chlorhexidine 4% Liquid 1 Application(s) Topical <User Schedule>  dextrose 5%. 1000 milliLiter(s) (50 mL/Hr) IV Continuous <Continuous>  dextrose 50% Injectable 12.5 Gram(s) IV Push once  dextrose 50% Injectable 25 Gram(s) IV Push once  dextrose 50% Injectable 25 Gram(s) IV Push once  diltiazem Infusion 7.5 mG/Hr (7.5 mL/Hr) IV Continuous <Continuous>  docusate sodium 100 milliGRAM(s) Oral three times a day  fenofibrate Tablet 145 milliGRAM(s) Oral daily  influenza   Vaccine 0.5 milliLiter(s) IntraMuscular once  insulin glargine Injectable (LANTUS) 10 Unit(s) SubCutaneous at bedtime  insulin lispro (HumaLOG) corrective regimen sliding scale   SubCutaneous every 6 hours  levoFLOXacin IVPB 250 milliGRAM(s) IV Intermittent every 24 hours  levoFLOXacin IVPB      methylPREDNISolone sodium succinate Injectable 40 milliGRAM(s) IV Push daily  sodium chloride 0.9%. 1000 milliLiter(s) (50 mL/Hr) IV Continuous <Continuous>    MEDICATIONS  (PRN):  dextrose 40% Gel 15 Gram(s) Oral once PRN Blood Glucose LESS THAN 70 milliGRAM(s)/deciliter  glucagon  Injectable 1 milliGRAM(s) IntraMuscular once PRN Glucose LESS THAN 70 milligrams/deciliter  HYDROmorphone  Injectable 1 milliGRAM(s) IV Push every 6 hours PRN Mild to severe pain  senna 2 Tablet(s) Oral at bedtime PRN Constipation        LABS                                            9.6                   Neurophils% (auto):   x      (09-05 @ 02:55):    12.63)-----------(265          Lymphocytes% (auto):  x                                             29.0                   Eosinphils% (auto):   x        Manual%: Neutrophils x    ; Lymphocytes x    ; Eosinophils x    ; Bands%: x    ; Blasts x                                    126    |  87     |  31                  Calcium: 8.7   / iCa: x      (09-05 @ 02:55)    ----------------------------<  238       Magnesium: 1.4                              3.0     |  24     |  1.05             Phosphorous: 3.1                            Addendum:  Reached out to patient's radiation oncologist  Dr. Mcfadden  for collateral information. He is currently out of office and will be back  on 9/6.  Initial plan is to discuss whether palliative RT can be offered to shoulder area to reduce pain. Per speaking with daughter Dr. Mcfadden is waiting on imaging tomorrow to decided plan and next steps. Recommend reaching out to Dr. Mcfadden in the AM for definitive plan. MICU Transfer Note    Transfer from: MICU  Transfer to:  (  ) Medicine    (  ) Telemetry    (X  ) RCU    (  ) Palliative    (  ) Stroke Unit    (  ) _______________  Bed:   Accepting physican:      MICU COURSE:  MICU was consulted on 9/4/18 for sudden onset SOB and respiratory distress. Pt vomited after being given morphine and then developed wheezing and given duonebs x2 w/ mild improvement. EKG showed MAT, patient placed on Bipap for SOB and then admitted to MICU for airway monitoring. Concern in context of vomiting and possible new brain metastasis found on MRI, neurosurgery consulted with recs that patient can be follow up outpatient. Cardiology consulted, believes patient's trops were due to demand ischemia and that EKG not afib. Patient was started on prednisone 40mg PO QD and levofloxocin 750mg Qd for COPD excasterbation and then transitioned to high flow nasal cannula and Levofloxacin w/ improvement in resp status however pt continued to complain of R shoulder pain. Over night she was given Toradol and Tylenol w/ little improvement. Given 1mg Dilauded with little improvement. POCUS shows LLL consolidation. Patient's respiratory status was considered stable and transferred to the RCU. Patient's Oncologist was called, in april imaging had showed Right apical mass to be 3.9 x 3.7cm. In context of growth and new possible met in brain, stated that patient would likely not receive targeted therapy and more systemic therapy would be initiated and that they will evaluate patient for this outpatient. Patient has known adenocarcinoma diagnosed of biopsy on previous left lung mass treated with radiation. Rad-Onc saw patient, and agreed that radiation to the R lung mass will offer some palliation, plan to evaluate patient and give radiation treatment.         ASSESSMENT & PLAN:   87 y/o F PMH Lung CA (Adenocarcinoma, Dx 2010 s/p L VATS, wedge resection, RT in July 2017),  PMR, HTN, COPD with emphysema, DM2 p/w worsening Rt shoulder pain from partial supraspinatus tear, found to have possible met in brain.    #Neuro:  Cerebellar lesion, 1cm x 2cm, possible met of known adenocarcinoma  -Neuro exam wnl  -Neuro checks qdaily  -Neurosurgery evaluated patient, to see outpatient for possible gamma knife surgery  -Avoid anticoagulation at this time    #Resp, COPD exacerbation hx of lung Ca diagnosed in 2010, s/p VATS, wedge resection,  -Apr PFTs showed mild obstructive airway disease with decreased DLCO  -On nasal canula   -started on levofloxocin 750mg Qdaily  -NS  -Q6 duonebs  -Q2 Albuterol PRN  -40mg methylprednisolone qDaily  -c/w supp o2, wean as tolerated.  -Lungs cancer showed increase in size of multiple b/l pulmonary nodules/massess from april (3.7x3.9 to 5.9x5.7 in 5 months), to follow up with patients oncologist, can be reached at [phone]  -Rad-Onc to radiate patients mass for palliation, unclear if they can do this before the weekend, can do O/P    #Endo, DM  -Insulin SS  -4 units lispro before meals  -10 units glargline qHS  -Was on 10 methylprednisolone mg BID for polymyalgia rheumatica chronically    #Metabolic  -Hyponatremia to 124, sending/serum osms and urine electrolytes to further workup  -Potassium   -Trend BMP    #GI  -Dash diet    #Febrile to 38.6C overnight  -Tylenol PRN  -F/U on BCX  -U/A negative  -no focal consolidations on CXR  -RVP negative    #CV, was tachycardic to 150  -EKG showed MAT  -Lopressor 25mg BID, unclear if patient was taking 2 pills BID (total 100mg)    #MSK, supraspinatous tear  -Ortho F/U on D/C    #Ethics  -dilauded 1mg Q4hrs PRN as per palliative car for shoulder pain    #DVT  -SCD's      For Follow-Up:          Vital Signs Last 24 Hrs  T(C): 36 (05 Sep 2018 12:00), Max: 38.6 (05 Sep 2018 00:00)  T(F): 96.8 (05 Sep 2018 12:00), Max: 101.4 (05 Sep 2018 00:00)  HR: 78 (05 Sep 2018 12:00) (75 - 151)  BP: 138/51 (05 Sep 2018 12:00) (98/78 - 195/101)  BP(mean): 70 (05 Sep 2018 12:00) (69 - 128)  RR: 14 (05 Sep 2018 12:00) (14 - 34)  SpO2: 96% (05 Sep 2018 12:00) (90% - 100%)  I&O's Summary    04 Sep 2018 07:01  -  05 Sep 2018 07:00  --------------------------------------------------------  IN: 1377.5 mL / OUT: 1775 mL / NET: -397.5 mL    05 Sep 2018 07:01  -  05 Sep 2018 13:17  --------------------------------------------------------  IN: 325 mL / OUT: 300 mL / NET: 25 mL          MEDICATIONS  (STANDING):  chlorhexidine 4% Liquid 1 Application(s) Topical <User Schedule>  dextrose 5%. 1000 milliLiter(s) (50 mL/Hr) IV Continuous <Continuous>  dextrose 50% Injectable 12.5 Gram(s) IV Push once  dextrose 50% Injectable 25 Gram(s) IV Push once  dextrose 50% Injectable 25 Gram(s) IV Push once  diltiazem Infusion 7.5 mG/Hr (7.5 mL/Hr) IV Continuous <Continuous>  docusate sodium 100 milliGRAM(s) Oral three times a day  fenofibrate Tablet 145 milliGRAM(s) Oral daily  influenza   Vaccine 0.5 milliLiter(s) IntraMuscular once  insulin glargine Injectable (LANTUS) 10 Unit(s) SubCutaneous at bedtime  insulin lispro (HumaLOG) corrective regimen sliding scale   SubCutaneous every 6 hours  levoFLOXacin IVPB 250 milliGRAM(s) IV Intermittent every 24 hours  levoFLOXacin IVPB      methylPREDNISolone sodium succinate Injectable 40 milliGRAM(s) IV Push daily  sodium chloride 0.9%. 1000 milliLiter(s) (50 mL/Hr) IV Continuous <Continuous>    MEDICATIONS  (PRN):  dextrose 40% Gel 15 Gram(s) Oral once PRN Blood Glucose LESS THAN 70 milliGRAM(s)/deciliter  glucagon  Injectable 1 milliGRAM(s) IntraMuscular once PRN Glucose LESS THAN 70 milligrams/deciliter  HYDROmorphone  Injectable 1 milliGRAM(s) IV Push every 6 hours PRN Mild to severe pain  senna 2 Tablet(s) Oral at bedtime PRN Constipation        LABS                                            9.6                   Neurophils% (auto):   x      (09-05 @ 02:55):    12.63)-----------(265          Lymphocytes% (auto):  x                                             29.0                   Eosinphils% (auto):   x        Manual%: Neutrophils x    ; Lymphocytes x    ; Eosinophils x    ; Bands%: x    ; Blasts x                                    126    |  87     |  31                  Calcium: 8.7   / iCa: x      (09-05 @ 02:55)    ----------------------------<  238       Magnesium: 1.4                              3.0     |  24     |  1.05             Phosphorous: 3.1                            Addendum:  Reached out to patient's radiation oncologist  Dr. Mcfadden  for collateral information. He is currently out of office and will be back  on 9/6.  Initial plan is to discuss whether palliative RT can be offered to shoulder area to reduce pain. Per speaking with daughter Dr. Mcfadden is waiting on imaging tomorrow to decided plan and next steps. Recommend reaching out to Dr. Mcfadden in the AM for definitive plan. MICU Transfer Note    Transfer from: MICU  Transfer to:  (  ) Medicine    ( X ) Telemetry    ( ) RCU    (  ) Palliative    (  ) Stroke Unit    (  ) _______________  Bed: 729A  Accepting physican: Dr. Ferrell      MICU COURSE:  MICU was consulted on 9/4/18 for sudden onset SOB and respiratory distress. Pt vomited after being given morphine and then developed wheezing and given duonebs x2 w/ mild improvement. EKG showed MAT, patient placed on Bipap for SOB and then admitted to MICU for airway monitoring. Concern in context of vomiting and possible new brain metastasis found on MRI, neurosurgery consulted with recs that patient can be follow up outpatient. Cardiology consulted, believes patient's trops were due to demand ischemia and that EKG not afib. Patient was started on prednisone 40mg PO QD and levofloxocin 750mg Qd for COPD excasterbation and then transitioned to high flow nasal cannula and Levofloxacin w/ improvement in resp status however pt continued to complain of R shoulder pain. Over night she was given Toradol and Tylenol w/ little improvement. Given 1mg Dilauded with little improvement. POCUS shows LLL consolidation. Patient's respiratory status was considered stable and transferred to the RCU. Patient's Oncologist was called, in april imaging had showed Right apical mass to be 3.9 x 3.7cm. In context of growth and new possible met in brain, stated that patient would likely not receive targeted therapy and more systemic therapy would be initiated and that they will evaluate patient for this outpatient. Patient has known adenocarcinoma diagnosed of biopsy on previous left lung mass treated with radiation. Rad-Onc saw patient, and agreed that radiation to the R lung mass will offer some palliation, plan to evaluate patient and give radiation treatment.         ASSESSMENT & PLAN:   85 y/o F PMH Lung CA (Adenocarcinoma, Dx 2010 s/p L VATS, wedge resection, RT in July 2017),  PMR, HTN, COPD with emphysema, DM2 p/w worsening Rt shoulder pain from partial supraspinatus tear, found to have possible met in brain.    #Neuro:  Cerebellar lesion, 1cm x 2cm, possible met of known adenocarcinoma  -Neuro exam wnl  -Neuro checks qdaily  -Neurosurgery evaluated patient, to see outpatient for possible gamma knife surgery  -Avoid anticoagulation at this time    #Resp, COPD exacerbation hx of lung Ca diagnosed in 2010, s/p VATS, wedge resection,  -Apr PFTs showed mild obstructive airway disease with decreased DLCO  -On nasal canula   -started on levofloxocin 750mg Qdaily  -NS  -Q6 duonebs  -Q2 Albuterol PRN  -40mg methylprednisolone qDaily  -c/w supp o2, wean as tolerated.  -Lungs cancer showed increase in size of multiple b/l pulmonary nodules/massess from april (3.7x3.9 to 5.9x5.7 in 5 months), to follow up with patients oncologist, can be reached at [phone]  -Rad-Onc to radiate patients mass for palliation, unclear if they can do this before the weekend, can do O/P    #Endo, DM  -Insulin SS  -4 units lispro before meals  -10 units glargline qHS  -Was on 10 methylprednisolone mg BID for polymyalgia rheumatica chronically    #Metabolic  -Hyponatremia to 124, sending/serum osms and urine electrolytes to further workup  -Potassium   -Trend BMP    #GI  -Dash diet    #Febrile to 38.6C overnight  -Tylenol PRN  -F/U on BCX  -U/A negative  -no focal consolidations on CXR  -RVP negative    #CV, was tachycardic to 150  -EKG showed MAT  -Lopressor 25mg BID, unclear if patient was taking 2 pills BID (total 100mg)    #MSK, supraspinatous tear  -Ortho F/U on D/C    #Ethics  -dilauded 1mg Q4hrs PRN as per palliative car for shoulder pain    #DVT  -SCD's      For Follow-Up:          Vital Signs Last 24 Hrs  T(C): 36 (05 Sep 2018 12:00), Max: 38.6 (05 Sep 2018 00:00)  T(F): 96.8 (05 Sep 2018 12:00), Max: 101.4 (05 Sep 2018 00:00)  HR: 78 (05 Sep 2018 12:00) (75 - 151)  BP: 138/51 (05 Sep 2018 12:00) (98/78 - 195/101)  BP(mean): 70 (05 Sep 2018 12:00) (69 - 128)  RR: 14 (05 Sep 2018 12:00) (14 - 34)  SpO2: 96% (05 Sep 2018 12:00) (90% - 100%)  I&O's Summary    04 Sep 2018 07:01  -  05 Sep 2018 07:00  --------------------------------------------------------  IN: 1377.5 mL / OUT: 1775 mL / NET: -397.5 mL    05 Sep 2018 07:01  -  05 Sep 2018 13:17  --------------------------------------------------------  IN: 325 mL / OUT: 300 mL / NET: 25 mL          MEDICATIONS  (STANDING):  chlorhexidine 4% Liquid 1 Application(s) Topical <User Schedule>  dextrose 5%. 1000 milliLiter(s) (50 mL/Hr) IV Continuous <Continuous>  dextrose 50% Injectable 12.5 Gram(s) IV Push once  dextrose 50% Injectable 25 Gram(s) IV Push once  dextrose 50% Injectable 25 Gram(s) IV Push once  diltiazem Infusion 7.5 mG/Hr (7.5 mL/Hr) IV Continuous <Continuous>  docusate sodium 100 milliGRAM(s) Oral three times a day  fenofibrate Tablet 145 milliGRAM(s) Oral daily  influenza   Vaccine 0.5 milliLiter(s) IntraMuscular once  insulin glargine Injectable (LANTUS) 10 Unit(s) SubCutaneous at bedtime  insulin lispro (HumaLOG) corrective regimen sliding scale   SubCutaneous every 6 hours  levoFLOXacin IVPB 250 milliGRAM(s) IV Intermittent every 24 hours  levoFLOXacin IVPB      methylPREDNISolone sodium succinate Injectable 40 milliGRAM(s) IV Push daily  sodium chloride 0.9%. 1000 milliLiter(s) (50 mL/Hr) IV Continuous <Continuous>    MEDICATIONS  (PRN):  dextrose 40% Gel 15 Gram(s) Oral once PRN Blood Glucose LESS THAN 70 milliGRAM(s)/deciliter  glucagon  Injectable 1 milliGRAM(s) IntraMuscular once PRN Glucose LESS THAN 70 milligrams/deciliter  HYDROmorphone  Injectable 1 milliGRAM(s) IV Push every 6 hours PRN Mild to severe pain  senna 2 Tablet(s) Oral at bedtime PRN Constipation        LABS                                            9.6                   Neurophils% (auto):   x      (09-05 @ 02:55):    12.63)-----------(265          Lymphocytes% (auto):  x                                             29.0                   Eosinphils% (auto):   x        Manual%: Neutrophils x    ; Lymphocytes x    ; Eosinophils x    ; Bands%: x    ; Blasts x                                    126    |  87     |  31                  Calcium: 8.7   / iCa: x      (09-05 @ 02:55)    ----------------------------<  238       Magnesium: 1.4                              3.0     |  24     |  1.05             Phosphorous: 3.1                            Addendum:  Reached out to patient's radiation oncologist  Dr. Mcfadden  for collateral information. He is currently out of office and will be back  on 9/6.  Initial plan is to discuss whether palliative RT can be offered to shoulder area to reduce pain. Per speaking with daughter Dr. Mcfadden is waiting on imaging tomorrow to decided plan and next steps. Recommend reaching out to Dr. Mcfadden in the AM for definitive plan.

## 2018-09-05 NOTE — PROGRESS NOTE ADULT - PROBLEM SELECTOR PLAN 1
Oncology consulted.   RT eval pending.  Unclear whether further RT is going to be offered.  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

## 2018-09-05 NOTE — CONSULT NOTE ADULT - PROBLEM SELECTOR RECOMMENDATION 2
Outpt PET CT Apr 2018 with interval development of metabolic activity and increase in size of multiple b/l pulmonary nodules/masses (report in [employer])  -discuss with family possible transfer of care and follow-up with medical oncologist  -obtain outpt records
-elevated, likely 2/2 pain. Monitor BP. Goal <130/80. c/w nifedipine, metoprolol.
Patient to be evaluated by Pulmonary for possible Hi flow.   Supportive care.  Given that patient will be receiving Dilaudid for pain, will also help with shortness of breath.
review  CT from 4/2018  review records of prior treatment to determine if there is an option for additional  palliative radiation.  review any  previous molecular testing, if not available this should be obtained.

## 2018-09-05 NOTE — CHART NOTE - NSCHARTNOTEFT_GEN_A_CORE
: Rehan    INDICATION: Respiratory distress    PROCEDURE:  [ ] LIMITED ECHO  [x ] LIMITED CHEST  [ ] LIMITED RETROPERITONEAL  [ ] LIMITED ABDOMINAL  [ ] LIMITED DVT  [ ] NEEDLE GUIDANCE VASCULAR  [ ] NEEDLE GUIDANCE THORACENTESIS  [ ] NEEDLE GUIDANCE PARACENTESIS  [ ] NEEDLE GUIDANCE PERICARDIOCENTESIS  [ ] OTHER    FINDINGS:  Scattered B lines anteriorly w/ irregular pleura, consolidation at right base, no pleural effusion, possible apical/RUL mass noted     INTERPRETATION:  Will continue w/ antibiotics, no need for further lasix    Marie Van MD  Pulmonary Critical Care Fellow  [phone]

## 2018-09-05 NOTE — PROGRESS NOTE ADULT - SUBJECTIVE AND OBJECTIVE BOX
SUBJECTIVE AND OBJECTIVE:  INTERVAL HPI/OVERNIGHT EVENTS:  Still complaining of right shoulder pain.     DNR on chart: no  Allergies    ACE inhibitors (Angioedema)    Intolerances    MEDICATIONS  (STANDING):  chlorhexidine 4% Liquid 1 Application(s) Topical <User Schedule>  dextrose 5%. 1000 milliLiter(s) (50 mL/Hr) IV Continuous <Continuous>  dextrose 50% Injectable 12.5 Gram(s) IV Push once  dextrose 50% Injectable 25 Gram(s) IV Push once  dextrose 50% Injectable 25 Gram(s) IV Push once  docusate sodium 100 milliGRAM(s) Oral three times a day  fenofibrate Tablet 145 milliGRAM(s) Oral daily  influenza   Vaccine 0.5 milliLiter(s) IntraMuscular once  insulin glargine Injectable (LANTUS) 12 Unit(s) SubCutaneous at bedtime  insulin lispro (HumaLOG) corrective regimen sliding scale   SubCutaneous three times a day before meals  insulin lispro (HumaLOG) corrective regimen sliding scale   SubCutaneous at bedtime  insulin lispro Injectable (HumaLOG) 3 Unit(s) SubCutaneous three times a day before meals  ketorolac   Injectable 15 milliGRAM(s) IV Push once  levoFLOXacin IVPB 250 milliGRAM(s) IV Intermittent every 24 hours  levoFLOXacin IVPB      metoprolol tartrate 25 milliGRAM(s) Oral two times a day  polyethylene glycol 3350 17 Gram(s) Oral daily  predniSONE   Tablet 40 milliGRAM(s) Oral daily  senna 2 Tablet(s) Oral at bedtime    MEDICATIONS  (PRN):  dextrose 40% Gel 15 Gram(s) Oral once PRN Blood Glucose LESS THAN 70 milliGRAM(s)/deciliter  glucagon  Injectable 1 milliGRAM(s) IntraMuscular once PRN Glucose LESS THAN 70 milligrams/deciliter  HYDROmorphone  Injectable 1 milliGRAM(s) IV Push every 4 hours PRN Severe Pain (7 - 10)      ITEMS UNCHECKED ARE NOT PRESENT    PRESENT SYMPTOMS: [ ]Unable to obtain due to poor mentation   Source if other than patient:  [ ]Family   [ x]Team     Pain (Impact on QOL):  unable to move arm.  Location: right shoulder  Minimal acceptable level (0-10 scale):  8/10          Aggravating factors: movement  Quality: dull, achy  Radiation: none  Severity (0-10 scale): 810    Timing: intermittent    Dyspnea:                           [x ]Mild [ ]Moderate [ ]Severe  Anxiety:                             [ ]Mild [ ]Moderate [ x]Severe  Fatigue:                             [ ]Mild [ ]Moderate [x ]Severe  Nausea:                             [x]Mild [ ]Moderate [ ]Severe  Loss of appetite:              [ ]Mild [ ]Moderate [ x]Severe  Constipation:                    [ ]Mild [x ]Moderate [ ]Severe    PAIN AD Score:	  [email or web address] (Ctrl + left click to view)    Other Symptoms:  [x ]All other review of systems negative     Karnofsky Performance Score/Palliative Performance Status Version 2:         %    http://palliative.info/resource_material/[email or web address]  PHYSICAL EXAM:  Vital Signs Last 24 Hrs  T(C): 37.3 (06 Sep 2018 04:00), Max: 37.3 (06 Sep 2018 04:00)  T(F): 99.2 (06 Sep 2018 04:00), Max: 99.2 (06 Sep 2018 04:00)  HR: 94 (06 Sep 2018 10:47) (78 - 126)  BP: 160/114 (06 Sep 2018 06:00) (126/54 - 184/97)  BP(mean): 119 (06 Sep 2018 06:00) (70 - 119)  RR: 23 (06 Sep 2018 10:47) (13 - 33)  SpO2: 100% (06 Sep 2018 10:47) (90% - 100%) I&O's Summary    05 Sep 2018 07:01  -  06 Sep 2018 07:00  --------------------------------------------------------  IN: 1310 mL / OUT: 450 mL / NET: 860 mL     GENERAL:  [ x]Alert  [x ]Oriented x 3 [ ]Lethargic  [ ]Cachexia  [ ]Unarousable  [ ]Verbal  [ ]Non-Verbal    Behavioral:   [ ] Anxiety  [ ] Delirium [ ] Agitation [ ] Other    HEENT:  [ ]Normal   [x ]Dry mouth   [ ]ET Tube/Trach  [ ]Oral lesions    PULMONARY:   [ ]Clear [ ]Tachypnea  [ ]Audible excessive secretions   [x ]Rhonchi        [x ]Right [ ]Left [ ]Bilateral  [x ]Crackles        [x ]Right [ ]Left [ ]Bilateral  [ ]Wheezing     [ ]Right [ ]Left [ ]Bilateral    CARDIOVASCULAR:    [x ]Regular [ ]Irregular [ ]Tachy  [ ]Blane [ ]Murmur [ ]Other    GASTROINTESTINAL:  [x ]Soft  [ ]Distended   x[ ]+BS  x[ ]Non tender [ ]Tender  [ ]PEG [ ]OGT/ NGT   Last BM:    GENITOURINARY:  [ ]Normal [ ] Incontinent   [ ]Oliguria/Anuria   [x ]Alcaraz    MUSCULOSKELETAL:   [ ]Normal   [x ]Weakness  [ ]Bed/Wheelchair bound [ ]Edema    NEUROLOGIC:   [x ]No focal deficits  [ ] Cognitive impairment  [ ] Dysphagia [ ]Dysarthria [ ] Paresis [ ]Other     SKIN:   [ x]Normal   [ ]Pressure ulcer(s)  [ ]Rash    CRITICAL CARE:  [ ] Shock Present  [ ]Septic [ ]Cardiogenic [ ]Neurologic [ ]Hypovolemic  [ ]  Vasopressors [ ]  Inotropes   [ ] Respiratory failure present  [ ] Acute  [ ] Chronic [ ] Hypoxic  [ ] Hypercarbic [ ] Other  [ ] Other organ failure     LABS:                        10.1   6.77  )-----------( 229      ( 06 Sep 2018 02:36 )             30.9   09-06    127<L>  |  88<L>  |  30<H>  ----------------------------<  223<H>  3.7   |  22  |  0.98    Ca    8.5      06 Sep 2018 02:36  Phos  2.1     -  Mg     1.8     -    TPro  6.8  /  Alb  2.7<L>  /  TBili  0.8  /  DBili  x   /  AST  36<H>  /  ALT  39<H>  /  AlkPhos  88  -06      Urinalysis Basic - ( 05 Sep 2018 01:30 )    Color: YELLOW / Appearance: CLEAR / S.014 / pH: 6.0  Gluc: LARGE / Ketone: NEGATIVE  / Bili: NEGATIVE / Urobili: NORMAL   Blood: NEGATIVE / Protein: MODERATE / Nitrite: NEGATIVE   Leuk Esterase: TRACE / RBC: 6-10 / WBC 3-5   Sq Epi: FEW / Non Sq Epi: x / Bacteria: NEGATIVE      RADIOLOGY & ADDITIONAL STUDIES:    Protein Calorie Malnutrition Present: [ ] yes [ ] no  [ ] PPSV2 < or = 30%  [ ] significant weight loss [ ] poor nutritional intake [ ] anasarca [ ] catabolic state Albumin, Serum: 2.7 g/dL (18 @ 02:36)  Artificial Nutrition [ ]     REFERRALS:   [ ]Chaplaincy  [ ] Hospice  [ ]Child Life  [ ]Social Work  [ ]Case management [ ]Holistic Therapy   Goals of Care Document:

## 2018-09-05 NOTE — PROGRESS NOTE ADULT - SUBJECTIVE AND OBJECTIVE BOX
Chief Complaint: DM2    History: Patient advanced to po diet but not carb consistent diet. Per family at bedside patient ate very little.    MEDICATIONS  (STANDING):  chlorhexidine 4% Liquid 1 Application(s) Topical <User Schedule>  dextrose 5%. 1000 milliLiter(s) (50 mL/Hr) IV Continuous <Continuous>  dextrose 50% Injectable 12.5 Gram(s) IV Push once  dextrose 50% Injectable 25 Gram(s) IV Push once  dextrose 50% Injectable 25 Gram(s) IV Push once  diltiazem    Tablet 60 milliGRAM(s) Oral four times a day  docusate sodium 100 milliGRAM(s) Oral three times a day  fenofibrate Tablet 145 milliGRAM(s) Oral daily  HYDROmorphone  Injectable 1 milliGRAM(s) IV Push every 6 hours  influenza   Vaccine 0.5 milliLiter(s) IntraMuscular once  insulin glargine Injectable (LANTUS) 12 Unit(s) SubCutaneous at bedtime  insulin lispro (HumaLOG) corrective regimen sliding scale   SubCutaneous three times a day before meals  insulin lispro (HumaLOG) corrective regimen sliding scale   SubCutaneous at bedtime  insulin lispro Injectable (HumaLOG) 3 Unit(s) SubCutaneous three times a day before meals  levoFLOXacin IVPB 250 milliGRAM(s) IV Intermittent every 24 hours  levoFLOXacin IVPB      methylPREDNISolone sodium succinate Injectable 40 milliGRAM(s) IV Push daily  polyethylene glycol 3350 17 Gram(s) Oral daily  senna 2 Tablet(s) Oral at bedtime  sodium chloride 0.9%. 1000 milliLiter(s) (50 mL/Hr) IV Continuous <Continuous>    MEDICATIONS  (PRN):  dextrose 40% Gel 15 Gram(s) Oral once PRN Blood Glucose LESS THAN 70 milliGRAM(s)/deciliter  glucagon  Injectable 1 milliGRAM(s) IntraMuscular once PRN Glucose LESS THAN 70 milligrams/deciliter  HYDROmorphone  Injectable 0.5 milliGRAM(s) IV Push every 4 hours PRN Mild to severe pain      Allergies    ACE inhibitors (Angioedema)    Intolerances      Review of Systems:    UNABLE TO OBTAIN    PHYSICAL EXAM:  VITALS: T(C): 35.8 (09-05-18 @ 16:00)  T(F): 96.5 (09-05-18 @ 16:00), Max: 101.4 (09-05-18 @ 00:00)  HR: 100 (09-05-18 @ 17:00) (75 - 151)  BP: 154/92 (09-05-18 @ 17:00) (98/78 - 195/101)  RR:  (13 - 34)  SpO2:  (90% - 99%)  Wt(kg): --  GENERAL: NAD, lethargic  EYES: No proptosis, no lid lag, anicteric  HEENT:  Atraumatic, Normocephalic, moist mucous membranes  GI: Soft, nontender, non distended  SKIN: Dry, intact, No rashes or lesions      CAPILLARY BLOOD GLUCOSE      POCT Blood Glucose.: 209 mg/dL (05 Sep 2018 17:35)  POCT Blood Glucose.: 253 mg/dL (05 Sep 2018 12:41)  POCT Blood Glucose.: 245 mg/dL (05 Sep 2018 05:16)  POCT Blood Glucose.: 279 mg/dL (05 Sep 2018 00:08)  POCT Blood Glucose.: 292 mg/dL (04 Sep 2018 22:17)  POCT Blood Glucose.: 356 mg/dL (04 Sep 2018 18:48)      09-05    126<L>  |  87<L>  |  31<H>  ----------------------------<  238<H>  3.0<L>   |  24  |  1.05    EGFR if : 56  EGFR if non : 48    Ca    8.7      09-05  Mg     1.4     09-05  Phos  3.1     09-05    TPro  7.7  /  Alb  3.4  /  TBili  0.4  /  DBili  x   /  AST  17  /  ALT  20  /  AlkPhos  69  09-03          Thyroid Function Tests:      Hemoglobin A1C, Whole Blood: 9.8 % <H> [4.0 - 5.6] (09-05-18 @ 02:55)

## 2018-09-05 NOTE — PROGRESS NOTE ADULT - SUBJECTIVE AND OBJECTIVE BOX
Bienvenido Martínez, PGY1  MICU Team  [phone]    SUBJECTIVE: Patient seen and examined at bedside.     Interval Events:    OBJECTIVE:    VITAL SIGNS:  ICU Vital Signs Last 24 Hrs  T(C): 36 (05 Sep 2018 12:00), Max: 38.6 (05 Sep 2018 00:00)  T(F): 96.8 (05 Sep 2018 12:00), Max: 101.4 (05 Sep 2018 00:00)  HR: 85 (05 Sep 2018 13:00) (75 - 151)  BP: 159/60 (05 Sep 2018 13:00) (98/78 - 195/101)  BP(mean): 85 (05 Sep 2018 13:00) (69 - 128)  ABP: --  ABP(mean): --  RR: 13 (05 Sep 2018 13:00) (13 - 34)  SpO2: 96% (05 Sep 2018 13:00) (90% - 100%)         @ 07:  -   @ 07:00  --------------------------------------------------------  IN: 1377.5 mL / OUT: 1775 mL / NET: -397.5 mL     @ 07:  -   @ 14:51  --------------------------------------------------------  IN: 325 mL / OUT: 300 mL / NET: 25 mL      CAPILLARY BLOOD GLUCOSE      POCT Blood Glucose.: 253 mg/dL (05 Sep 2018 12:41)      PHYSICAL EXAM:    General:   HEENT:   Neck:  Respiratory:   Cardiovascular:   Abdomen:   Extremities:   Skin:   Neurological:    MEDICATIONS:  MEDICATIONS  (STANDING):  chlorhexidine 4% Liquid 1 Application(s) Topical <User Schedule>  dextrose 5%. 1000 milliLiter(s) (50 mL/Hr) IV Continuous <Continuous>  dextrose 50% Injectable 12.5 Gram(s) IV Push once  dextrose 50% Injectable 25 Gram(s) IV Push once  dextrose 50% Injectable 25 Gram(s) IV Push once  diltiazem    Tablet 60 milliGRAM(s) Oral four times a day  docusate sodium 100 milliGRAM(s) Oral three times a day  fenofibrate Tablet 145 milliGRAM(s) Oral daily  influenza   Vaccine 0.5 milliLiter(s) IntraMuscular once  insulin glargine Injectable (LANTUS) 10 Unit(s) SubCutaneous at bedtime  insulin lispro (HumaLOG) corrective regimen sliding scale   SubCutaneous every 6 hours  levoFLOXacin IVPB 250 milliGRAM(s) IV Intermittent every 24 hours  levoFLOXacin IVPB      methylPREDNISolone sodium succinate Injectable 40 milliGRAM(s) IV Push daily  polyethylene glycol 3350 17 Gram(s) Oral daily  senna 2 Tablet(s) Oral at bedtime  sodium chloride 0.9%. 1000 milliLiter(s) (50 mL/Hr) IV Continuous <Continuous>    MEDICATIONS  (PRN):  dextrose 40% Gel 15 Gram(s) Oral once PRN Blood Glucose LESS THAN 70 milliGRAM(s)/deciliter  glucagon  Injectable 1 milliGRAM(s) IntraMuscular once PRN Glucose LESS THAN 70 milligrams/deciliter  HYDROmorphone  Injectable 1 milliGRAM(s) IV Push every 6 hours PRN Mild to severe pain      ALLERGIES:  Allergies    ACE inhibitors (Angioedema)    Intolerances        LABS:                        9.6    12.63 )-----------( 265      ( 05 Sep 2018 02:55 )             29.0     CBC Full  -  ( 05 Sep 2018 02:55 )  WBC Count : 12.63 K/uL  Hemoglobin : 9.6 g/dL  Hematocrit : 29.0 %  Platelet Count - Automated : 265 K/uL  Mean Cell Volume : 78.2 fL  Mean Cell Hemoglobin : 25.9 pg  Mean Cell Hemoglobin Concentration : 33.1 %  Auto Neutrophil # : x  Auto Lymphocyte # : x  Auto Monocyte # : x  Auto Eosinophil # : x  Auto Basophil # : x  Auto Neutrophil % : x  Auto Lymphocyte % : x  Auto Monocyte % : x  Auto Eosinophil % : x  Auto Basophil % : x        126<L>  |  87<L>  |  31<H>  ----------------------------<  238<H>  3.0<L>   |  24  |  1.05    Ca    8.7      05 Sep 2018 02:55  Phos  3.1     09-05  Mg     1.4         TPro  7.7  /  Alb  3.4  /  TBili  0.4  /  DBili  x   /  AST  17  /  ALT  20  /  AlkPhos  69  -    Creatinine Trend: 1.05<--, 0.91<--, 1.02<--, 1.01<--  LIVER FUNCTIONS - ( 03 Sep 2018 17:30 )  Alb: 3.4 g/dL / Pro: 7.7 g/dL / ALK PHOS: 69 u/L / ALT: 20 u/L / AST: 17 u/L / GGT: x           PT/INR - ( 03 Sep 2018 17:30 )   PT: 12.8 SEC;   INR: 1.15          PTT - ( 03 Sep 2018 17:30 )  PTT:24.7 SEC  CARDIAC MARKERS ( 05 Sep 2018 02:55 )  x     / x     / 48 u/L / 4.52 ng/mL / x      CARDIAC MARKERS ( 04 Sep 2018 20:10 )  x     / x     / 38 u/L / 3.26 ng/mL / x          ABG - ( 04 Sep 2018 17:28 )  pH, Arterial: 7.41  pH, Blood: x     /  pCO2: 39    /  pO2: 75    / HCO3: 25    / Base Excess: 0.0   /  SaO2: 94.6              Urinalysis Basic - ( 05 Sep 2018 01:30 )    Color: YELLOW / Appearance: CLEAR / S.014 / pH: 6.0  Gluc: LARGE / Ketone: NEGATIVE  / Bili: NEGATIVE / Urobili: NORMAL   Blood: NEGATIVE / Protein: MODERATE / Nitrite: NEGATIVE   Leuk Esterase: TRACE / RBC: 6-10 / WBC 3-5   Sq Epi: FEW / Non Sq Epi: x / Bacteria: NEGATIVE        MICROBIOLOGY:    IMAGING:    RADIOLOGY & ADDITIONAL TESTS: Reviewed. Bienvenido Martínez, PGY1  MICU Team  [phone]    SUBJECTIVE: Patient seen and examined at bedside.     Interval Events: Patient transitioned to high flow nasal canula.     OBJECTIVE:   VITAL SIGNS:  ICU Vital Signs Last 24 Hrs  T(C): 36 (05 Sep 2018 12:00), Max: 38.6 (05 Sep 2018 00:00)  T(F): 96.8 (05 Sep 2018 12:00), Max: 101.4 (05 Sep 2018 00:00)  HR: 85 (05 Sep 2018 13:00) (75 - 151)  BP: 159/60 (05 Sep 2018 13:00) (98/78 - 195/101)  BP(mean): 85 (05 Sep 2018 13:00) (69 - 128)  ABP: --  ABP(mean): --  RR: 13 (05 Sep 2018 13:00) (13 - 34)  SpO2: 96% (05 Sep 2018 13:00) (90% - 100%)         @ 07: @ 07:00  --------------------------------------------------------  IN: 1377.5 mL / OUT: 1775 mL / NET: -397.5 mL     @ 07: @ 14:51  --------------------------------------------------------  IN: 325 mL / OUT: 300 mL / NET: 25 mL      CAPILLARY BLOOD GLUCOSE      POCT Blood Glucose.: 253 mg/dL (05 Sep 2018 12:41)      PHYSICAL EXAM:    General:   HEENT:   Neck:  Respiratory:   Cardiovascular:   Abdomen:   Extremities:   Skin:   Neurological:    MEDICATIONS:  MEDICATIONS  (STANDING):  chlorhexidine 4% Liquid 1 Application(s) Topical <User Schedule>  dextrose 5%. 1000 milliLiter(s) (50 mL/Hr) IV Continuous <Continuous>  dextrose 50% Injectable 12.5 Gram(s) IV Push once  dextrose 50% Injectable 25 Gram(s) IV Push once  dextrose 50% Injectable 25 Gram(s) IV Push once  diltiazem    Tablet 60 milliGRAM(s) Oral four times a day  docusate sodium 100 milliGRAM(s) Oral three times a day  fenofibrate Tablet 145 milliGRAM(s) Oral daily  influenza   Vaccine 0.5 milliLiter(s) IntraMuscular once  insulin glargine Injectable (LANTUS) 10 Unit(s) SubCutaneous at bedtime  insulin lispro (HumaLOG) corrective regimen sliding scale   SubCutaneous every 6 hours  levoFLOXacin IVPB 250 milliGRAM(s) IV Intermittent every 24 hours  levoFLOXacin IVPB      methylPREDNISolone sodium succinate Injectable 40 milliGRAM(s) IV Push daily  polyethylene glycol 3350 17 Gram(s) Oral daily  senna 2 Tablet(s) Oral at bedtime  sodium chloride 0.9%. 1000 milliLiter(s) (50 mL/Hr) IV Continuous <Continuous>    MEDICATIONS  (PRN):  dextrose 40% Gel 15 Gram(s) Oral once PRN Blood Glucose LESS THAN 70 milliGRAM(s)/deciliter  glucagon  Injectable 1 milliGRAM(s) IntraMuscular once PRN Glucose LESS THAN 70 milligrams/deciliter  HYDROmorphone  Injectable 1 milliGRAM(s) IV Push every 6 hours PRN Mild to severe pain      ALLERGIES:  Allergies    ACE inhibitors (Angioedema)    Intolerances        LABS:                        9.6    12.63 )-----------( 265      ( 05 Sep 2018 02:55 )             29.0     CBC Full  -  ( 05 Sep 2018 02:55 )  WBC Count : 12.63 K/uL  Hemoglobin : 9.6 g/dL  Hematocrit : 29.0 %  Platelet Count - Automated : 265 K/uL  Mean Cell Volume : 78.2 fL  Mean Cell Hemoglobin : 25.9 pg  Mean Cell Hemoglobin Concentration : 33.1 %  Auto Neutrophil # : x  Auto Lymphocyte # : x  Auto Monocyte # : x  Auto Eosinophil # : x  Auto Basophil # : x  Auto Neutrophil % : x  Auto Lymphocyte % : x  Auto Monocyte % : x  Auto Eosinophil % : x  Auto Basophil % : x        126<L>  |  87<L>  |  31<H>  ----------------------------<  238<H>  3.0<L>   |  24  |  1.05    Ca    8.7      05 Sep 2018 02:55  Phos  3.1     09-05  Mg     1.4     09-05    TPro  7.7  /  Alb  3.4  /  TBili  0.4  /  DBili  x   /  AST  17  /  ALT  20  /  AlkPhos  69  -03    Creatinine Trend: 1.05<--, 0.91<--, 1.02<--, 1.01<--  LIVER FUNCTIONS - ( 03 Sep 2018 17:30 )  Alb: 3.4 g/dL / Pro: 7.7 g/dL / ALK PHOS: 69 u/L / ALT: 20 u/L / AST: 17 u/L / GGT: x           PT/INR - ( 03 Sep 2018 17:30 )   PT: 12.8 SEC;   INR: 1.15          PTT - ( 03 Sep 2018 17:30 )  PTT:24.7 SEC  CARDIAC MARKERS ( 05 Sep 2018 02:55 )  x     / x     / 48 u/L / 4.52 ng/mL / x      CARDIAC MARKERS ( 04 Sep 2018 20:10 )  x     / x     / 38 u/L / 3.26 ng/mL / x          ABG - ( 04 Sep 2018 17:28 )  pH, Arterial: 7.41  pH, Blood: x     /  pCO2: 39    /  pO2: 75    / HCO3: 25    / Base Excess: 0.0   /  SaO2: 94.6              Urinalysis Basic - ( 05 Sep 2018 01:30 )    Color: YELLOW / Appearance: CLEAR / S.014 / pH: 6.0  Gluc: LARGE / Ketone: NEGATIVE  / Bili: NEGATIVE / Urobili: NORMAL   Blood: NEGATIVE / Protein: MODERATE / Nitrite: NEGATIVE   Leuk Esterase: TRACE / RBC: 6-10 / WBC 3-5   Sq Epi: FEW / Non Sq Epi: x / Bacteria: NEGATIVE        MICROBIOLOGY:    IMAGING:    RADIOLOGY & ADDITIONAL TESTS: Reviewed. Bienvenido Martínez, PGY1  MICU Team  [phone]    SUBJECTIVE: Patient seen and examined at bedside.     Interval Events: Patient transitioned to high flow nasal canula.     OBJECTIVE:   VITAL SIGNS:  ICU Vital Signs Last 24 Hrs  T(C): 36 (05 Sep 2018 12:00), Max: 38.6 (05 Sep 2018 00:00)  T(F): 96.8 (05 Sep 2018 12:00), Max: 101.4 (05 Sep 2018 00:00)  HR: 85 (05 Sep 2018 13:) (75 - 151)  BP: 159/60 (05 Sep 2018 13:00) (98/78 - 195/101)  BP(mean): 85 (05 Sep 2018 13:00) (69 - 128)  ABP: --  ABP(mean): --  RR: 13 (05 Sep 2018 13:00) (13 - 34)  SpO2: 96% (05 Sep 2018 13:00) (90% - 100%)         @ 07: @ 07:00  --------------------------------------------------------  IN: 1377.5 mL / OUT: 1775 mL / NET: -397.5 mL     @ 07:  -   @ 14:51  --------------------------------------------------------  IN: 325 mL / OUT: 300 mL / NET: 25 mL      CAPILLARY BLOOD GLUCOSE      POCT Blood Glucose.: 253 mg/dL (05 Sep 2018 12:41)      PHYSICAL EXAM:  	GENERAL: tachypnic  	HEAD:  Atraumatic, Normocephalic  	EYES: EOMI, PERRLA, conjunctiva and sclera clear  	NECK: Supple, No JVD  	CHEST/LUNG: CTAB  	HEART: Regular rate and rhythm; No murmurs, rubs, or gallops  	ABDOMEN: Soft, Nontender, Nondistended; Bowel sounds present  	EXTREMITIES:  2+ Peripheral Pulses, No clubbing, cyanosis, or edema. R arm, not TTP, pain with active ROM  	PSYCH: AAOx3  	NEUROLOGY: non-focal  SKIN: No rashes or lesions    MEDICATIONS:  MEDICATIONS  (STANDING):  chlorhexidine 4% Liquid 1 Application(s) Topical <User Schedule>  dextrose 5%. 1000 milliLiter(s) (50 mL/Hr) IV Continuous <Continuous>  dextrose 50% Injectable 12.5 Gram(s) IV Push once  dextrose 50% Injectable 25 Gram(s) IV Push once  dextrose 50% Injectable 25 Gram(s) IV Push once  diltiazem    Tablet 60 milliGRAM(s) Oral four times a day  docusate sodium 100 milliGRAM(s) Oral three times a day  fenofibrate Tablet 145 milliGRAM(s) Oral daily  influenza   Vaccine 0.5 milliLiter(s) IntraMuscular once  insulin glargine Injectable (LANTUS) 10 Unit(s) SubCutaneous at bedtime  insulin lispro (HumaLOG) corrective regimen sliding scale   SubCutaneous every 6 hours  levoFLOXacin IVPB 250 milliGRAM(s) IV Intermittent every 24 hours  levoFLOXacin IVPB      methylPREDNISolone sodium succinate Injectable 40 milliGRAM(s) IV Push daily  polyethylene glycol 3350 17 Gram(s) Oral daily  senna 2 Tablet(s) Oral at bedtime  sodium chloride 0.9%. 1000 milliLiter(s) (50 mL/Hr) IV Continuous <Continuous>    MEDICATIONS  (PRN):  dextrose 40% Gel 15 Gram(s) Oral once PRN Blood Glucose LESS THAN 70 milliGRAM(s)/deciliter  glucagon  Injectable 1 milliGRAM(s) IntraMuscular once PRN Glucose LESS THAN 70 milligrams/deciliter  HYDROmorphone  Injectable 1 milliGRAM(s) IV Push every 6 hours PRN Mild to severe pain      ALLERGIES:  Allergies    ACE inhibitors (Angioedema)    Intolerances        LABS:                        9.6    12.63 )-----------( 265      ( 05 Sep 2018 02:55 )             29.0     CBC Full  -  ( 05 Sep 2018 02:55 )  WBC Count : 12.63 K/uL  Hemoglobin : 9.6 g/dL  Hematocrit : 29.0 %  Platelet Count - Automated : 265 K/uL  Mean Cell Volume : 78.2 fL  Mean Cell Hemoglobin : 25.9 pg  Mean Cell Hemoglobin Concentration : 33.1 %  Auto Neutrophil # : x  Auto Lymphocyte # : x  Auto Monocyte # : x  Auto Eosinophil # : x  Auto Basophil # : x  Auto Neutrophil % : x  Auto Lymphocyte % : x  Auto Monocyte % : x  Auto Eosinophil % : x  Auto Basophil % : x        126<L>  |  87<L>  |  31<H>  ----------------------------<  238<H>  3.0<L>   |  24  |  1.05    Ca    8.7      05 Sep 2018 02:55  Phos  3.1       Mg     1.4         TPro  7.7  /  Alb  3.4  /  TBili  0.4  /  DBili  x   /  AST  17  /  ALT  20  /  AlkPhos  69      Creatinine Trend: 1.05<--, 0.91<--, 1.02<--, 1.01<--  LIVER FUNCTIONS - ( 03 Sep 2018 17:30 )  Alb: 3.4 g/dL / Pro: 7.7 g/dL / ALK PHOS: 69 u/L / ALT: 20 u/L / AST: 17 u/L / GGT: x           PT/INR - ( 03 Sep 2018 17:30 )   PT: 12.8 SEC;   INR: 1.15          PTT - ( 03 Sep 2018 17:30 )  PTT:24.7 SEC  CARDIAC MARKERS ( 05 Sep 2018 02:55 )  x     / x     / 48 u/L / 4.52 ng/mL / x      CARDIAC MARKERS ( 04 Sep 2018 20:10 )  x     / x     / 38 u/L / 3.26 ng/mL / x          ABG - ( 04 Sep 2018 17:28 )  pH, Arterial: 7.41  pH, Blood: x     /  pCO2: 39    /  pO2: 75    / HCO3: 25    / Base Excess: 0.0   /  SaO2: 94.6              Urinalysis Basic - ( 05 Sep 2018 01:30 )    Color: YELLOW / Appearance: CLEAR / S.014 / pH: 6.0  Gluc: LARGE / Ketone: NEGATIVE  / Bili: NEGATIVE / Urobili: NORMAL   Blood: NEGATIVE / Protein: MODERATE / Nitrite: NEGATIVE   Leuk Esterase: TRACE / RBC: 6-10 / WBC 3-5   Sq Epi: FEW / Non Sq Epi: x / Bacteria: NEGATIVE        MICROBIOLOGY:    IMAGING:    RADIOLOGY & ADDITIONAL TESTS: Reviewed.

## 2018-09-05 NOTE — PROGRESS NOTE ADULT - PROBLEM SELECTOR PLAN 1
Change diet to consistent carbohydrate.  Increase Lantus to 12u qhs  Add Humalog 3/3/3  Change correction scale from moderate q6h to moderate before meals and moderate bedtime.    DC plan: patient was to be started on basal insulin as outpatient in addition to oral agents. For dc will need to clarify goals of care. Outpatient endocrinologist Dr. Alcocer.

## 2018-09-05 NOTE — PROGRESS NOTE ADULT - ASSESSMENT
87 y/o F PMH  of Type 2 DM, Lung CA (unknown stage or type, Dx 2010 s/p L VATS, wedge resection, RT in July 2017), PMR on chronic steroids, HTN, p/w worsening Rt shoulder pain for last two months. Admitted with concerning lesion on MRI head for brain met. On steroids with hyperglycemia.

## 2018-09-05 NOTE — PROGRESS NOTE ADULT - ASSESSMENT
85 y/o female with known Lung adenoca tx at [facility], p/w vomiting and shoulder pain. 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

## 2018-09-05 NOTE — PROGRESS NOTE ADULT - SUBJECTIVE AND OBJECTIVE BOX
_________________________________________________________________________________________  ========>>  M E D I C A L   A T T E N D I N G    F O L L O W  U P  N O T E  <<=========  -----------------------------------------------------------------------------------------------------    - Patient seen and examined by me earlier today.   - In summary,  CLIVE HENRY is a 86y year old woman who originally presented with Rt shoulder pain    noted and discussed events yesterday with PA and other specialists..   pt taken to ICU overnight for hypoxemia, post BiPAP now on High flow and being weaned off and transferred to the RCU  - Patient today overall doing a bit better, comfortable, eating poorly, still with some pain in left shouder    ==================>> REVIEW OF SYSTEM <<=================    GEN: no fever, no chills, + pain as above   RESP: mild SOB, no cough, no sputum  CVS: no chest pain, no palpitations, no edema  GI: no abdominal pain, no nausea, ghad vomiting yesterday PM, none today   : no dysuria, no frequency, no hematuria  Neuro: no headache, no dizziness  Derm : no itching, no rash    ==================>> PHYSICAL EXAM <<=================    GEN: A&O X 3 , NAD , comfortable  HEENT: NCAT, PERRL, MMM, hearing intact, on high flow   Neck: supple , no JVD  CVS: S1S2 , Irregular , No M/R/G appreciated  PULM: CTA B/L,  no W/R/R appreciated  ABD.: soft. non tender, non distended,  bowel sounds present  Extrem: intact pulses , no edema   PSYCH : flat affect       ==================>> MEDICATIONS <<====================    MEDICATIONS  (STANDING):  chlorhexidine 4% Liquid 1 Application(s) Topical <User Schedule>  dextrose 5%. 1000 milliLiter(s) (50 mL/Hr) IV Continuous <Continuous>  dextrose 50% Injectable 12.5 Gram(s) IV Push once  dextrose 50% Injectable 25 Gram(s) IV Push once  dextrose 50% Injectable 25 Gram(s) IV Push once  diltiazem    Tablet 60 milliGRAM(s) Oral four times a day  docusate sodium 100 milliGRAM(s) Oral three times a day  fenofibrate Tablet 145 milliGRAM(s) Oral daily  influenza   Vaccine 0.5 milliLiter(s) IntraMuscular once  insulin glargine Injectable (LANTUS) 12 Unit(s) SubCutaneous at bedtime  insulin lispro (HumaLOG) corrective regimen sliding scale   SubCutaneous three times a day before meals  insulin lispro (HumaLOG) corrective regimen sliding scale   SubCutaneous at bedtime  insulin lispro Injectable (HumaLOG) 3 Unit(s) SubCutaneous three times a day before meals  levoFLOXacin IVPB 250 milliGRAM(s) IV Intermittent every 24 hours  levoFLOXacin IVPB      methylPREDNISolone sodium succinate Injectable 40 milliGRAM(s) IV Push daily  polyethylene glycol 3350 17 Gram(s) Oral daily  senna 2 Tablet(s) Oral at bedtime  sodium chloride 0.9%. 1000 milliLiter(s) (50 mL/Hr) IV Continuous <Continuous>    MEDICATIONS  (PRN):  dextrose 40% Gel 15 Gram(s) Oral once PRN Blood Glucose LESS THAN 70 milliGRAM(s)/deciliter  glucagon  Injectable 1 milliGRAM(s) IntraMuscular once PRN Glucose LESS THAN 70 milligrams/deciliter  HYDROmorphone  Injectable 1 milliGRAM(s) IV Push every 4 hours PRN Severe Pain (7 - 10)    ==================>> VITAL SIGNS <<==================    T(C): 35.8 (18 @ 16:00), Max: 38.6 (18 @ 00:00)  HR: 100 (18 @ 17:00) (75 - 151)  BP: 154/92 (18 @ 17:00) (98/78 - 195/101)  RR: 20 (18 @ 17:00) (13 - 34)  SpO2: 96% (18 @ 17:00) (90% - 99%)    POCT Blood Glucose.: 209 mg/dL (05 Sep 2018 17:35)  POCT Blood Glucose.: 253 mg/dL (05 Sep 2018 12:41)  POCT Blood Glucose.: 245 mg/dL (05 Sep 2018 05:16)  POCT Blood Glucose.: 279 mg/dL (05 Sep 2018 00:08)  POCT Blood Glucose.: 292 mg/dL (04 Sep 2018 22:17)    I&O's Summary    04 Sep 2018 07:  -  05 Sep 2018 07:00  --------------------------------------------------------  IN: 1377.5 mL / OUT: 1775 mL / NET: -397.5 mL    05 Sep 2018 07:01  -  05 Sep 2018 19:16  --------------------------------------------------------  IN: 675 mL / OUT: 400 mL / NET: 275 mL     ==================>> LAB AND IMAGING <<==================                        9.6    12.63 )-----------( 265      ( 05 Sep 2018 02:55 )             29.0     Hemoglobin:   9.6 <<==,  9.6 <<==,  10.2 <<==,  10.5 <<==    126<L>  |  87<L>  |  31<H>  ----------------------------<  238<H>  3.0<L>   |  24  |  1.05    Sodium:   126  <==, 124  <==, 125  <==, 133  <==    Ca    8.7      05 Sep 2018 02:55  Phos  3.1       Mg     1.4                   ABG - ( 04 Sep 2018 17:28 )  pH, Arterial: 7.41  pH, Blood: x     /  pCO2: 39    /  pO2: 75    / HCO3: 25    / Base Excess: 0.0   /  SaO2: 94.6      Urinalysis Basic - ( 05 Sep 2018 01:30 )  Color: YELLOW / Appearance: CLEAR / S.014 / pH: 6.0  Gluc: LARGE / Ketone: NEGATIVE  / Bili: NEGATIVE / Urobili: NORMAL   Blood: NEGATIVE / Protein: MODERATE / Nitrite: NEGATIVE   Leuk Esterase: TRACE / RBC: 6-10 / WBC 3-5   Sq Epi: FEW / Non Sq Epi: x / Bacteria: NEGATIVE    HgA1C: 9.8  (18)            imaging reviewed : hemorrhagic  brain met   ___________________________________________________________________________________  ===============>>  A S S E S S M E N T   A N D   P L A N <<===============  ------------------------------------------------------------------------------------------    · Assessment		  87 y/o F PMH Lung CA (unknown stage or type, Dx 2010 s/p L VATS, wedge resection, RT in 2017), PMR, HTN, DM2 p/w worsening Rt shoulder pain from partial supraspinatus tear, found to have poss hemorrhagic met to brain pending MRI.       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  Consider role of dexamethasone, given brain MRI findings.     Problem: Shoulder pain likely due to partial supraspinatus tear on MRI shoulder   Pain control with narcotics  should not use NSAIDS given brain met   MRI of brachial plexus noted  PT c/s.   possible outpatient follow up with Ortho / pain mgmt for possible injection ? ( d/w Dtr)     Problem: Hyponatremia, possibly SIADH in the setting of lung ca   Trend BMP closely  free fluid restriction  consider renal eval  encourage PO intake     Problem: Hypertension  resume / continue home medications with hold parameters  cardio f/u and mgmt appreciated     Problem: Diabetes, poorly controlled on admission   Endocrine f/u and mgmt appreciated   Consistent carb diet  HbA1c 9.8%    Problem:  Need for other prophylactic measure.    SCDs  Diet: DASH/CC (lactose free)     -GI/DVT Prophylaxis.    --------------------------------------------  Case discussed with pt and Dtr, ICU team, cardio   Education given on findings and plan of care  ___________________________  HLima Ferrell D.O.  Pager: [phone]

## 2018-09-06 LAB
ALBUMIN SERPL ELPH-MCNC: 2.7 G/DL — LOW (ref 3.3–5)
ALP SERPL-CCNC: 88 U/L — SIGNIFICANT CHANGE UP (ref 40–120)
ALT FLD-CCNC: 39 U/L — HIGH (ref 4–33)
AST SERPL-CCNC: 36 U/L — HIGH (ref 4–32)
BASOPHILS # BLD AUTO: 0.01 K/UL — SIGNIFICANT CHANGE UP (ref 0–0.2)
BASOPHILS NFR BLD AUTO: 0.1 % — SIGNIFICANT CHANGE UP (ref 0–2)
BILIRUB SERPL-MCNC: 0.8 MG/DL — SIGNIFICANT CHANGE UP (ref 0.2–1.2)
BUN SERPL-MCNC: 30 MG/DL — HIGH (ref 7–23)
BUN SERPL-MCNC: 30 MG/DL — HIGH (ref 7–23)
CALCIUM SERPL-MCNC: 8.5 MG/DL — SIGNIFICANT CHANGE UP (ref 8.4–10.5)
CALCIUM SERPL-MCNC: 8.5 MG/DL — SIGNIFICANT CHANGE UP (ref 8.4–10.5)
CHLORIDE SERPL-SCNC: 88 MMOL/L — LOW (ref 98–107)
CHLORIDE SERPL-SCNC: 88 MMOL/L — LOW (ref 98–107)
CK MB BLD-MCNC: 2.76 NG/ML — SIGNIFICANT CHANGE UP (ref 1–4.7)
CK MB BLD-MCNC: SIGNIFICANT CHANGE UP (ref 0–2.5)
CK SERPL-CCNC: 28 U/L — SIGNIFICANT CHANGE UP (ref 25–170)
CO2 SERPL-SCNC: 22 MMOL/L — SIGNIFICANT CHANGE UP (ref 22–31)
CO2 SERPL-SCNC: 22 MMOL/L — SIGNIFICANT CHANGE UP (ref 22–31)
CREAT SERPL-MCNC: 0.98 MG/DL — SIGNIFICANT CHANGE UP (ref 0.5–1.3)
CREAT SERPL-MCNC: 0.98 MG/DL — SIGNIFICANT CHANGE UP (ref 0.5–1.3)
EOSINOPHIL # BLD AUTO: 0.01 K/UL — SIGNIFICANT CHANGE UP (ref 0–0.5)
EOSINOPHIL NFR BLD AUTO: 0.1 % — SIGNIFICANT CHANGE UP (ref 0–6)
GLUCOSE BLDC GLUCOMTR-MCNC: 229 MG/DL — HIGH (ref 70–99)
GLUCOSE BLDC GLUCOMTR-MCNC: 236 MG/DL — HIGH (ref 70–99)
GLUCOSE BLDC GLUCOMTR-MCNC: 248 MG/DL — HIGH (ref 70–99)
GLUCOSE BLDC GLUCOMTR-MCNC: 258 MG/DL — HIGH (ref 70–99)
GLUCOSE SERPL-MCNC: 223 MG/DL — HIGH (ref 70–99)
GLUCOSE SERPL-MCNC: 223 MG/DL — HIGH (ref 70–99)
HCT VFR BLD CALC: 30.9 % — LOW (ref 34.5–45)
HCT VFR BLD CALC: 30.9 % — LOW (ref 34.5–45)
HGB BLD-MCNC: 10.1 G/DL — LOW (ref 11.5–15.5)
HGB BLD-MCNC: 10.1 G/DL — LOW (ref 11.5–15.5)
IMM GRANULOCYTES # BLD AUTO: 0.08 # — SIGNIFICANT CHANGE UP
IMM GRANULOCYTES NFR BLD AUTO: 1.2 % — SIGNIFICANT CHANGE UP (ref 0–1.5)
LYMPHOCYTES # BLD AUTO: 0.25 K/UL — LOW (ref 1–3.3)
LYMPHOCYTES # BLD AUTO: 3.7 % — LOW (ref 13–44)
MAGNESIUM SERPL-MCNC: 1.8 MG/DL — SIGNIFICANT CHANGE UP (ref 1.6–2.6)
MCHC RBC-ENTMCNC: 26 PG — LOW (ref 27–34)
MCHC RBC-ENTMCNC: 26 PG — LOW (ref 27–34)
MCHC RBC-ENTMCNC: 32.7 % — SIGNIFICANT CHANGE UP (ref 32–36)
MCHC RBC-ENTMCNC: 32.7 % — SIGNIFICANT CHANGE UP (ref 32–36)
MCV RBC AUTO: 79.4 FL — LOW (ref 80–100)
MCV RBC AUTO: 79.4 FL — LOW (ref 80–100)
MONOCYTES # BLD AUTO: 0.18 K/UL — SIGNIFICANT CHANGE UP (ref 0–0.9)
MONOCYTES NFR BLD AUTO: 2.7 % — SIGNIFICANT CHANGE UP (ref 2–14)
NEUTROPHILS # BLD AUTO: 6.24 K/UL — SIGNIFICANT CHANGE UP (ref 1.8–7.4)
NEUTROPHILS NFR BLD AUTO: 92.2 % — HIGH (ref 43–77)
NRBC # FLD: 0 — SIGNIFICANT CHANGE UP
NRBC # FLD: 0 — SIGNIFICANT CHANGE UP
NT-PROBNP SERPL-SCNC: SIGNIFICANT CHANGE UP PG/ML
PHOSPHATE SERPL-MCNC: 2.1 MG/DL — LOW (ref 2.5–4.5)
PLATELET # BLD AUTO: 229 K/UL — SIGNIFICANT CHANGE UP (ref 150–400)
PLATELET # BLD AUTO: 229 K/UL — SIGNIFICANT CHANGE UP (ref 150–400)
PMV BLD: 9.8 FL — SIGNIFICANT CHANGE UP (ref 7–13)
PMV BLD: 9.8 FL — SIGNIFICANT CHANGE UP (ref 7–13)
POTASSIUM SERPL-MCNC: 3.7 MMOL/L — SIGNIFICANT CHANGE UP (ref 3.5–5.3)
POTASSIUM SERPL-MCNC: 3.7 MMOL/L — SIGNIFICANT CHANGE UP (ref 3.5–5.3)
POTASSIUM SERPL-SCNC: 3.7 MMOL/L — SIGNIFICANT CHANGE UP (ref 3.5–5.3)
POTASSIUM SERPL-SCNC: 3.7 MMOL/L — SIGNIFICANT CHANGE UP (ref 3.5–5.3)
PROT SERPL-MCNC: 6.8 G/DL — SIGNIFICANT CHANGE UP (ref 6–8.3)
RBC # BLD: 3.89 M/UL — SIGNIFICANT CHANGE UP (ref 3.8–5.2)
RBC # BLD: 3.89 M/UL — SIGNIFICANT CHANGE UP (ref 3.8–5.2)
RBC # FLD: 15.2 % — HIGH (ref 10.3–14.5)
RBC # FLD: 15.2 % — HIGH (ref 10.3–14.5)
SODIUM SERPL-SCNC: 127 MMOL/L — LOW (ref 135–145)
SODIUM SERPL-SCNC: 127 MMOL/L — LOW (ref 135–145)
SPECIMEN SOURCE: SIGNIFICANT CHANGE UP
TROPONIN T, HIGH SENSITIVITY: 68 NG/L — CRITICAL HIGH (ref ?–14)
WBC # BLD: 6.77 K/UL — SIGNIFICANT CHANGE UP (ref 3.8–10.5)
WBC # BLD: 6.77 K/UL — SIGNIFICANT CHANGE UP (ref 3.8–10.5)
WBC # FLD AUTO: 6.77 K/UL — SIGNIFICANT CHANGE UP (ref 3.8–10.5)
WBC # FLD AUTO: 6.77 K/UL — SIGNIFICANT CHANGE UP (ref 3.8–10.5)

## 2018-09-06 PROCEDURE — 99291 CRITICAL CARE FIRST HOUR: CPT

## 2018-09-06 PROCEDURE — 99222 1ST HOSP IP/OBS MODERATE 55: CPT

## 2018-09-06 PROCEDURE — 99232 SBSQ HOSP IP/OBS MODERATE 35: CPT

## 2018-09-06 RX ORDER — FUROSEMIDE 40 MG
40 TABLET ORAL ONCE
Qty: 0 | Refills: 0 | Status: COMPLETED | OUTPATIENT
Start: 2018-09-06 | End: 2018-09-06

## 2018-09-06 RX ORDER — DILTIAZEM HCL 120 MG
10 CAPSULE, EXT RELEASE 24 HR ORAL
Qty: 125 | Refills: 0 | Status: DISCONTINUED | OUTPATIENT
Start: 2018-09-06 | End: 2018-09-06

## 2018-09-06 RX ORDER — METOPROLOL TARTRATE 50 MG
5 TABLET ORAL ONCE
Qty: 0 | Refills: 0 | Status: COMPLETED | OUTPATIENT
Start: 2018-09-06 | End: 2018-09-06

## 2018-09-06 RX ORDER — SODIUM CHLORIDE 9 MG/ML
1000 INJECTION INTRAMUSCULAR; INTRAVENOUS; SUBCUTANEOUS
Qty: 0 | Refills: 0 | Status: DISCONTINUED | OUTPATIENT
Start: 2018-09-06 | End: 2018-09-06

## 2018-09-06 RX ORDER — FAMOTIDINE 10 MG/ML
20 INJECTION INTRAVENOUS DAILY
Qty: 0 | Refills: 0 | Status: DISCONTINUED | OUTPATIENT
Start: 2018-09-06 | End: 2018-09-15

## 2018-09-06 RX ORDER — SODIUM CHLORIDE 9 MG/ML
1000 INJECTION, SOLUTION INTRAVENOUS
Qty: 0 | Refills: 0 | Status: DISCONTINUED | OUTPATIENT
Start: 2018-09-06 | End: 2018-09-06

## 2018-09-06 RX ORDER — METOPROLOL TARTRATE 50 MG
25 TABLET ORAL
Qty: 0 | Refills: 0 | Status: DISCONTINUED | OUTPATIENT
Start: 2018-09-06 | End: 2018-09-06

## 2018-09-06 RX ORDER — INSULIN LISPRO 100/ML
4 VIAL (ML) SUBCUTANEOUS
Qty: 0 | Refills: 0 | Status: DISCONTINUED | OUTPATIENT
Start: 2018-09-06 | End: 2018-09-09

## 2018-09-06 RX ORDER — DILTIAZEM HCL 120 MG
15 CAPSULE, EXT RELEASE 24 HR ORAL
Qty: 125 | Refills: 0 | Status: DISCONTINUED | OUTPATIENT
Start: 2018-09-06 | End: 2018-09-06

## 2018-09-06 RX ORDER — METOPROLOL TARTRATE 50 MG
25 TABLET ORAL
Qty: 0 | Refills: 0 | Status: DISCONTINUED | OUTPATIENT
Start: 2018-09-06 | End: 2018-09-12

## 2018-09-06 RX ADMIN — Medication 10 MG/HR: at 02:36

## 2018-09-06 RX ADMIN — Medication 40 MILLIGRAM(S): at 15:28

## 2018-09-06 RX ADMIN — Medication 25 MILLIGRAM(S): at 11:17

## 2018-09-06 RX ADMIN — HYDROMORPHONE HYDROCHLORIDE 1 MILLIGRAM(S): 2 INJECTION INTRAMUSCULAR; INTRAVENOUS; SUBCUTANEOUS at 17:30

## 2018-09-06 RX ADMIN — HYDROMORPHONE HYDROCHLORIDE 1 MILLIGRAM(S): 2 INJECTION INTRAMUSCULAR; INTRAVENOUS; SUBCUTANEOUS at 03:39

## 2018-09-06 RX ADMIN — CHLORHEXIDINE GLUCONATE 1 APPLICATION(S): 213 SOLUTION TOPICAL at 11:18

## 2018-09-06 RX ADMIN — Medication 40 MILLIGRAM(S): at 12:36

## 2018-09-06 RX ADMIN — INSULIN GLARGINE 12 UNIT(S): 100 INJECTION, SOLUTION SUBCUTANEOUS at 22:37

## 2018-09-06 RX ADMIN — Medication 5 MILLIGRAM(S): at 07:01

## 2018-09-06 RX ADMIN — HYDROMORPHONE HYDROCHLORIDE 1 MILLIGRAM(S): 2 INJECTION INTRAMUSCULAR; INTRAVENOUS; SUBCUTANEOUS at 17:16

## 2018-09-06 RX ADMIN — POLYETHYLENE GLYCOL 3350 17 GRAM(S): 17 POWDER, FOR SOLUTION ORAL at 12:36

## 2018-09-06 RX ADMIN — Medication 145 MILLIGRAM(S): at 11:18

## 2018-09-06 RX ADMIN — Medication 4 UNIT(S): at 16:59

## 2018-09-06 RX ADMIN — HYDROMORPHONE HYDROCHLORIDE 1 MILLIGRAM(S): 2 INJECTION INTRAMUSCULAR; INTRAVENOUS; SUBCUTANEOUS at 04:09

## 2018-09-06 RX ADMIN — Medication 4: at 16:59

## 2018-09-06 RX ADMIN — FAMOTIDINE 20 MILLIGRAM(S): 10 INJECTION INTRAVENOUS at 18:08

## 2018-09-06 RX ADMIN — Medication 25 MILLIGRAM(S): at 23:09

## 2018-09-06 RX ADMIN — SODIUM CHLORIDE 50 MILLILITER(S): 9 INJECTION INTRAMUSCULAR; INTRAVENOUS; SUBCUTANEOUS at 08:21

## 2018-09-06 RX ADMIN — SENNA PLUS 2 TABLET(S): 8.6 TABLET ORAL at 22:39

## 2018-09-06 RX ADMIN — Medication 40 MILLIGRAM(S): at 05:49

## 2018-09-06 RX ADMIN — Medication 4: at 08:21

## 2018-09-06 RX ADMIN — HYDROMORPHONE HYDROCHLORIDE 1 MILLIGRAM(S): 2 INJECTION INTRAMUSCULAR; INTRAVENOUS; SUBCUTANEOUS at 11:30

## 2018-09-06 RX ADMIN — Medication 100 MILLIGRAM(S): at 23:40

## 2018-09-06 RX ADMIN — Medication 3 UNIT(S): at 08:22

## 2018-09-06 RX ADMIN — Medication 6: at 12:29

## 2018-09-06 RX ADMIN — HYDROMORPHONE HYDROCHLORIDE 1 MILLIGRAM(S): 2 INJECTION INTRAMUSCULAR; INTRAVENOUS; SUBCUTANEOUS at 11:14

## 2018-09-06 RX ADMIN — Medication 100 MILLIGRAM(S): at 15:28

## 2018-09-06 RX ADMIN — Medication 3 UNIT(S): at 12:29

## 2018-09-06 NOTE — CONSULT NOTE ADULT - SUBJECTIVE AND OBJECTIVE BOX
HPI:  85 y/o F PMH Lung CA, reported h/o adenocarcinoma, s/p radiation treatment at [facility] as: 2014= right lung, 3 fractions, 2017 left lung 46gy with Dr. Ivy, now p/w worsening Rt shoulder pain for last two months. Pt states pain was worse over last several days, starting last Thursday. Pt was seen in ER on Saturday and had MRI showing supraspinatus tear as well as apical lung mass, unclear of brachial plexus involvement. Pt was discharged from ED with oxycodone, but that failed to alleviate her pain and has returned for dyspnea along with a run of tachycardia both reasons why at present she is in the MICU under observation.     Denies: recent trauma, no vision changes, neck pain, chest pain, SOB, fevers, N/V/D.     She was seen comfortable, KPS 50, on a nasal cannula, lethargic with two daughters by her bedside.  Per her daughters she has pain to the right shoulder upon any movement worsening recently.     < from: CT Head No Cont (09.03.18 @ 21:54) >  IMPRESSION:     Hyperdense left cerebellar lesion measuring 1.5 x 1.3 cm for which   considerations again include cavernoma, evolving hemorrhage, or   hemorrhagic metastasis. Contrast-enhanced MRI of the brain is recommended   for further evaluation.    < from: CT Chest No Cont (09.01.18 @ 14:50) >  IMPRESSION:    Emphysema. Large, enlarging right upper lobe mass and right lower lobe   pulmonary nodule, suspicious for malignancy.    Decreased irregular opacity surrounding suture material in the left upper   lobe. Adjacent ground glass opacities may reflect infection versus tumor.    Chronic appearing right-sided Hill-Sachs deformity in the right shoulder;   correlate with separately dictated CT of the shoulder for further   evaluation.    New 1.5 cm left breast nodule, not well evaluated on CT. Correlate with   mammography.    Stable 2.8 cm right adrenal mass.        In ED, vitals: Tm: 98 HR: 72-75 BP: 150-199/66-90, RR: 17 SpO2  Allergies    ACE inhibitors (Angioedema)    Intolerances        ROS: [  ] Fever  [  ] Chills  [  ]Chest Pain [  ] SOB  [  ]Cough [  ] N/V  [  ] Diarrhea [  ]Constipation [  ]Other ROS:  [  ] ROS otherwise negative    PAST MEDICAL & SURGICAL HISTORY:  Dyslipidemia  Hypertension  Diabetes  Lung cancer  Lung cancer: diagnosed 2010  S/P cholecystectomy  H/O carotid endarterectomy: left      FAMILY HISTORY:  No pertinent family history in first degree relatives      MEDICATIONS  (STANDING):  chlorhexidine 4% Liquid 1 Application(s) Topical <User Schedule>  dextrose 5%. 1000 milliLiter(s) (50 mL/Hr) IV Continuous <Continuous>  dextrose 50% Injectable 12.5 Gram(s) IV Push once  dextrose 50% Injectable 25 Gram(s) IV Push once  dextrose 50% Injectable 25 Gram(s) IV Push once  docusate sodium 100 milliGRAM(s) Oral three times a day  fenofibrate Tablet 145 milliGRAM(s) Oral daily  influenza   Vaccine 0.5 milliLiter(s) IntraMuscular once  insulin glargine Injectable (LANTUS) 12 Unit(s) SubCutaneous at bedtime  insulin lispro (HumaLOG) corrective regimen sliding scale   SubCutaneous three times a day before meals  insulin lispro (HumaLOG) corrective regimen sliding scale   SubCutaneous at bedtime  insulin lispro Injectable (HumaLOG) 3 Unit(s) SubCutaneous three times a day before meals  ketorolac   Injectable 15 milliGRAM(s) IV Push once  levoFLOXacin IVPB 250 milliGRAM(s) IV Intermittent every 24 hours  levoFLOXacin IVPB      metoprolol tartrate 25 milliGRAM(s) Oral two times a day  polyethylene glycol 3350 17 Gram(s) Oral daily  predniSONE   Tablet 40 milliGRAM(s) Oral daily  senna 2 Tablet(s) Oral at bedtime    MEDICATIONS  (PRN):  dextrose 40% Gel 15 Gram(s) Oral once PRN Blood Glucose LESS THAN 70 milliGRAM(s)/deciliter  glucagon  Injectable 1 milliGRAM(s) IntraMuscular once PRN Glucose LESS THAN 70 milligrams/deciliter  HYDROmorphone  Injectable 1 milliGRAM(s) IV Push every 4 hours PRN Severe Pain (7 - 10)      PHYSICAL EXAM  Vital Signs Last 24 Hrs  T(C): 36.8 (06 Sep 2018 12:00), Max: 37.3 (06 Sep 2018 04:00)  T(F): 98.3 (06 Sep 2018 12:00), Max: 99.2 (06 Sep 2018 04:00)  HR: 84 (06 Sep 2018 13:00) (84 - 126)  BP: 142/58 (06 Sep 2018 13:00) (126/54 - 184/97)  BP(mean): 80 (06 Sep 2018 13:00) (71 - 129)  RR: 20 (06 Sep 2018 13:00) (14 - 33)  SpO2: 100% (06 Sep 2018 13:00) (90% - 100%)    General: Well nourished, well developed, no acute distress  HEENT: NC/AT; EOMI, PERRL, sclera nonicteric; external ears normal; no rhinorrhea or epistaxis; mucous membranes moist; oropharynx clear and without erythema  CV: NR, RR; no appreciable r/m/g  Lungs: CTAB, no increased work of breathing, seen on nasal cannula comfortable.   Abodmen: Bowel sounds present; soft, NTND  MSK: Vertebral spine non-tender to palpation  Neuro: AAOx3; cranial nerves II-XII intact; strength 5/5 in upper and lower extremities; sensation to light touch in tact bilaterally.  Psych: Full affect; mood congruent  Skin: no visible rashes on limited examination          ASSESSMENT/PLAN    CLIVE HENRY is a 86y woman with h/o lung adenocarcinoma s/p wedge resection and radiation treatment at [facility] (2014- right lung, 2017- left lung) now with right shoulder pains and per imaging a 6cm RUL mass  and new possible brain lesions.  1) have neurosurgery evaluate her brain lesions after an MRI with contrast is done.   2) if she remains in pt, we discussed palliative right lung/chest RT next week.  3) if discharged, family can go to [facility], or we can treat palliative RT at [facility].      All information given to the daughters.    We discussed the use of palliative radiation in this setting, namely to improve quality of life through the reduction of symptoms.  We talked about the risks, benefits, acute and long term side effects, as well as expected treatment outcomes.  He/She was given the opportunity to ask questions, which were answered to his/her apparent satisfaction.  We will arrange for inpatient/outpatient treatment.    [phone] HPI:  85 y/o F PMH Lung CA, reported h/o adenocarcinoma, s/p radiation treatment at [facility] as: 2014= right lung, 3 fractions, 2017 left lung 46gy with Dr. Ivy, now p/w worsening Rt shoulder pain for last two months. Pt states pain was worse over last several days, starting last Thursday. Pt was seen in ER on Saturday and had MRI showing supraspinatus tear as well as apical lung mass, unclear of brachial plexus involvement. Pt was discharged from ED with oxycodone, but that failed to alleviate her pain and has returned for dyspnea along with a run of tachycardia both reasons why at present she is in the MICU under observation.     Denies: recent trauma, no vision changes, neck pain, chest pain, SOB, fevers, N/V/D.     She was seen comfortable, KPS 50, on a nasal cannula, lethargic with two daughters by her bedside.  Per her daughters she has pain to the right shoulder upon any movement worsening recently.   Brain mri shows cerebellar lesion.  seen by Neurosurgery and planned for outpatient GK SRS with Dr. Mo.     < from: MR Angio Head No Cont (09.04.18 @ 12:20) >  There is evidence of abnormal lesion which does demonstrate abnormal T1   shortening in the left cerebellar region. This finding corresponds to   high attenuated lesion seen on prior noncontrast head CT and demonstrates   associated susceptibility as well. This finding is likely compatible with   an underlying hemorrhagic neoplastic lesion such as underlying metastasis   given patient's history. Clinical correlation continued close interval   follow-up is recommended. Surrounding edema is identified. This lesion   measures approximately 1.2 x 1.4 x 2.1 cm. No significant shift or   herniation is seen.    The large vessels demonstrate normal flow voids.        < from: CT Head No Cont (09.03.18 @ 21:54) >  IMPRESSION:     Hyperdense left cerebellar lesion measuring 1.5 x 1.3 cm for which   considerations again include cavernoma, evolving hemorrhage, or   hemorrhagic metastasis. Contrast-enhanced MRI of the brain is recommended   for further evaluation.    < from: CT Chest No Cont (09.01.18 @ 14:50) >  IMPRESSION:    Emphysema. Large, enlarging right upper lobe mass and right lower lobe   pulmonary nodule, suspicious for malignancy.    Decreased irregular opacity surrounding suture material in the left upper   lobe. Adjacent ground glass opacities may reflect infection versus tumor.    Chronic appearing right-sided Hill-Sachs deformity in the right shoulder;   correlate with separately dictated CT of the shoulder for further   evaluation.    New 1.5 cm left breast nodule, not well evaluated on CT. Correlate with   mammography.    Stable 2.8 cm right adrenal mass.        In ED, vitals: Tm: 98 HR: 72-75 BP: 150-199/66-90, RR: 17 SpO2  Allergies    ACE inhibitors (Angioedema)    Intolerances        ROS: [  ] Fever  [  ] Chills  [  ]Chest Pain [  ] SOB  [  ]Cough [  ] N/V  [  ] Diarrhea [  ]Constipation [  ]Other ROS:  [  ] ROS otherwise negative    PAST MEDICAL & SURGICAL HISTORY:  Dyslipidemia  Hypertension  Diabetes  Lung cancer  Lung cancer: diagnosed 2010  S/P cholecystectomy  H/O carotid endarterectomy: left      FAMILY HISTORY:  No pertinent family history in first degree relatives      MEDICATIONS  (STANDING):  chlorhexidine 4% Liquid 1 Application(s) Topical <User Schedule>  dextrose 5%. 1000 milliLiter(s) (50 mL/Hr) IV Continuous <Continuous>  dextrose 50% Injectable 12.5 Gram(s) IV Push once  dextrose 50% Injectable 25 Gram(s) IV Push once  dextrose 50% Injectable 25 Gram(s) IV Push once  docusate sodium 100 milliGRAM(s) Oral three times a day  fenofibrate Tablet 145 milliGRAM(s) Oral daily  influenza   Vaccine 0.5 milliLiter(s) IntraMuscular once  insulin glargine Injectable (LANTUS) 12 Unit(s) SubCutaneous at bedtime  insulin lispro (HumaLOG) corrective regimen sliding scale   SubCutaneous three times a day before meals  insulin lispro (HumaLOG) corrective regimen sliding scale   SubCutaneous at bedtime  insulin lispro Injectable (HumaLOG) 3 Unit(s) SubCutaneous three times a day before meals  ketorolac   Injectable 15 milliGRAM(s) IV Push once  levoFLOXacin IVPB 250 milliGRAM(s) IV Intermittent every 24 hours  levoFLOXacin IVPB      metoprolol tartrate 25 milliGRAM(s) Oral two times a day  polyethylene glycol 3350 17 Gram(s) Oral daily  predniSONE   Tablet 40 milliGRAM(s) Oral daily  senna 2 Tablet(s) Oral at bedtime    MEDICATIONS  (PRN):  dextrose 40% Gel 15 Gram(s) Oral once PRN Blood Glucose LESS THAN 70 milliGRAM(s)/deciliter  glucagon  Injectable 1 milliGRAM(s) IntraMuscular once PRN Glucose LESS THAN 70 milligrams/deciliter  HYDROmorphone  Injectable 1 milliGRAM(s) IV Push every 4 hours PRN Severe Pain (7 - 10)      PHYSICAL EXAM  Vital Signs Last 24 Hrs  T(C): 36.8 (06 Sep 2018 12:00), Max: 37.3 (06 Sep 2018 04:00)  T(F): 98.3 (06 Sep 2018 12:00), Max: 99.2 (06 Sep 2018 04:00)  HR: 84 (06 Sep 2018 13:00) (84 - 126)  BP: 142/58 (06 Sep 2018 13:00) (126/54 - 184/97)  BP(mean): 80 (06 Sep 2018 13:00) (71 - 129)  RR: 20 (06 Sep 2018 13:00) (14 - 33)  SpO2: 100% (06 Sep 2018 13:00) (90% - 100%)    General: Well nourished, well developed, no acute distress  HEENT: NC/AT; EOMI, PERRL, sclera nonicteric; external ears normal; no rhinorrhea or epistaxis; mucous membranes moist; oropharynx clear and without erythema  CV: NR, RR; no appreciable r/m/g  Lungs: CTAB, no increased work of breathing, seen on nasal cannula comfortable.   Abodmen: Bowel sounds present; soft, NTND  MSK: Vertebral spine non-tender to palpation  Neuro: AAOx3; cranial nerves II-XII intact; strength 5/5 in upper and lower extremities; sensation to light touch in tact bilaterally.  Psych: Full affect; mood congruent  Skin: no visible rashes on limited examination          ASSESSMENT/PLAN    CLIVE HENRY is a 86y woman with h/o lung adenocarcinoma s/p wedge resection and radiation treatment at [facility] (2014- right lung, 2017- left lung) now with right shoulder pains and per imaging a 6cm RUL mass  and new possible brain lesions.  1) plan for outpt [facility] with Dr. Mo for her cerebellar lesion seen on MRI once discharged.   2) if she remains in pt, we discussed palliative right lung/chest RT next week.  3) if discharged, family can go to [facility], or we can treat palliative RT at [facility] to her lung.     All information given to the daughters.    We discussed the use of palliative radiation in this setting, namely to improve quality of life through the reduction of symptoms.  We talked about the risks, benefits, acute and long term side effects, as well as expected treatment outcomes.  He/She was given the opportunity to ask questions, which were answered to his/her apparent satisfaction.  We will arrange for inpatient/outpatient treatment.    [phone] HPI:  87 y/o F PMH Lung CA, reported h/o adenocarcinoma, s/p radiation treatment at [facility] as follows:   2014= right lung,   SBRT in 3 fractions 2017   left lung 46 Gy with Dr.Ken Ivy.  Now p/w worsening Rt shoulder pain for last two months. Pt states pain was worse over last several days, starting last Thursday. Pt was seen in ER on Saturday and had MRI showing supraspinatus tear as well as apical lung mass, unclear of brachial plexus involvement. Pt was discharged from ED with oxycodone, but that failed to alleviate her pain and has returned for dyspnea along with a run of tachycardia both reasons why at present she is in the MICU under observation.     Denies: recent trauma, no vision changes, neck pain, chest pain, SOB, fevers, N/V/D.     She was seen comfortable, KPS 50, on a nasal cannula, lethargic with two daughters by her bedside.  Per her daughters she has pain to the right shoulder upon any movement worsening recently.   Brain mri shows cerebellar lesion.  seen by Neurosurgery and planned for outpatient GK SRS with Dr. Mo.      MR Angio Head No Cont (09.04.18 @ 12:20)  There is evidence of abnormal lesion which does demonstrate abnormal T1   shortening in the left cerebellar region. This finding corresponds to   high attenuated lesion seen on prior noncontrast head CT and demonstrates   associated susceptibility as well. This finding is likely compatible with   an underlying hemorrhagic neoplastic lesion such as underlying metastasis   given patient's history. Clinical correlation continued close interval   follow-up is recommended. Surrounding edema is identified. This lesion   measures approximately 1.2 x 1.4 x 2.1 cm. No significant shift or   herniation is seen.    The large vessels demonstrate normal flow voids.        < from: CT Head No Cont (09.03.18 @ 21:54) >  IMPRESSION:     Hyperdense left cerebellar lesion measuring 1.5 x 1.3 cm for which   considerations again include cavernoma, evolving hemorrhage, or   hemorrhagic metastasis. Contrast-enhanced MRI of the brain is recommended   for further evaluation.    < from: CT Chest No Cont (09.01.18 @ 14:50) >  IMPRESSION:    Emphysema. Large, enlarging right upper lobe mass and right lower lobe   pulmonary nodule, suspicious for malignancy.    Decreased irregular opacity surrounding suture material in the left upper   lobe. Adjacent ground glass opacities may reflect infection versus tumor.    Chronic appearing right-sided Hill-Sachs deformity in the right shoulder;   correlate with separately dictated CT of the shoulder for further   evaluation.    New 1.5 cm left breast nodule, not well evaluated on CT. Correlate with   mammography.    Stable 2.8 cm right adrenal mass.        In ED, vitals: Tm: 98 HR: 72-75 BP: 150-199/66-90, RR: 17 SpO2  Allergies    ACE inhibitors (Angioedema)    Intolerances        ROS: [  ] Fever  [  ] Chills  [  ]Chest Pain [  ] SOB  [  ]Cough [  ] N/V  [  ] Diarrhea [  ]Constipation [  ]Other ROS:  [  ] ROS otherwise negative    PAST MEDICAL & SURGICAL HISTORY:  Dyslipidemia  Hypertension  Diabetes  Lung cancer  Lung cancer: diagnosed 2010  S/P cholecystectomy  H/O carotid endarterectomy: left      FAMILY HISTORY:  No pertinent family history in first degree relatives      MEDICATIONS  (STANDING):  chlorhexidine 4% Liquid 1 Application(s) Topical <User Schedule>  dextrose 5%. 1000 milliLiter(s) (50 mL/Hr) IV Continuous <Continuous>  dextrose 50% Injectable 12.5 Gram(s) IV Push once  dextrose 50% Injectable 25 Gram(s) IV Push once  dextrose 50% Injectable 25 Gram(s) IV Push once  docusate sodium 100 milliGRAM(s) Oral three times a day  fenofibrate Tablet 145 milliGRAM(s) Oral daily  influenza   Vaccine 0.5 milliLiter(s) IntraMuscular once  insulin glargine Injectable (LANTUS) 12 Unit(s) SubCutaneous at bedtime  insulin lispro (HumaLOG) corrective regimen sliding scale   SubCutaneous three times a day before meals  insulin lispro (HumaLOG) corrective regimen sliding scale   SubCutaneous at bedtime  insulin lispro Injectable (HumaLOG) 3 Unit(s) SubCutaneous three times a day before meals  ketorolac   Injectable 15 milliGRAM(s) IV Push once  levoFLOXacin IVPB 250 milliGRAM(s) IV Intermittent every 24 hours  levoFLOXacin IVPB      metoprolol tartrate 25 milliGRAM(s) Oral two times a day  polyethylene glycol 3350 17 Gram(s) Oral daily  predniSONE   Tablet 40 milliGRAM(s) Oral daily  senna 2 Tablet(s) Oral at bedtime    MEDICATIONS  (PRN):  dextrose 40% Gel 15 Gram(s) Oral once PRN Blood Glucose LESS THAN 70 milliGRAM(s)/deciliter  glucagon  Injectable 1 milliGRAM(s) IntraMuscular once PRN Glucose LESS THAN 70 milligrams/deciliter  HYDROmorphone  Injectable 1 milliGRAM(s) IV Push every 4 hours PRN Severe Pain (7 - 10)      PHYSICAL EXAM  Vital Signs Last 24 Hrs  T(C): 36.8 (06 Sep 2018 12:00), Max: 37.3 (06 Sep 2018 04:00)  T(F): 98.3 (06 Sep 2018 12:00), Max: 99.2 (06 Sep 2018 04:00)  HR: 84 (06 Sep 2018 13:00) (84 - 126)  BP: 142/58 (06 Sep 2018 13:00) (126/54 - 184/97)  BP(mean): 80 (06 Sep 2018 13:00) (71 - 129)  RR: 20 (06 Sep 2018 13:00) (14 - 33)  SpO2: 100% (06 Sep 2018 13:00) (90% - 100%)    General: Well nourished, well developed, no acute distress  HEENT: NC/AT; EOMI, PERRL, sclera nonicteric; external ears normal; no rhinorrhea or epistaxis; mucous membranes moist; oropharynx clear and without erythema  CV: NR, RR; no appreciable r/m/g  Lungs: CTAB, no increased work of breathing, seen on nasal cannula comfortable.   Abodmen: Bowel sounds present; soft, NTND  MSK: Vertebral spine non-tender to palpation  Neuro: AAOx3; cranial nerves II-XII intact; strength 5/5 in upper and lower extremities; sensation to light touch in tact bilaterally.  Psych: Full affect; mood congruent  Skin: no visible rashes on limited examination          ASSESSMENT/PLAN    CLIVE HENRY is a 86y woman with h/o lung adenocarcinoma s/p wedge resection and radiation treatment at [facility] (2014- right lung, 2017- left lung) now with right shoulder pains and per imaging a 6cm RUL mass  and new possible brain lesions.  1) plan for outpt Gamma knife with Dr. Mo for the cerebellar lesion seen on MRI once discharged.   2) if she remains in pt, we discussed palliative right lung/chest RT next week.  3) if discharged, family can go to [facility], or we can treat palliative RT at [facility] to her lung.     All information given to the daughters.    We discussed the use of palliative radiation in this setting, namely to improve quality of life through the reduction of symptoms.  We talked about the risks, benefits, acute and long term side effects, as well as expected treatment outcomes.  He/She was given the opportunity to ask questions, which were answered to his/her apparent satisfaction.  We will arrange for inpatient/outpatient treatment.    [phone]

## 2018-09-06 NOTE — PROGRESS NOTE ADULT - ASSESSMENT
Right shoulder pain - CT showing an enlarging RUL mass, MRI showing a right rotator cuff tear. Pain  and  chest wall tenderness suggest pain is related to the lung mass. She had a CT scan in [place] in April 2018 which should be compared to the present scan . If enlarging mass is confirmed, this represents recurrence of the  previous malignancy. Would need to get  records regarding histology, results of any molecular testing if  it was done, details of prior treatment. She  has a cerebellar lesion , likely a metastasis but the immediate focus should be  management of the pain she is having.  radiation oncology recommending palliative radiation to RUL to be givien at [facility] if can be stabilized for discharge or as an inpatient if hospitalization to be prolonged.  gamma knife tx to cerebellar mass as outpatient    Cerebellar mass - neurosurgery following - recommending gamma knife - agree with this recommendation.    COPD/respiratory failure  improved, ? precipitated by sedation from narcotic analgesics.

## 2018-09-06 NOTE — PROGRESS NOTE ADULT - SUBJECTIVE AND OBJECTIVE BOX
Chief Complaint: DM2    History: Per family at bedside patient with very small po intake today.  Lantus was decreased from 12 to 6 u last night.    MEDICATIONS  (STANDING):  chlorhexidine 4% Liquid 1 Application(s) Topical <User Schedule>  dextrose 5%. 1000 milliLiter(s) (50 mL/Hr) IV Continuous <Continuous>  dextrose 50% Injectable 12.5 Gram(s) IV Push once  dextrose 50% Injectable 25 Gram(s) IV Push once  dextrose 50% Injectable 25 Gram(s) IV Push once  docusate sodium 100 milliGRAM(s) Oral three times a day  fenofibrate Tablet 145 milliGRAM(s) Oral daily  influenza   Vaccine 0.5 milliLiter(s) IntraMuscular once  insulin glargine Injectable (LANTUS) 12 Unit(s) SubCutaneous at bedtime  insulin lispro (HumaLOG) corrective regimen sliding scale   SubCutaneous three times a day before meals  insulin lispro (HumaLOG) corrective regimen sliding scale   SubCutaneous at bedtime  insulin lispro Injectable (HumaLOG) 3 Unit(s) SubCutaneous three times a day before meals  levoFLOXacin IVPB 250 milliGRAM(s) IV Intermittent every 24 hours  levoFLOXacin IVPB      metoprolol tartrate 25 milliGRAM(s) Oral two times a day  polyethylene glycol 3350 17 Gram(s) Oral daily  predniSONE   Tablet 40 milliGRAM(s) Oral daily  senna 2 Tablet(s) Oral at bedtime    MEDICATIONS  (PRN):  dextrose 40% Gel 15 Gram(s) Oral once PRN Blood Glucose LESS THAN 70 milliGRAM(s)/deciliter  glucagon  Injectable 1 milliGRAM(s) IntraMuscular once PRN Glucose LESS THAN 70 milligrams/deciliter  HYDROmorphone  Injectable 1 milliGRAM(s) IV Push every 4 hours PRN Severe Pain (7 - 10)      Allergies    ACE inhibitors (Angioedema)    Intolerances      Review of Systems:  ALL OTHER SYSTEMS REVIEWED AND NEGATIVE    PHYSICAL EXAM:  VITALS: T(C): 36.8 (09-06-18 @ 12:00)  T(F): 98.3 (09-06-18 @ 12:00), Max: 99.2 (09-06-18 @ 04:00)  HR: 86 (09-06-18 @ 15:05) (82 - 126)  BP: 142/58 (09-06-18 @ 13:00) (126/54 - 184/97)  RR:  (14 - 33)  SpO2:  (90% - 100%)  Wt(kg): --  GENERAL: NAD, well-groomed, well-developed  EYES: No proptosis, no lid lag, anicteric  HEENT:  Atraumatic, Normocephalic, moist mucous membranes  SKIN: Dry, intact, No rashes or lesions        CAPILLARY BLOOD GLUCOSE      POCT Blood Glucose.: 258 mg/dL (06 Sep 2018 11:37)  POCT Blood Glucose.: 248 mg/dL (06 Sep 2018 08:04)  POCT Blood Glucose.: 145 mg/dL (05 Sep 2018 21:38)  POCT Blood Glucose.: 209 mg/dL (05 Sep 2018 17:35)      09-06    127<L>  |  88<L>  |  30<H>  ----------------------------<  223<H>  3.7   |  22  |  0.98    EGFR if : 61  EGFR if non : 52    Ca    8.5      09-06  Mg     1.8     09-06  Phos  2.1     09-06    TPro  6.8  /  Alb  2.7<L>  /  TBili  0.8  /  DBili  x   /  AST  36<H>  /  ALT  39<H>  /  AlkPhos  88  09-06          Thyroid Function Tests:      Hemoglobin A1C, Whole Blood: 9.8 % <H> [4.0 - 5.6] (09-05-18 @ 02:55)

## 2018-09-06 NOTE — PROGRESS NOTE ADULT - SUBJECTIVE AND OBJECTIVE BOX
_________________________________________________________________________________________  ========>>  M E D I C A L   A T T E N D I N G    F O L L O W  U P  N O T E  <<=========  -----------------------------------------------------------------------------------------------------    - Patient seen and examined by me earlier today.   - In summary,  CLIVE HENRY is a 86y year old woman who originally presented with Rt shoulder pain    noted and discussed events  with the ICU team. pt overall improved, on Nasal canula and going to the floor, HR improved   - Patient today overall doing a bit better, comfortable, eating poorly, still with some pain in left shoulder    ==================>> REVIEW OF SYSTEM <<=================    GEN: no fever, no chills, + pain as above   RESP: mild SOB, no cough, no sputum  CVS: no chest pain, no palpitations, no edema  GI: no abdominal pain, no nausea, ghad vomiting yesterday PM, none today   : no dysuria, no frequency, no hematuria  Neuro: no headache, no dizziness  Derm : no itching, no rash    ==================>> PHYSICAL EXAM <<=================    GEN: A&O X 3 , NAD , comfortable  HEENT: NCAT, PERRL, MMM, hearing intact, on high flow   Neck: supple , no JVD  CVS: S1S2 , Irregular , No M/R/G appreciated  PULM: CTA B/L,  + upper respiratory sounds   ABD.: soft. non tender, non distended,  bowel sounds present  Extrem: intact pulses , no edema   PSYCH : flat affect       ==================>> MEDICATIONS <<====================    chlorhexidine 4% Liquid 1 Application(s) Topical <User Schedule>  dextrose 5%. 1000 milliLiter(s) IV Continuous <Continuous>  dextrose 50% Injectable 12.5 Gram(s) IV Push once  dextrose 50% Injectable 25 Gram(s) IV Push once  dextrose 50% Injectable 25 Gram(s) IV Push once  docusate sodium 100 milliGRAM(s) Oral three times a day  famotidine    Tablet 20 milliGRAM(s) Oral daily  fenofibrate Tablet 145 milliGRAM(s) Oral daily  influenza   Vaccine 0.5 milliLiter(s) IntraMuscular once  insulin glargine Injectable (LANTUS) 12 Unit(s) SubCutaneous at bedtime  insulin lispro (HumaLOG) corrective regimen sliding scale   SubCutaneous three times a day before meals  insulin lispro (HumaLOG) corrective regimen sliding scale   SubCutaneous at bedtime  insulin lispro Injectable (HumaLOG) 4 Unit(s) SubCutaneous three times a day before meals  levoFLOXacin IVPB 250 milliGRAM(s) IV Intermittent every 24 hours  levoFLOXacin IVPB      metoprolol tartrate 25 milliGRAM(s) Oral two times a day  polyethylene glycol 3350 17 Gram(s) Oral daily  predniSONE   Tablet 40 milliGRAM(s) Oral daily  senna 2 Tablet(s) Oral at bedtime    MEDICATIONS  (PRN):  dextrose 40% Gel 15 Gram(s) Oral once PRN Blood Glucose LESS THAN 70 milliGRAM(s)/deciliter  glucagon  Injectable 1 milliGRAM(s) IntraMuscular once PRN Glucose LESS THAN 70 milligrams/deciliter  HYDROmorphone  Injectable 1 milliGRAM(s) IV Push every 4 hours PRN Severe Pain (7 - 10)    ==================>> VITAL SIGNS <<==================    Vital Signs Last 24 Hrs  T(C): 36.7 (09-06-18 @ 18:38)  T(F): 98.1 (09-06-18 @ 18:38), Max: 99.2 (09-06-18 @ 04:00)  HR: 96 (09-06-18 @ 18:38) (74 - 126)  BP: 137/76 (09-06-18 @ 18:38)  BP(mean): 110 (09-06-18 @ 17:15) (74 - 129)  RR: 18 (09-06-18 @ 18:38) (15 - 33)  SpO2: 96% (09-06-18 @ 18:38) (90% - 100%)     POCT Blood Glucose.: 229 mg/dL (06 Sep 2018 16:53)  POCT Blood Glucose.: 258 mg/dL (06 Sep 2018 11:37)  POCT Blood Glucose.: 248 mg/dL (06 Sep 2018 08:04)  POCT Blood Glucose.: 145 mg/dL (05 Sep 2018 21:38)     ==================>> LAB AND IMAGING <<==================                        10.1   6.77  )-----------( 229      ( 06 Sep 2018 02:36 )             30.9        09-06    127<L>  |  88<L>  |  30<H>  ----------------------------<  223<H>  3.7   |  22  |  0.98    Sodium:   127  <==, 125  <==, 126  <==, 124  <==, 125  <==, 133  <==    Ca    8.5      06 Sep 2018 02:36  Phos  2.1     09-06  Mg     1.8     09-06    TPro  6.8  /  Alb  2.7<L>  /  TBili  0.8  /  DBili  x   /  AST  36<H>  /  ALT  39<H>  /  AlkPhos  88  09-06    ___________________________________________________________________________________  ===============>>  A S S E S S M E N T   A N D   P L A N <<===============  ------------------------------------------------------------------------------------------    · Assessment		  87 y/o F PMH Lung CA (unknown stage or type, Dx 2010 s/p L VATS, wedge resection, RT in July 2017), PMR, HTN, DM2 p/w worsening Rt shoulder pain from partial supraspinatus tear, found to have poss hemorrhagic met to brain pending MRI.       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 Saini at [facility]    Problem: Shoulder pain likely due to partial supraspinatus tear on MRI shoulder (also likely partially due to lung mass)   Pain control with narcotics as ordered  should not use NSAIDS given brain met   PT   possible outpatient follow up with Ortho / pain mgmt for possible injection ? ( d/w Dtr)     Problem: Hyponatremia, possibly SIADH in the setting of lung ca   Trend BMP closely  free fluid restriction  consider renal eval  encourage PO intake     Problem: Hypertension  resume / continue home medications with hold parameters  cardio f/u and mgmt appreciated     Problem: Diabetes, poorly controlled on admission   Endocrine f/u and mgmt appreciated   Consistent carb diet  HbA1c 9.8%    Problem:  Need for other prophylactic measure.    SCDs  Diet: DASH/CC (lactose free)     -GI/DVT Prophylaxis.    --------------------------------------------  Case discussed with pt and Dtr, ICU team, cardio   Education given on findings and plan of care  ___________________________  H. GEN Ferrell.  Pager: [phone]

## 2018-09-06 NOTE — PROGRESS NOTE ADULT - SUBJECTIVE AND OBJECTIVE BOX
Pt is seen and examined  pt is awake and lying in bed, weakened condition  feeling better, pain  persists but under better control  pt seems comfortable and denies any complaints at this time        MEDICATIONS  (STANDING):  chlorhexidine 4% Liquid 1 Application(s) Topical <User Schedule>  dextrose 5%. 1000 milliLiter(s) (50 mL/Hr) IV Continuous <Continuous>  dextrose 50% Injectable 12.5 Gram(s) IV Push once  dextrose 50% Injectable 25 Gram(s) IV Push once  dextrose 50% Injectable 25 Gram(s) IV Push once  docusate sodium 100 milliGRAM(s) Oral three times a day  fenofibrate Tablet 145 milliGRAM(s) Oral daily  influenza   Vaccine 0.5 milliLiter(s) IntraMuscular once  insulin glargine Injectable (LANTUS) 12 Unit(s) SubCutaneous at bedtime  insulin lispro (HumaLOG) corrective regimen sliding scale   SubCutaneous three times a day before meals  insulin lispro (HumaLOG) corrective regimen sliding scale   SubCutaneous at bedtime  insulin lispro Injectable (HumaLOG) 3 Unit(s) SubCutaneous three times a day before meals  levoFLOXacin IVPB 250 milliGRAM(s) IV Intermittent every 24 hours  levoFLOXacin IVPB      metoprolol tartrate 25 milliGRAM(s) Oral two times a day  polyethylene glycol 3350 17 Gram(s) Oral daily  predniSONE   Tablet 40 milliGRAM(s) Oral daily  senna 2 Tablet(s) Oral at bedtime    MEDICATIONS  (PRN):  dextrose 40% Gel 15 Gram(s) Oral once PRN Blood Glucose LESS THAN 70 milliGRAM(s)/deciliter  glucagon  Injectable 1 milliGRAM(s) IntraMuscular once PRN Glucose LESS THAN 70 milligrams/deciliter  HYDROmorphone  Injectable 1 milliGRAM(s) IV Push every 4 hours PRN Severe Pain (7 - 10)      Allergies    ACE inhibitors (Angioedema)    Intolerances        Vital Signs Last 24 Hrs  T(C): 36.8 (06 Sep 2018 12:00), Max: 37.3 (06 Sep 2018 04:00)  T(F): 98.3 (06 Sep 2018 12:00), Max: 99.2 (06 Sep 2018 04:00)  HR: 86 (06 Sep 2018 15:05) (82 - 126)  BP: 142/58 (06 Sep 2018 13:00) (126/54 - 184/97)  BP(mean): 80 (06 Sep 2018 13:00) (71 - 129)  RR: 16 (06 Sep 2018 14:00) (14 - 33)  SpO2: 99% (06 Sep 2018 15:05) (90% - 100%)    PHYSICAL EXAM  General: adult in NAD  HEENT: anicteric sclera, pink conjunctiva  Neck: supple  CV: normal S1/S2 with no murmur rubs or gallops  Lungs: positive air movement b/l ant lungs, clear to auscultation, no wheezes, no rales  Abdomen: soft non-tender non-distended, no hepatosplenomegaly  Ext: no clubbing cyanosis or edema  Skin: no rashes and no petechiae  Neuro: alert and , no focal deficits  LABS:                          10.1   6.77  )-----------( 229      ( 06 Sep 2018 02:36 )             30.9         Mean Cell Volume : 79.4 fL  Mean Cell Hemoglobin : 26.0 pg  Mean Cell Hemoglobin Concentration : 32.7 %  Auto Neutrophil # : 6.24 K/uL  Auto Lymphocyte # : 0.25 K/uL  Auto Monocyte # : 0.18 K/uL  Auto Eosinophil # : 0.01 K/uL  Auto Basophil # : 0.01 K/uL  Auto Neutrophil % : 92.2 %  Auto Lymphocyte % : 3.7 %  Auto Monocyte % : 2.7 %  Auto Eosinophil % : 0.1 %  Auto Basophil % : 0.1 %      09-06    127<L>  |  88<L>  |  30<H>  ----------------------------<  223<H>  3.7   |  22  |  0.98    Ca    8.5      06 Sep 2018 02:36  Phos  2.1     09-06  Mg     1.8     09-06    TPro  6.8  /  Alb  2.7<L>  /  TBili  0.8  /  DBili  x   /  AST  36<H>  /  ALT  39<H>  /  AlkPhos  88  09-06      RADIOLOGY & ADDITIONAL STUDIES:      < from: Xray Chest 1 View-PORTABLE IMMEDIATE (09.04.18 @ 16:46) >  EXAM:  XR CHEST PORTABLE IMMED 1V        PROCEDURE DATE:  Sep  4 2018         INTERPRETATION:  CLINICAL INFORMATION: Shortness of breath. Lung cancer   with possible metastases of the brain.    TECHNIQUE: Single AP view of the chest.    COMPARISON:Chest x-ray 9/3/2018.    IMPRESSION:  Surgical clips in the right upper abdomen.  Large round right apical mass, which is unchanged. Scarring and chain   sutures in the left apex of the lung which is also unchanged.   Heart size cannot be accurately assessed on this projection.  Degenerative changes of the spine.

## 2018-09-06 NOTE — PROGRESS NOTE ADULT - PROBLEM SELECTOR PLAN 1
BG remain elevated. Lantus dose was decreased from 12 to 6u last night.  Would proceed with Lantus 12u qhs - please do not lower this dose.  Increase Humalog to 4/4/4  Continue moderate correction scale before meals and moderate bedtime.    DC plan: patient was to be started on basal insulin as outpatient in addition to oral agents. For dc will need to clarify goals of care. Outpatient endocrinologist Dr. Alcocer.

## 2018-09-06 NOTE — CHART NOTE - NSCHARTNOTEFT_GEN_A_CORE
Briefly, 86 F w/ lung ca s/p L VATS and RT in July and COPD initially presented w/ L shoulder pain found to have worsening R lung lesions and new L lung mass concerning for malignancy w/ new brain lesions seen on CTH. Pt developed significant wheezing and resp distress requiring BiPAP support and admitted to MICU for airway monitoring. MICU course c/b MAT. Pt started on prednisone and levaquin for COPD exacerbation. Initially required BiPAP and hi-sapphire but now weaned to NC. Pt seen by rad-onc with possible plans for palliative radiation to manage pain from lung mass     87 y/o F w/ lung ca a/w L supraspinatus tear transferred to MICU for airway monitoring in the setting of COPD exacerbation     #COPD exacerbation  -c/w levaquin, plan for 5-7 day course  -c/w Briefly, 86 F w/ lung ca s/p L VATS and RT in July and COPD initially presented w/ L shoulder pain found to have worsening R lung lesions and new L lung mass concerning for malignancy w/ new brain lesions seen on CTH. Pt developed significant wheezing and resp distress requiring BiPAP support and admitted to MICU for airway monitoring. MICU course c/b MAT. Pt started on prednisone and levaquin for COPD exacerbation. Initially required BiPAP and hi-sapphire but now weaned to NC. Pt seen by rad-onc with possible plans for palliative radiation to manage pain from lung mass/ supraspinatus tear    85 y/o F w/ lung ca a/w R supraspinatus tear transferred to MICU for airway monitoring in the setting of COPD exacerbation     #COPD exacerbation  -c/w levaquin, plan for 5-7 day course  -c/w steroids, consider 5 day course  -Wean o2 as tolerated    #Lung cancer  -Rad/Onc and Onc follow-up to determine palliative RT inpatient or outpatient    #MAT  -continue to monitor on tele, HR should improve w/ tx of CODP exacerbation    #Supraspinatus tear  -pain control w/ IV dilaudid PRN.  -Outpatient ortho f/u     Pt will be transferred to Dr. Ferrell's service    Ran Brooks PGY3 Briefly, 86 F w/ lung ca s/p L VATS and RT in July and COPD initially presented w/ shoulder pain found to have worsening R lung lesions and new L lung mass concerning for malignancy w/ new brain lesions seen on CTH. Pt developed significant wheezing and resp distress requiring BiPAP support and admitted to MICU for airway monitoring. MICU course c/b MAT. Pt started on prednisone and levaquin for COPD exacerbation. Initially required BiPAP and hi-sapphire but now weaned to NC. Pt seen by rad-onc with possible plans for palliative radiation to manage pain from lung mass/ supraspinatus tear    87 y/o F w/ lung ca a/w R supraspinatus tear transferred to MICU for airway monitoring in the setting of COPD exacerbation     #COPD exacerbation  -c/w levaquin, plan for 5-7 day course  -c/w steroids, consider 5 day course  -Wean o2 as tolerated    #Lung cancer  -Rad/Onc and Onc follow-up to determine palliative RT inpatient or outpatient    #MAT  -continue to monitor on tele, HR should improve w/ tx of CODP exacerbation    #Supraspinatus tear  -pain control w/ IV dilaudid PRN.  -Outpatient ortho f/u     Pt will be transferred to Dr. Ferrell's service    Ran Brooks PGY3

## 2018-09-06 NOTE — PROGRESS NOTE ADULT - SUBJECTIVE AND OBJECTIVE BOX
Bienvenido Martínez, PGY1  MICU Team  [phone]    SUBJECTIVE: Patient seen and examined at bedside.     Interval Events:    OBJECTIVE:    VITAL SIGNS:  ICU Vital Signs Last 24 Hrs  T(C): 37.3 (06 Sep 2018 04:00), Max: 37.3 (06 Sep 2018 04:00)  T(F): 99.2 (06 Sep 2018 04:00), Max: 99.2 (06 Sep 2018 04:00)  HR: 114 (06 Sep 2018 06:00) (75 - 126)  BP: 160/114 (06 Sep 2018 06:00) (126/54 - 184/97)  BP(mean): 119 (06 Sep 2018 06:00) (70 - 119)  ABP: --  ABP(mean): --  RR: 17 (06 Sep 2018 07:14) (13 - 33)  SpO2: 99% (06 Sep 2018 07:14) (90% - 100%)        09-05 @ 07:01  -  09-06 @ 07:00  --------------------------------------------------------  IN: 1310 mL / OUT: 450 mL / NET: 860 mL      CAPILLARY BLOOD GLUCOSE      POCT Blood Glucose.: 145 mg/dL (05 Sep 2018 21:38)      PHYSICAL EXAM:    General:   HEENT:   Neck:  Respiratory:   Cardiovascular:   Abdomen:   Extremities:   Skin:   Neurological:    MEDICATIONS:  MEDICATIONS  (STANDING):  chlorhexidine 4% Liquid 1 Application(s) Topical <User Schedule>  dextrose 5%. 1000 milliLiter(s) (50 mL/Hr) IV Continuous <Continuous>  dextrose 50% Injectable 12.5 Gram(s) IV Push once  dextrose 50% Injectable 25 Gram(s) IV Push once  dextrose 50% Injectable 25 Gram(s) IV Push once  diltiazem Infusion 15 mG/Hr (15 mL/Hr) IV Continuous <Continuous>  docusate sodium 100 milliGRAM(s) Oral three times a day  fenofibrate Tablet 145 milliGRAM(s) Oral daily  influenza   Vaccine 0.5 milliLiter(s) IntraMuscular once  insulin glargine Injectable (LANTUS) 12 Unit(s) SubCutaneous at bedtime  insulin lispro (HumaLOG) corrective regimen sliding scale   SubCutaneous three times a day before meals  insulin lispro (HumaLOG) corrective regimen sliding scale   SubCutaneous at bedtime  insulin lispro Injectable (HumaLOG) 3 Unit(s) SubCutaneous three times a day before meals  ketorolac   Injectable 15 milliGRAM(s) IV Push once  levoFLOXacin IVPB 250 milliGRAM(s) IV Intermittent every 24 hours  levoFLOXacin IVPB      methylPREDNISolone sodium succinate Injectable 40 milliGRAM(s) IV Push daily  polyethylene glycol 3350 17 Gram(s) Oral daily  senna 2 Tablet(s) Oral at bedtime  sodium chloride 0.9%. 1000 milliLiter(s) (50 mL/Hr) IV Continuous <Continuous>    MEDICATIONS  (PRN):  dextrose 40% Gel 15 Gram(s) Oral once PRN Blood Glucose LESS THAN 70 milliGRAM(s)/deciliter  glucagon  Injectable 1 milliGRAM(s) IntraMuscular once PRN Glucose LESS THAN 70 milligrams/deciliter  HYDROmorphone  Injectable 1 milliGRAM(s) IV Push every 4 hours PRN Severe Pain (7 - 10)      ALLERGIES:  Allergies    ACE inhibitors (Angioedema)    Intolerances        LABS:                        10.1   6.77  )-----------( 229      ( 06 Sep 2018 02:36 )             30.9     CBC Full  -  ( 06 Sep 2018 02:36 )  WBC Count : 6.77 K/uL  Hemoglobin : 10.1 g/dL  Hematocrit : 30.9 %  Platelet Count - Automated : 229 K/uL  Mean Cell Volume : 79.4 fL  Mean Cell Hemoglobin : 26.0 pg  Mean Cell Hemoglobin Concentration : 32.7 %  Auto Neutrophil # : 6.24 K/uL  Auto Lymphocyte # : 0.25 K/uL  Auto Monocyte # : 0.18 K/uL  Auto Eosinophil # : 0.01 K/uL  Auto Basophil # : 0.01 K/uL  Auto Neutrophil % : 92.2 %  Auto Lymphocyte % : 3.7 %  Auto Monocyte % : 2.7 %  Auto Eosinophil % : 0.1 %  Auto Basophil % : 0.1 %    -    127<L>  |  88<L>  |  30<H>  ----------------------------<  223<H>  3.7   |  22  |  0.98    Ca    8.5      06 Sep 2018 02:36  Phos  2.1     09-06  Mg     1.8     -    TPro  6.8  /  Alb  2.7<L>  /  TBili  0.8  /  DBili  x   /  AST  36<H>  /  ALT  39<H>  /  AlkPhos  88      Creatinine Trend: 0.98<--, 0.93<--, 1.05<--, 0.91<--, 1.02<--, 1.01<--  LIVER FUNCTIONS - ( 06 Sep 2018 02:36 )  Alb: 2.7 g/dL / Pro: 6.8 g/dL / ALK PHOS: 88 u/L / ALT: 39 u/L / AST: 36 u/L / GGT: x             CARDIAC MARKERS ( 05 Sep 2018 23:00 )  x     / x     / 28 u/L / 2.76 ng/mL / x      CARDIAC MARKERS ( 05 Sep 2018 02:55 )  x     / x     / 48 u/L / 4.52 ng/mL / x      CARDIAC MARKERS ( 04 Sep 2018 20:10 )  x     / x     / 38 u/L / 3.26 ng/mL / x          ABG - ( 05 Sep 2018 23:00 )  pH, Arterial: 7.46  pH, Blood: x     /  pCO2: 33    /  pO2: 193   / HCO3: 25    / Base Excess: -0.2  /  SaO2: 99.0              Urinalysis Basic - ( 05 Sep 2018 01:30 )    Color: YELLOW / Appearance: CLEAR / S.014 / pH: 6.0  Gluc: LARGE / Ketone: NEGATIVE  / Bili: NEGATIVE / Urobili: NORMAL   Blood: NEGATIVE / Protein: MODERATE / Nitrite: NEGATIVE   Leuk Esterase: TRACE / RBC: 6-10 / WBC 3-5   Sq Epi: FEW / Non Sq Epi: x / Bacteria: NEGATIVE        MICROBIOLOGY:    Culture - Blood (collected 05 Sep 2018 03:33)  Source: BLOOD PERIPHERAL  Preliminary Report (06 Sep 2018 03:33):    NO ORGANISMS ISOLATED    NO ORGANISMS ISOLATED AT 24 HOURS    Culture - Blood (collected 04 Sep 2018 17:36)  Source: BLOOD  Preliminary Report (05 Sep 2018 17:36):    NO ORGANISMS ISOLATED    NO ORGANISMS ISOLATED AT 24 HOURS      IMAGING:    RADIOLOGY & ADDITIONAL TESTS: Reviewed. Bienvenido Martínez, PGY1  MICU Team  [phone]    SUBJECTIVE: Patient seen and examined at bedside.     Interval Events: patient had another episode of SOB overnight, resolved after 1.5mg Ativan. Stable on nasal canula.     OBJECTIVE:    VITAL SIGNS:  ICU Vital Signs Last 24 Hrs  T(C): 37.3 (06 Sep 2018 04:00), Max: 37.3 (06 Sep 2018 04:00)  T(F): 99.2 (06 Sep 2018 04:00), Max: 99.2 (06 Sep 2018 04:00)  HR: 114 (06 Sep 2018 06:00) (75 - 126)  BP: 160/114 (06 Sep 2018 06:00) (126/54 - 184/97)  BP(mean): 119 (06 Sep 2018 06:00) (70 - 119)  ABP: --  ABP(mean): --  RR: 17 (06 Sep 2018 07:14) (13 - 33)  SpO2: 99% (06 Sep 2018 07:14) (90% - 100%)        - @ 07:01  -   @ 07:00  --------------------------------------------------------  IN: 1310 mL / OUT: 450 mL / NET: 860 mL      CAPILLARY BLOOD GLUCOSE      POCT Blood Glucose.: 145 mg/dL (05 Sep 2018 21:38)      PHYSICAL EXAM:    SICAL EXAM:  	GENERAL: tachypnic  	HEAD:  Atraumatic, Normocephalic  	EYES: EOMI, PERRLA, conjunctiva and sclera clear  	NECK: Supple, No JVD  	CHEST/LUNG: CTAB  	HEART: Regular rate and rhythm; No murmurs, rubs, or gallops  	ABDOMEN: Soft, Nontender, Nondistended; Bowel sounds present  	EXTREMITIES:  2+ Peripheral Pulses, No clubbing, cyanosis, or edema. R arm, not TTP, pain with active ROM  	PSYCH: AAOx3  	NEUROLOGY: non-focal  SKIN: No rashes or lesions    MEDICATIONS:  MEDICATIONS  (STANDING):  chlorhexidine 4% Liquid 1 Application(s) Topical <User Schedule>  dextrose 5%. 1000 milliLiter(s) (50 mL/Hr) IV Continuous <Continuous>  dextrose 50% Injectable 12.5 Gram(s) IV Push once  dextrose 50% Injectable 25 Gram(s) IV Push once  dextrose 50% Injectable 25 Gram(s) IV Push once  diltiazem Infusion 15 mG/Hr (15 mL/Hr) IV Continuous <Continuous>  docusate sodium 100 milliGRAM(s) Oral three times a day  fenofibrate Tablet 145 milliGRAM(s) Oral daily  influenza   Vaccine 0.5 milliLiter(s) IntraMuscular once  insulin glargine Injectable (LANTUS) 12 Unit(s) SubCutaneous at bedtime  insulin lispro (HumaLOG) corrective regimen sliding scale   SubCutaneous three times a day before meals  insulin lispro (HumaLOG) corrective regimen sliding scale   SubCutaneous at bedtime  insulin lispro Injectable (HumaLOG) 3 Unit(s) SubCutaneous three times a day before meals  ketorolac   Injectable 15 milliGRAM(s) IV Push once  levoFLOXacin IVPB 250 milliGRAM(s) IV Intermittent every 24 hours  levoFLOXacin IVPB      methylPREDNISolone sodium succinate Injectable 40 milliGRAM(s) IV Push daily  polyethylene glycol 3350 17 Gram(s) Oral daily  senna 2 Tablet(s) Oral at bedtime  sodium chloride 0.9%. 1000 milliLiter(s) (50 mL/Hr) IV Continuous <Continuous>    MEDICATIONS  (PRN):  dextrose 40% Gel 15 Gram(s) Oral once PRN Blood Glucose LESS THAN 70 milliGRAM(s)/deciliter  glucagon  Injectable 1 milliGRAM(s) IntraMuscular once PRN Glucose LESS THAN 70 milligrams/deciliter  HYDROmorphone  Injectable 1 milliGRAM(s) IV Push every 4 hours PRN Severe Pain (7 - 10)      ALLERGIES:  Allergies    ACE inhibitors (Angioedema)    Intolerances        LABS:                        10.1   6.77  )-----------( 229      ( 06 Sep 2018 02:36 )             30.9     CBC Full  -  ( 06 Sep 2018 02:36 )  WBC Count : 6.77 K/uL  Hemoglobin : 10.1 g/dL  Hematocrit : 30.9 %  Platelet Count - Automated : 229 K/uL  Mean Cell Volume : 79.4 fL  Mean Cell Hemoglobin : 26.0 pg  Mean Cell Hemoglobin Concentration : 32.7 %  Auto Neutrophil # : 6.24 K/uL  Auto Lymphocyte # : 0.25 K/uL  Auto Monocyte # : 0.18 K/uL  Auto Eosinophil # : 0.01 K/uL  Auto Basophil # : 0.01 K/uL  Auto Neutrophil % : 92.2 %  Auto Lymphocyte % : 3.7 %  Auto Monocyte % : 2.7 %  Auto Eosinophil % : 0.1 %  Auto Basophil % : 0.1 %        127<L>  |  88<L>  |  30<H>  ----------------------------<  223<H>  3.7   |  22  |  0.98    Ca    8.5      06 Sep 2018 02:36  Phos  2.1       Mg     1.8         TPro  6.8  /  Alb  2.7<L>  /  TBili  0.8  /  DBili  x   /  AST  36<H>  /  ALT  39<H>  /  AlkPhos  88      Creatinine Trend: 0.98<--, 0.93<--, 1.05<--, 0.91<--, 1.02<--, 1.01<--  LIVER FUNCTIONS - ( 06 Sep 2018 02:36 )  Alb: 2.7 g/dL / Pro: 6.8 g/dL / ALK PHOS: 88 u/L / ALT: 39 u/L / AST: 36 u/L / GGT: x             CARDIAC MARKERS ( 05 Sep 2018 23:00 )  x     / x     / 28 u/L / 2.76 ng/mL / x      CARDIAC MARKERS ( 05 Sep 2018 02:55 )  x     / x     / 48 u/L / 4.52 ng/mL / x      CARDIAC MARKERS ( 04 Sep 2018 20:10 )  x     / x     / 38 u/L / 3.26 ng/mL / x          ABG - ( 05 Sep 2018 23:00 )  pH, Arterial: 7.46  pH, Blood: x     /  pCO2: 33    /  pO2: 193   / HCO3: 25    / Base Excess: -0.2  /  SaO2: 99.0              Urinalysis Basic - ( 05 Sep 2018 01:30 )    Color: YELLOW / Appearance: CLEAR / S.014 / pH: 6.0  Gluc: LARGE / Ketone: NEGATIVE  / Bili: NEGATIVE / Urobili: NORMAL   Blood: NEGATIVE / Protein: MODERATE / Nitrite: NEGATIVE   Leuk Esterase: TRACE / RBC: 6-10 / WBC 3-5   Sq Epi: FEW / Non Sq Epi: x / Bacteria: NEGATIVE        MICROBIOLOGY:    Culture - Blood (collected 05 Sep 2018 03:33)  Source: BLOOD PERIPHERAL  Preliminary Report (06 Sep 2018 03:33):    NO ORGANISMS ISOLATED    NO ORGANISMS ISOLATED AT 24 HOURS    Culture - Blood (collected 04 Sep 2018 17:36)  Source: BLOOD  Preliminary Report (05 Sep 2018 17:36):    NO ORGANISMS ISOLATED    NO ORGANISMS ISOLATED AT 24 HOURS      IMAGING:    RADIOLOGY & ADDITIONAL TESTS: Reviewed.

## 2018-09-06 NOTE — PROGRESS NOTE ADULT - ASSESSMENT
ASSESSMENT & PLAN:   85 y/o F PMH Lung CA (Adenocarcinoma, Dx 2010 s/p L VATS, wedge resection, RT in July 2017),  PMR, HTN, COPD with emphysema, DM2 p/w worsening Rt shoulder pain from partial supraspinatus tear, found to have possible met in brain.    #Neuro:  Cerebellar lesion, 1cm x 2cm, possible met of known adenocarcinoma  -Neuro exam wnl  -Neuro checks qdaily  -Neurosurgery evaluated patient, to see outpatient for possible gamma knife surgery  -Avoid anticoagulation at this time    #Resp, COPD exacerbation hx of lung Ca diagnosed in 2010, s/p VATS, wedge resection,  -Apr PFTs showed mild obstructive airway disease with decreased DLCO  -On nasal canula   -started on levofloxocin 750mg Qdaily 5 day course (day 3/5 9/6/2018)  -Q6 duonebs  -Q2 Albuterol PRN  -40mg methylprednisolone qDaily 5 day course currently day 3/5 (9/6/2018)  -c/w supp o2, wean as tolerated.  -Lungs cancer showed increase in size of multiple b/l pulmonary nodules/massess from april (3.7x3.9 to 5.9x5.7 in 5 months), to follow up with patients oncologist, can be reached at [phone]  -Rad-Onc to radiate patients mass for palliation, unclear if they can do this before the weekend, can do O/P    #Endo, DM  -Insulin SS  -4 units lispro before meals  -10 units glargline qHS  -Was on 10 methylprednisolone mg BID for polymyalgia rheumatica chronically    #Metabolic  -Hyponatremia to 124, sending/serum osms and urine electrolytes to further workup  -Potassium   -Trend BMP    #GI  -Dash diet    #Febrile to 38.6C overnight  -Tylenol PRN  -F/U on BCX  -U/A negative  -no focal consolidations on CXR  -RVP negative    #CV, was tachycardic to 150  -EKG showed MAT  -Lopressor 25mg BID, unclear if patient was taking 2 pills BID (total 100mg) will clarify     #MSK, supraspinatous tear  -Ortho F/U on D/C    #Ethics  -dilauded 1mg Q4hrs PRN as per palliative car for shoulder pain    #DVT  -SCD's ASSESSMENT & PLAN:   87 y/o F PMH Lung CA (Adenocarcinoma, Dx 2010 s/p L VATS, wedge resection, RT in July 2017),  PMR, HTN, COPD with emphysema, DM2 p/w worsening Rt shoulder pain from partial supraspinatus tear, found to have possible met in brain.    #Neuro:  Cerebellar lesion, 1cm x 2cm, possible met of known adenocarcinoma  -Neuro exam wnl  -Neuro checks qdaily  -Neurosurgery evaluated patient, to see outpatient for possible gamma knife surgery    #Resp, COPD exacerbation hx of lung Ca diagnosed in 2010, s/p VATS, wedge resection,  -Apr PFTs showed mild obstructive airway disease with decreased DLCO  -On nasal canula   -started on levofloxocin 750mg Qdaily 5 day course (day 3/5 9/6/2018)  -Q6 duonebs  -Q2 Albuterol PRN  -40mg methylprednisolone qDaily 5 day course currently day 3/5 (9/6/2018)  -c/w supp o2, wean as tolerated.  -Lungs cancer showed increase in size of multiple b/l pulmonary nodules/massess from april (3.7x3.9 to 5.9x5.7 in 5 months), to follow up with patients oncologist, can be reached at [phone]  -Rad-Onc to radiate patients mass for palliation, unclear if they can do this before the weekend, can do O/P    #Endo, DM  -Insulin SS  -4 units lispro before meals  -10 units glargline qHS  -Was on 10 methylprednisolone mg BID for polymyalgia rheumatica chronically    #Metabolic  -Hyponatremia to 124, sending/serum osms and urine electrolytes to further workup  -Potassium   -Trend BMP    #GI  -Dash diet    #Afebrile overnight  -Tylenol PRN  -BCX negative 24 hours  -U/A negative  -no focal consolidations on CXR  -RVP negative    #CV, was tachycardic to 150  -EKG showed MAT  -Lopressor 25mg BID, unclear if patient was taking 2 pills BID (total 100mg) will clarify     #MSK, supraspinatous tear  -Ortho F/U on D/C    #Ethics  -dilauded 1mg Q4hrs PRN as per palliative car for shoulder pain    #DVT  -SCD's

## 2018-09-06 NOTE — PROGRESS NOTE ADULT - SUBJECTIVE AND OBJECTIVE BOX
Cardiovascular Disease Progress Note    Overnight events: No acute events overnight.  still requiring high flow nasal cannula. episode of nausea and emesis yesterday. no cp/sob/palps/pnd/orthopnea. pain better well controlled  Otherwise review of systems negative    Objective Findings:  T(C): 37.3 (18 @ 04:00), Max: 37.3 (18 @ 04:00)  HR: 114 (18 @ 06:00) (75 - 126)  BP: 160/114 (18 @ 06:00) (126/54 - 184/97)  RR: 17 (18 @ 07:14) (13 - 33)  SpO2: 99% (18 @ 07:14) (90% - 100%)  Wt(kg): --  Daily     Daily Weight in k.3 (06 Sep 2018 05:47)      Physical Exam:  Gen: NAD  HEENT: EOMI  CV: RRR, normal S1 + S2, no m/r/g  Lungs: CTAB  Abd: soft, non-tender  Ext: No edema    Telemetry: NSR, APC's, ST    Laboratory Data:                        10.1   6.77  )-----------( 229      ( 06 Sep 2018 02:36 )             30.9         127<L>  |  88<L>  |  30<H>  ----------------------------<  223<H>  3.7   |  22  |  0.98    Ca    8.5      06 Sep 2018 02:36  Phos  2.1       Mg     1.8         TPro  6.8  /  Alb  2.7<L>  /  TBili  0.8  /  DBili  x   /  AST  36<H>  /  ALT  39<H>  /  AlkPhos  88  -06      CARDIAC MARKERS ( 05 Sep 2018 23:00 )  x     / x     / 28 u/L / 2.76 ng/mL / x      CARDIAC MARKERS ( 05 Sep 2018 02:55 )  x     / x     / 48 u/L / 4.52 ng/mL / x      CARDIAC MARKERS ( 04 Sep 2018 20:10 )  x     / x     / 38 u/L / 3.26 ng/mL / x              Inpatient Medications:  MEDICATIONS  (STANDING):  chlorhexidine 4% Liquid 1 Application(s) Topical <User Schedule>  dextrose 5%. 1000 milliLiter(s) (50 mL/Hr) IV Continuous <Continuous>  dextrose 50% Injectable 12.5 Gram(s) IV Push once  dextrose 50% Injectable 25 Gram(s) IV Push once  dextrose 50% Injectable 25 Gram(s) IV Push once  diltiazem Infusion 15 mG/Hr (15 mL/Hr) IV Continuous <Continuous>  docusate sodium 100 milliGRAM(s) Oral three times a day  fenofibrate Tablet 145 milliGRAM(s) Oral daily  influenza   Vaccine 0.5 milliLiter(s) IntraMuscular once  insulin glargine Injectable (LANTUS) 12 Unit(s) SubCutaneous at bedtime  insulin lispro (HumaLOG) corrective regimen sliding scale   SubCutaneous three times a day before meals  insulin lispro (HumaLOG) corrective regimen sliding scale   SubCutaneous at bedtime  insulin lispro Injectable (HumaLOG) 3 Unit(s) SubCutaneous three times a day before meals  ketorolac   Injectable 15 milliGRAM(s) IV Push once  levoFLOXacin IVPB 250 milliGRAM(s) IV Intermittent every 24 hours  levoFLOXacin IVPB      methylPREDNISolone sodium succinate Injectable 40 milliGRAM(s) IV Push daily  polyethylene glycol 3350 17 Gram(s) Oral daily  senna 2 Tablet(s) Oral at bedtime  sodium chloride 0.9%. 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     1) hypoxic respiratory failure requiring BiPAP, COPD, emphysema  -doesnt appear cardiac mediated. appears euvolemic  -possibly infectious vs progression of disease vs copd-e  -would obtain LE duplex to r/o venous thromboembolism as that is also in the differential  -c/w abx per MICU for fevers and consolidation on imaging  -agree with steroids and nebs for possible copd-e    3) elevated troponin, without overt ischemic sx or ischemic changes on ecg  -would defer ischemic workup for now given overall prognosis and inability to tolerate anti-platelet agents/AC given recent imaging findings    4) right should pain, hx of PMR  -f/u palliative/pain management recs  -consider anesthesia/ortho consult for possible local therapy  -low suspicion for septic joint as no effusion seen on imaging and no surrounding erythema or tenderness     5) HTN, APC's  -currently on diltiazem drip. can transition to dilt 60mg q6h  -no evidence of atrial fibrillation on visualized ecg's and telemetry  -agree with holding heparin drip  -agree with holding beta blockers given copd-e    6) cerebellar lesion  -appreciate nsgy recs  -mri/mra results noted         Slick York MD   Cardiovascular Disease  [phone]

## 2018-09-07 LAB
ALBUMIN SERPL ELPH-MCNC: 2.5 G/DL — LOW (ref 3.3–5)
ALBUMIN SERPL ELPH-MCNC: 2.6 G/DL — LOW (ref 3.3–5)
ALP SERPL-CCNC: 77 U/L — SIGNIFICANT CHANGE UP (ref 40–120)
ALP SERPL-CCNC: 85 U/L — SIGNIFICANT CHANGE UP (ref 40–120)
ALT FLD-CCNC: 30 U/L — SIGNIFICANT CHANGE UP (ref 4–33)
ALT FLD-CCNC: 31 U/L — SIGNIFICANT CHANGE UP (ref 4–33)
AST SERPL-CCNC: 22 U/L — SIGNIFICANT CHANGE UP (ref 4–32)
AST SERPL-CCNC: 30 U/L — SIGNIFICANT CHANGE UP (ref 4–32)
BASE EXCESS BLDA CALC-SCNC: 4.3 MMOL/L — SIGNIFICANT CHANGE UP
BASOPHILS # BLD AUTO: 0.01 K/UL — SIGNIFICANT CHANGE UP (ref 0–0.2)
BASOPHILS # BLD AUTO: 0.01 K/UL — SIGNIFICANT CHANGE UP (ref 0–0.2)
BASOPHILS NFR BLD AUTO: 0.1 % — SIGNIFICANT CHANGE UP (ref 0–2)
BASOPHILS NFR BLD AUTO: 0.2 % — SIGNIFICANT CHANGE UP (ref 0–2)
BILIRUB SERPL-MCNC: 0.6 MG/DL — SIGNIFICANT CHANGE UP (ref 0.2–1.2)
BILIRUB SERPL-MCNC: 0.6 MG/DL — SIGNIFICANT CHANGE UP (ref 0.2–1.2)
BLD GP AB SCN SERPL QL: NEGATIVE — SIGNIFICANT CHANGE UP
BUN SERPL-MCNC: 28 MG/DL — HIGH (ref 7–23)
BUN SERPL-MCNC: 29 MG/DL — HIGH (ref 7–23)
CA-I BLDA-SCNC: 1.07 MMOL/L — LOW (ref 1.15–1.29)
CALCIUM SERPL-MCNC: 8.3 MG/DL — LOW (ref 8.4–10.5)
CALCIUM SERPL-MCNC: 8.5 MG/DL — SIGNIFICANT CHANGE UP (ref 8.4–10.5)
CHLORIDE SERPL-SCNC: 85 MMOL/L — LOW (ref 98–107)
CHLORIDE SERPL-SCNC: 87 MMOL/L — LOW (ref 98–107)
CO2 SERPL-SCNC: 24 MMOL/L — SIGNIFICANT CHANGE UP (ref 22–31)
CO2 SERPL-SCNC: 26 MMOL/L — SIGNIFICANT CHANGE UP (ref 22–31)
CREAT SERPL-MCNC: 0.81 MG/DL — SIGNIFICANT CHANGE UP (ref 0.5–1.3)
CREAT SERPL-MCNC: 0.88 MG/DL — SIGNIFICANT CHANGE UP (ref 0.5–1.3)
EOSINOPHIL # BLD AUTO: 0 K/UL — SIGNIFICANT CHANGE UP (ref 0–0.5)
EOSINOPHIL # BLD AUTO: 0.01 K/UL — SIGNIFICANT CHANGE UP (ref 0–0.5)
EOSINOPHIL NFR BLD AUTO: 0 % — SIGNIFICANT CHANGE UP (ref 0–6)
EOSINOPHIL NFR BLD AUTO: 0.1 % — SIGNIFICANT CHANGE UP (ref 0–6)
ERYTHROCYTE [SEDIMENTATION RATE] IN BLOOD: 101 MM/HR — HIGH (ref 4–25)
GLUCOSE BLDA-MCNC: 232 MG/DL — HIGH (ref 70–99)
GLUCOSE BLDC GLUCOMTR-MCNC: 110 MG/DL — HIGH (ref 70–99)
GLUCOSE BLDC GLUCOMTR-MCNC: 236 MG/DL — HIGH (ref 70–99)
GLUCOSE BLDC GLUCOMTR-MCNC: 266 MG/DL — HIGH (ref 70–99)
GLUCOSE SERPL-MCNC: 216 MG/DL — HIGH (ref 70–99)
GLUCOSE SERPL-MCNC: 242 MG/DL — HIGH (ref 70–99)
HCO3 BLDA-SCNC: 29 MMOL/L — HIGH (ref 22–26)
HCT VFR BLD CALC: 27.4 % — LOW (ref 34.5–45)
HCT VFR BLD CALC: 28.1 % — LOW (ref 34.5–45)
HCT VFR BLD CALC: 29 % — LOW (ref 34.5–45)
HCT VFR BLDA CALC: 38.7 % — SIGNIFICANT CHANGE UP (ref 34.5–46.5)
HGB BLD-MCNC: 8.9 G/DL — LOW (ref 11.5–15.5)
HGB BLD-MCNC: 9.1 G/DL — LOW (ref 11.5–15.5)
HGB BLD-MCNC: 9.6 G/DL — LOW (ref 11.5–15.5)
HGB BLDA-MCNC: 12.6 G/DL — SIGNIFICANT CHANGE UP (ref 11.5–15.5)
IMM GRANULOCYTES # BLD AUTO: 0.07 # — SIGNIFICANT CHANGE UP
IMM GRANULOCYTES # BLD AUTO: 0.17 # — SIGNIFICANT CHANGE UP
IMM GRANULOCYTES NFR BLD AUTO: 1.1 % — SIGNIFICANT CHANGE UP (ref 0–1.5)
IMM GRANULOCYTES NFR BLD AUTO: 2.3 % — HIGH (ref 0–1.5)
LACTATE BLDA-SCNC: 1.2 MMOL/L — SIGNIFICANT CHANGE UP (ref 0.5–2)
LYMPHOCYTES # BLD AUTO: 0.36 K/UL — LOW (ref 1–3.3)
LYMPHOCYTES # BLD AUTO: 0.41 K/UL — LOW (ref 1–3.3)
LYMPHOCYTES # BLD AUTO: 5.5 % — LOW (ref 13–44)
LYMPHOCYTES # BLD AUTO: 5.7 % — LOW (ref 13–44)
MAGNESIUM SERPL-MCNC: 1.6 MG/DL — SIGNIFICANT CHANGE UP (ref 1.6–2.6)
MAGNESIUM SERPL-MCNC: 1.8 MG/DL — SIGNIFICANT CHANGE UP (ref 1.6–2.6)
MCHC RBC-ENTMCNC: 25.3 PG — LOW (ref 27–34)
MCHC RBC-ENTMCNC: 25.4 PG — LOW (ref 27–34)
MCHC RBC-ENTMCNC: 26.2 PG — LOW (ref 27–34)
MCHC RBC-ENTMCNC: 32.4 % — SIGNIFICANT CHANGE UP (ref 32–36)
MCHC RBC-ENTMCNC: 32.5 % — SIGNIFICANT CHANGE UP (ref 32–36)
MCHC RBC-ENTMCNC: 33.1 % — SIGNIFICANT CHANGE UP (ref 32–36)
MCV RBC AUTO: 78.3 FL — LOW (ref 80–100)
MCV RBC AUTO: 78.3 FL — LOW (ref 80–100)
MCV RBC AUTO: 79 FL — LOW (ref 80–100)
MONOCYTES # BLD AUTO: 0.25 K/UL — SIGNIFICANT CHANGE UP (ref 0–0.9)
MONOCYTES # BLD AUTO: 0.32 K/UL — SIGNIFICANT CHANGE UP (ref 0–0.9)
MONOCYTES NFR BLD AUTO: 3.8 % — SIGNIFICANT CHANGE UP (ref 2–14)
MONOCYTES NFR BLD AUTO: 4.4 % — SIGNIFICANT CHANGE UP (ref 2–14)
NEUTROPHILS # BLD AUTO: 5.81 K/UL — SIGNIFICANT CHANGE UP (ref 1.8–7.4)
NEUTROPHILS # BLD AUTO: 6.32 K/UL — SIGNIFICANT CHANGE UP (ref 1.8–7.4)
NEUTROPHILS NFR BLD AUTO: 87.4 % — HIGH (ref 43–77)
NEUTROPHILS NFR BLD AUTO: 89.4 % — HIGH (ref 43–77)
NRBC # FLD: 0 — SIGNIFICANT CHANGE UP
PCO2 BLDA: 33 MMHG — SIGNIFICANT CHANGE UP (ref 32–48)
PH BLDA: 7.53 PH — HIGH (ref 7.35–7.45)
PHOSPHATE SERPL-MCNC: 2.2 MG/DL — LOW (ref 2.5–4.5)
PHOSPHATE SERPL-MCNC: 2.4 MG/DL — LOW (ref 2.5–4.5)
PLATELET # BLD AUTO: 224 K/UL — SIGNIFICANT CHANGE UP (ref 150–400)
PLATELET # BLD AUTO: 276 K/UL — SIGNIFICANT CHANGE UP (ref 150–400)
PLATELET # BLD AUTO: 288 K/UL — SIGNIFICANT CHANGE UP (ref 150–400)
PMV BLD: 9.3 FL — SIGNIFICANT CHANGE UP (ref 7–13)
PMV BLD: 9.6 FL — SIGNIFICANT CHANGE UP (ref 7–13)
PMV BLD: 9.7 FL — SIGNIFICANT CHANGE UP (ref 7–13)
PO2 BLDA: 130 MMHG — HIGH (ref 83–108)
POTASSIUM BLDA-SCNC: 3.1 MMOL/L — LOW (ref 3.4–4.5)
POTASSIUM SERPL-MCNC: 3.3 MMOL/L — LOW (ref 3.5–5.3)
POTASSIUM SERPL-MCNC: 3.4 MMOL/L — LOW (ref 3.5–5.3)
POTASSIUM SERPL-SCNC: 3.3 MMOL/L — LOW (ref 3.5–5.3)
POTASSIUM SERPL-SCNC: 3.4 MMOL/L — LOW (ref 3.5–5.3)
PROT SERPL-MCNC: 6.3 G/DL — SIGNIFICANT CHANGE UP (ref 6–8.3)
PROT SERPL-MCNC: 6.5 G/DL — SIGNIFICANT CHANGE UP (ref 6–8.3)
RBC # BLD: 3.5 M/UL — LOW (ref 3.8–5.2)
RBC # BLD: 3.59 M/UL — LOW (ref 3.8–5.2)
RBC # BLD: 3.67 M/UL — LOW (ref 3.8–5.2)
RBC # FLD: 15 % — HIGH (ref 10.3–14.5)
RBC # FLD: 15.2 % — HIGH (ref 10.3–14.5)
RBC # FLD: 15.4 % — HIGH (ref 10.3–14.5)
RH IG SCN BLD-IMP: POSITIVE — SIGNIFICANT CHANGE UP
SAO2 % BLDA: 98.2 % — SIGNIFICANT CHANGE UP (ref 95–99)
SODIUM BLDA-SCNC: 121 MMOL/L — LOW (ref 136–146)
SODIUM SERPL-SCNC: 126 MMOL/L — LOW (ref 135–145)
SODIUM SERPL-SCNC: 127 MMOL/L — LOW (ref 135–145)
WBC # BLD: 6.5 K/UL — SIGNIFICANT CHANGE UP (ref 3.8–10.5)
WBC # BLD: 7.24 K/UL — SIGNIFICANT CHANGE UP (ref 3.8–10.5)
WBC # BLD: 9.18 K/UL — SIGNIFICANT CHANGE UP (ref 3.8–10.5)
WBC # FLD AUTO: 6.5 K/UL — SIGNIFICANT CHANGE UP (ref 3.8–10.5)
WBC # FLD AUTO: 7.24 K/UL — SIGNIFICANT CHANGE UP (ref 3.8–10.5)
WBC # FLD AUTO: 9.18 K/UL — SIGNIFICANT CHANGE UP (ref 3.8–10.5)

## 2018-09-07 PROCEDURE — 99233 SBSQ HOSP IP/OBS HIGH 50: CPT | Mod: GC

## 2018-09-07 PROCEDURE — 99232 SBSQ HOSP IP/OBS MODERATE 35: CPT

## 2018-09-07 PROCEDURE — 93970 EXTREMITY STUDY: CPT | Mod: 26

## 2018-09-07 PROCEDURE — 99233 SBSQ HOSP IP/OBS HIGH 50: CPT

## 2018-09-07 PROCEDURE — 71045 X-RAY EXAM CHEST 1 VIEW: CPT | Mod: 26

## 2018-09-07 PROCEDURE — 99223 1ST HOSP IP/OBS HIGH 75: CPT | Mod: GC

## 2018-09-07 RX ORDER — SODIUM CHLORIDE 9 MG/ML
1 INJECTION INTRAMUSCULAR; INTRAVENOUS; SUBCUTANEOUS DAILY
Qty: 0 | Refills: 0 | Status: DISCONTINUED | OUTPATIENT
Start: 2018-09-07 | End: 2018-09-15

## 2018-09-07 RX ORDER — VALACYCLOVIR 500 MG/1
1000 TABLET, FILM COATED ORAL EVERY 12 HOURS
Qty: 0 | Refills: 0 | Status: DISCONTINUED | OUTPATIENT
Start: 2018-09-07 | End: 2018-09-07

## 2018-09-07 RX ORDER — INSULIN GLARGINE 100 [IU]/ML
10 INJECTION, SOLUTION SUBCUTANEOUS AT BEDTIME
Qty: 0 | Refills: 0 | Status: DISCONTINUED | OUTPATIENT
Start: 2018-09-07 | End: 2018-09-08

## 2018-09-07 RX ORDER — ONDANSETRON 8 MG/1
4 TABLET, FILM COATED ORAL ONCE
Qty: 0 | Refills: 0 | Status: COMPLETED | OUTPATIENT
Start: 2018-09-07 | End: 2018-09-07

## 2018-09-07 RX ORDER — ACYCLOVIR SODIUM 500 MG
650 VIAL (EA) INTRAVENOUS EVERY 8 HOURS
Qty: 0 | Refills: 0 | Status: DISCONTINUED | OUTPATIENT
Start: 2018-09-07 | End: 2018-09-07

## 2018-09-07 RX ORDER — DIPHENHYDRAMINE HCL 50 MG
25 CAPSULE ORAL ONCE
Qty: 0 | Refills: 0 | Status: COMPLETED | OUTPATIENT
Start: 2018-09-07 | End: 2018-09-07

## 2018-09-07 RX ORDER — LEVALBUTEROL 1.25 MG/.5ML
0.63 SOLUTION, CONCENTRATE RESPIRATORY (INHALATION) EVERY 6 HOURS
Qty: 0 | Refills: 0 | Status: DISCONTINUED | OUTPATIENT
Start: 2018-09-07 | End: 2018-09-15

## 2018-09-07 RX ORDER — GABAPENTIN 400 MG/1
100 CAPSULE ORAL DAILY
Qty: 0 | Refills: 0 | Status: DISCONTINUED | OUTPATIENT
Start: 2018-09-07 | End: 2018-09-10

## 2018-09-07 RX ORDER — ACYCLOVIR SODIUM 500 MG
650 VIAL (EA) INTRAVENOUS EVERY 12 HOURS
Qty: 0 | Refills: 0 | Status: DISCONTINUED | OUTPATIENT
Start: 2018-09-07 | End: 2018-09-15

## 2018-09-07 RX ORDER — POTASSIUM CHLORIDE 20 MEQ
40 PACKET (EA) ORAL ONCE
Qty: 0 | Refills: 0 | Status: COMPLETED | OUTPATIENT
Start: 2018-09-07 | End: 2018-09-07

## 2018-09-07 RX ORDER — VALACYCLOVIR 500 MG/1
1000 TABLET, FILM COATED ORAL THREE TIMES A DAY
Qty: 0 | Refills: 0 | Status: DISCONTINUED | OUTPATIENT
Start: 2018-09-07 | End: 2018-09-07

## 2018-09-07 RX ORDER — SODIUM,POTASSIUM PHOSPHATES 278-250MG
1 POWDER IN PACKET (EA) ORAL
Qty: 0 | Refills: 0 | Status: COMPLETED | OUTPATIENT
Start: 2018-09-07 | End: 2018-09-08

## 2018-09-07 RX ORDER — ACETAMINOPHEN 500 MG
650 TABLET ORAL EVERY 6 HOURS
Qty: 0 | Refills: 0 | Status: DISCONTINUED | OUTPATIENT
Start: 2018-09-07 | End: 2018-09-12

## 2018-09-07 RX ORDER — IPRATROPIUM BROMIDE 0.2 MG/ML
500 SOLUTION, NON-ORAL INHALATION ONCE
Qty: 0 | Refills: 0 | Status: COMPLETED | OUTPATIENT
Start: 2018-09-07 | End: 2018-09-07

## 2018-09-07 RX ORDER — HYDROCORTISONE 1 %
1 OINTMENT (GRAM) TOPICAL THREE TIMES A DAY
Qty: 0 | Refills: 0 | Status: DISCONTINUED | OUTPATIENT
Start: 2018-09-07 | End: 2018-09-07

## 2018-09-07 RX ORDER — HYDROMORPHONE HYDROCHLORIDE 2 MG/ML
0.5 INJECTION INTRAMUSCULAR; INTRAVENOUS; SUBCUTANEOUS EVERY 4 HOURS
Qty: 0 | Refills: 0 | Status: DISCONTINUED | OUTPATIENT
Start: 2018-09-07 | End: 2018-09-10

## 2018-09-07 RX ADMIN — Medication 25 MILLIGRAM(S): at 12:40

## 2018-09-07 RX ADMIN — HYDROMORPHONE HYDROCHLORIDE 0.5 MILLIGRAM(S): 2 INJECTION INTRAMUSCULAR; INTRAVENOUS; SUBCUTANEOUS at 18:19

## 2018-09-07 RX ADMIN — Medication 263 MILLIGRAM(S): at 17:53

## 2018-09-07 RX ADMIN — Medication 40 MILLIEQUIVALENT(S): at 17:55

## 2018-09-07 RX ADMIN — Medication 650 MILLIGRAM(S): at 02:24

## 2018-09-07 RX ADMIN — GABAPENTIN 100 MILLIGRAM(S): 400 CAPSULE ORAL at 22:09

## 2018-09-07 RX ADMIN — Medication 650 MILLIGRAM(S): at 12:38

## 2018-09-07 RX ADMIN — Medication 100 MILLIGRAM(S): at 22:09

## 2018-09-07 RX ADMIN — SODIUM CHLORIDE 1 GRAM(S): 9 INJECTION INTRAMUSCULAR; INTRAVENOUS; SUBCUTANEOUS at 22:09

## 2018-09-07 RX ADMIN — Medication 25 MILLIGRAM(S): at 03:37

## 2018-09-07 RX ADMIN — Medication 1 TABLET(S): at 22:09

## 2018-09-07 RX ADMIN — Medication 500 MICROGRAM(S): at 22:10

## 2018-09-07 RX ADMIN — Medication 650 MILLIGRAM(S): at 13:38

## 2018-09-07 RX ADMIN — Medication 4 UNIT(S): at 12:42

## 2018-09-07 RX ADMIN — Medication 4: at 12:42

## 2018-09-07 RX ADMIN — Medication 1 APPLICATION(S): at 01:35

## 2018-09-07 RX ADMIN — Medication 6: at 10:08

## 2018-09-07 RX ADMIN — Medication 40 MILLIGRAM(S): at 05:47

## 2018-09-07 RX ADMIN — HYDROMORPHONE HYDROCHLORIDE 0.5 MILLIGRAM(S): 2 INJECTION INTRAMUSCULAR; INTRAVENOUS; SUBCUTANEOUS at 14:25

## 2018-09-07 RX ADMIN — Medication 4 UNIT(S): at 10:09

## 2018-09-07 RX ADMIN — Medication 145 MILLIGRAM(S): at 12:40

## 2018-09-07 RX ADMIN — Medication 1 TABLET(S): at 17:55

## 2018-09-07 RX ADMIN — HYDROMORPHONE HYDROCHLORIDE 0.5 MILLIGRAM(S): 2 INJECTION INTRAMUSCULAR; INTRAVENOUS; SUBCUTANEOUS at 05:56

## 2018-09-07 RX ADMIN — Medication 100 MILLIGRAM(S): at 05:39

## 2018-09-07 RX ADMIN — SENNA PLUS 2 TABLET(S): 8.6 TABLET ORAL at 22:09

## 2018-09-07 RX ADMIN — Medication 650 MILLIGRAM(S): at 01:24

## 2018-09-07 RX ADMIN — HYDROMORPHONE HYDROCHLORIDE 0.5 MILLIGRAM(S): 2 INJECTION INTRAMUSCULAR; INTRAVENOUS; SUBCUTANEOUS at 15:25

## 2018-09-07 RX ADMIN — FAMOTIDINE 20 MILLIGRAM(S): 10 INJECTION INTRAVENOUS at 12:40

## 2018-09-07 RX ADMIN — HYDROMORPHONE HYDROCHLORIDE 0.5 MILLIGRAM(S): 2 INJECTION INTRAMUSCULAR; INTRAVENOUS; SUBCUTANEOUS at 19:18

## 2018-09-07 RX ADMIN — POLYETHYLENE GLYCOL 3350 17 GRAM(S): 17 POWDER, FOR SOLUTION ORAL at 12:41

## 2018-09-07 RX ADMIN — HYDROMORPHONE HYDROCHLORIDE 0.5 MILLIGRAM(S): 2 INJECTION INTRAMUSCULAR; INTRAVENOUS; SUBCUTANEOUS at 05:34

## 2018-09-07 NOTE — CHART NOTE - NSCHARTNOTEFT_GEN_A_CORE
Spoke with daughter at bedside concern about right shoulder rash, requesting ortho consult, inquire regarding steroid regimen as pt was on prednisone 40mg prior to admission for polymyalgia rheumatica. Family also wants to find out the status of the radiation treatment. Message left for radiation oncology DELANO Dodge awaiting reply. Ortho consult called and per ortho Right suprasinatus tear is outpatient and pt to follow up with Dr Shahid [phone] on discharge. Per discussion with attending Dr Ferrell will access the shoulder blister and comment if derm consult in warranted, start prednison 30mg. Will continue to monitor

## 2018-09-07 NOTE — PROGRESS NOTE ADULT - ASSESSMENT
87 y/o F w/ lung ca a/w R supraspinatus tear transferred to MICU for airway monitoring in the setting of COPD exacerbation     1. COPD exacerbation--  -Patient required MICU stay with transient bilevel support  -Now improved, on 2L NC  -PE: no wheeze  -cont levaquin for 5-days  -cont steroids for 5-days  -Wean o2 as tolerated    2.  Adenocarcinoma of the lung-  -follow at [facility]  -cont radiation therapy    Hung Campuzano DO  Pulmonary and Critical Care Fellow

## 2018-09-07 NOTE — PROGRESS NOTE ADULT - SUBJECTIVE AND OBJECTIVE BOX
Pt is seen and examined  pt is awake and lying in bed/  Still with pain in right upper chest wall and shoulder  Herpes zoster rash developed in Right upper chest wall  pt seems incomfortable secondary to pain        MEDICATIONS  (STANDING):  acyclovir IVPB 650 milliGRAM(s) IV Intermittent every 8 hours  chlorhexidine 4% Liquid 1 Application(s) Topical <User Schedule>  dextrose 5%. 1000 milliLiter(s) (50 mL/Hr) IV Continuous <Continuous>  dextrose 50% Injectable 12.5 Gram(s) IV Push once  dextrose 50% Injectable 25 Gram(s) IV Push once  dextrose 50% Injectable 25 Gram(s) IV Push once  docusate sodium 100 milliGRAM(s) Oral three times a day  famotidine    Tablet 20 milliGRAM(s) Oral daily  fenofibrate Tablet 145 milliGRAM(s) Oral daily  influenza   Vaccine 0.5 milliLiter(s) IntraMuscular once  insulin glargine Injectable (LANTUS) 12 Unit(s) SubCutaneous at bedtime  insulin lispro (HumaLOG) corrective regimen sliding scale   SubCutaneous three times a day before meals  insulin lispro (HumaLOG) corrective regimen sliding scale   SubCutaneous at bedtime  insulin lispro Injectable (HumaLOG) 4 Unit(s) SubCutaneous three times a day before meals  levoFLOXacin IVPB 250 milliGRAM(s) IV Intermittent every 24 hours  levoFLOXacin IVPB      metoprolol tartrate 25 milliGRAM(s) Oral two times a day  polyethylene glycol 3350 17 Gram(s) Oral daily  potassium chloride   Powder 40 milliEquivalent(s) Oral once  predniSONE   Tablet 20 milliGRAM(s) Oral once  senna 2 Tablet(s) Oral at bedtime    MEDICATIONS  (PRN):  acetaminophen   Tablet .. 650 milliGRAM(s) Oral every 6 hours PRN Temp greater or equal to 38C (100.4F), Mild Pain (1 - 3), Moderate Pain (4 - 6), Severe Pain (7 - 10)  dextrose 40% Gel 15 Gram(s) Oral once PRN Blood Glucose LESS THAN 70 milliGRAM(s)/deciliter  glucagon  Injectable 1 milliGRAM(s) IntraMuscular once PRN Glucose LESS THAN 70 milligrams/deciliter  HYDROmorphone  Injectable 0.5 milliGRAM(s) IV Push every 4 hours PRN Severe Pain (7 - 10)      Allergies    ACE inhibitors (Angioedema)    Intolerances        Vital Signs Last 24 Hrs  T(C): 36.7 (07 Sep 2018 12:27), Max: 36.8 (06 Sep 2018 17:55)  T(F): 98 (07 Sep 2018 12:27), Max: 98.3 (06 Sep 2018 17:55)  HR: 90 (07 Sep 2018 12:27) (74 - 105)  BP: 180/87 (07 Sep 2018 12:27) (137/76 - 180/87)  BP(mean): 110 (06 Sep 2018 17:15) (110 - 110)  RR: 17 (07 Sep 2018 12:27) (17 - 19)  SpO2: 96% (07 Sep 2018 12:27) (96% - 100%)    PHYSICAL EXAM  General: adult in NAD  on isolation secondary to newly diagnosed Zoster rash  LABS:                          9.1    9.18  )-----------( 276      ( 07 Sep 2018 15:45 )             28.1         Mean Cell Volume : 78.3 fL  Mean Cell Hemoglobin : 25.3 pg  Mean Cell Hemoglobin Concentration : 32.4 %  Auto Neutrophil # : x  Auto Lymphocyte # : x  Auto Monocyte # : x  Auto Eosinophil # : x  Auto Basophil # : x  Auto Neutrophil % : x  Auto Lymphocyte % : x  Auto Monocyte % : x  Auto Eosinophil % : x  Auto Basophil % : x      09-07    127<L>  |  87<L>  |  28<H>  ----------------------------<  242<H>  3.3<L>   |  26  |  0.88    Ca    8.3<L>      07 Sep 2018 06:55  Phos  2.4     09-07  Mg     1.8     09-07    TPro  6.3  /  Alb  2.5<L>  /  TBili  0.6  /  DBili  x   /  AST  22  /  ALT  30  /  AlkPhos  77  09-07      RADIOLOGY & ADDITIONAL STUDIES:

## 2018-09-07 NOTE — CHART NOTE - NSCHARTNOTEFT_GEN_A_CORE
RRT called for respiratory distress. 86 F w/ lung ca s/p L VATS and RT in July and COPD initially presented w/ shoulder pain found to have worsening R lung lesions and new L lung mass concerning for malignancy w/ new brain lesions seen on CTH, briefly in the MICU for likely COPD exacerbation. On exam, pt noted to be in significant respiratory distress with accessory muscle use, diffuse expiratory wheeze. T: 97.6  /90 RR 30-40 SpO2: 98% on 5L. Pt was given a stat duoneb treatment and treated with 5mg IV lopressor and another 2.5 mg shortly after. HR improved to the 120s. Due to concern for hypertensive emergency with likely resulting flash pulmonary edema, 40mg IV lasix was administered with mild improvement. Due to persistent work of breathing, pt was started on bipap. Family at bedside stating she is full code. BP remained in the 180s systolic, 10 mg of hydralazine was administered. BP remained in the 170s systolic. Another 10mg of labetalol was given with improvement in blood pressure to the 130s-150s systolic.     Recs:   -continue with bipap, wean off as tolerated. Trend ABGs  -continue with metoprolol for rate control. Monitor volume status, consider lasix PRN . Maintain SBP <150  -continue with levaquin, prednisone. Change xopenex to ATC for COPD exacerbation  -continued GOC, consider Palliative eval?    Moncho Angulo [id number] RRT called for respiratory distress. 86 F w/ lung ca s/p L VATS and RT in July and COPD initially presented w/ shoulder pain found to have worsening R lung lesions and new L lung mass concerning for malignancy w/ new brain lesions seen on CTH, briefly in the MICU for likely COPD exacerbation. On exam, pt noted to be in significant respiratory distress with accessory muscle use, diffuse expiratory wheeze. T: 97.6  /90 RR 30-40 SpO2: 98% on 5L. Pt was given a stat duoneb treatment and treated with 5mg IV lopressor and another 2.5 mg shortly after. HR improved to the 120s. Due to concern for hypertensive emergency with likely resulting flash pulmonary edema, 40mg IV lasix was administered with mild improvement. Despite this, pt continued to have significant work of breathing therefore started on bipap. Family at bedside stating she is full code. BP remained in the 180s systolic, 10 mg of hydralazine was administered. BP remained in the 170s systolic. Another 10mg of labetalol was given with improvement in blood pressure to the 130s-150s systolic.     Recs:   -continue with bipap, wean off as tolerated. Trend ABGs  -continue with metoprolol for rate control. Monitor volume status, consider lasix PRN . Maintain SBP <150  -continue with levaquin, prednisone. Change xopenex to ATC for COPD exacerbation  -continued GOC, consider Palliative eval?    Moncho Angulo [id number] RRT called for respiratory distress. 86 F w/ lung ca s/p L VATS and RT in July and COPD initially presented w/ shoulder pain found to have worsening R lung lesions and new L lung mass concerning for malignancy w/ new brain lesions seen on CTH, briefly in the MICU for likely COPD exacerbation. On exam, pt noted to be in significant respiratory distress with accessory muscle use, diffuse expiratory wheeze. T: 97.6  /90 RR 30-40 SpO2: 98% on 5L. Pt was given a stat duoneb treatment and treated with 5mg IV lopressor and another 2.5 mg shortly after. HR improved to the 120s. Due to concern for hypertensive emergency with likely resulting flash pulmonary edema, 40mg IV lasix was administered with mild improvement. Pt complained of nausea, was given 4 mg IV zofran with improvement. Pt continued to have significant work of breathing therefore started on bipap. Family at bedside stating she is full code. BP remained in the 180s systolic, 10 mg of hydralazine was administered. BP remained in the 170s systolic. Another 10mg of labetalol was given with improvement in blood pressure to the 130s-150s systolic.     Recs:   -continue with bipap, wean off as tolerated. Trend ABGs  -continue with metoprolol for rate control. Monitor volume status, consider lasix PRN . Maintain SBP <150  -continue with levaquin, prednisone. Change xopenex to ATC for COPD exacerbation  -continued GOC, consider Palliative eval?    Moncho Angulo [id number]

## 2018-09-07 NOTE — CHART NOTE - NSCHARTNOTEFT_GEN_A_CORE
Patient had complaints of itching to right shoulder and hydrocortisone cream was ordered.  After application noted to have a blister-like rash on shoulder and right side of upper chest.  Will d/c hydrocortisone and order a dose of Benadryl 25mg for rash/itching.      FYI: patient/family refused full dose of Lantus 12 units as recommended and ordered by Endocrine--only wanted 6 units.  Also after 4 days of 5 day Prednisone course (40mg Prednisone) for COPD family does not want her to receive the full dose--will only allow 20mg. Patient had complaints of itching to right shoulder and hydrocortisone cream was ordered.  After application noted to have a blister-like rash on shoulder and right side of upper chest.  Will d/c hydrocortisone and order a dose of Benadryl 25mg for rash/itching.      FYI: patient/family refused full dose of Lantus 12 units as recommended and ordered by Endocrine--only wanted 6 units.    Also after 4 days of 5 day Prednisone course (40mg Prednisone) for COPD family does not want her to receive the full dose--will only allow 20mg.  Also think that 1mg Dilaudid IV q4 hrs is too strong--will change to 0.5mg Dilaudid IV q4 hrs

## 2018-09-07 NOTE — PROGRESS NOTE ADULT - SUBJECTIVE AND OBJECTIVE BOX
Cardiovascular Disease Progress Note    Overnight events: transferred out of MICU. no longer requiring high flow. s/p hydrocortisone to right shoulder c/b rash. still with poor appetite. no cp/sob/pnd/orthopnea    Objective Findings:  T(C): 36.7 (18 @ 05:21), Max: 37.1 (18 @ 16:00)  HR: 92 (18 @ 05:21) (74 - 105)  BP: 158/54 (18 @ 05:21) (132/54 - 164/77)  RR: 17 (18 @ 05:21) (16 - 23)  SpO2: 98% (18 @ 05:21) (96% - 100%)  Wt(kg): --  Daily     Daily Weight in k.4 (07 Sep 2018 07:22)      Physical Exam:  Gen: NAD  HEENT: EOMI  CV: RRR, normal S1 + S2, no m/r/g  Lungs: CTAB  Abd: soft, non-tender  Ext: No edema    Telemetry: NSR, ST, APC's    Laboratory Data:                        8.9    6.50  )-----------( 224      ( 07 Sep 2018 06:55 )             27.4         127<L>  |  87<L>  |  28<H>  ----------------------------<  242<H>  3.3<L>   |  26  |  0.88    Ca    8.3<L>      07 Sep 2018 06:55  Phos  2.4       Mg     1.8         TPro  6.3  /  Alb  2.5<L>  /  TBili  0.6  /  DBili  x   /  AST  22  /  ALT  30  /  AlkPhos  77        CARDIAC MARKERS ( 05 Sep 2018 23:00 )  x     / x     / 28 u/L / 2.76 ng/mL / x              Inpatient Medications:  MEDICATIONS  (STANDING):  chlorhexidine 4% Liquid 1 Application(s) Topical <User Schedule>  dextrose 5%. 1000 milliLiter(s) (50 mL/Hr) IV Continuous <Continuous>  dextrose 50% Injectable 12.5 Gram(s) IV Push once  dextrose 50% Injectable 25 Gram(s) IV Push once  dextrose 50% Injectable 25 Gram(s) IV Push once  docusate sodium 100 milliGRAM(s) Oral three times a day  famotidine    Tablet 20 milliGRAM(s) Oral daily  fenofibrate Tablet 145 milliGRAM(s) Oral daily  influenza   Vaccine 0.5 milliLiter(s) IntraMuscular once  insulin glargine Injectable (LANTUS) 12 Unit(s) SubCutaneous at bedtime  insulin lispro (HumaLOG) corrective regimen sliding scale   SubCutaneous three times a day before meals  insulin lispro (HumaLOG) corrective regimen sliding scale   SubCutaneous at bedtime  insulin lispro Injectable (HumaLOG) 4 Unit(s) SubCutaneous three times a day before meals  levoFLOXacin IVPB 250 milliGRAM(s) IV Intermittent every 24 hours  levoFLOXacin IVPB      metoprolol tartrate 25 milliGRAM(s) Oral two times a day  polyethylene glycol 3350 17 Gram(s) Oral daily  senna 2 Tablet(s) Oral at bedtime      Assessment:  86 F with PMH of lung adenoca (dx around , s/p L VATS, XRT of TERESA/RUL last in 2016), COPD with emphysema, htn, and dm2, presented with R shoulder pain 2/2 partial supraspinatus tear, admitted for 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     Plan:  1) hypoxic respiratory failure initially requiring BiPAP, COPD, emphysema  -doesnt appear cardiac mediated. appears euvolemic  -possibly infectious vs progression of disease vs copd-e  -would obtain LE duplex to r/o venous thromboembolism as that is also in the differential  -c/w abx per medicine for fevers and consolidation on imaging  -s/p steroids and nebs for possible copd-e    3) elevated troponin, without overt ischemic sx or ischemic changes on ecg  -would defer ischemic workup for now given overall prognosis and inability to tolerate anti-platelet agents/AC given recent imaging findings    4) right should pain, hx of PMR  -f/u palliative/pain management recs  -consider anesthesia/ortho consult for possible local therapy  -low suspicion for septic joint as no effusion seen on imaging and no surrounding erythema or tenderness     5) HTN, APC's  -no evidence of atrial fibrillation on visualized ecg's and telemetry  -agree with holding heparin drip  -c/w metoprolol as tolerates  -previously on nifedipine, can resume if BP remains elevated. would tolerate slightly elevated SBP to avoid diastolic hypotension    6) cerebellar lesion  -appreciate nsgy/onc recs  -mri/mra results noted      Over 35 minutes spent on total encounter; more than 50% of the visit was spent counseling and/or coordinating care by the attending physician.      Slick York MD   Cardiovascular Disease  [phone]

## 2018-09-07 NOTE — CONSULT NOTE ADULT - ASSESSMENT
HPI:  85 y/o F PMH Lung CA (unknown stage or type, Dx 2010 s/p L VATS, wedge resection, RT in July 2017, now with possible recurrence), PMR, HTN, DM2 p/w worsening Rt shoulder pain for last two months. Pt states pain was worse over last several days, starting last Thursday. Pt was seen in ER on Saturday and had MRI showing supraspinatus tear as well as apical lung mass, unclear of brachial plexus involvement. Pt was discharged from ED with oxycodone, but that failed to alleviate her pain.   Denies: recent trauma, no vision changes, neck pain, chest pain, SOB, fevers, N/V/D.     In ED, vitals: Tm: 98 HR: 72-75 BP: 150-199/66-90, RR: 17 SpO2: 99% RA  Pt received 2 L LR, morphine 4 mg IVP x 2, zofran 4mg IVP, albuterol x 1   CTH: showed Hyperdense left cerebellar lesion measuring 1.5 x 1.3 cm possibly cavernoma, evolving hemorrhage, or   hemorrhagic metastasis.   Neuro surgery was c/s, rec MRI/MRA head w/ and w/o contrast (04 Sep 2018 02:52)    PAST MEDICAL & SURGICAL HISTORY:  Dyslipidemia  Hypertension  Diabetes  Lung cancer  Lung cancer: diagnosed 2010  S/P cholecystectomy  H/O carotid endarterectomy: left      REVIEW OF SYSTEMS:  General: no fever/chills, no lethargy  Skin/breast: see HPI  Ophthalmologic: no eye pain or change in vision  ENT: no dysphagia or change in hearing  Respiratory: No SOB or cough  Cardiovascular: No palpitations or chest pain  Gastrointestinal: no abdominal pain or blood in stool  Genitourinary: no dysuria or frequency  Musculoskeleta: no joint pains or weakness  Neurological: no weakness or tingling    MEDICATIONS  (STANDING):  chlorhexidine 4% Liquid 1 Application(s) Topical <User Schedule>  dextrose 5%. 1000 milliLiter(s) (50 mL/Hr) IV Continuous <Continuous>  dextrose 50% Injectable 12.5 Gram(s) IV Push once  dextrose 50% Injectable 25 Gram(s) IV Push once  dextrose 50% Injectable 25 Gram(s) IV Push once  docusate sodium 100 milliGRAM(s) Oral three times a day  famotidine    Tablet 20 milliGRAM(s) Oral daily  fenofibrate Tablet 145 milliGRAM(s) Oral daily  influenza   Vaccine 0.5 milliLiter(s) IntraMuscular once  insulin glargine Injectable (LANTUS) 12 Unit(s) SubCutaneous at bedtime  insulin lispro (HumaLOG) corrective regimen sliding scale   SubCutaneous three times a day before meals  insulin lispro (HumaLOG) corrective regimen sliding scale   SubCutaneous at bedtime  insulin lispro Injectable (HumaLOG) 4 Unit(s) SubCutaneous three times a day before meals  levoFLOXacin IVPB 250 milliGRAM(s) IV Intermittent every 24 hours  levoFLOXacin IVPB      metoprolol tartrate 25 milliGRAM(s) Oral two times a day  polyethylene glycol 3350 17 Gram(s) Oral daily  potassium chloride   Powder 40 milliEquivalent(s) Oral once  predniSONE   Tablet 20 milliGRAM(s) Oral once  senna 2 Tablet(s) Oral at bedtime  valACYclovir 1000 milliGRAM(s) Oral every 12 hours    MEDICATIONS  (PRN):  acetaminophen   Tablet .. 650 milliGRAM(s) Oral every 6 hours PRN Temp greater or equal to 38C (100.4F), Mild Pain (1 - 3), Moderate Pain (4 - 6), Severe Pain (7 - 10)  dextrose 40% Gel 15 Gram(s) Oral once PRN Blood Glucose LESS THAN 70 milliGRAM(s)/deciliter  glucagon  Injectable 1 milliGRAM(s) IntraMuscular once PRN Glucose LESS THAN 70 milligrams/deciliter  HYDROmorphone  Injectable 0.5 milliGRAM(s) IV Push every 4 hours PRN Severe Pain (7 - 10)    Allergies    ACE inhibitors (Angioedema)    Intolerances        SOCIAL HISTORY:    FAMILY HISTORY:  No pertinent family history in first degree relatives      Vital Signs Last 24 Hrs  T(C): 36.7 (07 Sep 2018 12:27), Max: 37.1 (06 Sep 2018 16:00)  T(F): 98 (07 Sep 2018 12:27), Max: 98.8 (06 Sep 2018 16:00)  HR: 90 (07 Sep 2018 12:27) (74 - 105)  BP: 180/87 (07 Sep 2018 12:27) (137/76 - 180/87)  BP(mean): 110 (06 Sep 2018 17:15) (84 - 110)  RR: 17 (07 Sep 2018 12:27) (16 - 19)  SpO2: 96% (07 Sep 2018 12:27) (96% - 100%)  PHYSICAL EXAM:     The patient was alert and oriented X 3, well nourished, and in no  apparent distress.  OP showed no ulcerations  There was no visible lymphadenopathy.  Conjunctiva were non injected  There was no clubbing or edema of extremities.  The scalp, hair, face, eyebrows, lips, OP, neck, chest, back,   extremities X 4, nails were examined.  There was no hyperhidrosis or bromhidrosis.    Of note on skin exam:       LABS:                        8.9    6.50  )-----------( 224      ( 07 Sep 2018 06:55 )             27.4     09-07    127<L>  |  87<L>  |  28<H>  ----------------------------<  242<H>  3.3<L>   |  26  |  0.88    Ca    8.3<L>      07 Sep 2018 06:55  Phos  2.4     09-07  Mg     1.8     09-07    TPro  6.3  /  Alb  2.5<L>  /  TBili  0.6  /  DBili  x   /  AST  22  /  ALT  30  /  AlkPhos  77  09-07          RADIOLOGY & ADDITIONAL STUDIES: 86F w/ rash on R chest clinically c/w herpes zoster.  -Sent HSV/VZV PCR from site  -Recommend starting valtrex 1g TID x 10d    Discussed with primary team. Pt seen and examined with Dr. Nguyen.    Keturah Barkley MD  PGY3, Dermatology

## 2018-09-07 NOTE — CHART NOTE - NSCHARTNOTEFT_GEN_A_CORE
Pt was seen and examined.  Briefly this is a elderly female c hx HTN DM PMR and lung cancer who present with zoster  Euvolemic hyponatremia that is likely SIADH in addition to "tea-toast" diet  Hypokalemia  Hypophosphatemia    Suggest  -Kphos x 3 doses  -NaCl tabs 1000mg po qd  -Consider gabapentin for neuropathic pain   -Encourage protein intake  -Start Glucerna and Blake        Sayed Harika  [facility]  [phone]

## 2018-09-07 NOTE — PROGRESS NOTE ADULT - SUBJECTIVE AND OBJECTIVE BOX
Chief Complaint: DM2    History: Lantus 12u was refused last night by family member at bedside and 6u was given instead.  Small po intake. Now with Shingles.    MEDICATIONS  (STANDING):  acyclovir IVPB 650 milliGRAM(s) IV Intermittent every 12 hours  chlorhexidine 4% Liquid 1 Application(s) Topical <User Schedule>  dextrose 5%. 1000 milliLiter(s) (50 mL/Hr) IV Continuous <Continuous>  dextrose 50% Injectable 12.5 Gram(s) IV Push once  dextrose 50% Injectable 25 Gram(s) IV Push once  dextrose 50% Injectable 25 Gram(s) IV Push once  docusate sodium 100 milliGRAM(s) Oral three times a day  famotidine    Tablet 20 milliGRAM(s) Oral daily  fenofibrate Tablet 145 milliGRAM(s) Oral daily  gabapentin 100 milliGRAM(s) Oral daily  influenza   Vaccine 0.5 milliLiter(s) IntraMuscular once  insulin glargine Injectable (LANTUS) 12 Unit(s) SubCutaneous at bedtime  insulin lispro (HumaLOG) corrective regimen sliding scale   SubCutaneous three times a day before meals  insulin lispro (HumaLOG) corrective regimen sliding scale   SubCutaneous at bedtime  insulin lispro Injectable (HumaLOG) 4 Unit(s) SubCutaneous three times a day before meals  levoFLOXacin IVPB 250 milliGRAM(s) IV Intermittent every 24 hours  levoFLOXacin IVPB      metoprolol tartrate 25 milliGRAM(s) Oral two times a day  polyethylene glycol 3350 17 Gram(s) Oral daily  potassium acid phosphate/sodium acid phosphate tablet (K-PHOS No. 2) 1 Tablet(s) Oral four times a day with meals  predniSONE   Tablet 20 milliGRAM(s) Oral once  senna 2 Tablet(s) Oral at bedtime  sodium chloride 1 Gram(s) Oral daily    MEDICATIONS  (PRN):  acetaminophen   Tablet .. 650 milliGRAM(s) Oral every 6 hours PRN Temp greater or equal to 38C (100.4F), Mild Pain (1 - 3), Moderate Pain (4 - 6), Severe Pain (7 - 10)  dextrose 40% Gel 15 Gram(s) Oral once PRN Blood Glucose LESS THAN 70 milliGRAM(s)/deciliter  glucagon  Injectable 1 milliGRAM(s) IntraMuscular once PRN Glucose LESS THAN 70 milligrams/deciliter  HYDROmorphone  Injectable 0.5 milliGRAM(s) IV Push every 4 hours PRN Severe Pain (7 - 10)      Allergies    ACE inhibitors (Angioedema)    Intolerances      Review of Systems:  Pain at site of rash.  ALL OTHER SYSTEMS REVIEWED AND NEGATIVE      PHYSICAL EXAM:  VITALS: T(C): 36.7 (09-07-18 @ 12:27)  T(F): 98 (09-07-18 @ 12:27), Max: 98.2 (09-06-18 @ 20:29)  HR: 90 (09-07-18 @ 12:27) (90 - 105)  BP: 180/87 (09-07-18 @ 12:27) (137/76 - 180/87)  RR:  (17 - 18)  SpO2:  (96% - 100%)  Wt(kg): --  GENERAL: NAD, well-groomed, well-developed  EYES: No proptosis, no lid lag, anicteric  HEENT:  Atraumatic, Normocephalic, moist mucous membranes  NEURO: extraocular movements intact, no tremor  PSYCH: Alert, awake      CAPILLARY BLOOD GLUCOSE      POCT Blood Glucose.: 110 mg/dL (07 Sep 2018 17:18)  POCT Blood Glucose.: 236 mg/dL (07 Sep 2018 12:17)  POCT Blood Glucose.: 266 mg/dL (07 Sep 2018 09:59)  POCT Blood Glucose.: 236 mg/dL (06 Sep 2018 21:46)      09-07    127<L>  |  87<L>  |  28<H>  ----------------------------<  242<H>  3.3<L>   |  26  |  0.88    EGFR if : 69  EGFR if non : 59    Ca    8.3<L>      09-07  Mg     1.8     09-07  Phos  2.4     09-07    TPro  6.3  /  Alb  2.5<L>  /  TBili  0.6  /  DBili  x   /  AST  22  /  ALT  30  /  AlkPhos  77  09-07          Thyroid Function Tests:      Hemoglobin A1C, Whole Blood: 9.8 % <H> [4.0 - 5.6] (09-05-18 @ 02:55)

## 2018-09-07 NOTE — PROGRESS NOTE ADULT - PROBLEM SELECTOR PLAN 1
BG remain elevated. Lantus dose was again decreased from 12 to 6u last night per family at bedside.  Glucose elevated this AM but predinner reading improved.  Would recommend Lantus 10u qhs - please do not lower this dose.  Continue Humalog 4/4/4  Continue moderate correction scale before meals and moderate bedtime.    DC plan: patient was to be started on basal insulin as outpatient in addition to oral agents. For dc will need to clarify goals of care. Outpatient endocrinologist Dr. Alcocer. BG remain elevated. Lantus dose was again decreased from 12 to 6u last night per family at bedside.  Glucose elevated this AM but predinner reading improved.  Would recommend Lantus 10u qhs - please do not lower this dose.  Continue Humalog 4/4/4  Continue moderate correction scale before meals and moderate bedtime.  Prednisone taper is ordered.  Doses of insulin may need to be decreased as steroids are tapered.    DC plan: patient was to be started on basal insulin as outpatient in addition to oral agents. For dc will need to clarify goals of care. Outpatient endocrinologist Dr. Alcocer.

## 2018-09-07 NOTE — CONSULT NOTE ADULT - SUBJECTIVE AND OBJECTIVE BOX
HPI:  85 y/o F PMH Lung CA (unknown stage or type, Dx  s/p L VATS, wedge resection, RT in 2017, now with possible recurrence), PMR, HTN, DM2, admitted with worsening R shoulder pain from partial supraspinatus tear, found to have possible hemorrhagic brain met pending MRI. Dermatology is c/s for rash on R chest x 1d. Yesterday the pt's daughter noted erythema and the pt reported a burning sensation of the skin; today blisters arose in the area. Treatment to date includes HC 1%, which was not helpful.    PAST MEDICAL & SURGICAL HISTORY:  Dyslipidemia  Hypertension  Diabetes  Lung cancer  Lung cancer: diagnosed   S/P cholecystectomy  H/O carotid endarterectomy: left      REVIEW OF SYSTEMS:  General: no fever/chills, no lethargy  Skin/breast: see HPI  Ophthalmologic: no eye pain or change in vision  ENT: no dysphagia or change in hearing  Respiratory: SOB in the setting of likely lung cancer recurrence  Cardiovascular: No palpitations or chest pain  Gastrointestinal: no abdominal pain or blood in stool  Genitourinary: no dysuria or frequency  Musculoskeletal: no joint pains or weakness  Neurological: no weakness or tingling    MEDICATIONS  (STANDING):  chlorhexidine 4% Liquid 1 Application(s) Topical <User Schedule>  dextrose 5%. 1000 milliLiter(s) (50 mL/Hr) IV Continuous <Continuous>  dextrose 50% Injectable 12.5 Gram(s) IV Push once  dextrose 50% Injectable 25 Gram(s) IV Push once  dextrose 50% Injectable 25 Gram(s) IV Push once  docusate sodium 100 milliGRAM(s) Oral three times a day  famotidine    Tablet 20 milliGRAM(s) Oral daily  fenofibrate Tablet 145 milliGRAM(s) Oral daily  influenza   Vaccine 0.5 milliLiter(s) IntraMuscular once  insulin glargine Injectable (LANTUS) 12 Unit(s) SubCutaneous at bedtime  insulin lispro (HumaLOG) corrective regimen sliding scale   SubCutaneous three times a day before meals  insulin lispro (HumaLOG) corrective regimen sliding scale   SubCutaneous at bedtime  insulin lispro Injectable (HumaLOG) 4 Unit(s) SubCutaneous three times a day before meals  levoFLOXacin IVPB 250 milliGRAM(s) IV Intermittent every 24 hours  levoFLOXacin IVPB      metoprolol tartrate 25 milliGRAM(s) Oral two times a day  polyethylene glycol 3350 17 Gram(s) Oral daily  potassium chloride   Powder 40 milliEquivalent(s) Oral once  predniSONE   Tablet 20 milliGRAM(s) Oral once  senna 2 Tablet(s) Oral at bedtime  valACYclovir 1000 milliGRAM(s) Oral every 12 hours    MEDICATIONS  (PRN):  acetaminophen   Tablet .. 650 milliGRAM(s) Oral every 6 hours PRN Temp greater or equal to 38C (100.4F), Mild Pain (1 - 3), Moderate Pain (4 - 6), Severe Pain (7 - 10)  dextrose 40% Gel 15 Gram(s) Oral once PRN Blood Glucose LESS THAN 70 milliGRAM(s)/deciliter  glucagon  Injectable 1 milliGRAM(s) IntraMuscular once PRN Glucose LESS THAN 70 milligrams/deciliter  HYDROmorphone  Injectable 0.5 milliGRAM(s) IV Push every 4 hours PRN Severe Pain (7 - 10)    Allergies  ACE inhibitors (Angioedema)    Intolerances  None    SOCIAL HISTORY:  is recently     FAMILY HISTORY:  No pertinent family history in first degree relatives      Vital Signs Last 24 Hrs  T(C): 36.7 (07 Sep 2018 12:27), Max: 37.1 (06 Sep 2018 16:00)  T(F): 98 (07 Sep 2018 12:27), Max: 98.8 (06 Sep 2018 16:00)  HR: 90 (07 Sep 2018 12:27) (74 - 105)  BP: 180/87 (07 Sep 2018 12:27) (137/76 - 180/87)  BP(mean): 110 (06 Sep 2018 17:15) (84 - 110)  RR: 17 (07 Sep 2018 12:27) (16 - 19)  SpO2: 96% (07 Sep 2018 12:27) (96% - 100%)    PHYSICAL EXAM:     The patient was alert and oriented X 3, thin, and in no apparent distress.  There was no visible lymphadenopathy.  Conjunctiva were non injected  There was no clubbing or edema of extremities.  The scalp, hair, face, eyebrows, lips, OP, neck, chest, upper extremities and nails were examined.  There was no hyperhidrosis or bromhidrosis.    Of note on skin exam:   erythematous patch on R upper chest studded w/ few vesicles and scalloped erosions    LABS:                        8.9    6.50  )-----------( 224      ( 07 Sep 2018 06:55 )             27.4         127<L>  |  87<L>  |  28<H>  ----------------------------<  242<H>  3.3<L>   |  26  |  0.88    Ca    8.3<L>      07 Sep 2018 06:55  Phos  2.4       Mg     1.8         TPro  6.3  /  Alb  2.5<L>  /  TBili  0.6  /  DBili  x   /  AST  22  /  ALT  30  /  AlkPhos  77

## 2018-09-07 NOTE — PROGRESS NOTE ADULT - SUBJECTIVE AND OBJECTIVE BOX
_________________________________________________________________________________________  ========>>  M E D I C A L   A T T E N D I N G    F O L L O W  U P  N O T E  <<=========  -----------------------------------------------------------------------------------------------------    - Patient seen and examined by me earlier today.   - In summary,  CLIVE HENRY is a 86y year old woman who originally presented with Rt shoulder pain      pt out of the ICU on tele floor      Pt noted this morning to have some "rash" on right shoulder: Derm consulted and pt diagnosed with zoster  - Patient otherwise overall comfortable, but weak, eating poorly, still with some pain in right shoulder    ==================>> REVIEW OF SYSTEM <<=================    GEN: no fever, no chills, + pain as above   RESP: mild SOB, no cough, no sputum  CVS: no chest pain, no palpitations, no edema  GI: no abdominal pain, no nausea  : no dysuria, no frequency, no hematuria  Neuro: no headache, no dizziness  Derm : no itching, rash as above  feels depressed     ==================>> PHYSICAL EXAM <<=================    GEN: A&O X 3 , NAD , comfortable  HEENT: NCAT, PERRL, MMM, hearing intact, on high flow   Neck: supple , no JVD  CVS: S1S2 , Irregular , No M/R/G appreciated  PULM: CTA B/L   ABD.: soft. non tender, non distended,  bowel sounds present  Extrem: intact pulses , no edema   Derm: Dermatomally distributed vesicular rash on right C3/4 dermatome    PSYCH : flat affect        ==================>> MEDICATIONS <<====================    chlorhexidine 4% Liquid 1 Application(s) Topical <User Schedule>  dextrose 5%. 1000 milliLiter(s) IV Continuous <Continuous>  dextrose 50% Injectable 12.5 Gram(s) IV Push once  dextrose 50% Injectable 25 Gram(s) IV Push once  dextrose 50% Injectable 25 Gram(s) IV Push once  docusate sodium 100 milliGRAM(s) Oral three times a day  famotidine    Tablet 20 milliGRAM(s) Oral daily  fenofibrate Tablet 145 milliGRAM(s) Oral daily  influenza   Vaccine 0.5 milliLiter(s) IntraMuscular once  insulin glargine Injectable (LANTUS) 12 Unit(s) SubCutaneous at bedtime  insulin lispro (HumaLOG) corrective regimen sliding scale   SubCutaneous three times a day before meals  insulin lispro (HumaLOG) corrective regimen sliding scale   SubCutaneous at bedtime  insulin lispro Injectable (HumaLOG) 4 Unit(s) SubCutaneous three times a day before meals  levoFLOXacin IVPB 250 milliGRAM(s) IV Intermittent every 24 hours  levoFLOXacin IVPB      metoprolol tartrate 25 milliGRAM(s) Oral two times a day  polyethylene glycol 3350 17 Gram(s) Oral daily  potassium chloride   Powder 40 milliEquivalent(s) Oral once  predniSONE   Tablet 20 milliGRAM(s) Oral once  senna 2 Tablet(s) Oral at bedtime    MEDICATIONS  (PRN):  acetaminophen   Tablet .. 650 milliGRAM(s) Oral every 6 hours PRN Temp greater or equal to 38C (100.4F), Mild Pain (1 - 3), Moderate Pain (4 - 6), Severe Pain (7 - 10)  dextrose 40% Gel 15 Gram(s) Oral once PRN Blood Glucose LESS THAN 70 milliGRAM(s)/deciliter  glucagon  Injectable 1 milliGRAM(s) IntraMuscular once PRN Glucose LESS THAN 70 milligrams/deciliter  HYDROmorphone  Injectable 0.5 milliGRAM(s) IV Push every 4 hours PRN Severe Pain (7 - 10)    ==================>> VITAL SIGNS <<==================    Vital Signs Last 24 Hrs  T(C): 36.7 (09-07-18 @ 12:27)  T(F): 98 (09-07-18 @ 12:27), Max: 98.3 (09-06-18 @ 17:55)  HR: 90 (09-07-18 @ 12:27) (74 - 105)  BP: 180/87 (09-07-18 @ 12:27)  BP(mean): 110 (09-06-18 @ 17:15) (110 - 110)  RR: 17 (09-07-18 @ 12:27) (17 - 19)  SpO2: 96% (09-07-18 @ 12:27) (96% - 100%)      POCT Blood Glucose.: 236 mg/dL (07 Sep 2018 12:17)  POCT Blood Glucose.: 266 mg/dL (07 Sep 2018 09:59)  POCT Blood Glucose.: 236 mg/dL (06 Sep 2018 21:46)  POCT Blood Glucose.: 229 mg/dL (06 Sep 2018 16:53)     ==================>> LAB AND IMAGING <<==================                        9.1    9.18  )-----------( 276      ( 07 Sep 2018 15:45 )             28.1        Hemoglobin:   9.1 <<==,  8.9 <<==,  10.1 <<==,  9.5 <<==,  9.6 <<==,  9.6 <<==    127<L>  |  87<L>  |  28<H>  ----------------------------<  242<H>  3.3<L>   |  26  |  0.88    Sodium:   127  <==, 127  <==, 125  <==, 126  <==, 124  <==, 125  <==    Ca    8.3<L>      07 Sep 2018 06:55  Phos  2.4     09-07  Mg     1.8     09-07    TPro  6.3  /  Alb  2.5<L>  /  TBili  0.6  /  DBili  x   /  AST  22  /  ALT  30  /  AlkPhos  77  09-07    ___________________________________________________________________________________  ===============>>  A S S E S S M E N T   A N D   P L A N <<===============  ------------------------------------------------------------------------------------------    · Assessment		  85 y/o F PMH Lung CA (unknown stage or type, Dx 2010 s/p L VATS, wedge resection, RT in July 2017), PMR, HTN, DM2 p/w worsening Rt shoulder pain from partial supraspinatus tear, found to have poss hemorrhagic met to brain pending MRI.     ·  Problem: Herpes Zoster  Derm appreciated   prefer few doses of IV antivirals as pt immunocompromised, and not taking much PO to begin with..   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 Saini at [facility]    Problem: Shoulder pain Multifactorial give supraspinatus tear, lung mass and shingles   Pain control with narcotics as ordered  should not use NSAIDS given brain met   PT     Problem: Hyponatremia, possibly SIADH in the setting of lung ca   Trend BMP closely  free fluid restriction  renal consult >> ? vaptan therapy?  encourage PO intake     Problem: Hypertension  continue Rx as above  cardio f/u and mgmt appreciated     Problem: Diabetes, poorly controlled on admission   Endocrine f/u and mgmt appreciated   Consistent carb diet  HbA1c 9.8%    Problem:  Need for other prophylactic measure.    SCDs  Diet: DASH/CC (lactose free)     ·  Problem: depression  offered to start Remeron, pt declined for now    -GI/DVT Prophylaxis.  - rehab planing early next week if improved   --------------------------------------------  Case discussed with pt and Dtr, NP, onc  Education given on findings and plan of care  ___________________________  KARLA Ferrell D.O.  Pager: [phone]

## 2018-09-07 NOTE — PROGRESS NOTE ADULT - SUBJECTIVE AND OBJECTIVE BOX
Interval Events:    REVIEW OF SYSTEMS:  Constitutional:   Eyes:  ENT:  CV:  Resp:  GI:  :  MSK:  Integumentary:  Neurological:  Psychiatric:  Endocrine:  Hematologic/Lymphatic:  Allergic/Immunologic:  [x] All other systems negative  [ ] Unable to assess ROS because ________    OBJECTIVE:  ICU Vital Signs Last 24 Hrs  T(C): 36.7 (07 Sep 2018 05:21), Max: 37.1 (06 Sep 2018 16:00)  T(F): 98 (07 Sep 2018 05:21), Max: 98.8 (06 Sep 2018 16:00)  HR: 92 (07 Sep 2018 05:21) (74 - 105)  BP: 158/54 (07 Sep 2018 05:21) (132/54 - 164/77)  BP(mean): 110 (06 Sep 2018 17:15) (75 - 110)  ABP: --  ABP(mean): --  RR: 17 (07 Sep 2018 05:21) (16 - 23)  SpO2: 98% (07 Sep 2018 05:21) (96% - 100%)        09-06 @ 07:01  -  09-07 @ 07:00  --------------------------------------------------------  IN: 370 mL / OUT: 500 mL / NET: -130 mL      CAPILLARY BLOOD GLUCOSE      POCT Blood Glucose.: 236 mg/dL (06 Sep 2018 21:46)      PHYSICAL EXAM:  General: AAOx3  HEENT: EOMI, PERRL  Lymph Nodes: No LAD  Neck: Supple  Respiratory:   Cardiovascular: RRR   Abdomen: soft, NT/ND  Extremities: no c/c/e    HOSPITAL MEDICATIONS:  MEDICATIONS  (STANDING):  chlorhexidine 4% Liquid 1 Application(s) Topical <User Schedule>  dextrose 5%. 1000 milliLiter(s) (50 mL/Hr) IV Continuous <Continuous>  dextrose 50% Injectable 12.5 Gram(s) IV Push once  dextrose 50% Injectable 25 Gram(s) IV Push once  dextrose 50% Injectable 25 Gram(s) IV Push once  docusate sodium 100 milliGRAM(s) Oral three times a day  famotidine    Tablet 20 milliGRAM(s) Oral daily  fenofibrate Tablet 145 milliGRAM(s) Oral daily  influenza   Vaccine 0.5 milliLiter(s) IntraMuscular once  insulin glargine Injectable (LANTUS) 12 Unit(s) SubCutaneous at bedtime  insulin lispro (HumaLOG) corrective regimen sliding scale   SubCutaneous three times a day before meals  insulin lispro (HumaLOG) corrective regimen sliding scale   SubCutaneous at bedtime  insulin lispro Injectable (HumaLOG) 4 Unit(s) SubCutaneous three times a day before meals  levoFLOXacin IVPB 250 milliGRAM(s) IV Intermittent every 24 hours  levoFLOXacin IVPB      metoprolol tartrate 25 milliGRAM(s) Oral two times a day  polyethylene glycol 3350 17 Gram(s) Oral daily  potassium chloride   Powder 40 milliEquivalent(s) Oral once  senna 2 Tablet(s) Oral at bedtime    MEDICATIONS  (PRN):  acetaminophen   Tablet .. 650 milliGRAM(s) Oral every 6 hours PRN Temp greater or equal to 38C (100.4F), Mild Pain (1 - 3), Moderate Pain (4 - 6), Severe Pain (7 - 10)  dextrose 40% Gel 15 Gram(s) Oral once PRN Blood Glucose LESS THAN 70 milliGRAM(s)/deciliter  glucagon  Injectable 1 milliGRAM(s) IntraMuscular once PRN Glucose LESS THAN 70 milligrams/deciliter  HYDROmorphone  Injectable 0.5 milliGRAM(s) IV Push every 4 hours PRN Severe Pain (7 - 10)      LABS:                        8.9    6.50  )-----------( 224      ( 07 Sep 2018 06:55 )             27.4     09-07    127<L>  |  87<L>  |  28<H>  ----------------------------<  242<H>  3.3<L>   |  26  |  0.88    Ca    8.3<L>      07 Sep 2018 06:55  Phos  2.4     09-07  Mg     1.8     09-07    TPro  6.3  /  Alb  2.5<L>  /  TBili  0.6  /  DBili  x   /  AST  22  /  ALT  30  /  AlkPhos  77  09-07        Arterial Blood Gas:  09-05 @ 23:00  7.46/33/193/25/99.0/-0.2  ABG lactate: 0.9        RADIOLOGY:  [x] Reviewed and interpreted by me Interval Events:  No acute events o/n    REVIEW OF SYSTEMS:  Constitutional:   Eyes:  ENT:  CV:  Resp:  GI:  :  MSK:  Integumentary:  Neurological:  Psychiatric:  Endocrine:  Hematologic/Lymphatic:  Allergic/Immunologic:  [x] All other systems negative  [ ] Unable to assess ROS because ________    OBJECTIVE:  ICU Vital Signs Last 24 Hrs  T(C): 36.7 (07 Sep 2018 05:21), Max: 37.1 (06 Sep 2018 16:00)  T(F): 98 (07 Sep 2018 05:21), Max: 98.8 (06 Sep 2018 16:00)  HR: 92 (07 Sep 2018 05:21) (74 - 105)  BP: 158/54 (07 Sep 2018 05:21) (132/54 - 164/77)  BP(mean): 110 (06 Sep 2018 17:15) (75 - 110)  ABP: --  ABP(mean): --  RR: 17 (07 Sep 2018 05:21) (16 - 23)  SpO2: 98% (07 Sep 2018 05:21) (96% - 100%)        09-06 @ 07:01  -  09-07 @ 07:00  --------------------------------------------------------  IN: 370 mL / OUT: 500 mL / NET: -130 mL      CAPILLARY BLOOD GLUCOSE      POCT Blood Glucose.: 236 mg/dL (06 Sep 2018 21:46)      PHYSICAL EXAM:  General: AAOx3  HEENT: EOMI, PERRL  Lymph Nodes: No LAD  Neck: Supple  Respiratory: dec BS at bases  Cardiovascular: RRR   Abdomen: soft, NT/ND  Extremities: no c/c/e    HOSPITAL MEDICATIONS:  MEDICATIONS  (STANDING):  chlorhexidine 4% Liquid 1 Application(s) Topical <User Schedule>  dextrose 5%. 1000 milliLiter(s) (50 mL/Hr) IV Continuous <Continuous>  dextrose 50% Injectable 12.5 Gram(s) IV Push once  dextrose 50% Injectable 25 Gram(s) IV Push once  dextrose 50% Injectable 25 Gram(s) IV Push once  docusate sodium 100 milliGRAM(s) Oral three times a day  famotidine    Tablet 20 milliGRAM(s) Oral daily  fenofibrate Tablet 145 milliGRAM(s) Oral daily  influenza   Vaccine 0.5 milliLiter(s) IntraMuscular once  insulin glargine Injectable (LANTUS) 12 Unit(s) SubCutaneous at bedtime  insulin lispro (HumaLOG) corrective regimen sliding scale   SubCutaneous three times a day before meals  insulin lispro (HumaLOG) corrective regimen sliding scale   SubCutaneous at bedtime  insulin lispro Injectable (HumaLOG) 4 Unit(s) SubCutaneous three times a day before meals  levoFLOXacin IVPB 250 milliGRAM(s) IV Intermittent every 24 hours  levoFLOXacin IVPB      metoprolol tartrate 25 milliGRAM(s) Oral two times a day  polyethylene glycol 3350 17 Gram(s) Oral daily  potassium chloride   Powder 40 milliEquivalent(s) Oral once  senna 2 Tablet(s) Oral at bedtime    MEDICATIONS  (PRN):  acetaminophen   Tablet .. 650 milliGRAM(s) Oral every 6 hours PRN Temp greater or equal to 38C (100.4F), Mild Pain (1 - 3), Moderate Pain (4 - 6), Severe Pain (7 - 10)  dextrose 40% Gel 15 Gram(s) Oral once PRN Blood Glucose LESS THAN 70 milliGRAM(s)/deciliter  glucagon  Injectable 1 milliGRAM(s) IntraMuscular once PRN Glucose LESS THAN 70 milligrams/deciliter  HYDROmorphone  Injectable 0.5 milliGRAM(s) IV Push every 4 hours PRN Severe Pain (7 - 10)      LABS:                        8.9    6.50  )-----------( 224      ( 07 Sep 2018 06:55 )             27.4     09-07    127<L>  |  87<L>  |  28<H>  ----------------------------<  242<H>  3.3<L>   |  26  |  0.88    Ca    8.3<L>      07 Sep 2018 06:55  Phos  2.4     09-07  Mg     1.8     09-07    TPro  6.3  /  Alb  2.5<L>  /  TBili  0.6  /  DBili  x   /  AST  22  /  ALT  30  /  AlkPhos  77  09-07        Arterial Blood Gas:  09-05 @ 23:00  7.46/33/193/25/99.0/-0.2  ABG lactate: 0.9        RADIOLOGY:  [x] Reviewed and interpreted by me

## 2018-09-07 NOTE — PROGRESS NOTE ADULT - PROBLEM SELECTOR PLAN 1
Pain management  antiviral therapy for herpes zoster  Palliative radiation to RUL mass as outlined above will need to be delayed until Zoster rash heals.

## 2018-09-07 NOTE — PROGRESS NOTE ADULT - ASSESSMENT
Right shoulder pain - Now with Herpes Zoster rash in the right upper chest over the site she is having pain. CT showing an enlarging RUL mass, MRI showing a right rotator cuff tear. Pain  and  chest wall tenderness suggest pain is related to the lung mass although acute exacerbation prompting this admission could be neuropathic from the Zoster infection.  On reviewing prior scans , clearly has an enlarging rul /apical lung mass. Would need to get  records regarding histology, results of any molecular testing if  it was done, details of prior treatment. She  has a cerebellar lesion , likely a metastasis but the immediate focus should be  management of the pain she is having.  radiation oncology recommending palliative radiation to RUL to be given at [facility] if can be stabilized for discharge or as an inpatient if hospitalization to be prolonged.  gamma knife tx to cerebellar mass as outpatient    Cerebellar mass - neurosurgery following - recommending gamma knife - agree with this recommendation.    COPD/respiratory failure  improved, ? precipitated by sedation from narcotic analgesics.

## 2018-09-08 LAB
ALBUMIN SERPL ELPH-MCNC: 2.6 G/DL — LOW (ref 3.3–5)
ALP SERPL-CCNC: 84 U/L — SIGNIFICANT CHANGE UP (ref 40–120)
ALT FLD-CCNC: 27 U/L — SIGNIFICANT CHANGE UP (ref 4–33)
AST SERPL-CCNC: 25 U/L — SIGNIFICANT CHANGE UP (ref 4–32)
BASOPHILS # BLD AUTO: 0.01 K/UL — SIGNIFICANT CHANGE UP (ref 0–0.2)
BASOPHILS NFR BLD AUTO: 0.1 % — SIGNIFICANT CHANGE UP (ref 0–2)
BILIRUB SERPL-MCNC: 0.7 MG/DL — SIGNIFICANT CHANGE UP (ref 0.2–1.2)
BUN SERPL-MCNC: 30 MG/DL — HIGH (ref 7–23)
CALCIUM SERPL-MCNC: 8.3 MG/DL — LOW (ref 8.4–10.5)
CHLORIDE SERPL-SCNC: 87 MMOL/L — LOW (ref 98–107)
CO2 SERPL-SCNC: 26 MMOL/L — SIGNIFICANT CHANGE UP (ref 22–31)
CREAT SERPL-MCNC: 0.9 MG/DL — SIGNIFICANT CHANGE UP (ref 0.5–1.3)
EOSINOPHIL # BLD AUTO: 0 K/UL — SIGNIFICANT CHANGE UP (ref 0–0.5)
EOSINOPHIL NFR BLD AUTO: 0 % — SIGNIFICANT CHANGE UP (ref 0–6)
GLUCOSE BLDC GLUCOMTR-MCNC: 237 MG/DL — HIGH (ref 70–99)
GLUCOSE BLDC GLUCOMTR-MCNC: 270 MG/DL — HIGH (ref 70–99)
GLUCOSE BLDC GLUCOMTR-MCNC: 314 MG/DL — HIGH (ref 70–99)
GLUCOSE SERPL-MCNC: 239 MG/DL — HIGH (ref 70–99)
HCT VFR BLD CALC: 28.7 % — LOW (ref 34.5–45)
HGB BLD-MCNC: 9.2 G/DL — LOW (ref 11.5–15.5)
HSV+VZV DNA SPEC QL NAA+PROBE: SIGNIFICANT CHANGE UP
IMM GRANULOCYTES # BLD AUTO: 0.17 # — SIGNIFICANT CHANGE UP
IMM GRANULOCYTES NFR BLD AUTO: 2.4 % — HIGH (ref 0–1.5)
LYMPHOCYTES # BLD AUTO: 0.64 K/UL — LOW (ref 1–3.3)
LYMPHOCYTES # BLD AUTO: 9.1 % — LOW (ref 13–44)
MAGNESIUM SERPL-MCNC: 1.7 MG/DL — SIGNIFICANT CHANGE UP (ref 1.6–2.6)
MCHC RBC-ENTMCNC: 25 PG — LOW (ref 27–34)
MCHC RBC-ENTMCNC: 32.1 % — SIGNIFICANT CHANGE UP (ref 32–36)
MCV RBC AUTO: 78 FL — LOW (ref 80–100)
MONOCYTES # BLD AUTO: 0.33 K/UL — SIGNIFICANT CHANGE UP (ref 0–0.9)
MONOCYTES NFR BLD AUTO: 4.7 % — SIGNIFICANT CHANGE UP (ref 2–14)
NEUTROPHILS # BLD AUTO: 5.85 K/UL — SIGNIFICANT CHANGE UP (ref 1.8–7.4)
NEUTROPHILS NFR BLD AUTO: 83.7 % — HIGH (ref 43–77)
NRBC # FLD: 0 — SIGNIFICANT CHANGE UP
PHOSPHATE SERPL-MCNC: 2.9 MG/DL — SIGNIFICANT CHANGE UP (ref 2.5–4.5)
PLATELET # BLD AUTO: 267 K/UL — SIGNIFICANT CHANGE UP (ref 150–400)
PMV BLD: 9.6 FL — SIGNIFICANT CHANGE UP (ref 7–13)
POTASSIUM SERPL-MCNC: 3.7 MMOL/L — SIGNIFICANT CHANGE UP (ref 3.5–5.3)
POTASSIUM SERPL-SCNC: 3.7 MMOL/L — SIGNIFICANT CHANGE UP (ref 3.5–5.3)
PROT SERPL-MCNC: 6.3 G/DL — SIGNIFICANT CHANGE UP (ref 6–8.3)
RBC # BLD: 3.68 M/UL — LOW (ref 3.8–5.2)
RBC # FLD: 15.1 % — HIGH (ref 10.3–14.5)
SODIUM SERPL-SCNC: 126 MMOL/L — LOW (ref 135–145)
SPECIMEN SOURCE: SIGNIFICANT CHANGE UP
WBC # BLD: 7 K/UL — SIGNIFICANT CHANGE UP (ref 3.8–10.5)
WBC # FLD AUTO: 7 K/UL — SIGNIFICANT CHANGE UP (ref 3.8–10.5)

## 2018-09-08 PROCEDURE — 99223 1ST HOSP IP/OBS HIGH 75: CPT | Mod: GC

## 2018-09-08 PROCEDURE — 99232 SBSQ HOSP IP/OBS MODERATE 35: CPT

## 2018-09-08 RX ORDER — INSULIN GLARGINE 100 [IU]/ML
12 INJECTION, SOLUTION SUBCUTANEOUS AT BEDTIME
Qty: 0 | Refills: 0 | Status: DISCONTINUED | OUTPATIENT
Start: 2018-09-08 | End: 2018-09-12

## 2018-09-08 RX ORDER — ACETAMINOPHEN 500 MG
1000 TABLET ORAL ONCE
Qty: 0 | Refills: 0 | Status: COMPLETED | OUTPATIENT
Start: 2018-09-08 | End: 2018-09-08

## 2018-09-08 RX ORDER — HYDRALAZINE HCL 50 MG
5 TABLET ORAL ONCE
Qty: 0 | Refills: 0 | Status: COMPLETED | OUTPATIENT
Start: 2018-09-08 | End: 2018-09-08

## 2018-09-08 RX ORDER — NIFEDIPINE 30 MG
60 TABLET, EXTENDED RELEASE 24 HR ORAL DAILY
Qty: 0 | Refills: 0 | Status: DISCONTINUED | OUTPATIENT
Start: 2018-09-08 | End: 2018-09-08

## 2018-09-08 RX ORDER — NIFEDIPINE 30 MG
60 TABLET, EXTENDED RELEASE 24 HR ORAL AT BEDTIME
Qty: 0 | Refills: 0 | Status: DISCONTINUED | OUTPATIENT
Start: 2018-09-08 | End: 2018-09-15

## 2018-09-08 RX ORDER — ACETAMINOPHEN 500 MG
1000 TABLET ORAL ONCE
Qty: 0 | Refills: 0 | Status: COMPLETED | OUTPATIENT
Start: 2018-09-08 | End: 2018-09-09

## 2018-09-08 RX ORDER — NIFEDIPINE 30 MG
30 TABLET, EXTENDED RELEASE 24 HR ORAL DAILY
Qty: 0 | Refills: 0 | Status: DISCONTINUED | OUTPATIENT
Start: 2018-09-08 | End: 2018-09-15

## 2018-09-08 RX ORDER — GABAPENTIN 400 MG/1
200 CAPSULE ORAL ONCE
Qty: 0 | Refills: 0 | Status: COMPLETED | OUTPATIENT
Start: 2018-09-08 | End: 2018-09-09

## 2018-09-08 RX ORDER — POTASSIUM CHLORIDE 20 MEQ
10 PACKET (EA) ORAL
Qty: 0 | Refills: 0 | Status: COMPLETED | OUTPATIENT
Start: 2018-09-08 | End: 2018-09-08

## 2018-09-08 RX ORDER — GLYCERIN ADULT
1 SUPPOSITORY, RECTAL RECTAL ONCE
Qty: 0 | Refills: 0 | Status: COMPLETED | OUTPATIENT
Start: 2018-09-08 | End: 2018-09-08

## 2018-09-08 RX ORDER — POTASSIUM CHLORIDE 20 MEQ
40 PACKET (EA) ORAL EVERY 4 HOURS
Qty: 0 | Refills: 0 | Status: DISCONTINUED | OUTPATIENT
Start: 2018-09-08 | End: 2018-09-08

## 2018-09-08 RX ADMIN — Medication 30 MILLIGRAM(S): at 12:52

## 2018-09-08 RX ADMIN — Medication 650 MILLIGRAM(S): at 17:30

## 2018-09-08 RX ADMIN — Medication 650 MILLIGRAM(S): at 10:06

## 2018-09-08 RX ADMIN — Medication 1 TABLET(S): at 09:18

## 2018-09-08 RX ADMIN — Medication 263 MILLIGRAM(S): at 17:07

## 2018-09-08 RX ADMIN — Medication 650 MILLIGRAM(S): at 16:39

## 2018-09-08 RX ADMIN — Medication 5 MILLIGRAM(S): at 04:04

## 2018-09-08 RX ADMIN — Medication 100 MILLIEQUIVALENT(S): at 00:58

## 2018-09-08 RX ADMIN — Medication 6: at 09:14

## 2018-09-08 RX ADMIN — Medication 145 MILLIGRAM(S): at 09:25

## 2018-09-08 RX ADMIN — Medication 650 MILLIGRAM(S): at 10:50

## 2018-09-08 RX ADMIN — HYDROMORPHONE HYDROCHLORIDE 0.5 MILLIGRAM(S): 2 INJECTION INTRAMUSCULAR; INTRAVENOUS; SUBCUTANEOUS at 21:59

## 2018-09-08 RX ADMIN — Medication 25 MILLIGRAM(S): at 22:16

## 2018-09-08 RX ADMIN — Medication 20 MILLIGRAM(S): at 06:02

## 2018-09-08 RX ADMIN — POLYETHYLENE GLYCOL 3350 17 GRAM(S): 17 POWDER, FOR SOLUTION ORAL at 09:25

## 2018-09-08 RX ADMIN — Medication 10: at 12:57

## 2018-09-08 RX ADMIN — Medication 4: at 22:16

## 2018-09-08 RX ADMIN — Medication 100 MILLIEQUIVALENT(S): at 03:46

## 2018-09-08 RX ADMIN — Medication 4 UNIT(S): at 12:58

## 2018-09-08 RX ADMIN — Medication 25 MILLIGRAM(S): at 09:25

## 2018-09-08 RX ADMIN — Medication 20 MILLIGRAM(S): at 17:07

## 2018-09-08 RX ADMIN — Medication 1000 MILLIGRAM(S): at 04:06

## 2018-09-08 RX ADMIN — Medication 4 UNIT(S): at 09:14

## 2018-09-08 RX ADMIN — CHLORHEXIDINE GLUCONATE 1 APPLICATION(S): 213 SOLUTION TOPICAL at 12:54

## 2018-09-08 RX ADMIN — Medication 400 MILLIGRAM(S): at 03:46

## 2018-09-08 RX ADMIN — HYDROMORPHONE HYDROCHLORIDE 0.5 MILLIGRAM(S): 2 INJECTION INTRAMUSCULAR; INTRAVENOUS; SUBCUTANEOUS at 21:44

## 2018-09-08 RX ADMIN — GABAPENTIN 100 MILLIGRAM(S): 400 CAPSULE ORAL at 09:25

## 2018-09-08 RX ADMIN — SODIUM CHLORIDE 1 GRAM(S): 9 INJECTION INTRAMUSCULAR; INTRAVENOUS; SUBCUTANEOUS at 09:25

## 2018-09-08 RX ADMIN — INSULIN GLARGINE 10 UNIT(S): 100 INJECTION, SOLUTION SUBCUTANEOUS at 01:25

## 2018-09-08 RX ADMIN — Medication 1 SUPPOSITORY(S): at 12:50

## 2018-09-08 RX ADMIN — Medication 4 UNIT(S): at 17:10

## 2018-09-08 RX ADMIN — Medication 100 MILLIGRAM(S): at 06:02

## 2018-09-08 RX ADMIN — FAMOTIDINE 20 MILLIGRAM(S): 10 INJECTION INTRAVENOUS at 09:25

## 2018-09-08 RX ADMIN — Medication 4: at 17:09

## 2018-09-08 RX ADMIN — Medication 1 TABLET(S): at 12:53

## 2018-09-08 RX ADMIN — Medication 100 MILLIEQUIVALENT(S): at 02:17

## 2018-09-08 RX ADMIN — Medication 263 MILLIGRAM(S): at 06:00

## 2018-09-08 RX ADMIN — Medication 20 MILLIGRAM(S): at 00:58

## 2018-09-08 RX ADMIN — Medication 1 TABLET(S): at 16:39

## 2018-09-08 RX ADMIN — Medication 100 MILLIGRAM(S): at 15:03

## 2018-09-08 RX ADMIN — Medication 60 MILLIGRAM(S): at 21:50

## 2018-09-08 RX ADMIN — INSULIN GLARGINE 12 UNIT(S): 100 INJECTION, SOLUTION SUBCUTANEOUS at 22:15

## 2018-09-08 NOTE — PROGRESS NOTE ADULT - SUBJECTIVE AND OBJECTIVE BOX
Chief Complaint: Hyperglycemia    History: 86 y.o. female with h/o Type 2 DM, Lung Cancer, PMR on chronic steroids, presents with right shoulder pain. Family reports patient has poor appetite. Tolerating po intake. No vomiting or abdominal pain. Patient c/o right shoulder pain.     MEDICATIONS  (STANDING):  acyclovir IVPB 650 milliGRAM(s) IV Intermittent every 12 hours  chlorhexidine 4% Liquid 1 Application(s) Topical <User Schedule>  dextrose 5%. 1000 milliLiter(s) (50 mL/Hr) IV Continuous <Continuous>  dextrose 50% Injectable 12.5 Gram(s) IV Push once  dextrose 50% Injectable 25 Gram(s) IV Push once  dextrose 50% Injectable 25 Gram(s) IV Push once  docusate sodium 100 milliGRAM(s) Oral three times a day  famotidine    Tablet 20 milliGRAM(s) Oral daily  fenofibrate Tablet 145 milliGRAM(s) Oral daily  gabapentin 100 milliGRAM(s) Oral daily  insulin glargine Injectable (LANTUS) 10 Unit(s) SubCutaneous at bedtime  insulin lispro (HumaLOG) corrective regimen sliding scale   SubCutaneous three times a day before meals  insulin lispro (HumaLOG) corrective regimen sliding scale   SubCutaneous at bedtime  insulin lispro Injectable (HumaLOG) 4 Unit(s) SubCutaneous three times a day before meals  levoFLOXacin IVPB 250 milliGRAM(s) IV Intermittent every 24 hours  levoFLOXacin IVPB      metoprolol tartrate 25 milliGRAM(s) Oral two times a day  NIFEdipine XL 60 milliGRAM(s) Oral at bedtime  NIFEdipine XL 30 milliGRAM(s) Oral daily  ondansetron Injectable 4 milliGRAM(s) IV Push once  polyethylene glycol 3350 17 Gram(s) Oral daily  potassium acid phosphate/sodium acid phosphate tablet (K-PHOS No. 2) 1 Tablet(s) Oral four times a day with meals  predniSONE   Tablet 20 milliGRAM(s) Oral <User Schedule>  senna 2 Tablet(s) Oral at bedtime  sodium chloride 1 Gram(s) Oral daily    MEDICATIONS  (PRN):  acetaminophen   Tablet .. 650 milliGRAM(s) Oral every 6 hours PRN Temp greater or equal to 38C (100.4F), Mild Pain (1 - 3), Moderate Pain (4 - 6), Severe Pain (7 - 10)  dextrose 40% Gel 15 Gram(s) Oral once PRN Blood Glucose LESS THAN 70 milliGRAM(s)/deciliter  glucagon  Injectable 1 milliGRAM(s) IntraMuscular once PRN Glucose LESS THAN 70 milligrams/deciliter  HYDROmorphone  Injectable 0.5 milliGRAM(s) IV Push every 4 hours PRN Severe Pain (7 - 10)  levalbuterol Inhalation 0.63 milliGRAM(s) Inhalation every 6 hours PRN SOB/wheezing      Allergies    ACE inhibitors (Angioedema)    Intolerances      Review of Systems:  Constitutional: No fever  HEENT: No pain  Cardiovascular: No chest pain, palpitations  Respiratory: No SOB, no cough  GI: No nausea, vomiting, abdominal pain      PHYSICAL EXAM:  VITALS: T(C): 36.4 (09-08-18 @ 12:19)  T(F): 97.5 (09-08-18 @ 12:19), Max: 98.6 (09-07-18 @ 19:29)  HR: 66 (09-08-18 @ 12:19) (66 - 118)  BP: 142/55 (09-08-18 @ 12:19) (142/55 - 215/110)  RR:  (18 - 22)  SpO2:  (97% - 100%)  Wt(kg): --  GENERAL: NAD  RESPIRATORY: Clear to auscultation bilaterally; No rales, rhonchi, wheezing  CARDIOVASCULAR: Regular rate and rhythm; no peripheral edema  GI: Soft, nontender, non distended, normal bowel sounds    POCT Blood Glucose.: 270 mg/dL (09-08-18 @ 08:39)  POCT Blood Glucose.: 110 mg/dL (09-07-18 @ 17:18)  POCT Blood Glucose.: 236 mg/dL (09-07-18 @ 12:17)  POCT Blood Glucose.: 266 mg/dL (09-07-18 @ 09:59)  POCT Blood Glucose.: 236 mg/dL (09-06-18 @ 21:46)  POCT Blood Glucose.: 229 mg/dL (09-06-18 @ 16:53)  POCT Blood Glucose.: 258 mg/dL (09-06-18 @ 11:37)  POCT Blood Glucose.: 248 mg/dL (09-06-18 @ 08:04)  POCT Blood Glucose.: 145 mg/dL (09-05-18 @ 21:38)  POCT Blood Glucose.: 209 mg/dL (09-05-18 @ 17:35)      09-08    126<L>  |  87<L>  |  30<H>  ----------------------------<  239<H>  3.7   |  26  |  0.90    EGFR if : 67  EGFR if non : 58    Ca    8.3<L>      09-08  Mg     1.7     09-08  Phos  2.9     09-08    TPro  6.3  /  Alb  2.6<L>  /  TBili  0.7  /  DBili  x   /  AST  25  /  ALT  27  /  AlkPhos  84  09-08          Thyroid Function Tests:      Hemoglobin A1C, Whole Blood: 9.8 % <H> [4.0 - 5.6] (09-05-18 @ 02:55)

## 2018-09-08 NOTE — CONSULT NOTE ADULT - PROBLEM SELECTOR RECOMMENDATION 9
- patient now improved and sating well on BiPAP  - recommend continuing BiPAP tonight as reassess in morning   - POCUS showed clear lungs with small bilateral pleural effusion, so no further need for lasix at this time   - continue with BP and rate control     No need for MICU at this time     Yoshi Cox MD PGY3  [phone] / [id number]

## 2018-09-08 NOTE — CONSULT NOTE ADULT - CONSULT REQUESTED BY NAME
Chiara Ferrell
Dr Ferrell
Dr Ferrell
Dr. Ferrell
Dr. Ferrell
Ghassan
Ghassan
Medicine
armani
Ghassan

## 2018-09-08 NOTE — CONSULT NOTE ADULT - PROBLEM SELECTOR PROBLEM 1
Pulmonary emphysema, unspecified emphysema type
Acute pain of right shoulder
Acute respiratory failure with hypoxia
Cerebellar lesion
Malignant neoplasm of upper lobe of right lung
Type 2 diabetes mellitus with hyperglycemia, without long-term current use of insulin

## 2018-09-08 NOTE — CONSULT NOTE ADULT - ATTENDING COMMENTS
agree w assessment and plan above
Cerbellar metastasis  Possible radiosurgery in future
87 yo with adeno CA of lung, RUL mass, brain met; in MICU this admission treated for COPD exascerbation with NIV BiPAP, prednisone and Levaquin; MAT in MICU; now RRT for hypertension and respiratory distress Afib with RVR.  She was treated with BiPAP, lasix and Metoprolol with relief of distress and improvement in HR and BP and oxygenation  Pt seen and examined with family present at bedside.  On BiPAP in NAD  irreg  POCUS showing A line predominance anteriorly with bilat Pleff small L>R and small area of atalectasis on L post base  Resolved acute respiratory distress likely 2/2 pulm edema precipitated by RVR and BP elevation  Recommend continue BiPAP as needed; monitor I/O and HR; encourage deep breaths/inspiratory spirometer  She does not require ICU admission at this time  d/w family - three daughters - at bedside
Pt with lung ca, possible hemorrhagic brain mets, developed hypoxemia and was transferred to MICU for further care.
86F DM2 exacerbated by steroids. Currently NPO - agree with Lantus 10u qhs and moderate correction scale q6h.  Check HbA1c. DC plan to be finalized with more data. Outpatient follow up with Dr. Alcocer.

## 2018-09-08 NOTE — CONSULT NOTE ADULT - ASSESSMENT
85 y/o F PMH Lung CA (Adenocarcinoma, Dx 2010 s/p L VATS, wedge resection, RT in July 2017),  PMR, HTN, COPD with emphysema, DM2 p/w worsening Rt shoulder pain from partial supraspinatus tear, found to have possible met in brain course c/b by MAT in setting of respiratory distress from COPD exacerbation and Herpes-Zoster infection. MICU consult called for recurrent respiratory distress in setting of afib w/ RVR and hypertension, now improved.

## 2018-09-08 NOTE — CONSULT NOTE ADULT - CONSULT REASON
89 yo with pmh  apical lung cancer admitted for pain in the right shoulder
Brain Lesion/Arm Pain
Goals of care
Left cerebellar lesion
Rash
Respiratory distress
Type 2 DM with hyperglycemia on steroids
hypoxemia
hypoxia
lung ca, pain to r shoulder

## 2018-09-08 NOTE — PROGRESS NOTE ADULT - SUBJECTIVE AND OBJECTIVE BOX
Patient seen and examined    REVIEW OF SYSTEMS:  As per HPI, otherwise 8 full 10 ROS were unremarkable    MEDICATIONS  (STANDING):  acyclovir IVPB 650 milliGRAM(s) IV Intermittent every 12 hours  chlorhexidine 4% Liquid 1 Application(s) Topical <User Schedule>  dextrose 5%. 1000 milliLiter(s) (50 mL/Hr) IV Continuous <Continuous>  dextrose 50% Injectable 12.5 Gram(s) IV Push once  dextrose 50% Injectable 25 Gram(s) IV Push once  dextrose 50% Injectable 25 Gram(s) IV Push once  docusate sodium 100 milliGRAM(s) Oral three times a day  famotidine    Tablet 20 milliGRAM(s) Oral daily  fenofibrate Tablet 145 milliGRAM(s) Oral daily  gabapentin 100 milliGRAM(s) Oral daily  insulin glargine Injectable (LANTUS) 10 Unit(s) SubCutaneous at bedtime  insulin lispro (HumaLOG) corrective regimen sliding scale   SubCutaneous three times a day before meals  insulin lispro (HumaLOG) corrective regimen sliding scale   SubCutaneous at bedtime  insulin lispro Injectable (HumaLOG) 4 Unit(s) SubCutaneous three times a day before meals  levoFLOXacin IVPB 250 milliGRAM(s) IV Intermittent every 24 hours  levoFLOXacin IVPB      metoprolol tartrate 25 milliGRAM(s) Oral two times a day  NIFEdipine XL 60 milliGRAM(s) Oral at bedtime  NIFEdipine XL 30 milliGRAM(s) Oral daily  ondansetron Injectable 4 milliGRAM(s) IV Push once  polyethylene glycol 3350 17 Gram(s) Oral daily  potassium acid phosphate/sodium acid phosphate tablet (K-PHOS No. 2) 1 Tablet(s) Oral four times a day with meals  predniSONE   Tablet 20 milliGRAM(s) Oral <User Schedule>  senna 2 Tablet(s) Oral at bedtime  sodium chloride 1 Gram(s) Oral daily      VITAL:  T(C): , Max: 37 (09-07-18 @ 19:29)  T(F): , Max: 98.6 (09-07-18 @ 19:29)  HR: 66 (09-08-18 @ 12:19)  BP: 142/55 (09-08-18 @ 12:19)  BP(mean): --  RR: 18 (09-08-18 @ 12:19)  SpO2: 100% (09-08-18 @ 12:19)  Wt(kg): --    I and O's:    09-07 @ 07:01  -  09-08 @ 07:00  --------------------------------------------------------  IN: 663 mL / OUT: 520 mL / NET: 143 mL    09-08 @ 07:01  -  09-08 @ 13:04  --------------------------------------------------------  IN: 118 mL / OUT: 150 mL / NET: -32 mL          PHYSICAL EXAM:    Constitutional: NAD  HEENT: PERRLA, EOMI,  MMM  Neck: No LAD, No JVD  Respiratory: CTAB  Cardiovascular: S1 and S2  Gastrointestinal: BS+, soft, NT/ND  Extremities: No peripheral edema  Neurological: A/O x 3, no focal deficits  Psychiatric: Normal mood, normal affect  : No Alcaraz  Skin: No rashes  Access: Not applicable    LABS:                        9.2    7.00  )-----------( 267      ( 08 Sep 2018 06:28 )             28.7     09-08    126<L>  |  87<L>  |  30<H>  ----------------------------<  239<H>  3.7   |  26  |  0.90    Ca    8.3<L>      08 Sep 2018 06:28  Phos  2.9     09-08  Mg     1.7     09-08    TPro  6.3  /  Alb  2.6<L>  /  TBili  0.7  /  DBili  x   /  AST  25  /  ALT  27  /  AlkPhos  84  09-08      ASSESSMENT AND PLAN:  The patient is an 86-year-old female with past medical history of diabetes, hypertension, polymyalgia rheumatica, lung cancer with metastasis, who presents with hyponatremia.  Her urine indices are consistent with syndrome of inappropriate secretion of antidiuretic hormone.  She also may have a component of tea and toast diet.  Usually these patients have a low BUN, but her BUN is elevated, likely from the effects of the prednisone.  The prednisone is also leading to osmotic diuresis secondary to the high blood sugars.  Goals of therapy are to improve her intake of protein.  Antidiuretic hormone secretion can absolutely be possible from her malignancy (especially for lung cancer).    Gastrointestinal: Continue Glucerna three times a day along with Ensure pudding & Blake.  Renal: Hyponatremia:   Continue NaCL tabs as ordered for now; Hyponatremia will be a constant issue for her.  In her antibiotics, she is getting essentially 400 mL of dextrose; slight downtrend in Na+ this Am though acceptable for now   Cardiovascular:  Unclear if her blood pressure is elevated because it is high or it is elevated secondary to the stress of being in pain secondary to the zoster; BPs acceptable for now  Defer to primary care physician regarding gabapentin to help with the pain sensation.

## 2018-09-08 NOTE — CONSULT NOTE ADULT - PROVIDER SPECIALTY LIST ADULT
Cardiology
Dermatology
Endocrinology
Heme/Onc
MICU
Neurology
Neurosurgery
Palliative Care
Pulmonology
Rad Onc

## 2018-09-08 NOTE — PROGRESS NOTE ADULT - PROBLEM SELECTOR PLAN 1
BG remain elevated  Will increase Lantus to 12 units qhs   Will continue Humalog 4/4/4 for now given poor po intake and decrease in Prednisone dose  Continue moderate correction scale before meals and moderate bedtime.  Prednisone taper is ordered.  Doses of insulin may need to be decreased as steroids are tapered.    DC plan: patient was to be started on basal insulin as outpatient in addition to oral agents. For dc will need to clarify goals of care. Outpatient endocrinologist Dr. Alcocer.

## 2018-09-08 NOTE — CONSULT NOTE ADULT - REASON FOR ADMISSION
Patient is a 86y old  Female who presents with a chief complaint of R. Shoulder Pain
COPD exacerbation
Patient is a 86y old  Female who presents with a chief complaint of R. Shoulder Pain

## 2018-09-08 NOTE — CONSULT NOTE ADULT - SUBJECTIVE AND OBJECTIVE BOX
CHIEF COMPLAINT: Shortness of breath    HPI:  86 F w/ lung ca s/p L VATS and RT in July and COPD initially presented w/ shoulder pain found to have worsening R lung lesions and new L lung mass concerning for malignancy w/ new brain lesions seen on CTH. Pt developed significant wheezing and resp distress requiring BiPAP support and admitted to MICU for airway monitoring. MICU course c/b MAT. Pt started on prednisone and Levaquin for COPD exacerbation. Initially required BiPAP and hi-sapphire but now weaned to NC. Pt seen by rad-onc with possible plans for palliative radiation to manage pain from lung mass/ supraspinatus tear.   Patient was transferred to the floors on NC and was diagnosed with Shingles today. Daughters were at bedside this afternoon and report after having dinner started having increased work of breathing. A rapid response was called and she was found to be hypertensive (), in afib w/ RVR and tachypneic (30s). She was changed from NC back to BiPAP, given lopressor and lasix. After these interventions her breathing, HR and BP improved.       PAST MEDICAL & SURGICAL HISTORY:  Dyslipidemia  Hypertension  Diabetes  Lung cancer  Lung cancer: diagnosed 2010  S/P cholecystectomy  H/O carotid endarterectomy: left      FAMILY HISTORY:  No pertinent family history in first degree relatives      SOCIAL HISTORY:  Former smoker, quit > 20 years ago, 1 ppd > 30 years, no alcohol, illicit drug use  Lives alone at home, independent in ADLs    Allergies    ACE inhibitors (Angioedema)    Intolerances        HOME MEDICATIONS:    REVIEW OF SYSTEMS:  Constitutional: fells cold   Eyes:  ENT:  CV: no CP  Resp: no CP or SOB on BiPAP  GI: no Abd pain  :  MSK:  Integumentary: + pain in R shoulder and arm   Neurological:  Psychiatric:  Endocrine:  Hematologic/Lymphatic:  Allergic/Immunologic:  [X ] All other systems negative  [ ] Unable to assess ROS because ________    OBJECTIVE:  ICU Vital Signs Last 24 Hrs  T(C): 37 (07 Sep 2018 19:29), Max: 37 (07 Sep 2018 19:29)  T(F): 98.6 (07 Sep 2018 19:29), Max: 98.6 (07 Sep 2018 19:29)  HR: 77 (07 Sep 2018 22:45) (77 - 107)  BP: 187/75 (07 Sep 2018 19:29) (158/54 - 187/75)  BP(mean): --  ABP: --  ABP(mean): --  RR: 18 (07 Sep 2018 19:29) (17 - 18)  SpO2: 99% (07 Sep 2018 22:45) (96% - 99%)        09-06 @ 07:01  -  09-07 @ 07:00  --------------------------------------------------------  IN: 370 mL / OUT: 500 mL / NET: -130 mL      CAPILLARY BLOOD GLUCOSE      POCT Blood Glucose.: 110 mg/dL (07 Sep 2018 17:18)      PHYSICAL EXAM:    GENERAL: Comfortable, no acute distress   HEAD:  Normocephalic, atraumatic  HEENT: Moist mucous membranes  NECK: Supple  NERVOUS SYSTEM:  Alert & Oriented X3, Motor Strength 5/5 B/L upper and lower extremities  CHEST/LUNG: Clear to auscultation bilaterally, on BiPAP  HEART: Irregularly irregular   ABDOMEN: Soft, Nontender, Nondistended, Bowel sounds present  EXTREMITIES:   No clubbing, cyanosis, or edema  MUSCULOSKELETAL: No muscle tenderness, no joint tenderness  SKIN:  warm and dry, + rash on R upper chest and R arm         HOSPITAL MEDICATIONS:  MEDICATIONS  (STANDING):  acyclovir IVPB 650 milliGRAM(s) IV Intermittent every 12 hours  chlorhexidine 4% Liquid 1 Application(s) Topical <User Schedule>  dextrose 5%. 1000 milliLiter(s) (50 mL/Hr) IV Continuous <Continuous>  dextrose 50% Injectable 12.5 Gram(s) IV Push once  dextrose 50% Injectable 25 Gram(s) IV Push once  dextrose 50% Injectable 25 Gram(s) IV Push once  docusate sodium 100 milliGRAM(s) Oral three times a day  famotidine    Tablet 20 milliGRAM(s) Oral daily  fenofibrate Tablet 145 milliGRAM(s) Oral daily  gabapentin 100 milliGRAM(s) Oral daily  influenza   Vaccine 0.5 milliLiter(s) IntraMuscular once  insulin glargine Injectable (LANTUS) 10 Unit(s) SubCutaneous at bedtime  insulin lispro (HumaLOG) corrective regimen sliding scale   SubCutaneous three times a day before meals  insulin lispro (HumaLOG) corrective regimen sliding scale   SubCutaneous at bedtime  insulin lispro Injectable (HumaLOG) 4 Unit(s) SubCutaneous three times a day before meals  levoFLOXacin IVPB 250 milliGRAM(s) IV Intermittent every 24 hours  levoFLOXacin IVPB      metoprolol tartrate 25 milliGRAM(s) Oral two times a day  ondansetron Injectable 4 milliGRAM(s) IV Push once  polyethylene glycol 3350 17 Gram(s) Oral daily  potassium acid phosphate/sodium acid phosphate tablet (K-PHOS No. 2) 1 Tablet(s) Oral four times a day with meals  potassium chloride    Tablet ER 40 milliEquivalent(s) Oral every 4 hours  predniSONE   Tablet 20 milliGRAM(s) Oral once  predniSONE   Tablet 20 milliGRAM(s) Oral <User Schedule>  senna 2 Tablet(s) Oral at bedtime  sodium chloride 1 Gram(s) Oral daily    MEDICATIONS  (PRN):  acetaminophen   Tablet .. 650 milliGRAM(s) Oral every 6 hours PRN Temp greater or equal to 38C (100.4F), Mild Pain (1 - 3), Moderate Pain (4 - 6), Severe Pain (7 - 10)  dextrose 40% Gel 15 Gram(s) Oral once PRN Blood Glucose LESS THAN 70 milliGRAM(s)/deciliter  glucagon  Injectable 1 milliGRAM(s) IntraMuscular once PRN Glucose LESS THAN 70 milligrams/deciliter  HYDROmorphone  Injectable 0.5 milliGRAM(s) IV Push every 4 hours PRN Severe Pain (7 - 10)  levalbuterol Inhalation 0.63 milliGRAM(s) Inhalation every 6 hours PRN SOB/wheezing      LABS:                        9.6    7.24  )-----------( 288      ( 07 Sep 2018 23:10 )             29.0     09-07    126<L>  |  85<L>  |  29<H>  ----------------------------<  216<H>  3.4<L>   |  24  |  0.81    Ca    8.5      07 Sep 2018 23:10  Phos  2.2     09-07  Mg     1.6     09-07    TPro  6.5  /  Alb  2.6<L>  /  TBili  0.6  /  DBili  x   /  AST  30  /  ALT  31  /  AlkPhos  85  09-07        Arterial Blood Gas:  09-07 @ 23:10  7.53/33/130/29/98.2/4.3  ABG lactate: 1.2        MICROBIOLOGY:     RADIOLOGY:  [ ] Reviewed and interpreted by me    EKG:

## 2018-09-08 NOTE — PROGRESS NOTE ADULT - SUBJECTIVE AND OBJECTIVE BOX
Cardiovascular Disease Progress Note    Overnight events: No acute events overnight.  s/p treatment for zoster on right shoulder. episode of htn overnight requiring rapid response. currently sleeping - denies cp/sob/pnd/orthopnea.  Otherwise review of systems negative    Objective Findings:  T(C): 36.5 (18 @ 06:03), Max: 37 (18 @ 19:29)  HR: 88 (18 @ 11:20) (77 - 118)  BP: 157/91 (18 @ 06:03) (157/91 - 215/110)  RR: 20 (18 @ 06:03) (17 - 22)  SpO2: 98% (18 @ 11:20) (96% - 100%)  Wt(kg): --  Daily     Daily Weight in k.5 (08 Sep 2018 06:59)      Physical Exam:  Gen: NAD  HEENT: EOMI  CV: RRR, normal S1 + S2, no m/r/g  Lungs: CTAB  Abd: soft, non-tender  Ext: No edema    Telemetry: NSR ST APC's    Laboratory Data:                        9.2    7.00  )-----------( 267      ( 08 Sep 2018 06:28 )             28.7         126<L>  |  87<L>  |  30<H>  ----------------------------<  239<H>  3.7   |  26  |  0.90    Ca    8.3<L>      08 Sep 2018 06:28  Phos  2.9       Mg     1.7         TPro  6.3  /  Alb  2.6<L>  /  TBili  0.7  /  DBili  x   /  AST  25  /  ALT  27  /  AlkPhos  84  -      Inpatient Medications:  MEDICATIONS  (STANDING):  acyclovir IVPB 650 milliGRAM(s) IV Intermittent every 12 hours  chlorhexidine 4% Liquid 1 Application(s) Topical <User Schedule>  dextrose 5%. 1000 milliLiter(s) (50 mL/Hr) IV Continuous <Continuous>  dextrose 50% Injectable 12.5 Gram(s) IV Push once  dextrose 50% Injectable 25 Gram(s) IV Push once  dextrose 50% Injectable 25 Gram(s) IV Push once  docusate sodium 100 milliGRAM(s) Oral three times a day  famotidine    Tablet 20 milliGRAM(s) Oral daily  fenofibrate Tablet 145 milliGRAM(s) Oral daily  gabapentin 100 milliGRAM(s) Oral daily  insulin glargine Injectable (LANTUS) 10 Unit(s) SubCutaneous at bedtime  insulin lispro (HumaLOG) corrective regimen sliding scale   SubCutaneous three times a day before meals  insulin lispro (HumaLOG) corrective regimen sliding scale   SubCutaneous at bedtime  insulin lispro Injectable (HumaLOG) 4 Unit(s) SubCutaneous three times a day before meals  levoFLOXacin IVPB 250 milliGRAM(s) IV Intermittent every 24 hours  levoFLOXacin IVPB      metoprolol tartrate 25 milliGRAM(s) Oral two times a day  ondansetron Injectable 4 milliGRAM(s) IV Push once  polyethylene glycol 3350 17 Gram(s) Oral daily  potassium acid phosphate/sodium acid phosphate tablet (K-PHOS No. 2) 1 Tablet(s) Oral four times a day with meals  predniSONE   Tablet 20 milliGRAM(s) Oral <User Schedule>  senna 2 Tablet(s) Oral at bedtime  sodium chloride 1 Gram(s) Oral daily      Assessment:  86 F with PMH of lung adenoca (dx around , s/p L VATS, XRT of TERESA/RUL last in 2016), COPD with emphysema, htn, and dm2, presented with R shoulder pain which is at least partially related to acute zoster, also incidentally 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     Plan:  1) hypoxic respiratory failure initially requiring BiPAP, COPD, emphysema  -doesnt appear cardiac mediated. appears euvolemic  -possibly infectious vs progression of disease vs copd-e  -would obtain LE duplex to r/o venous thromboembolism as that is also in the differential  -c/w abx per medicine for fevers and consolidation on imaging  -c/w steroids and nebs for possible copd-e    3) elevated troponin, without overt ischemic sx or ischemic changes on ecg  -would defer ischemic workup for now given overall prognosis and inability to tolerate anti-platelet agents/AC given recent imaging findings    4) right should pain, hx of PMR, acute zoster  -appreciate derm recs  -c/w acylovir, prednisone and pain control  -f/u palliative/pain management recs    5) HTN, APC's  -no evidence of atrial fibrillation on visualized ecg's and telemetry  -agree with holding heparin drip  -c/w metoprolol as tolerates  -previously on nifedipine, would resume at home dose    6) cerebellar lesion  -appreciate nsgy/onc recs  -mri/mra results noted    Over 25 minutes spent on total encounter; more than 50% of the visit was spent counseling and/or coordinating care by the attending physician.      Slick York MD   Cardiovascular Disease  [phone]

## 2018-09-08 NOTE — CONSULT NOTE ADULT - CONSULT REQUESTED DATE/TIME
04-Sep-2018
04-Sep-2018 01:14
04-Sep-2018 08:15
04-Sep-2018 13:00
04-Sep-2018 14:23
04-Sep-2018 14:24
05-Sep-2018 13:19
07-Sep-2018 15:20
08-Sep-2018 00:22
06-Sep-2018 14:50

## 2018-09-08 NOTE — PROGRESS NOTE ADULT - SUBJECTIVE AND OBJECTIVE BOX
_________________________________________________________________________________________  ========>>  M E D I C A L   A T T E N D I N G    F O L L O W  U P  N O T E  <<=========  -----------------------------------------------------------------------------------------------------    - Patient seen and examined by me earlier today.   - In summary,  CLIVE HENRY is a 86y year old woman who originally presented with Rt shoulder pain  - Patient overall better /comfortable,  weak, eating a bit better, still with some pain in right shoulder but choosing not to take narcotics     ==================>> REVIEW OF SYSTEM <<=================    GEN: no fever, no chills, + pain as above   RESP: mild SOB, no cough, no sputum  CVS: no chest pain, no palpitations, no edema  GI: no abdominal pain, no nausea  : no dysuria, no frequency, no hematuria  Neuro: no headache, no dizziness  Derm : no itching, rash as above  feels depressed     ==================>> PHYSICAL EXAM <<=================    GEN: A&O X 3 , NAD , comfortable  HEENT: NCAT, PERRL, MMM, hearing intact, on high flow   Neck: supple , no JVD  CVS: S1S2 , Irregular , No M/R/G appreciated  PULM: CTA B/L   ABD.: soft. non tender, non distended,  bowel sounds present  Extrem: intact pulses , no edema   Derm: Dermatomally distributed vesicular rash on right C3/4 dermatome    PSYCH : flat affect         ==================>> MEDICATIONS <<====================    acyclovir IVPB 650 milliGRAM(s) IV Intermittent every 12 hours  chlorhexidine 4% Liquid 1 Application(s) Topical <User Schedule>  dextrose 5%. 1000 milliLiter(s) IV Continuous <Continuous>  dextrose 50% Injectable 12.5 Gram(s) IV Push once  dextrose 50% Injectable 25 Gram(s) IV Push once  dextrose 50% Injectable 25 Gram(s) IV Push once  docusate sodium 100 milliGRAM(s) Oral three times a day  famotidine    Tablet 20 milliGRAM(s) Oral daily  fenofibrate Tablet 145 milliGRAM(s) Oral daily  gabapentin 100 milliGRAM(s) Oral daily  insulin glargine Injectable (LANTUS) 12 Unit(s) SubCutaneous at bedtime  insulin lispro (HumaLOG) corrective regimen sliding scale   SubCutaneous three times a day before meals  insulin lispro (HumaLOG) corrective regimen sliding scale   SubCutaneous at bedtime  insulin lispro Injectable (HumaLOG) 4 Unit(s) SubCutaneous three times a day before meals  levoFLOXacin IVPB 250 milliGRAM(s) IV Intermittent every 24 hours  levoFLOXacin IVPB      metoprolol tartrate 25 milliGRAM(s) Oral two times a day  NIFEdipine XL 60 milliGRAM(s) Oral at bedtime  NIFEdipine XL 30 milliGRAM(s) Oral daily  ondansetron Injectable 4 milliGRAM(s) IV Push once  polyethylene glycol 3350 17 Gram(s) Oral daily  senna 2 Tablet(s) Oral at bedtime  sodium chloride 1 Gram(s) Oral daily    MEDICATIONS  (PRN):  acetaminophen   Tablet .. 650 milliGRAM(s) Oral every 6 hours PRN Temp greater or equal to 38C (100.4F), Mild Pain (1 - 3), Moderate Pain (4 - 6), Severe Pain (7 - 10)  dextrose 40% Gel 15 Gram(s) Oral once PRN Blood Glucose LESS THAN 70 milliGRAM(s)/deciliter  glucagon  Injectable 1 milliGRAM(s) IntraMuscular once PRN Glucose LESS THAN 70 milligrams/deciliter  HYDROmorphone  Injectable 0.5 milliGRAM(s) IV Push every 4 hours PRN Severe Pain (7 - 10)  levalbuterol Inhalation 0.63 milliGRAM(s) Inhalation every 6 hours PRN SOB/wheezing    ==================>> VITAL SIGNS <<==================    Vital Signs Last 24 Hrs  T(C): 36.4 (09-08-18 @ 12:19)  T(F): 97.5 (09-08-18 @ 12:19), Max: 98.6 (09-07-18 @ 19:29)  HR: 93 (09-08-18 @ 18:02) (66 - 118)  BP: 142/55 (09-08-18 @ 12:19)  BP(mean): --  RR: 18 (09-08-18 @ 12:19) (18 - 22)  SpO2: 98% (09-08-18 @ 17:39) (97% - 100%)    CAPILLARY BLOOD GLUCOSE  356 (08 Sep 2018 12:57)      POCT Blood Glucose.: 237 mg/dL (08 Sep 2018 17:09)  POCT Blood Glucose.: 270 mg/dL (08 Sep 2018 08:39)     ==================>> LAB AND IMAGING <<==================                        9.2    7.00  )-----------( 267      ( 08 Sep 2018 06:28 )             28.7        Hemoglobin:   9.2 <<==,  9.6 <<==,  9.1 <<==,  8.9 <<==,  10.1 <<==,  9.5 <<==    126<L>  |  87<L>  |  30<H>  ----------------------------<  239<H>  3.7   |  26  |  0.90    Sodium:   126  <==, 126  <==, 127  <==, 127  <==, 125  <==, 126  <==, 124  <==    Ca    8.3<L>      08 Sep 2018 06:28  Phos  2.9     09-08  Mg     1.7     09-08    TPro  6.3  /  Alb  2.6<L>  /  TBili  0.7  /  DBili  x   /  AST  25  /  ALT  27  /  AlkPhos  84  09-08    ___________________________________________________________________________________  ===============>>  A S S E S S M E N T   A N D   P L A N <<===============  ------------------------------------------------------------------------------------------    · Assessment		  87 y/o F PMH Lung CA (unknown stage or type, Dx 2010 s/p L VATS, wedge resection, RT in July 2017), PMR, HTN, DM2 p/w worsening Rt shoulder pain from partial supraspinatus tear, found to have poss hemorrhagic met to brain pending MRI.     ·  Problem: Herpes Zoster  continue IV antivirals as pt immunocompromised, and not taking much PO to begin with..  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  - rehab planing early week if improved / pending improvements in shingles lesions  --------------------------------------------  Case discussed with pt and Dtr, cardio  Education given on findings and plan of care  ___________________________  KARLA Ferrell D.O.  Pager: [phone]

## 2018-09-08 NOTE — PROGRESS NOTE ADULT - ASSESSMENT
87 y/o female with PMH  of Type 2 DM, Lung CA (unknown stage or type, Dx 2010 s/p L VATS, wedge resection, RT in July 2017), PMR on chronic steroids, HTN, p/w worsening Rt shoulder pain for last two months. Admitted with concerning lesion on MRI head for brain met. On steroids with hyperglycemia.

## 2018-09-09 LAB
ALBUMIN SERPL ELPH-MCNC: 2.3 G/DL — LOW (ref 3.3–5)
ALP SERPL-CCNC: 77 U/L — SIGNIFICANT CHANGE UP (ref 40–120)
ALT FLD-CCNC: 25 U/L — SIGNIFICANT CHANGE UP (ref 4–33)
AST SERPL-CCNC: 23 U/L — SIGNIFICANT CHANGE UP (ref 4–32)
BACTERIA BLD CULT: SIGNIFICANT CHANGE UP
BASOPHILS # BLD AUTO: 0.01 K/UL — SIGNIFICANT CHANGE UP (ref 0–0.2)
BASOPHILS NFR BLD AUTO: 0.1 % — SIGNIFICANT CHANGE UP (ref 0–2)
BILIRUB SERPL-MCNC: 0.5 MG/DL — SIGNIFICANT CHANGE UP (ref 0.2–1.2)
BUN SERPL-MCNC: 31 MG/DL — HIGH (ref 7–23)
CALCIUM SERPL-MCNC: 8.4 MG/DL — SIGNIFICANT CHANGE UP (ref 8.4–10.5)
CHLORIDE SERPL-SCNC: 88 MMOL/L — LOW (ref 98–107)
CO2 SERPL-SCNC: 30 MMOL/L — SIGNIFICANT CHANGE UP (ref 22–31)
CREAT SERPL-MCNC: 0.93 MG/DL — SIGNIFICANT CHANGE UP (ref 0.5–1.3)
EOSINOPHIL # BLD AUTO: 0.02 K/UL — SIGNIFICANT CHANGE UP (ref 0–0.5)
EOSINOPHIL NFR BLD AUTO: 0.3 % — SIGNIFICANT CHANGE UP (ref 0–6)
GLUCOSE BLDC GLUCOMTR-MCNC: 150 MG/DL — HIGH (ref 70–99)
GLUCOSE BLDC GLUCOMTR-MCNC: 154 MG/DL — HIGH (ref 70–99)
GLUCOSE BLDC GLUCOMTR-MCNC: 248 MG/DL — HIGH (ref 70–99)
GLUCOSE BLDC GLUCOMTR-MCNC: 277 MG/DL — HIGH (ref 70–99)
GLUCOSE SERPL-MCNC: 130 MG/DL — HIGH (ref 70–99)
HCT VFR BLD CALC: 29.1 % — LOW (ref 34.5–45)
HGB BLD-MCNC: 9.3 G/DL — LOW (ref 11.5–15.5)
IMM GRANULOCYTES # BLD AUTO: 0.23 # — SIGNIFICANT CHANGE UP
IMM GRANULOCYTES NFR BLD AUTO: 3 % — HIGH (ref 0–1.5)
LYMPHOCYTES # BLD AUTO: 1.12 K/UL — SIGNIFICANT CHANGE UP (ref 1–3.3)
LYMPHOCYTES # BLD AUTO: 14.7 % — SIGNIFICANT CHANGE UP (ref 13–44)
MAGNESIUM SERPL-MCNC: 1.8 MG/DL — SIGNIFICANT CHANGE UP (ref 1.6–2.6)
MCHC RBC-ENTMCNC: 25.4 PG — LOW (ref 27–34)
MCHC RBC-ENTMCNC: 32 % — SIGNIFICANT CHANGE UP (ref 32–36)
MCV RBC AUTO: 79.5 FL — LOW (ref 80–100)
MONOCYTES # BLD AUTO: 0.34 K/UL — SIGNIFICANT CHANGE UP (ref 0–0.9)
MONOCYTES NFR BLD AUTO: 4.5 % — SIGNIFICANT CHANGE UP (ref 2–14)
NEUTROPHILS # BLD AUTO: 5.91 K/UL — SIGNIFICANT CHANGE UP (ref 1.8–7.4)
NEUTROPHILS NFR BLD AUTO: 77.4 % — HIGH (ref 43–77)
NRBC # FLD: 0 — SIGNIFICANT CHANGE UP
PHOSPHATE SERPL-MCNC: 3.2 MG/DL — SIGNIFICANT CHANGE UP (ref 2.5–4.5)
PLATELET # BLD AUTO: 263 K/UL — SIGNIFICANT CHANGE UP (ref 150–400)
PMV BLD: 9.8 FL — SIGNIFICANT CHANGE UP (ref 7–13)
POTASSIUM SERPL-MCNC: 3.6 MMOL/L — SIGNIFICANT CHANGE UP (ref 3.5–5.3)
POTASSIUM SERPL-SCNC: 3.6 MMOL/L — SIGNIFICANT CHANGE UP (ref 3.5–5.3)
PROT SERPL-MCNC: 6 G/DL — SIGNIFICANT CHANGE UP (ref 6–8.3)
RBC # BLD: 3.66 M/UL — LOW (ref 3.8–5.2)
RBC # FLD: 15.2 % — HIGH (ref 10.3–14.5)
SODIUM SERPL-SCNC: 130 MMOL/L — LOW (ref 135–145)
WBC # BLD: 7.63 K/UL — SIGNIFICANT CHANGE UP (ref 3.8–10.5)
WBC # FLD AUTO: 7.63 K/UL — SIGNIFICANT CHANGE UP (ref 3.8–10.5)

## 2018-09-09 PROCEDURE — 99232 SBSQ HOSP IP/OBS MODERATE 35: CPT

## 2018-09-09 RX ORDER — INSULIN LISPRO 100/ML
6 VIAL (ML) SUBCUTANEOUS
Qty: 0 | Refills: 0 | Status: DISCONTINUED | OUTPATIENT
Start: 2018-09-09 | End: 2018-09-14

## 2018-09-09 RX ADMIN — Medication 25 MILLIGRAM(S): at 21:26

## 2018-09-09 RX ADMIN — Medication 4 UNIT(S): at 12:42

## 2018-09-09 RX ADMIN — HYDROMORPHONE HYDROCHLORIDE 0.5 MILLIGRAM(S): 2 INJECTION INTRAMUSCULAR; INTRAVENOUS; SUBCUTANEOUS at 18:13

## 2018-09-09 RX ADMIN — Medication 400 MILLIGRAM(S): at 00:25

## 2018-09-09 RX ADMIN — SODIUM CHLORIDE 1 GRAM(S): 9 INJECTION INTRAMUSCULAR; INTRAVENOUS; SUBCUTANEOUS at 09:28

## 2018-09-09 RX ADMIN — Medication 650 MILLIGRAM(S): at 15:15

## 2018-09-09 RX ADMIN — Medication 145 MILLIGRAM(S): at 09:28

## 2018-09-09 RX ADMIN — Medication 650 MILLIGRAM(S): at 07:34

## 2018-09-09 RX ADMIN — GABAPENTIN 100 MILLIGRAM(S): 400 CAPSULE ORAL at 09:29

## 2018-09-09 RX ADMIN — Medication 263 MILLIGRAM(S): at 06:51

## 2018-09-09 RX ADMIN — Medication 4 UNIT(S): at 09:24

## 2018-09-09 RX ADMIN — Medication 1000 MILLIGRAM(S): at 01:20

## 2018-09-09 RX ADMIN — FAMOTIDINE 20 MILLIGRAM(S): 10 INJECTION INTRAVENOUS at 09:29

## 2018-09-09 RX ADMIN — Medication 6 UNIT(S): at 18:05

## 2018-09-09 RX ADMIN — Medication 30 MILLIGRAM(S): at 06:51

## 2018-09-09 RX ADMIN — Medication 263 MILLIGRAM(S): at 18:05

## 2018-09-09 RX ADMIN — HYDROMORPHONE HYDROCHLORIDE 0.5 MILLIGRAM(S): 2 INJECTION INTRAMUSCULAR; INTRAVENOUS; SUBCUTANEOUS at 18:55

## 2018-09-09 RX ADMIN — Medication 6: at 09:24

## 2018-09-09 RX ADMIN — INSULIN GLARGINE 12 UNIT(S): 100 INJECTION, SOLUTION SUBCUTANEOUS at 22:54

## 2018-09-09 RX ADMIN — Medication 4: at 12:41

## 2018-09-09 RX ADMIN — Medication 650 MILLIGRAM(S): at 22:50

## 2018-09-09 RX ADMIN — Medication 650 MILLIGRAM(S): at 10:35

## 2018-09-09 RX ADMIN — Medication 25 MILLIGRAM(S): at 09:30

## 2018-09-09 RX ADMIN — Medication 650 MILLIGRAM(S): at 14:15

## 2018-09-09 RX ADMIN — Medication 25 MILLIGRAM(S): at 21:25

## 2018-09-09 RX ADMIN — Medication 650 MILLIGRAM(S): at 23:43

## 2018-09-09 RX ADMIN — GABAPENTIN 200 MILLIGRAM(S): 400 CAPSULE ORAL at 01:21

## 2018-09-09 RX ADMIN — Medication 60 MILLIGRAM(S): at 21:25

## 2018-09-09 NOTE — PROGRESS NOTE ADULT - SUBJECTIVE AND OBJECTIVE BOX
Chief Complaint: hyperglycemia    History: 87 y/o female with PMH  of Type 2 DM, Lung CA (unknown stage or type, Dx 2010 s/p L VATS, wedge resection, RT in July 2017), PMR on chronic steroids, HTN, p/w worsening Rt shoulder pain for last two months. Admitted with concerning lesion on MRI head for brain met. On steroids with hyperglycemia. Patient reports less shoulder pain today. Tolerating po intake. No nausea or vomiting or abdominal pain. Prednisone now 30 mg daily.       MEDICATIONS  (STANDING):  acyclovir IVPB 650 milliGRAM(s) IV Intermittent every 12 hours  dextrose 5%. 1000 milliLiter(s) (50 mL/Hr) IV Continuous <Continuous>  dextrose 50% Injectable 12.5 Gram(s) IV Push once  dextrose 50% Injectable 25 Gram(s) IV Push once  dextrose 50% Injectable 25 Gram(s) IV Push once  docusate sodium 100 milliGRAM(s) Oral three times a day  famotidine    Tablet 20 milliGRAM(s) Oral daily  fenofibrate Tablet 145 milliGRAM(s) Oral daily  gabapentin 100 milliGRAM(s) Oral daily  insulin glargine Injectable (LANTUS) 12 Unit(s) SubCutaneous at bedtime  insulin lispro (HumaLOG) corrective regimen sliding scale   SubCutaneous three times a day before meals  insulin lispro (HumaLOG) corrective regimen sliding scale   SubCutaneous at bedtime  insulin lispro Injectable (HumaLOG) 4 Unit(s) SubCutaneous three times a day before meals  metoprolol tartrate 25 milliGRAM(s) Oral two times a day  NIFEdipine XL 60 milliGRAM(s) Oral at bedtime  NIFEdipine XL 30 milliGRAM(s) Oral daily  ondansetron Injectable 4 milliGRAM(s) IV Push once  polyethylene glycol 3350 17 Gram(s) Oral daily  predniSONE   Tablet 30 milliGRAM(s) Oral daily  senna 2 Tablet(s) Oral at bedtime  sodium chloride 1 Gram(s) Oral daily    MEDICATIONS  (PRN):  acetaminophen   Tablet .. 650 milliGRAM(s) Oral every 6 hours PRN Temp greater or equal to 38C (100.4F), Mild Pain (1 - 3), Moderate Pain (4 - 6), Severe Pain (7 - 10)  dextrose 40% Gel 15 Gram(s) Oral once PRN Blood Glucose LESS THAN 70 milliGRAM(s)/deciliter  glucagon  Injectable 1 milliGRAM(s) IntraMuscular once PRN Glucose LESS THAN 70 milligrams/deciliter  HYDROmorphone  Injectable 0.5 milliGRAM(s) IV Push every 4 hours PRN Severe Pain (7 - 10)  levalbuterol Inhalation 0.63 milliGRAM(s) Inhalation every 6 hours PRN SOB/wheezing      Allergies    ACE inhibitors (Angioedema)    Intolerances      Review of Systems:  Constitutional: No fever  Cardiovascular: No chest pain, palpitations  Respiratory: Breathing is stable  GI: No nausea, vomiting, abdominal pain  Skin: rash noted on right lateral neck    PHYSICAL EXAM:  VITALS: T(C): 36.3 (09-09-18 @ 12:24)  T(F): 97.3 (09-09-18 @ 12:24), Max: 97.9 (09-08-18 @ 21:40)  HR: 62 (09-09-18 @ 12:24) (62 - 94)  BP: 120/71 (09-09-18 @ 12:24) (120/71 - 157/96)  RR:  (18 - 20)  SpO2:  (98% - 100%)  Wt(kg): --  GENERAL: NAD  RESPIRATORY: Clear to auscultation bilaterall  CARDIOVASCULAR: +S1/S2  GI: Soft, nontender, non distended, normal bowel sounds  PSYCH: normal affect, normal mood    POCT Blood Glucose.: 248 mg/dL (09-09-18 @ 12:20)  POCT Blood Glucose.: 277 mg/dL (09-09-18 @ 08:50)  POCT Blood Glucose.: 314 mg/dL (09-08-18 @ 22:15)  POCT Blood Glucose.: 237 mg/dL (09-08-18 @ 17:09)  POCT Blood Glucose.: 270 mg/dL (09-08-18 @ 08:39)  POCT Blood Glucose.: 110 mg/dL (09-07-18 @ 17:18)  POCT Blood Glucose.: 236 mg/dL (09-07-18 @ 12:17)  POCT Blood Glucose.: 266 mg/dL (09-07-18 @ 09:59)  POCT Blood Glucose.: 236 mg/dL (09-06-18 @ 21:46)  POCT Blood Glucose.: 229 mg/dL (09-06-18 @ 16:53)      09-09    130<L>  |  88<L>  |  31<H>  ----------------------------<  130<H>  3.6   |  30  |  0.93    EGFR if : 65  EGFR if non : 56    Ca    8.4      09-09  Mg     1.8     09-09  Phos  3.2     09-09    TPro  6.0  /  Alb  2.3<L>  /  TBili  0.5  /  DBili  x   /  AST  23  /  ALT  25  /  AlkPhos  77  09-09          Thyroid Function Tests:      Hemoglobin A1C, Whole Blood: 9.8 % <H> [4.0 - 5.6] (09-05-18 @ 02:55)

## 2018-09-09 NOTE — PROGRESS NOTE ADULT - PROBLEM SELECTOR PLAN 1
BG remain elevated  Will continue Lantus 12 units qhs   Will increase Humalog  to 6 units QAC  Continue moderate correction scale before meals and moderate bedtime.  Doses of insulin may need to be decreased as steroids are tapered.    DC plan: patient was to be started on basal insulin as outpatient in addition to oral agents. For dc will need to clarify goals of care. Outpatient endocrinologist Dr. Alcocer.

## 2018-09-09 NOTE — PROGRESS NOTE ADULT - SUBJECTIVE AND OBJECTIVE BOX
_________________________________________________________________________________________  ========>>  M E D I C A L   A T T E N D I N G    F O L L O W  U P  N O T E  <<=========  -----------------------------------------------------------------------------------------------------    - Patient seen and examined by me approximately sixty minutes ago.   - In summary,  CLIVE HENRY is a 86y year old woman who originally presented with Rt shoulder pain  - Patient overall better /comfortable,  weak, eating a bit better, still with some pain on and off in right shoulder area     per Dtr pt eating better     ==================>> REVIEW OF SYSTEM <<=================    GEN: no fever, no chills, + pain as above   RESP: mild SOB, no cough, no sputum  CVS: no chest pain, no palpitations, no edema  GI: no abdominal pain, no nausea  : no dysuria, no frequency, no hematuria  Neuro: no headache, no dizziness  Derm : no itching    ==================>> PHYSICAL EXAM <<=================    GEN: A&O X 3 , NAD , comfortable, sleepy   HEENT: NCAT, PERRL, MMM, hearing intact, on high flow   Neck: supple , no JVD  CVS: S1S2 , Irregular , No M/R/G appreciated  PULM: CTA B/L   ABD.: soft. non tender, non distended,  bowel sounds present  Extrem: intact pulses , no edema   Derm: Dermatomally distributed vesicular rash on right C3/4 dermatome    PSYCH : flat affect        ==================>> MEDICATIONS <<====================    acyclovir IVPB 650 milliGRAM(s) IV Intermittent every 12 hours  dextrose 5%. 1000 milliLiter(s) IV Continuous <Continuous>  dextrose 50% Injectable 12.5 Gram(s) IV Push once  dextrose 50% Injectable 25 Gram(s) IV Push once  dextrose 50% Injectable 25 Gram(s) IV Push once  docusate sodium 100 milliGRAM(s) Oral three times a day  famotidine    Tablet 20 milliGRAM(s) Oral daily  fenofibrate Tablet 145 milliGRAM(s) Oral daily  gabapentin 100 milliGRAM(s) Oral daily  insulin glargine Injectable (LANTUS) 12 Unit(s) SubCutaneous at bedtime  insulin lispro (HumaLOG) corrective regimen sliding scale   SubCutaneous three times a day before meals  insulin lispro (HumaLOG) corrective regimen sliding scale   SubCutaneous at bedtime  insulin lispro Injectable (HumaLOG) 4 Unit(s) SubCutaneous three times a day before meals  metoprolol tartrate 25 milliGRAM(s) Oral two times a day  NIFEdipine XL 60 milliGRAM(s) Oral at bedtime  NIFEdipine XL 30 milliGRAM(s) Oral daily  ondansetron Injectable 4 milliGRAM(s) IV Push once  polyethylene glycol 3350 17 Gram(s) Oral daily  predniSONE   Tablet 30 milliGRAM(s) Oral daily  senna 2 Tablet(s) Oral at bedtime  sodium chloride 1 Gram(s) Oral daily    MEDICATIONS  (PRN):  acetaminophen   Tablet .. 650 milliGRAM(s) Oral every 6 hours PRN Temp greater or equal to 38C (100.4F), Mild Pain (1 - 3), Moderate Pain (4 - 6), Severe Pain (7 - 10)  dextrose 40% Gel 15 Gram(s) Oral once PRN Blood Glucose LESS THAN 70 milliGRAM(s)/deciliter  glucagon  Injectable 1 milliGRAM(s) IntraMuscular once PRN Glucose LESS THAN 70 milligrams/deciliter  HYDROmorphone  Injectable 0.5 milliGRAM(s) IV Push every 4 hours PRN Severe Pain (7 - 10)  levalbuterol Inhalation 0.63 milliGRAM(s) Inhalation every 6 hours PRN SOB/wheezing    ==================>> VITAL SIGNS <<==================    Vital Signs Last 24 Hrs  T(C): 36.3 (09-09-18 @ 12:24)  T(F): 97.3 (09-09-18 @ 12:24), Max: 97.9 (09-08-18 @ 21:40)  HR: 62 (09-09-18 @ 12:24) (62 - 94)  BP: 120/71 (09-09-18 @ 12:24)  BP(mean): --  RR: 18 (09-09-18 @ 12:24) (18 - 20)  SpO2: 100% (09-09-18 @ 12:24) (98% - 100%)      CAPILLARY BLOOD GLUCOSE  356 (08 Sep 2018 12:57)    POCT Blood Glucose.: 248 mg/dL (09 Sep 2018 12:20)  POCT Blood Glucose.: 277 mg/dL (09 Sep 2018 08:50)  POCT Blood Glucose.: 314 mg/dL (08 Sep 2018 22:15)  POCT Blood Glucose.: 237 mg/dL (08 Sep 2018 17:09)     ==================>> LAB AND IMAGING <<==================                        9.3    7.63  )-----------( 263      ( 09 Sep 2018 06:45 )             29.1        Hemoglobin:   9.3 <<==,  9.2 <<==,  9.6 <<==,  9.1 <<==,  8.9 <<==,  10.1 <<==    130<L>  |  88<L>  |  31<H>  ----------------------------<  130<H>  3.6   |  30  |  0.93    Sodium:   130  <==, 126  <==, 126  <==, 127  <==, 127  <==, 125  <==, 126  <==  	  Ca    8.4      09 Sep 2018 06:45  Phos  3.2     09-09  Mg     1.8     09-09    TPro  6.0  /  Alb  2.3<L>  /  TBili  0.5  /  DBili  x   /  AST  23  /  ALT  25  /  AlkPhos  77  09-09    ___________________________________________________________________________________  ===============>>  A S S E S S M E N T   A N D   P L A N <<===============  ------------------------------------------------------------------------------------------    · Assessment		  85 y/o F PMH Lung CA (unknown stage or type, Dx 2010 s/p L VATS, wedge resection, RT in July 2017), PMR, HTN, DM2 p/w worsening Rt shoulder pain from partial supraspinatus tear, found to have poss hemorrhagic met to brain pending MRI.     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  - rehab planing early week if improved / pending improvements in shingles lesions  --------------------------------------------  Case discussed with pt and Dtr  Education given on findings and plan of care  ___________________________  AKRLA Ferrell D.O.  Pager: [phone]

## 2018-09-09 NOTE — PROGRESS NOTE ADULT - SUBJECTIVE AND OBJECTIVE BOX
Cardiovascular Disease Progress Note    Overnight events: No acute events overnight.  BP better. pain stable. no cp/sob/pnd/orthopnea  Otherwise review of systems negative    Objective Findings:  T(C): 36.2 (18 @ 06:46), Max: 36.6 (18 @ 21:40)  HR: 85 (18 @ 10:01) (66 - 94)  BP: 133/75 (18 @ 06:46) (133/75 - 157/96)  RR: 19 (18 @ 06:46) (18 - 20)  SpO2: 99% (18 @ 06:52) (98% - 100%)  Wt(kg): --  Daily     Daily Weight in k.9 (09 Sep 2018 07:17)      Physical Exam:  Gen: NAD  HEENT: EOMI  CV: RRR, normal S1 + S2, no m/r/g  Lungs: CTAB  Abd: soft, non-tender  Ext: No edema    Telemetry: NSR ST APC'S    Laboratory Data:                        9.3    7.63  )-----------( 263      ( 09 Sep 2018 06:45 )             29.1         130<L>  |  88<L>  |  31<H>  ----------------------------<  130<H>  3.6   |  30  |  0.93    Ca    8.4      09 Sep 2018 06:45  Phos  3.2       Mg     1.8         TPro  6.0  /  Alb  2.3<L>  /  TBili  0.5  /  DBili  x   /  AST  23  /  ALT  25  /  AlkPhos  77          Inpatient Medications:  MEDICATIONS  (STANDING):  acyclovir IVPB 650 milliGRAM(s) IV Intermittent every 12 hours  dextrose 5%. 1000 milliLiter(s) (50 mL/Hr) IV Continuous <Continuous>  dextrose 50% Injectable 12.5 Gram(s) IV Push once  dextrose 50% Injectable 25 Gram(s) IV Push once  dextrose 50% Injectable 25 Gram(s) IV Push once  docusate sodium 100 milliGRAM(s) Oral three times a day  famotidine    Tablet 20 milliGRAM(s) Oral daily  fenofibrate Tablet 145 milliGRAM(s) Oral daily  gabapentin 100 milliGRAM(s) Oral daily  insulin glargine Injectable (LANTUS) 12 Unit(s) SubCutaneous at bedtime  insulin lispro (HumaLOG) corrective regimen sliding scale   SubCutaneous three times a day before meals  insulin lispro (HumaLOG) corrective regimen sliding scale   SubCutaneous at bedtime  insulin lispro Injectable (HumaLOG) 4 Unit(s) SubCutaneous three times a day before meals  metoprolol tartrate 25 milliGRAM(s) Oral two times a day  NIFEdipine XL 60 milliGRAM(s) Oral at bedtime  NIFEdipine XL 30 milliGRAM(s) Oral daily  ondansetron Injectable 4 milliGRAM(s) IV Push once  polyethylene glycol 3350 17 Gram(s) Oral daily  predniSONE   Tablet 30 milliGRAM(s) Oral daily  senna 2 Tablet(s) Oral at bedtime  sodium chloride 1 Gram(s) Oral daily      Assessment:  86 F with PMH of lung adenoca (dx around , s/p L VATS, XRT of TERESA/RUL last in 2016), COPD with emphysema, htn, and dm2, presented with R shoulder pain which is at least partially related to acute zoster, also incidentally 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     Plan:  1) hypoxic respiratory failure initially requiring BiPAP now on supplemental O2, COPD/emphysema  - appears euvolemic  -possibly infectious vs progression of disease vs copd-e  -s/p LE duplex - no evidence of DVT    -c/w abx per medicine for fevers and consolidation on imaging  -c/w steroids and nebs for possible copd-e    3) elevated troponin, without overt ischemic sx or ischemic changes on ecg  -would defer ischemic workup for now given overall prognosis and inability to tolerate anti-platelet agents/AC given recent imaging findings    4) right should pain, hx of PMR, acute zoster  -appreciate derm recs  -c/w acylovir, prednisone and pain control  -f/u palliative/pain management recs    5) HTN, APC's  -no evidence of atrial fibrillation on visualized ecg's and telemetry  -agree with holding heparin drip  -c/w metoprolol as tolerates  -c/w nifdepine    6) cerebellar lesion  -appreciate nsgy/onc recs  -mri/mra results noted      Over 25 minutes spent on total encounter; more than 50% of the visit was spent counseling and/or coordinating care by the attending physician.      Slick York MD   Cardiovascular Disease  [phone]

## 2018-09-09 NOTE — PROGRESS NOTE ADULT - ASSESSMENT
85 y/o female with PMH  of Type 2 DM, Lung CA (unknown stage or type, Dx 2010 s/p L VATS, wedge resection, RT in July 2017), PMR on chronic steroids, HTN, p/w worsening Rt shoulder pain for last two months. Admitted with concerning lesion on MRI head for brain met. On steroids with hyperglycemia.

## 2018-09-10 DIAGNOSIS — B02.9 ZOSTER WITHOUT COMPLICATIONS: ICD-10-CM

## 2018-09-10 LAB
ALBUMIN SERPL ELPH-MCNC: 2.3 G/DL — LOW (ref 3.3–5)
ALP SERPL-CCNC: 71 U/L — SIGNIFICANT CHANGE UP (ref 40–120)
ALT FLD-CCNC: 24 U/L — SIGNIFICANT CHANGE UP (ref 4–33)
AST SERPL-CCNC: 20 U/L — SIGNIFICANT CHANGE UP (ref 4–32)
BACTERIA BLD CULT: SIGNIFICANT CHANGE UP
BASOPHILS # BLD AUTO: 0.01 K/UL — SIGNIFICANT CHANGE UP (ref 0–0.2)
BASOPHILS NFR BLD AUTO: 0.1 % — SIGNIFICANT CHANGE UP (ref 0–2)
BILIRUB SERPL-MCNC: 0.4 MG/DL — SIGNIFICANT CHANGE UP (ref 0.2–1.2)
BUN SERPL-MCNC: 24 MG/DL — HIGH (ref 7–23)
CALCIUM SERPL-MCNC: 8.4 MG/DL — SIGNIFICANT CHANGE UP (ref 8.4–10.5)
CHLORIDE SERPL-SCNC: 92 MMOL/L — LOW (ref 98–107)
CO2 SERPL-SCNC: 29 MMOL/L — SIGNIFICANT CHANGE UP (ref 22–31)
CREAT SERPL-MCNC: 0.88 MG/DL — SIGNIFICANT CHANGE UP (ref 0.5–1.3)
EOSINOPHIL # BLD AUTO: 0.06 K/UL — SIGNIFICANT CHANGE UP (ref 0–0.5)
EOSINOPHIL NFR BLD AUTO: 0.7 % — SIGNIFICANT CHANGE UP (ref 0–6)
GLUCOSE BLDC GLUCOMTR-MCNC: 125 MG/DL — HIGH (ref 70–99)
GLUCOSE BLDC GLUCOMTR-MCNC: 133 MG/DL — HIGH (ref 70–99)
GLUCOSE BLDC GLUCOMTR-MCNC: 144 MG/DL — HIGH (ref 70–99)
GLUCOSE BLDC GLUCOMTR-MCNC: 152 MG/DL — HIGH (ref 70–99)
GLUCOSE BLDC GLUCOMTR-MCNC: 356 MG/DL — HIGH (ref 70–99)
GLUCOSE SERPL-MCNC: 89 MG/DL — SIGNIFICANT CHANGE UP (ref 70–99)
HCT VFR BLD CALC: 29.1 % — LOW (ref 34.5–45)
HGB BLD-MCNC: 9.2 G/DL — LOW (ref 11.5–15.5)
IMM GRANULOCYTES # BLD AUTO: 0.26 # — SIGNIFICANT CHANGE UP
IMM GRANULOCYTES NFR BLD AUTO: 3.1 % — HIGH (ref 0–1.5)
LYMPHOCYTES # BLD AUTO: 1.42 K/UL — SIGNIFICANT CHANGE UP (ref 1–3.3)
LYMPHOCYTES # BLD AUTO: 17.1 % — SIGNIFICANT CHANGE UP (ref 13–44)
MAGNESIUM SERPL-MCNC: 1.9 MG/DL — SIGNIFICANT CHANGE UP (ref 1.6–2.6)
MCHC RBC-ENTMCNC: 25.1 PG — LOW (ref 27–34)
MCHC RBC-ENTMCNC: 31.6 % — LOW (ref 32–36)
MCV RBC AUTO: 79.5 FL — LOW (ref 80–100)
MONOCYTES # BLD AUTO: 0.41 K/UL — SIGNIFICANT CHANGE UP (ref 0–0.9)
MONOCYTES NFR BLD AUTO: 4.9 % — SIGNIFICANT CHANGE UP (ref 2–14)
NEUTROPHILS # BLD AUTO: 6.16 K/UL — SIGNIFICANT CHANGE UP (ref 1.8–7.4)
NEUTROPHILS NFR BLD AUTO: 74.1 % — SIGNIFICANT CHANGE UP (ref 43–77)
NRBC # FLD: 0 — SIGNIFICANT CHANGE UP
PHOSPHATE SERPL-MCNC: 2.7 MG/DL — SIGNIFICANT CHANGE UP (ref 2.5–4.5)
PLATELET # BLD AUTO: 261 K/UL — SIGNIFICANT CHANGE UP (ref 150–400)
PMV BLD: 9.2 FL — SIGNIFICANT CHANGE UP (ref 7–13)
POTASSIUM SERPL-MCNC: 3.9 MMOL/L — SIGNIFICANT CHANGE UP (ref 3.5–5.3)
POTASSIUM SERPL-SCNC: 3.9 MMOL/L — SIGNIFICANT CHANGE UP (ref 3.5–5.3)
PROT SERPL-MCNC: 5.8 G/DL — LOW (ref 6–8.3)
RBC # BLD: 3.66 M/UL — LOW (ref 3.8–5.2)
RBC # FLD: 15.4 % — HIGH (ref 10.3–14.5)
SODIUM SERPL-SCNC: 132 MMOL/L — LOW (ref 135–145)
WBC # BLD: 8.32 K/UL — SIGNIFICANT CHANGE UP (ref 3.8–10.5)
WBC # FLD AUTO: 8.32 K/UL — SIGNIFICANT CHANGE UP (ref 3.8–10.5)

## 2018-09-10 PROCEDURE — 99233 SBSQ HOSP IP/OBS HIGH 50: CPT

## 2018-09-10 PROCEDURE — 99232 SBSQ HOSP IP/OBS MODERATE 35: CPT

## 2018-09-10 RX ORDER — AER TRAVELER 0.5 G/1
1 SOLUTION RECTAL; TOPICAL THREE TIMES A DAY
Qty: 0 | Refills: 0 | Status: DISCONTINUED | OUTPATIENT
Start: 2018-09-10 | End: 2018-09-15

## 2018-09-10 RX ORDER — TRAMADOL HYDROCHLORIDE 50 MG/1
25 TABLET ORAL EVERY 6 HOURS
Qty: 0 | Refills: 0 | Status: DISCONTINUED | OUTPATIENT
Start: 2018-09-10 | End: 2018-09-11

## 2018-09-10 RX ORDER — GABAPENTIN 400 MG/1
100 CAPSULE ORAL EVERY 8 HOURS
Qty: 0 | Refills: 0 | Status: DISCONTINUED | OUTPATIENT
Start: 2018-09-10 | End: 2018-09-11

## 2018-09-10 RX ADMIN — TRAMADOL HYDROCHLORIDE 25 MILLIGRAM(S): 50 TABLET ORAL at 22:06

## 2018-09-10 RX ADMIN — GABAPENTIN 100 MILLIGRAM(S): 400 CAPSULE ORAL at 22:00

## 2018-09-10 RX ADMIN — Medication 60 MILLIGRAM(S): at 22:00

## 2018-09-10 RX ADMIN — Medication 30 MILLIGRAM(S): at 06:21

## 2018-09-10 RX ADMIN — Medication 145 MILLIGRAM(S): at 11:38

## 2018-09-10 RX ADMIN — Medication 650 MILLIGRAM(S): at 04:45

## 2018-09-10 RX ADMIN — Medication 25 MILLIGRAM(S): at 11:37

## 2018-09-10 RX ADMIN — Medication 6 UNIT(S): at 12:55

## 2018-09-10 RX ADMIN — TRAMADOL HYDROCHLORIDE 25 MILLIGRAM(S): 50 TABLET ORAL at 23:06

## 2018-09-10 RX ADMIN — Medication 650 MILLIGRAM(S): at 18:30

## 2018-09-10 RX ADMIN — GABAPENTIN 100 MILLIGRAM(S): 400 CAPSULE ORAL at 11:37

## 2018-09-10 RX ADMIN — Medication 6 UNIT(S): at 09:23

## 2018-09-10 RX ADMIN — HYDROMORPHONE HYDROCHLORIDE 0.5 MILLIGRAM(S): 2 INJECTION INTRAMUSCULAR; INTRAVENOUS; SUBCUTANEOUS at 06:31

## 2018-09-10 RX ADMIN — INSULIN GLARGINE 12 UNIT(S): 100 INJECTION, SOLUTION SUBCUTANEOUS at 22:01

## 2018-09-10 RX ADMIN — Medication 6 UNIT(S): at 17:57

## 2018-09-10 RX ADMIN — SODIUM CHLORIDE 1 GRAM(S): 9 INJECTION INTRAMUSCULAR; INTRAVENOUS; SUBCUTANEOUS at 11:37

## 2018-09-10 RX ADMIN — HYDROMORPHONE HYDROCHLORIDE 0.5 MILLIGRAM(S): 2 INJECTION INTRAMUSCULAR; INTRAVENOUS; SUBCUTANEOUS at 11:36

## 2018-09-10 RX ADMIN — HYDROMORPHONE HYDROCHLORIDE 0.5 MILLIGRAM(S): 2 INJECTION INTRAMUSCULAR; INTRAVENOUS; SUBCUTANEOUS at 06:16

## 2018-09-10 RX ADMIN — Medication 263 MILLIGRAM(S): at 06:21

## 2018-09-10 RX ADMIN — Medication 263 MILLIGRAM(S): at 18:39

## 2018-09-10 RX ADMIN — Medication 650 MILLIGRAM(S): at 17:56

## 2018-09-10 RX ADMIN — Medication 25 MILLIGRAM(S): at 22:00

## 2018-09-10 RX ADMIN — FAMOTIDINE 20 MILLIGRAM(S): 10 INJECTION INTRAVENOUS at 11:38

## 2018-09-10 RX ADMIN — Medication 650 MILLIGRAM(S): at 04:15

## 2018-09-10 RX ADMIN — HYDROMORPHONE HYDROCHLORIDE 0.5 MILLIGRAM(S): 2 INJECTION INTRAMUSCULAR; INTRAVENOUS; SUBCUTANEOUS at 12:58

## 2018-09-10 NOTE — PROGRESS NOTE ADULT - PROBLEM SELECTOR PROBLEM 3
Acute respiratory failure with hypoxia
Acute pain of right shoulder
Acute respiratory failure with hypoxia

## 2018-09-10 NOTE — PROGRESS NOTE ADULT - SUBJECTIVE AND OBJECTIVE BOX
Cardiovascular Disease Progress Note    Overnight events: No acute events overnight.  overall feels improved. slept well. appetite better. pain better well controlled. no cp/sob/pnd/orthopnea  Otherwise review of systems negative    Objective Findings:  T(C): 36.6 (09-10-18 @ 07:38), Max: 36.6 (09-10-18 @ 07:38)  HR: 91 (09-10-18 @ 07:38) (62 - 91)  BP: 119/91 (09-10-18 @ 07:38) (119/91 - 150/86)  RR: 18 (09-10-18 @ 07:38) (16 - 18)  SpO2: 97% (09-10-18 @ 07:38) (97% - 100%)  Wt(kg): --  Daily     Daily Weight in k.3 (10 Sep 2018 07:35)      Physical Exam:  Gen: NAD  HEENT: EOMI  CV: RRR, normal S1 + S2, no m/r/g  Lungs: CTAB  Abd: soft, non-tender  Ext: No edema    Telemetry:    Laboratory Data:                        9.2    8.32  )-----------( 261      ( 10 Sep 2018 06:12 )             29.1     09-10    132<L>  |  92<L>  |  24<H>  ----------------------------<  89  3.9   |  29  |  0.88    Ca    8.4      10 Sep 2018 06:12  Phos  2.7     09-10  Mg     1.9     -10    TPro  5.8<L>  /  Alb  2.3<L>  /  TBili  0.4  /  DBili  x   /  AST  20  /  ALT  24  /  AlkPhos  71  09-10              Inpatient Medications:  MEDICATIONS  (STANDING):  acyclovir IVPB 650 milliGRAM(s) IV Intermittent every 12 hours  dextrose 5%. 1000 milliLiter(s) (50 mL/Hr) IV Continuous <Continuous>  dextrose 50% Injectable 12.5 Gram(s) IV Push once  dextrose 50% Injectable 25 Gram(s) IV Push once  dextrose 50% Injectable 25 Gram(s) IV Push once  docusate sodium 100 milliGRAM(s) Oral three times a day  famotidine    Tablet 20 milliGRAM(s) Oral daily  fenofibrate Tablet 145 milliGRAM(s) Oral daily  gabapentin 100 milliGRAM(s) Oral daily  insulin glargine Injectable (LANTUS) 12 Unit(s) SubCutaneous at bedtime  insulin lispro (HumaLOG) corrective regimen sliding scale   SubCutaneous three times a day before meals  insulin lispro (HumaLOG) corrective regimen sliding scale   SubCutaneous at bedtime  insulin lispro Injectable (HumaLOG) 6 Unit(s) SubCutaneous three times a day before meals  metoprolol tartrate 25 milliGRAM(s) Oral two times a day  NIFEdipine XL 60 milliGRAM(s) Oral at bedtime  NIFEdipine XL 30 milliGRAM(s) Oral daily  polyethylene glycol 3350 17 Gram(s) Oral daily  predniSONE   Tablet 30 milliGRAM(s) Oral daily  senna 2 Tablet(s) Oral at bedtime  sodium chloride 1 Gram(s) Oral daily      Assessment:  86 F with PMH of lung adenoca (dx around , s/p L VATS, XRT of TERESA/RUL last in 2016), COPD with emphysema, htn, and dm2, presented with R shoulder pain which is at least partially related to acute zoster, also incidentally 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     Plan:  1) hypoxic respiratory failure initially requiring BiPAP now on supplemental O2, COPD/emphysema  - appears euvolemic  -possibly infectious vs progression of disease vs copd-e  -s/p LE duplex - no evidence of DVT    -c/w abx per medicine for fevers and consolidation on imaging  -c/w steroids and nebs for possible copd-e    3) elevated troponin, without overt ischemic sx or ischemic changes on ecg  -would defer ischemic workup for now given overall prognosis and inability to tolerate anti-platelet agents/AC given recent imaging findings  -c/w fenofibrate for HLD  -cont to hold aspirin given hemorrhagic cerebellar lesion    4) right should pain, hx of PMR, acute zoster  -appreciate derm recs  -c/w acylovir, prednisone and gabapentin    5) HTN, APC's  -no evidence of atrial fibrillation on visualized ecg's and telemetry  -agree with holding heparin drip  -c/w metoprolol as tolerates  -c/w nifdepine    6) cerebellar lesion  -appreciate nsgy/onc recs  -mri/mra results noted      Over 35 minutes spent on total encounter; more than 50% of the visit was spent counseling and/or coordinating care by the attending physician.      Slick York MD   Cardiovascular Disease  [phone]

## 2018-09-10 NOTE — PROGRESS NOTE ADULT - SUBJECTIVE AND OBJECTIVE BOX
Patient is a 86y old  Female who presents with a chief complaint of Patient is a 86y old  Female who presents with a chief complaint of R. Shoulder Pain (09 Sep 2018 13:05)       Pt is seen and examined  pt is awake and lying in bed  pt c/o pain in right shoulder -- as per daughter, pain comes and goes; last night received pain meds and was able to sleep most of nighttime    REVIEW OF SYSTEMS:    CONSTITUTIONAL: No weakness, fevers or chills  EYES/ENT: No visual changes;  No vertigo or throat pain   NECK: No pain or stiffness  RESPIRATORY: No cough, wheezing, hemoptysis; No shortness of breath  CARDIOVASCULAR: No chest pain or palpitations  GASTROINTESTINAL: No abdominal or epigastric pain. No nausea, vomiting, or hematemesis; No diarrhea or constipation. No melena or hematochezia.  GENITOURINARY: No dysuria, frequency or hematuria  NEUROLOGICAL: No numbness or weakness  SKIN: No itching, burning, rashes, or lesions     MEDICATIONS  (STANDING):  acyclovir IVPB 650 milliGRAM(s) IV Intermittent every 12 hours  dextrose 5%. 1000 milliLiter(s) (50 mL/Hr) IV Continuous <Continuous>  dextrose 50% Injectable 12.5 Gram(s) IV Push once  dextrose 50% Injectable 25 Gram(s) IV Push once  dextrose 50% Injectable 25 Gram(s) IV Push once  docusate sodium 100 milliGRAM(s) Oral three times a day  famotidine    Tablet 20 milliGRAM(s) Oral daily  fenofibrate Tablet 145 milliGRAM(s) Oral daily  gabapentin 100 milliGRAM(s) Oral daily  insulin glargine Injectable (LANTUS) 12 Unit(s) SubCutaneous at bedtime  insulin lispro (HumaLOG) corrective regimen sliding scale   SubCutaneous three times a day before meals  insulin lispro (HumaLOG) corrective regimen sliding scale   SubCutaneous at bedtime  insulin lispro Injectable (HumaLOG) 6 Unit(s) SubCutaneous three times a day before meals  metoprolol tartrate 25 milliGRAM(s) Oral two times a day  NIFEdipine XL 60 milliGRAM(s) Oral at bedtime  NIFEdipine XL 30 milliGRAM(s) Oral daily  polyethylene glycol 3350 17 Gram(s) Oral daily  predniSONE   Tablet 30 milliGRAM(s) Oral daily  senna 2 Tablet(s) Oral at bedtime  sodium chloride 1 Gram(s) Oral daily    MEDICATIONS  (PRN):  acetaminophen   Tablet .. 650 milliGRAM(s) Oral every 6 hours PRN Temp greater or equal to 38C (100.4F), Mild Pain (1 - 3), Moderate Pain (4 - 6), Severe Pain (7 - 10)  dextrose 40% Gel 15 Gram(s) Oral once PRN Blood Glucose LESS THAN 70 milliGRAM(s)/deciliter  glucagon  Injectable 1 milliGRAM(s) IntraMuscular once PRN Glucose LESS THAN 70 milligrams/deciliter  HYDROmorphone  Injectable 0.5 milliGRAM(s) IV Push every 4 hours PRN Severe Pain (7 - 10)  levalbuterol Inhalation 0.63 milliGRAM(s) Inhalation every 6 hours PRN SOB/wheezing      Allergies    ACE inhibitors (Angioedema)    Intolerances        Vital Signs Last 24 Hrs  T(C): 36.6 (10 Sep 2018 07:38), Max: 36.6 (10 Sep 2018 07:38)  T(F): 97.9 (10 Sep 2018 07:38), Max: 97.9 (10 Sep 2018 07:38)  HR: 91 (10 Sep 2018 07:38) (62 - 91)  BP: 119/91 (10 Sep 2018 07:38) (119/91 - 150/86)  BP(mean): --  RR: 18 (10 Sep 2018 07:38) (16 - 18)  SpO2: 97% (10 Sep 2018 07:38) (97% - 100%)    PHYSICAL EXAM  General: adult in pain due to shingles  HEENT: clear oropharynx, anicteric sclera, pink conjunctiva  Neck: supple  CV: normal S1/S2 with no murmur rubs or gallops  Lungs: positive air movement b/l ant lungs,clear to auscultation, no wheezes, no rales  Abdomen: soft non-tender non-distended, no hepatosplenomegaly  Ext: no clubbing cyanosis or edema  Skin: no rashes and no petechiae  Neuro: alert and oriented X 4,                               9.2    8.32  )-----------( 261      ( 10 Sep 2018 06:12 )             29.1         Mean Cell Volume : 79.5 fL  Mean Cell Hemoglobin : 25.1 pg  Mean Cell Hemoglobin Concentration : 31.6 %  Auto Neutrophil # : 6.16 K/uL  Auto Lymphocyte # : 1.42 K/uL  Auto Monocyte # : 0.41 K/uL  Auto Eosinophil # : 0.06 K/uL  Auto Basophil # : 0.01 K/uL  Auto Neutrophil % : 74.1 %  Auto Lymphocyte % : 17.1 %  Auto Monocyte % : 4.9 %  Auto Eosinophil % : 0.7 %  Auto Basophil % : 0.1 %      09-10    132<L>  |  92<L>  |  24<H>  ----------------------------<  89  3.9   |  29  |  0.88    Ca    8.4      10 Sep 2018 06:12  Phos  2.7     09-10  Mg     1.9     09-10    TPro  5.8<L>  /  Alb  2.3<L>  /  TBili  0.4  /  DBili  x   /  AST  20  /  ALT  24  /  AlkPhos  71  09-10          Assessment

## 2018-09-10 NOTE — PROGRESS NOTE ADULT - PROBLEM SELECTOR PLAN 4
gamma knife RT as outpatient
Yessica Garces Miralax.
gamma knife RT as outpatient

## 2018-09-10 NOTE — PROGRESS NOTE ADULT - SUBJECTIVE AND OBJECTIVE BOX
Chief Complaint: hyperglycemia    History: 87 y/o female with PMH  of Type 2 DM, Lung CA (unknown stage or type, Dx 2010 s/p L VATS, wedge resection, RT in July 2017), PMR on chronic steroids, HTN, p/w worsening Rt shoulder pain for last two months. Admitted with concerning lesion on MRI head for brain met. On steroids with hyperglycemia. Patient reports less shoulder pain today. Tolerating po intake. No nausea or vomiting or abdominal pain. Prednisone now 30 mg daily. daughter at bedaside.  reports that pateint was prescribed Tresiba however not taught how to administer.      MEDICATIONS  (STANDING):  acyclovir IVPB 650 milliGRAM(s) IV Intermittent every 12 hours  dextrose 5%. 1000 milliLiter(s) (50 mL/Hr) IV Continuous <Continuous>  dextrose 50% Injectable 12.5 Gram(s) IV Push once  dextrose 50% Injectable 25 Gram(s) IV Push once  dextrose 50% Injectable 25 Gram(s) IV Push once  docusate sodium 100 milliGRAM(s) Oral three times a day  famotidine    Tablet 20 milliGRAM(s) Oral daily  fenofibrate Tablet 145 milliGRAM(s) Oral daily  gabapentin 100 milliGRAM(s) Oral daily  insulin glargine Injectable (LANTUS) 12 Unit(s) SubCutaneous at bedtime  insulin lispro (HumaLOG) corrective regimen sliding scale   SubCutaneous three times a day before meals  insulin lispro (HumaLOG) corrective regimen sliding scale   SubCutaneous at bedtime  insulin lispro Injectable (HumaLOG) 4 Unit(s) SubCutaneous three times a day before meals  metoprolol tartrate 25 milliGRAM(s) Oral two times a day  NIFEdipine XL 60 milliGRAM(s) Oral at bedtime  NIFEdipine XL 30 milliGRAM(s) Oral daily  ondansetron Injectable 4 milliGRAM(s) IV Push once  polyethylene glycol 3350 17 Gram(s) Oral daily  predniSONE   Tablet 30 milliGRAM(s) Oral daily  senna 2 Tablet(s) Oral at bedtime  sodium chloride 1 Gram(s) Oral daily    MEDICATIONS  (PRN):  acetaminophen   Tablet .. 650 milliGRAM(s) Oral every 6 hours PRN Temp greater or equal to 38C (100.4F), Mild Pain (1 - 3), Moderate Pain (4 - 6), Severe Pain (7 - 10)  dextrose 40% Gel 15 Gram(s) Oral once PRN Blood Glucose LESS THAN 70 milliGRAM(s)/deciliter  glucagon  Injectable 1 milliGRAM(s) IntraMuscular once PRN Glucose LESS THAN 70 milligrams/deciliter  HYDROmorphone  Injectable 0.5 milliGRAM(s) IV Push every 4 hours PRN Severe Pain (7 - 10)  levalbuterol Inhalation 0.63 milliGRAM(s) Inhalation every 6 hours PRN SOB/wheezing      Allergies    ACE inhibitors (Angioedema)    Intolerances      Review of Systems:  Constitutional: No fever  Cardiovascular: No chest pain, palpitations  Respiratory: Breathing is stable  GI: No nausea, vomiting, abdominal pain  Skin: rash noted on right lateral neck    PHYSICAL EXAM:  Vital Signs Last 24 Hrs  T(C): 36.9 (10 Sep 2018 12:45), Max: 36.9 (10 Sep 2018 12:45)  T(F): 98.5 (10 Sep 2018 12:45), Max: 98.5 (10 Sep 2018 12:45)  HR: 98 (10 Sep 2018 12:45) (67 - 98)  BP: 155/66 (10 Sep 2018 12:45) (119/91 - 155/66)  BP(mean): 114 (10 Sep 2018 11:43) (114 - 114)  RR: 17 (10 Sep 2018 12:45) (16 - 18)  SpO2: 97% (10 Sep 2018 12:45) (97% - 100%)  GENERAL: NAD  RESPIRATORY: Clear to auscultation bilaterall  CARDIOVASCULAR: +S1/S2  GI: Soft, nontender, non distended, normal bowel sounds  PSYCH: normal affect, normal mood    CAPILLARY BLOOD GLUCOSE      POCT Blood Glucose.: 133 mg/dL (10 Sep 2018 12:42)  POCT Blood Glucose.: 125 mg/dL (10 Sep 2018 08:24)  POCT Blood Glucose.: 154 mg/dL (09 Sep 2018 22:43)  POCT Blood Glucose.: 150 mg/dL (09 Sep 2018 17:42)    POCT Blood Glucose.: 248 mg/dL (09-09-18 @ 12:20)  POCT Blood Glucose.: 277 mg/dL (09-09-18 @ 08:50)  POCT Blood Glucose.: 314 mg/dL (09-08-18 @ 22:15)  POCT Blood Glucose.: 237 mg/dL (09-08-18 @ 17:09)  POCT Blood Glucose.: 270 mg/dL (09-08-18 @ 08:39)  POCT Blood Glucose.: 110 mg/dL (09-07-18 @ 17:18)  POCT Blood Glucose.: 236 mg/dL (09-07-18 @ 12:17)  POCT Blood Glucose.: 266 mg/dL (09-07-18 @ 09:59)  POCT Blood Glucose.: 236 mg/dL (09-06-18 @ 21:46)  POCT Blood Glucose.: 229 mg/dL (09-06-18 @ 16:53)      09-09    130<L>  |  88<L>  |  31<H>  ----------------------------<  130<H>  3.6   |  30  |  0.93    EGFR if : 65  EGFR if non : 56    Ca    8.4      09-09  Mg     1.8     09-09  Phos  3.2     09-09    TPro  6.0  /  Alb  2.3<L>  /  TBili  0.5  /  DBili  x   /  AST  23  /  ALT  25  /  AlkPhos  77  09-09          Thyroid Function Tests:      Hemoglobin A1C, Whole Blood: 9.8 % <H> [4.0 - 5.6] (09-05-18 @ 02:55)

## 2018-09-10 NOTE — PROGRESS NOTE ADULT - SUBJECTIVE AND OBJECTIVE BOX
_________________________________________________________________________________________  ========>>  M E D I C A L   A T T E N D I N G    F O L L O W  U P  N O T E  <<=========  -----------------------------------------------------------------------------------------------------    - Patient seen and examined by me earlier today.   - In summary,  CLIVE HENRY is a 86y year old woman who originally presented with Rt shoulder pain  - Patient overall better /comfortable,  weak, eating a bit better, still with some pain on and off in right shoulder area     per Dtr pt took one dose od Dilaudid but it makes the pt very sleepy for hours, preferes not to use it.. .. agreeable to Tramadol        ( pt already been cleared from infection control point of view by ?)    ==================>> REVIEW OF SYSTEM <<=================    GEN: no fever, no chills, + pain as above   RESP: mild SOB, no cough, no sputum  CVS: no chest pain, no palpitations, no edema  GI: no abdominal pain, no nausea  : no dysuria, no frequency, no hematuria  Neuro: no headache, no dizziness  Derm : no itching    ==================>> PHYSICAL EXAM <<=================    GEN: A&O X 2 , NAD , comfortable, sleepy   HEENT: NCAT, PERRL, MMM, hearing intact, on high flow   Neck: supple , no JVD  CVS: S1S2 , Irregular , No M/R/G appreciated  PULM: CTA B/L   ABD.: soft. non tender, non distended,  bowel sounds present  Extrem: intact pulses , no edema   Derm: Dermatomally distributed vesicular rash on right C3/4 dermatome, still with active versicles   PSYCH : flat affect        ==================>> MEDICATIONS <<====================    acyclovir IVPB 650 milliGRAM(s) IV Intermittent every 12 hours  dextrose 5%. 1000 milliLiter(s) IV Continuous <Continuous>  dextrose 50% Injectable 12.5 Gram(s) IV Push once  dextrose 50% Injectable 25 Gram(s) IV Push once  dextrose 50% Injectable 25 Gram(s) IV Push once  docusate sodium 100 milliGRAM(s) Oral three times a day  famotidine    Tablet 20 milliGRAM(s) Oral daily  fenofibrate Tablet 145 milliGRAM(s) Oral daily  gabapentin 100 milliGRAM(s) Oral every 8 hours  insulin glargine Injectable (LANTUS) 12 Unit(s) SubCutaneous at bedtime  insulin lispro (HumaLOG) corrective regimen sliding scale   SubCutaneous three times a day before meals  insulin lispro (HumaLOG) corrective regimen sliding scale   SubCutaneous at bedtime  insulin lispro Injectable (HumaLOG) 6 Unit(s) SubCutaneous three times a day before meals  metoprolol tartrate 25 milliGRAM(s) Oral two times a day  NIFEdipine XL 60 milliGRAM(s) Oral at bedtime  NIFEdipine XL 30 milliGRAM(s) Oral daily  polyethylene glycol 3350 17 Gram(s) Oral daily  predniSONE   Tablet 30 milliGRAM(s) Oral daily  senna 2 Tablet(s) Oral at bedtime  sodium chloride 1 Gram(s) Oral daily    MEDICATIONS  (PRN):  acetaminophen   Tablet .. 650 milliGRAM(s) Oral every 6 hours PRN Temp greater or equal to 38C (100.4F), Mild Pain (1 - 3), Moderate Pain (4 - 6), Severe Pain (7 - 10)  dextrose 40% Gel 15 Gram(s) Oral once PRN Blood Glucose LESS THAN 70 milliGRAM(s)/deciliter  glucagon  Injectable 1 milliGRAM(s) IntraMuscular once PRN Glucose LESS THAN 70 milligrams/deciliter  levalbuterol Inhalation 0.63 milliGRAM(s) Inhalation every 6 hours PRN SOB/wheezing  traMADol 25 milliGRAM(s) Oral every 6 hours PRN Moderate Pain (4 - 6)    ==================>> VITAL SIGNS <<==================    Vital Signs Last 24 Hrs  T(C): 36.9 (09-10-18 @ 12:45)  T(F): 98.5 (09-10-18 @ 12:45), Max: 98.5 (09-10-18 @ 12:45)  HR: 93 (09-10-18 @ 16:13) (78 - 98)  BP: 155/66 (09-10-18 @ 12:45)  BP(mean): 114 (09-10-18 @ 11:43) (114 - 114)  RR: 17 (09-10-18 @ 12:45) (16 - 18)  SpO2: 95% (09-10-18 @ 16:13) (95% - 100%)      POCT Blood Glucose.: 144 mg/dL (10 Sep 2018 17:44)  POCT Blood Glucose.: 133 mg/dL (10 Sep 2018 12:42)  POCT Blood Glucose.: 125 mg/dL (10 Sep 2018 08:24)  POCT Blood Glucose.: 154 mg/dL (09 Sep 2018 22:43)     ==================>> LAB AND IMAGING <<==================                        9.2    8.32  )-----------( 261      ( 10 Sep 2018 06:12 )             29.1     Hemoglobin:   9.2 <<==,  9.3 <<==,  9.2 <<==,  9.6 <<==,  9.1 <<==,  8.9 <<==    132<L>  |  92<L>  |  24<H>  ----------------------------<  89  3.9   |  29  |  0.88    Sodium:   132  <==, 130  <==, 126  <==, 126  <==, 127  <==, 127  <==, 125  <==    Ca    8.4      10 Sep 2018 06:12  Phos  2.7     09-10  Mg     1.9     09-10    TPro  5.8<L>  /  Alb  2.3<L>  /  TBili  0.4  /  DBili  x   /  AST  20  /  ALT  24  /  AlkPhos  71  09-10    ___________________________________________________________________________________  ===============>>  A S S E S S M E N T   A N D   P L A N <<===============  ------------------------------------------------------------------------------------------    · Assessment		  87 y/o F PMH Lung CA (unknown stage or type, Dx 2010 s/p L VATS, wedge resection, RT in July 2017), PMR, HTN, DM2 p/w worsening Rt shoulder pain from partial supraspinatus tear, found to have poss hemorrhagic met to brain pending MRI.     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  - rehab planing this week if improved / pending improvements in shingles lesions  --------------------------------------------  Case discussed with pt and Dtr, RN  Education given on findings and plan of care  ___________________________  KARLA Frerell D.O.  Pager: [phone]

## 2018-09-10 NOTE — PROGRESS NOTE ADULT - ASSESSMENT
ASSESSMENT AND PLAN:  The patient is an 86-year-old female with past medical history of diabetes, hypertension, polymyalgia rheumatica, lung cancer with metastasis, who presents with hyponatremia.  Her urine indices are consistent with syndrome of inappropriate secretion of antidiuretic hormone.  She also may have a component of tea and toast diet.  Usually these patients have a low BUN, but her BUN is elevated, likely from the effects of the prednisone.  The prednisone is also leading to osmotic diuresis secondary to the high blood sugars.  Goals of therapy are to improve her intake of protein.  Antidiuretic hormone secretion can absolutely be possible from her malignancy (especially for lung cancer).    Gastrointestinal: Continue Glucerna three times a day along with Ensure pudding & Blake.  Renal: Hyponatremia:   Continue NaCL tabs as ordered for now; Hyponatremia will be a constant issue for her.  In her antibiotics, she is getting essentially 400 mL of dextrose; slight downtrend in Na+ this Am though acceptable for now   Cardiovascular:   BPs acceptable for now  Neuro-Possibly increase the gabapentin to help with the pain sensation?

## 2018-09-10 NOTE — PROGRESS NOTE ADULT - SUBJECTIVE AND OBJECTIVE BOX
SUBJECTIVE AND OBJECTIVE:  Still complaining of shoulder pain.  INTERVAL HPI/OVERNIGHT EVENTS:  Lesions starting to crust over.   Still in pain.    DNR on chart:   Allergies    ACE inhibitors (Angioedema)    Intolerances    MEDICATIONS  (STANDING):  acyclovir IVPB 650 milliGRAM(s) IV Intermittent every 12 hours  dextrose 5%. 1000 milliLiter(s) (50 mL/Hr) IV Continuous <Continuous>  dextrose 50% Injectable 12.5 Gram(s) IV Push once  dextrose 50% Injectable 25 Gram(s) IV Push once  dextrose 50% Injectable 25 Gram(s) IV Push once  docusate sodium 100 milliGRAM(s) Oral three times a day  famotidine    Tablet 20 milliGRAM(s) Oral daily  fenofibrate Tablet 145 milliGRAM(s) Oral daily  gabapentin 100 milliGRAM(s) Oral every 8 hours  insulin glargine Injectable (LANTUS) 12 Unit(s) SubCutaneous at bedtime  insulin lispro (HumaLOG) corrective regimen sliding scale   SubCutaneous three times a day before meals  insulin lispro (HumaLOG) corrective regimen sliding scale   SubCutaneous at bedtime  insulin lispro Injectable (HumaLOG) 6 Unit(s) SubCutaneous three times a day before meals  lidocaine   Patch 1 Patch Transdermal daily  metoprolol tartrate 25 milliGRAM(s) Oral two times a day  NIFEdipine XL 60 milliGRAM(s) Oral at bedtime  NIFEdipine XL 30 milliGRAM(s) Oral daily  polyethylene glycol 3350 17 Gram(s) Oral daily  predniSONE   Tablet 30 milliGRAM(s) Oral daily  senna 2 Tablet(s) Oral at bedtime  sodium chloride 1 Gram(s) Oral daily  witch hazel Pads 1 Application(s) Topical three times a day    MEDICATIONS  (PRN):  acetaminophen   Tablet .. 650 milliGRAM(s) Oral every 6 hours PRN Temp greater or equal to 38C (100.4F), Mild Pain (1 - 3), Moderate Pain (4 - 6), Severe Pain (7 - 10)  acetaminophen   Tablet .. 975 milliGRAM(s) Oral every 6 hours PRN Severe Pain (7 - 10)  dextrose 40% Gel 15 Gram(s) Oral once PRN Blood Glucose LESS THAN 70 milliGRAM(s)/deciliter  glucagon  Injectable 1 milliGRAM(s) IntraMuscular once PRN Glucose LESS THAN 70 milligrams/deciliter  levalbuterol Inhalation 0.63 milliGRAM(s) Inhalation every 6 hours PRN SOB/wheezing  traMADol 25 milliGRAM(s) Oral every 6 hours PRN Moderate Pain (4 - 6)      ITEMS UNCHECKED ARE NOT PRESENT    PRESENT SYMPTOMS: [ ]Unable to obtain due to poor mentation   Source if other than patient:  [ ]Family   [ ]Team     Pain (Impact on QOL):  Unable to move  Location: right shoulder  Minimal acceptable level (0-10 scale): 4            Aggravating factors: movement  Quality: sharp, burning  Radiation: down arm  Severity (0-10 scale): 8   Timing: intermittent    Dyspnea:                           [ ]Mild [ ]Moderate [ ]Severe  Anxiety:                             [ ]Mild [ ]Moderate [ ]Severe  Fatigue:                             [ ]Mild [ ]Moderate [ x]Severe  Nausea:                             [x ]Mild [ ]Moderate [ ]Severe  Loss of appetite:              [ ]Mild [ ]Moderate [x ]Severe  Constipation:                    [x ]Mild [ ]Moderate [ ]Severe    PAIN AD Score:	  [email or web address] (Ctrl + left click to view)    Other Symptoms:  [ ]All other review of systems negative     Karnofsky Performance Score/Palliative Performance Status Version 2:  40       %    http://palliative.info/resource_material/[email or web address].pdf  PHYSICAL EXAM:  Vital Signs Last 24 Hrs  T(C): 37.1 (11 Sep 2018 06:01), Max: 37.1 (11 Sep 2018 06:01)  T(F): 98.8 (11 Sep 2018 06:01), Max: 98.8 (11 Sep 2018 06:01)  HR: 80 (11 Sep 2018 11:10) (80 - 98)  BP: 161/61 (11 Sep 2018 11:10) (127/70 - 161/61)  BP(mean): --  RR: 17 (11 Sep 2018 11:10) (17 - 18)  SpO2: 94% (11 Sep 2018 11:10) (94% - 99%) I&O's Summary    10 Sep 2018 07:01  -  11 Sep 2018 07:00  --------------------------------------------------------  IN: 370 mL / OUT: 0 mL / NET: 370 mL    11 Sep 2018 07:01  -  11 Sep 2018 11:45  --------------------------------------------------------  IN: 180 mL / OUT: 0 mL / NET: 180 mL     GENERAL:  [x ]Alert  [ x]Oriented x 2  [ ]Lethargic  [ ]Cachexia  [ ]Unarousable  [ ]Verbal  [ ]Non-Verbal    Behavioral:   [ ] Anxiety  [ ] Delirium [ ] Agitation [ ] Other    HEENT:  [ ]Normal   [x ]Dry mouth   [ ]ET Tube/Trach  [ ]Oral lesions    PULMONARY:   [x ]Clear [ ]Tachypnea  [ ]Audible excessive secretions   [ ]Rhonchi        [ ]Right [ ]Left [ ]Bilateral  [ ]Crackles        [ ]Right [ ]Left [ ]Bilateral  [ ]Wheezing     [ ]Right [ ]Left [ ]Bilateral    CARDIOVASCULAR:    [ x]Regular [ ]Irregular [ ]Tachy  [ ]Blane [ ]Murmur [ ]Other    GASTROINTESTINAL:  [ x]Soft  [ ]Distended   [x+BS  [x ]Non tender [ ]Tender  [ ]PEG [ ]OGT/ NGT   Last BM:    GENITOURINARY:  [x ]Normal [ ] Incontinent   [ ]Oliguria/Anuria   [ ]Alcaraz    MUSCULOSKELETAL:   [ ]Normal   [x ]Weakness  [ ]Bed/Wheelchair bound [ ]Edema    NEUROLOGIC:   [ x]No focal deficits  [ ] Cognitive impairment  [ ] Dysphagia [ ]Dysarthria [ ] Paresis [ ]Other     SKIN:   [ ]Normal   [ ]Pressure ulcer(s)  [x ]Rash right shoulder vesicular rash    CRITICAL CARE:  [ ] Shock Present  [ ]Septic [ ]Cardiogenic [ ]Neurologic [ ]Hypovolemic  [ ]  Vasopressors [ ]  Inotropes   [ ] Respiratory failure present  [ ] Acute  [ ] Chronic [ ] Hypoxic  [ ] Hypercarbic [ ] Other  [ ] Other organ failure     LABS:                        8.6    7.32  )-----------( 244      ( 11 Sep 2018 05:50 )             27.3   09-11    130<L>  |  91<L>  |  27<H>  ----------------------------<  100<H>  4.2   |  30  |  0.86    Ca    8.5      11 Sep 2018 05:50  Phos  2.7     09-10  Mg     1.9     09-11    TPro  5.8<L>  /  Alb  2.3<L>  /  TBili  0.4  /  DBili  x   /  AST  20  /  ALT  24  /  AlkPhos  71  09-10        RADIOLOGY & ADDITIONAL STUDIES:    Protein Calorie Malnutrition Present: [ ] yes [ ] no  [ ] PPSV2 < or = 30%  [ ] significant weight loss [ ] poor nutritional intake [ ] anasarca [ ] catabolic state Albumin, Serum: 2.3 g/dL (09-10-18 @ 06:12)  Artificial Nutrition [ ]     REFERRALS:   [ ]Chaplaincy  [ ] Hospice  [ ]Child Life  [ ]Social Work  [ ]Case management [ ]Holistic Therapy   Goals of Care Document:

## 2018-09-10 NOTE — PROGRESS NOTE ADULT - PROBLEM SELECTOR PLAN 2
Discussed with family. No plans for chemotherapy.  Targeted therapy could be considered if molecular studies were done or can be done on prior bioptic tissue. No plans for additional biopsy.
-- no plans for any therapy at this time  -- once pt has recovered from Herpes zoster infection, will need to f/u with Radiation Oncology    Will sign off care -- please re-consult if any additional issues arise
Continues on [place].   Management as per MICU.  Supportive care.  Continue with opiates prn.,
Discussed with family. No plans for chemotherapy.  Targeted therapy could be considered if molecular studies were done or can be done on prior bioptic tissue. No plans for additional biopsy.
Improving.  Supportive care.  Continue with opiates prn.,
Resolved.   Supportive care.  Continue with opiates prn.,

## 2018-09-10 NOTE — PROGRESS NOTE ADULT - PROBLEM SELECTOR PLAN 1
BG remain elevated  Will continue Lantus 12 units qhs   Will c/w Humalog  to 6 units QAC  Continue moderate correction scale before meals and moderate bedtime.  Doses of insulin may need to be decreased as steroids are tapered.    DC plan: patient was to be started on basal insulin as outpatient in addition to oral agents. For dc will need to clarify goals of care. Outpatient endocrinologist Dr. Alcocer.  Referred to diabetes educator.  please have RN staf begin teaching insulin pen administration

## 2018-09-10 NOTE — PROGRESS NOTE ADULT - PROBLEM SELECTOR PLAN 3
Management as per medicine/pulmonary
Dilaudid .5mg IV Q4 hours prn.  Increase Neurontin to 100mg TID.
Dilaudid .5mg IV Q4 hours prn.  Initiate Neurontin 100mg daily.
Dilaudid 1mg IV Q6 hours ATC.  Continue with Dilaudid 1mg IV prn.
Management as per medicine/pulmonary

## 2018-09-10 NOTE — PROGRESS NOTE ADULT - SUBJECTIVE AND OBJECTIVE BOX
NEPHROLOGY-[facility] [phone]        Patient seen and examined         MEDICATIONS  (STANDING):  acyclovir IVPB 650 milliGRAM(s) IV Intermittent every 12 hours  dextrose 5%. 1000 milliLiter(s) (50 mL/Hr) IV Continuous <Continuous>  dextrose 50% Injectable 12.5 Gram(s) IV Push once  dextrose 50% Injectable 25 Gram(s) IV Push once  dextrose 50% Injectable 25 Gram(s) IV Push once  docusate sodium 100 milliGRAM(s) Oral three times a day  famotidine    Tablet 20 milliGRAM(s) Oral daily  fenofibrate Tablet 145 milliGRAM(s) Oral daily  gabapentin 100 milliGRAM(s) Oral every 8 hours  insulin glargine Injectable (LANTUS) 12 Unit(s) SubCutaneous at bedtime  insulin lispro (HumaLOG) corrective regimen sliding scale   SubCutaneous three times a day before meals  insulin lispro (HumaLOG) corrective regimen sliding scale   SubCutaneous at bedtime  insulin lispro Injectable (HumaLOG) 6 Unit(s) SubCutaneous three times a day before meals  metoprolol tartrate 25 milliGRAM(s) Oral two times a day  NIFEdipine XL 60 milliGRAM(s) Oral at bedtime  NIFEdipine XL 30 milliGRAM(s) Oral daily  polyethylene glycol 3350 17 Gram(s) Oral daily  predniSONE   Tablet 30 milliGRAM(s) Oral daily  senna 2 Tablet(s) Oral at bedtime  sodium chloride 1 Gram(s) Oral daily      VITAL:  T(C): , Max: 36.9 (09-10-18 @ 12:45)  T(F): , Max: 98.5 (09-10-18 @ 12:45)  HR: 90 (09-10-18 @ 18:28)  BP: 134/58 (09-10-18 @ 18:28)  BP(mean): 114 (09-10-18 @ 11:43)  RR: 18 (09-10-18 @ 18:28)  SpO2: 95% (09-10-18 @ 18:28)  Wt(kg): --    I and O's:    09-09 @ 07:01  -  09-10 @ 07:00  --------------------------------------------------------  IN: 1863 mL / OUT: 200 mL / NET: 1663 mL          PHYSICAL EXAM:    Constitutional: NAD  HEENT: PERRLA    Neck:  No JVD  Respiratory: CTAB/L  Cardiovascular: S1 and S2  Gastrointestinal: BS+, soft, NT/ND  Extremities: No peripheral edema  Neurological: A/O x 3, no focal deficits  Psychiatric: Normal mood, normal affect  : No Alcaraz  Skin: No rashes  Access: Not applicable    LABS:                        9.2    8.32  )-----------( 261      ( 10 Sep 2018 06:12 )             29.1     09-10    132<L>  |  92<L>  |  24<H>  ----------------------------<  89  3.9   |  29  |  0.88    Ca    8.4      10 Sep 2018 06:12  Phos  2.7     09-10  Mg     1.9     09-10    TPro  5.8<L>  /  Alb  2.3<L>  /  TBili  0.4  /  DBili  x   /  AST  20  /  ALT  24  /  AlkPhos  71  09-10          Urine Studies:          RADIOLOGY & ADDITIONAL STUDIES:

## 2018-09-10 NOTE — PROGRESS NOTE ADULT - PROBLEM SELECTOR PROBLEM 2
Malignant neoplasm of upper lobe of right lung
Acute respiratory failure with hypoxia
Malignant neoplasm of upper lobe of right lung
Malignant neoplasm of upper lobe of right lung

## 2018-09-10 NOTE — PROGRESS NOTE ADULT - PROBLEM SELECTOR PROBLEM 4
Cerebellar lesion
Cerebellar lesion
Slow transit constipation

## 2018-09-11 LAB
BASOPHILS # BLD AUTO: 0.01 K/UL — SIGNIFICANT CHANGE UP (ref 0–0.2)
BASOPHILS NFR BLD AUTO: 0.1 % — SIGNIFICANT CHANGE UP (ref 0–2)
BUN SERPL-MCNC: 27 MG/DL — HIGH (ref 7–23)
CALCIUM SERPL-MCNC: 8.5 MG/DL — SIGNIFICANT CHANGE UP (ref 8.4–10.5)
CHLORIDE SERPL-SCNC: 91 MMOL/L — LOW (ref 98–107)
CO2 SERPL-SCNC: 30 MMOL/L — SIGNIFICANT CHANGE UP (ref 22–31)
CREAT SERPL-MCNC: 0.86 MG/DL — SIGNIFICANT CHANGE UP (ref 0.5–1.3)
EOSINOPHIL # BLD AUTO: 0.05 K/UL — SIGNIFICANT CHANGE UP (ref 0–0.5)
EOSINOPHIL NFR BLD AUTO: 0.7 % — SIGNIFICANT CHANGE UP (ref 0–6)
GLUCOSE BLDC GLUCOMTR-MCNC: 120 MG/DL — HIGH (ref 70–99)
GLUCOSE BLDC GLUCOMTR-MCNC: 143 MG/DL — HIGH (ref 70–99)
GLUCOSE BLDC GLUCOMTR-MCNC: 236 MG/DL — HIGH (ref 70–99)
GLUCOSE BLDC GLUCOMTR-MCNC: 290 MG/DL — HIGH (ref 70–99)
GLUCOSE SERPL-MCNC: 100 MG/DL — HIGH (ref 70–99)
HCT VFR BLD CALC: 27.3 % — LOW (ref 34.5–45)
HGB BLD-MCNC: 8.6 G/DL — LOW (ref 11.5–15.5)
IMM GRANULOCYTES # BLD AUTO: 0.21 # — SIGNIFICANT CHANGE UP
IMM GRANULOCYTES NFR BLD AUTO: 2.9 % — HIGH (ref 0–1.5)
LYMPHOCYTES # BLD AUTO: 1.6 K/UL — SIGNIFICANT CHANGE UP (ref 1–3.3)
LYMPHOCYTES # BLD AUTO: 21.9 % — SIGNIFICANT CHANGE UP (ref 13–44)
MAGNESIUM SERPL-MCNC: 1.9 MG/DL — SIGNIFICANT CHANGE UP (ref 1.6–2.6)
MCHC RBC-ENTMCNC: 25.5 PG — LOW (ref 27–34)
MCHC RBC-ENTMCNC: 31.5 % — LOW (ref 32–36)
MCV RBC AUTO: 81 FL — SIGNIFICANT CHANGE UP (ref 80–100)
MONOCYTES # BLD AUTO: 0.4 K/UL — SIGNIFICANT CHANGE UP (ref 0–0.9)
MONOCYTES NFR BLD AUTO: 5.5 % — SIGNIFICANT CHANGE UP (ref 2–14)
NEUTROPHILS # BLD AUTO: 5.05 K/UL — SIGNIFICANT CHANGE UP (ref 1.8–7.4)
NEUTROPHILS NFR BLD AUTO: 68.9 % — SIGNIFICANT CHANGE UP (ref 43–77)
NRBC # FLD: 0 — SIGNIFICANT CHANGE UP
PLATELET # BLD AUTO: 244 K/UL — SIGNIFICANT CHANGE UP (ref 150–400)
PMV BLD: 9.4 FL — SIGNIFICANT CHANGE UP (ref 7–13)
POTASSIUM SERPL-MCNC: 4.2 MMOL/L — SIGNIFICANT CHANGE UP (ref 3.5–5.3)
POTASSIUM SERPL-SCNC: 4.2 MMOL/L — SIGNIFICANT CHANGE UP (ref 3.5–5.3)
RBC # BLD: 3.37 M/UL — LOW (ref 3.8–5.2)
RBC # FLD: 15.4 % — HIGH (ref 10.3–14.5)
SODIUM SERPL-SCNC: 130 MMOL/L — LOW (ref 135–145)
WBC # BLD: 7.32 K/UL — SIGNIFICANT CHANGE UP (ref 3.8–10.5)
WBC # FLD AUTO: 7.32 K/UL — SIGNIFICANT CHANGE UP (ref 3.8–10.5)

## 2018-09-11 PROCEDURE — 99232 SBSQ HOSP IP/OBS MODERATE 35: CPT

## 2018-09-11 PROCEDURE — 12345: CPT | Mod: NC

## 2018-09-11 RX ORDER — ACETAMINOPHEN 500 MG
975 TABLET ORAL EVERY 6 HOURS
Qty: 0 | Refills: 0 | Status: DISCONTINUED | OUTPATIENT
Start: 2018-09-11 | End: 2018-09-11

## 2018-09-11 RX ORDER — GABAPENTIN 400 MG/1
200 CAPSULE ORAL
Qty: 0 | Refills: 0 | Status: DISCONTINUED | OUTPATIENT
Start: 2018-09-11 | End: 2018-09-12

## 2018-09-11 RX ORDER — LIDOCAINE 4 G/100G
1 CREAM TOPICAL DAILY
Qty: 0 | Refills: 0 | Status: DISCONTINUED | OUTPATIENT
Start: 2018-09-11 | End: 2018-09-11

## 2018-09-11 RX ORDER — TRAMADOL HYDROCHLORIDE 50 MG/1
25 TABLET ORAL
Qty: 0 | Refills: 0 | Status: DISCONTINUED | OUTPATIENT
Start: 2018-09-11 | End: 2018-09-15

## 2018-09-11 RX ORDER — ACETAMINOPHEN 500 MG
975 TABLET ORAL EVERY 8 HOURS
Qty: 0 | Refills: 0 | Status: DISCONTINUED | OUTPATIENT
Start: 2018-09-11 | End: 2018-09-15

## 2018-09-11 RX ADMIN — Medication 263 MILLIGRAM(S): at 06:03

## 2018-09-11 RX ADMIN — Medication 25 MILLIGRAM(S): at 21:31

## 2018-09-11 RX ADMIN — Medication 30 MILLIGRAM(S): at 06:03

## 2018-09-11 RX ADMIN — Medication 6: at 17:47

## 2018-09-11 RX ADMIN — GABAPENTIN 100 MILLIGRAM(S): 400 CAPSULE ORAL at 13:01

## 2018-09-11 RX ADMIN — Medication 6 UNIT(S): at 13:00

## 2018-09-11 RX ADMIN — SODIUM CHLORIDE 1 GRAM(S): 9 INJECTION INTRAMUSCULAR; INTRAVENOUS; SUBCUTANEOUS at 11:11

## 2018-09-11 RX ADMIN — Medication 263 MILLIGRAM(S): at 18:15

## 2018-09-11 RX ADMIN — Medication 145 MILLIGRAM(S): at 11:11

## 2018-09-11 RX ADMIN — TRAMADOL HYDROCHLORIDE 25 MILLIGRAM(S): 50 TABLET ORAL at 22:34

## 2018-09-11 RX ADMIN — Medication 975 MILLIGRAM(S): at 17:44

## 2018-09-11 RX ADMIN — Medication 6 UNIT(S): at 09:00

## 2018-09-11 RX ADMIN — Medication 25 MILLIGRAM(S): at 11:11

## 2018-09-11 RX ADMIN — AER TRAVELER 1 APPLICATION(S): 0.5 SOLUTION RECTAL; TOPICAL at 21:30

## 2018-09-11 RX ADMIN — LIDOCAINE 1 PATCH: 4 CREAM TOPICAL at 13:00

## 2018-09-11 RX ADMIN — TRAMADOL HYDROCHLORIDE 25 MILLIGRAM(S): 50 TABLET ORAL at 21:36

## 2018-09-11 RX ADMIN — FAMOTIDINE 20 MILLIGRAM(S): 10 INJECTION INTRAVENOUS at 11:11

## 2018-09-11 RX ADMIN — INSULIN GLARGINE 12 UNIT(S): 100 INJECTION, SOLUTION SUBCUTANEOUS at 22:31

## 2018-09-11 RX ADMIN — Medication 4: at 09:00

## 2018-09-11 RX ADMIN — TRAMADOL HYDROCHLORIDE 25 MILLIGRAM(S): 50 TABLET ORAL at 05:10

## 2018-09-11 RX ADMIN — GABAPENTIN 100 MILLIGRAM(S): 400 CAPSULE ORAL at 06:03

## 2018-09-11 RX ADMIN — TRAMADOL HYDROCHLORIDE 25 MILLIGRAM(S): 50 TABLET ORAL at 04:10

## 2018-09-11 RX ADMIN — Medication 60 MILLIGRAM(S): at 21:30

## 2018-09-11 RX ADMIN — Medication 6 UNIT(S): at 17:47

## 2018-09-11 RX ADMIN — Medication 975 MILLIGRAM(S): at 18:48

## 2018-09-11 NOTE — PROGRESS NOTE ADULT - SUBJECTIVE AND OBJECTIVE BOX
Patient seen and examined in bed; no new events.  NAD.    REVIEW OF SYSTEMS:  As per HPI, otherwise 8 full 10 ROS were unremarkable    MEDICATIONS  (STANDING):  acyclovir IVPB 650 milliGRAM(s) IV Intermittent every 12 hours  dextrose 5%. 1000 milliLiter(s) (50 mL/Hr) IV Continuous <Continuous>  dextrose 50% Injectable 12.5 Gram(s) IV Push once  dextrose 50% Injectable 25 Gram(s) IV Push once  dextrose 50% Injectable 25 Gram(s) IV Push once  docusate sodium 100 milliGRAM(s) Oral three times a day  famotidine    Tablet 20 milliGRAM(s) Oral daily  fenofibrate Tablet 145 milliGRAM(s) Oral daily  gabapentin 100 milliGRAM(s) Oral every 8 hours  insulin glargine Injectable (LANTUS) 12 Unit(s) SubCutaneous at bedtime  insulin lispro (HumaLOG) corrective regimen sliding scale   SubCutaneous three times a day before meals  insulin lispro (HumaLOG) corrective regimen sliding scale   SubCutaneous at bedtime  insulin lispro Injectable (HumaLOG) 6 Unit(s) SubCutaneous three times a day before meals  lidocaine   Patch 1 Patch Transdermal daily  metoprolol tartrate 25 milliGRAM(s) Oral two times a day  NIFEdipine XL 60 milliGRAM(s) Oral at bedtime  NIFEdipine XL 30 milliGRAM(s) Oral daily  polyethylene glycol 3350 17 Gram(s) Oral daily  predniSONE   Tablet 30 milliGRAM(s) Oral daily  senna 2 Tablet(s) Oral at bedtime  sodium chloride 1 Gram(s) Oral daily  witch hazel Pads 1 Application(s) Topical three times a day      VITAL:  T(C): , Max: 37.1 (09-11-18 @ 06:01)  T(F): , Max: 98.8 (09-11-18 @ 06:01)  HR: 80 (09-11-18 @ 11:10)  BP: 161/61 (09-11-18 @ 11:10)  BP(mean): --  RR: 17 (09-11-18 @ 11:10)  SpO2: 94% (09-11-18 @ 11:10)  Wt(kg): --    I and O's:    09-10 @ 07:01  -  09-11 @ 07:00  --------------------------------------------------------  IN: 370 mL / OUT: 0 mL / NET: 370 mL    09-11 @ 07:01  -  09-11 @ 12:27  --------------------------------------------------------  IN: 180 mL / OUT: 0 mL / NET: 180 mL          PHYSICAL EXAM:    Constitutional: NAD  HEENT: PERRLA, EOMI,  MMM  Neck: No LAD, No JVD  Respiratory: CTAB  Cardiovascular: S1 and S2  Gastrointestinal: BS+, soft, NT/ND  Extremities: No peripheral edema  Neurological: A/O x 3, no focal deficits  Psychiatric: Normal mood, normal affect  : No Alcaraz  Skin: No rashes  Access: Not applicable    LABS:                        8.6    7.32  )-----------( 244      ( 11 Sep 2018 05:50 )             27.3     09-11    130<L>  |  91<L>  |  27<H>  ----------------------------<  100<H>  4.2   |  30  |  0.86    Ca    8.5      11 Sep 2018 05:50  Phos  2.7     09-10  Mg     1.9     09-11    TPro  5.8<L>  /  Alb  2.3<L>  /  TBili  0.4  /  DBili  x   /  AST  20  /  ALT  24  /  AlkPhos  71  09-10      ASSESSMENT AND PLAN:  The patient is an 86-year-old female with past medical history of diabetes, hypertension, polymyalgia rheumatica, lung cancer with metastasis, who presents with hyponatremia.  Her urine indices are consistent with syndrome of inappropriate secretion of antidiuretic hormone.  She also may have a component of tea and toast diet.  Usually these patients have a low BUN, but her BUN is elevated, likely from the effects of the prednisone.  The prednisone is also leading to osmotic diuresis secondary to the high blood sugars.  Goals of therapy are to improve her intake of protein.  Antidiuretic hormone secretion can absolutely be possible from her malignancy (especially for lung cancer).    Gastrointestinal: Continue Glucerna three times a day along with Ensure pudding & Blake.  Renal: Hyponatremia:   Continue NaCL tabs as ordered for now; Hyponatremia will be a constant issue for her.  In her antibiotics, she is getting essentially 400 mL of dextrose; slight downtrend in Na+ this Am though acceptable for now   Cardiovascular:   BPs acceptable for now  Neuro- Gabapentin recently increased

## 2018-09-11 NOTE — DIETITIAN INITIAL EVALUATION ADULT. - PROBLEM SELECTOR PLAN 3
hx of lung Ca diagnosed in 2010, s/p VATS, wedge resection, RT   follows with Dr. Saini from [facility], not receiving treatment at this time  in setting of possible met to brain, will c/s palliative care  Call oncologist in AM to inform of admission

## 2018-09-11 NOTE — CHART NOTE - NSCHARTNOTEFT_GEN_A_CORE
NUTRITION SERVICES                                                                                  MALNUTRITION ALERT     Attention Health Care Provider: Upon nutritional assessment by the Registered Dietitian your patient was determined to meet criteria / has evidence of the following diagnosis/diagnoses:    [ ] Mild Protein Calorie Malnutrition   [ ] Moderate Protein Calorie Malnutrition   [X] Severe Protein Calorie Malnutrition   [ ] Unspecified Protein Calorie Malnutrition   [ ] Underweight / BMI <19  [ ] Morbid Obesity / BMI >40      By signing this assessment you are acknowledging the diagnosis/diagnoses.       PLAN OF CARE: Refer to Initial Dietitian Evaluation or Nutrition Follow-Up Documentation for Nutritional Recommendations.

## 2018-09-11 NOTE — PROGRESS NOTE ADULT - SUBJECTIVE AND OBJECTIVE BOX
_________________________________________________________________________________________  ========>>  M E D I C A L   A T T E N D I N G    F O L L O W  U P  N O T E  <<=========  -----------------------------------------------------------------------------------------------------    - Patient seen and examined by me earlier today.   - In summary,  CLIVE HENRY is a 86y year old woman who originally presented with Rt shoulder pain  - Patient overall better /comfortable,  weak, eating a bit better, still with some pain on and off in right shoulder area     pt took two doses of tramadol, but still making her sleepy.. discussed with pt's Dtrs and meds adjusted with tramadol only at night and increased Neurontin BID during the day     ==================>> REVIEW OF SYSTEM <<=================    GEN: no fever, no chills, + pain on and off including sharp electric type pain at tomes ( Neurontin increased as above)   RESP: mild SOB, no cough, no sputum  CVS: no chest pain, no palpitations, no edema  GI: no abdominal pain, no nausea  : no dysuria, no frequency, no hematuria  Neuro: no headache, no dizziness  Derm : no itching    ==================>> PHYSICAL EXAM <<=================    GEN: A&O X 3 , NAD , comfortable  HEENT: NCAT, PERRL, MMM, hearing intact  Neck: supple , no JVD  CVS: S1S2 , Irregular , No M/R/G appreciated  PULM: CTA B/L   ABD.: soft. non tender, non distended,  bowel sounds present  Extrem: intact pulses , no edema   Derm: Dermatomally distributed vesicular rash on right C3/4 dermatome, still with active versicles   PSYCH : flat affect         ==================>> MEDICATIONS <<====================    acyclovir IVPB 650 milliGRAM(s) IV Intermittent every 12 hours  dextrose 5%. 1000 milliLiter(s) IV Continuous <Continuous>  dextrose 50% Injectable 12.5 Gram(s) IV Push once  dextrose 50% Injectable 25 Gram(s) IV Push once  dextrose 50% Injectable 25 Gram(s) IV Push once  docusate sodium 100 milliGRAM(s) Oral three times a day  famotidine    Tablet 20 milliGRAM(s) Oral daily  fenofibrate Tablet 145 milliGRAM(s) Oral daily  gabapentin 200 milliGRAM(s) Oral <User Schedule>  insulin glargine Injectable (LANTUS) 12 Unit(s) SubCutaneous at bedtime  insulin lispro (HumaLOG) corrective regimen sliding scale   SubCutaneous three times a day before meals  insulin lispro (HumaLOG) corrective regimen sliding scale   SubCutaneous at bedtime  insulin lispro Injectable (HumaLOG) 6 Unit(s) SubCutaneous three times a day before meals  metoprolol tartrate 25 milliGRAM(s) Oral two times a day  NIFEdipine XL 60 milliGRAM(s) Oral at bedtime  NIFEdipine XL 30 milliGRAM(s) Oral daily  polyethylene glycol 3350 17 Gram(s) Oral daily  predniSONE   Tablet 30 milliGRAM(s) Oral daily  senna 2 Tablet(s) Oral at bedtime  sodium chloride 1 Gram(s) Oral daily  traMADol 25 milliGRAM(s) Oral <User Schedule>  witch hazel Pads 1 Application(s) Topical three times a day    MEDICATIONS  (PRN):  acetaminophen   Tablet .. 650 milliGRAM(s) Oral every 6 hours PRN Temp greater or equal to 38C (100.4F), Mild Pain (1 - 3), Moderate Pain (4 - 6), Severe Pain (7 - 10)  acetaminophen   Tablet .. 975 milliGRAM(s) Oral every 8 hours PRN Severe Pain (7 - 10)  dextrose 40% Gel 15 Gram(s) Oral once PRN Blood Glucose LESS THAN 70 milliGRAM(s)/deciliter  glucagon  Injectable 1 milliGRAM(s) IntraMuscular once PRN Glucose LESS THAN 70 milligrams/deciliter  levalbuterol Inhalation 0.63 milliGRAM(s) Inhalation every 6 hours PRN SOB/wheezing    ==================>> VITAL SIGNS <<==================    Vital Signs Last 24 Hrs  T(C): 37.1 (09-11-18 @ 06:01)  T(F): 98.8 (09-11-18 @ 06:01), Max: 98.8 (09-11-18 @ 06:01)  HR: 80 (09-11-18 @ 11:10) (80 - 93)  BP: 161/61 (09-11-18 @ 11:10)  BP(mean): --  RR: 17 (09-11-18 @ 11:10) (17 - 18)  SpO2: 94% (09-11-18 @ 11:10) (94% - 99%)    CAPILLARY BLOOD GLUCOSE      POCT Blood Glucose.: 143 mg/dL (11 Sep 2018 12:37)  POCT Blood Glucose.: 236 mg/dL (11 Sep 2018 08:43)  POCT Blood Glucose.: 152 mg/dL (10 Sep 2018 21:45)  POCT Blood Glucose.: 144 mg/dL (10 Sep 2018 17:44)     ==================>> LAB AND IMAGING <<==================                        8.6    7.32  )-----------( 244      ( 11 Sep 2018 05:50 )             27.3        Hemoglobin:   8.6 <<==,  9.2 <<==,  9.3 <<==,  9.2 <<==,  9.6 <<==,  9.1 <<==    130<L>  |  91<L>  |  27<H>  ----------------------------<  100<H>  4.2   |  30  |  0.86    Sodium:   130  <==, 132  <==, 130  <==, 126  <==, 126  <==, 127  <==, 127  <==    Ca    8.5      11 Sep 2018 05:50  Phos  2.7     09-10  Mg     1.9     09-11    TPro  5.8<L>  /  Alb  2.3<L>  /  TBili  0.4  /  DBili  x   /  AST  20  /  ALT  24  /  AlkPhos  71  09-10    ___________________________________________________________________________________  ===============>>  A S S E S S M E N T   A N D   P L A N <<===============  ------------------------------------------------------------------------------------------    · Assessment		  85 y/o F PMH Lung CA (unknown stage or type, Dx 2010 s/p L VATS, wedge resection, RT in July 2017), PMR, HTN, DM2 p/w worsening Rt shoulder pain from partial supraspinatus tear, found to have poss hemorrhagic met to brain pending MRI.     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 Saini at [facility]    Problem: Shoulder pain Multifactorial give supraspinatus tear, lung mass and shingles   Pain control as above   neurontin , and increase as needed   should not use NSAIDS given brain et   PT >> rehab  ( pt prefers to go home !)    Problem: Hyponatremia, likely SIADH in the setting of lung ca   Trend BMP closely as improved post salt tabs  free fluid restriction  renal appreciated   encourage PO intake   monitor     Problem: Hypertension  continue Rx as adjusted   cardio f/u and mgmt appreciated     Problem: Diabetes, poorly controlled   Endocrine f/u and mgmt appreciated   Consistent carb diet  HbA1c 9.8%    ·  Problem: depression  offered to start Remeron, pt declined for now    -GI/DVT Prophylaxis.  - Dispo planing   --------------------------------------------  Case discussed with pt and Dtrs, RN  Education given on findings and plan of care  ___________________________  KARLA Ferrell D.O.  Pager: [phone]

## 2018-09-11 NOTE — DIETITIAN INITIAL EVALUATION ADULT. - PROBLEM SELECTOR PLAN 6
Hyperglycemic to 462 on BMP   Endocrine c/s   On multiple oral DM medications, also on prednisone 10 mg twice daily  Will order ISS AC/HS for now  Consider adding long acting insulin, give persistent hyperglycemia while inpatient   Consistent carb diet  F/u HbA1c in AM

## 2018-09-11 NOTE — DIETITIAN INITIAL EVALUATION ADULT. - NS AS NUTRI INTERV COLLABORAT
1. Suggest outpatient follow up with an endocrinologist to ensure long-term DM diet comprehension and compliance. 2. Recommend bedside swallow evaluation by SLP to determine the appropriate food and beverage consistency.

## 2018-09-11 NOTE — DIETITIAN INITIAL EVALUATION ADULT. - NS AS NUTRI INTERV MEALS SNACK
1. Continue Dysphagia Level 3 Soft diet with Honey Consistency fluid, DASH/TLC (cholesterol and sodium restricted) diet. 1500mL Fluid Restriction per medical team discretion. 2. Continue Glucerna Therapeutic Nutrition Shake 240mls 3x daily (660kcals, 30g protein), Ensure Pudding 1x daily (170 kcals, 4g protein), and Blake 1 x daily. 3. Please Encourage po intake, assist with meals and menu selections, provide alternatives PRN. 4. Consider multivitamin daily for micronutrient coverage. 5. Continue bowel regimen. 6. Monitor weights, labs, BM's, skin integrity, p.o. intake.

## 2018-09-11 NOTE — PROGRESS NOTE ADULT - PROBLEM SELECTOR PLAN 1
BG remain elevated  Will continue Lantus 12 units qhs   Will c/w Humalog  to 6 units QAC  Continue moderate correction scale before meals and moderate bedtime.  Doses of insulin may need to be decreased as steroids are tapered.    DC plan: patient was to be started on basal insulin as outpatient in addition to oral agents. For dc will need to clarify goals of care. Outpatient endocrinologist Dr. Alcocer.  Referred to diabetes educator.  please have RN staf begin teaching insulin pen administration BG's have been stable on Lantus 12 units, for now would:   continue Lantus 12 units qhs   Will c/w Humalog  to 6 units QAC  Continue moderate correction scale before meals and moderate bedtime.  Doses of insulin may need to be decreased as steroids are tapered.    DC plan: patient was to be started on basal insulin as outpatient in addition to oral agents. For dc will need to clarify goals of care. Outpatient endocrinologist Dr. Alcocer.  Referred to diabetes educator.  please have RN jefferson begin teaching insulin pen administration. Dc meds likely Tresiba, metformin and naglitinide if patietn continues to be on prednisone 30 mg daily

## 2018-09-11 NOTE — DIETITIAN INITIAL EVALUATION ADULT. - OTHER INFO
Initial Dietitian Evaluation 2/2 to extended length of stay. Per chart review patient with medical history of  Lung CA (unknown stage or type, Dx 2010 s/p L VATS, wedge resection, RT in July 2017), PMR, HTN, DM2 p/w worsening Rt shoulder pain from partial supraspinatus tear, found to have possible hemorrhagic met to brain pending MRI. NKFA. Patient reports good PO intake PTA. Denies any recent weight loss. HbA1c 9.8%. On steroid treatment which can elevated blood glucose levels. Patient tolerating diet in-house however patient reports mainly consuming fruit cups and juice. Tried to drink Glucerna Shakes. Patient's weight trending down in-house; 139.7# (9/04)-> 135# (9/11) indicating 3.4% weight loss in 1 week. On bowel regimen for constipation. No nausea/vomiting noted. Patient being followed by Palliative care. Encouraged PO intake. Consistent Carbohydrate diet education not appropriate at this time given clinical condition and poor appetite in-house.

## 2018-09-11 NOTE — DIETITIAN INITIAL EVALUATION ADULT. - DIET TYPE
dysphagia 3, soft, honey consistency fluid/DASH/TLC (sodium and cholesterol restricted diet)/Glucerna Therapeutic Nutrition Shake 240mls 3x daily (660kcals, 30g protein), Ensure Pudding 1x daily (170 kcals, 4g protein), Blake 1 daily

## 2018-09-11 NOTE — PROGRESS NOTE ADULT - SUBJECTIVE AND OBJECTIVE BOX
Cardiovascular Disease Progress Note    Overnight events: No acute events overnight. No cp/sob/pnd/orthopnea    Otherwise review of systems negative    Objective Findings:  T(C): 37.1 (18 @ 06:01), Max: 37.1 (18 @ 06:01)  HR: 84 (18 @ 07:42) (81 - 98)  BP: 149/76 (18 @ 06:01) (127/70 - 155/66)  RR: 17 (18 @ 06:01) (17 - 18)  SpO2: 95% (18 @ 07:42) (94% - 99%)  Wt(kg): --  Daily     Daily Weight in k.3 (11 Sep 2018 07:38)      Physical Exam:  Gen: NAD  HEENT: EOMI  CV: RRR, normal S1 + S2, no m/r/g  Lungs: CTAB  Abd: soft, non-tender  Ext: No edema    Telemetry: NSR    Laboratory Data:                        8.6    7.32  )-----------( 244      ( 11 Sep 2018 05:50 )             27.3     09-    130<L>  |  91<L>  |  27<H>  ----------------------------<  100<H>  4.2   |  30  |  0.86    Ca    8.5      11 Sep 2018 05:50  Phos  2.7     -10  Mg     1.9         TPro  5.8<L>  /  Alb  2.3<L>  /  TBili  0.4  /  DBili  x   /  AST  20  /  ALT  24  /  AlkPhos  71  09-10        Inpatient Medications:  MEDICATIONS  (STANDING):  acyclovir IVPB 650 milliGRAM(s) IV Intermittent every 12 hours  dextrose 5%. 1000 milliLiter(s) (50 mL/Hr) IV Continuous <Continuous>  dextrose 50% Injectable 12.5 Gram(s) IV Push once  dextrose 50% Injectable 25 Gram(s) IV Push once  dextrose 50% Injectable 25 Gram(s) IV Push once  docusate sodium 100 milliGRAM(s) Oral three times a day  famotidine    Tablet 20 milliGRAM(s) Oral daily  fenofibrate Tablet 145 milliGRAM(s) Oral daily  gabapentin 100 milliGRAM(s) Oral every 8 hours  insulin glargine Injectable (LANTUS) 12 Unit(s) SubCutaneous at bedtime  insulin lispro (HumaLOG) corrective regimen sliding scale   SubCutaneous three times a day before meals  insulin lispro (HumaLOG) corrective regimen sliding scale   SubCutaneous at bedtime  insulin lispro Injectable (HumaLOG) 6 Unit(s) SubCutaneous three times a day before meals  metoprolol tartrate 25 milliGRAM(s) Oral two times a day  NIFEdipine XL 60 milliGRAM(s) Oral at bedtime  NIFEdipine XL 30 milliGRAM(s) Oral daily  polyethylene glycol 3350 17 Gram(s) Oral daily  predniSONE   Tablet 30 milliGRAM(s) Oral daily  senna 2 Tablet(s) Oral at bedtime  sodium chloride 1 Gram(s) Oral daily  witch hazel Pads 1 Application(s) Topical three times a day      Assessment:  86 F with PMH of lung adenoca (dx around , s/p L VATS, XRT of TERESA/RUL last in 2016), COPD with emphysema, htn, and dm2, presented with R shoulder pain which is at least partially related to acute zoster, also incidentally 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     Plan:  1) hypoxic respiratory failure initially requiring BiPAP now on supplemental O2, COPD/emphysema  - appears euvolemic  -wean o2 as tolerates  -possibly infectious vs progression of disease vs copd-e  -s/p LE duplex - no evidence of DVT    -s/p abx per medicine for fevers and consolidation on imaging  -c/w steroids and nebs for possible copd-e    3) elevated troponin, without overt ischemic sx or ischemic changes on ecg  -would defer ischemic workup for now given overall prognosis and inability to tolerate anti-platelet agents/AC given recent imaging findings  -c/w fenofibrate for HLD  -cont to hold aspirin given hemorrhagic cerebellar lesion    4) right should pain, hx of PMR, acute zoster  -appreciate derm recs  -c/w acylovir, prednisone and gabapentin    5) HTN, APC's  -no evidence of atrial fibrillation on visualized ecg's and telemetry  -agree with holding heparin drip  -c/w metoprolol as tolerates  -c/w nifdepine    6) cerebellar lesion  -appreciate nsgy/onc recs  -mri/mra results noted      Over 25 minutes spent on total encounter; more than 50% of the visit was spent counseling and/or coordinating care by the attending physician.      Slick York MD   Cardiovascular Disease  [phone]

## 2018-09-11 NOTE — DIETITIAN INITIAL EVALUATION ADULT. - PHYSICAL APPEARANCE
Limited nutrition focused physical exam as patient reports shoulder pain. Severe temporal wasting evident, moderate quadriceps muscle mass depletion/underweight

## 2018-09-11 NOTE — PROGRESS NOTE ADULT - SUBJECTIVE AND OBJECTIVE BOX
Chief Complaint: hyperglycemia    History: 87 y/o female with PMH  of Type 2 DM, Lung CA (unknown stage or type, Dx 2010 s/p L VATS, wedge resection, RT in July 2017), PMR on chronic steroids, HTN, p/w worsening Rt shoulder pain for last two months. Admitted with concerning lesion on MRI head for brain met. On steroids with hyperglycemia. Patient reports less shoulder pain today. Tolerating po intake. No nausea or vomiting or abdominal pain. Prednisone now 30 mg daily. daughter at bedaside.  reports that pateint was prescribed Tresiba however not taught how to administer.      MEDICATIONS  (STANDING):  acyclovir IVPB 650 milliGRAM(s) IV Intermittent every 12 hours  dextrose 5%. 1000 milliLiter(s) (50 mL/Hr) IV Continuous <Continuous>  dextrose 50% Injectable 12.5 Gram(s) IV Push once  dextrose 50% Injectable 25 Gram(s) IV Push once  dextrose 50% Injectable 25 Gram(s) IV Push once  docusate sodium 100 milliGRAM(s) Oral three times a day  famotidine    Tablet 20 milliGRAM(s) Oral daily  fenofibrate Tablet 145 milliGRAM(s) Oral daily  gabapentin 100 milliGRAM(s) Oral daily  insulin glargine Injectable (LANTUS) 12 Unit(s) SubCutaneous at bedtime  insulin lispro (HumaLOG) corrective regimen sliding scale   SubCutaneous three times a day before meals  insulin lispro (HumaLOG) corrective regimen sliding scale   SubCutaneous at bedtime  insulin lispro Injectable (HumaLOG) 4 Unit(s) SubCutaneous three times a day before meals  metoprolol tartrate 25 milliGRAM(s) Oral two times a day  NIFEdipine XL 60 milliGRAM(s) Oral at bedtime  NIFEdipine XL 30 milliGRAM(s) Oral daily  ondansetron Injectable 4 milliGRAM(s) IV Push once  polyethylene glycol 3350 17 Gram(s) Oral daily  predniSONE   Tablet 30 milliGRAM(s) Oral daily  senna 2 Tablet(s) Oral at bedtime  sodium chloride 1 Gram(s) Oral daily    MEDICATIONS  (PRN):  acetaminophen   Tablet .. 650 milliGRAM(s) Oral every 6 hours PRN Temp greater or equal to 38C (100.4F), Mild Pain (1 - 3), Moderate Pain (4 - 6), Severe Pain (7 - 10)  dextrose 40% Gel 15 Gram(s) Oral once PRN Blood Glucose LESS THAN 70 milliGRAM(s)/deciliter  glucagon  Injectable 1 milliGRAM(s) IntraMuscular once PRN Glucose LESS THAN 70 milligrams/deciliter  HYDROmorphone  Injectable 0.5 milliGRAM(s) IV Push every 4 hours PRN Severe Pain (7 - 10)  levalbuterol Inhalation 0.63 milliGRAM(s) Inhalation every 6 hours PRN SOB/wheezing      Allergies    ACE inhibitors (Angioedema)    Intolerances      Review of Systems:  Constitutional: No fever  Cardiovascular: No chest pain, palpitations  Respiratory: Breathing is stable  GI: No nausea, vomiting, abdominal pain  Skin: rash noted on right lateral neck    PHYSICAL EXAM:  Vital Signs Last 24 Hrs  T(C): 37.1 (11 Sep 2018 06:01), Max: 37.1 (11 Sep 2018 06:01)  T(F): 98.8 (11 Sep 2018 06:01), Max: 98.8 (11 Sep 2018 06:01)  HR: 80 (11 Sep 2018 11:10) (80 - 98)  BP: 161/61 (11 Sep 2018 11:10) (127/70 - 161/61)  BP(mean): --  RR: 17 (11 Sep 2018 11:10) (17 - 18)  SpO2: 94% (11 Sep 2018 11:10) (94% - 99%)  GENERAL: NAD  RESPIRATORY: Clear to auscultation bilaterall  CARDIOVASCULAR: +S1/S2  GI: Soft, nontender, non distended, normal bowel sounds  PSYCH: normal affect, normal mood    CAPILLARY BLOOD GLUCOSE  POCT Blood Glucose.: 236 mg/dL (11 Sep 2018 08:43)  POCT Blood Glucose.: 152 mg/dL (10 Sep 2018 21:45)  POCT Blood Glucose.: 144 mg/dL (10 Sep 2018 17:44)  POCT Blood Glucose.: 133 mg/dL (10 Sep 2018 12:42)    POCT Blood Glucose.: 133 mg/dL (10 Sep 2018 12:42)  POCT Blood Glucose.: 125 mg/dL (10 Sep 2018 08:24)  POCT Blood Glucose.: 154 mg/dL (09 Sep 2018 22:43)  POCT Blood Glucose.: 150 mg/dL (09 Sep 2018 17:42)    POCT Blood Glucose.: 248 mg/dL (09-09-18 @ 12:20)  POCT Blood Glucose.: 277 mg/dL (09-09-18 @ 08:50)  POCT Blood Glucose.: 314 mg/dL (09-08-18 @ 22:15)  POCT Blood Glucose.: 237 mg/dL (09-08-18 @ 17:09)  POCT Blood Glucose.: 270 mg/dL (09-08-18 @ 08:39)  POCT Blood Glucose.: 110 mg/dL (09-07-18 @ 17:18)  POCT Blood Glucose.: 236 mg/dL (09-07-18 @ 12:17)  POCT Blood Glucose.: 266 mg/dL (09-07-18 @ 09:59)  POCT Blood Glucose.: 236 mg/dL (09-06-18 @ 21:46)  POCT Blood Glucose.: 229 mg/dL (09-06-18 @ 16:53)      09-11    130<L>  |  91<L>  |  27<H>  ----------------------------<  100<H>  4.2   |  30  |  0.86    Ca    8.5      11 Sep 2018 05:50  Phos  2.7     09-10  Mg     1.9     09-11    TPro  5.8<L>  /  Alb  2.3<L>  /  TBili  0.4  /  DBili  x   /  AST  20  /  ALT  24  /  AlkPhos  71  09-10       Hemoglobin A1C, Whole Blood: 9.8 % <H> [4.0 - 5.6] (09-05-18 @ 02:55) Chief Complaint: hyperglycemia    History: patient and daughter meeting with diabetes educator now.  Prednisone dose continues, no n/v, tolerating PO, with fair appetite.        MEDICATIONS  (STANDING):  acyclovir IVPB 650 milliGRAM(s) IV Intermittent every 12 hours  dextrose 5%. 1000 milliLiter(s) (50 mL/Hr) IV Continuous <Continuous>  dextrose 50% Injectable 12.5 Gram(s) IV Push once  dextrose 50% Injectable 25 Gram(s) IV Push once  dextrose 50% Injectable 25 Gram(s) IV Push once  docusate sodium 100 milliGRAM(s) Oral three times a day  famotidine    Tablet 20 milliGRAM(s) Oral daily  fenofibrate Tablet 145 milliGRAM(s) Oral daily  gabapentin 100 milliGRAM(s) Oral daily  insulin glargine Injectable (LANTUS) 12 Unit(s) SubCutaneous at bedtime  insulin lispro (HumaLOG) corrective regimen sliding scale   SubCutaneous three times a day before meals  insulin lispro (HumaLOG) corrective regimen sliding scale   SubCutaneous at bedtime  insulin lispro Injectable (HumaLOG) 4 Unit(s) SubCutaneous three times a day before meals  metoprolol tartrate 25 milliGRAM(s) Oral two times a day  NIFEdipine XL 60 milliGRAM(s) Oral at bedtime  NIFEdipine XL 30 milliGRAM(s) Oral daily  ondansetron Injectable 4 milliGRAM(s) IV Push once  polyethylene glycol 3350 17 Gram(s) Oral daily  predniSONE   Tablet 30 milliGRAM(s) Oral daily  senna 2 Tablet(s) Oral at bedtime  sodium chloride 1 Gram(s) Oral daily    MEDICATIONS  (PRN):  acetaminophen   Tablet .. 650 milliGRAM(s) Oral every 6 hours PRN Temp greater or equal to 38C (100.4F), Mild Pain (1 - 3), Moderate Pain (4 - 6), Severe Pain (7 - 10)  dextrose 40% Gel 15 Gram(s) Oral once PRN Blood Glucose LESS THAN 70 milliGRAM(s)/deciliter  glucagon  Injectable 1 milliGRAM(s) IntraMuscular once PRN Glucose LESS THAN 70 milligrams/deciliter  HYDROmorphone  Injectable 0.5 milliGRAM(s) IV Push every 4 hours PRN Severe Pain (7 - 10)  levalbuterol Inhalation 0.63 milliGRAM(s) Inhalation every 6 hours PRN SOB/wheezing      Allergies    ACE inhibitors (Angioedema)    Intolerances      Review of Systems:  Constitutional: No fever  Cardiovascular: No chest pain, palpitations  Respiratory: Breathing is stable  GI: No nausea, vomiting, abdominal pain  Skin: rash noted on right lateral neck    PHYSICAL EXAM:  Vital Signs Last 24 Hrs  T(C): 37.1 (11 Sep 2018 06:01), Max: 37.1 (11 Sep 2018 06:01)  T(F): 98.8 (11 Sep 2018 06:01), Max: 98.8 (11 Sep 2018 06:01)  HR: 80 (11 Sep 2018 11:10) (80 - 98)  BP: 161/61 (11 Sep 2018 11:10) (127/70 - 161/61)  BP(mean): --  RR: 17 (11 Sep 2018 11:10) (17 - 18)  SpO2: 94% (11 Sep 2018 11:10) (94% - 99%)  GENERAL: NAD  RESPIRATORY: Clear to auscultation bilaterall  CARDIOVASCULAR: +S1/S2  GI: Soft, nontender, non distended, normal bowel sounds  PSYCH: normal affect, normal mood    CAPILLARY BLOOD GLUCOSE      POCT Blood Glucose.: 143 mg/dL (11 Sep 2018 12:37)    POCT Blood Glucose.: 236 mg/dL (11 Sep 2018 08:43)  POCT Blood Glucose.: 152 mg/dL (10 Sep 2018 21:45)  POCT Blood Glucose.: 144 mg/dL (10 Sep 2018 17:44)  POCT Blood Glucose.: 133 mg/dL (10 Sep 2018 12:42)    POCT Blood Glucose.: 133 mg/dL (10 Sep 2018 12:42)  POCT Blood Glucose.: 125 mg/dL (10 Sep 2018 08:24)  POCT Blood Glucose.: 154 mg/dL (09 Sep 2018 22:43)  POCT Blood Glucose.: 150 mg/dL (09 Sep 2018 17:42)    POCT Blood Glucose.: 248 mg/dL (09-09-18 @ 12:20)  POCT Blood Glucose.: 277 mg/dL (09-09-18 @ 08:50)  POCT Blood Glucose.: 314 mg/dL (09-08-18 @ 22:15)  POCT Blood Glucose.: 237 mg/dL (09-08-18 @ 17:09)  POCT Blood Glucose.: 270 mg/dL (09-08-18 @ 08:39)  POCT Blood Glucose.: 110 mg/dL (09-07-18 @ 17:18)  POCT Blood Glucose.: 236 mg/dL (09-07-18 @ 12:17)  POCT Blood Glucose.: 266 mg/dL (09-07-18 @ 09:59)  POCT Blood Glucose.: 236 mg/dL (09-06-18 @ 21:46)  POCT Blood Glucose.: 229 mg/dL (09-06-18 @ 16:53)      09-11    130<L>  |  91<L>  |  27<H>  ----------------------------<  100<H>  4.2   |  30  |  0.86    Ca    8.5      11 Sep 2018 05:50  Phos  2.7     09-10  Mg     1.9     09-11    TPro  5.8<L>  /  Alb  2.3<L>  /  TBili  0.4  /  DBili  x   /  AST  20  /  ALT  24  /  AlkPhos  71  09-10       Hemoglobin A1C, Whole Blood: 9.8 % <H> [4.0 - 5.6] (09-05-18 @ 02:55)

## 2018-09-12 LAB
ALBUMIN SERPL ELPH-MCNC: 2.4 G/DL — LOW (ref 3.3–5)
ALP SERPL-CCNC: 75 U/L — SIGNIFICANT CHANGE UP (ref 40–120)
ALT FLD-CCNC: 18 U/L — SIGNIFICANT CHANGE UP (ref 4–33)
AST SERPL-CCNC: 21 U/L — SIGNIFICANT CHANGE UP (ref 4–32)
BILIRUB SERPL-MCNC: 0.4 MG/DL — SIGNIFICANT CHANGE UP (ref 0.2–1.2)
BUN SERPL-MCNC: 28 MG/DL — HIGH (ref 7–23)
CALCIUM SERPL-MCNC: 8.8 MG/DL — SIGNIFICANT CHANGE UP (ref 8.4–10.5)
CHLORIDE SERPL-SCNC: 93 MMOL/L — LOW (ref 98–107)
CO2 SERPL-SCNC: 29 MMOL/L — SIGNIFICANT CHANGE UP (ref 22–31)
CREAT SERPL-MCNC: 0.82 MG/DL — SIGNIFICANT CHANGE UP (ref 0.5–1.3)
GLUCOSE BLDC GLUCOMTR-MCNC: 129 MG/DL — HIGH (ref 70–99)
GLUCOSE BLDC GLUCOMTR-MCNC: 199 MG/DL — HIGH (ref 70–99)
GLUCOSE BLDC GLUCOMTR-MCNC: 208 MG/DL — HIGH (ref 70–99)
GLUCOSE BLDC GLUCOMTR-MCNC: 293 MG/DL — HIGH (ref 70–99)
GLUCOSE SERPL-MCNC: 142 MG/DL — HIGH (ref 70–99)
HCT VFR BLD CALC: 28.9 % — LOW (ref 34.5–45)
HGB BLD-MCNC: 8.9 G/DL — LOW (ref 11.5–15.5)
MAGNESIUM SERPL-MCNC: 1.9 MG/DL — SIGNIFICANT CHANGE UP (ref 1.6–2.6)
MCHC RBC-ENTMCNC: 25.1 PG — LOW (ref 27–34)
MCHC RBC-ENTMCNC: 30.8 % — LOW (ref 32–36)
MCV RBC AUTO: 81.6 FL — SIGNIFICANT CHANGE UP (ref 80–100)
NRBC # FLD: 0 — SIGNIFICANT CHANGE UP
PLATELET # BLD AUTO: 260 K/UL — SIGNIFICANT CHANGE UP (ref 150–400)
PMV BLD: 9.4 FL — SIGNIFICANT CHANGE UP (ref 7–13)
POTASSIUM SERPL-MCNC: 3.6 MMOL/L — SIGNIFICANT CHANGE UP (ref 3.5–5.3)
POTASSIUM SERPL-SCNC: 3.6 MMOL/L — SIGNIFICANT CHANGE UP (ref 3.5–5.3)
PROT SERPL-MCNC: 6.2 G/DL — SIGNIFICANT CHANGE UP (ref 6–8.3)
RBC # BLD: 3.54 M/UL — LOW (ref 3.8–5.2)
RBC # FLD: 15.3 % — HIGH (ref 10.3–14.5)
SODIUM SERPL-SCNC: 133 MMOL/L — LOW (ref 135–145)
WBC # BLD: 7.34 K/UL — SIGNIFICANT CHANGE UP (ref 3.8–10.5)
WBC # FLD AUTO: 7.34 K/UL — SIGNIFICANT CHANGE UP (ref 3.8–10.5)

## 2018-09-12 PROCEDURE — 99232 SBSQ HOSP IP/OBS MODERATE 35: CPT

## 2018-09-12 PROCEDURE — 99232 SBSQ HOSP IP/OBS MODERATE 35: CPT | Mod: GC

## 2018-09-12 RX ORDER — KETOROLAC TROMETHAMINE 30 MG/ML
15 SYRINGE (ML) INJECTION ONCE
Qty: 0 | Refills: 0 | Status: DISCONTINUED | OUTPATIENT
Start: 2018-09-12 | End: 2018-09-12

## 2018-09-12 RX ORDER — GABAPENTIN 400 MG/1
300 CAPSULE ORAL
Qty: 0 | Refills: 0 | Status: DISCONTINUED | OUTPATIENT
Start: 2018-09-12 | End: 2018-09-14

## 2018-09-12 RX ORDER — METOPROLOL TARTRATE 50 MG
50 TABLET ORAL
Qty: 0 | Refills: 0 | Status: DISCONTINUED | OUTPATIENT
Start: 2018-09-12 | End: 2018-09-15

## 2018-09-12 RX ORDER — INSULIN GLARGINE 100 [IU]/ML
14 INJECTION, SOLUTION SUBCUTANEOUS AT BEDTIME
Qty: 0 | Refills: 0 | Status: DISCONTINUED | OUTPATIENT
Start: 2018-09-12 | End: 2018-09-13

## 2018-09-12 RX ORDER — BUDESONIDE AND FORMOTEROL FUMARATE DIHYDRATE 160; 4.5 UG/1; UG/1
2 AEROSOL RESPIRATORY (INHALATION)
Qty: 0 | Refills: 0 | Status: DISCONTINUED | OUTPATIENT
Start: 2018-09-12 | End: 2018-09-15

## 2018-09-12 RX ORDER — HYDRALAZINE HCL 50 MG
10 TABLET ORAL ONCE
Qty: 0 | Refills: 0 | Status: COMPLETED | OUTPATIENT
Start: 2018-09-12 | End: 2018-09-12

## 2018-09-12 RX ORDER — POTASSIUM CHLORIDE 20 MEQ
20 PACKET (EA) ORAL ONCE
Qty: 0 | Refills: 0 | Status: COMPLETED | OUTPATIENT
Start: 2018-09-12 | End: 2018-09-12

## 2018-09-12 RX ADMIN — INSULIN GLARGINE 14 UNIT(S): 100 INJECTION, SOLUTION SUBCUTANEOUS at 22:58

## 2018-09-12 RX ADMIN — Medication 650 MILLIGRAM(S): at 01:08

## 2018-09-12 RX ADMIN — Medication 263 MILLIGRAM(S): at 05:43

## 2018-09-12 RX ADMIN — AER TRAVELER 1 APPLICATION(S): 0.5 SOLUTION RECTAL; TOPICAL at 21:38

## 2018-09-12 RX ADMIN — GABAPENTIN 200 MILLIGRAM(S): 400 CAPSULE ORAL at 05:43

## 2018-09-12 RX ADMIN — AER TRAVELER 1 APPLICATION(S): 0.5 SOLUTION RECTAL; TOPICAL at 05:46

## 2018-09-12 RX ADMIN — TRAMADOL HYDROCHLORIDE 25 MILLIGRAM(S): 50 TABLET ORAL at 21:20

## 2018-09-12 RX ADMIN — SODIUM CHLORIDE 1 GRAM(S): 9 INJECTION INTRAMUSCULAR; INTRAVENOUS; SUBCUTANEOUS at 11:03

## 2018-09-12 RX ADMIN — Medication 975 MILLIGRAM(S): at 18:01

## 2018-09-12 RX ADMIN — Medication 20 MILLIEQUIVALENT(S): at 15:17

## 2018-09-12 RX ADMIN — Medication 2: at 12:39

## 2018-09-12 RX ADMIN — Medication 145 MILLIGRAM(S): at 11:03

## 2018-09-12 RX ADMIN — Medication 6 UNIT(S): at 12:39

## 2018-09-12 RX ADMIN — Medication 6: at 17:58

## 2018-09-12 RX ADMIN — GABAPENTIN 200 MILLIGRAM(S): 400 CAPSULE ORAL at 15:17

## 2018-09-12 RX ADMIN — Medication 650 MILLIGRAM(S): at 12:06

## 2018-09-12 RX ADMIN — Medication 975 MILLIGRAM(S): at 17:01

## 2018-09-12 RX ADMIN — FAMOTIDINE 20 MILLIGRAM(S): 10 INJECTION INTRAVENOUS at 11:03

## 2018-09-12 RX ADMIN — Medication 263 MILLIGRAM(S): at 17:01

## 2018-09-12 RX ADMIN — LIDOCAINE 1 PATCH: 4 CREAM TOPICAL at 00:09

## 2018-09-12 RX ADMIN — GABAPENTIN 300 MILLIGRAM(S): 400 CAPSULE ORAL at 21:20

## 2018-09-12 RX ADMIN — Medication 30 MILLIGRAM(S): at 05:43

## 2018-09-12 RX ADMIN — Medication 4: at 08:55

## 2018-09-12 RX ADMIN — Medication 60 MILLIGRAM(S): at 21:38

## 2018-09-12 RX ADMIN — Medication 6 UNIT(S): at 08:55

## 2018-09-12 RX ADMIN — Medication 10 MILLIGRAM(S): at 15:17

## 2018-09-12 RX ADMIN — Medication 15 MILLIGRAM(S): at 05:47

## 2018-09-12 RX ADMIN — Medication 50 MILLIGRAM(S): at 21:38

## 2018-09-12 RX ADMIN — Medication 650 MILLIGRAM(S): at 00:06

## 2018-09-12 RX ADMIN — Medication 6 UNIT(S): at 17:59

## 2018-09-12 RX ADMIN — AER TRAVELER 1 APPLICATION(S): 0.5 SOLUTION RECTAL; TOPICAL at 12:40

## 2018-09-12 RX ADMIN — Medication 15 MILLIGRAM(S): at 06:06

## 2018-09-12 RX ADMIN — Medication 650 MILLIGRAM(S): at 11:06

## 2018-09-12 RX ADMIN — Medication 25 MILLIGRAM(S): at 11:03

## 2018-09-12 NOTE — PROGRESS NOTE ADULT - PROBLEM SELECTOR PLAN 1
BG's have been stable on Lantus 12 units, for now would:   continue Lantus 12 units qhs   Will c/w Humalog  to 6 units QAC  Continue moderate correction scale before meals and moderate bedtime.  Doses of insulin may need to be decreased as steroids are tapered.    DC plan: patient was to be started on basal insulin as outpatient in addition to oral agents. For dc will need to clarify goals of care. Outpatient endocrinologist Dr. Alcocer.  Referred to diabetes educator.  please have RN jefferson begin teaching insulin pen administration. Dc meds likely Tresiba, metformin and naglitinide if patietn continues to be on prednisone 30 mg daily Fasting has been elevated,   Will increase lantus to 14 units at bedtime  Will c/w Humalog  to 6 units QAC  Continue moderate correction scale before meals and moderate bedtime.  Doses of insulin may need to be decreased as steroids are tapered.    DC plan: patient was to be started on basal insulin as outpatient in addition to oral agents. For dc will need to clarify goals of care. Outpatient endocrinologist Dr. Alcocer.  Referred to diabetes educator.  please have RN staf begin teaching insulin pen administration. Dc meds likely Tresiba, metformin and naglitinide if patietn continues to be on prednisone 30 mg daily

## 2018-09-12 NOTE — PROGRESS NOTE ADULT - ASSESSMENT
----------------------------------------  Karla Monroy MD PGY-5  Pulmonary/Critical Care Fellow  Pager # [phone] (NS), [phone] ([facility]) 86F Hx Lung CA (unknown stage or type, Dx 2010 s/p L VATS, wedge resection, RT in 7/2017), PMR, HTN, DM2 p/w worsening Rt shoulder pain for last two months. MRI C-spine for shoulder pain, found lesion in cerebellum. CTH w/hyperdense L cerebellar lesion (1.5 x 1.3 cm) concerning for hemorrhagic met. MICU/Pulm c/s for increased WOB and hypoxia (?aspiration vs. flash pulm edema from hypertensive urgency). On bilevel, went to MICU, going to get radiation and NSG wants gamma knife for the brain lesion. Outpatient PFTs showing mild obstructive lung disease  - would restart her ICS/LABA, can start symbicort  - would decrease prednisone to home dosage (10mg bid)  - can follow up with us once discharged at [address] [place] [address] Road  - will sign off, please reconsult as needed    Case d/w Dr. Keller (pulm attending)    ----------------------------------------  Karla Monroy MD PGY-5  Pulmonary/Critical Care Fellow  Pager # [phone] (NS), [phone] ([facility])

## 2018-09-12 NOTE — PROGRESS NOTE ADULT - SUBJECTIVE AND OBJECTIVE BOX
_________________________________________________________________________________________  ========>>  M E D I C A L   A T T E N D I N G    F O L L O W  U P  N O T E  <<=========  -----------------------------------------------------------------------------------------------------    - Patient seen and examined by me earlier today.   - In summary,  CLIVE HENRY is a 86y year old woman who originally presented with Rt shoulder pain  - Patient overall better /comfortable,  weak, eating a bit better, still with some pain on and off in right shoulder area      continuing to adjust pain meds to achieve balance between comfort and wakefulness     ==================>> REVIEW OF SYSTEM <<=================    GEN: no fever, no chills, + pain on and off including sharp electric type pain at tomes ( Neurontin increased again)   RESP: mild SOB, no cough, no sputum  CVS: no chest pain, no palpitations, no edema  GI: no abdominal pain, no nausea  : no dysuria, no frequency, no hematuria  Neuro: no headache, no dizziness  Derm : no itching    ==================>> PHYSICAL EXAM <<=================    GEN: A&O X 3 , NAD , comfortable  HEENT: NCAT, PERRL, MMM, hearing intact  Neck: supple , no JVD  CVS: S1S2 , Irregular , No M/R/G appreciated  PULM: CTA B/L   ABD.: soft. non tender, non distended,  bowel sounds present  Extrem: intact pulses , no edema   Derm: Dermatomally distributed vesicular rash on right C3/4 dermatome, still with active versicles   PSYCH : flat affect        ==================>> MEDICATIONS <<====================    acyclovir IVPB 650 milliGRAM(s) IV Intermittent every 12 hours  dextrose 5%. 1000 milliLiter(s) IV Continuous <Continuous>  dextrose 50% Injectable 12.5 Gram(s) IV Push once  dextrose 50% Injectable 25 Gram(s) IV Push once  dextrose 50% Injectable 25 Gram(s) IV Push once  docusate sodium 100 milliGRAM(s) Oral three times a day  famotidine    Tablet 20 milliGRAM(s) Oral daily  fenofibrate Tablet 145 milliGRAM(s) Oral daily  gabapentin 300 milliGRAM(s) Oral <User Schedule>  insulin glargine Injectable (LANTUS) 14 Unit(s) SubCutaneous at bedtime  insulin lispro (HumaLOG) corrective regimen sliding scale   SubCutaneous three times a day before meals  insulin lispro (HumaLOG) corrective regimen sliding scale   SubCutaneous at bedtime  insulin lispro Injectable (HumaLOG) 6 Unit(s) SubCutaneous three times a day before meals  metoprolol tartrate 50 milliGRAM(s) Oral two times a day  NIFEdipine XL 60 milliGRAM(s) Oral at bedtime  NIFEdipine XL 30 milliGRAM(s) Oral daily  polyethylene glycol 3350 17 Gram(s) Oral daily  predniSONE   Tablet 30 milliGRAM(s) Oral daily  senna 2 Tablet(s) Oral at bedtime  sodium chloride 1 Gram(s) Oral daily  traMADol 25 milliGRAM(s) Oral <User Schedule>  witch hazel Pads 1 Application(s) Topical three times a day    MEDICATIONS  (PRN):  acetaminophen   Tablet .. 975 milliGRAM(s) Oral every 8 hours PRN Severe Pain (7 - 10)  dextrose 40% Gel 15 Gram(s) Oral once PRN Blood Glucose LESS THAN 70 milliGRAM(s)/deciliter  glucagon  Injectable 1 milliGRAM(s) IntraMuscular once PRN Glucose LESS THAN 70 milligrams/deciliter  levalbuterol Inhalation 0.63 milliGRAM(s) Inhalation every 6 hours PRN SOB/wheezing    ==================>> VITAL SIGNS <<==================    Vital Signs Last 24 Hrs  T(C): 36.6 (09-12-18 @ 14:25)  T(F): 97.8 (09-12-18 @ 14:25), Max: 97.9 (09-12-18 @ 11:01)  HR: 80 (09-12-18 @ 16:47) (79 - 108)  BP: 169/72 (09-12-18 @ 14:25)  RR: 18 (09-12-18 @ 14:25) (18 - 18)  SpO2: 97% (09-12-18 @ 16:47) (96% - 100%)      POCT Blood Glucose.: 199 mg/dL (12 Sep 2018 12:20)  POCT Blood Glucose.: 208 mg/dL (12 Sep 2018 08:45)  POCT Blood Glucose.: 120 mg/dL (11 Sep 2018 22:26)  POCT Blood Glucose.: 290 mg/dL (11 Sep 2018 17:44)     ==================>> LAB AND IMAGING <<==================                        8.9    7.34  )-----------( 260      ( 12 Sep 2018 06:15 )             28.9        Hemoglobin:   8.9 <<==,  8.6 <<==,  9.2 <<==,  9.3 <<==,  9.2 <<==,  9.6 <<==    133<L>  |  93<L>  |  28<H>  ----------------------------<  142<H>  3.6   |  29  |  0.82    Sodium:   133  <==, 130  <==, 132  <==, 130  <==, 126  <==, 126  <==, 127  <==    Ca    8.8      12 Sep 2018 06:15  Mg     1.9     09-12    TPro  6.2  /  Alb  2.4<L>  /  TBili  0.4  /  DBili  x   /  AST  21  /  ALT  18  /  AlkPhos  75  09-12    ___________________________________________________________________________________  ===============>>  A S S E S S M E N T   A N D   P L A N <<===============  ------------------------------------------------------------------------------------------    · Assessment		  85 y/o F PMH Lung CA (unknown stage or type, Dx 2010 s/p L VATS, wedge resection, RT in July 2017), PMR, HTN, DM2 p/w worsening Rt shoulder pain from partial supraspinatus tear, found to have poss hemorrhagic met to brain pending MRI.     ·  Problem: Herpes Zoster / shingles   continue IV antivirals as pt immunocompromised>> will switch to PO to complete treatment upon DC   pain mgmt as above >> Neurontin increased again         Tramadol at bedtime          tylenol as ordered (975 mg TID PRN)          trying to avoid narcotics per family request as pt with decreased mentation / wakefulness, and due to nausea/ vomiting)     ·  Problem: hypoxemic respiratory failure, likely multifactorial given lung cancer with possible superimposed complex pneumonia and COPD exacerbation ,   pulmonary team care and mgmt :  overall improved   wean off O2 as able and DC as able  completed Levaquin : observe off abx  incentive spirometer  prednisone and taper as able     ·  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  - Dispo planing as above  --------------------------------------------  Case discussed with pt and Dtrs, RN  Education given on findings and plan of care  ___________________________  H. GEN Ferrell.  Pager: [phone]

## 2018-09-12 NOTE — PROGRESS NOTE ADULT - SUBJECTIVE AND OBJECTIVE BOX
NEPHROLOGY - NSN    Patient seen and examined.    MEDICATIONS  (STANDING):  acyclovir IVPB 650 milliGRAM(s) IV Intermittent every 12 hours  dextrose 5%. 1000 milliLiter(s) (50 mL/Hr) IV Continuous <Continuous>  dextrose 50% Injectable 12.5 Gram(s) IV Push once  dextrose 50% Injectable 25 Gram(s) IV Push once  dextrose 50% Injectable 25 Gram(s) IV Push once  docusate sodium 100 milliGRAM(s) Oral three times a day  famotidine    Tablet 20 milliGRAM(s) Oral daily  fenofibrate Tablet 145 milliGRAM(s) Oral daily  gabapentin 200 milliGRAM(s) Oral <User Schedule>  insulin glargine Injectable (LANTUS) 12 Unit(s) SubCutaneous at bedtime  insulin lispro (HumaLOG) corrective regimen sliding scale   SubCutaneous three times a day before meals  insulin lispro (HumaLOG) corrective regimen sliding scale   SubCutaneous at bedtime  insulin lispro Injectable (HumaLOG) 6 Unit(s) SubCutaneous three times a day before meals  metoprolol tartrate 25 milliGRAM(s) Oral two times a day  NIFEdipine XL 60 milliGRAM(s) Oral at bedtime  NIFEdipine XL 30 milliGRAM(s) Oral daily  polyethylene glycol 3350 17 Gram(s) Oral daily  predniSONE   Tablet 30 milliGRAM(s) Oral daily  senna 2 Tablet(s) Oral at bedtime  sodium chloride 1 Gram(s) Oral daily  traMADol 25 milliGRAM(s) Oral <User Schedule>  witch hazel Pads 1 Application(s) Topical three times a day    VITALS:  T(C): , Max: 36.6 (09-11-18 @ 21:25)  T(F): , Max: 97.9 (09-12-18 @ 11:01)  HR: 79 (09-12-18 @ 11:46)  BP: 153/76 (09-12-18 @ 11:01)  BP(mean): --  RR: 18 (09-12-18 @ 11:01)  SpO2: 96% (09-12-18 @ 11:46)  Wt(kg): --  I and O's:    09-11 @ 07:01  -  09-12 @ 07:00  --------------------------------------------------------  IN: 550 mL / OUT: 0 mL / NET: 550 mL    09-12 @ 07:01  -  09-12 @ 12:52  --------------------------------------------------------  IN: 180 mL / OUT: 0 mL / NET: 180 mL          REVIEW OF SYSTEMS:  Denies any nausea, vomiting, diarrhea, fever or chills. Denies chest pain, SOB, focal weakness, hematuria or dysuria. Good oral intake and denies fatigue or weakness. All other pertinent systems are reviewed and are negative.     PHYSICAL EXAM:  Constitutional: NAD  Respiratory: CTA B/L  Cardiovascular: S1 and S2, RRR  Gastrointestinal: + BS, soft, NT, ND  Extremities: No peripheral edema  Neurological: AAO x 3, CN 2-12 intact  : No Alcaraz  Access: Not applicable    LABS:                        8.9    7.34  )-----------( 260      ( 12 Sep 2018 06:15 )             28.9     09-12    133<L>  |  93<L>  |  28<H>  ----------------------------<  142<H>  3.6   |  29  |  0.82    Ca    8.8      12 Sep 2018 06:15  Mg     1.9     09-12    TPro  6.2  /  Alb  2.4<L>  /  TBili  0.4  /  DBili  x   /  AST  21  /  ALT  18  /  AlkPhos  75  09-12      Urine Studies:        RADIOLOGY & ADDITIONAL STUDIES:      ASSESSMENT    RECOMMEND    Pascale Byers NP  [facility],   [phone] NEPHROLOGY - NSN    Patient seen and examined.  C/O R shoulder pain.    MEDICATIONS  (STANDING):  acyclovir IVPB 650 milliGRAM(s) IV Intermittent every 12 hours  dextrose 5%. 1000 milliLiter(s) (50 mL/Hr) IV Continuous <Continuous>  dextrose 50% Injectable 12.5 Gram(s) IV Push once  dextrose 50% Injectable 25 Gram(s) IV Push once  dextrose 50% Injectable 25 Gram(s) IV Push once  docusate sodium 100 milliGRAM(s) Oral three times a day  famotidine    Tablet 20 milliGRAM(s) Oral daily  fenofibrate Tablet 145 milliGRAM(s) Oral daily  gabapentin 200 milliGRAM(s) Oral <User Schedule>  insulin glargine Injectable (LANTUS) 12 Unit(s) SubCutaneous at bedtime  insulin lispro (HumaLOG) corrective regimen sliding scale   SubCutaneous three times a day before meals  insulin lispro (HumaLOG) corrective regimen sliding scale   SubCutaneous at bedtime  insulin lispro Injectable (HumaLOG) 6 Unit(s) SubCutaneous three times a day before meals  metoprolol tartrate 25 milliGRAM(s) Oral two times a day  NIFEdipine XL 60 milliGRAM(s) Oral at bedtime  NIFEdipine XL 30 milliGRAM(s) Oral daily  polyethylene glycol 3350 17 Gram(s) Oral daily  predniSONE   Tablet 30 milliGRAM(s) Oral daily  senna 2 Tablet(s) Oral at bedtime  sodium chloride 1 Gram(s) Oral daily  traMADol 25 milliGRAM(s) Oral <User Schedule>  witch hazel Pads 1 Application(s) Topical three times a day    VITALS:  T(C): , Max: 36.6 (09-11-18 @ 21:25)  T(F): , Max: 97.9 (09-12-18 @ 11:01)  HR: 79 (09-12-18 @ 11:46)  BP: 153/76 (09-12-18 @ 11:01)  BP(mean): --  RR: 18 (09-12-18 @ 11:01)  SpO2: 96% (09-12-18 @ 11:46)  Wt(kg): --  I and O's:    09-11 @ 07:01  -  09-12 @ 07:00  --------------------------------------------------------  IN: 550 mL / OUT: 0 mL / NET: 550 mL    09-12 @ 07:01  -  09-12 @ 12:52  --------------------------------------------------------  IN: 180 mL / OUT: 0 mL / NET: 180 mL    PHYSICAL EXAM:  Constitutional: NAD  Respiratory: CTA B/L  Cardiovascular: S1 and S2, RRR  Gastrointestinal: + BS, soft, NT, ND  Extremities: No peripheral edema  Neurological: forgetful   : No Alcaraz  Access: Not applicable    LABS:                        8.9    7.34  )-----------( 260      ( 12 Sep 2018 06:15 )             28.9     09-12    133<L>  |  93<L>  |  28<H>  ----------------------------<  142<H>  3.6   |  29  |  0.82    Ca    8.8      12 Sep 2018 06:15  Mg     1.9     09-12    TPro  6.2  /  Alb  2.4<L>  /  TBili  0.4  /  DBili  x   /  AST  21  /  ALT  18  /  AlkPhos  75  09-12      ASSESSMENT  The patient is an 86-year-old female with past medical history of diabetes, hypertension, polymyalgia rheumatica, lung cancer with metastasis, who presents with hyponatremia.  Her urine indices are consistent with syndrome of inappropriate secretion of antidiuretic hormone.  She also may have a component of tea and toast diet.  Usually these patients have a low BUN, but her BUN is elevated, likely from the effects of the prednisone.  The prednisone is also leading to osmotic diuresis secondary to the high blood sugars.  Goals of therapy are to improve her intake of protein.  Antidiuretic hormone secretion can absolutely be possible from her malignancy (especially for lung cancer).    Gastrointestinal: Continue Glucerna three times a day along with Ensure pudding & Blake.   Renal: Hyponatremia: Hyponatremia will be a constant issue for her.  In her antibiotics, she is getting essentially 400 mL of dextrose. Continue NaCL tabs as ordered for now. K on low side of normal limits - give KCl 20 meq x 1 dose   Cardiovascular: euvolemic on exam.   BPs acceptable for now, on high side likely due to salt tabs, continue to hold diuretics  Shoulder pain: pain management per primary team    Pascale Byers NP  [facility]  [phone]

## 2018-09-12 NOTE — PROGRESS NOTE ADULT - ATTENDING COMMENTS
Pt with metastatic lung disease, was in MICU for 2 days for acute respiratory distress after rapid A-fib and probable aspiration. She is now on medical floor, stable off HFNC, and on 2 LPM NC. Denies worsening breathing issues. Continue solumedrol and levaquin for 5 days for COPD exacerbation and aspiration pna, has LLL consolidation on US. Rec, after 5 days, to put her back on baseline prednisone dose. Consider derm consult for painful rash and blistering over right anterior chest. Rad onc to f/u for lung mets. please call with questions.
SOB resplving but still significant pain in shoulder/workup in progress for the new mass/guarded
still in and out of MAT, pain starting to subside/pulmonary edema getting better/overall going to be a bad prognosis
I have seen and examined the patient. I agree with the above history, physical exam, and plan of care except for as detailed below.    85 y/o F w/metastatic lung cancer, COPD, Afib s/p MICU stay for respiratory distress in setting of likely COPD exacerbation. Respiratory status now improved back to baseline.    - Recommend weaning steroids to home dose of prednisone 10mg 2x/day  - Bronchodilators  - Out of bed as tolerated
acute resp failure with question of either pulmonary edema from cardiac diastolic dysfunction this is on a background of new lung mass and pain in right shoulder/and new lesion in the cerebellum/guarded

## 2018-09-12 NOTE — PROGRESS NOTE ADULT - SUBJECTIVE AND OBJECTIVE BOX
Contact info:   pager [phone], please provide 10 digit call back number.   If no answer or after hours, please contact [phone]    Chief Complaint: Type 2 DM, on steroid     History: 87 y/o female with PMH  of Type 2 DM, Lung CA (unknown stage or type, Dx 2010 s/p L VATS, wedge resection, RT in July 2017), PMR on chronic steroids, HTN, p/w worsening Rt shoulder pain for last two months. Admitted with concerning lesion on MRI head for brain met. On steroids with hyperglycemia.  Today     MEDICATIONS  (STANDING):  acyclovir IVPB 650 milliGRAM(s) IV Intermittent every 12 hours  dextrose 5%. 1000 milliLiter(s) (50 mL/Hr) IV Continuous <Continuous>  dextrose 50% Injectable 12.5 Gram(s) IV Push once  dextrose 50% Injectable 25 Gram(s) IV Push once  dextrose 50% Injectable 25 Gram(s) IV Push once  docusate sodium 100 milliGRAM(s) Oral three times a day  famotidine    Tablet 20 milliGRAM(s) Oral daily  fenofibrate Tablet 145 milliGRAM(s) Oral daily  gabapentin 200 milliGRAM(s) Oral <User Schedule>  insulin glargine Injectable (LANTUS) 12 Unit(s) SubCutaneous at bedtime  insulin lispro (HumaLOG) corrective regimen sliding scale   SubCutaneous three times a day before meals  insulin lispro (HumaLOG) corrective regimen sliding scale   SubCutaneous at bedtime  insulin lispro Injectable (HumaLOG) 6 Unit(s) SubCutaneous three times a day before meals  metoprolol tartrate 25 milliGRAM(s) Oral two times a day  NIFEdipine XL 60 milliGRAM(s) Oral at bedtime  NIFEdipine XL 30 milliGRAM(s) Oral daily  polyethylene glycol 3350 17 Gram(s) Oral daily  predniSONE   Tablet 30 milliGRAM(s) Oral daily  senna 2 Tablet(s) Oral at bedtime  sodium chloride 1 Gram(s) Oral daily  traMADol 25 milliGRAM(s) Oral <User Schedule>  witch hazel Pads 1 Application(s) Topical three times a day    MEDICATIONS  (PRN):  acetaminophen   Tablet .. 650 milliGRAM(s) Oral every 6 hours PRN Temp greater or equal to 38C (100.4F), Mild Pain (1 - 3), Moderate Pain (4 - 6), Severe Pain (7 - 10)  acetaminophen   Tablet .. 975 milliGRAM(s) Oral every 8 hours PRN Severe Pain (7 - 10)  dextrose 40% Gel 15 Gram(s) Oral once PRN Blood Glucose LESS THAN 70 milliGRAM(s)/deciliter  glucagon  Injectable 1 milliGRAM(s) IntraMuscular once PRN Glucose LESS THAN 70 milligrams/deciliter  levalbuterol Inhalation 0.63 milliGRAM(s) Inhalation every 6 hours PRN SOB/wheezing      Allergies    ACE inhibitors (Angioedema)    Intolerances      Review of Systems:  Constitutional: No fever  Eyes: No blurry vision  Neuro: No tremors  HEENT: No pain  Cardiovascular: No chest pain, palpitations  Respiratory: No SOB, no cough  GI: No nausea, vomiting, abdominal pain  : No dysuria  Skin: no rash  Psych: no depression  Endocrine: no polyuria, polydipsia  Hem/lymph: no swelling  Osteoporosis: no fractures    ALL OTHER SYSTEMS REVIEWED AND NEGATIVE    UNABLE TO OBTAIN    PHYSICAL EXAM:  VITALS: T(C): 36.6 (09-12-18 @ 14:25)  T(F): 97.8 (09-12-18 @ 14:25), Max: 97.9 (09-12-18 @ 11:01)  HR: 80 (09-12-18 @ 14:25) (79 - 108)  BP: 169/72 (09-12-18 @ 14:25) (153/76 - 169/72)  RR:  (18 - 18)  SpO2:  (96% - 100%)  Wt(kg): --  GENERAL: NAD  RESPIRATORY: Clear to auscultation bilaterall  CARDIOVASCULAR: +S1/S2  GI: Soft, nontender, non distended, normal bowel sounds  PSYCH: normal affect, normal mood    POCT Blood Glucose.: 199 mg/dL (09-12-18 @ 12:20)  POCT Blood Glucose.: 208 mg/dL (09-12-18 @ 08:45)  POCT Blood Glucose.: 120 mg/dL (09-11-18 @ 22:26)  POCT Blood Glucose.: 290 mg/dL (09-11-18 @ 17:44)  POCT Blood Glucose.: 143 mg/dL (09-11-18 @ 12:37)  POCT Blood Glucose.: 236 mg/dL (09-11-18 @ 08:43)  POCT Blood Glucose.: 152 mg/dL (09-10-18 @ 21:45)  POCT Blood Glucose.: 144 mg/dL (09-10-18 @ 17:44)  POCT Blood Glucose.: 133 mg/dL (09-10-18 @ 12:42)  POCT Blood Glucose.: 125 mg/dL (09-10-18 @ 08:24)  POCT Blood Glucose.: 154 mg/dL (09-09-18 @ 22:43)  POCT Blood Glucose.: 150 mg/dL (09-09-18 @ 17:42)      09-12    133<L>  |  93<L>  |  28<H>  ----------------------------<  142<H>  3.6   |  29  |  0.82    EGFR if : 75  EGFR if non : 65    Ca    8.8      09-12  Mg     1.9     09-12  Phos  2.7     09-10    TPro  6.2  /  Alb  2.4<L>  /  TBili  0.4  /  DBili  x   /  AST  21  /  ALT  18  /  AlkPhos  75  09-12          Thyroid Function Tests:      Hemoglobin A1C, Whole Blood: 9.8 % <H> [4.0 - 5.6] (09-05-18 @ 02:55) Contact info:   pager [phone], please provide 10 digit call back number.   If no answer or after hours, please contact [phone]    Chief Complaint: Type 2 DM, on steroid     History: 87 y/o female with PMH  of Type 2 DM, Lung CA (unknown stage or type, Dx 2010 s/p L VATS, wedge resection, RT in July 2017), PMR on chronic steroids, HTN, p/w worsening Rt shoulder pain for last two months. Admitted with concerning lesion on MRI head for brain met. On steroids with hyperglycemia.  Today daughter at bedside, patient still has very poor appetite.  Fasting glucose high, but endorsed being in a lot of pain.  She has been trying to give patient protein shakes throughout the day.      MEDICATIONS  (STANDING):  acyclovir IVPB 650 milliGRAM(s) IV Intermittent every 12 hours  docusate sodium 100 milliGRAM(s) Oral three times a day  famotidine    Tablet 20 milliGRAM(s) Oral daily  fenofibrate Tablet 145 milliGRAM(s) Oral daily  gabapentin 200 milliGRAM(s) Oral <User Schedule>  insulin glargine Injectable (LANTUS) 12 Unit(s) SubCutaneous at bedtime  insulin lispro (HumaLOG) corrective regimen sliding scale   SubCutaneous three times a day before meals  insulin lispro (HumaLOG) corrective regimen sliding scale   SubCutaneous at bedtime  insulin lispro Injectable (HumaLOG) 6 Unit(s) SubCutaneous three times a day before meals  metoprolol tartrate 25 milliGRAM(s) Oral two times a day  NIFEdipine XL 60 milliGRAM(s) Oral at bedtime  NIFEdipine XL 30 milliGRAM(s) Oral daily  polyethylene glycol 3350 17 Gram(s) Oral daily  predniSONE   Tablet 30 milliGRAM(s) Oral daily  senna 2 Tablet(s) Oral at bedtime  sodium chloride 1 Gram(s) Oral daily  traMADol 25 milliGRAM(s) Oral <User Schedule>  witch hazel Pads 1 Application(s) Topical three times a day    MEDICATIONS  (PRN):  acetaminophen   Tablet .. 650 milliGRAM(s) Oral every 6 hours PRN Temp greater or equal to 38C (100.4F), Mild Pain (1 - 3), Moderate Pain (4 - 6), Severe Pain (7 - 10)  acetaminophen   Tablet .. 975 milliGRAM(s) Oral every 8 hours PRN Severe Pain (7 - 10)  dextrose 40% Gel 15 Gram(s) Oral once PRN Blood Glucose LESS THAN 70 milliGRAM(s)/deciliter  glucagon  Injectable 1 milliGRAM(s) IntraMuscular once PRN Glucose LESS THAN 70 milligrams/deciliter  levalbuterol Inhalation 0.63 milliGRAM(s) Inhalation every 6 hours PRN SOB/wheezing      Allergies    ACE inhibitors (Angioedema)    Intolerances      Review of Systems:  Constitutional: No fever  Eyes: No blurry vision  Neuro: No tremors  HEENT: No pain  Cardiovascular: No chest pain, palpitations  Respiratory: No SOB, no cough  GI: No nausea, vomiting, abdominal pain  : No dysuria  Skin: no rash  Psych: no depression  Endocrine: no polyuria, polydipsia  Hem/lymph: no swelling  Osteoporosis: no fractures    ALL OTHER SYSTEMS REVIEWED AND NEGATIVE    UNABLE TO OBTAIN    PHYSICAL EXAM:  VITALS: T(C): 36.6 (09-12-18 @ 14:25)  T(F): 97.8 (09-12-18 @ 14:25), Max: 97.9 (09-12-18 @ 11:01)  HR: 80 (09-12-18 @ 14:25) (79 - 108)  BP: 169/72 (09-12-18 @ 14:25) (153/76 - 169/72)  RR:  (18 - 18)  SpO2:  (96% - 100%)  Wt(kg): --  GENERAL: NAD  RESPIRATORY: Clear to auscultation bilaterall  CARDIOVASCULAR: +S1/S2  GI: Soft, nontender, non distended, normal bowel sounds  PSYCH: normal affect, normal mood    POCT Blood Glucose.: 199 mg/dL (09-12-18 @ 12:20)  POCT Blood Glucose.: 208 mg/dL (09-12-18 @ 08:45)  POCT Blood Glucose.: 120 mg/dL (09-11-18 @ 22:26)  POCT Blood Glucose.: 290 mg/dL (09-11-18 @ 17:44)  POCT Blood Glucose.: 143 mg/dL (09-11-18 @ 12:37)  POCT Blood Glucose.: 236 mg/dL (09-11-18 @ 08:43)  POCT Blood Glucose.: 152 mg/dL (09-10-18 @ 21:45)  POCT Blood Glucose.: 144 mg/dL (09-10-18 @ 17:44)  POCT Blood Glucose.: 133 mg/dL (09-10-18 @ 12:42)  POCT Blood Glucose.: 125 mg/dL (09-10-18 @ 08:24)  POCT Blood Glucose.: 154 mg/dL (09-09-18 @ 22:43)  POCT Blood Glucose.: 150 mg/dL (09-09-18 @ 17:42)      09-12    133<L>  |  93<L>  |  28<H>  ----------------------------<  142<H>  3.6   |  29  |  0.82    EGFR if : 75  EGFR if non : 65    Ca    8.8      09-12  Mg     1.9     09-12  Phos  2.7     09-10    TPro  6.2  /  Alb  2.4<L>  /  TBili  0.4  /  DBili  x   /  AST  21  /  ALT  18  /  AlkPhos  75  09-12          Thyroid Function Tests:      Hemoglobin A1C, Whole Blood: 9.8 % <H> [4.0 - 5.6] (09-05-18 @ 02:55)

## 2018-09-12 NOTE — PROGRESS NOTE ADULT - SUBJECTIVE AND OBJECTIVE BOX
Cardiovascular Disease Progress Note    Overnight events: No acute events overnight.  still with persistent right shoulder pain. no cp/sob/pnd/orthopnea/palpitations  Otherwise review of systems negative    Objective Findings:  T(C): 36.4 (18 @ 05:41), Max: 36.6 (18 @ 21:25)  HR: 92 (18 @ 07:03) (80 - 108)  BP: 157/84 (18 @ 05:41) (156/74 - 162/86)  RR: 18 (18 @ 05:41) (17 - 18)  SpO2: 97% (18 @ 07:03) (94% - 100%)  Wt(kg): --  Daily     Daily Weight in k.9 (12 Sep 2018 07:03)      Physical Exam:  Gen: NAD  HEENT: EOMI  CV: RRR, normal S1 + S2, no m/r/g  Lungs: CTAB  Abd: soft, non-tender  Ext: No edema    Telemetry: nsr apc's    Laboratory Data:                        8.6    7.32  )-----------( 244      ( 11 Sep 2018 05:50 )             27.3     09-    130<L>  |  91<L>  |  27<H>  ----------------------------<  100<H>  4.2   |  30  |  0.86    Ca    8.5      11 Sep 2018 05:50  Mg     1.9             Inpatient Medications:  MEDICATIONS  (STANDING):  acyclovir IVPB 650 milliGRAM(s) IV Intermittent every 12 hours  dextrose 5%. 1000 milliLiter(s) (50 mL/Hr) IV Continuous <Continuous>  dextrose 50% Injectable 12.5 Gram(s) IV Push once  dextrose 50% Injectable 25 Gram(s) IV Push once  dextrose 50% Injectable 25 Gram(s) IV Push once  docusate sodium 100 milliGRAM(s) Oral three times a day  famotidine    Tablet 20 milliGRAM(s) Oral daily  fenofibrate Tablet 145 milliGRAM(s) Oral daily  gabapentin 200 milliGRAM(s) Oral <User Schedule>  insulin glargine Injectable (LANTUS) 12 Unit(s) SubCutaneous at bedtime  insulin lispro (HumaLOG) corrective regimen sliding scale   SubCutaneous three times a day before meals  insulin lispro (HumaLOG) corrective regimen sliding scale   SubCutaneous at bedtime  insulin lispro Injectable (HumaLOG) 6 Unit(s) SubCutaneous three times a day before meals  metoprolol tartrate 25 milliGRAM(s) Oral two times a day  NIFEdipine XL 60 milliGRAM(s) Oral at bedtime  NIFEdipine XL 30 milliGRAM(s) Oral daily  polyethylene glycol 3350 17 Gram(s) Oral daily  predniSONE   Tablet 30 milliGRAM(s) Oral daily  senna 2 Tablet(s) Oral at bedtime  sodium chloride 1 Gram(s) Oral daily  traMADol 25 milliGRAM(s) Oral <User Schedule>  witch hazel Pads 1 Application(s) Topical three times a day      Assessment:  86 F with PMH of lung adenoca (dx around , s/p L VATS, XRT of TERESA/RUL last in 2016), COPD with emphysema, htn, and dm2, presented with R shoulder pain which is at least partially related to acute zoster, also incidentally 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     Plan:  1) hypoxic respiratory failure initially requiring BiPAP now on supplemental O2, COPD/emphysema  - appears euvolemic  -wean o2 as tolerates  -possibly infectious vs progression of disease vs copd-e  -s/p LE duplex - no evidence of DVT    -s/p abx per medicine for fevers and consolidation on imaging  -c/w steroids and nebs for possible copd-e    3) elevated troponin, without overt ischemic sx or ischemic changes on ecg  -would defer ischemic workup for now given overall prognosis and inability to tolerate anti-platelet agents/AC given recent imaging findings  -c/w fenofibrate for HLD  -cont to hold aspirin given hemorrhagic cerebellar lesion    4) right should pain, hx of PMR, acute zoster  -appreciate derm recs  -c/w acylovir, prednisone and gabapentin    5) HTN, APC's  -no evidence of atrial fibrillation on visualized ecg's and telemetry  -agree with holding heparin drip/AC  -c/w metoprolol as tolerates  -c/w nifdepine    6) cerebellar lesion  -appreciate nsgy/onc recs  -mri/mra results noted      Over 2 inutes spent on total encounter; more than 50% of the visit was spent counseling and/or coordinating care by the attending physician.      Slick York MD   Cardiovascular Disease  [phone]

## 2018-09-12 NOTE — PROGRESS NOTE ADULT - SUBJECTIVE AND OBJECTIVE BOX
OBJECTIVE:  ICU Vital Signs Last 24 Hrs  T(C): 36.4 (12 Sep 2018 05:41), Max: 36.6 (11 Sep 2018 21:25)  T(F): 97.5 (12 Sep 2018 05:41), Max: 97.8 (11 Sep 2018 21:25)  HR: 92 (12 Sep 2018 07:03) (80 - 108)  BP: 157/84 (12 Sep 2018 05:41) (156/74 - 162/86)  BP(mean): --  ABP: --  ABP(mean): --  RR: 18 (12 Sep 2018 05:41) (17 - 18)  SpO2: 97% (12 Sep 2018 07:03) (94% - 100%)        09-11 @ 07:01  -  09-12 @ 07:00  --------------------------------------------------------  IN: 550 mL / OUT: 0 mL / NET: 550 mL      CAPILLARY BLOOD GLUCOSE      POCT Blood Glucose.: 120 mg/dL (11 Sep 2018 22:26)      PHYSICAL EXAM:  General:   HEENT:   Respiratory:   Cardiovascular:   Abdomen:   Extremities:   Skin:   Neurological:    HOSPITAL MEDICATIONS:    acyclovir IVPB 650 milliGRAM(s) IV Intermittent every 12 hours    metoprolol tartrate 25 milliGRAM(s) Oral two times a day  NIFEdipine XL 60 milliGRAM(s) Oral at bedtime  NIFEdipine XL 30 milliGRAM(s) Oral daily    dextrose 40% Gel 15 Gram(s) Oral once PRN  dextrose 50% Injectable 12.5 Gram(s) IV Push once  dextrose 50% Injectable 25 Gram(s) IV Push once  dextrose 50% Injectable 25 Gram(s) IV Push once  fenofibrate Tablet 145 milliGRAM(s) Oral daily  glucagon  Injectable 1 milliGRAM(s) IntraMuscular once PRN  insulin glargine Injectable (LANTUS) 12 Unit(s) SubCutaneous at bedtime  insulin lispro (HumaLOG) corrective regimen sliding scale   SubCutaneous three times a day before meals  insulin lispro (HumaLOG) corrective regimen sliding scale   SubCutaneous at bedtime  insulin lispro Injectable (HumaLOG) 6 Unit(s) SubCutaneous three times a day before meals  predniSONE   Tablet 30 milliGRAM(s) Oral daily    levalbuterol Inhalation 0.63 milliGRAM(s) Inhalation every 6 hours PRN    acetaminophen   Tablet .. 650 milliGRAM(s) Oral every 6 hours PRN  acetaminophen   Tablet .. 975 milliGRAM(s) Oral every 8 hours PRN  gabapentin 200 milliGRAM(s) Oral <User Schedule>  traMADol 25 milliGRAM(s) Oral <User Schedule>    docusate sodium 100 milliGRAM(s) Oral three times a day  famotidine    Tablet 20 milliGRAM(s) Oral daily  polyethylene glycol 3350 17 Gram(s) Oral daily  senna 2 Tablet(s) Oral at bedtime        dextrose 5%. 1000 milliLiter(s) IV Continuous <Continuous>  sodium chloride 1 Gram(s) Oral daily      witch hazel Pads 1 Application(s) Topical three times a day        LABS:                        8.6    7.32  )-----------( 244      ( 11 Sep 2018 05:50 )             27.3     Hgb Trend: 8.6<--, 9.2<--, 9.3<--, 9.2<--, 9.6<--  09-11    130<L>  |  91<L>  |  27<H>  ----------------------------<  100<H>  4.2   |  30  |  0.86    Ca    8.5      11 Sep 2018 05:50  Mg     1.9     09-11      Creatinine Trend: 0.86<--, 0.88<--, 0.93<--, 0.90<--, 0.81<--, 0.88<--            MICROBIOLOGY:     RADIOLOGY:  [ ] Reviewed and interpreted by me    PULMONARY FUNCTION TESTS: Pt seen and examined by the bedside. No acute events overnight. On RA, O2 Sat 97%. No longer having SOB.     OBJECTIVE:  ICU Vital Signs Last 24 Hrs  T(C): 36.4 (12 Sep 2018 05:41), Max: 36.6 (11 Sep 2018 21:25)  T(F): 97.5 (12 Sep 2018 05:41), Max: 97.8 (11 Sep 2018 21:25)  HR: 92 (12 Sep 2018 07:03) (80 - 108)  BP: 157/84 (12 Sep 2018 05:41) (156/74 - 162/86)  BP(mean): --  ABP: --  ABP(mean): --  RR: 18 (12 Sep 2018 05:41) (17 - 18)  SpO2: 97% (12 Sep 2018 07:03) (94% - 100%)      09-11 @ 07:01  -  09-12 @ 07:00  --------------------------------------------------------  IN: 550 mL / OUT: 0 mL / NET: 550 mL    CAPILLARY BLOOD GLUCOSE  POCT Blood Glucose.: 120 mg/dL (11 Sep 2018 22:26)    PHYSICAL EXAM:  General: NAD, pleasant  HEENT: NCAT, moist mucosal membranes  Respiratory: CTAB  Cardiovascular: S1/S2 normal, RRR, no murmur/rubs/gallops appreciated  Abdomen: soft, non-tender, non-distended with normal bowel sounds  Extremities: no b/l LE edema, no cyanosis/clubbing  Skin: warm/dry  Neurological: answering questions appropriately, no focal deficits    HOSPITAL MEDICATIONS:  acyclovir IVPB 650 milliGRAM(s) IV Intermittent every 12 hours    metoprolol tartrate 25 milliGRAM(s) Oral two times a day  NIFEdipine XL 60 milliGRAM(s) Oral at bedtime  NIFEdipine XL 30 milliGRAM(s) Oral daily    dextrose 40% Gel 15 Gram(s) Oral once PRN  dextrose 50% Injectable 12.5 Gram(s) IV Push once  dextrose 50% Injectable 25 Gram(s) IV Push once  dextrose 50% Injectable 25 Gram(s) IV Push once  fenofibrate Tablet 145 milliGRAM(s) Oral daily  glucagon  Injectable 1 milliGRAM(s) IntraMuscular once PRN  insulin glargine Injectable (LANTUS) 12 Unit(s) SubCutaneous at bedtime  insulin lispro (HumaLOG) corrective regimen sliding scale   SubCutaneous three times a day before meals  insulin lispro (HumaLOG) corrective regimen sliding scale   SubCutaneous at bedtime  insulin lispro Injectable (HumaLOG) 6 Unit(s) SubCutaneous three times a day before meals  predniSONE   Tablet 30 milliGRAM(s) Oral daily    levalbuterol Inhalation 0.63 milliGRAM(s) Inhalation every 6 hours PRN    acetaminophen   Tablet .. 650 milliGRAM(s) Oral every 6 hours PRN  acetaminophen   Tablet .. 975 milliGRAM(s) Oral every 8 hours PRN  gabapentin 200 milliGRAM(s) Oral <User Schedule>  traMADol 25 milliGRAM(s) Oral <User Schedule>    docusate sodium 100 milliGRAM(s) Oral three times a day  famotidine    Tablet 20 milliGRAM(s) Oral daily  polyethylene glycol 3350 17 Gram(s) Oral daily  senna 2 Tablet(s) Oral at bedtime    dextrose 5%. 1000 milliLiter(s) IV Continuous <Continuous>  sodium chloride 1 Gram(s) Oral daily    witch hazel Pads 1 Application(s) Topical three times a day        LABS:                        8.6    7.32  )-----------( 244      ( 11 Sep 2018 05:50 )             27.3     Hgb Trend: 8.6<--, 9.2<--, 9.3<--, 9.2<--, 9.6<--  09-11    130<L>  |  91<L>  |  27<H>  ----------------------------<  100<H>  4.2   |  30  |  0.86    Ca    8.5      11 Sep 2018 05:50  Mg     1.9     09-11      Creatinine Trend: 0.86<--, 0.88<--, 0.93<--, 0.90<--, 0.81<--, 0.88<--

## 2018-09-13 LAB
BUN SERPL-MCNC: 24 MG/DL — HIGH (ref 7–23)
CALCIUM SERPL-MCNC: 8.4 MG/DL — SIGNIFICANT CHANGE UP (ref 8.4–10.5)
CHLORIDE SERPL-SCNC: 90 MMOL/L — LOW (ref 98–107)
CO2 SERPL-SCNC: 29 MMOL/L — SIGNIFICANT CHANGE UP (ref 22–31)
CREAT SERPL-MCNC: 0.77 MG/DL — SIGNIFICANT CHANGE UP (ref 0.5–1.3)
GLUCOSE BLDC GLUCOMTR-MCNC: 127 MG/DL — HIGH (ref 70–99)
GLUCOSE BLDC GLUCOMTR-MCNC: 191 MG/DL — HIGH (ref 70–99)
GLUCOSE BLDC GLUCOMTR-MCNC: 259 MG/DL — HIGH (ref 70–99)
GLUCOSE BLDC GLUCOMTR-MCNC: 320 MG/DL — HIGH (ref 70–99)
GLUCOSE SERPL-MCNC: 192 MG/DL — HIGH (ref 70–99)
HCT VFR BLD CALC: 29 % — LOW (ref 34.5–45)
HGB BLD-MCNC: 8.9 G/DL — LOW (ref 11.5–15.5)
MCHC RBC-ENTMCNC: 24.7 PG — LOW (ref 27–34)
MCHC RBC-ENTMCNC: 30.7 % — LOW (ref 32–36)
MCV RBC AUTO: 80.3 FL — SIGNIFICANT CHANGE UP (ref 80–100)
NRBC # FLD: 0 — SIGNIFICANT CHANGE UP
PLATELET # BLD AUTO: 293 K/UL — SIGNIFICANT CHANGE UP (ref 150–400)
PMV BLD: 9.2 FL — SIGNIFICANT CHANGE UP (ref 7–13)
POTASSIUM SERPL-MCNC: 4.1 MMOL/L — SIGNIFICANT CHANGE UP (ref 3.5–5.3)
POTASSIUM SERPL-SCNC: 4.1 MMOL/L — SIGNIFICANT CHANGE UP (ref 3.5–5.3)
RBC # BLD: 3.61 M/UL — LOW (ref 3.8–5.2)
RBC # FLD: 15.3 % — HIGH (ref 10.3–14.5)
SODIUM SERPL-SCNC: 126 MMOL/L — LOW (ref 135–145)
WBC # BLD: 7.85 K/UL — SIGNIFICANT CHANGE UP (ref 3.8–10.5)
WBC # FLD AUTO: 7.85 K/UL — SIGNIFICANT CHANGE UP (ref 3.8–10.5)

## 2018-09-13 PROCEDURE — 71045 X-RAY EXAM CHEST 1 VIEW: CPT | Mod: 26

## 2018-09-13 PROCEDURE — 99232 SBSQ HOSP IP/OBS MODERATE 35: CPT

## 2018-09-13 RX ORDER — INSULIN GLARGINE 100 [IU]/ML
15 INJECTION, SOLUTION SUBCUTANEOUS AT BEDTIME
Qty: 0 | Refills: 0 | Status: DISCONTINUED | OUTPATIENT
Start: 2018-09-13 | End: 2018-09-14

## 2018-09-13 RX ORDER — HYDRALAZINE HCL 50 MG
5 TABLET ORAL ONCE
Qty: 0 | Refills: 0 | Status: COMPLETED | OUTPATIENT
Start: 2018-09-13 | End: 2018-09-13

## 2018-09-13 RX ADMIN — TRAMADOL HYDROCHLORIDE 25 MILLIGRAM(S): 50 TABLET ORAL at 20:29

## 2018-09-13 RX ADMIN — Medication 8: at 18:14

## 2018-09-13 RX ADMIN — Medication 975 MILLIGRAM(S): at 14:10

## 2018-09-13 RX ADMIN — GABAPENTIN 300 MILLIGRAM(S): 400 CAPSULE ORAL at 16:25

## 2018-09-13 RX ADMIN — Medication 6 UNIT(S): at 13:14

## 2018-09-13 RX ADMIN — AER TRAVELER 1 APPLICATION(S): 0.5 SOLUTION RECTAL; TOPICAL at 05:50

## 2018-09-13 RX ADMIN — INSULIN GLARGINE 15 UNIT(S): 100 INJECTION, SOLUTION SUBCUTANEOUS at 22:36

## 2018-09-13 RX ADMIN — GABAPENTIN 300 MILLIGRAM(S): 400 CAPSULE ORAL at 05:49

## 2018-09-13 RX ADMIN — AER TRAVELER 1 APPLICATION(S): 0.5 SOLUTION RECTAL; TOPICAL at 13:14

## 2018-09-13 RX ADMIN — Medication 263 MILLIGRAM(S): at 05:49

## 2018-09-13 RX ADMIN — Medication 145 MILLIGRAM(S): at 13:14

## 2018-09-13 RX ADMIN — LEVALBUTEROL 0.63 MILLIGRAM(S): 1.25 SOLUTION, CONCENTRATE RESPIRATORY (INHALATION) at 00:13

## 2018-09-13 RX ADMIN — Medication 975 MILLIGRAM(S): at 05:50

## 2018-09-13 RX ADMIN — Medication 6 UNIT(S): at 09:24

## 2018-09-13 RX ADMIN — BUDESONIDE AND FORMOTEROL FUMARATE DIHYDRATE 2 PUFF(S): 160; 4.5 AEROSOL RESPIRATORY (INHALATION) at 20:29

## 2018-09-13 RX ADMIN — Medication 6 UNIT(S): at 18:14

## 2018-09-13 RX ADMIN — Medication 975 MILLIGRAM(S): at 06:30

## 2018-09-13 RX ADMIN — Medication 30 MILLIGRAM(S): at 05:49

## 2018-09-13 RX ADMIN — Medication 50 MILLIGRAM(S): at 05:50

## 2018-09-13 RX ADMIN — Medication 975 MILLIGRAM(S): at 15:10

## 2018-09-13 RX ADMIN — Medication 50 MILLIGRAM(S): at 17:15

## 2018-09-13 RX ADMIN — SODIUM CHLORIDE 1 GRAM(S): 9 INJECTION INTRAMUSCULAR; INTRAVENOUS; SUBCUTANEOUS at 13:14

## 2018-09-13 RX ADMIN — AER TRAVELER 1 APPLICATION(S): 0.5 SOLUTION RECTAL; TOPICAL at 22:33

## 2018-09-13 RX ADMIN — Medication 263 MILLIGRAM(S): at 17:47

## 2018-09-13 RX ADMIN — Medication 10 MILLIGRAM(S): at 17:15

## 2018-09-13 RX ADMIN — Medication 5 MILLIGRAM(S): at 00:23

## 2018-09-13 RX ADMIN — BUDESONIDE AND FORMOTEROL FUMARATE DIHYDRATE 2 PUFF(S): 160; 4.5 AEROSOL RESPIRATORY (INHALATION) at 10:54

## 2018-09-13 RX ADMIN — Medication 60 MILLIGRAM(S): at 22:33

## 2018-09-13 RX ADMIN — FAMOTIDINE 20 MILLIGRAM(S): 10 INJECTION INTRAVENOUS at 13:14

## 2018-09-13 RX ADMIN — Medication 10 MILLIGRAM(S): at 05:49

## 2018-09-13 RX ADMIN — Medication 6: at 09:24

## 2018-09-13 NOTE — PROVIDER CONTACT NOTE (OTHER) - ASSESSMENT
Pt asymptomatic
PT asymptomatic, VS otherwise stable.
PT asymptomatic.
See cognitive, perrla, neuro assessments. Discussed with provider.

## 2018-09-13 NOTE — PROGRESS NOTE ADULT - PROBLEM SELECTOR PLAN 1
Fasting has been elevated,   Will increase Lantus to 15 units at bedtime  Will c/w Humalog  to 6 units QAC  Continue moderate correction scale before meals and moderate bedtime.  Doses of insulin may need to be decreased as steroids are tapered.    DC plan: patient was to be started on basal insulin as outpatient in addition to oral agents. For dc will need to clarify goals of care. Outpatient endocrinologist Dr. Alcocer.  Referred to diabetes educator.  please have RN staff begin teaching insulin pen administration. Dc meds likely Tresiba, metformin and nateglinide if patients continues to be on prednisone 30 mg daily

## 2018-09-13 NOTE — OCCUPATIONAL THERAPY INITIAL EVALUATION ADULT - DIAGNOSIS, OT EVAL
Right UE weakness, right UE weakness, decreased functional use of right UE, decreased ADL independence, decreased functional mobility Right UE weakness, right UE pain, decreased functional use of right UE, decreased ADL independence, decreased functional mobility

## 2018-09-13 NOTE — PROGRESS NOTE ADULT - SUBJECTIVE AND OBJECTIVE BOX
NEPHROLOGY-[facility] [phone]        Patient seen and examined in bed.  She was sleeping most of the day.  SOB developed last night.          MEDICATIONS  (STANDING):  acyclovir IVPB 650 milliGRAM(s) IV Intermittent every 12 hours  buDESOnide 160 MICROgram(s)/formoterol 4.5 MICROgram(s) Inhaler 2 Puff(s) Inhalation two times a day  dextrose 5%. 1000 milliLiter(s) (50 mL/Hr) IV Continuous <Continuous>  dextrose 50% Injectable 12.5 Gram(s) IV Push once  dextrose 50% Injectable 25 Gram(s) IV Push once  dextrose 50% Injectable 25 Gram(s) IV Push once  docusate sodium 100 milliGRAM(s) Oral three times a day  famotidine    Tablet 20 milliGRAM(s) Oral daily  fenofibrate Tablet 145 milliGRAM(s) Oral daily  gabapentin 300 milliGRAM(s) Oral <User Schedule>  insulin glargine Injectable (LANTUS) 15 Unit(s) SubCutaneous at bedtime  insulin lispro (HumaLOG) corrective regimen sliding scale   SubCutaneous three times a day before meals  insulin lispro (HumaLOG) corrective regimen sliding scale   SubCutaneous at bedtime  insulin lispro Injectable (HumaLOG) 6 Unit(s) SubCutaneous three times a day before meals  metoprolol tartrate 50 milliGRAM(s) Oral two times a day  NIFEdipine XL 60 milliGRAM(s) Oral at bedtime  NIFEdipine XL 30 milliGRAM(s) Oral daily  polyethylene glycol 3350 17 Gram(s) Oral daily  predniSONE   Tablet 10 milliGRAM(s) Oral two times a day  senna 2 Tablet(s) Oral at bedtime  sodium chloride 1 Gram(s) Oral daily  traMADol 25 milliGRAM(s) Oral <User Schedule>  witch hazel Pads 1 Application(s) Topical three times a day      VITAL:  T(C): , Max: 37.1 (09-12-18 @ 21:32)  T(F): , Max: 98.7 (09-12-18 @ 21:32)  HR: 79 (09-13-18 @ 17:00)  BP: 176/79 (09-13-18 @ 17:00)  BP(mean): --  RR: 18 (09-13-18 @ 15:10)  SpO2: 100% (09-13-18 @ 15:10)  Wt(kg): --    I and O's:    09-12 @ 07:01  -  09-13 @ 07:00  --------------------------------------------------------  IN: 420 mL / OUT: 100 mL / NET: 320 mL          PHYSICAL EXAM:    Constitutional: cachetic;  respond to tactile  HEENT: PERRLA    Neck:  No JVD  Respiratory: poor effort  Cardiovascular: S1 and S2  Gastrointestinal: BS+, soft, NT/ND  Extremities: No peripheral edema  Neurological: A/O x 3,   Psychiatric: unable  : No Alcaraz  Skin: No rashes  Access: Not applicable    LABS:                        8.9    7.85  )-----------( 293      ( 13 Sep 2018 06:56 )             29.0     09-13    126<L>  |  90<L>  |  24<H>  ----------------------------<  192<H>  4.1   |  29  |  0.77    Ca    8.4      13 Sep 2018 06:56  Mg     1.9     09-12    TPro  6.2  /  Alb  2.4<L>  /  TBili  0.4  /  DBili  x   /  AST  21  /  ALT  18  /  AlkPhos  75  09-12          Urine Studies:          RADIOLOGY & ADDITIONAL STUDIES:

## 2018-09-13 NOTE — CHART NOTE - NSCHARTNOTEFT_GEN_A_CORE
Patient requires semi-electric hospital bed secondary to lung cancer with brain lesion. Head of bed needs to be elevated to thirty degrees or more. Frequent changes in body position are required and are not feasible in an ordinary bed. Gel mattress overlay is needed to prevent skin breakdown.

## 2018-09-13 NOTE — CHART NOTE - NSCHARTNOTEFT_GEN_A_CORE
Late entry note -   Patient noted to desaturate to 86. Patient complained of shortness of breath. Noted to be off nasal cannula. Nasal cannula placed on patient at 3L and return of oxygen saturation of 97 percent. Will continue to monitor. ABG refused. CDW Dr Ferrell

## 2018-09-13 NOTE — PROGRESS NOTE ADULT - SUBJECTIVE AND OBJECTIVE BOX
Cardiovascular Disease Progress Note    Overnight events: overnight events noted - htn and hypoxic overnight, resolved with hydralazine and bronchdilators. currently feels well. no cp/sob/pnd/orthopnea  Otherwise review of systems negative    Objective Findings:  T(C): 37 (18 @ 05:47), Max: 37.1 (18 @ 21:32)  HR: 77 (18 @ 06:46) (77 - 82)  BP: 159/74 (18 @ 05:47) (153/76 - 189/87)  RR: 18 (18 @ 05:47) (18 - 19)  SpO2: 97% (18 @ 06:46) (96% - 100%)  Wt(kg): --  Daily     Daily Weight in k.8 (13 Sep 2018 06:58)      Physical Exam:  Gen: NAD  HEENT: EOMI  CV: RRR, normal S1 + S2, no m/r/g  Lungs: CTAB  Abd: soft, non-tender  Ext: No edema    Telemetry: NSR    Laboratory Data:                        8.9    7.85  )-----------( 293      ( 13 Sep 2018 06:56 )             29.0         133<L>  |  93<L>  |  28<H>  ----------------------------<  142<H>  3.6   |  29  |  0.82    Ca    8.8      12 Sep 2018 06:15  Mg     1.9         TPro  6.2  /  Alb  2.4<L>  /  TBili  0.4  /  DBili  x   /  AST  21  /  ALT  18  /  AlkPhos  75                Inpatient Medications:  MEDICATIONS  (STANDING):  acyclovir IVPB 650 milliGRAM(s) IV Intermittent every 12 hours  buDESOnide 160 MICROgram(s)/formoterol 4.5 MICROgram(s) Inhaler 2 Puff(s) Inhalation two times a day  dextrose 5%. 1000 milliLiter(s) (50 mL/Hr) IV Continuous <Continuous>  dextrose 50% Injectable 12.5 Gram(s) IV Push once  dextrose 50% Injectable 25 Gram(s) IV Push once  dextrose 50% Injectable 25 Gram(s) IV Push once  docusate sodium 100 milliGRAM(s) Oral three times a day  famotidine    Tablet 20 milliGRAM(s) Oral daily  fenofibrate Tablet 145 milliGRAM(s) Oral daily  gabapentin 300 milliGRAM(s) Oral <User Schedule>  insulin glargine Injectable (LANTUS) 14 Unit(s) SubCutaneous at bedtime  insulin lispro (HumaLOG) corrective regimen sliding scale   SubCutaneous three times a day before meals  insulin lispro (HumaLOG) corrective regimen sliding scale   SubCutaneous at bedtime  insulin lispro Injectable (HumaLOG) 6 Unit(s) SubCutaneous three times a day before meals  metoprolol tartrate 50 milliGRAM(s) Oral two times a day  NIFEdipine XL 60 milliGRAM(s) Oral at bedtime  NIFEdipine XL 30 milliGRAM(s) Oral daily  polyethylene glycol 3350 17 Gram(s) Oral daily  predniSONE   Tablet 10 milliGRAM(s) Oral two times a day  senna 2 Tablet(s) Oral at bedtime  sodium chloride 1 Gram(s) Oral daily  traMADol 25 milliGRAM(s) Oral <User Schedule>  witch hazel Pads 1 Application(s) Topical three times a day      Assessment:  86 F with PMH of lung adenoca (dx around , s/p L VATS, XRT of TERESA/RUL last in 2016), COPD with emphysema, htn, and dm2, presented with R shoulder pain which is at least partially related to acute zoster, also incidentally 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     Plan:  1) hypoxic respiratory failure initially requiring BiPAP now on supplemental O2, COPD/emphysema  - appears euvolemic  -wean o2 as tolerates  -possibly infectious vs progression of disease vs copd-e  -s/p LE duplex - no evidence of DVT    -s/p abx per medicine for fevers and consolidation on imaging  -c/w steroids and nebs for possible copd-e    3) elevated troponin, without overt ischemic sx or ischemic changes on ecg  -would defer ischemic workup for now given overall prognosis and inability to tolerate anti-platelet agents/AC given recent imaging findings  -c/w fenofibrate for HLD  -cont to hold aspirin given hemorrhagic cerebellar lesion    4) right should pain, hx of PMR, acute zoster  -appreciate derm recs  -c/w acylovir, prednisone and gabapentin    5) HTN, APC's  -no evidence of atrial fibrillation on visualized ecg's and telemetry  -c/w metoprolol as tolerates. increased dose possibly contributing to bronchospasm - would reduce to 25mg bid if wheezing recurs  -c/w nifdepine  -would start hydralazine 25mg tid with hold parameters  -cont to hold hctz 2/2 hyponatremia    6) cerebellar lesion  -appreciate nsgy/onc recs  -mri/mra results noted      Over 35 minutes spent on total encounter; more than 50% of the visit was spent counseling and/or coordinating care by the attending physician.      Slick York MD   Cardiovascular Disease  [phone]

## 2018-09-13 NOTE — PROGRESS NOTE ADULT - ASSESSMENT
The patient is an 86-year-old female with past medical history of diabetes, hypertension, polymyalgia rheumatica, lung cancer with metastasis, who presents with hyponatremia.  Her urine indices are consistent with syndrome of inappropriate secretion of antidiuretic hormone.  She also may have a component of tea and toast diet.  Usually these patients have a low BUN, but her BUN is elevated, likely from the effects of the prednisone.  The prednisone is also leading to osmotic diuresis secondary to the high blood sugars.  Goals of therapy are to improve her intake of protein.  Antidiuretic hormone secretion can absolutely be possible from her malignancy (especially for lung cancer).  SOB-New    Gastrointestinal: Continue Glucerna three times a day along with Ensure pudding & Blake so long as she is awake.  Turns out daughter was still trying to feed her despite the fact that her eyes remained closed.  Is the SOB aspiartion pna/chemical pneumonitis  Renal: Hyponatremia: Hyponatremia will be a constant issue for her. Nacl tabs qd  Cardiovascular: euvolemic on exam.   BPs acceptable for now, on high side likely due to salt tabs;  If BP remains high then Hydralazine  Shoulder pain: pain management per primary team

## 2018-09-13 NOTE — CHART NOTE - NSCHARTNOTEFT_GEN_A_CORE
Notified by nurse, patient was having shortness of breath and hypertensive. Pt evaluated at bedside. Patient in acute respiratory distress. Pt states the shortness of breath start acutely couple of minutes ago. Pt denies cough, chest pain, fever, or any other complaints.     GENERAL APPEARANCE: Well developed, well nourished, alert and cooperative, and appears to be in respiratory distress.   CARDIAC: Normal S1 and S2. No S3, S4 or murmurs. Rhythm is tachycardic.   LUNGS: + expiratory wheezes bilaterally. Auscultation without rales, rhonchi,  or diminished breath sounds.  EXTREMITIES: No edema. Peripheral pulses intact.   PSYCHIATRIC: The mental examination revealed the patient was oriented to person, place, and time.     Vital Signs Last 24 Hrs  T(C): 37.1 (12 Sep 2018 21:32), Max: 37.1 (12 Sep 2018 21:32)  T(F): 98.7 (12 Sep 2018 21:32), Max: 98.7 (12 Sep 2018 21:32)  HR: 81 (12 Sep 2018 21:32) (79 - 92)  BP: 158/90 (13 Sep 2018 00:39) (153/76 - 189/87)  BP(mean): --  RR: 19 (12 Sep 2018 21:32) (18 - 19)  SpO2: 99% (12 Sep 2018 21:32) (96% - 100%)    Plan:  Give Xopenex and hydralazine 5 mg now. Repeat CXR. Continue to monitor.

## 2018-09-13 NOTE — PROGRESS NOTE ADULT - SUBJECTIVE AND OBJECTIVE BOX
Contact info:   pager [phone], please provide 10 digit call back number.   If no answer or after hours, please contact [phone]    Chief Complaint: Type 2 DM, on steroid     History: 85 y/o female with PMH  of Type 2 DM, Lung CA (unknown stage or type, Dx 2010 s/p L VATS, wedge resection, RT in July 2017), PMR on chronic steroids, HTN, p/w worsening Rt shoulder pain for last two months. Admitted with concerning lesion on MRI head for brain met. On steroids with hyperglycemia.  Today daughter at bedside, patient's appetite is improving.  She's eating food from home.      MEDICATIONS  (STANDING):  acyclovir IVPB 650 milliGRAM(s) IV Intermittent every 12 hours  buDESOnide 160 MICROgram(s)/formoterol 4.5 MICROgram(s) Inhaler 2 Puff(s) Inhalation two times a day  dextrose 5%. 1000 milliLiter(s) (50 mL/Hr) IV Continuous <Continuous>  dextrose 50% Injectable 12.5 Gram(s) IV Push once  dextrose 50% Injectable 25 Gram(s) IV Push once  dextrose 50% Injectable 25 Gram(s) IV Push once  docusate sodium 100 milliGRAM(s) Oral three times a day  famotidine    Tablet 20 milliGRAM(s) Oral daily  fenofibrate Tablet 145 milliGRAM(s) Oral daily  gabapentin 300 milliGRAM(s) Oral <User Schedule>  insulin glargine Injectable (LANTUS) 14 Unit(s) SubCutaneous at bedtime  insulin lispro (HumaLOG) corrective regimen sliding scale   SubCutaneous three times a day before meals  insulin lispro (HumaLOG) corrective regimen sliding scale   SubCutaneous at bedtime  insulin lispro Injectable (HumaLOG) 6 Unit(s) SubCutaneous three times a day before meals  metoprolol tartrate 50 milliGRAM(s) Oral two times a day  NIFEdipine XL 60 milliGRAM(s) Oral at bedtime  NIFEdipine XL 30 milliGRAM(s) Oral daily  polyethylene glycol 3350 17 Gram(s) Oral daily  predniSONE   Tablet 10 milliGRAM(s) Oral two times a day  senna 2 Tablet(s) Oral at bedtime  sodium chloride 1 Gram(s) Oral daily  traMADol 25 milliGRAM(s) Oral <User Schedule>  witch hazel Pads 1 Application(s) Topical three times a day      MEDICATIONS  (PRN):  acetaminophen   Tablet .. 650 milliGRAM(s) Oral every 6 hours PRN Temp greater or equal to 38C (100.4F), Mild Pain (1 - 3), Moderate Pain (4 - 6), Severe Pain (7 - 10)  acetaminophen   Tablet .. 975 milliGRAM(s) Oral every 8 hours PRN Severe Pain (7 - 10)  dextrose 40% Gel 15 Gram(s) Oral once PRN Blood Glucose LESS THAN 70 milliGRAM(s)/deciliter  glucagon  Injectable 1 milliGRAM(s) IntraMuscular once PRN Glucose LESS THAN 70 milligrams/deciliter  levalbuterol Inhalation 0.63 milliGRAM(s) Inhalation every 6 hours PRN SOB/wheezing      Allergies    ACE inhibitors (Angioedema)      Review of Systems:  Constitutional: No fever  Eyes: No blurry vision  Neuro: No tremors  HEENT: No pain  Cardiovascular: No chest pain, palpitations  Respiratory: No SOB, no cough  GI: No nausea, vomiting, abdominal pain  : No dysuria  Skin: no rash  Psych: no depression  Endocrine: no polyuria, polydipsia  Hem/lymph: no swelling  Osteoporosis: no fractures    ALL OTHER SYSTEMS REVIEWED AND NEGATIVE      PHYSICAL EXAM:  VITALS: T(C): 36.6 (09-12-18 @ 14:25)  T(F): 97.8 (09-12-18 @ 14:25), Max: 97.9 (09-12-18 @ 11:01)  HR: 80 (09-12-18 @ 14:25) (79 - 108)  BP: 169/72 (09-12-18 @ 14:25) (153/76 - 169/72)  RR:  (18 - 18)  SpO2:  (96% - 100%)  Wt(kg): --  GENERAL: NAD  RESPIRATORY: Clear to auscultation bilaterall  CARDIOVASCULAR: +S1/S2  GI: Soft, nontender, non distended, normal bowel sounds  PSYCH: normal affect, normal mood    POCT Blood Glucose.: 127 mg/dL (09-13-18 @ 12:44)  POCT Blood Glucose.: 259 mg/dL (09-13-18 @ 08:38)  POCT Blood Glucose.: 129 mg/dL (09-12-18 @ 22:40)  POCT Blood Glucose.: 293 mg/dL (09-12-18 @ 17:44)  POCT Blood Glucose.: 199 mg/dL (09-12-18 @ 12:20)  POCT Blood Glucose.: 208 mg/dL (09-12-18 @ 08:45)  POCT Blood Glucose.: 120 mg/dL (09-11-18 @ 22:26)  POCT Blood Glucose.: 290 mg/dL (09-11-18 @ 17:44)  POCT Blood Glucose.: 143 mg/dL (09-11-18 @ 12:37)  POCT Blood Glucose.: 236 mg/dL (09-11-18 @ 08:43)  POCT Blood Glucose.: 152 mg/dL (09-10-18 @ 21:45)  POCT Blood Glucose.: 144 mg/dL (09-10-18 @ 17:44)      09-12    133<L>  |  93<L>  |  28<H>  ----------------------------<  142<H>  3.6   |  29  |  0.82    EGFR if : 75  EGFR if non : 65    Ca    8.8      09-12  Mg     1.9     09-12  Phos  2.7     09-10    TPro  6.2  /  Alb  2.4<L>  /  TBili  0.4  /  DBili  x   /  AST  21  /  ALT  18  /  AlkPhos  75  09-12          Thyroid Function Tests:      Hemoglobin A1C, Whole Blood: 9.8 % <H> [4.0 - 5.6] (09-05-18 @ 02:55)

## 2018-09-13 NOTE — OCCUPATIONAL THERAPY INITIAL EVALUATION ADULT - PERTINENT HX OF CURRENT PROBLEM, REHAB EVAL
87 y/o F PMH Lung CA (unknown stage or type, Dx 2010 s/p L VATS, wedge resection, RT in July 2017), PMR, HTN, DM2 p/w worsening Rt shoulder pain for last two months. MRI showing supraspinatus tear as well as apical lung mass, unclear of brachial plexus involvement.

## 2018-09-13 NOTE — PROVIDER CONTACT NOTE (OTHER) - ACTION/TREATMENT ORDERED:
continue to monitot
Continue to monitor.
Give 5mg Hydralazine.
Tele PA made aware. BP medications given as prescribed.
Tele PA made aware. Will give BP medications as per order
Continue to monitor.

## 2018-09-13 NOTE — PROGRESS NOTE ADULT - SUBJECTIVE AND OBJECTIVE BOX
_________________________________________________________________________________________  ========>>  M E D I C A L   A T T E N D I N G    F O L L O W  U P  N O T E  <<=========  -----------------------------------------------------------------------------------------------------    - Patient seen and examined by me earlier today.   - In summary,  CLIVE HENRY is a 86y year old woman who originally presented with Rt shoulder pain  - Patient overall better /comfortable,  weak, eating a bit better, still with some pain on and off in right shoulder area      continuing to adjust pain meds to achieve balance between comfort and wakefulness    pt reportedly had a drop in O2 off O2 supplement: will need home O2, being set up     ==================>> REVIEW OF SYSTEM <<=================    GEN: no fever, no chills, + pain on and off including sharp electric type pain at times  RESP: mild SOB, no cough, no sputum  CVS: no chest pain, no palpitations, no edema  GI: no abdominal pain, no nausea  : no dysuria, no frequency, no hematuria  Neuro: no headache, no dizziness  Derm : no itching    ==================>> PHYSICAL EXAM <<=================    GEN: A&O X 3 , NAD , comfortable  HEENT: NCAT, PERRL, MMM, hearing intact  Neck: supple , no JVD  CVS: S1S2 , Irregular , No M/R/G appreciated  PULM: CTA B/L   ABD.: soft. non tender, non distended,  bowel sounds present  Extrem: intact pulses , no edema   Derm: Dermatomally distributed vesicular rash on right C3/4 dermatome, still with active versicles, overall improving, crusting over   PSYCH : flat affect         ==================>> MEDICATIONS <<====================    acyclovir IVPB 650 milliGRAM(s) IV Intermittent every 12 hours  buDESOnide 160 MICROgram(s)/formoterol 4.5 MICROgram(s) Inhaler 2 Puff(s) Inhalation two times a day  dextrose 5%. 1000 milliLiter(s) IV Continuous <Continuous>  dextrose 50% Injectable 12.5 Gram(s) IV Push once  dextrose 50% Injectable 25 Gram(s) IV Push once  dextrose 50% Injectable 25 Gram(s) IV Push once  docusate sodium 100 milliGRAM(s) Oral three times a day  famotidine    Tablet 20 milliGRAM(s) Oral daily  fenofibrate Tablet 145 milliGRAM(s) Oral daily  gabapentin 300 milliGRAM(s) Oral <User Schedule>  insulin glargine Injectable (LANTUS) 15 Unit(s) SubCutaneous at bedtime  insulin lispro (HumaLOG) corrective regimen sliding scale   SubCutaneous three times a day before meals  insulin lispro (HumaLOG) corrective regimen sliding scale   SubCutaneous at bedtime  insulin lispro Injectable (HumaLOG) 6 Unit(s) SubCutaneous three times a day before meals  metoprolol tartrate 50 milliGRAM(s) Oral two times a day  NIFEdipine XL 60 milliGRAM(s) Oral at bedtime  NIFEdipine XL 30 milliGRAM(s) Oral daily  polyethylene glycol 3350 17 Gram(s) Oral daily  predniSONE   Tablet 10 milliGRAM(s) Oral two times a day  senna 2 Tablet(s) Oral at bedtime  sodium chloride 1 Gram(s) Oral daily  traMADol 25 milliGRAM(s) Oral <User Schedule>  witch hazel Pads 1 Application(s) Topical three times a day    MEDICATIONS  (PRN):  acetaminophen   Tablet .. 975 milliGRAM(s) Oral every 8 hours PRN Severe Pain (7 - 10)  dextrose 40% Gel 15 Gram(s) Oral once PRN Blood Glucose LESS THAN 70 milliGRAM(s)/deciliter  glucagon  Injectable 1 milliGRAM(s) IntraMuscular once PRN Glucose LESS THAN 70 milligrams/deciliter  levalbuterol Inhalation 0.63 milliGRAM(s) Inhalation every 6 hours PRN SOB/wheezing    ==================>> VITAL SIGNS <<==================    Vital Signs Last 24 Hrs  T(C): 36.3 (09-13-18 @ 15:10)  T(F): 97.4 (09-13-18 @ 15:10), Max: 98.7 (09-12-18 @ 21:32)  HR: 72 (09-13-18 @ 15:10) (62 - 82)  BP: 154/77 (09-13-18 @ 15:10)  RR: 18 (09-13-18 @ 15:10) (18 - 19)  SpO2: 100% (09-13-18 @ 15:10) (87% - 100%)     POCT Blood Glucose.: 127 mg/dL (13 Sep 2018 12:44)  POCT Blood Glucose.: 259 mg/dL (13 Sep 2018 08:38)  POCT Blood Glucose.: 129 mg/dL (12 Sep 2018 22:40)  POCT Blood Glucose.: 293 mg/dL (12 Sep 2018 17:44)     ==================>> LAB AND IMAGING <<==================                        8.9    7.85  )-----------( 293      ( 13 Sep 2018 06:56 )             29.0        Hemoglobin:   8.9 <<==,  8.9 <<==,  8.6 <<==,  9.2 <<==,  9.3 <<==    126<L>  |  90<L>  |  24<H>  ----------------------------<  192<H>  4.1   |  29  |  0.77    Sodium:   126  <==, 133  <==, 130  <==, 132  <==, 130  <==, 126  <==, 126  <==    Ca    8.4      13 Sep 2018 06:56  Mg     1.9     09-12    TPro  6.2  /  Alb  2.4<L>  /  TBili  0.4  /  DBili  x   /  AST  21  /  ALT  18  /  AlkPhos  75  09-12    ___________________________________________________________________________________  ===============>>  A S S E S S M E N T   A N D   P L A N <<===============  ------------------------------------------------------------------------------------------    · Assessment		  87 y/o F PMH Lung CA (unknown stage or type, Dx 2010 s/p L VATS, wedge resection, RT in July 2017), PMR, HTN, DM2 p/w worsening Rt shoulder pain from partial supraspinatus tear, found to have poss hemorrhagic met to brain pending MRI.     ·  Problem: Herpes Zoster / shingles   continue IV antivirals as pt immunocompromised>> will switch to PO to complete treatment upon DC   pain mgmt as above >> Neurontin 300 mg BID        Tramadol at bedtime          tylenol as ordered (975 mg TID PRN)          trying to avoid narcotics per family request as pt with decreased mentation / wakefulness, and due to nausea/ vomiting)     ·  Problem: hypoxemic respiratory failure, likely multifactorial given lung cancer with possible superimposed complex pneumonia and COPD exacerbation ,   pulmonary team care and mgmt :  overall improved   will need Home O2: being worked on   completed Levaquin : observe off abx  incentive spirometer  prednisone and taper as able   Home nebulizer with xopenex as needed     ·  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    Problem: Hypertension, poorly controlled  continue Rx as adjusted  with lopressor 50 mg BID, and current dose of CCB  hold diuretics / HCTZ given low Na  would avoid hydralazine ..?  cardio f/u and mgmt     Problem: Diabetes, poorly controlled   Endocrine f/u and mgmt appreciated   Consistent carb diet  HbA1c 9.8%    ·  Problem: depression  offered to start Remeron, pt declined for now    -GI/DVT Prophylaxis.  - Dispo planing as above  --------------------------------------------  Case discussed with pt and Dtr, RN, PA, Cardio  Education given on findings and plan of care  ___________________________  H. GEN Ferrell.  Pager: [phone]

## 2018-09-14 ENCOUNTER — TRANSCRIPTION ENCOUNTER (OUTPATIENT)
Age: 83
End: 2018-09-14

## 2018-09-14 LAB
BASOPHILS # BLD AUTO: 0.01 K/UL — SIGNIFICANT CHANGE UP (ref 0–0.2)
BASOPHILS NFR BLD AUTO: 0.1 % — SIGNIFICANT CHANGE UP (ref 0–2)
BUN SERPL-MCNC: 21 MG/DL — SIGNIFICANT CHANGE UP (ref 7–23)
CALCIUM SERPL-MCNC: 8.4 MG/DL — SIGNIFICANT CHANGE UP (ref 8.4–10.5)
CHLORIDE SERPL-SCNC: 93 MMOL/L — LOW (ref 98–107)
CO2 SERPL-SCNC: 27 MMOL/L — SIGNIFICANT CHANGE UP (ref 22–31)
CREAT SERPL-MCNC: 0.78 MG/DL — SIGNIFICANT CHANGE UP (ref 0.5–1.3)
EOSINOPHIL # BLD AUTO: 0.01 K/UL — SIGNIFICANT CHANGE UP (ref 0–0.5)
EOSINOPHIL NFR BLD AUTO: 0.1 % — SIGNIFICANT CHANGE UP (ref 0–6)
GLUCOSE BLDC GLUCOMTR-MCNC: 113 MG/DL — HIGH (ref 70–99)
GLUCOSE BLDC GLUCOMTR-MCNC: 221 MG/DL — HIGH (ref 70–99)
GLUCOSE BLDC GLUCOMTR-MCNC: 258 MG/DL — HIGH (ref 70–99)
GLUCOSE BLDC GLUCOMTR-MCNC: 283 MG/DL — HIGH (ref 70–99)
GLUCOSE SERPL-MCNC: 190 MG/DL — HIGH (ref 70–99)
HCT VFR BLD CALC: 28.4 % — LOW (ref 34.5–45)
HGB BLD-MCNC: 8.9 G/DL — LOW (ref 11.5–15.5)
IMM GRANULOCYTES # BLD AUTO: 0.23 # — SIGNIFICANT CHANGE UP
IMM GRANULOCYTES NFR BLD AUTO: 3.1 % — HIGH (ref 0–1.5)
LYMPHOCYTES # BLD AUTO: 1.07 K/UL — SIGNIFICANT CHANGE UP (ref 1–3.3)
LYMPHOCYTES # BLD AUTO: 14.6 % — SIGNIFICANT CHANGE UP (ref 13–44)
MAGNESIUM SERPL-MCNC: 1.8 MG/DL — SIGNIFICANT CHANGE UP (ref 1.6–2.6)
MCHC RBC-ENTMCNC: 25.1 PG — LOW (ref 27–34)
MCHC RBC-ENTMCNC: 31.3 % — LOW (ref 32–36)
MCV RBC AUTO: 80.2 FL — SIGNIFICANT CHANGE UP (ref 80–100)
MONOCYTES # BLD AUTO: 0.45 K/UL — SIGNIFICANT CHANGE UP (ref 0–0.9)
MONOCYTES NFR BLD AUTO: 6.1 % — SIGNIFICANT CHANGE UP (ref 2–14)
NEUTROPHILS # BLD AUTO: 5.58 K/UL — SIGNIFICANT CHANGE UP (ref 1.8–7.4)
NEUTROPHILS NFR BLD AUTO: 76 % — SIGNIFICANT CHANGE UP (ref 43–77)
NRBC # FLD: 0 — SIGNIFICANT CHANGE UP
OSMOLALITY SERPL: 277 MOSMO/KG — SIGNIFICANT CHANGE UP (ref 275–295)
PLATELET # BLD AUTO: 312 K/UL — SIGNIFICANT CHANGE UP (ref 150–400)
PMV BLD: 9.3 FL — SIGNIFICANT CHANGE UP (ref 7–13)
POTASSIUM SERPL-MCNC: 4.5 MMOL/L — SIGNIFICANT CHANGE UP (ref 3.5–5.3)
POTASSIUM SERPL-SCNC: 4.5 MMOL/L — SIGNIFICANT CHANGE UP (ref 3.5–5.3)
RBC # BLD: 3.54 M/UL — LOW (ref 3.8–5.2)
RBC # FLD: 15.3 % — HIGH (ref 10.3–14.5)
SODIUM SERPL-SCNC: 129 MMOL/L — LOW (ref 135–145)
WBC # BLD: 7.35 K/UL — SIGNIFICANT CHANGE UP (ref 3.8–10.5)
WBC # FLD AUTO: 7.35 K/UL — SIGNIFICANT CHANGE UP (ref 3.8–10.5)

## 2018-09-14 PROCEDURE — 99232 SBSQ HOSP IP/OBS MODERATE 35: CPT

## 2018-09-14 RX ORDER — INSULIN GLARGINE 100 [IU]/ML
17 INJECTION, SOLUTION SUBCUTANEOUS AT BEDTIME
Qty: 0 | Refills: 0 | Status: DISCONTINUED | OUTPATIENT
Start: 2018-09-14 | End: 2018-09-15

## 2018-09-14 RX ORDER — FUROSEMIDE 40 MG
20 TABLET ORAL DAILY
Qty: 0 | Refills: 0 | Status: DISCONTINUED | OUTPATIENT
Start: 2018-09-14 | End: 2018-09-15

## 2018-09-14 RX ORDER — INSULIN LISPRO 100/ML
7 VIAL (ML) SUBCUTANEOUS
Qty: 0 | Refills: 0 | Status: DISCONTINUED | OUTPATIENT
Start: 2018-09-14 | End: 2018-09-15

## 2018-09-14 RX ORDER — INSULIN GLARGINE 100 [IU]/ML
15 INJECTION, SOLUTION SUBCUTANEOUS AT BEDTIME
Qty: 0 | Refills: 0 | Status: DISCONTINUED | OUTPATIENT
Start: 2018-09-14 | End: 2018-09-14

## 2018-09-14 RX ORDER — GABAPENTIN 400 MG/1
400 CAPSULE ORAL
Qty: 0 | Refills: 0 | Status: DISCONTINUED | OUTPATIENT
Start: 2018-09-14 | End: 2018-09-15

## 2018-09-14 RX ORDER — GABAPENTIN 400 MG/1
100 CAPSULE ORAL ONCE
Qty: 0 | Refills: 0 | Status: COMPLETED | OUTPATIENT
Start: 2018-09-14 | End: 2018-09-14

## 2018-09-14 RX ADMIN — Medication 7 UNIT(S): at 18:19

## 2018-09-14 RX ADMIN — GABAPENTIN 300 MILLIGRAM(S): 400 CAPSULE ORAL at 05:38

## 2018-09-14 RX ADMIN — Medication 6: at 08:39

## 2018-09-14 RX ADMIN — Medication 50 MILLIGRAM(S): at 18:18

## 2018-09-14 RX ADMIN — BUDESONIDE AND FORMOTEROL FUMARATE DIHYDRATE 2 PUFF(S): 160; 4.5 AEROSOL RESPIRATORY (INHALATION) at 20:55

## 2018-09-14 RX ADMIN — AER TRAVELER 1 APPLICATION(S): 0.5 SOLUTION RECTAL; TOPICAL at 05:38

## 2018-09-14 RX ADMIN — Medication 60 MILLIGRAM(S): at 20:55

## 2018-09-14 RX ADMIN — Medication 4: at 18:19

## 2018-09-14 RX ADMIN — Medication 10 MILLIGRAM(S): at 18:18

## 2018-09-14 RX ADMIN — GABAPENTIN 100 MILLIGRAM(S): 400 CAPSULE ORAL at 13:00

## 2018-09-14 RX ADMIN — Medication 145 MILLIGRAM(S): at 08:16

## 2018-09-14 RX ADMIN — BUDESONIDE AND FORMOTEROL FUMARATE DIHYDRATE 2 PUFF(S): 160; 4.5 AEROSOL RESPIRATORY (INHALATION) at 08:16

## 2018-09-14 RX ADMIN — Medication 975 MILLIGRAM(S): at 04:00

## 2018-09-14 RX ADMIN — Medication 50 MILLIGRAM(S): at 05:38

## 2018-09-14 RX ADMIN — Medication 6 UNIT(S): at 08:38

## 2018-09-14 RX ADMIN — Medication 6 UNIT(S): at 13:01

## 2018-09-14 RX ADMIN — AER TRAVELER 1 APPLICATION(S): 0.5 SOLUTION RECTAL; TOPICAL at 20:56

## 2018-09-14 RX ADMIN — TRAMADOL HYDROCHLORIDE 25 MILLIGRAM(S): 50 TABLET ORAL at 20:55

## 2018-09-14 RX ADMIN — Medication 975 MILLIGRAM(S): at 13:04

## 2018-09-14 RX ADMIN — Medication 6: at 13:00

## 2018-09-14 RX ADMIN — POLYETHYLENE GLYCOL 3350 17 GRAM(S): 17 POWDER, FOR SOLUTION ORAL at 08:16

## 2018-09-14 RX ADMIN — Medication 263 MILLIGRAM(S): at 18:18

## 2018-09-14 RX ADMIN — Medication 975 MILLIGRAM(S): at 11:23

## 2018-09-14 RX ADMIN — Medication 975 MILLIGRAM(S): at 03:21

## 2018-09-14 RX ADMIN — Medication 30 MILLIGRAM(S): at 05:38

## 2018-09-14 RX ADMIN — Medication 263 MILLIGRAM(S): at 05:38

## 2018-09-14 RX ADMIN — SODIUM CHLORIDE 1 GRAM(S): 9 INJECTION INTRAMUSCULAR; INTRAVENOUS; SUBCUTANEOUS at 08:16

## 2018-09-14 RX ADMIN — AER TRAVELER 1 APPLICATION(S): 0.5 SOLUTION RECTAL; TOPICAL at 13:00

## 2018-09-14 RX ADMIN — Medication 10 MILLIGRAM(S): at 05:38

## 2018-09-14 RX ADMIN — FAMOTIDINE 20 MILLIGRAM(S): 10 INJECTION INTRAVENOUS at 08:16

## 2018-09-14 RX ADMIN — INSULIN GLARGINE 17 UNIT(S): 100 INJECTION, SOLUTION SUBCUTANEOUS at 22:51

## 2018-09-14 RX ADMIN — GABAPENTIN 400 MILLIGRAM(S): 400 CAPSULE ORAL at 18:17

## 2018-09-14 NOTE — PROGRESS NOTE ADULT - SUBJECTIVE AND OBJECTIVE BOX
Cardiovascular Disease Progress Note    Overnight events: No acute events overnight.  patient with episode of hypoxia yesterday that improved with O2, denied abg. cxr suggestive of mild pleural effusions  Otherwise review of systems negative. denies cp/sob/pnd/orthopnea    Objective Findings:  T(C): 36.6 (09-14-18 @ 05:35), Max: 36.7 (09-13-18 @ 20:27)  HR: 68 (09-14-18 @ 05:35) (62 - 79)  BP: 142/59 (09-14-18 @ 05:35) (134/55 - 176/79)  RR: 18 (09-14-18 @ 05:35) (18 - 18)  SpO2: 100% (09-14-18 @ 05:35) (87% - 100%)  Wt(kg): --  Daily     Daily       Physical Exam:  Gen: NAD  HEENT: EOMI  CV: RRR, normal S1 + S2, no m/r/g  Lungs: CTAB  Abd: soft, non-tender  Ext: No edema    Telemetry: NSR    Laboratory Data:                        8.9    7.35  )-----------( 312      ( 14 Sep 2018 04:00 )             28.4     09-14    129<L>  |  93<L>  |  21  ----------------------------<  190<H>  4.5   |  27  |  0.78    Ca    8.4      14 Sep 2018 04:00  Mg     1.8     09-14        Inpatient Medications:  MEDICATIONS  (STANDING):  acyclovir IVPB 650 milliGRAM(s) IV Intermittent every 12 hours  buDESOnide 160 MICROgram(s)/formoterol 4.5 MICROgram(s) Inhaler 2 Puff(s) Inhalation two times a day  dextrose 5%. 1000 milliLiter(s) (50 mL/Hr) IV Continuous <Continuous>  dextrose 50% Injectable 12.5 Gram(s) IV Push once  dextrose 50% Injectable 25 Gram(s) IV Push once  dextrose 50% Injectable 25 Gram(s) IV Push once  docusate sodium 100 milliGRAM(s) Oral three times a day  famotidine    Tablet 20 milliGRAM(s) Oral daily  fenofibrate Tablet 145 milliGRAM(s) Oral daily  gabapentin 300 milliGRAM(s) Oral <User Schedule>  insulin glargine Injectable (LANTUS) 15 Unit(s) SubCutaneous at bedtime  insulin lispro (HumaLOG) corrective regimen sliding scale   SubCutaneous three times a day before meals  insulin lispro (HumaLOG) corrective regimen sliding scale   SubCutaneous at bedtime  insulin lispro Injectable (HumaLOG) 6 Unit(s) SubCutaneous three times a day before meals  metoprolol tartrate 50 milliGRAM(s) Oral two times a day  NIFEdipine XL 60 milliGRAM(s) Oral at bedtime  NIFEdipine XL 30 milliGRAM(s) Oral daily  polyethylene glycol 3350 17 Gram(s) Oral daily  predniSONE   Tablet 10 milliGRAM(s) Oral two times a day  senna 2 Tablet(s) Oral at bedtime  sodium chloride 1 Gram(s) Oral daily  traMADol 25 milliGRAM(s) Oral <User Schedule>  witch hazel Pads 1 Application(s) Topical three times a day      Assessment:  86 F with PMH of lung adenoca (dx around 2010, s/p L VATS, XRT of TERESA/RUL last in July 2016), COPD with emphysema, htn, and dm2, presented with R shoulder pain which is at least partially related to acute zoster, also incidentally 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     Plan:  1) hypoxic respiratory failure initially requiring BiPAP now on supplemental O2, COPD/emphysema  - cxr suggestive of small bilateral pleural effusions, would start on lasix 20mg po daily  -wean o2 as tolerates  -possibly infectious vs progression of disease vs copd-e vs pulmonary edema  -s/p LE duplex - no evidence of DVT    -s/p abx per medicine for fevers and consolidation on imaging  -c/w bronchodilators  -reconsult pulm as needed    3) elevated troponin, without overt ischemic sx or ischemic changes on ecg  -would defer ischemic workup for now given overall prognosis and inability to tolerate anti-platelet agents/AC given recent imaging findings  -c/w fenofibrate for HLD  -cont to hold aspirin given hemorrhagic cerebellar lesion    4) right should pain, hx of PMR, acute zoster  -appreciate derm recs  -c/w acylovir, prednisone and gabapentin    5) HTN, APC's  -no evidence of atrial fibrillation on visualized ecg's and telemetry  -c/w metoprolol as tolerates. increased dose possibly contributing to bronchospasm - would reduce to 25mg bid if wheezing recurs  -c/w nifdepine  -consider starting hydralazine 25mg tid with hold parameters if SBP persistently above 150  -cont to hold hctz 2/2 hyponatremia    6) cerebellar lesion  -appreciate nsgy/onc recs  -mri/mra results noted        Over 35 minutes spent on total encounter; more than 50% of the visit was spent counseling and/or coordinating care by the attending physician.      Slick York MD   Cardiovascular Disease  [phone]

## 2018-09-14 NOTE — DISCHARGE NOTE ADULT - REASON FOR ADMISSION
Patient is a 86y old  Female who presents with a chief complaint of R. Shoulder Pain Right shoulder pain

## 2018-09-14 NOTE — PROGRESS NOTE ADULT - SUBJECTIVE AND OBJECTIVE BOX
Contact info:   pager [phone], please provide 10 digit call back number.   If no answer or after hours, please contact [phone]    Chief Complaint: Type 2 DM, on steroid     History: 87 y/o female with PMH  of Type 2 DM, Lung CA (unknown stage or type, Dx 2010 s/p L VATS, wedge resection, RT in July 2017), PMR on chronic steroids, HTN, p/w worsening Rt shoulder pain for last two months. Admitted with concerning lesion on MRI head for brain met. On steroids with hyperglycemia.  Today daughter at bedside, patient's appetite is improving.      MEDICATIONS  (STANDING):  acyclovir IVPB 650 milliGRAM(s) IV Intermittent every 12 hours  buDESOnide 160 MICROgram(s)/formoterol 4.5 MICROgram(s) Inhaler 2 Puff(s) Inhalation two times a day  docusate sodium 100 milliGRAM(s) Oral three times a day  famotidine    Tablet 20 milliGRAM(s) Oral daily  fenofibrate Tablet 145 milliGRAM(s) Oral daily  furosemide    Tablet 20 milliGRAM(s) Oral daily  gabapentin 400 milliGRAM(s) Oral <User Schedule>  insulin glargine Injectable (LANTUS) 15 Unit(s) SubCutaneous at bedtime  insulin lispro (HumaLOG) corrective regimen sliding scale   SubCutaneous three times a day before meals  insulin lispro (HumaLOG) corrective regimen sliding scale   SubCutaneous at bedtime  insulin lispro Injectable (HumaLOG) 7 Unit(s) SubCutaneous three times a day before meals  metoprolol tartrate 50 milliGRAM(s) Oral two times a day  NIFEdipine XL 60 milliGRAM(s) Oral at bedtime  NIFEdipine XL 30 milliGRAM(s) Oral daily  polyethylene glycol 3350 17 Gram(s) Oral daily  predniSONE   Tablet 10 milliGRAM(s) Oral two times a day  senna 2 Tablet(s) Oral at bedtime  sodium chloride 1 Gram(s) Oral daily  traMADol 25 milliGRAM(s) Oral <User Schedule>  witch hazel Pads 1 Application(s) Topical three times a day    Allergies    ACE inhibitors (Angioedema)    Review of Systems:  Constitutional: No fever  Eyes: No blurry vision  Neuro: No tremors  HEENT: No pain  Cardiovascular: No chest pain, palpitations  Respiratory: No SOB, no cough  GI: No nausea, vomiting, abdominal pain  : No dysuria  Skin: no rash  Psych: no depression  Endocrine: no polyuria, polydipsia  Hem/lymph: no swelling  Osteoporosis: no fractures    ALL OTHER SYSTEMS REVIEWED AND NEGATIVE      PHYSICAL EXAM:  VITALS: T(C): 36.6 (09-12-18 @ 14:25)  T(F): 97.8 (09-12-18 @ 14:25), Max: 97.9 (09-12-18 @ 11:01)  HR: 80 (09-12-18 @ 14:25) (79 - 108)  BP: 169/72 (09-12-18 @ 14:25) (153/76 - 169/72)  RR:  (18 - 18)  SpO2:  (96% - 100%)  Wt(kg): --  GENERAL: NAD  RESPIRATORY: Clear to auscultation bilaterall  CARDIOVASCULAR: +S1/S2  GI: Soft, nontender, non distended, normal bowel sounds  PSYCH: normal affect, normal mood  2:20)    POCT Blood Glucose.: 283 mg/dL (09-14-18 @ 12:34)  POCT Blood Glucose.: 258 mg/dL (09-14-18 @ 08:34)  POCT Blood Glucose.: 191 mg/dL (09-13-18 @ 22:32)  POCT Blood Glucose.: 320 mg/dL (09-13-18 @ 17:56)  POCT Blood Glucose.: 127 mg/dL (09-13-18 @ 12:44)  POCT Blood Glucose.: 259 mg/dL (09-13-18 @ 08:38)  POCT Blood Glucose.: 129 mg/dL (09-12-18 @ 22:40)  POCT Blood Glucose.: 293 mg/dL (09-12-18 @ 17:44)  POCT Blood Glucose.: 199 mg/dL (09-12-18 @ 12:20)  POCT Blood Glucose.: 208 mg/dL (09-12-18 @ 08:45)  POCT Blood Glucose.: 120 mg/dL (09-11-18 @ 22:26)  POCT Blood Glucose.: 290 mg/dL (09-11-18 @ 17:44)    09-12    133<L>  |  93<L>  |  28<H>  ----------------------------<  142<H>  3.6   |  29  |  0.82    EGFR if : 75  EGFR if non : 65    Ca    8.8      09-12  Mg     1.9     09-12  Phos  2.7     09-10    TPro  6.2  /  Alb  2.4<L>  /  TBili  0.4  /  DBili  x   /  AST  21  /  ALT  18  /  AlkPhos  75  09-12          Thyroid Function Tests:      Hemoglobin A1C, Whole Blood: 9.8 % <H> [4.0 - 5.6] (09-05-18 @ 02:55)

## 2018-09-14 NOTE — PROGRESS NOTE ADULT - SUBJECTIVE AND OBJECTIVE BOX
NEPHROLOGY-NSN [phone]        Patient seen and examined in bed.  She was more alert today  Eating has improved        MEDICATIONS  (STANDING):  acyclovir IVPB 650 milliGRAM(s) IV Intermittent every 12 hours  buDESOnide 160 MICROgram(s)/formoterol 4.5 MICROgram(s) Inhaler 2 Puff(s) Inhalation two times a day  dextrose 5%. 1000 milliLiter(s) (50 mL/Hr) IV Continuous <Continuous>  dextrose 50% Injectable 12.5 Gram(s) IV Push once  dextrose 50% Injectable 25 Gram(s) IV Push once  dextrose 50% Injectable 25 Gram(s) IV Push once  docusate sodium 100 milliGRAM(s) Oral three times a day  famotidine    Tablet 20 milliGRAM(s) Oral daily  fenofibrate Tablet 145 milliGRAM(s) Oral daily  furosemide    Tablet 20 milliGRAM(s) Oral daily  gabapentin 400 milliGRAM(s) Oral <User Schedule>  insulin glargine Injectable (LANTUS) 15 Unit(s) SubCutaneous at bedtime  insulin lispro (HumaLOG) corrective regimen sliding scale   SubCutaneous three times a day before meals  insulin lispro (HumaLOG) corrective regimen sliding scale   SubCutaneous at bedtime  insulin lispro Injectable (HumaLOG) 7 Unit(s) SubCutaneous three times a day before meals  metoprolol tartrate 50 milliGRAM(s) Oral two times a day  NIFEdipine XL 60 milliGRAM(s) Oral at bedtime  NIFEdipine XL 30 milliGRAM(s) Oral daily  polyethylene glycol 3350 17 Gram(s) Oral daily  predniSONE   Tablet 10 milliGRAM(s) Oral two times a day  senna 2 Tablet(s) Oral at bedtime  sodium chloride 1 Gram(s) Oral daily  traMADol 25 milliGRAM(s) Oral <User Schedule>  witch hazel Pads 1 Application(s) Topical three times a day      VITAL:  T(C): , Max: 36.7 (09-13-18 @ 20:27)  T(F): , Max: 98 (09-13-18 @ 20:27)  HR: 81 (09-14-18 @ 15:28)  BP: 157/76 (09-14-18 @ 15:28)  BP(mean): --  RR: 18 (09-14-18 @ 15:28)  SpO2: 96% (09-14-18 @ 15:28)  Wt(kg): --    I and O's:    09-13 @ 07:01  -  09-14 @ 07:00  --------------------------------------------------------  IN: 0 mL / OUT: 100 mL / NET: -100 mL    09-14 @ 07:01  -  09-14 @ 16:56  --------------------------------------------------------  IN: 240 mL / OUT: 0 mL / NET: 240 mL          PHYSICAL EXAM:    Constitutional: cachetic  HEENT: PERRLA    Neck:  No JVD  Respiratory: poor effort  Cardiovascular: S1 and S2  Gastrointestinal: BS+, soft, NT/ND  Extremities: No peripheral edema  Neurological: A/O x 3, no focal deficits  Psychiatric: Normal mood, normal affect  : No Alcaraz  Skin: No rashes  Access: Not applicable    LABS:                        8.9    7.35  )-----------( 312      ( 14 Sep 2018 04:00 )             28.4     09-14    129<L>  |  93<L>  |  21  ----------------------------<  190<H>  4.5   |  27  |  0.78    Ca    8.4      14 Sep 2018 04:00  Mg     1.8     09-14            Urine Studies:          RADIOLOGY & ADDITIONAL STUDIES:

## 2018-09-14 NOTE — DISCHARGE NOTE ADULT - CARE PLAN
Principal Discharge DX:	Shoulder pain  Secondary Diagnosis:	Lung cancer  Secondary Diagnosis:	Atrial fibrillation  Goal:	To restore or maintain a normal heart rate and rhythm, to prevent blood clots, and decrease the risks of stroke CVA/TIA.  Assessment and plan of treatment:	Please take your medications as prescribed.  Continue to take your blood thinner as prescribed and follow with your physician to monitor your levels.  Low fat diet, reduce caffeine intake, and exercise at least 30 minutes daily.  Secondary Diagnosis:	Herpes zoster  Secondary Diagnosis:	Cerebellar lesion Principal Discharge DX:	Shoulder pain  Goal:	Pain control.  Assessment and plan of treatment:	Continue pain medication regimen. Ortho recommending outpatient follow up.  Secondary Diagnosis:	Lung cancer  Goal:	Monitor.  Assessment and plan of treatment:	Recommendation is for outpatient Rt / gamma knife for cerebellar lesion noted on imaging.  Secondary Diagnosis:	Atrial fibrillation  Goal:	To restore or maintain a normal heart rate and rhythm, to prevent blood clots, and decrease the risks of stroke CVA/TIA.  Assessment and plan of treatment:	Please take your medications as prescribed.  Continue to take your blood thinner as prescribed and follow with your physician to monitor your levels.  Low fat diet, reduce caffeine intake, and exercise at least 30 minutes daily.  Secondary Diagnosis:	Herpes zoster  Assessment and plan of treatment:	Continue Valtrex x 5 days. Principal Discharge DX:	Shoulder pain  Goal:	Pain control.  Assessment and plan of treatment:	Continue pain medication regimen.  Secondary Diagnosis:	Lung cancer  Goal:	Monitor.  Assessment and plan of treatment:	Recommendation is for outpatient Rt / gamma knife for cerebellar lesion noted on imaging.  Secondary Diagnosis:	Atrial fibrillation  Goal:	To restore or maintain a normal heart rate and rhythm, to prevent blood clots, and decrease the risks of stroke CVA/TIA.  Assessment and plan of treatment:	Please take your medications as prescribed; follow with your physician to monitor your levels.  Low fat diet, reduce caffeine intake, and exercise at least 30 minutes daily.  Secondary Diagnosis:	Herpes zoster  Assessment and plan of treatment:	Continue Valtrex x 5 days.

## 2018-09-14 NOTE — DISCHARGE NOTE ADULT - PATIENT PORTAL LINK FT
You can access the [facility] Patient Portal, offered by [facility], by registering with the following website: [email or web address]

## 2018-09-14 NOTE — DISCHARGE NOTE ADULT - PLAN OF CARE
To restore or maintain a normal heart rate and rhythm, to prevent blood clots, and decrease the risks of stroke CVA/TIA. Please take your medications as prescribed.  Continue to take your blood thinner as prescribed and follow with your physician to monitor your levels.  Low fat diet, reduce caffeine intake, and exercise at least 30 minutes daily. Pain control. Continue pain medication regimen. Ortho recommending outpatient follow up. Monitor. Recommendation is for outpatient Rt / gamma knife for cerebellar lesion noted on imaging. Continue Valtrex x 5 days. Continue pain medication regimen. Please take your medications as prescribed; follow with your physician to monitor your levels.  Low fat diet, reduce caffeine intake, and exercise at least 30 minutes daily.

## 2018-09-14 NOTE — DISCHARGE NOTE ADULT - ADDITIONAL INSTRUCTIONS
Follow up with PCP within 1-2 weeks of discharge.   Follow up with cardiology within 1-2 weeks of discharge. Slick York MD = Cardiovascular Disease [phone]  Follow up with nephrology within 1-2 weeks of discharge.

## 2018-09-14 NOTE — DISCHARGE NOTE ADULT - PROVIDER TOKENS
TOKEN:'[id number]:'[id number]',FREE:[LAST:[TIBALDI],FIRST:[ENDOCRINE],PHONE:[(   )    -],FAX:[(   )    -]] TOKEN:'[id number]:'[id number]',FREE:[LAST:[NATALIA],FIRST:[ENDOCRINE],PHONE:[(   )    -],FAX:[(   )    -]],FREE:[LAST:[Yeny],FIRST:[Rosendo],PHONE:[(   )    -],FAX:[(   )    -],ADDRESS:[[facility]]]

## 2018-09-14 NOTE — CHART NOTE - NSCHARTNOTEFT_GEN_A_CORE
Patient diagnosed with Lung Ca with mets and COPD. Patient noted to to desat to 87% while at rest on room air. Patient requires home O2.

## 2018-09-14 NOTE — PROGRESS NOTE ADULT - SUBJECTIVE AND OBJECTIVE BOX
_________________________________________________________________________________________  ========>>  M E D I C A L   A T T E N D I N G    F O L L O W  U P  N O T E  <<=========  -----------------------------------------------------------------------------------------------------    - Patient seen and examined by me approximately sixty minutes ago.   - In summary,  CLIVE HENRY is a 86y year old woman who originally presented with Rt shoulder pain  - Patient overall better /comfortable,  weak, eating a bit better, still with some pain on and off in right shoulder area      continuing to adjust pain meds to achieve balance between comfort and wakefulness      ==================>> REVIEW OF SYSTEM <<=================    GEN: no fever, no chills, + pain on and off including sharp electric type pain at times  RESP: mild SOB, no cough, no sputum  CVS: no chest pain, no palpitations, no edema  GI: no abdominal pain, no nausea  : no dysuria, no frequency, no hematuria  Neuro: no headache, no dizziness  Derm : no itching    ==================>> PHYSICAL EXAM <<=================    GEN: A&O X 3 , NAD , comfortable  HEENT: NCAT, PERRL, MMM, hearing intact  Neck: supple , no JVD  CVS: S1S2 , Irregular , No M/R/G appreciated  PULM: CTA B/L   ABD.: soft. non tender, non distended,  bowel sounds present  Extrem: intact pulses , no edema   Derm: Dermatomally distributed vesicular rash on right C3/4 dermatome, still with active versicles, overall improving, crusting over   PSYCH : flat affect         ==================>> MEDICATIONS <<====================    acyclovir IVPB 650 milliGRAM(s) IV Intermittent every 12 hours  buDESOnide 160 MICROgram(s)/formoterol 4.5 MICROgram(s) Inhaler 2 Puff(s) Inhalation two times a day  dextrose 5%. 1000 milliLiter(s) IV Continuous <Continuous>  dextrose 50% Injectable 12.5 Gram(s) IV Push once  dextrose 50% Injectable 25 Gram(s) IV Push once  dextrose 50% Injectable 25 Gram(s) IV Push once  docusate sodium 100 milliGRAM(s) Oral three times a day  famotidine    Tablet 20 milliGRAM(s) Oral daily  fenofibrate Tablet 145 milliGRAM(s) Oral daily  furosemide    Tablet 20 milliGRAM(s) Oral daily  gabapentin 400 milliGRAM(s) Oral <User Schedule>  insulin glargine Injectable (LANTUS) 15 Unit(s) SubCutaneous at bedtime  insulin lispro (HumaLOG) corrective regimen sliding scale   SubCutaneous three times a day before meals  insulin lispro (HumaLOG) corrective regimen sliding scale   SubCutaneous at bedtime  insulin lispro Injectable (HumaLOG) 7 Unit(s) SubCutaneous three times a day before meals  metoprolol tartrate 50 milliGRAM(s) Oral two times a day  NIFEdipine XL 60 milliGRAM(s) Oral at bedtime  NIFEdipine XL 30 milliGRAM(s) Oral daily  polyethylene glycol 3350 17 Gram(s) Oral daily  predniSONE   Tablet 10 milliGRAM(s) Oral two times a day  senna 2 Tablet(s) Oral at bedtime  sodium chloride 1 Gram(s) Oral daily  traMADol 25 milliGRAM(s) Oral <User Schedule>  witch hazel Pads 1 Application(s) Topical three times a day    MEDICATIONS  (PRN):  acetaminophen   Tablet .. 975 milliGRAM(s) Oral every 8 hours PRN Severe Pain (7 - 10)  dextrose 40% Gel 15 Gram(s) Oral once PRN Blood Glucose LESS THAN 70 milliGRAM(s)/deciliter  glucagon  Injectable 1 milliGRAM(s) IntraMuscular once PRN Glucose LESS THAN 70 milligrams/deciliter  levalbuterol Inhalation 0.63 milliGRAM(s) Inhalation every 6 hours PRN SOB/wheezing    ==================>> VITAL SIGNS <<==================    Vital Signs Last 24 Hrs  T(C): 36.6 (09-14-18 @ 15:28)  T(F): 97.8 (09-14-18 @ 15:28), Max: 98 (09-13-18 @ 20:27)  HR: 81 (09-14-18 @ 15:28) (68 - 81)  BP: 157/76 (09-14-18 @ 15:28)  BP(mean): --  RR: 18 (09-14-18 @ 15:28) (18 - 18)  SpO2: 96% (09-14-18 @ 15:28) (96% - 100%)      POCT Blood Glucose.: 283 mg/dL (14 Sep 2018 12:34)  POCT Blood Glucose.: 258 mg/dL (14 Sep 2018 08:34)  POCT Blood Glucose.: 191 mg/dL (13 Sep 2018 22:32)  POCT Blood Glucose.: 320 mg/dL (13 Sep 2018 17:56)     ==================>> LAB AND IMAGING <<==================                        8.9    7.35  )-----------( 312      ( 14 Sep 2018 04:00 )             28.4        Hemoglobin:   8.9 <<==,  8.9 <<==,  8.9 <<==,  8.6 <<==,  9.2 <<==    129<L>  |  93<L>  |  21  ----------------------------<  190<H>  4.5   |  27  |  0.78    Sodium:   129  <==, 126  <==, 133  <==, 130  <==, 132  <==, 130  <==    Ca    8.4      14 Sep 2018 04:00  Mg     1.8     09-14    ___________________________________________________________________________________  ===============>>  A S S E S S M E N T   A N D   P L A N <<===============  ------------------------------------------------------------------------------------------    · Assessment		  87 y/o F PMH Lung CA (unknown stage or type, Dx 2010 s/p L VATS, wedge resection, RT in July 2017), PMR, HTN, DM2 p/w worsening Rt shoulder pain from partial supraspinatus tear, found to have poss hemorrhagic met to brain pending MRI.     ·  Problem: Herpes Zoster / shingles   continue IV antivirals as pt immunocompromised>> will switch to PO to complete treatment upon DC   pain mgmt as above >> Neurontin increased to 400 mg BID        Tramadol at bedtime          tylenol as ordered (975 mg TID PRN)          trying to avoid narcotics per family request as pt with decreased mentation / wakefulness, and due to nausea/ vomiting)     ·  Problem: hypoxemic respiratory failure, likely multifactorial given lung cancer with possible superimposed complex pneumonia and COPD exacerbation ,   pulmonary team care and mgmt :  overall improved   will need Home O2: being worked on    completed Levaquin : observe off abx  incentive spirometer  prednisone taper   Home nebulizer with xopenex as needed   lasix restarted     ·  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  cardio f/u and mgmt     Problem: Diabetes, poorly controlled   Endocrine f/u and mgmt appreciated   Consistent carb diet  HbA1c 9.8%    -GI/DVT Prophylaxis.  - Dispo planing likely home over the weekend   --------------------------------------------  Case discussed with pt and Isauror, RN, PA, Cardio  Education given on findings and plan of care  ___________________________  H. GEN Ferrell.  Pager: [phone]

## 2018-09-14 NOTE — PROGRESS NOTE ADULT - PROBLEM SELECTOR PLAN 1
Fasting has been elevated,   Will increase Lantus to 15 --> 17 units at bedtime  Will c/w Humalog  to 6 --> 7units QAC  Continue moderate correction scale before meals and moderate bedtime.  Doses of insulin may need to be decreased as steroids are tapered.    DC plan: patient was to be started on basal insulin as outpatient in addition to oral agents. For dc will need to clarify goals of care. Outpatient endocrinologist Dr. Alcocer.  Referred to diabetes educator.  please have RN staff begin teaching insulin pen administration. Dc meds likely Tresiba, metformin and nateglinide if patients continues to be on prednisone 30 mg daily

## 2018-09-14 NOTE — DISCHARGE NOTE ADULT - MEDICATION SUMMARY - MEDICATIONS TO STOP TAKING
I will STOP taking the medications listed below when I get home from the hospital:    ertapenem 1 g injection  -- 1 gram(s) injectable every 24 hours   Ertapenem 1g IVPB every 24hrs through 11/10/17    remove IV  -- remove PIV on 11/10 after completion of antibiotic administration I will STOP taking the medications listed below when I get home from the hospital:    ertapenem 1 g injection  -- 1 gram(s) injectable every 24 hours   Ertapenem 1g IVPB every 24hrs through 11/10/17    remove IV  -- remove PIV on 11/10 after completion of antibiotic administration    metoprolol tartrate 25 mg oral tablet  -- 25 milligram(s) by mouth every 12 hours  -- 4 tabs I will STOP taking the medications listed below when I get home from the hospital:    aspirin 81 mg oral tablet  -- 1 tab(s) by mouth once a day    ertapenem 1 g injection  -- 1 gram(s) injectable every 24 hours   Ertapenem 1g IVPB every 24hrs through 11/10/17    remove IV  -- remove PIV on 11/10 after completion of antibiotic administration    metoprolol tartrate 25 mg oral tablet  -- 25 milligram(s) by mouth every 12 hours  -- 4 tabs

## 2018-09-14 NOTE — DISCHARGE NOTE ADULT - HOME CARE AGENCY
[facility] [phone] referral sent for RN., PT, OT, SW, HHA chandana, a/w acceptance

## 2018-09-14 NOTE — DISCHARGE NOTE ADULT - DURABLE MEDICAL EQUIPMENT AGENCY
[facility] [phone] for RW, Commode, Hospital Bed delivered to house, Home O2 delivery to house upon d/c

## 2018-09-14 NOTE — PROGRESS NOTE ADULT - ASSESSMENT
The patient is an 86-year-old female with past medical history of diabetes, hypertension, polymyalgia rheumatica, lung cancer with metastasis, who presents with hyponatremia.  Her urine indices are consistent with syndrome of inappropriate secretion of antidiuretic hormone.  She also may have a component of tea and toast diet.  Usually these patients have a low BUN, but her BUN is elevated, likely from the effects of the prednisone.  The prednisone is also leading to osmotic diuresis secondary to the high blood sugars.  Goals of therapy are to improve her intake of protein.  Antidiuretic hormone secretion can absolutely be possible from her malignancy (especially for lung cancer).       Gastrointestinal: Continue Glucerna three times a day along with Ensure pudding & Blake so long as she is awake.  Turns out daughter was still trying to feed her despite the fact that her eyes remained closed.    Renal: Hyponatremia: Hyponatremia will be a constant issue for her. Nacl tabs qd  Cardiovascular: euvolemic on exam.    If BP remains high then Hydralazine  Shoulder pain: pain management per primary team    If pain is controlled then dc planning

## 2018-09-14 NOTE — DISCHARGE NOTE ADULT - MEDICATION SUMMARY - MEDICATIONS TO TAKE
I will START or STAY ON the medications listed below when I get home from the hospital:    predniSONE 10 mg oral tablet  -- 1 tab(s) by mouth 2 times a day  -- Indication: For Steroid    acetaminophen 325 mg oral tablet  -- 3 tab(s) by mouth every 8 hours, As needed, Severe Pain (7 - 10)  -- Indication: For Pain    Halfprin 81 mg oral tablet  -- 1 tab(s) by mouth once a day  -- Indication: For Cardiac    traMADol 50 mg oral tablet  -- 0.5 tab(s) by mouth once a day (at bedtime) MDD:25mg   -- Indication: For Pain    gabapentin 100 mg oral capsule  -- 1 cap(s) by mouth 2 times a day  -- Indication: For Pain    gabapentin 400 mg oral capsule  -- 1 cap(s) by mouth 2 times a day taks at 6:30 ad 15:00  -- Indication: For Pain    glimepiride  -- 2.5 milligram(s) by mouth once a day  -- Indication: For Diabetes    Januvia 100 mg oral tablet  -- 1 tab(s) by mouth once a day  -- Indication: For Diabetes    TriCor 145 mg oral tablet  -- 1 tab(s) by mouth once a day  -- Indication: For Hld    Valtrex 1 g oral tablet  -- 1 tab(s) by mouth 2 times a day  -- Indication: For Antiviral    metoprolol tartrate 50 mg oral tablet  -- 1 tab(s) by mouth 2 times a day  -- Indication: For Htn    budesonide-formoterol 160 mcg-4.5 mcg/inh inhalation aerosol  -- 2 puff(s) inhaled 2 times a day   -- Indication: For respiratory    NIFEdipine 30 mg oral tablet, extended release  -- 1 tab(s) by mouth once a day  -- Indication: For Htn    NIFEdipine 60 mg oral tablet, extended release  -- 1 tab(s) by mouth once a day (at bedtime)  -- Indication: For Htn    furosemide 20 mg oral tablet  -- 1 tab(s) by mouth once a day  -- Indication: For Htn    famotidine 20 mg oral tablet  -- 1 tab(s) by mouth once a day  -- Indication: For Gerd    docusate sodium 100 mg oral capsule  -- 1 cap(s) by mouth 3 times a day  -- Indication: For Laxative    senna oral tablet  -- 2 tab(s) by mouth once a day (at bedtime)  -- Indication: For Laxative    polyethylene glycol 3350 oral powder for reconstitution  -- 17 gram(s) by mouth once a day, As Needed -for constipation   -- Indication: For Laxative    sodium chloride 1 g oral tablet  -- 1 tab(s) by mouth once a day  -- Indication: For electrolyte    potassium chloride 10 mEq oral capsule, extended release  -- 1 tab(s) by mouth once a day  -- Indication: For electrolyte I will START or STAY ON the medications listed below when I get home from the hospital:    predniSONE 10 mg oral tablet  -- 1 tab(s) by mouth 2 times a day  -- Indication: For Steroid    Halfprin 81 mg oral tablet  -- 1 tab(s) by mouth once a day  -- Indication: For Cardiac    traMADol 50 mg oral tablet  -- 0.5 tab(s) by mouth once a day (at bedtime) MDD:25mg   -- Indication: For Pain    gabapentin 100 mg oral capsule  -- 1 cap(s) by mouth 2 times a day  -- Indication: For Pain    gabapentin 400 mg oral capsule  -- 1 cap(s) by mouth 2 times a day taks at 6:30 ad 15:00  -- Indication: For Pain    glimepiride  -- 2.5 milligram(s) by mouth once a day  -- Indication: For Diabetes    Januvia 100 mg oral tablet  -- 1 tab(s) by mouth once a day  -- Indication: For Diabetes    TriCor 145 mg oral tablet  -- 1 tab(s) by mouth once a day  -- Indication: For Hld    Valtrex 1 g oral tablet  -- 1 tab(s) by mouth 2 times a day  -- Indication: For Antiviral    metoprolol tartrate 50 mg oral tablet  -- 1 tab(s) by mouth 2 times a day  -- Indication: For Htn    budesonide-formoterol 160 mcg-4.5 mcg/inh inhalation aerosol  -- 2 puff(s) inhaled 2 times a day   -- Indication: For respiratory    NIFEdipine 30 mg oral tablet, extended release  -- 1 tab(s) by mouth once a day  -- Indication: For Htn    NIFEdipine 60 mg oral tablet, extended release  -- 1 tab(s) by mouth once a day (at bedtime)  -- Indication: For Htn    furosemide 20 mg oral tablet  -- 1 tab(s) by mouth once a day  -- Indication: For Htn    famotidine 20 mg oral tablet  -- 1 tab(s) by mouth once a day  -- Indication: For Gerd    docusate sodium 100 mg oral capsule  -- 1 cap(s) by mouth 3 times a day  -- Indication: For Laxative    senna oral tablet  -- 2 tab(s) by mouth once a day (at bedtime)  -- Indication: For Laxative    polyethylene glycol 3350 oral powder for reconstitution  -- 17 gram(s) by mouth once a day, As Needed -for constipation   -- Indication: For Laxative    sodium chloride 1 g oral tablet  -- 1 tab(s) by mouth once a day  -- Indication: For electrolyte    potassium chloride 10 mEq oral capsule, extended release  -- 1 tab(s) by mouth once a day  -- Indication: For electrolyte I will START or STAY ON the medications listed below when I get home from the hospital:    predniSONE 10 mg oral tablet  -- 1 tab(s) by mouth 2 times a day  -- Indication: For Steroid    Halfprin 81 mg oral tablet  -- 1 tab(s) by mouth once a day  -- Indication: For Cad    traMADol 50 mg oral tablet  -- 0.5 tab(s) by mouth once a day (at bedtime) MDD:25mg   -- Indication: For Pain    gabapentin 100 mg oral capsule  -- 1 cap(s) by mouth 2 times a day  -- Indication: For Pain    gabapentin 400 mg oral capsule  -- 1 cap(s) by mouth 2 times a day taks at 6:30 ad 15:00  -- Indication: For Pain    glimepiride  -- 2.5 milligram(s) by mouth once a day  -- Indication: For Diabetes    Januvia 100 mg oral tablet  -- 1 tab(s) by mouth once a day  -- Indication: For Diabetes    TriCor 145 mg oral tablet  -- 1 tab(s) by mouth once a day  -- Indication: For Hld    Valtrex 1 g oral tablet  -- 1 tab(s) by mouth 2 times a day  -- Indication: For Shingles    metoprolol tartrate 50 mg oral tablet  -- 1 tab(s) by mouth 2 times a day  -- Indication: For Htn    budesonide-formoterol 160 mcg-4.5 mcg/inh inhalation aerosol  -- 2 puff(s) inhaled 2 times a day   -- Indication: For respiratory    NIFEdipine 30 mg oral tablet, extended release  -- 1 tab(s) by mouth once a day  -- Indication: For Htn    NIFEdipine 60 mg oral tablet, extended release  -- 1 tab(s) by mouth once a day (at bedtime)  -- Indication: For Htn    furosemide 20 mg oral tablet  -- 1 tab(s) by mouth once a day  -- Indication: For Htn    famotidine 20 mg oral tablet  -- 1 tab(s) by mouth once a day  -- Indication: For Gerd    docusate sodium 100 mg oral capsule  -- 1 cap(s) by mouth 3 times a day  -- Indication: For Laxative    senna oral tablet  -- 2 tab(s) by mouth once a day (at bedtime)  -- Indication: For Laxative    polyethylene glycol 3350 oral powder for reconstitution  -- 17 gram(s) by mouth once a day, As Needed -for constipation   -- Indication: For Laxative    sodium chloride 1 g oral tablet  -- 1 tab(s) by mouth once a day  -- Indication: For replacement    potassium chloride 10 mEq oral capsule, extended release  -- 1 tab(s) by mouth once a day  -- Indication: For electrolyte I will START or STAY ON the medications listed below when I get home from the hospital:    predniSONE 10 mg oral tablet  -- 1 tab(s) by mouth 2 times a day  -- Indication: For Steroid    traMADol 50 mg oral tablet  -- 0.5 tab(s) by mouth once a day (at bedtime) MDD:25mg   -- Indication: For Pain    glimepiride  -- 2.5 milligram(s) by mouth once a day  -- Indication: For Diabetes    Januvia 100 mg oral tablet  -- 1 tab(s) by mouth once a day  -- Indication: For Diabetes    TriCor 145 mg oral tablet  -- 1 tab(s) by mouth once a day  -- Indication: For Hld    metoprolol tartrate 50 mg oral tablet  -- 1 tab(s) by mouth 2 times a day  -- Indication: For Htn    budesonide-formoterol 160 mcg-4.5 mcg/inh inhalation aerosol  -- 2 puff(s) inhaled 2 times a day   -- Indication: For respiratory    furosemide 20 mg oral tablet  -- 1 tab(s) by mouth once a day  -- Indication: For Htn    famotidine 20 mg oral tablet  -- 1 tab(s) by mouth once a day  -- Indication: For Gerd    docusate sodium 100 mg oral capsule  -- 1 cap(s) by mouth 3 times a day  -- Indication: For Laxative    senna oral tablet  -- 2 tab(s) by mouth once a day (at bedtime)  -- Indication: For Laxative    potassium chloride 10 mEq oral capsule, extended release  -- 1 tab(s) by mouth once a day  -- Indication: For electrolyte I will START or STAY ON the medications listed below when I get home from the hospital:    predniSONE 10 mg oral tablet  -- 1 tab(s) by mouth 2 times a day  -- Indication: For Steroid    traMADol 50 mg oral tablet  -- 0.5 tab(s) by mouth once a day (at bedtime) MDD:25mg   -- Indication: For Pain    acetaminophen 325 mg oral tablet  -- 3 tab(s) by mouth every 8 hours, As needed, Severe Pain (7 - 10)  -- Indication: For Pain    glimepiride  -- 2.5 milligram(s) by mouth once a day  -- Indication: For Diabetes    Januvia 100 mg oral tablet  -- 1 tab(s) by mouth once a day  -- Indication: For Diabetes    TriCor 145 mg oral tablet  -- 1 tab(s) by mouth once a day  -- Indication: For Hld    metoprolol tartrate 50 mg oral tablet  -- 1 tab(s) by mouth 2 times a day  -- Indication: For Htn    budesonide-formoterol 160 mcg-4.5 mcg/inh inhalation aerosol  -- 2 puff(s) inhaled 2 times a day   -- Indication: For respiratory    furosemide 20 mg oral tablet  -- 1 tab(s) by mouth once a day  -- Indication: For Htn    famotidine 20 mg oral tablet  -- 1 tab(s) by mouth once a day  -- Indication: For Gerd    docusate sodium 100 mg oral capsule  -- 1 cap(s) by mouth 3 times a day  -- Indication: For Laxative    senna oral tablet  -- 2 tab(s) by mouth once a day (at bedtime)  -- Indication: For Laxative    potassium chloride 10 mEq oral capsule, extended release  -- 1 tab(s) by mouth once a day  -- Indication: For electrolyte I will START or STAY ON the medications listed below when I get home from the hospital:    predniSONE 10 mg oral tablet  -- 1 tab(s) by mouth 2 times a day  -- Indication: For Steroid    traMADol 50 mg oral tablet  -- 0.5 tab(s) by mouth once a day (at bedtime) MDD:25mg   -- Indication: For Pain    acetaminophen 325 mg oral tablet  -- 3 tab(s) by mouth every 8 hours, As needed, Severe Pain (7 - 10)  -- Indication: For Pain    gabapentin 400 mg oral capsule  -- 1 cap(s) by mouth 2 times a day taks at 6:30 ad 15:00  -- Indication: For Pain    gabapentin 100 mg oral capsule  -- 1 cap(s) by mouth 2 times a day  -- Indication: For Pain    glimepiride  -- 2.5 milligram(s) by mouth once a day  -- Indication: For Diabetes    Januvia 100 mg oral tablet  -- 1 tab(s) by mouth once a day  -- Indication: For Diabetes    TriCor 145 mg oral tablet  -- 1 tab(s) by mouth once a day  -- Indication: For Hld    Valtrex 1 g oral tablet  -- 1 tab(s) by mouth 2 times a day  -- Indication: For Shingles    metoprolol tartrate 50 mg oral tablet  -- 1 tab(s) by mouth 2 times a day  -- Indication: For Htn    budesonide-formoterol 160 mcg-4.5 mcg/inh inhalation aerosol  -- 2 puff(s) inhaled 2 times a day   -- Indication: For respiratory    NIFEdipine 30 mg oral tablet, extended release  -- 1 tab(s) by mouth once a day  -- Indication: For Htn    NIFEdipine 60 mg oral tablet, extended release  -- 1 tab(s) by mouth once a day (at bedtime)  -- Indication: For Htn    furosemide 20 mg oral tablet  -- 1 tab(s) by mouth once a day  -- Indication: For Htn    famotidine 20 mg oral tablet  -- 1 tab(s) by mouth once a day  -- Indication: For Gerd    docusate sodium 100 mg oral capsule  -- 1 cap(s) by mouth 3 times a day  -- Indication: For Laxative    senna oral tablet  -- 2 tab(s) by mouth once a day (at bedtime)  -- Indication: For Laxative    polyethylene glycol 3350 oral powder for reconstitution  -- 17 gram(s) by mouth once a day, As Needed -for constipation   -- Indication: For Laxative    sodium chloride 1 g oral tablet  -- 1 tab(s) by mouth once a day  -- Indication: For Supplement    potassium chloride 10 mEq oral capsule, extended release  -- 1 tab(s) by mouth once a day  -- Indication: For electrolyte

## 2018-09-14 NOTE — PROGRESS NOTE ADULT - PROBLEM SELECTOR PROBLEM 1
Type 2 diabetes mellitus with hyperglycemia, without long-term current use of insulin
Herpes zoster
R/O Brain metastases
Acute pain of right shoulder
Malignant neoplasm of upper lobe of right lung
Type 2 diabetes mellitus with hyperglycemia, without long-term current use of insulin
Acute pain of right shoulder

## 2018-09-14 NOTE — DISCHARGE NOTE ADULT - HOSPITAL COURSE
87 y/o F PMH Lung CA (unknown stage or type, Dx 2010 s/p L VATS, wedge resection, RT in July 2017), PMR, HTN, DM2 p/w worsening Rt shoulder pain from partial supraspinatus tear, found to have poss hemorrhagic met to brain pending MRI.     ·  Problem: Herpes Zoster / shingles   continue IV antivirals as pt immunocompromised>> will switch to PO to complete treatment upon DC   pain mgmt as above >> Neurontin increased to 400 mg BID        Tramadol at bedtime          tylenol as ordered (975 mg TID PRN)          trying to avoid narcotics per family request as pt with decreased mentation / wakefulness, and due to nausea/ vomiting)     ·  Problem: hypoxemic respiratory failure, likely multifactorial given lung cancer with possible superimposed complex pneumonia and COPD exacerbation ,   pulmonary team care and mgmt :  overall improved   will need Home O2: being worked on    completed Levaquin : observe off abx  incentive spirometer  prednisone taper   Home nebulizer with xopenex as needed   lasix restarted     ·  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 Saini at [facility]     Problem: Shoulder pain Multifactorial give supraspinatus tear, lung mass and shingles   Pain control as above  should not use NSAIDS given brain met   PT >> rehab  ( pt prefers to go home !)     Problem: Hyponatremia, likely SIADH in the setting of lung ca   Trend BMP closely as improved post salt tabs  free fluid restriction  renal f/u given worsening again   encourage PO intake   monitor a improved      Problem: Hypertension, poorly controlled  continue Rx as adjusted  with lopressor 50 mg BID, and current dose of CCB  hold diuretics / HCTZ given low Na  would avoid hydralazine ..?  cardio f/u and mgmt      Problem: Diabetes, poorly controlled   Endocrine f/u and mgmt appreciated   Consistent carb diet  HbA1c 9.8%    87 y/o F PMH Lung CA (unknown stage or type, Dx 2010 s/p L VATS, wedge resection, RT in July 2017),  PMR, HTN, COPD with emphysema, DM2 p/w worsening Rt shoulder pain from partial supraspinatus tear, found to have possible hemorrhagic met and new onset a-fib. Course complicated by acute onset respiratory distress, wheezing, and hypoxia for which she was placed on high flow nasal cannula+/- Bipap and admitted for airway monitoring.    +elevated troponin, without overt ischemic sx or ischemic changes on ecg--> would defer ischemic workup given overall prognosis and inability to tolerate anti-platelet agents/AC given recent imaging findings  + Right suprasinatus tear--PRN diluaded q6hrs is in for pain control (per ortho verbal recs f/u outpt)  + COPD excerbation s/p IV levaquin ( 5 d course) & solumedrol ( 5 day course)--> prednisone taper s/p B-PAP, now on nasal O2  + Monitor on Tele for Multifocal atrial tachycardia vs afib RVR- on cardizem gtt now PO metoprolol  + Hemorrhagic lesion in left cerebellar area due to brain mets- nuerosurgery following  + Enlarging Right apical lung mass due to reocurrence of lung cancer  + hyponatremia renal following  likely SIADH  + shingles c/w alcyclovir on contact/airborn -9/7 x 7 days  +Arthritis - home dose of 30mg prednisone   +depressionm, offered to start Remeron, pt declined   9/7 PM s/p RRT for resp distress- Afib with RVR, HTN emergency, nausea-s/p  BiPAP  9/13 - desaturation episode to 87 percent without oxygen --> family refusing ABG - as per MD, may not need ABG in order to qualify for oxygen   ?? aspiration PNA as daughter was feeding mother despite eyes being closed as per renal note   9/13 - lantus increased to 15 units at bedtime and 6 units QAC 87 y/o F PMH Lung CA (unknown stage or type, Dx 2010 s/p L VATS, wedge resection, RT in July 2017), PMR, HTN, DM2 p/w worsening Rt shoulder pain from partial supraspinatus tear, found to have possible hemorrhagic metastasis to brain. MRI noted - recommendation for outpatient Rt / gamma knife: family in agreement. RT to left apical lung mass  also eld, given Zoster involvement of the radiation field.   to eventually follow up with Dr. Saini at [facility]      ·  Problem: Herpes Zoster / shingles   continue IV antivirals as pt immunocompromised>> will switch to PO to complete treatment upon DC   pain mgmt as above >> Neurontin increased to 400 mg BID        Tramadol at bedtime          tylenol as ordered (975 mg TID PRN)          trying to avoid narcotics per family request as pt with decreased mentation / wakefulness, and due to nausea/ vomiting)     ·  Problem: hypoxemic respiratory failure, likely multifactorial given lung cancer with possible superimposed complex pneumonia and COPD exacerbation ,   pulmonary team care and mgmt :  overall improved   will need Home O2: being worked on    completed Levaquin : observe off abx  incentive spirometer  prednisone taper   Home nebulizer with xopenex as needed   lasix restarted     ·     Problem: Shoulder pain Multifactorial give supraspinatus tear, lung mass and shingles   Pain control as above  should not use NSAIDS given brain met   PT >> rehab  ( pt prefers to go home !)     Problem: Hyponatremia, likely SIADH in the setting of lung ca   Trend BMP closely as improved post salt tabs  free fluid restriction  renal f/u given worsening again   encourage PO intake   monitor a improved      Problem: Hypertension, poorly controlled  continue Rx as adjusted  with lopressor 50 mg BID, and current dose of CCB  hold diuretics / HCTZ given low Na  would avoid hydralazine ..?  cardio f/u and mgmt      Problem: Diabetes, poorly controlled   Endocrine f/u and mgmt appreciated   Consistent carb diet  HbA1c 9.8%    87 y/o F PMH Lung CA (unknown stage or type, Dx 2010 s/p L VATS, wedge resection, RT in July 2017),  PMR, HTN, COPD with emphysema, DM2 p/w worsening Rt shoulder pain from partial supraspinatus tear, found to have possible hemorrhagic met and new onset a-fib. Course complicated by acute onset respiratory distress, wheezing, and hypoxia for which she was placed on high flow nasal cannula+/- Bipap and admitted for airway monitoring.    +elevated troponin, without overt ischemic sx or ischemic changes on ecg--> would defer ischemic workup given overall prognosis and inability to tolerate anti-platelet agents/AC given recent imaging findings  + Right suprasinatus tear--PRN diluaded q6hrs is in for pain control (per ortho verbal recs f/u outpt)  + COPD excerbation s/p IV levaquin ( 5 d course) & solumedrol ( 5 day course)--> prednisone taper s/p B-PAP, now on nasal O2  + Monitor on Tele for Multifocal atrial tachycardia vs afib RVR- on cardizem gtt now PO metoprolol  + Hemorrhagic lesion in left cerebellar area due to brain mets- nuerosurgery following  + Enlarging Right apical lung mass due to reocurrence of lung cancer  + hyponatremia renal following  likely SIADH  + shingles c/w alcyclovir on contact/airborn -9/7 x 7 days  +Arthritis - home dose of 30mg prednisone   +depressionm, offered to start Remeron, pt declined   9/7 PM s/p RRT for resp distress- Afib with RVR, HTN emergency, nausea-s/p  BiPAP  9/13 - desaturation episode to 87 percent without oxygen --> family refusing ABG - as per MD, may not need ABG in order to qualify for oxygen   ?? aspiration PNA as daughter was feeding mother despite eyes being closed as per renal note   9/13 - lantus increased to 15 units at bedtime and 6 units QAC 87 y/o F PMH Lung CA (unknown stage or type, Dx 2010 s/p L VATS, wedge resection, RT in July 2017), PMR, HTN, DM2 p/w worsening Rt shoulder pain from partial supraspinatus tear, found to have possible hemorrhagic metastasis to brain. MRI noted - recommendation for outpatient Rt / gamma knife: family in agreement. RT to left apical lung mass  also eld, given Zoster involvement of the radiation field. Patient to follow up with Dr. Saini at [facility] for radiation oncology. Patient was treated with IV antiviral for herpes zoster/shingles, which is being switched to oral for completion upon discharge. Pain management with Neurontin 400mg BID and Tramadol. Course complicated by hypoxemic respiratory failure, likely multifactorial given lung cancer with possible superimposed complex pneumonia and COPD exacerbation. Home oxygen required - oxygen saturation to 87%. Prednisone taper completed. Home oxygen set up and delivered to house. Home nebulizer with Xopenex as needed and Lasix.   Shoulder pain complaint is multifactorial give supraspinatus tear, lung mass and shingles. To continue pain management without use of NSAIDs. Rehab recommended, family declined. During hospital stay, noted to have hyponatremia, likely SIADH in the setting of lung cancer. To be sent home with salt tabs and free fluid restriction. Renal cleared for discharge with outpatient follow up. To encourage PO intake. Hypertension poorly controlled, medications adjusted during inpatient. Patient with elevated troponins during admission, but ischemic workup deferred given overall prognosis and inability to tolerate anti-platelets. AFIB noted on telemetry - controlled with metoprolol.     Patient seen and evaluated, no acute somatic complaints. Medically cleared/stable for discharge as per Dr. Ferrell with close outpatient follow up within 1-2 weeks of discharge.

## 2018-09-14 NOTE — DISCHARGE NOTE ADULT - CARE PROVIDER_API CALL
Slick York), Internal Medicine  [address] [place] [address]  Phone: [phone]  Fax: [phone]    Alexia Shabazz), Internal Medicine; Nephrology  [address] [address]  Phone: [phone]  Fax: [phone]    NATALIA, ENDOCRINE  Phone: (   )    -  Fax: (   )    - Slick York), Internal Medicine  [address] [place] [address]  Phone: [phone]  Fax: [phone]    Alexia Shabazz), Internal Medicine; Nephrology  [address] [address]  Phone: [phone]  Fax: [phone]    NATALIA, ENDOCRINE  Phone: (   )    -  Fax: (   )    -    Rosendo Saini  RADIATION ONCOLOGY  Phone: (   )    -  Fax: (   )    -

## 2018-09-15 VITALS
OXYGEN SATURATION: 98 % | SYSTOLIC BLOOD PRESSURE: 137 MMHG | HEART RATE: 81 BPM | RESPIRATION RATE: 18 BRPM | DIASTOLIC BLOOD PRESSURE: 78 MMHG

## 2018-09-15 LAB
BUN SERPL-MCNC: 28 MG/DL — HIGH (ref 7–23)
CALCIUM SERPL-MCNC: 8.6 MG/DL — SIGNIFICANT CHANGE UP (ref 8.4–10.5)
CHLORIDE SERPL-SCNC: 91 MMOL/L — LOW (ref 98–107)
CO2 SERPL-SCNC: 26 MMOL/L — SIGNIFICANT CHANGE UP (ref 22–31)
CREAT SERPL-MCNC: 0.84 MG/DL — SIGNIFICANT CHANGE UP (ref 0.5–1.3)
GLUCOSE BLDC GLUCOMTR-MCNC: 207 MG/DL — HIGH (ref 70–99)
GLUCOSE BLDC GLUCOMTR-MCNC: 265 MG/DL — HIGH (ref 70–99)
GLUCOSE BLDC GLUCOMTR-MCNC: 288 MG/DL — HIGH (ref 70–99)
GLUCOSE SERPL-MCNC: 273 MG/DL — HIGH (ref 70–99)
HCT VFR BLD CALC: 30.1 % — LOW (ref 34.5–45)
HGB BLD-MCNC: 9.4 G/DL — LOW (ref 11.5–15.5)
MAGNESIUM SERPL-MCNC: 1.8 MG/DL — SIGNIFICANT CHANGE UP (ref 1.6–2.6)
MCHC RBC-ENTMCNC: 25.3 PG — LOW (ref 27–34)
MCHC RBC-ENTMCNC: 31.2 % — LOW (ref 32–36)
MCV RBC AUTO: 81.1 FL — SIGNIFICANT CHANGE UP (ref 80–100)
NRBC # FLD: 0 — SIGNIFICANT CHANGE UP
PLATELET # BLD AUTO: 382 K/UL — SIGNIFICANT CHANGE UP (ref 150–400)
PMV BLD: 9.3 FL — SIGNIFICANT CHANGE UP (ref 7–13)
POTASSIUM SERPL-MCNC: 4.4 MMOL/L — SIGNIFICANT CHANGE UP (ref 3.5–5.3)
POTASSIUM SERPL-SCNC: 4.4 MMOL/L — SIGNIFICANT CHANGE UP (ref 3.5–5.3)
RBC # BLD: 3.71 M/UL — LOW (ref 3.8–5.2)
RBC # FLD: 15.4 % — HIGH (ref 10.3–14.5)
SODIUM SERPL-SCNC: 129 MMOL/L — LOW (ref 135–145)
WBC # BLD: 10.49 K/UL — SIGNIFICANT CHANGE UP (ref 3.8–10.5)
WBC # FLD AUTO: 10.49 K/UL — SIGNIFICANT CHANGE UP (ref 3.8–10.5)

## 2018-09-15 RX ORDER — ASPIRIN/CALCIUM CARB/MAGNESIUM 324 MG
1 TABLET ORAL
Qty: 30 | Refills: 0 | OUTPATIENT
Start: 2018-09-15 | End: 2018-10-14

## 2018-09-15 RX ORDER — POLYETHYLENE GLYCOL 3350 17 G/17G
17 POWDER, FOR SOLUTION ORAL
Qty: 510 | Refills: 0 | OUTPATIENT
Start: 2018-09-15 | End: 2018-10-14

## 2018-09-15 RX ORDER — METOPROLOL TARTRATE 50 MG
25 TABLET ORAL
Qty: 0 | Refills: 0 | COMMUNITY

## 2018-09-15 RX ORDER — GABAPENTIN 400 MG/1
1 CAPSULE ORAL
Qty: 60 | Refills: 0 | OUTPATIENT
Start: 2018-09-15 | End: 2018-10-14

## 2018-09-15 RX ORDER — FAMOTIDINE 10 MG/ML
1 INJECTION INTRAVENOUS
Qty: 30 | Refills: 0 | OUTPATIENT
Start: 2018-09-15 | End: 2018-10-14

## 2018-09-15 RX ORDER — FUROSEMIDE 40 MG
1 TABLET ORAL
Qty: 30 | Refills: 0 | OUTPATIENT
Start: 2018-09-15 | End: 2018-10-14

## 2018-09-15 RX ORDER — METOPROLOL TARTRATE 50 MG
1 TABLET ORAL
Qty: 60 | Refills: 0 | OUTPATIENT
Start: 2018-09-15 | End: 2018-10-14

## 2018-09-15 RX ORDER — VALACYCLOVIR 500 MG/1
1 TABLET, FILM COATED ORAL
Qty: 10 | Refills: 0 | OUTPATIENT
Start: 2018-09-15 | End: 2018-09-19

## 2018-09-15 RX ORDER — NATEGLINIDE 60 MG/1
1 TABLET, COATED ORAL
Qty: 0 | Refills: 0 | COMMUNITY

## 2018-09-15 RX ORDER — SODIUM CHLORIDE 9 MG/ML
1 INJECTION INTRAMUSCULAR; INTRAVENOUS; SUBCUTANEOUS
Qty: 30 | Refills: 0 | OUTPATIENT
Start: 2018-09-15 | End: 2018-10-14

## 2018-09-15 RX ORDER — NIFEDIPINE 30 MG
1 TABLET, EXTENDED RELEASE 24 HR ORAL
Qty: 30 | Refills: 0 | OUTPATIENT
Start: 2018-09-15 | End: 2018-10-14

## 2018-09-15 RX ORDER — ACETAMINOPHEN 500 MG
3 TABLET ORAL
Qty: 270 | Refills: 0 | OUTPATIENT
Start: 2018-09-15 | End: 2018-10-14

## 2018-09-15 RX ORDER — BUDESONIDE AND FORMOTEROL FUMARATE DIHYDRATE 160; 4.5 UG/1; UG/1
2 AEROSOL RESPIRATORY (INHALATION)
Qty: 1 | Refills: 0 | OUTPATIENT
Start: 2018-09-15 | End: 2018-10-14

## 2018-09-15 RX ORDER — TRAMADOL HYDROCHLORIDE 50 MG/1
0.5 TABLET ORAL
Qty: 15 | Refills: 0 | OUTPATIENT
Start: 2018-09-15 | End: 2018-10-14

## 2018-09-15 RX ORDER — NIFEDIPINE 30 MG
1 TABLET, EXTENDED RELEASE 24 HR ORAL
Qty: 0 | Refills: 0 | COMMUNITY
Start: 2018-09-15 | End: 2018-10-14

## 2018-09-15 RX ORDER — NIFEDIPINE 30 MG
1 TABLET, EXTENDED RELEASE 24 HR ORAL
Qty: 0 | Refills: 0 | COMMUNITY
Start: 2018-09-15

## 2018-09-15 RX ORDER — NIFEDIPINE 30 MG
1 TABLET, EXTENDED RELEASE 24 HR ORAL
Qty: 0 | Refills: 0 | COMMUNITY

## 2018-09-15 RX ORDER — DOCUSATE SODIUM 100 MG
1 CAPSULE ORAL
Qty: 90 | Refills: 0 | OUTPATIENT
Start: 2018-09-15 | End: 2018-10-14

## 2018-09-15 RX ORDER — GABAPENTIN 400 MG/1
1 CAPSULE ORAL
Qty: 30 | Refills: 0 | OUTPATIENT
Start: 2018-09-15 | End: 2018-10-14

## 2018-09-15 RX ORDER — SENNA PLUS 8.6 MG/1
2 TABLET ORAL
Qty: 60 | Refills: 0 | OUTPATIENT
Start: 2018-09-15 | End: 2018-10-14

## 2018-09-15 RX ADMIN — Medication 7 UNIT(S): at 13:29

## 2018-09-15 RX ADMIN — SODIUM CHLORIDE 1 GRAM(S): 9 INJECTION INTRAMUSCULAR; INTRAVENOUS; SUBCUTANEOUS at 07:06

## 2018-09-15 RX ADMIN — Medication 7 UNIT(S): at 18:09

## 2018-09-15 RX ADMIN — Medication 30 MILLIGRAM(S): at 05:02

## 2018-09-15 RX ADMIN — Medication 263 MILLIGRAM(S): at 18:10

## 2018-09-15 RX ADMIN — Medication 6: at 18:09

## 2018-09-15 RX ADMIN — Medication 6: at 09:04

## 2018-09-15 RX ADMIN — Medication 7 UNIT(S): at 09:04

## 2018-09-15 RX ADMIN — Medication 145 MILLIGRAM(S): at 07:06

## 2018-09-15 RX ADMIN — Medication 50 MILLIGRAM(S): at 05:02

## 2018-09-15 RX ADMIN — Medication 10 MILLIGRAM(S): at 17:02

## 2018-09-15 RX ADMIN — Medication 20 MILLIGRAM(S): at 05:02

## 2018-09-15 RX ADMIN — AER TRAVELER 1 APPLICATION(S): 0.5 SOLUTION RECTAL; TOPICAL at 05:03

## 2018-09-15 RX ADMIN — Medication 100 MILLIGRAM(S): at 13:30

## 2018-09-15 RX ADMIN — Medication 975 MILLIGRAM(S): at 02:22

## 2018-09-15 RX ADMIN — Medication 100 MILLIGRAM(S): at 05:03

## 2018-09-15 RX ADMIN — Medication 4: at 13:29

## 2018-09-15 RX ADMIN — GABAPENTIN 400 MILLIGRAM(S): 400 CAPSULE ORAL at 17:02

## 2018-09-15 RX ADMIN — Medication 263 MILLIGRAM(S): at 06:18

## 2018-09-15 RX ADMIN — Medication 975 MILLIGRAM(S): at 03:16

## 2018-09-15 RX ADMIN — GABAPENTIN 400 MILLIGRAM(S): 400 CAPSULE ORAL at 05:02

## 2018-09-15 RX ADMIN — Medication 975 MILLIGRAM(S): at 10:38

## 2018-09-15 RX ADMIN — Medication 10 MILLIGRAM(S): at 05:02

## 2018-09-15 RX ADMIN — AER TRAVELER 1 APPLICATION(S): 0.5 SOLUTION RECTAL; TOPICAL at 13:30

## 2018-09-15 RX ADMIN — Medication 50 MILLIGRAM(S): at 17:02

## 2018-09-15 RX ADMIN — Medication 975 MILLIGRAM(S): at 11:38

## 2018-09-15 RX ADMIN — FAMOTIDINE 20 MILLIGRAM(S): 10 INJECTION INTRAVENOUS at 07:06

## 2018-09-15 RX ADMIN — BUDESONIDE AND FORMOTEROL FUMARATE DIHYDRATE 2 PUFF(S): 160; 4.5 AEROSOL RESPIRATORY (INHALATION) at 09:04

## 2018-09-15 RX ADMIN — POLYETHYLENE GLYCOL 3350 17 GRAM(S): 17 POWDER, FOR SOLUTION ORAL at 07:06

## 2018-09-15 RX ADMIN — Medication 975 MILLIGRAM(S): at 18:54

## 2018-09-15 NOTE — PROGRESS NOTE ADULT - SUBJECTIVE AND OBJECTIVE BOX
M E D I C A L   A T T E N D I N G    F O L L O W    U P   N O T E                                     ------------------------------------------------------------------------------------------------    patient evaluated by me, case discussed with team, chart, medications, and physical exam reviewed, labs / tests  and vitals reviewed by me, as bellow.   Patient is stable for discharge today.  discussed with pt and Dtr at bedside, want to go home.. given option of extra Neurontin as needed fo pain control. meds reconciled and reviewed with PA.  Patient to follow up with  PMD / Cardio, Oncology, RT  See discharge document for full note.      ==================>> MEDICATIONS <<====================    acyclovir IVPB 650 milliGRAM(s) IV Intermittent every 12 hours  buDESOnide 160 MICROgram(s)/formoterol 4.5 MICROgram(s) Inhaler 2 Puff(s) Inhalation two times a day  dextrose 5%. 1000 milliLiter(s) IV Continuous <Continuous>  dextrose 50% Injectable 12.5 Gram(s) IV Push once  dextrose 50% Injectable 25 Gram(s) IV Push once  dextrose 50% Injectable 25 Gram(s) IV Push once  docusate sodium 100 milliGRAM(s) Oral three times a day  famotidine    Tablet 20 milliGRAM(s) Oral daily  fenofibrate Tablet 145 milliGRAM(s) Oral daily  furosemide    Tablet 20 milliGRAM(s) Oral daily  gabapentin 400 milliGRAM(s) Oral <User Schedule>  insulin glargine Injectable (LANTUS) 17 Unit(s) SubCutaneous at bedtime  insulin lispro (HumaLOG) corrective regimen sliding scale   SubCutaneous three times a day before meals  insulin lispro (HumaLOG) corrective regimen sliding scale   SubCutaneous at bedtime  insulin lispro Injectable (HumaLOG) 7 Unit(s) SubCutaneous three times a day before meals  metoprolol tartrate 50 milliGRAM(s) Oral two times a day  NIFEdipine XL 60 milliGRAM(s) Oral at bedtime  NIFEdipine XL 30 milliGRAM(s) Oral daily  polyethylene glycol 3350 17 Gram(s) Oral daily  predniSONE   Tablet 10 milliGRAM(s) Oral two times a day  senna 2 Tablet(s) Oral at bedtime  sodium chloride 1 Gram(s) Oral daily  traMADol 25 milliGRAM(s) Oral <User Schedule>  witch hazel Pads 1 Application(s) Topical three times a day    MEDICATIONS  (PRN):  acetaminophen   Tablet .. 975 milliGRAM(s) Oral every 8 hours PRN Severe Pain (7 - 10)  dextrose 40% Gel 15 Gram(s) Oral once PRN Blood Glucose LESS THAN 70 milliGRAM(s)/deciliter  glucagon  Injectable 1 milliGRAM(s) IntraMuscular once PRN Glucose LESS THAN 70 milligrams/deciliter  levalbuterol Inhalation 0.63 milliGRAM(s) Inhalation every 6 hours PRN SOB/wheezing    ==================>> VITAL SIGNS <<==================    T(C): 36.5 (09-15-18 @ 12:50), Max: 37.1 (09-14-18 @ 21:45)  HR: 67 (09-15-18 @ 12:50) (67 - 90)  BP: 135/81 (09-15-18 @ 12:50) (135/81 - 174/69)  RR: 18 (09-15-18 @ 12:50) (16 - 18)  SpO2: 96% (09-15-18 @ 12:50) (95% - 96%)    POCT Blood Glucose.: 207 mg/dL (15 Sep 2018 12:49)  POCT Blood Glucose.: 288 mg/dL (15 Sep 2018 08:46)  POCT Blood Glucose.: 113 mg/dL (14 Sep 2018 22:32)  POCT Blood Glucose.: 221 mg/dL (14 Sep 2018 17:20)  I&O's Summary    14 Sep 2018 07:01  -  15 Sep 2018 07:00  --------------------------------------------------------  IN: 640 mL / OUT: 0 mL / NET: 640 mL       ==================>> LAB AND IMAGING <<==================                        9.4    10.49 )-----------( 382      ( 15 Sep 2018 07:33 )             30.1     129<L>  |  91<L>  |  28<H>  ----------------------------<  273<H>  4.4   |  26  |  0.84    Ca    8.6      15 Sep 2018 07:33  Mg     1.8     09-15    HgA1C: 9.8  (09-05-18)             Creatinine:  0.84  <<==, 0.78  <<==, 0.77  <<==, 0.82  <<==, 0.86  <<==, 0.88  <<==   Sodium:   129  <==, 129  <==, 126  <==, 133  <==, 130  <==, 132  <==      WBC count:   10.49 <<== ,  7.35 <<== ,  7.85 <<== ,  7.34 <<== ,  7.32 <<==    Hemoglobin:   9.4 <<==,  8.9 <<==,  8.9 <<==,  8.9 <<==,  8.6 <<==   platelets:  382 <==, 312 <==, 293 <==, 260 <==, 244 <==, 261 <==

## 2018-09-15 NOTE — PROGRESS NOTE ADULT - REASON FOR ADMISSION
Patient is a 86y old  Female who presents with a chief complaint of R. Shoulder Pain
R Shoulder Pain
chief complaint of R. Shoulder Pain
Patient is a 86y old  Female who presents with a chief complaint of R. Shoulder Pain
R. Shoulder Pain
Patient is a 86y old  Female who presents with a chief complaint of R. Shoulder Pain

## 2018-09-15 NOTE — PROGRESS NOTE ADULT - PROVIDER SPECIALTY LIST ADULT
Cardiology
Endocrinology
Heme/Onc
Heme/Onc
Internal Medicine
MICU
Nephrology
Palliative Care
Pulmonology
Pulmonology
Cardiology
Cardiology
Internal Medicine
Internal Medicine
Endocrinology
Neurosurgery
Heme/Onc

## 2018-09-15 NOTE — PROGRESS NOTE ADULT - SUBJECTIVE AND OBJECTIVE BOX
Cardiovascular Disease Progress Note    Overnight events: No acute events overnight.  pain is somewhat improved but still present. no cp/sob/pnd/orthopnea.   Otherwise review of systems negative    Objective Findings:  T(C): 36.9 (09-15-18 @ 05:14), Max: 37.1 (18 @ 21:45)  HR: 88 (09-15-18 @ 05:14) (81 - 90)  BP: 139/75 (09-15-18 @ 05:14) (139/75 - 174/69)  RR: 16 (09-15-18 @ 05:14) (16 - 18)  SpO2: 95% (09-15-18 @ 05:14) (95% - 96%)  Wt(kg): --  Daily     Daily Weight in k.2 (15 Sep 2018 07:53)      Physical Exam:  Gen: NAD  HEENT: EOMI  CV: RRR, normal S1 + S2, no m/r/g  Lungs: CTAB  Abd: soft, non-tender  Ext: No edema      Laboratory Data:                        9.4    10.49 )-----------( 382      ( 15 Sep 2018 07:33 )             30.1     09-15    129<L>  |  91<L>  |  28<H>  ----------------------------<  273<H>  4.4   |  26  |  0.84    Ca    8.6      15 Sep 2018 07:33  Mg     1.8     09-15                Inpatient Medications:  MEDICATIONS  (STANDING):  acyclovir IVPB 650 milliGRAM(s) IV Intermittent every 12 hours  buDESOnide 160 MICROgram(s)/formoterol 4.5 MICROgram(s) Inhaler 2 Puff(s) Inhalation two times a day  dextrose 5%. 1000 milliLiter(s) (50 mL/Hr) IV Continuous <Continuous>  dextrose 50% Injectable 12.5 Gram(s) IV Push once  dextrose 50% Injectable 25 Gram(s) IV Push once  dextrose 50% Injectable 25 Gram(s) IV Push once  docusate sodium 100 milliGRAM(s) Oral three times a day  famotidine    Tablet 20 milliGRAM(s) Oral daily  fenofibrate Tablet 145 milliGRAM(s) Oral daily  furosemide    Tablet 20 milliGRAM(s) Oral daily  gabapentin 400 milliGRAM(s) Oral <User Schedule>  insulin glargine Injectable (LANTUS) 17 Unit(s) SubCutaneous at bedtime  insulin lispro (HumaLOG) corrective regimen sliding scale   SubCutaneous three times a day before meals  insulin lispro (HumaLOG) corrective regimen sliding scale   SubCutaneous at bedtime  insulin lispro Injectable (HumaLOG) 7 Unit(s) SubCutaneous three times a day before meals  metoprolol tartrate 50 milliGRAM(s) Oral two times a day  NIFEdipine XL 60 milliGRAM(s) Oral at bedtime  NIFEdipine XL 30 milliGRAM(s) Oral daily  polyethylene glycol 3350 17 Gram(s) Oral daily  predniSONE   Tablet 10 milliGRAM(s) Oral two times a day  senna 2 Tablet(s) Oral at bedtime  sodium chloride 1 Gram(s) Oral daily  traMADol 25 milliGRAM(s) Oral <User Schedule>  witch hazel Pads 1 Application(s) Topical three times a day      Assessment:  86 F with PMH of lung adenoca (dx around , s/p L VATS, XRT of TERESA/RUL last in 2016), COPD with emphysema, htn, and dm2, presented with R shoulder pain which is at least partially related to acute zoster, also incidentally 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     Plan:  1) hypoxic respiratory failure in setting of pneumonia/COPD initially requiring BiPAP now stable on RA  - appears euvolemic. cont to hold diuretics  -wean o2 as tolerates  -s/p LE duplex - no evidence of DVT    -c/w bronchodilators  -reconsult pulm as needed    3) elevated troponin, without overt ischemic sx or ischemic changes on ecg  -would defer ischemic workup for now given overall prognosis and inability to tolerate anti-platelet agents/AC given recent imaging findings  -c/w fenofibrate for HLD  -cont to hold aspirin given hemorrhagic cerebellar lesion    4) right should pain, hx of PMR, acute zoster  -appreciate derm recs  -c/w acylovir, prednisone and gabapentin    5) HTN, APC's  -no evidence of atrial fibrillation on visualized ecg's and telemetry  -c/w metoprolol as tolerates. increased dose possibly contributing to bronchospasm - would reduce to 25mg bid if wheezing recurs  -c/w nifdepine  -consider starting hydralazine 25mg tid with hold parameters if SBP persistently above 150  -cont to hold hctz 2/2 hyponatremia    6) hyponatremia  -appreciate renal recs. likely multifactorial including SIADH and tea/toast diet  -c/w salt tabs and close monitoring of sodium levels    7) cerebellar lesion  -appreciate nsgy/onc recs  -mri/mra results noted      Over 35 minutes spent on total encounter; more than 50% of the visit was spent counseling and/or coordinating care by the attending physician.      Slick York MD   Cardiovascular Disease  [phone]

## 2018-09-16 RX ORDER — SODIUM CHLORIDE 9 MG/ML
1 INJECTION INTRAMUSCULAR; INTRAVENOUS; SUBCUTANEOUS
Qty: 30 | Refills: 0 | OUTPATIENT
Start: 2018-09-16 | End: 2018-10-15

## 2018-09-16 RX ORDER — GLIMEPIRIDE 1 MG
1 TABLET ORAL
Qty: 30 | Refills: 0 | OUTPATIENT
Start: 2018-09-16 | End: 2018-10-15

## 2018-09-21 ENCOUNTER — INPATIENT (INPATIENT)
Facility: HOSPITAL | Age: 83
LOS: 8 days | End: 2018-09-30
Attending: GENERAL PRACTICE | Admitting: GENERAL PRACTICE
Payer: MEDICARE

## 2018-09-21 VITALS
SYSTOLIC BLOOD PRESSURE: 153 MMHG | OXYGEN SATURATION: 100 % | HEART RATE: 92 BPM | TEMPERATURE: 98 F | RESPIRATION RATE: 16 BRPM | DIASTOLIC BLOOD PRESSURE: 68 MMHG

## 2018-09-21 DIAGNOSIS — I10 ESSENTIAL (PRIMARY) HYPERTENSION: ICD-10-CM

## 2018-09-21 DIAGNOSIS — R06.02 SHORTNESS OF BREATH: ICD-10-CM

## 2018-09-21 DIAGNOSIS — C34.90 MALIGNANT NEOPLASM OF UNSPECIFIED PART OF UNSPECIFIED BRONCHUS OR LUNG: ICD-10-CM

## 2018-09-21 DIAGNOSIS — C34.90 MALIGNANT NEOPLASM OF UNSPECIFIED PART OF UNSPECIFIED BRONCHUS OR LUNG: Chronic | ICD-10-CM

## 2018-09-21 DIAGNOSIS — E11.9 TYPE 2 DIABETES MELLITUS WITHOUT COMPLICATIONS: ICD-10-CM

## 2018-09-21 DIAGNOSIS — E22.2 SYNDROME OF INAPPROPRIATE SECRETION OF ANTIDIURETIC HORMONE: ICD-10-CM

## 2018-09-21 DIAGNOSIS — M35.3 POLYMYALGIA RHEUMATICA: ICD-10-CM

## 2018-09-21 DIAGNOSIS — Z29.9 ENCOUNTER FOR PROPHYLACTIC MEASURES, UNSPECIFIED: ICD-10-CM

## 2018-09-21 LAB
ALBUMIN SERPL ELPH-MCNC: 3 G/DL — LOW (ref 3.3–5)
ALP SERPL-CCNC: 85 U/L — SIGNIFICANT CHANGE UP (ref 40–120)
ALT FLD-CCNC: 20 U/L — SIGNIFICANT CHANGE UP (ref 4–33)
APTT BLD: 26.2 SEC — LOW (ref 27.5–37.4)
AST SERPL-CCNC: 19 U/L — SIGNIFICANT CHANGE UP (ref 4–32)
B PERT DNA SPEC QL NAA+PROBE: SIGNIFICANT CHANGE UP
BASE EXCESS BLDV CALC-SCNC: 8.5 MMOL/L — SIGNIFICANT CHANGE UP
BASOPHILS # BLD AUTO: 0.01 K/UL — SIGNIFICANT CHANGE UP (ref 0–0.2)
BASOPHILS NFR BLD AUTO: 0.1 % — SIGNIFICANT CHANGE UP (ref 0–2)
BILIRUB SERPL-MCNC: 0.4 MG/DL — SIGNIFICANT CHANGE UP (ref 0.2–1.2)
BLOOD GAS VENOUS - CREATININE: 0.82 MG/DL — SIGNIFICANT CHANGE UP (ref 0.5–1.3)
BUN SERPL-MCNC: 24 MG/DL — HIGH (ref 7–23)
C PNEUM DNA SPEC QL NAA+PROBE: NOT DETECTED — SIGNIFICANT CHANGE UP
CALCIUM SERPL-MCNC: 9 MG/DL — SIGNIFICANT CHANGE UP (ref 8.4–10.5)
CHLORIDE BLDV-SCNC: 96 MMOL/L — SIGNIFICANT CHANGE UP (ref 96–108)
CHLORIDE SERPL-SCNC: 91 MMOL/L — LOW (ref 98–107)
CO2 SERPL-SCNC: 31 MMOL/L — SIGNIFICANT CHANGE UP (ref 22–31)
CREAT SERPL-MCNC: 0.78 MG/DL — SIGNIFICANT CHANGE UP (ref 0.5–1.3)
EOSINOPHIL # BLD AUTO: 0 K/UL — SIGNIFICANT CHANGE UP (ref 0–0.5)
EOSINOPHIL NFR BLD AUTO: 0 % — SIGNIFICANT CHANGE UP (ref 0–6)
FLUAV H1 2009 PAND RNA SPEC QL NAA+PROBE: NOT DETECTED — SIGNIFICANT CHANGE UP
FLUAV H1 RNA SPEC QL NAA+PROBE: NOT DETECTED — SIGNIFICANT CHANGE UP
FLUAV H3 RNA SPEC QL NAA+PROBE: NOT DETECTED — SIGNIFICANT CHANGE UP
FLUAV SUBTYP SPEC NAA+PROBE: SIGNIFICANT CHANGE UP
FLUBV RNA SPEC QL NAA+PROBE: NOT DETECTED — SIGNIFICANT CHANGE UP
GAS PNL BLDV: 127 MMOL/L — LOW (ref 136–146)
GLUCOSE BLDC GLUCOMTR-MCNC: 264 MG/DL — HIGH (ref 70–99)
GLUCOSE BLDC GLUCOMTR-MCNC: 89 MG/DL — SIGNIFICANT CHANGE UP (ref 70–99)
GLUCOSE BLDV-MCNC: 155 — HIGH (ref 70–99)
GLUCOSE SERPL-MCNC: 146 MG/DL — HIGH (ref 70–99)
HADV DNA SPEC QL NAA+PROBE: NOT DETECTED — SIGNIFICANT CHANGE UP
HCO3 BLDV-SCNC: 31 MMOL/L — HIGH (ref 20–27)
HCOV 229E RNA SPEC QL NAA+PROBE: NOT DETECTED — SIGNIFICANT CHANGE UP
HCOV HKU1 RNA SPEC QL NAA+PROBE: NOT DETECTED — SIGNIFICANT CHANGE UP
HCOV NL63 RNA SPEC QL NAA+PROBE: NOT DETECTED — SIGNIFICANT CHANGE UP
HCOV OC43 RNA SPEC QL NAA+PROBE: NOT DETECTED — SIGNIFICANT CHANGE UP
HCT VFR BLD CALC: 30.3 % — LOW (ref 34.5–45)
HCT VFR BLDV CALC: 29.8 % — LOW (ref 34.5–45)
HGB BLD-MCNC: 9.4 G/DL — LOW (ref 11.5–15.5)
HGB BLDV-MCNC: 9.6 G/DL — LOW (ref 11.5–15.5)
HMPV RNA SPEC QL NAA+PROBE: NOT DETECTED — SIGNIFICANT CHANGE UP
HPIV1 RNA SPEC QL NAA+PROBE: NOT DETECTED — SIGNIFICANT CHANGE UP
HPIV2 RNA SPEC QL NAA+PROBE: NOT DETECTED — SIGNIFICANT CHANGE UP
HPIV3 RNA SPEC QL NAA+PROBE: NOT DETECTED — SIGNIFICANT CHANGE UP
HPIV4 RNA SPEC QL NAA+PROBE: NOT DETECTED — SIGNIFICANT CHANGE UP
IMM GRANULOCYTES # BLD AUTO: 0.09 # — SIGNIFICANT CHANGE UP
IMM GRANULOCYTES NFR BLD AUTO: 0.8 % — SIGNIFICANT CHANGE UP (ref 0–1.5)
INR BLD: 1.17 — SIGNIFICANT CHANGE UP (ref 0.88–1.17)
LACTATE BLDV-MCNC: 1.4 MMOL/L — SIGNIFICANT CHANGE UP (ref 0.5–2)
LYMPHOCYTES # BLD AUTO: 0.53 K/UL — LOW (ref 1–3.3)
LYMPHOCYTES # BLD AUTO: 4.6 % — LOW (ref 13–44)
M PNEUMO DNA SPEC QL NAA+PROBE: NOT DETECTED — SIGNIFICANT CHANGE UP
MCHC RBC-ENTMCNC: 25.8 PG — LOW (ref 27–34)
MCHC RBC-ENTMCNC: 31 % — LOW (ref 32–36)
MCV RBC AUTO: 83.2 FL — SIGNIFICANT CHANGE UP (ref 80–100)
MONOCYTES # BLD AUTO: 0.43 K/UL — SIGNIFICANT CHANGE UP (ref 0–0.9)
MONOCYTES NFR BLD AUTO: 3.7 % — SIGNIFICANT CHANGE UP (ref 2–14)
NEUTROPHILS # BLD AUTO: 10.54 K/UL — HIGH (ref 1.8–7.4)
NEUTROPHILS NFR BLD AUTO: 90.8 % — HIGH (ref 43–77)
NRBC # FLD: 0 — SIGNIFICANT CHANGE UP
NT-PROBNP SERPL-SCNC: 7471 PG/ML — SIGNIFICANT CHANGE UP
PCO2 BLDV: 52 MMHG — HIGH (ref 41–51)
PH BLDV: 7.42 PH — SIGNIFICANT CHANGE UP (ref 7.32–7.43)
PLATELET # BLD AUTO: 359 K/UL — SIGNIFICANT CHANGE UP (ref 150–400)
PMV BLD: 8.8 FL — SIGNIFICANT CHANGE UP (ref 7–13)
PO2 BLDV: 45 MMHG — HIGH (ref 35–40)
POTASSIUM BLDV-SCNC: 3.7 MMOL/L — SIGNIFICANT CHANGE UP (ref 3.4–4.5)
POTASSIUM SERPL-MCNC: 4.2 MMOL/L — SIGNIFICANT CHANGE UP (ref 3.5–5.3)
POTASSIUM SERPL-SCNC: 4.2 MMOL/L — SIGNIFICANT CHANGE UP (ref 3.5–5.3)
PROT SERPL-MCNC: 7.2 G/DL — SIGNIFICANT CHANGE UP (ref 6–8.3)
PROTHROM AB SERPL-ACNC: 13 SEC — SIGNIFICANT CHANGE UP (ref 9.8–13.1)
RBC # BLD: 3.64 M/UL — LOW (ref 3.8–5.2)
RBC # FLD: 16.3 % — HIGH (ref 10.3–14.5)
RSV RNA SPEC QL NAA+PROBE: NOT DETECTED — SIGNIFICANT CHANGE UP
RV+EV RNA SPEC QL NAA+PROBE: POSITIVE — HIGH
SAO2 % BLDV: 76 % — SIGNIFICANT CHANGE UP (ref 60–85)
SODIUM SERPL-SCNC: 132 MMOL/L — LOW (ref 135–145)
TROPONIN T, HIGH SENSITIVITY: 29 NG/L — SIGNIFICANT CHANGE UP (ref ?–14)
TROPONIN T, HIGH SENSITIVITY: 30 NG/L — SIGNIFICANT CHANGE UP (ref ?–14)
WBC # BLD: 11.6 K/UL — HIGH (ref 3.8–10.5)
WBC # FLD AUTO: 11.6 K/UL — HIGH (ref 3.8–10.5)

## 2018-09-21 PROCEDURE — 71045 X-RAY EXAM CHEST 1 VIEW: CPT | Mod: 26

## 2018-09-21 PROCEDURE — 99223 1ST HOSP IP/OBS HIGH 75: CPT | Mod: AI

## 2018-09-21 PROCEDURE — 71250 CT THORAX DX C-: CPT | Mod: 26

## 2018-09-21 PROCEDURE — 99223 1ST HOSP IP/OBS HIGH 75: CPT

## 2018-09-21 RX ORDER — AZITHROMYCIN 500 MG/1
500 TABLET, FILM COATED ORAL ONCE
Qty: 0 | Refills: 0 | Status: COMPLETED | OUTPATIENT
Start: 2018-09-21 | End: 2018-09-21

## 2018-09-21 RX ORDER — VANCOMYCIN HCL 1 G
1000 VIAL (EA) INTRAVENOUS EVERY 12 HOURS
Qty: 0 | Refills: 0 | Status: DISCONTINUED | OUTPATIENT
Start: 2018-09-21 | End: 2018-09-21

## 2018-09-21 RX ORDER — IPRATROPIUM/ALBUTEROL SULFATE 18-103MCG
3 AEROSOL WITH ADAPTER (GRAM) INHALATION ONCE
Qty: 0 | Refills: 0 | Status: COMPLETED | OUTPATIENT
Start: 2018-09-21 | End: 2018-09-21

## 2018-09-21 RX ORDER — LIDOCAINE 4 G/100G
1 CREAM TOPICAL ONCE
Qty: 0 | Refills: 0 | Status: COMPLETED | OUTPATIENT
Start: 2018-09-21 | End: 2018-09-21

## 2018-09-21 RX ORDER — SODIUM CHLORIDE 9 MG/ML
3 INJECTION INTRAMUSCULAR; INTRAVENOUS; SUBCUTANEOUS EVERY 8 HOURS
Qty: 0 | Refills: 0 | Status: DISCONTINUED | OUTPATIENT
Start: 2018-09-21 | End: 2018-09-30

## 2018-09-21 RX ORDER — ACETAMINOPHEN 500 MG
650 TABLET ORAL EVERY 6 HOURS
Qty: 0 | Refills: 0 | Status: DISCONTINUED | OUTPATIENT
Start: 2018-09-21 | End: 2018-09-22

## 2018-09-21 RX ORDER — INFLUENZA VIRUS VACCINE 15; 15; 15; 15 UG/.5ML; UG/.5ML; UG/.5ML; UG/.5ML
0.5 SUSPENSION INTRAMUSCULAR ONCE
Qty: 0 | Refills: 0 | Status: DISCONTINUED | OUTPATIENT
Start: 2018-09-21 | End: 2018-09-29

## 2018-09-21 RX ORDER — DEXTROSE 50 % IN WATER 50 %
15 SYRINGE (ML) INTRAVENOUS ONCE
Qty: 0 | Refills: 0 | Status: DISCONTINUED | OUTPATIENT
Start: 2018-09-21 | End: 2018-09-30

## 2018-09-21 RX ORDER — INSULIN LISPRO 100/ML
VIAL (ML) SUBCUTANEOUS
Qty: 0 | Refills: 0 | Status: DISCONTINUED | OUTPATIENT
Start: 2018-09-21 | End: 2018-09-30

## 2018-09-21 RX ORDER — SODIUM CHLORIDE 9 MG/ML
1000 INJECTION, SOLUTION INTRAVENOUS
Qty: 0 | Refills: 0 | Status: DISCONTINUED | OUTPATIENT
Start: 2018-09-21 | End: 2018-09-30

## 2018-09-21 RX ORDER — DEXTROSE 50 % IN WATER 50 %
25 SYRINGE (ML) INTRAVENOUS ONCE
Qty: 0 | Refills: 0 | Status: DISCONTINUED | OUTPATIENT
Start: 2018-09-21 | End: 2018-09-30

## 2018-09-21 RX ORDER — GABAPENTIN 400 MG/1
300 CAPSULE ORAL ONCE
Qty: 0 | Refills: 0 | Status: COMPLETED | OUTPATIENT
Start: 2018-09-21 | End: 2018-09-21

## 2018-09-21 RX ORDER — INSULIN GLARGINE 100 [IU]/ML
16 INJECTION, SOLUTION SUBCUTANEOUS AT BEDTIME
Qty: 0 | Refills: 0 | Status: DISCONTINUED | OUTPATIENT
Start: 2018-09-21 | End: 2018-09-28

## 2018-09-21 RX ORDER — FUROSEMIDE 40 MG
20 TABLET ORAL ONCE
Qty: 0 | Refills: 0 | Status: COMPLETED | OUTPATIENT
Start: 2018-09-21 | End: 2018-09-21

## 2018-09-21 RX ORDER — GLUCAGON INJECTION, SOLUTION 0.5 MG/.1ML
1 INJECTION, SOLUTION SUBCUTANEOUS ONCE
Qty: 0 | Refills: 0 | Status: DISCONTINUED | OUTPATIENT
Start: 2018-09-21 | End: 2018-09-30

## 2018-09-21 RX ORDER — GABAPENTIN 400 MG/1
400 CAPSULE ORAL
Qty: 0 | Refills: 0 | Status: DISCONTINUED | OUTPATIENT
Start: 2018-09-21 | End: 2018-09-24

## 2018-09-21 RX ORDER — VANCOMYCIN HCL 1 G
1000 VIAL (EA) INTRAVENOUS EVERY 24 HOURS
Qty: 0 | Refills: 0 | Status: DISCONTINUED | OUTPATIENT
Start: 2018-09-21 | End: 2018-09-27

## 2018-09-21 RX ORDER — FAMOTIDINE 10 MG/ML
20 INJECTION INTRAVENOUS DAILY
Qty: 0 | Refills: 0 | Status: DISCONTINUED | OUTPATIENT
Start: 2018-09-21 | End: 2018-09-25

## 2018-09-21 RX ORDER — NIFEDIPINE 30 MG
30 TABLET, EXTENDED RELEASE 24 HR ORAL DAILY
Qty: 0 | Refills: 0 | Status: DISCONTINUED | OUTPATIENT
Start: 2018-09-21 | End: 2018-09-28

## 2018-09-21 RX ORDER — ACETAMINOPHEN 500 MG
975 TABLET ORAL ONCE
Qty: 0 | Refills: 0 | Status: COMPLETED | OUTPATIENT
Start: 2018-09-21 | End: 2018-09-21

## 2018-09-21 RX ORDER — VANCOMYCIN HCL 1 G
1000 VIAL (EA) INTRAVENOUS ONCE
Qty: 0 | Refills: 0 | Status: DISCONTINUED | OUTPATIENT
Start: 2018-09-21 | End: 2018-09-21

## 2018-09-21 RX ORDER — SODIUM CHLORIDE 9 MG/ML
1 INJECTION INTRAMUSCULAR; INTRAVENOUS; SUBCUTANEOUS DAILY
Qty: 0 | Refills: 0 | Status: DISCONTINUED | OUTPATIENT
Start: 2018-09-21 | End: 2018-09-28

## 2018-09-21 RX ORDER — ALBUTEROL 90 UG/1
3 AEROSOL, METERED ORAL
Qty: 0 | Refills: 0 | COMMUNITY

## 2018-09-21 RX ORDER — PIPERACILLIN AND TAZOBACTAM 4; .5 G/20ML; G/20ML
3.38 INJECTION, POWDER, LYOPHILIZED, FOR SOLUTION INTRAVENOUS EVERY 8 HOURS
Qty: 0 | Refills: 0 | Status: DISCONTINUED | OUTPATIENT
Start: 2018-09-21 | End: 2018-09-27

## 2018-09-21 RX ORDER — BUDESONIDE AND FORMOTEROL FUMARATE DIHYDRATE 160; 4.5 UG/1; UG/1
2 AEROSOL RESPIRATORY (INHALATION)
Qty: 0 | Refills: 0 | Status: DISCONTINUED | OUTPATIENT
Start: 2018-09-21 | End: 2018-09-29

## 2018-09-21 RX ORDER — DEXTROSE 50 % IN WATER 50 %
12.5 SYRINGE (ML) INTRAVENOUS ONCE
Qty: 0 | Refills: 0 | Status: DISCONTINUED | OUTPATIENT
Start: 2018-09-21 | End: 2018-09-30

## 2018-09-21 RX ORDER — GABAPENTIN 400 MG/1
2 CAPSULE ORAL
Qty: 0 | Refills: 0 | COMMUNITY

## 2018-09-21 RX ORDER — ALBUTEROL 90 UG/1
1 AEROSOL, METERED ORAL
Qty: 0 | Refills: 0 | COMMUNITY

## 2018-09-21 RX ORDER — DOCUSATE SODIUM 100 MG
100 CAPSULE ORAL THREE TIMES A DAY
Qty: 0 | Refills: 0 | Status: DISCONTINUED | OUTPATIENT
Start: 2018-09-21 | End: 2018-09-28

## 2018-09-21 RX ORDER — POLYETHYLENE GLYCOL 3350 17 G/17G
17 POWDER, FOR SOLUTION ORAL DAILY
Qty: 0 | Refills: 0 | Status: DISCONTINUED | OUTPATIENT
Start: 2018-09-21 | End: 2018-09-28

## 2018-09-21 RX ORDER — SENNA PLUS 8.6 MG/1
2 TABLET ORAL AT BEDTIME
Qty: 0 | Refills: 0 | Status: DISCONTINUED | OUTPATIENT
Start: 2018-09-21 | End: 2018-09-28

## 2018-09-21 RX ORDER — SITAGLIPTIN 50 MG/1
1 TABLET, FILM COATED ORAL
Qty: 0 | Refills: 0 | COMMUNITY

## 2018-09-21 RX ORDER — CEFEPIME 1 G/1
1000 INJECTION, POWDER, FOR SOLUTION INTRAMUSCULAR; INTRAVENOUS ONCE
Qty: 0 | Refills: 0 | Status: COMPLETED | OUTPATIENT
Start: 2018-09-21 | End: 2018-09-21

## 2018-09-21 RX ORDER — FENOFIBRATE,MICRONIZED 130 MG
145 CAPSULE ORAL DAILY
Qty: 0 | Refills: 0 | Status: DISCONTINUED | OUTPATIENT
Start: 2018-09-21 | End: 2018-09-28

## 2018-09-21 RX ORDER — POTASSIUM CHLORIDE 20 MEQ
1 PACKET (EA) ORAL
Qty: 0 | Refills: 0 | COMMUNITY

## 2018-09-21 RX ORDER — INSULIN LISPRO 100/ML
6 VIAL (ML) SUBCUTANEOUS
Qty: 0 | Refills: 0 | Status: DISCONTINUED | OUTPATIENT
Start: 2018-09-21 | End: 2018-09-28

## 2018-09-21 RX ORDER — METOPROLOL TARTRATE 50 MG
50 TABLET ORAL
Qty: 0 | Refills: 0 | Status: DISCONTINUED | OUTPATIENT
Start: 2018-09-21 | End: 2018-09-25

## 2018-09-21 RX ORDER — NIFEDIPINE 30 MG
60 TABLET, EXTENDED RELEASE 24 HR ORAL AT BEDTIME
Qty: 0 | Refills: 0 | Status: DISCONTINUED | OUTPATIENT
Start: 2018-09-21 | End: 2018-09-28

## 2018-09-21 RX ORDER — TRAMADOL HYDROCHLORIDE 50 MG/1
25 TABLET ORAL AT BEDTIME
Qty: 0 | Refills: 0 | Status: DISCONTINUED | OUTPATIENT
Start: 2018-09-21 | End: 2018-09-22

## 2018-09-21 RX ORDER — GABAPENTIN 400 MG/1
100 CAPSULE ORAL
Qty: 0 | Refills: 0 | Status: DISCONTINUED | OUTPATIENT
Start: 2018-09-21 | End: 2018-09-22

## 2018-09-21 RX ADMIN — Medication 3 MILLILITER(S): at 15:00

## 2018-09-21 RX ADMIN — GABAPENTIN 300 MILLIGRAM(S): 400 CAPSULE ORAL at 14:48

## 2018-09-21 RX ADMIN — Medication 3 MILLILITER(S): at 23:52

## 2018-09-21 RX ADMIN — Medication 3 MILLILITER(S): at 15:30

## 2018-09-21 RX ADMIN — Medication 975 MILLIGRAM(S): at 14:47

## 2018-09-21 RX ADMIN — PIPERACILLIN AND TAZOBACTAM 25 GRAM(S): 4; .5 INJECTION, POWDER, LYOPHILIZED, FOR SOLUTION INTRAVENOUS at 22:00

## 2018-09-21 RX ADMIN — Medication 250 MILLIGRAM(S): at 18:32

## 2018-09-21 RX ADMIN — CEFEPIME 1000 MILLIGRAM(S): 1 INJECTION, POWDER, FOR SOLUTION INTRAMUSCULAR; INTRAVENOUS at 16:00

## 2018-09-21 RX ADMIN — Medication 650 MILLIGRAM(S): at 22:16

## 2018-09-21 RX ADMIN — CEFEPIME 100 MILLIGRAM(S): 1 INJECTION, POWDER, FOR SOLUTION INTRAMUSCULAR; INTRAVENOUS at 15:00

## 2018-09-21 RX ADMIN — LIDOCAINE 1 PATCH: 4 CREAM TOPICAL at 14:48

## 2018-09-21 RX ADMIN — Medication 3 MILLILITER(S): at 15:17

## 2018-09-21 RX ADMIN — INSULIN GLARGINE 16 UNIT(S): 100 INJECTION, SOLUTION SUBCUTANEOUS at 23:21

## 2018-09-21 RX ADMIN — GABAPENTIN 100 MILLIGRAM(S): 400 CAPSULE ORAL at 22:19

## 2018-09-21 RX ADMIN — Medication 50 MILLIGRAM(S): at 18:33

## 2018-09-21 RX ADMIN — Medication 975 MILLIGRAM(S): at 16:00

## 2018-09-21 RX ADMIN — TRAMADOL HYDROCHLORIDE 25 MILLIGRAM(S): 50 TABLET ORAL at 18:44

## 2018-09-21 RX ADMIN — AZITHROMYCIN 250 MILLIGRAM(S): 500 TABLET, FILM COATED ORAL at 15:57

## 2018-09-21 RX ADMIN — SODIUM CHLORIDE 3 MILLILITER(S): 9 INJECTION INTRAMUSCULAR; INTRAVENOUS; SUBCUTANEOUS at 23:23

## 2018-09-21 RX ADMIN — Medication 10 MILLIGRAM(S): at 19:18

## 2018-09-21 RX ADMIN — Medication 60 MILLIGRAM(S): at 21:59

## 2018-09-21 RX ADMIN — Medication 650 MILLIGRAM(S): at 23:16

## 2018-09-21 RX ADMIN — BUDESONIDE AND FORMOTEROL FUMARATE DIHYDRATE 2 PUFF(S): 160; 4.5 AEROSOL RESPIRATORY (INHALATION) at 23:20

## 2018-09-21 RX ADMIN — Medication 20 MILLIGRAM(S): at 15:00

## 2018-09-21 NOTE — H&P ADULT - ASSESSMENT
86 F with above hx with worsening SOB depsite oral abx as out-pt. possible HCAP vs mucus plugging vs progression of dz

## 2018-09-21 NOTE — H&P ADULT - HISTORY OF PRESENT ILLNESS
86 f w hx lung ca s/p VATS, wedge resection, RT, no chemo, COPD on home O2, PMR on prednisone 30 daily chronically, DM2, HTN, recent hospitalization 9/4-9/15 for R shoulder pain at which point was found to have supraspinatus tear as well as R chest wall shingles s/p complete course valtrex sent home on O2, here today for SOB. per family and pt she has been coughing and she saw her PMD Dr Maxwell who started her on Levaquin with she completed 4-5 day course wo much improvement. per family, pt coughed up a copious amount of phlegm and now feels better. she denies much SOB now and is just tired, wanting to sleep/ denies fevers or chills. she also received an extra dose of Lasix today by PMD in addition to her home dose.   in ED she is not hypoxic, but was noted with increased work of breathing. afebrile and hemodynamically stable, received HCP coverage and ordered fro CT chest. on my arrival she appears in NAD, sleeping.

## 2018-09-21 NOTE — H&P ADULT - SKIN
Spoke with pharmacy technician Stephen at [facility] pharmacy [phone].  She confirms that patient was sent lisinopril 10 mg , to take one half tablet daily, #45 sent on 12/7/16.   detailed exam

## 2018-09-21 NOTE — ED PROVIDER NOTE - PROGRESS NOTE DETAILS
Elizabeth Goldberger PGY-2: called griffin lomeli, awaiting call back Elizabeth Goldberger PGY-2: no call back from Jaylen but pt is marie regardless so called previous admitting MD Ferrell, who requested ct non-con prior to adm

## 2018-09-21 NOTE — CONSULT NOTE ADULT - SUBJECTIVE AND OBJECTIVE BOX
HPI:  86 f w hx lung ca s/p VATS, wedge resection, RT, no chemo, COPD on home O2, PMR on prednisone 30 daily chronically, DM2, HTN, recent hospitalization 9/4-9/15 for R shoulder pain at which point was found to have supraspinatus tear as well as R chest wall shingles s/p complete course valtrex sent home on O2, here today for SOB. per family and pt she has been coughing and she saw her PMD Dr Maxwell who started her on Levaquin with she completed 4-5 day course wo much improvement. per family, pt coughed up a copious amount of phlegm and now feels better. she denies much SOB now and is just tired, wanting to sleep/ denies fevers or chills. she also received an extra dose of Lasix today by PMD in addition to her home dose.   in ED she is not hypoxic, but was noted with increased work of breathing. afebrile and hemodynamically stable, received HCP coverage and ordered fro CT chest. on my arrival she appears in NAD, sleeping. (21 Sep 2018 16:43)    PERTINENT PM/SXH:   Dyslipidemia  Hypertension  Diabetes  Lung cancer    Lung cancer  S/P cholecystectomy  H/O carotid endarterectomy    FAMILY HISTORY:  No pertinent family history in first degree relatives    ITEMS NOT CHECKED ARE NOT PRESENT    SOCIAL HISTORY:   Significant other/partner:  [ ]  Children:  [ ]  Pentecostalism/Spirituality:  Substance hx:  [ ]   Tobacco hx:  [ ]   Alcohol hx: [ ]   Home Opioid hx:  [ ] I-Stop Reference No:  Living Situation: [ ]Home  [ ]Long term care  [ ]Rehab [ ]Other    ADVANCE DIRECTIVES:    DNR  Yes  MOLST  [ ]  Living Will  [ ]   DECISION MAKER(s):  [ ] Health Care Proxy(s)  [ ] Surrogate(s)  [ ] Guardian           Name(s): Phone Number(s):    BASELINE (I)ADL(s) (prior to admission):  [place]: [ ]Total  [ ] Moderate [ ]Dependent    Allergies    ACE inhibitors (Angioedema)    Intolerances    MEDICATIONS  (STANDING):  acetaminophen   Tablet .. 975 milliGRAM(s) Oral every 8 hours  buDESOnide 160 MICROgram(s)/formoterol 4.5 MICROgram(s) Inhaler 2 Puff(s) Inhalation two times a day  dextrose 5%. 1000 milliLiter(s) (50 mL/Hr) IV Continuous <Continuous>  dextrose 50% Injectable 12.5 Gram(s) IV Push once  dextrose 50% Injectable 25 Gram(s) IV Push once  dextrose 50% Injectable 25 Gram(s) IV Push once  docusate sodium 100 milliGRAM(s) Oral three times a day  famotidine    Tablet 20 milliGRAM(s) Oral daily  fenofibrate Tablet 145 milliGRAM(s) Oral daily  gabapentin 400 milliGRAM(s) Oral three times a day  influenza   Vaccine 0.5 milliLiter(s) IntraMuscular once  insulin glargine Injectable (LANTUS) 16 Unit(s) SubCutaneous at bedtime  insulin lispro (HumaLOG) corrective regimen sliding scale   SubCutaneous three times a day before meals  insulin lispro Injectable (HumaLOG) 6 Unit(s) SubCutaneous three times a day before meals  lidocaine   Patch 3 Patch Transdermal daily  metoprolol tartrate 50 milliGRAM(s) Oral two times a day  NIFEdipine XL 30 milliGRAM(s) Oral daily  NIFEdipine XL 60 milliGRAM(s) Oral at bedtime  piperacillin/tazobactam IVPB. 3.375 Gram(s) IV Intermittent every 8 hours  predniSONE   Tablet 10 milliGRAM(s) Oral two times a day  senna 2 Tablet(s) Oral at bedtime  sodium chloride 1 Gram(s) Oral daily  sodium chloride 0.9% lock flush 3 milliLiter(s) IV Push every 8 hours  traMADol 25 milliGRAM(s) Oral <User Schedule>  vancomycin  IVPB 1000 milliGRAM(s) IV Intermittent every 24 hours    MEDICATIONS  (PRN):  dextrose 40% Gel 15 Gram(s) Oral once PRN Blood Glucose LESS THAN 70 milliGRAM(s)/deciliter  dextrose 40% Gel 15 Gram(s) Oral once PRN Blood Glucose LESS THAN 70 milliGRAM(s)/deciliter  glucagon  Injectable 1 milliGRAM(s) IntraMuscular once PRN Glucose LESS THAN 70 milligrams/deciliter  polyethylene glycol 3350 17 Gram(s) Oral daily PRN Constipation    PRESENT SYMPTOMS: [ ]Unable to obtain due to poor mentation   Source if other than patient:  [ ]Family   [ ]Team     Pain (Impact on QOL):    Location -         Minimal acceptable level (0-10 scale):                    Aggravating factors -  Quality -  Radiation -  Severity (0-10 scale) -    Timing -    PAIN AD Score:     [email or web address] (press ctrl +  left click to view)    Dyspnea:                           [ ]Mild [ ]Moderate [ ]Severe  Anxiety:                             [ ]Mild [ ]Moderate [ ]Severe  Fatigue:                             [ ]Mild [ ]Moderate [ ]Severe  Nausea:                             [ ]Mild [ ]Moderate [ ]Severe  Loss of appetite:              [ ]Mild [ ]Moderate [ ]Severe  Constipation:                    [ ]Mild [ ]Moderate [ ]Severe    Other Symptoms:  [ ]All other review of systems negative     Karnofsky Performance Score/Palliative Performance Status Version 2:         %    http://palliative.info/resource_material/[email or web address].pdf  PHYSICAL EXAM:  Vital Signs Last 24 Hrs  T(C): 36.5 (25 Sep 2018 05:43), Max: 36.5 (25 Sep 2018 05:43)  T(F): 97.7 (25 Sep 2018 05:43), Max: 97.7 (25 Sep 2018 05:43)  HR: 92 (25 Sep 2018 07:03) (88 - 97)  BP: 159/88 (25 Sep 2018 05:43) (117/82 - 165/89)  BP(mean): --  RR: 18 (25 Sep 2018 05:43) (18 - 18)  SpO2: 100% (25 Sep 2018 07:03) (95% - 100%) I&O's Summary  GENERAL:  [ ]Alert  [ ]Oriented x   [ ]Lethargic  [ ]Cachexia  [ ]Unarousable  [ ]Verbal  [ ]Non-Verbal  Behavioral:   [ ] Anxiety  [ ] Delirium [ ] Agitation [ ] Other  HEENT:  [ ]Normal   [ ]Dry mouth   [ ]ET Tube/Trach  [ ]Oral lesions  PULMONARY:   [ ]Clear [ ]Tachypnea  [ ]Audible excessive secretions   [ ]Rhonchi        [ ]Right [ ]Left [ ]Bilateral  [ ]Crackles        [ ]Right [ ]Left [ ]Bilateral  [ ]Wheezing     [ ]Right [ ]Left [ ]Bilateral  CARDIOVASCULAR:    [ ]Regular [ ]Irregular [ ]Tachy  [ ]Blane [ ]Murmur [ ]Other  GASTROINTESTINAL:  [ ]Soft  [ ]Distended   [ ]+BS  [ ]Non tender [ ]Tender  [ ]PEG [ ]OGT/ NGT  Last BM: GENITOURINARY:  [ ]Normal [ ] Incontinent   [ ]Oliguria/Anuria   [ ]Alcaraz  MUSCULOSKELETAL:   [ ]Normal   [ ]Weakness  [ ]Bed/Wheelchair bound [ ]Edema  NEUROLOGIC:   [ ]No focal deficits  [ ] Cognitive impairment  [ ] Dysphagia [ ]Dysarthria [ ] Paresis [ ]Other   SKIN:   [ ]Normal   [ ]Pressure ulcer(s)  [ ]Rash    CRITICAL CARE:  [ ] Shock Present  [ ]Septic [ ]Cardiogenic [ ]Neurologic [ ]Hypovolemic  [ ]  Vasopressors [ ]  Inotropes   [ ] Respiratory failure present  [ ] Acute  [ ] Chronic [ ] Hypoxic  [ ] Hypercarbic [ ] Other  [ ] Other organ failure     LABS:                        8.8    10.99 )-----------( 435      ( 25 Sep 2018 06:50 )             29.2   09-25    136  |  95<L>  |  18  ----------------------------<  133<H>  3.9   |  30  |  0.70    Ca    8.9      25 Sep 2018 06:50          RADIOLOGY & ADDITIONAL STUDIES:    PROTEIN CALORIE MALNUTRITION PRESENT: [ ] Yes [ ] No  [ ] PPSV2 < or = to 30% [ ] significant weight loss  [ ] poor nutritional intake [ ] catabolic state [ ] anasarca     Albumin, Serum: 3.0 g/dL (09-21-18 @ 14:00)  Artificial Nutrition [ ]     REFERRALS:   [ ]Chaplaincy  [ ] Hospice  [ ]Child Life  [ ]Social Work  [ ]Case management [ ]Holistic Therapy   Goals of Care Discussion Document: HPI:  86 f w hx lung ca s/p VATS, wedge resection, RT, no chemo, COPD on home O2, PMR on prednisone 30 daily chronically, DM2, HTN, recent hospitalization 9/4-9/15 for R shoulder pain at which point was found to have supraspinatus tear as well as R chest wall shingles s/p complete course valtrex sent home on O2, here today for SOB. per family and pt she has been coughing and she saw her PMD Dr Maxwell who started her on Levaquin with she completed 4-5 day course wo much improvement. per family, pt coughed up a copious amount of phlegm and now feels better. she denies much SOB now and is just tired, wanting to sleep/ denies fevers or chills. she also received an extra dose of Lasix today by PMD in addition to her home dose.   in ED she is not hypoxic, but was noted with increased work of breathing. afebrile and hemodynamically stable, received HCP coverage and ordered fro CT chest. on my arrival she appears in NAD, sleeping. (21 Sep 2018 16:43)    PERTINENT PM/SXH:   Dyslipidemia  Hypertension  Diabetes  Lung cancer    Lung cancer  S/P cholecystectomy  H/O carotid endarterectomy    FAMILY HISTORY:  No pertinent family history in first degree relatives    ITEMS NOT CHECKED ARE NOT PRESENT    SOCIAL HISTORY:   Significant other/partner:  [ ]  Children:  [x ]  Jehovah's witness/Spirituality: Worship  Substance hx:  [ ]   Tobacco hx:  [ x]   Alcohol hx: [ ]   Home Opioid hx:  [ x] I-Stop Reference No:  Living Situation: [ ]Home  [ ]Long term care  [x ]Rehab [ ]Other    ADVANCE DIRECTIVES:    DNR  Yes  MOLST  [ ]  Living Will  [ ]   DECISION MAKER(s): Gia Sol (daughter) [phone]  [ ] Health Care Proxy(s)  [ ] Surrogate(s)  [ ] Guardian           Name(s): Phone Number(s):    BASELINE (I)ADL(s) (prior to admission):  [place]: [ ]Total  [ x] Moderate [ ]Dependent    Allergies    ACE inhibitors (Angioedema)    Intolerances    MEDICATIONS  (STANDING):  acetaminophen   Tablet .. 975 milliGRAM(s) Oral every 8 hours  buDESOnide 160 MICROgram(s)/formoterol 4.5 MICROgram(s) Inhaler 2 Puff(s) Inhalation two times a day  dextrose 5%. 1000 milliLiter(s) (50 mL/Hr) IV Continuous <Continuous>  dextrose 50% Injectable 12.5 Gram(s) IV Push once  dextrose 50% Injectable 25 Gram(s) IV Push once  dextrose 50% Injectable 25 Gram(s) IV Push once  docusate sodium 100 milliGRAM(s) Oral three times a day  famotidine    Tablet 20 milliGRAM(s) Oral daily  fenofibrate Tablet 145 milliGRAM(s) Oral daily  gabapentin 400 milliGRAM(s) Oral three times a day  influenza   Vaccine 0.5 milliLiter(s) IntraMuscular once  insulin glargine Injectable (LANTUS) 16 Unit(s) SubCutaneous at bedtime  insulin lispro (HumaLOG) corrective regimen sliding scale   SubCutaneous three times a day before meals  insulin lispro Injectable (HumaLOG) 6 Unit(s) SubCutaneous three times a day before meals  lidocaine   Patch 3 Patch Transdermal daily  metoprolol tartrate 50 milliGRAM(s) Oral two times a day  NIFEdipine XL 30 milliGRAM(s) Oral daily  NIFEdipine XL 60 milliGRAM(s) Oral at bedtime  piperacillin/tazobactam IVPB. 3.375 Gram(s) IV Intermittent every 8 hours  predniSONE   Tablet 10 milliGRAM(s) Oral two times a day  senna 2 Tablet(s) Oral at bedtime  sodium chloride 1 Gram(s) Oral daily  sodium chloride 0.9% lock flush 3 milliLiter(s) IV Push every 8 hours  traMADol 25 milliGRAM(s) Oral <User Schedule>  vancomycin  IVPB 1000 milliGRAM(s) IV Intermittent every 24 hours    MEDICATIONS  (PRN):  dextrose 40% Gel 15 Gram(s) Oral once PRN Blood Glucose LESS THAN 70 milliGRAM(s)/deciliter  dextrose 40% Gel 15 Gram(s) Oral once PRN Blood Glucose LESS THAN 70 milliGRAM(s)/deciliter  glucagon  Injectable 1 milliGRAM(s) IntraMuscular once PRN Glucose LESS THAN 70 milligrams/deciliter  polyethylene glycol 3350 17 Gram(s) Oral daily PRN Constipation    PRESENT SYMPTOMS: [ ]Unable to obtain due to poor mentation   Source if other than patient:  [ ]Family   [ ]Team     Pain (Impact on QOL):  Unable to move  Location - Right shoulder          Minimal acceptable level (0-10 scale): 5                   Aggravating factors - movement  Quality - sharp, stabbing  Radiation - none  Severity (0-10 scale) - 10   Timing - constant    PAIN AD Score:     [email or web address] (press ctrl +  left click to view)    Dyspnea:                           [ ]Mild [ ]Moderate [x ]Severe  Anxiety:                             [ ]Mild [x ]Moderate [ ]Severe  Fatigue:                             [ ]Mild [ ]Moderate [ x]Severe  Nausea:                             [ x]Mild [ ]Moderate [ ]Severe  Loss of appetite:              [ ]Mild [ ]Moderate [x ]Severe  Constipation:                    [x ]Mild [ ]Moderate [ ]Severe    Other Symptoms:  [ x]All other review of systems negative     Karnofsky Performance Score/Palliative Performance Status Version 2: 40        %    http://palliative.info/resource_material/[email or web address].pdf  PHYSICAL EXAM:  Vital Signs Last 24 Hrs  T(C): 36.5 (25 Sep 2018 05:43), Max: 36.5 (25 Sep 2018 05:43)  T(F): 97.7 (25 Sep 2018 05:43), Max: 97.7 (25 Sep 2018 05:43)  HR: 92 (25 Sep 2018 07:03) (88 - 97)  BP: 159/88 (25 Sep 2018 05:43) (117/82 - 165/89)  BP(mean): --  RR: 18 (25 Sep 2018 05:43) (18 - 18)  SpO2: 100% (25 Sep 2018 07:03) (95% - 100%) I&O's Summary  GENERAL:  [x ]Alert  [x ]Oriented x 3  [ ]Lethargic  [ ]Cachexia  [ ]Unarousable  [ ]Verbal  [ ]Non-Verbal  Behavioral:   [x ] Anxiety  [ ] Delirium [ ] Agitation [ ] Other  HEENT:  [ ]Normal   [x ]Dry mouth   [ ]ET Tube/Trach  [ ]Oral lesions  PULMONARY:   [ ]Clear [ ]Tachypnea  [ ]Audible excessive secretions   [x]Rhonchi        [ ]Right [ ]Left [x ]Bilateral  [ ]Crackles        [ ]Right [ ]Left [ ]Bilateral  [ ]Wheezing     [ ]Right [ ]Left [ ]Bilateral  CARDIOVASCULAR:    [x ]Regular [ ]Irregular [ ]Tachy  [ ]Blane [ ]Murmur [ ]Other  GASTROINTESTINAL:  [x ]Soft  [ ]Distended   [x ]+BS  [x ]Non tender [ ]Tender  [ ]PEG [ ]OGT/ NGT  Last BM: GENITOURINARY:  [ x]Normal [ ] Incontinent   [ ]Oliguria/Anuria   [ ]Alcaraz  MUSCULOSKELETAL:   [ ]Normal   [ ]Weakness  [x ]Bed/Wheelchair bound [ ]Edema  NEUROLOGIC:   [x ]No focal deficits  [ ] Cognitive impairment  [ ] Dysphagia [ ]Dysarthria [ ] Paresis [ ]Other   SKIN:   [ x]Normal   [ ]Pressure ulcer(s)  [ ]Rash    CRITICAL CARE:  [ ] Shock Present  [ ]Septic [ ]Cardiogenic [ ]Neurologic [ ]Hypovolemic  [ ]  Vasopressors [ ]  Inotropes   [ ] Respiratory failure present  [ ] Acute  [ ] Chronic [ ] Hypoxic  [ ] Hypercarbic [ ] Other  [ ] Other organ failure     LABS:                        8.8    10.99 )-----------( 435      ( 25 Sep 2018 06:50 )             29.2   09-25    136  |  95<L>  |  18  ----------------------------<  133<H>  3.9   |  30  |  0.70    Ca    8.9      25 Sep 2018 06:50          RADIOLOGY & ADDITIONAL STUDIES:    PROTEIN CALORIE MALNUTRITION PRESENT: [ ] Yes [ ] No  [ ] PPSV2 < or = to 30% [ ] significant weight loss  [ ] poor nutritional intake [ ] catabolic state [ ] anasarca     Albumin, Serum: 3.0 g/dL (09-21-18 @ 14:00)  Artificial Nutrition [ ]     REFERRALS:   [ ]Chaplaincy  [ ] Hospice  [ ]Child Life  [ ]Social Work  [ ]Case management [ ]Holistic Therapy   Goals of Care Discussion Document:

## 2018-09-21 NOTE — ED PROVIDER NOTE - MEDICAL DECISION MAKING DETAILS
86f w hx lung ca s/p VATS, wedge resection, RT, COPD, PMR on prednisone 30 daily chronically, DM, HTN, recent hospitalization for shoulder pain + shingles, here for 5 days cough and 1 day SOB. Mild resp distress noted w coarse lung sounds, likely pneumonia. Labs, cxr, abx, adm

## 2018-09-21 NOTE — H&P ADULT - PROBLEM SELECTOR PLAN 2
seen by endo on last admit  will resume basal + bolus at 15 and 6 units for now and titrate as appropriate

## 2018-09-21 NOTE — CHART NOTE - NSCHARTNOTEFT_GEN_A_CORE
RVP + for Entero-rhinovirus  will still cw abx coverage until CT and bcx are available  if no evidence of infiltrate on imaging and cx remain neg, will consider monitoring off abx

## 2018-09-21 NOTE — ED PROVIDER NOTE - ATTENDING CONTRIBUTION TO CARE
Dr. Lemus:  I have personally performed a face to face bedside history and physical examination of this patient. I have discussed the history, examination, review of systems, assessment and plan of management with the resident. I have reviewed the electronic medical record and amended it to reflect my history, review of systems, physical exam, assessment and plan.    86F h/o Lung CA (unknown stage or type, Dx 2010 s/p L VATS, wedge resection, RT in July 2017), PMR, HTN, DM2, s/p recent admission with pulmonary edema and shingles, presents with SOB worsening over past few days.  Was seen by Dr. York for SOB and given 5 days of Levaquin with little improvement.  Denies fevers.  +Congested cough.      Exam:  - tachypneic  - rrr  - diffusely coarse breath sounds  - abd soft ntnd  - minimal pedal edema    A/P  - concern for PNA vs pulmonary edema  - cbc ,cmp, trop, coags, bnp, vbg  - ekg  - cxr  - abx, lasix

## 2018-09-21 NOTE — H&P ADULT - PROBLEM SELECTOR PLAN 7
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

## 2018-09-21 NOTE — ED ADULT TRIAGE NOTE - CHIEF COMPLAINT QUOTE
c/o sob and productive cough since this morning, pmhx lung ca, copd. 1 duoneb given by ems. pt recently dx with shingles x 3 weeks, finished  valtrex yesterday.

## 2018-09-21 NOTE — ED ADULT NURSE NOTE - OBJECTIVE STATEMENT
Patient received to room 17 Patient received to room 17, A&Ox3, respirations even, productive cough with beige colored sputum, right chest wall blisters radiating toward right upper back and right side of neck, diagnosed with shingles, last dose of valtrex completed yesterday as per daughter, otherwise skin intact no signs of decubiti. Patient complains of SOB and productive cough since Sunday. Denies chest pain, LOC recent falls. As per daughter patient was recently discharged after 2 week hospital stay Patient received to room 17, A&Ox3, ambulatory with walker at baseline, respirations even, productive cough with beige colored sputum, right chest wall blisters radiating toward right upper back and right side of neck, diagnosed with shingles, last dose of valtrex completed yesterday as per daughter, otherwise skin intact no signs of decubiti. Patient complains of SOB and productive cough since Sunday. Denies chest pain, LOC recent falls. As per daughter patient was recently discharged after 2 week hospital stay. Left AC 20g IV placed labs drawn and sent. Hx DM, HTN, Lung CA.

## 2018-09-21 NOTE — ED ADULT NURSE NOTE - NSIMPLEMENTINTERV_GEN_ALL_ED
Implemented All Fall with Harm Risk Interventions:  [place] to call system. Call bell, personal items and telephone within reach. Instruct patient to call for assistance. Room bathroom lighting operational. Non-slip footwear when patient is off stretcher. Physically safe environment: no spills, clutter or unnecessary equipment. Stretcher in lowest position, wheels locked, appropriate side rails in place. Provide visual cue, wrist band, yellow gown, etc. Monitor gait and stability. Monitor for mental status changes and reorient to person, place, and time. Review medications for side effects contributing to fall risk. Reinforce activity limits and safety measures with patient and family. Provide visual clues: red socks.

## 2018-09-21 NOTE — H&P ADULT - PROBLEM SELECTOR PLAN 3
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

## 2018-09-21 NOTE — H&P ADULT - PROBLEM SELECTOR PLAN 1
likely multifactorial in setting of lung ca, underlying chronic COPD, and possible PNA  with cough and phlegm and recent hospitalization agree with HCAP coverage  mild leukocytosis and borderline rectal temp, f/u bcx  agree with CT chest to evaluate lung process better - possible mucus plugging as after expectoration pt feels much improved - Chest PT, consider pulm eval.   low concern for COPD exacerbation although on chronic steroids - no worsening hypoxemia, or wheezing / retention - cw home nebs, O2  elevated Pro-bnp but clinically appears euvolemic, will hold off on further escalation of diuresis - cw home lasix likely multifactorial in setting of lung ca, underlying chronic COPD, and possible PNA  with cough and phlegm and recent hospitalization agree with HCAP coverage - does not meet sepsis criteria   mild leukocytosis and borderline rectal temp, f/u bcx  agree with CT chest to evaluate lung process better - possible mucus plugging as after expectoration pt feels much improved - Chest PT, consider pulm eval.   low concern for COPD exacerbation although on chronic steroids - no worsening hypoxemia, or wheezing / retention - cw home nebs, O2  elevated Pro-bnp but clinically appears euvolemic, will hold off on further escalation of diuresis - cw home lasix likely multifactorial in setting of lung ca, underlying chronic COPD, and possible PNA  with cough and phlegm and recent hospitalization agree with HCAP coverage - does not meet sepsis criteria   mild leukocytosis and borderline rectal temp, f/u bcx  agree with CT chest to evaluate lung process better - possible mucus plugging as after expectoration pt feels much improved - Chest PT, consider pulm eval.   low concern for COPD exacerbation although on chronic steroids - no worsening hypoxemia, or wheezing / retention - cw home nebs, O2  elevated Pro-bnp but clinically appears euvolemic, will hold off on further escalation of diuresis - cw home lasix  at risk for PE, whoever, multiple reasons for SOB at this time and poor AC candidate in setting of brain mass with bleed, will therefore not pursue PE diagnosis as this will not , likely multifactorial in setting of lung ca, underlying chronic COPD, and possible PNA  with cough and phlegm and recent hospitalization agree with HCAP coverage - does not meet sepsis criteria   mild leukocytosis and borderline rectal temp, f/u bcx  agree with CT chest to evaluate lung process better - possible mucus plugging as after expectoration pt feels much improved - Chest PT, consider pulm eval.   low concern for COPD exacerbation although on chronic steroids - no worsening hypoxemia, or wheezing / retention - cw home nebs, O2  elevated Pro-bnp but clinically appears euvolemic, will hold off on further escalation of diuresis - cw home lasix  at risk for PE, whoever, multiple reasons for SOB at this time and poor AC candidate in setting of brain mass with bleed, will therefore not pursue PE diagnosis as this will not   f/u RVP

## 2018-09-21 NOTE — CONSULT NOTE ADULT - PROBLEM SELECTOR RECOMMENDATION 9
Patient is s/p wedge resection, RT.  Evidence of adrenal nodule, as well as Right apical mass.   From previous admission, patient also with the possibility of cerebellar hemorrhagic met as evident on CT Head.  Patient previously followed at [facility].   Current ECOG is 3-4.   Unlikely to be a candidate for disease modifying interventions.

## 2018-09-21 NOTE — ED PROVIDER NOTE - OBJECTIVE STATEMENT
86f w hx lung ca s/p VATS, wedge resection, RT, COPD, PMR on prednisone 30 daily chronically, DM, HTN, recent hospitalization 9/4-9/15 for r shoulder pain at which point was found to have supraspinatus tear as well as r chest wall shingles s/p complete course valtrex sent home on O2, here today for SOB. Pt states started to have cough 1 day after home from recent hospitalization. Was started on outpt course of levaquin w little improvement, and today sx have worsened to point of 86f w hx lung ca s/p VATS, wedge resection, RT, COPD, PMR on prednisone 30 daily chronically, DM, HTN, recent hospitalization 9/4-9/15 for r shoulder pain at which point was found to have supraspinatus tear as well as r chest wall shingles s/p complete course valtrex sent home on O2, here today for SOB. Pt states started to have cough 1 day after home from recent hospitalization. Was started on outpt course of levaquin w little improvement, and today sx have worsened to point of feeling SOB. Denies fever at home. No cp at any point.

## 2018-09-21 NOTE — H&P ADULT - PROBLEM SELECTOR PLAN 4
per family on 30 mg of chronic prednisone but was discharged on 20 mg last admission. will cw 20 mg for now

## 2018-09-21 NOTE — CONSULT NOTE ADULT - PROBLEM SELECTOR RECOMMENDATION 2
Patient found to have Rhinovirus, likely exacerbating underlying advanced COPD as well as lung cancer.   Currently on prophylactic broad spectrum antibiotics.  Receiving chest PT.

## 2018-09-21 NOTE — CONSULT NOTE ADULT - PROBLEM SELECTOR RECOMMENDATION 3
Currently receiving adjuvant pain medications, in terms of Tylenol, Lidoderm, Neurontin, Ultram.  During previous admission, patient was also on low dose Dilaudid prn.  If needed, can start Dilaudid 0.5mg IV Q3 hours prn.

## 2018-09-22 LAB
GLUCOSE BLDC GLUCOMTR-MCNC: 131 MG/DL — HIGH (ref 70–99)
GLUCOSE BLDC GLUCOMTR-MCNC: 246 MG/DL — HIGH (ref 70–99)
GLUCOSE BLDC GLUCOMTR-MCNC: 262 MG/DL — HIGH (ref 70–99)
GLUCOSE BLDC GLUCOMTR-MCNC: 360 MG/DL — HIGH (ref 70–99)
GLUCOSE BLDC GLUCOMTR-MCNC: 56 MG/DL — LOW (ref 70–99)
SPECIMEN SOURCE: SIGNIFICANT CHANGE UP
SPECIMEN SOURCE: SIGNIFICANT CHANGE UP

## 2018-09-22 RX ORDER — HYDROCORTISONE 20 MG
30 TABLET ORAL THREE TIMES A DAY
Qty: 0 | Refills: 0 | Status: DISCONTINUED | OUTPATIENT
Start: 2018-09-22 | End: 2018-09-22

## 2018-09-22 RX ORDER — HYDROCORTISONE 20 MG
30 TABLET ORAL THREE TIMES A DAY
Qty: 0 | Refills: 0 | Status: COMPLETED | OUTPATIENT
Start: 2018-09-22 | End: 2018-09-23

## 2018-09-22 RX ORDER — TRAMADOL HYDROCHLORIDE 50 MG/1
25 TABLET ORAL
Qty: 0 | Refills: 0 | Status: DISCONTINUED | OUTPATIENT
Start: 2018-09-22 | End: 2018-09-25

## 2018-09-22 RX ORDER — TRAMADOL HYDROCHLORIDE 50 MG/1
25 TABLET ORAL THREE TIMES A DAY
Qty: 0 | Refills: 0 | Status: DISCONTINUED | OUTPATIENT
Start: 2018-09-22 | End: 2018-09-22

## 2018-09-22 RX ORDER — DEXTROSE 50 % IN WATER 50 %
15 SYRINGE (ML) INTRAVENOUS ONCE
Qty: 0 | Refills: 0 | Status: DISCONTINUED | OUTPATIENT
Start: 2018-09-22 | End: 2018-09-30

## 2018-09-22 RX ORDER — IPRATROPIUM/ALBUTEROL SULFATE 18-103MCG
3 AEROSOL WITH ADAPTER (GRAM) INHALATION ONCE
Qty: 0 | Refills: 0 | Status: COMPLETED | OUTPATIENT
Start: 2018-09-22 | End: 2018-09-22

## 2018-09-22 RX ORDER — IPRATROPIUM/ALBUTEROL SULFATE 18-103MCG
3 AEROSOL WITH ADAPTER (GRAM) INHALATION EVERY 8 HOURS
Qty: 0 | Refills: 0 | Status: DISCONTINUED | OUTPATIENT
Start: 2018-09-22 | End: 2018-09-23

## 2018-09-22 RX ORDER — DEXTROSE 50 % IN WATER 50 %
25 SYRINGE (ML) INTRAVENOUS ONCE
Qty: 0 | Refills: 0 | Status: COMPLETED | OUTPATIENT
Start: 2018-09-22 | End: 2018-09-22

## 2018-09-22 RX ORDER — LIDOCAINE 4 G/100G
3 CREAM TOPICAL DAILY
Qty: 0 | Refills: 0 | Status: DISCONTINUED | OUTPATIENT
Start: 2018-09-22 | End: 2018-09-29

## 2018-09-22 RX ORDER — ACETAMINOPHEN 500 MG
975 TABLET ORAL
Qty: 0 | Refills: 0 | Status: DISCONTINUED | OUTPATIENT
Start: 2018-09-22 | End: 2018-09-24

## 2018-09-22 RX ORDER — ACETAMINOPHEN 500 MG
975 TABLET ORAL EVERY 8 HOURS
Qty: 0 | Refills: 0 | Status: DISCONTINUED | OUTPATIENT
Start: 2018-09-22 | End: 2018-09-22

## 2018-09-22 RX ADMIN — GABAPENTIN 400 MILLIGRAM(S): 400 CAPSULE ORAL at 15:09

## 2018-09-22 RX ADMIN — Medication 250 MILLIGRAM(S): at 17:41

## 2018-09-22 RX ADMIN — Medication 650 MILLIGRAM(S): at 07:00

## 2018-09-22 RX ADMIN — Medication 975 MILLIGRAM(S): at 11:47

## 2018-09-22 RX ADMIN — Medication 50 MILLIGRAM(S): at 06:12

## 2018-09-22 RX ADMIN — Medication 30 MILLIGRAM(S): at 06:13

## 2018-09-22 RX ADMIN — BUDESONIDE AND FORMOTEROL FUMARATE DIHYDRATE 2 PUFF(S): 160; 4.5 AEROSOL RESPIRATORY (INHALATION) at 22:04

## 2018-09-22 RX ADMIN — Medication 6 UNIT(S): at 17:41

## 2018-09-22 RX ADMIN — Medication 10 MILLIGRAM(S): at 06:13

## 2018-09-22 RX ADMIN — Medication 975 MILLIGRAM(S): at 12:42

## 2018-09-22 RX ADMIN — Medication 30 MILLIGRAM(S): at 17:30

## 2018-09-22 RX ADMIN — SODIUM CHLORIDE 3 MILLILITER(S): 9 INJECTION INTRAMUSCULAR; INTRAVENOUS; SUBCUTANEOUS at 22:16

## 2018-09-22 RX ADMIN — BUDESONIDE AND FORMOTEROL FUMARATE DIHYDRATE 2 PUFF(S): 160; 4.5 AEROSOL RESPIRATORY (INHALATION) at 08:41

## 2018-09-22 RX ADMIN — Medication 5: at 17:41

## 2018-09-22 RX ADMIN — Medication 50 MILLIGRAM(S): at 17:30

## 2018-09-22 RX ADMIN — PIPERACILLIN AND TAZOBACTAM 25 GRAM(S): 4; .5 INJECTION, POWDER, LYOPHILIZED, FOR SOLUTION INTRAVENOUS at 22:05

## 2018-09-22 RX ADMIN — Medication 25 GRAM(S): at 08:40

## 2018-09-22 RX ADMIN — Medication 100 MILLIGRAM(S): at 11:46

## 2018-09-22 RX ADMIN — Medication 3 MILLILITER(S): at 10:45

## 2018-09-22 RX ADMIN — TRAMADOL HYDROCHLORIDE 25 MILLIGRAM(S): 50 TABLET ORAL at 22:02

## 2018-09-22 RX ADMIN — FAMOTIDINE 20 MILLIGRAM(S): 10 INJECTION INTRAVENOUS at 11:46

## 2018-09-22 RX ADMIN — Medication 3 MILLILITER(S): at 23:36

## 2018-09-22 RX ADMIN — Medication 3: at 12:31

## 2018-09-22 RX ADMIN — INSULIN GLARGINE 16 UNIT(S): 100 INJECTION, SOLUTION SUBCUTANEOUS at 22:05

## 2018-09-22 RX ADMIN — Medication 6 UNIT(S): at 12:31

## 2018-09-22 RX ADMIN — Medication 3 MILLILITER(S): at 16:23

## 2018-09-22 RX ADMIN — Medication 650 MILLIGRAM(S): at 06:35

## 2018-09-22 RX ADMIN — Medication 30 MILLIGRAM(S): at 22:04

## 2018-09-22 RX ADMIN — SODIUM CHLORIDE 3 MILLILITER(S): 9 INJECTION INTRAMUSCULAR; INTRAVENOUS; SUBCUTANEOUS at 06:15

## 2018-09-22 RX ADMIN — GABAPENTIN 400 MILLIGRAM(S): 400 CAPSULE ORAL at 06:12

## 2018-09-22 RX ADMIN — PIPERACILLIN AND TAZOBACTAM 25 GRAM(S): 4; .5 INJECTION, POWDER, LYOPHILIZED, FOR SOLUTION INTRAVENOUS at 06:14

## 2018-09-22 RX ADMIN — LIDOCAINE 3 PATCH: 4 CREAM TOPICAL at 13:46

## 2018-09-22 RX ADMIN — Medication 60 MILLIGRAM(S): at 22:02

## 2018-09-22 RX ADMIN — TRAMADOL HYDROCHLORIDE 25 MILLIGRAM(S): 50 TABLET ORAL at 23:00

## 2018-09-22 RX ADMIN — SODIUM CHLORIDE 1 GRAM(S): 9 INJECTION INTRAMUSCULAR; INTRAVENOUS; SUBCUTANEOUS at 11:46

## 2018-09-22 RX ADMIN — TRAMADOL HYDROCHLORIDE 25 MILLIGRAM(S): 50 TABLET ORAL at 12:31

## 2018-09-22 RX ADMIN — TRAMADOL HYDROCHLORIDE 25 MILLIGRAM(S): 50 TABLET ORAL at 13:25

## 2018-09-22 RX ADMIN — Medication 145 MILLIGRAM(S): at 11:46

## 2018-09-22 RX ADMIN — PIPERACILLIN AND TAZOBACTAM 25 GRAM(S): 4; .5 INJECTION, POWDER, LYOPHILIZED, FOR SOLUTION INTRAVENOUS at 13:45

## 2018-09-22 RX ADMIN — SODIUM CHLORIDE 3 MILLILITER(S): 9 INJECTION INTRAMUSCULAR; INTRAVENOUS; SUBCUTANEOUS at 11:39

## 2018-09-22 NOTE — PROGRESS NOTE ADULT - SUBJECTIVE AND OBJECTIVE BOX
_________________________________________________________________________________________  ========>>  M E D I C A L   A T T E N D I N G    F O L L O W  U P  N O T E  <<=========  -----------------------------------------------------------------------------------------------------    - Patient seen and examined by me approximately thirty minutes ago.  - In summary,  CLIVE HENRY is a 86y year old woman who originally presented with SOB  - Patient today overall doing ok, comfortable, eating OK. still witih some pain in Rt shoulder area     ==================>> REVIEW OF SYSTEM <<=================    GEN: no fever, no chills, pain as above   RESP: SOB improved, occasional cough, no significant sputum  CVS: no chest pain, no palpitations, no edema  GI: no abdominal pain, no nausea, no constipation, no diarrhea  : no dysuria, no frequency, no hematuria  Neuro: no headache, no dizziness  Derm : no itching, no rash    ==================>> PHYSICAL EXAM <<=================    GEN: A&O X 3 , NAD , comfortable  HEENT: NCAT, PERRL, MMM, hearing intact  Neck: supple , no JVD  CVS: S1S2 , regular , No M/R/G appreciated ( limited)  PULM: + scattered B/L rhonchi   ABD.: soft. non tender, non distended,  bowel sounds present  Extrem: intact pulses , no edema      shingles lesions of right chest wall crusted over and sloughing off   PSYCH : normal mood,  not anxious      ==================>> MEDICATIONS <<====================    MEDICATIONS  (STANDING):  ALBUTerol/ipratropium for Nebulization. 3 milliLiter(s) Nebulizer once  buDESOnide 160 MICROgram(s)/formoterol 4.5 MICROgram(s) Inhaler 2 Puff(s) Inhalation two times a day  dextrose 5%. 1000 milliLiter(s) (50 mL/Hr) IV Continuous <Continuous>  dextrose 50% Injectable 12.5 Gram(s) IV Push once  dextrose 50% Injectable 25 Gram(s) IV Push once  dextrose 50% Injectable 25 Gram(s) IV Push once  docusate sodium 100 milliGRAM(s) Oral three times a day  famotidine    Tablet 20 milliGRAM(s) Oral daily  fenofibrate Tablet 145 milliGRAM(s) Oral daily  gabapentin 100 milliGRAM(s) Oral two times a day  gabapentin 400 milliGRAM(s) Oral <User Schedule>  influenza   Vaccine 0.5 milliLiter(s) IntraMuscular once  insulin glargine Injectable (LANTUS) 16 Unit(s) SubCutaneous at bedtime  insulin lispro (HumaLOG) corrective regimen sliding scale   SubCutaneous three times a day before meals  insulin lispro Injectable (HumaLOG) 6 Unit(s) SubCutaneous three times a day before meals  metoprolol tartrate 50 milliGRAM(s) Oral two times a day  NIFEdipine XL 30 milliGRAM(s) Oral daily  NIFEdipine XL 60 milliGRAM(s) Oral at bedtime  piperacillin/tazobactam IVPB. 3.375 Gram(s) IV Intermittent every 8 hours  predniSONE   Tablet 10 milliGRAM(s) Oral two times a day  senna 2 Tablet(s) Oral at bedtime  sodium chloride 1 Gram(s) Oral daily  sodium chloride 0.9% lock flush 3 milliLiter(s) IV Push every 8 hours  traMADol 25 milliGRAM(s) Oral at bedtime  vancomycin  IVPB 1000 milliGRAM(s) IV Intermittent every 24 hours    MEDICATIONS  (PRN):  acetaminophen   Tablet .. 650 milliGRAM(s) Oral every 6 hours PRN Temp greater or equal to 38C (100.4F), Moderate Pain (4 - 6)  acetaminophen   Tablet .. 650 milliGRAM(s) Oral every 6 hours PRN Moderate Pain (4 - 6), Severe Pain (7 - 10)  dextrose 40% Gel 15 Gram(s) Oral once PRN Blood Glucose LESS THAN 70 milliGRAM(s)/deciliter  dextrose 40% Gel 15 Gram(s) Oral once PRN Blood Glucose LESS THAN 70 milliGRAM(s)/deciliter  glucagon  Injectable 1 milliGRAM(s) IntraMuscular once PRN Glucose LESS THAN 70 milligrams/deciliter  polyethylene glycol 3350 17 Gram(s) Oral daily PRN Constipation    ==================>> VITAL SIGNS <<==================    T(C): 36.7 (09-22-18 @ 06:09), Max: 37.3 (09-21-18 @ 13:44)  HR: 71 (09-22-18 @ 06:09) (71 - 97)  BP: 151/72 (09-22-18 @ 06:09) (124/64 - 159/74)  RR: 18 (09-22-18 @ 06:09) (14 - 20)  SpO2: 99% (09-22-18 @ 06:09) (98% - 100%)    POCT Blood Glucose.: 246 mg/dL (22 Sep 2018 09:01)  POCT Blood Glucose.: 56 mg/dL (22 Sep 2018 08:26)  POCT Blood Glucose.: 89 mg/dL (21 Sep 2018 23:16)  POCT Blood Glucose.: 264 mg/dL (21 Sep 2018 18:53)     ==================>> LAB AND IMAGING <<==================                        9.4    11.60 )-----------( 359      ( 21 Sep 2018 14:20 )             30.3     132<L>  |  91<L>  |  24<H>  ----------------------------<  146<H>  4.2   |  31  |  0.78    Ca    9.0      21 Sep 2018 14:00    TPro  7.2  /  Alb  3.0<L>  /  TBili  0.4  /  DBili  x   /  AST  19  /  ALT  20  /  AlkPhos  85  09-21    PT/INR - ( 21 Sep 2018 14:20 )   PT: 13.0 SEC;   INR: 1.17     PTT - ( 21 Sep 2018 14:20 )  PTT:26.2 SEC              HgA1C: 9.8  (09-05-18)            < from: CT Chest No Cont (09.21.18 @ 20:48) > ( prelim reading as pneumonia)   IMPRESSION:   New subtotal atelectasis of the right lower lobe with volume loss.  Essentially unchanged or slightly increased in size right apical mass.  Left upper lobe consolidation with calcifications unchanged.  1.5 cm left breast nodule unchanged.  Right adrenal nodule unchanged.  < end of copied text >    ___________________________________________________________________________________  ===============>>  A S S E S S M E N T   A N D   P L A N <<===============  ------------------------------------------------------------------------------------------    · Assessment		  86 F with above hx with worsening SOB depsite oral abx as out-pt. possible HCAP vs mucus plugging vs progression of dz    Problem/Plan - 1:  ·  Problem: Shortness of breath.  Plan: likely multifactorial in setting of lung ca, underlying chronic COPD, atelectasis, viral URI, and possible PNA / HCAP  Continue current HCAP coverage ( prelim CT read different than final read)       Pulm consult being called to evaluate further need for abx  incentive spirometer  Nebs    Problem/Plan - 2:  ·  Problem: Diabetes  will continue basal + bolus at 15 and 6 units for now and titrate as appropriate.     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  should not use NSAIDS given brain met   discussed with Dtr and adjusted pain regimen for better pain control    Problem/Plan - 5:  ·  Problem: Essential hypertension.    cw home meds.   cardio appreciated     Problem/Plan - 6:  Problem: SIADH , in setting of malignancy  on salt tabs, NA stable.  renal f/u requested     Problem/Plan - 7:  ·  Problem: Prophylactic measure.    pharmacologic ppx in setting of recent hemorrhagic brain met  Venodyne for now    --------------------------------------------  Case discussed with pt, Dtr, Rn, NP, renal..   Education given on findings and plan of care  ___________________________  H. GEN Ferrell.  Pager: [phone]

## 2018-09-22 NOTE — CONSULT NOTE ADULT - SUBJECTIVE AND OBJECTIVE BOX
CHIEF COMPLAINT: SOB    HISTORY OF PRESENT ILLNESS:  86 f w hx lung ca s/p VATS, wedge resection, RT, no chemo, COPD on home O2, PMR on prednisone 30 daily chronically, DM2, HTN, recent hospitalization 9/4-9/15 for R shoulder pain at which point was found to have supraspinatus tear as well as R chest wall shingles s/p complete course valtrex sent home on O2, here today for SOB. per family and pt she has been coughing and she saw her PMD Dr Maxwell who started her on Levaquin with she completed 4-5 day course wo much improvement. per family, pt coughed up a copious amount of phlegm and now feels better. she denies much SOB now and is just tired, wanting to sleep/ denies fevers or chills. she also received an extra dose of Lasix today by PMD in addition to her home dose.   in ED she is not hypoxic, but was noted with increased work of breathing. afebrile and hemodynamically stable, received HCP coverage and ordered fro CT chest. on my arrival she appears in NAD, sleeping.       Allergies  ACE inhibitors (Angioedema)    MEDICATIONS:  metoprolol tartrate 50 milliGRAM(s) Oral two times a day  NIFEdipine XL 30 milliGRAM(s) Oral daily  NIFEdipine XL 60 milliGRAM(s) Oral at bedtime  pieracillin/tazobactam IVPB. 3.375 Gram(s) IV Intermittent every 8 hours  vancomycin  IVPB 1000 milliGRAM(s) IV Intermittent every 24 hours  ALBUTerol/ipratropium for Nebulization. 3 milliLiter(s) Nebulizer once  buDESOnide 160 MICROgram(s)/formoterol 4.5 MICROgram(s) Inhaler 2 Puff(s) Inhalation two times a day  acetaminophen   Tablet .. 650 milliGRAM(s) Oral every 6 hours PRN  acetaminophen   Tablet .. 650 milliGRAM(s) Oral every 6 hours PRN  gabapentin 100 milliGRAM(s) Oral two times a day  gabapentin 400 milliGRAM(s) Oral <User Schedule>  traMADol 25 milliGRAM(s) Oral at bedtime  docusate sodium 100 milliGRAM(s) Oral three times a day  famotidine    Tablet 20 milliGRAM(s) Oral daily  polyethylene glycol 3350 17 Gram(s) Oral daily PRN  senna 2 Tablet(s) Oral at bedtime  dextrose 40% Gel 15 Gram(s) Oral once PRN  dextrose 40% Gel 15 Gram(s) Oral once PRN  dextrose 50% Injectable 12.5 Gram(s) IV Push once  dextrose 50% Injectable 25 Gram(s) IV Push once  dextrose 50% Injectable 25 Gram(s) IV Push once  fenofibrate Tablet 145 milliGRAM(s) Oral daily  glucagon  Injectable 1 milliGRAM(s) IntraMuscular once PRN  insulin glargine Injectable (LANTUS) 16 Unit(s) SubCutaneous at bedtime  insulin lispro (HumaLOG) corrective regimen sliding scale   SubCutaneous three times a day before meals  insulin lispro Injectable (HumaLOG) 6 Unit(s) SubCutaneous three times a day before meals  predniSONE   Tablet 10 milliGRAM(s) Oral two times a day    extrose 5%. 1000 milliLiter(s) IV Continuous <Continuous>  influenza   Vaccine 0.5 milliLiter(s) IntraMuscular once  sodium chloride 1 Gram(s) Oral daily  sodium chloride 0.9% lock flush 3 milliLiter(s) IV Push every 8 hours      PAST MEDICAL & SURGICAL HISTORY:  Dyslipidemia  Hypertension  Diabetes  Lung cancer  Lung cancer: diagnosed 2010  S/P cholecystectomy  H/O carotid endarterectomy: left      FAMILY HISTORY:  No pertinent family history in first degree relatives      SOCIAL HISTORY:    former smoker. lives with daughters. has 24/7 care    REVIEW OF SYSTEMS:  See HPI, otherwise complete 10 point review of systems negative    [ ] All others negative	      PHYSICAL EXAM:  T(C): 36.7 (09-22-18 @ 06:09), Max: 37.3 (09-21-18 @ 13:44)  HR: 71 (09-22-18 @ 06:09) (71 - 97)  BP: 151/72 (09-22-18 @ 06:09) (124/64 - 159/74)  RR: 18 (09-22-18 @ 06:09) (14 - 20)  SpO2: 99% (09-22-18 @ 06:09) (98% - 100%)  Wt(kg): --  I&O's Summary      Appearance: No Acute Distress	  HEENT:  Normal oral mucosa, PERRL, EOMI	  Cardiovascular: Normal S1 S2, No JVD, No murmurs/rubs/gallops  Respiratory: Lungs clear to auscultation bilaterally  Gastrointestinal:  Soft, Non-tender, + BS	  Skin: No rashes, No ecchymoses, No cyanosis	  Neurologic: Non-focal  Extremities: No clubbing, cyanosis or edema  Vascular: Peripheral pulses palpable 2+ bilaterally  Psychiatry: A & O x 3, Mood & affect appropriate    Laboratory Data:	 	    CBC Full  -  ( 21 Sep 2018 14:20 )  WBC Count : 11.60 K/uL  Hemoglobin : 9.4 g/dL  Hematocrit : 30.3 %  Platelet Count - Automated : 359 K/uL  Mean Cell Volume : 83.2 fL  Mean Cell Hemoglobin : 25.8 pg  Mean Cell Hemoglobin Concentration : 31.0 %  Auto Neutrophil # : 10.54 K/uL  Auto Lymphocyte # : 0.53 K/uL  Auto Monocyte # : 0.43 K/uL  Auto Eosinophil # : 0.00 K/uL  Auto Basophil # : 0.01 K/uL  Auto Neutrophil % : 90.8 %  Auto Lymphocyte % : 4.6 %  Auto Monocyte % : 3.7 %  Auto Eosinophil % : 0.0 %  Auto Basophil % : 0.1 %    09-21    132<L>  |  91<L>  |  24<H>  ----------------------------<  146<H>  4.2   |  31  |  0.78    Ca    9.0      21 Sep 2018 14:00    TPro  7.2  /  Alb  3.0<L>  /  TBili  0.4  /  DBili  x   /  AST  19  /  ALT  20  /  AlkPhos  85  09-21      proBNP: Serum Pro-Brain Natriuretic Peptide: 7471 pg/mL (09-21 @ 14:00)      ECG:  	NSR        Assessment:  86 F with PMH of lung adenoca (dx around 2010, s/p L VATS, XRT of TERESA/RUL last in July 2016), COPD with emphysema, htn, and dm2, recently admitted with shoulder pain secondary to zoster and incidental e hemorrhagic brain met now presents with acute hypoxic respiratory failure, found to be RVP and significant right lung collapse/atelectasis    Plan:  1) hypoxic respiratory failure   -c/w abx for hospital acquired PNA  -chest PA  -bronchodilators  -pulm consult  -maintain euvolemia  -f/u cultures and infectious workup    2) HTN, APC's  -no evidence of atrial fibrillation. cont to hold AC  -c/w metoprolol as tolerates  -c/w nifdepine as dosed  -cont to hold hctz 2/2 hyponatremia          Greater than 60 minutes spent on total encounter; more than 50% of the visit was spent counseling and/or coordinating care by the attending physician.   	  Slick York MD   Cardiovascular Diseases  [phone]

## 2018-09-22 NOTE — CHART NOTE - NSCHARTNOTEFT_GEN_A_CORE
Pt was seen and examined.  Briefly this is a elderly female c hx lung cancer HTN DM pw + rhinovirus  SOB and issues with the clearance of her secretions  Hyponatremia    Suggest  -IV steroids q8 hours for 3 doses.  PO prednisone to resume on Monday  -Cont salt tabs  -Defer to pulm re IV abx  -Pulm toilet         Sayed Harika  [facility]  [phone]

## 2018-09-23 LAB
BASE EXCESS BLDA CALC-SCNC: 4.2 MMOL/L — SIGNIFICANT CHANGE UP
BUN SERPL-MCNC: 18 MG/DL — SIGNIFICANT CHANGE UP (ref 7–23)
CALCIUM SERPL-MCNC: 8.7 MG/DL — SIGNIFICANT CHANGE UP (ref 8.4–10.5)
CHLORIDE SERPL-SCNC: 93 MMOL/L — LOW (ref 98–107)
CO2 SERPL-SCNC: 28 MMOL/L — SIGNIFICANT CHANGE UP (ref 22–31)
CREAT SERPL-MCNC: 0.76 MG/DL — SIGNIFICANT CHANGE UP (ref 0.5–1.3)
GLUCOSE BLDC GLUCOMTR-MCNC: 112 MG/DL — HIGH (ref 70–99)
GLUCOSE BLDC GLUCOMTR-MCNC: 118 MG/DL — HIGH (ref 70–99)
GLUCOSE BLDC GLUCOMTR-MCNC: 132 MG/DL — HIGH (ref 70–99)
GLUCOSE BLDC GLUCOMTR-MCNC: 137 MG/DL — HIGH (ref 70–99)
GLUCOSE BLDC GLUCOMTR-MCNC: 180 MG/DL — HIGH (ref 70–99)
GLUCOSE BLDC GLUCOMTR-MCNC: 183 MG/DL — HIGH (ref 70–99)
GLUCOSE SERPL-MCNC: 102 MG/DL — HIGH (ref 70–99)
HCO3 BLDA-SCNC: 28 MMOL/L — HIGH (ref 22–26)
HCT VFR BLD CALC: 26 % — LOW (ref 34.5–45)
HGB BLD-MCNC: 8.1 G/DL — LOW (ref 11.5–15.5)
MCHC RBC-ENTMCNC: 25.8 PG — LOW (ref 27–34)
MCHC RBC-ENTMCNC: 31.2 % — LOW (ref 32–36)
MCV RBC AUTO: 82.8 FL — SIGNIFICANT CHANGE UP (ref 80–100)
NRBC # FLD: 0 — SIGNIFICANT CHANGE UP
PCO2 BLDA: 41 MMHG — SIGNIFICANT CHANGE UP (ref 32–48)
PH BLDA: 7.45 PH — SIGNIFICANT CHANGE UP (ref 7.35–7.45)
PLATELET # BLD AUTO: 354 K/UL — SIGNIFICANT CHANGE UP (ref 150–400)
PMV BLD: 9.1 FL — SIGNIFICANT CHANGE UP (ref 7–13)
PO2 BLDA: 168 MMHG — HIGH (ref 83–108)
POTASSIUM SERPL-MCNC: 4.1 MMOL/L — SIGNIFICANT CHANGE UP (ref 3.5–5.3)
POTASSIUM SERPL-SCNC: 4.1 MMOL/L — SIGNIFICANT CHANGE UP (ref 3.5–5.3)
RBC # BLD: 3.14 M/UL — LOW (ref 3.8–5.2)
RBC # FLD: 16.4 % — HIGH (ref 10.3–14.5)
SAO2 % BLDA: 98.9 % — SIGNIFICANT CHANGE UP (ref 95–99)
SODIUM SERPL-SCNC: 133 MMOL/L — LOW (ref 135–145)
WBC # BLD: 12.1 K/UL — HIGH (ref 3.8–10.5)
WBC # FLD AUTO: 12.1 K/UL — HIGH (ref 3.8–10.5)

## 2018-09-23 PROCEDURE — 99223 1ST HOSP IP/OBS HIGH 75: CPT | Mod: GC

## 2018-09-23 PROCEDURE — 71045 X-RAY EXAM CHEST 1 VIEW: CPT | Mod: 26

## 2018-09-23 RX ORDER — IPRATROPIUM/ALBUTEROL SULFATE 18-103MCG
3 AEROSOL WITH ADAPTER (GRAM) INHALATION EVERY 6 HOURS
Qty: 0 | Refills: 0 | Status: COMPLETED | OUTPATIENT
Start: 2018-09-23 | End: 2018-09-24

## 2018-09-23 RX ADMIN — Medication 100 MILLIGRAM(S): at 13:04

## 2018-09-23 RX ADMIN — Medication 250 MILLIGRAM(S): at 17:33

## 2018-09-23 RX ADMIN — Medication 975 MILLIGRAM(S): at 18:30

## 2018-09-23 RX ADMIN — Medication 3 MILLILITER(S): at 21:16

## 2018-09-23 RX ADMIN — PIPERACILLIN AND TAZOBACTAM 25 GRAM(S): 4; .5 INJECTION, POWDER, LYOPHILIZED, FOR SOLUTION INTRAVENOUS at 05:37

## 2018-09-23 RX ADMIN — SODIUM CHLORIDE 1 GRAM(S): 9 INJECTION INTRAMUSCULAR; INTRAVENOUS; SUBCUTANEOUS at 12:55

## 2018-09-23 RX ADMIN — TRAMADOL HYDROCHLORIDE 25 MILLIGRAM(S): 50 TABLET ORAL at 21:09

## 2018-09-23 RX ADMIN — TRAMADOL HYDROCHLORIDE 25 MILLIGRAM(S): 50 TABLET ORAL at 13:01

## 2018-09-23 RX ADMIN — Medication 975 MILLIGRAM(S): at 01:13

## 2018-09-23 RX ADMIN — Medication 6 UNIT(S): at 17:34

## 2018-09-23 RX ADMIN — Medication 50 MILLIGRAM(S): at 17:34

## 2018-09-23 RX ADMIN — Medication 0.5 MILLIGRAM(S): at 23:08

## 2018-09-23 RX ADMIN — SODIUM CHLORIDE 3 MILLILITER(S): 9 INJECTION INTRAMUSCULAR; INTRAVENOUS; SUBCUTANEOUS at 13:02

## 2018-09-23 RX ADMIN — Medication 975 MILLIGRAM(S): at 02:10

## 2018-09-23 RX ADMIN — GABAPENTIN 400 MILLIGRAM(S): 400 CAPSULE ORAL at 17:32

## 2018-09-23 RX ADMIN — SODIUM CHLORIDE 3 MILLILITER(S): 9 INJECTION INTRAMUSCULAR; INTRAVENOUS; SUBCUTANEOUS at 05:44

## 2018-09-23 RX ADMIN — Medication 100 MILLIGRAM(S): at 21:12

## 2018-09-23 RX ADMIN — Medication 975 MILLIGRAM(S): at 10:00

## 2018-09-23 RX ADMIN — Medication 1: at 17:34

## 2018-09-23 RX ADMIN — Medication 60 MILLIGRAM(S): at 21:12

## 2018-09-23 RX ADMIN — TRAMADOL HYDROCHLORIDE 25 MILLIGRAM(S): 50 TABLET ORAL at 05:32

## 2018-09-23 RX ADMIN — Medication 50 MILLIGRAM(S): at 05:36

## 2018-09-23 RX ADMIN — LIDOCAINE 3 PATCH: 4 CREAM TOPICAL at 01:49

## 2018-09-23 RX ADMIN — PIPERACILLIN AND TAZOBACTAM 25 GRAM(S): 4; .5 INJECTION, POWDER, LYOPHILIZED, FOR SOLUTION INTRAVENOUS at 21:15

## 2018-09-23 RX ADMIN — Medication 975 MILLIGRAM(S): at 17:32

## 2018-09-23 RX ADMIN — Medication 6 UNIT(S): at 08:56

## 2018-09-23 RX ADMIN — LIDOCAINE 3 PATCH: 4 CREAM TOPICAL at 12:54

## 2018-09-23 RX ADMIN — BUDESONIDE AND FORMOTEROL FUMARATE DIHYDRATE 2 PUFF(S): 160; 4.5 AEROSOL RESPIRATORY (INHALATION) at 08:12

## 2018-09-23 RX ADMIN — Medication 30 MILLIGRAM(S): at 05:36

## 2018-09-23 RX ADMIN — Medication 3 MILLILITER(S): at 12:01

## 2018-09-23 RX ADMIN — SENNA PLUS 2 TABLET(S): 8.6 TABLET ORAL at 21:13

## 2018-09-23 RX ADMIN — Medication 1: at 08:56

## 2018-09-23 RX ADMIN — Medication 3 MILLILITER(S): at 17:33

## 2018-09-23 RX ADMIN — PIPERACILLIN AND TAZOBACTAM 25 GRAM(S): 4; .5 INJECTION, POWDER, LYOPHILIZED, FOR SOLUTION INTRAVENOUS at 13:03

## 2018-09-23 RX ADMIN — Medication 145 MILLIGRAM(S): at 12:55

## 2018-09-23 RX ADMIN — Medication 975 MILLIGRAM(S): at 09:03

## 2018-09-23 RX ADMIN — TRAMADOL HYDROCHLORIDE 25 MILLIGRAM(S): 50 TABLET ORAL at 06:30

## 2018-09-23 RX ADMIN — Medication 40 MILLIGRAM(S): at 22:42

## 2018-09-23 RX ADMIN — TRAMADOL HYDROCHLORIDE 25 MILLIGRAM(S): 50 TABLET ORAL at 22:00

## 2018-09-23 RX ADMIN — GABAPENTIN 400 MILLIGRAM(S): 400 CAPSULE ORAL at 05:36

## 2018-09-23 RX ADMIN — TRAMADOL HYDROCHLORIDE 25 MILLIGRAM(S): 50 TABLET ORAL at 14:00

## 2018-09-23 RX ADMIN — SODIUM CHLORIDE 3 MILLILITER(S): 9 INJECTION INTRAMUSCULAR; INTRAVENOUS; SUBCUTANEOUS at 22:42

## 2018-09-23 RX ADMIN — Medication 6 UNIT(S): at 12:53

## 2018-09-23 RX ADMIN — INSULIN GLARGINE 16 UNIT(S): 100 INJECTION, SOLUTION SUBCUTANEOUS at 23:24

## 2018-09-23 RX ADMIN — FAMOTIDINE 20 MILLIGRAM(S): 10 INJECTION INTRAVENOUS at 12:55

## 2018-09-23 NOTE — PROGRESS NOTE ADULT - ASSESSMENT
Pt is a 85 yo elderly female c hx lung cancer HTN DM pw + rhinovirus  SOB and issues with the clearance of her secretions  Hyponatremia    Plan:  -Hyponatremia, continue salt tabs as ordered. HCTZ on hold. Encourage PO food intake and Glucerna shakes. Monitor BMP  -IV antibiotics and pulmonary toileting as per pulmonary

## 2018-09-23 NOTE — PHYSICAL THERAPY INITIAL EVALUATION ADULT - PERTINENT HX OF CURRENT PROBLEM, REHAB EVAL
86 F with above hx with worsening SOB despite oral abx as out-pt. possible HCAP vs mucus plugging vs progression of disease

## 2018-09-23 NOTE — PHYSICAL THERAPY INITIAL EVALUATION ADULT - GENERAL OBSERVATIONS, REHAB EVAL
Pt encountered in semisupine position, no distress, AxOx4, with +IV, 1.5L of O2 through nasal cannula, and daughter @bedside.

## 2018-09-23 NOTE — PHYSICAL THERAPY INITIAL EVALUATION ADULT - ACTIVE RANGE OF MOTION EXAMINATION, REHAB EVAL
bilateral  lower extremity Active ROM was WFL (within functional limits)/Right shoulder flexion ~20 degrees, right elbow/hand/wrist WFL (right supraspinatus tear)/Left UE Active ROM was WFL (within functional limits)

## 2018-09-23 NOTE — PROGRESS NOTE ADULT - SUBJECTIVE AND OBJECTIVE BOX
Cardiovascular Disease Progress Note    Overnight events: No acute events overnight. feels well. no cp/sob/pnd/orthopnea   Otherwise review of systems negative    Objective Findings:  T(C): 36.7 (09-23-18 @ 05:30), Max: 36.7 (09-23-18 @ 01:13)  HR: 98 (09-23-18 @ 05:30) (84 - 100)  BP: 123/68 (09-23-18 @ 05:30) (123/68 - 153/67)  RR: 18 (09-23-18 @ 05:30) (18 - 22)  SpO2: 100% (09-23-18 @ 05:30) (96% - 100%)  Wt(kg): --  Daily     Daily       Physical Exam:  Gen: NAD  HEENT: EOMI  CV: RRR, normal S1 + S2, no m/r/g  Lungs: CTAB  Abd: soft, non-tender  Ext: No edema    Telemetry:    Laboratory Data:                        8.1    12.10 )-----------( 354      ( 23 Sep 2018 06:55 )             26.0     09-23    133<L>  |  93<L>  |  18  ----------------------------<  102<H>  4.1   |  28  |  0.76    Ca    8.7      23 Sep 2018 06:55    TPro  7.2  /  Alb  3.0<L>  /  TBili  0.4  /  DBili  x   /  AST  19  /  ALT  20  /  AlkPhos  85  09-21    PT/INR - ( 21 Sep 2018 14:20 )   PT: 13.0 SEC;   INR: 1.17          PTT - ( 21 Sep 2018 14:20 )  PTT:26.2 SEC          Inpatient Medications:  MEDICATIONS  (STANDING):  buDESOnide 160 MICROgram(s)/formoterol 4.5 MICROgram(s) Inhaler 2 Puff(s) Inhalation two times a day  dextrose 5%. 1000 milliLiter(s) (50 mL/Hr) IV Continuous <Continuous>  dextrose 50% Injectable 12.5 Gram(s) IV Push once  dextrose 50% Injectable 25 Gram(s) IV Push once  dextrose 50% Injectable 25 Gram(s) IV Push once  docusate sodium 100 milliGRAM(s) Oral three times a day  famotidine    Tablet 20 milliGRAM(s) Oral daily  fenofibrate Tablet 145 milliGRAM(s) Oral daily  gabapentin 400 milliGRAM(s) Oral <User Schedule>  influenza   Vaccine 0.5 milliLiter(s) IntraMuscular once  insulin glargine Injectable (LANTUS) 16 Unit(s) SubCutaneous at bedtime  insulin lispro (HumaLOG) corrective regimen sliding scale   SubCutaneous three times a day before meals  insulin lispro Injectable (HumaLOG) 6 Unit(s) SubCutaneous three times a day before meals  lidocaine   Patch 3 Patch Transdermal daily  metoprolol tartrate 50 milliGRAM(s) Oral two times a day  NIFEdipine XL 30 milliGRAM(s) Oral daily  NIFEdipine XL 60 milliGRAM(s) Oral at bedtime  piperacillin/tazobactam IVPB. 3.375 Gram(s) IV Intermittent every 8 hours  senna 2 Tablet(s) Oral at bedtime  sodium chloride 1 Gram(s) Oral daily  sodium chloride 0.9% lock flush 3 milliLiter(s) IV Push every 8 hours  traMADol 25 milliGRAM(s) Oral <User Schedule>  vancomycin  IVPB 1000 milliGRAM(s) IV Intermittent every 24 hours      Assessment:  86 F with PMH of lung adenoca (dx around 2010, s/p L VATS, XRT of TERESA/RUL last in July 2016), COPD with emphysema, htn, and dm2, recently admitted with shoulder pain secondary to zoster and incidental e hemorrhagic brain met now presents with acute hypoxic respiratory failure, found to be RVP and significant right lung collapse/atelectasis    Plan:  1) hypoxic respiratory failure   -no evidence of HCAP on CT imaging  -chest PT  -bronchodilators  -pulm consult  -maintain euvolemia  -f/u cultures and infectious workup    2) HTN, APC's  -no evidence of atrial fibrillation. cont to hold AC  -c/w metoprolol as tolerates  -c/w nifdepine as dosed  -cont to hold hctz 2/2 hyponatremia    3) hyponatremia  -appreciate renal recs    Over 25 minutes spent on total encounter; more than 50% of the visit was spent counseling and/or coordinating care by the attending physician.      Slick York MD   Cardiovascular Disease  [phone]

## 2018-09-23 NOTE — PHYSICAL THERAPY INITIAL EVALUATION ADULT - ADDITIONAL COMMENTS
Pt reports that she lives alone in a private house (recently ) with ~5 MATHEW; (+)bilateral handrails; stair lift inside. Pt also reports that her 3 daughters have been rotating staying with her. Prior to hospital admission pt reports that she was completely independent and used no assistive device with ambulation. However over the past month pt has required assistance. Pt has a home health aide 24hours 7days/week. Pt has a rolling walker and motorized wheelchair @ home if she needs. Pt was recently discharged from hospital where she was suppose to start home PT. Pt also reports using 2L of home O2 PRN and denies any recent falls.    Pt left comfortable in chair, NAD, all lines intact, all precautions maintained, with call bell in reach, daughter @bedside, and RN aware of PT evaluation. Pt reports that she lives alone in a private house (recently ) with ~5 MATHEW; (+)bilateral handrails; stair lift inside. Pt also reports that her 3 daughters have been rotating staying with her. Prior to hospital admission pt reports that she was completely independent and used no assistive device with ambulation; However over the past month pt has required assistance. Pt has a home health aide 24hours 7days/week. Pt has a rolling walker and motorized wheelchair @ home if she needs. Pt was recently discharged from hospital where she was suppose to start home PT. Pt also reports using 2L of home O2 PRN and denies any recent falls.    Pt left comfortable in chair, NAD, all lines intact, all precautions maintained, with call bell in reach, daughter @bedside, and RN aware of PT evaluation.

## 2018-09-23 NOTE — PROGRESS NOTE ADULT - SUBJECTIVE AND OBJECTIVE BOX
[facility]-Matilde Garrison, NP- PROGRESS NOTE    Patient seen and examined OOB to chair with help of physical therapy. Patient having loose stools today but appetite better. Still with loose cough.       Hospital Medications:   MEDICATIONS  (STANDING):  buDESOnide 160 MICROgram(s)/formoterol 4.5 MICROgram(s) Inhaler 2 Puff(s) Inhalation two times a day  dextrose 5%. 1000 milliLiter(s) (50 mL/Hr) IV Continuous <Continuous>  dextrose 50% Injectable 12.5 Gram(s) IV Push once  dextrose 50% Injectable 25 Gram(s) IV Push once  dextrose 50% Injectable 25 Gram(s) IV Push once  docusate sodium 100 milliGRAM(s) Oral three times a day  famotidine    Tablet 20 milliGRAM(s) Oral daily  fenofibrate Tablet 145 milliGRAM(s) Oral daily  gabapentin 400 milliGRAM(s) Oral <User Schedule>  influenza   Vaccine 0.5 milliLiter(s) IntraMuscular once  insulin glargine Injectable (LANTUS) 16 Unit(s) SubCutaneous at bedtime  insulin lispro (HumaLOG) corrective regimen sliding scale   SubCutaneous three times a day before meals  insulin lispro Injectable (HumaLOG) 6 Unit(s) SubCutaneous three times a day before meals  lidocaine   Patch 3 Patch Transdermal daily  metoprolol tartrate 50 milliGRAM(s) Oral two times a day  NIFEdipine XL 30 milliGRAM(s) Oral daily  NIFEdipine XL 60 milliGRAM(s) Oral at bedtime  piperacillin/tazobactam IVPB. 3.375 Gram(s) IV Intermittent every 8 hours  senna 2 Tablet(s) Oral at bedtime  sodium chloride 1 Gram(s) Oral daily  sodium chloride 0.9% lock flush 3 milliLiter(s) IV Push every 8 hours  traMADol 25 milliGRAM(s) Oral <User Schedule>  vancomycin  IVPB 1000 milliGRAM(s) IV Intermittent every 24 hours      REVIEW OF SYSTEMS:  As per HPI, 8 out of 10 ROS were unremarkable    VITALS:  T(F): 98.2 (09-23-18 @ 14:01), Max: 98.2 (09-23-18 @ 14:01)  HR: 114 (09-23-18 @ 14:01)  BP: 134/87 (09-23-18 @ 14:01)  RR: 18 (09-23-18 @ 14:01)  SpO2: 95% (09-23-18 @ 14:01)  Wt(kg): --        PHYSICAL EXAM:  Constitutional: NAD  HEENT: PERRL  Neck: No JVD  Respiratory: scattered rhonchi  Cardiovascular: S1, S2, RRR  Gastrointestinal: BS+, soft, NT/ND  Extremities: No peripheral edema  Neurological: A/O x 3, no focal deficits  Psychiatric: Normal mood, normal affect  : No CVA tenderness. No swenson.     LABS:  Blood Gas Venous - Sodium: 127 mmol/L (09-21 @ 14:20)      09-23    133<L>  |  93<L>  |  18  ----------------------------<  102<H>  4.1   |  28  |  0.76  09-21    132<L>  |  91<L>  |  24<H>  ----------------------------<  146<H>  4.2   |  31  |  0.78    Ca    8.7      23 Sep 2018 06:55    TPro  7.2  /  Alb  3.0<L>  /  TBili  0.4  /  DBili  x   /  AST  19  /  ALT  20  /  AlkPhos  85  09-21                            8.1    12.10 )-----------( 354      ( 23 Sep 2018 06:55 )             26.0       Urine Studies:        RADIOLOGY & ADDITIONAL STUDIES:

## 2018-09-23 NOTE — CONSULT NOTE ADULT - SUBJECTIVE AND OBJECTIVE BOX
CHIEF COMPLAINT:    HPI:    PAST MEDICAL & SURGICAL HISTORY:  Dyslipidemia  Hypertension  Diabetes  Lung cancer  Lung cancer: diagnosed 2010  S/P cholecystectomy  H/O carotid endarterectomy: left      FAMILY HISTORY:  No pertinent family history in first degree relatives      SOCIAL HISTORY:  Smoking: [ ] Never Smoked [ ] Former Smoker (__ packs x ___ years) [ ] Current Smoker  (__ packs x ___ years)  Substance Use: [ ] Never Used [ ] Used ____  EtOH Use:  Marital Status: [ ] Single [ ]  [ ]  [ ]   Sexual History:   Occupation:  Recent Travel:  Country of Birth:  Advance Directives:    Allergies    ACE inhibitors (Angioedema)    Intolerances        HOME MEDICATIONS:    REVIEW OF SYSTEMS:  Constitutional: [ ] negative [ ] fevers [ ] chills [ ] weight loss [ ] weight gain  HEENT: [ ] negative [ ] dry eyes [ ] eye irritation [ ] postnasal drip [ ] nasal congestion  CV: [ ] negative  [ ] chest pain [ ] orthopnea [ ] palpitations [ ] murmur  Resp: [ ] negative [ ] cough [ ] shortness of breath [ ] dyspnea [ ] wheezing [ ] sputum [ ] hemoptysis  GI: [ ] negative [ ] nausea [ ] vomiting [ ] diarrhea [ ] constipation [ ] abd pain [ ] dysphagia   : [ ] negative [ ] dysuria [ ] nocturia [ ] hematuria [ ] increased urinary frequency  Musculoskeletal: [ ] negative [ ] back pain [ ] myalgias [ ] arthralgias [ ] fracture  Skin: [ ] negative [ ] rash [ ] itch  Neurological: [ ] negative [ ] headache [ ] dizziness [ ] syncope [ ] weakness [ ] numbness  Psychiatric: [ ] negative [ ] anxiety [ ] depression  Endocrine: [ ] negative [ ] diabetes [ ] thyroid problem  Hematologic/Lymphatic: [ ] negative [ ] anemia [ ] bleeding problem  Allergic/Immunologic: [ ] negative [ ] itchy eyes [ ] nasal discharge [ ] hives [ ] angioedema  [ ] All other systems negative  [ ] Unable to assess ROS because ________    OBJECTIVE:  ICU Vital Signs Last 24 Hrs  T(C): 36.7 (23 Sep 2018 05:30), Max: 36.7 (23 Sep 2018 01:13)  T(F): 98 (23 Sep 2018 05:30), Max: 98 (23 Sep 2018 01:13)  HR: 98 (23 Sep 2018 05:30) (84 - 100)  BP: 123/68 (23 Sep 2018 05:30) (123/68 - 153/67)  BP(mean): --  ABP: --  ABP(mean): --  RR: 18 (23 Sep 2018 05:30) (18 - 22)  SpO2: 100% (23 Sep 2018 05:30) (96% - 100%)        CAPILLARY BLOOD GLUCOSE      POCT Blood Glucose.: 137 mg/dL (23 Sep 2018 12:51)      PHYSICAL EXAM:  General:   HEENT:   Lymph Nodes:  Neck:   Respiratory:   Cardiovascular:   Abdomen:   Extremities:   Skin:   Neurological:  Psychiatry:    HOSPITAL MEDICATIONS:    piperacillin/tazobactam IVPB. 3.375 Gram(s) IV Intermittent every 8 hours  vancomycin  IVPB 1000 milliGRAM(s) IV Intermittent every 24 hours    metoprolol tartrate 50 milliGRAM(s) Oral two times a day  NIFEdipine XL 30 milliGRAM(s) Oral daily  NIFEdipine XL 60 milliGRAM(s) Oral at bedtime    dextrose 40% Gel 15 Gram(s) Oral once PRN  dextrose 40% Gel 15 Gram(s) Oral once PRN  dextrose 50% Injectable 12.5 Gram(s) IV Push once  dextrose 50% Injectable 25 Gram(s) IV Push once  dextrose 50% Injectable 25 Gram(s) IV Push once  fenofibrate Tablet 145 milliGRAM(s) Oral daily  glucagon  Injectable 1 milliGRAM(s) IntraMuscular once PRN  insulin glargine Injectable (LANTUS) 16 Unit(s) SubCutaneous at bedtime  insulin lispro (HumaLOG) corrective regimen sliding scale   SubCutaneous three times a day before meals  insulin lispro Injectable (HumaLOG) 6 Unit(s) SubCutaneous three times a day before meals    ALBUTerol/ipratropium for Nebulization 3 milliLiter(s) Nebulizer every 8 hours PRN  buDESOnide 160 MICROgram(s)/formoterol 4.5 MICROgram(s) Inhaler 2 Puff(s) Inhalation two times a day    acetaminophen   Tablet .. 975 milliGRAM(s) Oral <User Schedule> PRN  gabapentin 400 milliGRAM(s) Oral <User Schedule>  traMADol 25 milliGRAM(s) Oral <User Schedule>    docusate sodium 100 milliGRAM(s) Oral three times a day  famotidine    Tablet 20 milliGRAM(s) Oral daily  polyethylene glycol 3350 17 Gram(s) Oral daily PRN  senna 2 Tablet(s) Oral at bedtime        dextrose 5%. 1000 milliLiter(s) IV Continuous <Continuous>  sodium chloride 1 Gram(s) Oral daily  sodium chloride 0.9% lock flush 3 milliLiter(s) IV Push every 8 hours    influenza   Vaccine 0.5 milliLiter(s) IntraMuscular once    lidocaine   Patch 3 Patch Transdermal daily        LABS:                        8.1    12.10 )-----------( 354      ( 23 Sep 2018 06:55 )             26.0     Hgb Trend: 8.1<--, 9.4<--  09-23    133<L>  |  93<L>  |  18  ----------------------------<  102<H>  4.1   |  28  |  0.76    Ca    8.7      23 Sep 2018 06:55    TPro  7.2  /  Alb  3.0<L>  /  TBili  0.4  /  DBili  x   /  AST  19  /  ALT  20  /  AlkPhos  85  09-21    Creatinine Trend: 0.76<--, 0.78<--, 0.84<--, 0.78<--, 0.77<--, 0.82<--  PT/INR - ( 21 Sep 2018 14:20 )   PT: 13.0 SEC;   INR: 1.17          PTT - ( 21 Sep 2018 14:20 )  PTT:26.2 SEC      Venous Blood Gas:  09-21 @ 14:20  7.42/52/45/31/76.0  VBG Lactate: 1.4      MICROBIOLOGY:     RADIOLOGY:  [ ] Reviewed and interpreted by me    PULMONARY FUNCTION TESTS:    EKG: CHIEF COMPLAINT: dyspnea    HPI:  86F former with h/o HTN, DM2 and Lung adenocarcinoma first diagnosed in 2010 s/p VATS with  RUL/TERESA resection and XRT without any Chemotherapy. She now has right adrenal nodule, L cerebellar lesion and a RUL mass. Recently hospitalized for COPD exacerbation requiring MICU but not intubated and complicated by AFib and was found to have Shingles. They are crusting over now. Patient's oncologist are [facility] at [facility]. She was supposed to follow up for radiation therapy but has been debilitated Since last discharge, patient has had progressive dyspnea and while on home oxygen, has exertional desaturation from high 90's to low 90's. Due to increased weakness and dyspnea. She had a repeat CT chest which showed unchanged RUL mass and TERESA chronic changes but new RLL consolidation concerning for PNA vs mass or both. She is on Vanc/Zosyn. She also has entero/rhinovirus.    PAST MEDICAL & SURGICAL HISTORY:  Dyslipidemia  Hypertension  Diabetes  Lung cancer  Lung cancer: diagnosed 2010  S/P cholecystectomy  H/O carotid endarterectomy: left      FAMILY HISTORY:  No pertinent family history in first degree relatives      SOCIAL HISTORY:  Smoking: [ ] Never Smoked [x ] Former Smoker (__ packs x ___ years) [ ] Current Smoker  (__ packs x ___ years)  Substance Use: [ ] Never Used [ ] Used ____  EtOH Use:  Marital Status: [ ] Single [ ]  [ ]  [ ]   Sexual History:   Occupation:  Recent Travel:  Country of Birth:  Advance Directives:    Allergies    ACE inhibitors (Angioedema)    Intolerances        HOME MEDICATIONS:    REVIEW OF SYSTEMS:  Constitutional: [x ] negative [ ] fevers [ ] chills [ ] weight loss [ ] weight gain  HEENT: [x ] negative [ ] dry eyes [ ] eye irritation [ ] postnasal drip [ ] nasal congestion  CV: [ ] negative  [ ] chest pain [ ] orthopnea [ ] palpitations [ ] murmur  Resp: [ ] negative [ ] cough [ ] shortness of breath [x ] dyspnea [ ] wheezing [ ] sputum [ ] hemoptysis  GI: [x ] negative [ ] nausea [ ] vomiting [ ] diarrhea [ ] constipation [ ] abd pain [ ] dysphagia   : [x ] negative [ ] dysuria [ ] nocturia [ ] hematuria [ ] increased urinary frequency  Musculoskeletal: [ ] negative [ ] back pain [ ] myalgias [ ] arthralgias [ ] fracture  Skin: [ ] negative [x ] rash [ ] itch  Neurological: [ ] negative [ ] headache [ ] dizziness [ ] syncope [x ] weakness [ ] numbness  Psychiatric: [ ] negative [ x] anxiety [ ] depression  Endocrine: [ ] negative [ x] diabetes [ ] thyroid problem  Hematologic/Lymphatic: [ ] negative [ ] anemia [ ] bleeding problem  Allergic/Immunologic: [ ] negative [ ] itchy eyes [ ] nasal discharge [ ] hives [ ] angioedema  [ ] All other systems negative  [ ] Unable to assess ROS because ________    OBJECTIVE:  T(C): 36.7 (23 Sep 2018 05:30), Max: 36.7 (23 Sep 2018 01:13)  T(F): 98 (23 Sep 2018 05:30), Max: 98 (23 Sep 2018 01:13)  HR: 98 (23 Sep 2018 05:30) (84 - 100)  BP: 123/68 (23 Sep 2018 05:30) (123/68 - 153/67)  RR: 18 (23 Sep 2018 05:30) (18 - 22)  SpO2: 100% (23 Sep 2018 05:30) (96% - 100%)        CAPILLARY BLOOD GLUCOSE      POCT Blood Glucose.: 137 mg/dL (23 Sep 2018 12:51)      PHYSICAL EXAM:  General: NAD  HEENT: RICHY  Lymph Nodes:  Neck:   Respiratory: B/L Ronchi  Cardiovascular: RRR  Abdomen: S/NT/ND  Extremities: -C/C/E  Skin: Healing lesions  Neurological: non-focal.  Psychiatry:    HOSPITAL MEDICATIONS:    piperacillin/tazobactam IVPB. 3.375 Gram(s) IV Intermittent every 8 hours  vancomycin  IVPB 1000 milliGRAM(s) IV Intermittent every 24 hours    metoprolol tartrate 50 milliGRAM(s) Oral two times a day  NIFEdipine XL 30 milliGRAM(s) Oral daily  NIFEdipine XL 60 milliGRAM(s) Oral at bedtime    dextrose 40% Gel 15 Gram(s) Oral once PRN  dextrose 40% Gel 15 Gram(s) Oral once PRN  dextrose 50% Injectable 12.5 Gram(s) IV Push once  dextrose 50% Injectable 25 Gram(s) IV Push once  dextrose 50% Injectable 25 Gram(s) IV Push once  fenofibrate Tablet 145 milliGRAM(s) Oral daily  glucagon  Injectable 1 milliGRAM(s) IntraMuscular once PRN  insulin glargine Injectable (LANTUS) 16 Unit(s) SubCutaneous at bedtime  insulin lispro (HumaLOG) corrective regimen sliding scale   SubCutaneous three times a day before meals  insulin lispro Injectable (HumaLOG) 6 Unit(s) SubCutaneous three times a day before meals    ALBUTerol/ipratropium for Nebulization 3 milliLiter(s) Nebulizer every 8 hours PRN  buDESOnide 160 MICROgram(s)/formoterol 4.5 MICROgram(s) Inhaler 2 Puff(s) Inhalation two times a day    acetaminophen   Tablet .. 975 milliGRAM(s) Oral <User Schedule> PRN  gabapentin 400 milliGRAM(s) Oral <User Schedule>  traMADol 25 milliGRAM(s) Oral <User Schedule>    docusate sodium 100 milliGRAM(s) Oral three times a day  famotidine    Tablet 20 milliGRAM(s) Oral daily  polyethylene glycol 3350 17 Gram(s) Oral daily PRN  senna 2 Tablet(s) Oral at bedtime        dextrose 5%. 1000 milliLiter(s) IV Continuous <Continuous>  sodium chloride 1 Gram(s) Oral daily  sodium chloride 0.9% lock flush 3 milliLiter(s) IV Push every 8 hours    influenza   Vaccine 0.5 milliLiter(s) IntraMuscular once    lidocaine   Patch 3 Patch Transdermal daily        LABS:                        8.1    12.10 )-----------( 354      ( 23 Sep 2018 06:55 )             26.0     Hgb Trend: 8.1<--, 9.4<--  09-23    133<L>  |  93<L>  |  18  ----------------------------<  102<H>  4.1   |  28  |  0.76    Ca    8.7      23 Sep 2018 06:55    TPro  7.2  /  Alb  3.0<L>  /  TBili  0.4  /  DBili  x   /  AST  19  /  ALT  20  /  AlkPhos  85  09-21    Creatinine Trend: 0.76<--, 0.78<--, 0.84<--, 0.78<--, 0.77<--, 0.82<--  PT/INR - ( 21 Sep 2018 14:20 )   PT: 13.0 SEC;   INR: 1.17          PTT - ( 21 Sep 2018 14:20 )  PTT:26.2 SEC      Venous Blood Gas:  09-21 @ 14:20  7.42/52/45/31/76.0  VBG Lactate: 1.4      MICROBIOLOGY:     RADIOLOGY:  [ x] Reviewed and interpreted by me    LUNGS AND LARGE AIRWAYS: Small amount of retained mucus in the trachea.    6.4 x 5.6 cm right apical mass previously 6.1 x 5.5 cm.  New subtotal atelectasis of the right lower lobe with volume loss.  Left upper lobe consolidation with calcifications unchanged.  0.8 cm left lower lobe pulmonary nodule unchanged (2, 84).  Emphysema.  PLEURA: No pleural effusion.  VESSELS: Extensive calcific atherosclerotic disease and coronary artery   disease.  HEART: Heart size is normal. No pericardial effusion.  MEDIASTINUM AND BRENDON: No lymphadenopathy.  CHEST WALL AND LOWER NECK: 1.5 cm left breast nodule unchanged.  VISUALIZED UPPER ABDOMEN: Cholecystectomy. Right upper pole renal cyst.   Right adrenal nodule unchanged.  BONES: Within normal limits.    PULMONARY FUNCTION TESTS:    EKG:

## 2018-09-23 NOTE — PHYSICAL THERAPY INITIAL EVALUATION ADULT - DIAGNOSIS, PT EVAL
Pt admitted for SOB; pt presents with decreased strength, decreased balance, and decreased aerobic capacity/endurance.

## 2018-09-23 NOTE — CONSULT NOTE ADULT - ASSESSMENT
86 F with stage IV lung cancer, acute respiratory failure, neoplasm related pain, encounter for palliative care.
86F with Stage 4 Lung AdenoCA, COPD on home oxygen now with Entero/rhinovirus PNA    - Please make Duoneb q 6 around the clock for the next 24 hours.  - Continue supplemental oxygen as needed.  - Aerobika device to bedside and Chest PT q 8 for secretion mobilization.  - Please continue Vanc/Zosyn and for HCAP coverage. She will need 1 week of abx for complete course.  - She will need to follow up with her Oncologist at [facility] for further management of her advanced malignancy.  - Will follow.

## 2018-09-23 NOTE — PHYSICAL THERAPY INITIAL EVALUATION ADULT - DISCHARGE DISPOSITION, PT EVAL
however pt and pt's family do not want rehab and therefore will need home PT/rehabilitation facility

## 2018-09-23 NOTE — PROGRESS NOTE ADULT - SUBJECTIVE AND OBJECTIVE BOX
_________________________________________________________________________________________  ========>>  M E D I C A L   A T T E N D I N G    F O L L O W  U P  N O T E  <<=========  -----------------------------------------------------------------------------------------------------    - Patient seen and examined by me approximately thirty minutes ago.  - In summary,  CLIVE HENRY is a 86y year old woman who originally presented with SOB  - Patient today overall doing ok, comfortable, eating OK. pain better controlled    ==================>> REVIEW OF SYSTEM <<=================    GEN: no fever, no chills, pain as above   RESP: SOB stable / + CARRANZA , ++ cough and congestion , no significant sputum  CVS: no chest pain, no palpitations, no edema  GI: no abdominal pain, no nausea, no constipation, no diarrhea  : no dysuria, no frequency, no hematuria  Neuro: no headache, no dizziness  Derm : no itching, no rash    ==================>> PHYSICAL EXAM <<=================    GEN: A&O X 3 , NAD , comfortable  HEENT: NCAT, PERRL, MMM, hearing intact  Neck: supple , no JVD  CVS: S1S2 , regular , No M/R/G appreciated ( limited)  PULM: + scattered B/L rhonchi   ABD.: soft. non tender, non distended,  bowel sounds present  Extrem: intact pulses , no edema      shingles lesions of right chest wall crusted over and sloughing off   PSYCH : normal mood,  not anxious      ==================>> MEDICATIONS <<====================    buDESOnide 160 MICROgram(s)/formoterol 4.5 MICROgram(s) Inhaler 2 Puff(s) Inhalation two times a day  dextrose 5%. 1000 milliLiter(s) IV Continuous <Continuous>  dextrose 50% Injectable 12.5 Gram(s) IV Push once  dextrose 50% Injectable 25 Gram(s) IV Push once  dextrose 50% Injectable 25 Gram(s) IV Push once  docusate sodium 100 milliGRAM(s) Oral three times a day  famotidine    Tablet 20 milliGRAM(s) Oral daily  fenofibrate Tablet 145 milliGRAM(s) Oral daily  gabapentin 400 milliGRAM(s) Oral <User Schedule>  influenza   Vaccine 0.5 milliLiter(s) IntraMuscular once  insulin glargine Injectable (LANTUS) 16 Unit(s) SubCutaneous at bedtime  insulin lispro (HumaLOG) corrective regimen sliding scale   SubCutaneous three times a day before meals  insulin lispro Injectable (HumaLOG) 6 Unit(s) SubCutaneous three times a day before meals  lidocaine   Patch 3 Patch Transdermal daily  metoprolol tartrate 50 milliGRAM(s) Oral two times a day  NIFEdipine XL 30 milliGRAM(s) Oral daily  NIFEdipine XL 60 milliGRAM(s) Oral at bedtime  piperacillin/tazobactam IVPB. 3.375 Gram(s) IV Intermittent every 8 hours  senna 2 Tablet(s) Oral at bedtime  sodium chloride 1 Gram(s) Oral daily  sodium chloride 0.9% lock flush 3 milliLiter(s) IV Push every 8 hours  traMADol 25 milliGRAM(s) Oral <User Schedule>  vancomycin  IVPB 1000 milliGRAM(s) IV Intermittent every 24 hours    MEDICATIONS  (PRN):  acetaminophen   Tablet .. 975 milliGRAM(s) Oral <User Schedule> PRN Mild Pain (1 - 3), Moderate Pain (4 - 6), Severe Pain (7 - 10)  ALBUTerol/ipratropium for Nebulization 3 milliLiter(s) Nebulizer every 8 hours PRN Bronchospasm  dextrose 40% Gel 15 Gram(s) Oral once PRN Blood Glucose LESS THAN 70 milliGRAM(s)/deciliter  dextrose 40% Gel 15 Gram(s) Oral once PRN Blood Glucose LESS THAN 70 milliGRAM(s)/deciliter  glucagon  Injectable 1 milliGRAM(s) IntraMuscular once PRN Glucose LESS THAN 70 milligrams/deciliter  polyethylene glycol 3350 17 Gram(s) Oral daily PRN Constipation    ==================>> VITAL SIGNS <<==================    Vital Signs Last 24 Hrs  T(C): 36.7 (09-23-18 @ 05:30)  T(F): 98 (09-23-18 @ 05:30), Max: 98 (09-23-18 @ 01:13)  HR: 98 (09-23-18 @ 05:30) (84 - 100)  BP: 123/68 (09-23-18 @ 05:30)  RR: 18 (09-23-18 @ 05:30) (18 - 22)  SpO2: 100% (09-23-18 @ 05:30) (96% - 100%)      POCT Blood Glucose.: 132 mg/dL (23 Sep 2018 11:55)  POCT Blood Glucose.: 180 mg/dL (23 Sep 2018 08:48)  POCT Blood Glucose.: 118 mg/dL (23 Sep 2018 06:27)  POCT Blood Glucose.: 131 mg/dL (22 Sep 2018 21:23)  POCT Blood Glucose.: 360 mg/dL (22 Sep 2018 17:31)     ==================>> LAB AND IMAGING <<==================                        8.1    12.10 )-----------( 354      ( 23 Sep 2018 06:55 )             26.0     Hemoglobin:   8.1 <<==,  9.4 <<==       133<L>  |  93<L>  |  18  ----------------------------<  102<H>  4.1   |  28  |  0.76    Sodium:   133  <==, 132  <==    Ca    8.7      23 Sep 2018 06:55    TPro  7.2  /  Alb  3.0<L>  /  TBili  0.4  /  DBili  x   /  AST  19  /  ALT  20  /  AlkPhos  85  09-21     < from: CT Chest No Cont (09.21.18 @ 20:48) > ( prelim reading as pneumonia)   IMPRESSION:   New subtotal atelectasis of the right lower lobe with volume loss.  Essentially unchanged or slightly increased in size right apical mass.  Left upper lobe consolidation with calcifications unchanged.  1.5 cm left breast nodule unchanged.  Right adrenal nodule unchanged.  < end of copied text >    ___________________________________________________________________________________  ===============>>  A S S E S S M E N T   A N D   P L A N <<===============  ------------------------------------------------------------------------------------------    · Assessment		  86 F with above hx with worsening SOB despite oral abx as out-pt. possible HCAP vs mucus plugging vs progression of dz    Problem/Plan - 1:  ·  Problem: Shortness of breath.  Plan: likely multifactorial in setting of lung ca, underlying chronic COPD, atelectasis, viral URI  CT results not showing active pneumonia  will DC Abx for now, pending Pulm input   Pulm consult pending  chest PT / vibratory vest ( if available in this hospital), incentive spirometer  Nebs, steroids and O2 as ordered    Problem/Plan - 2:  ·  Problem: Diabetes  will continue basal + bolus  monitor FS    Problem/Plan - 3:  ·  Problem: Lung cancer (s/p RT, no chemo, follows at [place])  + progression of dz with hemorrhagic brain met noted on last admission and enlarging ling mass   to follow up wit Dr Ivy at [facility] post discharge for RT / chemo ..    Problem/Plan - 4:  ·  Problem: Shingles with post herpetic neuralgia  continue Neurontin  pt also with chronic Rt shoulder pain also due to apical mass and torn rotator cuff tendon..  should not use NSAIDS given brain met   discussed with Dtr and adjusted pain regimen for better pain control >> continue current regimen for now     Problem/Plan - 5:  ·  Problem: Essential hypertension.    cw home meds.   cardio appreciated     Problem/Plan - 6:  Problem: SIADH , in setting of malignancy  on salt tabs, NA stable.  renal f/u requested     Problem/Plan - 7:  ·  Problem: Prophylactic measure.    pharmacologic ppx in setting of recent hemorrhagic brain met  Venodyne for now    --------------------------------------------  Case discussed with pt, Dtr, NP, cardio   Education given on findings and plan of care  ___________________________  H. GEN Ferrell.  Pager: [phone]

## 2018-09-24 LAB
BUN SERPL-MCNC: 23 MG/DL — SIGNIFICANT CHANGE UP (ref 7–23)
CALCIUM SERPL-MCNC: 8.7 MG/DL — SIGNIFICANT CHANGE UP (ref 8.4–10.5)
CHLORIDE SERPL-SCNC: 92 MMOL/L — LOW (ref 98–107)
CO2 SERPL-SCNC: 28 MMOL/L — SIGNIFICANT CHANGE UP (ref 22–31)
CREAT SERPL-MCNC: 0.73 MG/DL — SIGNIFICANT CHANGE UP (ref 0.5–1.3)
GLUCOSE BLDC GLUCOMTR-MCNC: 131 MG/DL — HIGH (ref 70–99)
GLUCOSE BLDC GLUCOMTR-MCNC: 173 MG/DL — HIGH (ref 70–99)
GLUCOSE BLDC GLUCOMTR-MCNC: 201 MG/DL — HIGH (ref 70–99)
GLUCOSE BLDC GLUCOMTR-MCNC: 239 MG/DL — HIGH (ref 70–99)
GLUCOSE SERPL-MCNC: 124 MG/DL — HIGH (ref 70–99)
HCT VFR BLD CALC: 28.6 % — LOW (ref 34.5–45)
HGB BLD-MCNC: 8.7 G/DL — LOW (ref 11.5–15.5)
MCHC RBC-ENTMCNC: 25.5 PG — LOW (ref 27–34)
MCHC RBC-ENTMCNC: 30.4 % — LOW (ref 32–36)
MCV RBC AUTO: 83.9 FL — SIGNIFICANT CHANGE UP (ref 80–100)
NRBC # FLD: 0 — SIGNIFICANT CHANGE UP
PLATELET # BLD AUTO: 319 K/UL — SIGNIFICANT CHANGE UP (ref 150–400)
PMV BLD: 9.1 FL — SIGNIFICANT CHANGE UP (ref 7–13)
POTASSIUM SERPL-MCNC: 4 MMOL/L — SIGNIFICANT CHANGE UP (ref 3.5–5.3)
POTASSIUM SERPL-SCNC: 4 MMOL/L — SIGNIFICANT CHANGE UP (ref 3.5–5.3)
RBC # BLD: 3.41 M/UL — LOW (ref 3.8–5.2)
RBC # FLD: 16.7 % — HIGH (ref 10.3–14.5)
SODIUM SERPL-SCNC: 132 MMOL/L — LOW (ref 135–145)
VANCOMYCIN TROUGH SERPL-MCNC: 10 UG/ML — SIGNIFICANT CHANGE UP (ref 10–20)
WBC # BLD: 12.21 K/UL — HIGH (ref 3.8–10.5)
WBC # FLD AUTO: 12.21 K/UL — HIGH (ref 3.8–10.5)

## 2018-09-24 PROCEDURE — 99232 SBSQ HOSP IP/OBS MODERATE 35: CPT | Mod: GC

## 2018-09-24 PROCEDURE — 99233 SBSQ HOSP IP/OBS HIGH 50: CPT

## 2018-09-24 RX ORDER — ACETAMINOPHEN 500 MG
975 TABLET ORAL EVERY 8 HOURS
Qty: 0 | Refills: 0 | Status: DISCONTINUED | OUTPATIENT
Start: 2018-09-24 | End: 2018-09-24

## 2018-09-24 RX ORDER — ACETAMINOPHEN 500 MG
975 TABLET ORAL EVERY 8 HOURS
Qty: 0 | Refills: 0 | Status: DISCONTINUED | OUTPATIENT
Start: 2018-09-24 | End: 2018-09-25

## 2018-09-24 RX ORDER — IPRATROPIUM/ALBUTEROL SULFATE 18-103MCG
3 AEROSOL WITH ADAPTER (GRAM) INHALATION ONCE
Qty: 0 | Refills: 0 | Status: COMPLETED | OUTPATIENT
Start: 2018-09-24 | End: 2018-09-24

## 2018-09-24 RX ORDER — GABAPENTIN 400 MG/1
400 CAPSULE ORAL THREE TIMES A DAY
Qty: 0 | Refills: 0 | Status: DISCONTINUED | OUTPATIENT
Start: 2018-09-24 | End: 2018-09-27

## 2018-09-24 RX ADMIN — TRAMADOL HYDROCHLORIDE 25 MILLIGRAM(S): 50 TABLET ORAL at 13:36

## 2018-09-24 RX ADMIN — Medication 2: at 18:07

## 2018-09-24 RX ADMIN — SODIUM CHLORIDE 3 MILLILITER(S): 9 INJECTION INTRAMUSCULAR; INTRAVENOUS; SUBCUTANEOUS at 05:17

## 2018-09-24 RX ADMIN — Medication 1: at 12:35

## 2018-09-24 RX ADMIN — FAMOTIDINE 20 MILLIGRAM(S): 10 INJECTION INTRAVENOUS at 12:35

## 2018-09-24 RX ADMIN — GABAPENTIN 400 MILLIGRAM(S): 400 CAPSULE ORAL at 05:18

## 2018-09-24 RX ADMIN — SODIUM CHLORIDE 1 GRAM(S): 9 INJECTION INTRAMUSCULAR; INTRAVENOUS; SUBCUTANEOUS at 12:35

## 2018-09-24 RX ADMIN — TRAMADOL HYDROCHLORIDE 25 MILLIGRAM(S): 50 TABLET ORAL at 22:00

## 2018-09-24 RX ADMIN — Medication 0.5 MILLIGRAM(S): at 05:27

## 2018-09-24 RX ADMIN — TRAMADOL HYDROCHLORIDE 25 MILLIGRAM(S): 50 TABLET ORAL at 12:34

## 2018-09-24 RX ADMIN — BUDESONIDE AND FORMOTEROL FUMARATE DIHYDRATE 2 PUFF(S): 160; 4.5 AEROSOL RESPIRATORY (INHALATION) at 21:40

## 2018-09-24 RX ADMIN — TRAMADOL HYDROCHLORIDE 25 MILLIGRAM(S): 50 TABLET ORAL at 08:51

## 2018-09-24 RX ADMIN — Medication 30 MILLIGRAM(S): at 05:08

## 2018-09-24 RX ADMIN — Medication 3 MILLILITER(S): at 20:24

## 2018-09-24 RX ADMIN — LIDOCAINE 3 PATCH: 4 CREAM TOPICAL at 00:42

## 2018-09-24 RX ADMIN — Medication 60 MILLIGRAM(S): at 21:38

## 2018-09-24 RX ADMIN — INSULIN GLARGINE 16 UNIT(S): 100 INJECTION, SOLUTION SUBCUTANEOUS at 21:39

## 2018-09-24 RX ADMIN — Medication 50 MILLIGRAM(S): at 18:08

## 2018-09-24 RX ADMIN — GABAPENTIN 400 MILLIGRAM(S): 400 CAPSULE ORAL at 21:38

## 2018-09-24 RX ADMIN — Medication 975 MILLIGRAM(S): at 15:30

## 2018-09-24 RX ADMIN — GABAPENTIN 400 MILLIGRAM(S): 400 CAPSULE ORAL at 15:36

## 2018-09-24 RX ADMIN — BUDESONIDE AND FORMOTEROL FUMARATE DIHYDRATE 2 PUFF(S): 160; 4.5 AEROSOL RESPIRATORY (INHALATION) at 12:35

## 2018-09-24 RX ADMIN — SODIUM CHLORIDE 3 MILLILITER(S): 9 INJECTION INTRAMUSCULAR; INTRAVENOUS; SUBCUTANEOUS at 12:28

## 2018-09-24 RX ADMIN — TRAMADOL HYDROCHLORIDE 25 MILLIGRAM(S): 50 TABLET ORAL at 05:07

## 2018-09-24 RX ADMIN — Medication 10 MILLIGRAM(S): at 05:08

## 2018-09-24 RX ADMIN — Medication 50 MILLIGRAM(S): at 05:09

## 2018-09-24 RX ADMIN — PIPERACILLIN AND TAZOBACTAM 25 GRAM(S): 4; .5 INJECTION, POWDER, LYOPHILIZED, FOR SOLUTION INTRAVENOUS at 13:34

## 2018-09-24 RX ADMIN — Medication 3 MILLILITER(S): at 03:44

## 2018-09-24 RX ADMIN — Medication 3 MILLILITER(S): at 16:04

## 2018-09-24 RX ADMIN — PIPERACILLIN AND TAZOBACTAM 25 GRAM(S): 4; .5 INJECTION, POWDER, LYOPHILIZED, FOR SOLUTION INTRAVENOUS at 05:07

## 2018-09-24 RX ADMIN — Medication 10 MILLIGRAM(S): at 18:08

## 2018-09-24 RX ADMIN — Medication 250 MILLIGRAM(S): at 19:17

## 2018-09-24 RX ADMIN — SODIUM CHLORIDE 3 MILLILITER(S): 9 INJECTION INTRAMUSCULAR; INTRAVENOUS; SUBCUTANEOUS at 22:24

## 2018-09-24 RX ADMIN — PIPERACILLIN AND TAZOBACTAM 25 GRAM(S): 4; .5 INJECTION, POWDER, LYOPHILIZED, FOR SOLUTION INTRAVENOUS at 21:39

## 2018-09-24 RX ADMIN — Medication 3 MILLILITER(S): at 09:53

## 2018-09-24 RX ADMIN — Medication 145 MILLIGRAM(S): at 12:35

## 2018-09-24 RX ADMIN — Medication 0.5 MILLIGRAM(S): at 20:46

## 2018-09-24 RX ADMIN — TRAMADOL HYDROCHLORIDE 25 MILLIGRAM(S): 50 TABLET ORAL at 21:38

## 2018-09-24 NOTE — PROGRESS NOTE ADULT - ASSESSMENT
86 F with metastatic lung cancer, acute respiratory failure, neoplasm related pain, encounter for palliative care.

## 2018-09-24 NOTE — CHART NOTE - NSCHARTNOTEFT_GEN_A_CORE
ADS NIGHT COVERAGE:    RRT called per pt's daughter request as pt felt SOB and felt as if she could not breathe. Pt was seen and examined, placed on NRB mask prior to RRT with O2 sat at 100%, ABG was done and pt was awake and alert. Pt was placed on BiPAP. Pt slightly agitated, attempted to remove BiPAP. Ativan 0.5mg IV x1 given as pt has received in past.     T(C): 36.7 (23 Sep 2018 20:52), Max: 36.8 (23 Sep 2018 14:01)  T(F): 98 (23 Sep 2018 20:52), Max: 98.2 (23 Sep 2018 14:01)  HR: 120 (23 Sep 2018 22:49) (83 - 120)  BP: 169/84 (23 Sep 2018 20:52) (123/68 - 169/84)  RR: 20 (23 Sep 2018 20:52) (18 - 20)  SpO2: 95% (23 Sep 2018 22:49) (95% - 100%)    Family wants patient moved to RCU. D/w MAR, will transfer pt pending acceptance to RCU from pulmonary fellow/attending. Pulm fellow on call paged 2x. Awaiting call back.     Will continue to monitor.  ANA High ADS PA-C  [id number] ADS NIGHT COVERAGE:    RRT called per pt's daughter request as pt felt SOB and felt as if she could not breathe. Pt was seen and examined, placed on NRB mask prior to RRT with O2 sat at 100%, ABG was done and pt was awake and alert. Pt was placed on BiPAP. Pt slightly agitated, attempted to remove BiPAP. Ativan 0.5mg IV x1 given as pt has received in past.     T(C): 36.7 (23 Sep 2018 20:52), Max: 36.8 (23 Sep 2018 14:01)  T(F): 98 (23 Sep 2018 20:52), Max: 98.2 (23 Sep 2018 14:01)  HR: 120 (23 Sep 2018 22:49) (83 - 120)  BP: 169/84 (23 Sep 2018 20:52) (123/68 - 169/84)  RR: 20 (23 Sep 2018 20:52) (18 - 20)  SpO2: 95% (23 Sep 2018 22:49) (95% - 100%)    Family wants patient moved to RCU. D/w MAR, I will transfer pt pending acceptance to RCU from pulmonary fellow/attending. Pulm fellow on call paged 2x. Awaiting call back.     Will continue to monitor.  ANA High ADS PA-C  [id number]

## 2018-09-24 NOTE — CHART NOTE - NSCHARTNOTEFT_GEN_A_CORE
MAR RRT Note    86 f w hx lung ca s/p VATS, wedge resection, RT, no chemo, COPD on home O2, PMR on prednisone 30 daily chronically, DM2, HTN, recent hospitalization 9/4-9/15 for R shoulder pain at which point was found to have supraspinatus tear as well as R chest wall shingles s/p complete course valtrex sent home on O2, a/f SOB 2/2 HCAP vs mucous plugging vs progression of disease.     Patient with dyspnea later in the afternoon on 9/24. NP team placed on NRB mask, gave duoneb, prior to RRT with O2 sat at 100%, ABG 7.45/41/168/28. RRT called per pt's daughter request as pt felt SOB and felt as if she could not breathe. Hemodynamically stable and sating 91 on NRB. Started BIPAP and gave IV solumedrol with dec in WOB. Patient is DNR/DNI. Primary team will continue with care of patient and rapid ended. NP team will reach out to PCU about transfering patient.     Plan:   -c/w BIPAP  -c/w broad spectrum abx, duonebs q6, PO prednisone, and wean off BIPAP as tolerated   -palliative c/s     Erasmo Osorio PGY3

## 2018-09-24 NOTE — PROGRESS NOTE ADULT - ASSESSMENT
Pt is a 87 yo elderly female c hx lung cancer HTN DM pw + rhinovirus  SOB and issues with the clearance of her secretions  Hyponatremia  Shingles pain     Plan:  -Hyponatremia, continue salt tabs as ordered. HCTZ on hold. Encourage PO food intake and start Glucerna shakes. Monitor BMP  -IV antibiotics and pulmonary toileting as per pulmonary  -Change diet to Dysphagia 3 c honey thickened liquids  -Pain control seems to be a constant issue.  Will increase the gabapentin to 4000g po tid

## 2018-09-24 NOTE — PROGRESS NOTE ADULT - SUBJECTIVE AND OBJECTIVE BOX
Patient is a 86y old  Female who presents with a chief complaint of SOB (24 Sep 2018 15:47)      SUBJECTIVE / OVERNIGHT EVENTS:  S/p BIPAP last night  On supp O2.  Denies CP/Palpitation/HA.    MEDICATIONS  (STANDING):  acetaminophen   Tablet .. 975 milliGRAM(s) Oral every 8 hours  buDESOnide 160 MICROgram(s)/formoterol 4.5 MICROgram(s) Inhaler 2 Puff(s) Inhalation two times a day  dextrose 5%. 1000 milliLiter(s) (50 mL/Hr) IV Continuous <Continuous>  dextrose 50% Injectable 12.5 Gram(s) IV Push once  dextrose 50% Injectable 25 Gram(s) IV Push once  dextrose 50% Injectable 25 Gram(s) IV Push once  docusate sodium 100 milliGRAM(s) Oral three times a day  famotidine    Tablet 20 milliGRAM(s) Oral daily  fenofibrate Tablet 145 milliGRAM(s) Oral daily  gabapentin 400 milliGRAM(s) Oral three times a day  influenza   Vaccine 0.5 milliLiter(s) IntraMuscular once  insulin glargine Injectable (LANTUS) 16 Unit(s) SubCutaneous at bedtime  insulin lispro (HumaLOG) corrective regimen sliding scale   SubCutaneous three times a day before meals  insulin lispro Injectable (HumaLOG) 6 Unit(s) SubCutaneous three times a day before meals  lidocaine   Patch 3 Patch Transdermal daily  metoprolol tartrate 50 milliGRAM(s) Oral two times a day  NIFEdipine XL 30 milliGRAM(s) Oral daily  NIFEdipine XL 60 milliGRAM(s) Oral at bedtime  piperacillin/tazobactam IVPB. 3.375 Gram(s) IV Intermittent every 8 hours  predniSONE   Tablet 10 milliGRAM(s) Oral two times a day  senna 2 Tablet(s) Oral at bedtime  sodium chloride 1 Gram(s) Oral daily  sodium chloride 0.9% lock flush 3 milliLiter(s) IV Push every 8 hours  traMADol 25 milliGRAM(s) Oral <User Schedule>  vancomycin  IVPB 1000 milliGRAM(s) IV Intermittent every 24 hours    MEDICATIONS  (PRN):  dextrose 40% Gel 15 Gram(s) Oral once PRN Blood Glucose LESS THAN 70 milliGRAM(s)/deciliter  dextrose 40% Gel 15 Gram(s) Oral once PRN Blood Glucose LESS THAN 70 milliGRAM(s)/deciliter  glucagon  Injectable 1 milliGRAM(s) IntraMuscular once PRN Glucose LESS THAN 70 milligrams/deciliter  polyethylene glycol 3350 17 Gram(s) Oral daily PRN Constipation        CAPILLARY BLOOD GLUCOSE      POCT Blood Glucose.: 239 mg/dL (24 Sep 2018 21:31)  POCT Blood Glucose.: 201 mg/dL (24 Sep 2018 17:26)  POCT Blood Glucose.: 173 mg/dL (24 Sep 2018 12:33)  POCT Blood Glucose.: 131 mg/dL (24 Sep 2018 08:44)    I&O's Summary      PHYSICAL EXAM:    NECK: Supple, No JVD  CHEST/LUNG: Clear to auscultation bilaterally; No wheezing.  HEART: Regular rate and rhythm; No murmurs, rubs, or gallops  ABDOMEN: Soft, Nontender, Nondistended; Bowel sounds present  EXTREMITIES:   No clubbing, cyanosis, or edema  NEUROLOGY: AAO   SKIN: No rashes    LABS:                        8.7    12.21 )-----------( 319      ( 24 Sep 2018 07:50 )             28.6     09-24    132<L>  |  92<L>  |  23  ----------------------------<  124<H>  4.0   |  28  |  0.73    Ca    8.7      24 Sep 2018 07:50              CAPILLARY BLOOD GLUCOSE      POCT Blood Glucose.: 239 mg/dL (24 Sep 2018 21:31)  POCT Blood Glucose.: 201 mg/dL (24 Sep 2018 17:26)  POCT Blood Glucose.: 173 mg/dL (24 Sep 2018 12:33)  POCT Blood Glucose.: 131 mg/dL (24 Sep 2018 08:44)    09-21 @ 16:49  Culture-urine --  Culture results --  method type --  Organism --  Organism Identification --  Specimen source BLOOD PERIPHERAL           09-21 @ 16:49  Culture blood   NO ORGANISMS ISOLATED  NO ORGANISMS ISOLATED AT 72 HRS.  Culture results --  Gram stain --  Gram stain blood --  Method type --  Organism --  Organism identification --  Specimen source BLOOD PERIPHERAL      RADIOLOGY & ADDITIONAL TESTS:    Imaging Personally Reviewed:    Consultant(s) Notes Reviewed:      Care Discussed with Consultants/Other Providers:

## 2018-09-24 NOTE — CHART NOTE - NSCHARTNOTEFT_GEN_A_CORE
ADS NIGHT COVERAGE:    s/w pulmonology on call. Requested for ADS to call pulm team again later in AM to see if pt can be transferred to [facility].     ANA VILLANUEVA PA-C  [id number]

## 2018-09-24 NOTE — PROGRESS NOTE ADULT - ASSESSMENT
86F with advanced lung cancer, probably recurrence and a left breast mass - not yet characterized here with shortness of breath.   Found to have right lower infiltrate on CT as well as large right upper lobe mass which is likely previously known neoplasm. Question of new breast mass, too.     Assessment  86F with progressing metastatic cancer, possibly a new primary, too   Pneumonia  Was dyspneic, though improved on antibiotics and nasal cannula      Recs:  - Question of HCAP given recent admission. Agree with one week course of antibiotics  - Maintain o2 saturation above 90-92%. Patient tolerating nasal cannula 2L at this time. No need for high flow or bipap at this time. Does not have a strong indication for bipap. If further decompensates, would consider high flow before bipap.    - Patient has advanced cancer, is decompensating from her last admission and has not been able to keep her outpatient RT due to debility and these hospitalizations. Would likely benefit from palliative care.   These episodes of dyspnea may be due to anxiety or mass effect from the right apical mass. BUT there has been no desaturation associated with it. This may be another symptom she could address with palliative care.     Clair Wagoner MD  Pulmonary/Critical Care Fellow  page: [phone] 86F with advanced lung cancer, probably recurrence and a left breast mass - not yet characterized here with shortness of breath.   Found to have right lower infiltrate on CT as well as large right upper lobe mass which is likely previously known neoplasm. Question of new breast mass, too.     Assessment  86F with progressing metastatic cancer  RLL Pneumonia  Was dyspneic, though improved on antibiotics and nasal cannula      Recs:  - Question of HCAP given recent admission. Agree with one week course of antibiotics for now, will reassess in few days.  - Maintain o2 saturation above 90-92%. Patient tolerating nasal cannula 2L at this time. No need for high flow or bipap at this time. Does not have a strong indication for bipap. If further decompensates, would consider high flow before bipap.    - Patient has advanced cancer, is decompensating from her last admission and has not been able to keep her outpatient RT due to debility and these hospitalizations. Would likely benefit from palliative care.   These episodes of dyspnea may be due to anxiety or mass effect from the right apical mass. BUT there has been no desaturation associated with it. This may be another symptom she could address with palliative care.     Clair Wagoner MD  Pulmonary/Critical Care Fellow  page: [phone]

## 2018-09-24 NOTE — PROGRESS NOTE ADULT - SUBJECTIVE AND OBJECTIVE BOX
[facility] [phone]        Patient seen and examined in bed.  She was in good spirits and offered no complaints.  No NV noted        MEDICATIONS  (STANDING):  acetaminophen   Tablet .. 975 milliGRAM(s) Oral every 8 hours  ALBUTerol/ipratropium for Nebulization 3 milliLiter(s) Nebulizer every 6 hours  buDESOnide 160 MICROgram(s)/formoterol 4.5 MICROgram(s) Inhaler 2 Puff(s) Inhalation two times a day  dextrose 5%. 1000 milliLiter(s) (50 mL/Hr) IV Continuous <Continuous>  dextrose 50% Injectable 12.5 Gram(s) IV Push once  dextrose 50% Injectable 25 Gram(s) IV Push once  dextrose 50% Injectable 25 Gram(s) IV Push once  docusate sodium 100 milliGRAM(s) Oral three times a day  famotidine    Tablet 20 milliGRAM(s) Oral daily  fenofibrate Tablet 145 milliGRAM(s) Oral daily  gabapentin 400 milliGRAM(s) Oral <User Schedule>  influenza   Vaccine 0.5 milliLiter(s) IntraMuscular once  insulin glargine Injectable (LANTUS) 16 Unit(s) SubCutaneous at bedtime  insulin lispro (HumaLOG) corrective regimen sliding scale   SubCutaneous three times a day before meals  insulin lispro Injectable (HumaLOG) 6 Unit(s) SubCutaneous three times a day before meals  lidocaine   Patch 3 Patch Transdermal daily  metoprolol tartrate 50 milliGRAM(s) Oral two times a day  NIFEdipine XL 30 milliGRAM(s) Oral daily  NIFEdipine XL 60 milliGRAM(s) Oral at bedtime  piperacillin/tazobactam IVPB. 3.375 Gram(s) IV Intermittent every 8 hours  predniSONE   Tablet 10 milliGRAM(s) Oral two times a day  senna 2 Tablet(s) Oral at bedtime  sodium chloride 1 Gram(s) Oral daily  sodium chloride 0.9% lock flush 3 milliLiter(s) IV Push every 8 hours  traMADol 25 milliGRAM(s) Oral <User Schedule>  vancomycin  IVPB 1000 milliGRAM(s) IV Intermittent every 24 hours      VITAL:  T(C): , Max: 36.7 (09-23-18 @ 20:52)  T(F): , Max: 98 (09-23-18 @ 20:52)  HR: 89 (09-24-18 @ 15:14)  BP: 117/82 (09-24-18 @ 13:03)  BP(mean): --  RR: 18 (09-24-18 @ 13:03)  SpO2: 99% (09-24-18 @ 15:14)  Wt(kg): --    I and O's:        PHYSICAL EXAM:    Constitutional: cachetic  HEENT: PERRLA    Neck:  No JVD  Respiratory: Course breath sounds  Cardiovascular: S1 and S2  Gastrointestinal: BS+, soft, NT/ND  Extremities: No peripheral edema  Neurological: A/O x 3, no focal deficits  Psychiatric: Normal mood, normal affect  : No Alcaraz  Skin: No rashes  Access: Not applicable    LABS:                        8.7    12.21 )-----------( 319      ( 24 Sep 2018 07:50 )             28.6     09-24    132<L>  |  92<L>  |  23  ----------------------------<  124<H>  4.0   |  28  |  0.73    Ca    8.7      24 Sep 2018 07:50            Urine Studies:          RADIOLOGY & ADDITIONAL STUDIES:

## 2018-09-24 NOTE — PROGRESS NOTE ADULT - SUBJECTIVE AND OBJECTIVE BOX
CHIEF COMPLAINT: Dyspnea, lung cancer    Interval Events: Had dyspnea overnight, no reported desaturation but patient reported difficulty breathing and placed on Bipap.   This AM transitioned to nasal cannula 4L with SpO2 in 94-98% range without dyspnea.   CT reviewed with radiologist, right apical mass seen 2 weeks ago, also right sided atelectasis, infiltrate.   No real effsion  left breast mass    REVIEW OF SYSTEMS:  Constitutional: [ ] negative [ ] fevers [ ] chills [ ] weight loss [ ] weight gain  HEENT: [ ] negative [ ] dry eyes [ ] eye irritation [ ] postnasal drip [ ] nasal congestion  CV: [ ] negative  [ ] chest pain [ ] orthopnea [ ] palpitations [ ] murmur  Resp: [ ] negative [ ] cough [ ] shortness of breath [ ] dyspnea [ ] wheezing [ ] sputum [ ] hemoptysis  GI: [ ] negative [ ] nausea [ ] vomiting [ ] diarrhea [ ] constipation [ ] abd pain [ ] dysphagia   : [ ] negative [ ] dysuria [ ] nocturia [ ] hematuria [ ] increased urinary frequency  Musculoskeletal: [ ] negative [ ] back pain [ ] myalgias [ ] arthralgias [ ] fracture  Skin: [ ] negative [ ] rash [ ] itch  Neurological: [ ] negative [ ] headache [ ] dizziness [ ] syncope [ ] weakness [ ] numbness  Psychiatric: [ ] negative [ ] anxiety [ ] depression  Endocrine: [ ] negative [ ] diabetes [ ] thyroid problem  Hematologic/Lymphatic: [ ] negative [ ] anemia [ ] bleeding problem  Allergic/Immunologic: [ ] negative [ ] itchy eyes [ ] nasal discharge [ ] hives [ ] angioedema  [ ] All other systems negative  [ ] Unable to assess ROS because ________    OBJECTIVE:  ICU Vital Signs Last 24 Hrs  T(C): 36.4 (24 Sep 2018 13:03), Max: 36.7 (23 Sep 2018 20:52)  T(F): 97.5 (24 Sep 2018 13:03), Max: 98 (23 Sep 2018 20:52)  HR: 94 (24 Sep 2018 13:03) (83 - 120)  BP: 117/82 (24 Sep 2018 13:03) (117/82 - 169/84)  BP(mean): --  ABP: --  ABP(mean): --  RR: 18 (24 Sep 2018 13:03) (18 - 20)  SpO2: 99% (24 Sep 2018 13:03) (95% - 100%)        CAPILLARY BLOOD GLUCOSE      POCT Blood Glucose.: 173 mg/dL (24 Sep 2018 12:33)      PHYSICAL EXAM:  General: Ill appearing thin woman, uncomfortable on bipap but comfortable on nasal cannula  Respiratory: Normal effort on 4L NC with SpO2 [id number]  decreased sounds at right upper field, some right mid to low crackles.  Left side generally vesicular with occasional basal crackle  Cardiovascular: regular rate, normal s1 and s2,   Abdomen: thin, NT , ND  Awake, alert attentive. Oriented normally.   Hoarse voice    HOSPITAL MEDICATIONS:  MEDICATIONS  (STANDING):  ALBUTerol/ipratropium for Nebulization 3 milliLiter(s) Nebulizer every 6 hours  buDESOnide 160 MICROgram(s)/formoterol 4.5 MICROgram(s) Inhaler 2 Puff(s) Inhalation two times a day  dextrose 5%. 1000 milliLiter(s) (50 mL/Hr) IV Continuous <Continuous>  dextrose 50% Injectable 12.5 Gram(s) IV Push once  dextrose 50% Injectable 25 Gram(s) IV Push once  dextrose 50% Injectable 25 Gram(s) IV Push once  docusate sodium 100 milliGRAM(s) Oral three times a day  famotidine    Tablet 20 milliGRAM(s) Oral daily  fenofibrate Tablet 145 milliGRAM(s) Oral daily  gabapentin 400 milliGRAM(s) Oral <User Schedule>  influenza   Vaccine 0.5 milliLiter(s) IntraMuscular once  insulin glargine Injectable (LANTUS) 16 Unit(s) SubCutaneous at bedtime  insulin lispro (HumaLOG) corrective regimen sliding scale   SubCutaneous three times a day before meals  insulin lispro Injectable (HumaLOG) 6 Unit(s) SubCutaneous three times a day before meals  lidocaine   Patch 3 Patch Transdermal daily  metoprolol tartrate 50 milliGRAM(s) Oral two times a day  NIFEdipine XL 30 milliGRAM(s) Oral daily  NIFEdipine XL 60 milliGRAM(s) Oral at bedtime  piperacillin/tazobactam IVPB. 3.375 Gram(s) IV Intermittent every 8 hours  predniSONE   Tablet 10 milliGRAM(s) Oral two times a day  senna 2 Tablet(s) Oral at bedtime  sodium chloride 1 Gram(s) Oral daily  sodium chloride 0.9% lock flush 3 milliLiter(s) IV Push every 8 hours  traMADol 25 milliGRAM(s) Oral <User Schedule>  vancomycin  IVPB 1000 milliGRAM(s) IV Intermittent every 24 hours    MEDICATIONS  (PRN):  acetaminophen   Tablet .. 975 milliGRAM(s) Oral <User Schedule> PRN Mild Pain (1 - 3), Moderate Pain (4 - 6), Severe Pain (7 - 10)  dextrose 40% Gel 15 Gram(s) Oral once PRN Blood Glucose LESS THAN 70 milliGRAM(s)/deciliter  dextrose 40% Gel 15 Gram(s) Oral once PRN Blood Glucose LESS THAN 70 milliGRAM(s)/deciliter  glucagon  Injectable 1 milliGRAM(s) IntraMuscular once PRN Glucose LESS THAN 70 milligrams/deciliter  polyethylene glycol 3350 17 Gram(s) Oral daily PRN Constipation      LABS:                        8.7    12.21 )-----------( 319      ( 24 Sep 2018 07:50 )             28.6     Hgb Trend: 8.7<--, 8.1<--, 9.4<--  09-24    132<L>  |  92<L>  |  23  ----------------------------<  124<H>  4.0   |  28  |  0.73    Ca    8.7      24 Sep 2018 07:50      Creatinine Trend: 0.73<--, 0.76<--, 0.78<--, 0.84<--, 0.78<--, 0.77<--      Arterial Blood Gas:  09-23 @ 21:50  7.45/41/168/28/98.9/4.2  ABG lactate: --        MICROBIOLOGY:     Culture - Blood (collected 21 Sep 2018 16:49)  Source: BLOOD VENOUS  Preliminary Report (23 Sep 2018 16:49):    NO ORGANISMS ISOLATED    NO ORGANISMS ISOLATED AT 48 HRS.    Culture - Blood (collected 21 Sep 2018 16:49)  Source: BLOOD PERIPHERAL  Preliminary Report (23 Sep 2018 16:49):    NO ORGANISMS ISOLATED    NO ORGANISMS ISOLATED AT 48 HRS.

## 2018-09-24 NOTE — PROGRESS NOTE ADULT - SUBJECTIVE AND OBJECTIVE BOX
Cardiovascular Disease Progress Note    Overnight events: overnight events noted. currently on bipap. appears comfortable. no cp/sob/pnd/orthopnea/palpitations  Otherwise review of systems negative    Objective Findings:  T(C): 36.4 (09-24-18 @ 05:03), Max: 36.8 (09-23-18 @ 14:01)  HR: 90 (09-24-18 @ 07:52) (83 - 120)  BP: 149/77 (09-24-18 @ 05:03) (134/87 - 169/84)  RR: 20 (09-24-18 @ 05:03) (18 - 20)  SpO2: 100% (09-24-18 @ 07:52) (95% - 100%)  Wt(kg): --  Daily     Daily       Physical Exam:  Gen: NAD  HEENT: EOMI  CV: RRR, normal S1 + S2, no m/r/g  Lungs: coarse breath sounds  Abd: soft, non-tender  Ext: No edema      Laboratory Data:                        8.1    12.10 )-----------( 354      ( 23 Sep 2018 06:55 )             26.0     09-23    133<L>  |  93<L>  |  18  ----------------------------<  102<H>  4.1   |  28  |  0.76    Ca    8.7      23 Sep 2018 06:55                Inpatient Medications:  MEDICATIONS  (STANDING):  ALBUTerol/ipratropium for Nebulization 3 milliLiter(s) Nebulizer every 6 hours  buDESOnide 160 MICROgram(s)/formoterol 4.5 MICROgram(s) Inhaler 2 Puff(s) Inhalation two times a day  dextrose 5%. 1000 milliLiter(s) (50 mL/Hr) IV Continuous <Continuous>  dextrose 50% Injectable 12.5 Gram(s) IV Push once  dextrose 50% Injectable 25 Gram(s) IV Push once  dextrose 50% Injectable 25 Gram(s) IV Push once  docusate sodium 100 milliGRAM(s) Oral three times a day  famotidine    Tablet 20 milliGRAM(s) Oral daily  fenofibrate Tablet 145 milliGRAM(s) Oral daily  gabapentin 400 milliGRAM(s) Oral <User Schedule>  influenza   Vaccine 0.5 milliLiter(s) IntraMuscular once  insulin glargine Injectable (LANTUS) 16 Unit(s) SubCutaneous at bedtime  insulin lispro (HumaLOG) corrective regimen sliding scale   SubCutaneous three times a day before meals  insulin lispro Injectable (HumaLOG) 6 Unit(s) SubCutaneous three times a day before meals  lidocaine   Patch 3 Patch Transdermal daily  metoprolol tartrate 50 milliGRAM(s) Oral two times a day  NIFEdipine XL 30 milliGRAM(s) Oral daily  NIFEdipine XL 60 milliGRAM(s) Oral at bedtime  piperacillin/tazobactam IVPB. 3.375 Gram(s) IV Intermittent every 8 hours  predniSONE   Tablet 10 milliGRAM(s) Oral two times a day  senna 2 Tablet(s) Oral at bedtime  sodium chloride 1 Gram(s) Oral daily  sodium chloride 0.9% lock flush 3 milliLiter(s) IV Push every 8 hours  traMADol 25 milliGRAM(s) Oral <User Schedule>  vancomycin  IVPB 1000 milliGRAM(s) IV Intermittent every 24 hours      Assessment:  86 F with PMH of lung adenoca (dx around 2010, s/p L VATS, XRT of TERESA/RUL last in July 2016), COPD with emphysema, htn, and dm2, recently admitted with shoulder pain secondary to zoster and incidental e hemorrhagic brain met now presents with acute hypoxic respiratory failure, found to be RVP and significant right lung collapse/atelectasis    Plan:  1) hypoxic respiratory failure   -appreciate pulm recs. currently being treated for HCAP  -chest PT  -bronchodilators  -maintain euvolemia  -f/u cultures and infectious workup  -no evidence of CO2 retention on ABG    2) HTN, APC's  -no evidence of atrial fibrillation. cont to hold AC  -c/w metoprolol as tolerates  -c/w nifdepine as dosed  -cont to hold hctz 2/2 hyponatremia    3) hyponatremia  -likely multifactorial including tea/toast diet and SIADH  -appreciate renal recs  -improving       Over 35 minutes spent on total encounter; more than 50% of the visit was spent counseling and/or coordinating care by the attending physician.      Slick York MD   Cardiovascular Disease  [phone]

## 2018-09-24 NOTE — PROGRESS NOTE ADULT - ASSESSMENT
· Assessment	  86 F with above hx with worsening SOB despite oral abx as out-pt. possible HCAP vs mucus plugging vs progression of dz     Problem/Plan - 1:  ·  Problem: Shortness of breath.  Plan: likely from HCAP/Ca lung    Pulm f/up appreciated.  IV abx/ Symbicort  O2/Prednisone     Problem/Plan - 2:  ·  Problem: Diabetes  will continue basal + bolus  monitor FS    Hyponatremia:  Nephro f/up noted.     Problem/Plan - 3:  ·  Problem: Lung cancer (s/p RT, no chemo, follows at [facility])  + F/up at [facility].    CAD:  Cardio f/up noted.    Dw family.

## 2018-09-24 NOTE — PROGRESS NOTE ADULT - SUBJECTIVE AND OBJECTIVE BOX
SUBJECTIVE AND OBJECTIVE:  INTERVAL HPI/OVERNIGHT EVENTS:    DNR on chart: Yes    Allergies    ACE inhibitors (Angioedema)    Intolerances    MEDICATIONS  (STANDING):  acetaminophen   Tablet .. 975 milliGRAM(s) Oral every 8 hours  buDESOnide 160 MICROgram(s)/formoterol 4.5 MICROgram(s) Inhaler 2 Puff(s) Inhalation two times a day  dextrose 5%. 1000 milliLiter(s) (50 mL/Hr) IV Continuous <Continuous>  dextrose 50% Injectable 12.5 Gram(s) IV Push once  dextrose 50% Injectable 25 Gram(s) IV Push once  dextrose 50% Injectable 25 Gram(s) IV Push once  docusate sodium 100 milliGRAM(s) Oral three times a day  famotidine    Tablet 20 milliGRAM(s) Oral daily  fenofibrate Tablet 145 milliGRAM(s) Oral daily  gabapentin 400 milliGRAM(s) Oral three times a day  influenza   Vaccine 0.5 milliLiter(s) IntraMuscular once  insulin glargine Injectable (LANTUS) 16 Unit(s) SubCutaneous at bedtime  insulin lispro (HumaLOG) corrective regimen sliding scale   SubCutaneous three times a day before meals  insulin lispro Injectable (HumaLOG) 6 Unit(s) SubCutaneous three times a day before meals  lidocaine   Patch 3 Patch Transdermal daily  metoprolol tartrate 50 milliGRAM(s) Oral two times a day  NIFEdipine XL 30 milliGRAM(s) Oral daily  NIFEdipine XL 60 milliGRAM(s) Oral at bedtime  piperacillin/tazobactam IVPB. 3.375 Gram(s) IV Intermittent every 8 hours  predniSONE   Tablet 10 milliGRAM(s) Oral two times a day  senna 2 Tablet(s) Oral at bedtime  sodium chloride 1 Gram(s) Oral daily  sodium chloride 0.9% lock flush 3 milliLiter(s) IV Push every 8 hours  traMADol 25 milliGRAM(s) Oral <User Schedule>  vancomycin  IVPB 1000 milliGRAM(s) IV Intermittent every 24 hours    MEDICATIONS  (PRN):  dextrose 40% Gel 15 Gram(s) Oral once PRN Blood Glucose LESS THAN 70 milliGRAM(s)/deciliter  dextrose 40% Gel 15 Gram(s) Oral once PRN Blood Glucose LESS THAN 70 milliGRAM(s)/deciliter  glucagon  Injectable 1 milliGRAM(s) IntraMuscular once PRN Glucose LESS THAN 70 milligrams/deciliter  polyethylene glycol 3350 17 Gram(s) Oral daily PRN Constipation      ITEMS UNCHECKED ARE NOT PRESENT    PRESENT SYMPTOMS: [ ]Unable to obtain due to poor mentation   Source if other than patient:  [ ]Family   [ ]Team     Pain (Impact on QOL):    Location:  Minimal acceptable level (0-10 scale):            Aggravating factors:  Quality:  Radiation:  Severity (0-10 scale):    Timing:    Dyspnea:                           [ ]Mild [ ]Moderate [ ]Severe  Anxiety:                             [ ]Mild [ ]Moderate [ ]Severe  Fatigue:                             [ ]Mild [ ]Moderate [ ]Severe  Nausea:                             [ ]Mild [ ]Moderate [ ]Severe  Loss of appetite:              [ ]Mild [ ]Moderate [ ]Severe  Constipation:                    [ ]Mild [ ]Moderate [ ]Severe    PAIN AD Score:	  [email or web address]/painad.[email or web address] (Ctrl + left click to view)    Other Symptoms:  [ ]All other review of systems negative     Karnofsky Performance Score/Palliative Performance Status Version 2:         %    http://palliative.info/resource_material/PPSv2.pdf  PHYSICAL EXAM:  Vital Signs Last 24 Hrs  T(C): 36.5 (25 Sep 2018 05:43), Max: 36.5 (25 Sep 2018 05:43)  T(F): 97.7 (25 Sep 2018 05:43), Max: 97.7 (25 Sep 2018 05:43)  HR: 92 (25 Sep 2018 07:03) (88 - 97)  BP: 159/88 (25 Sep 2018 05:43) (117/82 - 165/89)  BP(mean): --  RR: 18 (25 Sep 2018 05:43) (18 - 18)  SpO2: 100% (25 Sep 2018 07:03) (95% - 100%) I&O's Summary   GENERAL:  [ ]Alert  [ ]Oriented x   [ ]Lethargic  [ ]Cachexia  [ ]Unarousable  [ ]Verbal  [ ]Non-Verbal    Behavioral:   [ ] Anxiety  [ ] Delirium [ ] Agitation [ ] Other    HEENT:  [ ]Normal   [ ]Dry mouth   [ ]ET Tube/Trach  [ ]Oral lesions    PULMONARY:   [ ]Clear [ ]Tachypnea  [ ]Audible excessive secretions   [ ]Rhonchi        [ ]Right [ ]Left [ ]Bilateral  [ ]Crackles        [ ]Right [ ]Left [ ]Bilateral  [ ]Wheezing     [ ]Right [ ]Left [ ]Bilateral    CARDIOVASCULAR:    [ ]Regular [ ]Irregular [ ]Tachy  [ ]Blane [ ]Murmur [ ]Other    GASTROINTESTINAL:  [ ]Soft  [ ]Distended   [ ]+BS  [ ]Non tender [ ]Tender  [ ]PEG [ ]OGT/ NGT   Last BM:  GENITOURINARY:  [ ]Normal [ ] Incontinent   [ ]Oliguria/Anuria   [ ]Alcaraz    MUSCULOSKELETAL:   [ ]Normal   [ ]Weakness  [ ]Bed/Wheelchair bound [ ]Edema    NEUROLOGIC:   [ ]No focal deficits  [ ] Cognitive impairment  [ ] Dysphagia [ ]Dysarthria [ ] Paresis [ ]Other     SKIN:   [ ]Normal   [ ]Pressure ulcer(s)  [ ]Rash    CRITICAL CARE:  [ ] Shock Present  [ ]Septic [ ]Cardiogenic [ ]Neurologic [ ]Hypovolemic  [ ]  Vasopressors [ ]  Inotropes   [ ] Respiratory failure present  [ ] Acute  [ ] Chronic [ ] Hypoxic  [ ] Hypercarbic [ ] Other  [ ] Other organ failure     LABS:                        8.8    10.99 )-----------( 435      ( 25 Sep 2018 06:50 )             29.2   09-25    136  |  95<L>  |  18  ----------------------------<  133<H>  3.9   |  30  |  0.70    Ca    8.9      25 Sep 2018 06:50          RADIOLOGY & ADDITIONAL STUDIES:    Protein Calorie Malnutrition Present: [ ] yes [ ] no  [ ] PPSV2 < or = 30%  [ ] significant weight loss [ ] poor nutritional intake [ ] anasarca [ ] catabolic state Albumin, Serum: 3.0 g/dL (09-21-18 @ 14:00)  Artificial Nutrition [ ]     REFERRALS:   [ ]Chaplaincy  [ ] Hospice  [ ]Child Life  [ ]Social Work  [ ]Case management [ ]Holistic Therapy   Goals of Care Document: SUBJECTIVE AND OBJECTIVE:  Worsening respiratory failure, likely mucous plugging.  INTERVAL HPI/OVERNIGHT EVENTS:  Worsening respiratory failure.  Continues to have pain.    DNR on chart: Yes    Allergies    ACE inhibitors (Angioedema)    Intolerances    MEDICATIONS  (STANDING):  acetaminophen   Tablet .. 975 milliGRAM(s) Oral every 8 hours  buDESOnide 160 MICROgram(s)/formoterol 4.5 MICROgram(s) Inhaler 2 Puff(s) Inhalation two times a day  dextrose 5%. 1000 milliLiter(s) (50 mL/Hr) IV Continuous <Continuous>  dextrose 50% Injectable 12.5 Gram(s) IV Push once  dextrose 50% Injectable 25 Gram(s) IV Push once  dextrose 50% Injectable 25 Gram(s) IV Push once  docusate sodium 100 milliGRAM(s) Oral three times a day  famotidine    Tablet 20 milliGRAM(s) Oral daily  fenofibrate Tablet 145 milliGRAM(s) Oral daily  gabapentin 400 milliGRAM(s) Oral three times a day  influenza   Vaccine 0.5 milliLiter(s) IntraMuscular once  insulin glargine Injectable (LANTUS) 16 Unit(s) SubCutaneous at bedtime  insulin lispro (HumaLOG) corrective regimen sliding scale   SubCutaneous three times a day before meals  insulin lispro Injectable (HumaLOG) 6 Unit(s) SubCutaneous three times a day before meals  lidocaine   Patch 3 Patch Transdermal daily  metoprolol tartrate 50 milliGRAM(s) Oral two times a day  NIFEdipine XL 30 milliGRAM(s) Oral daily  NIFEdipine XL 60 milliGRAM(s) Oral at bedtime  piperacillin/tazobactam IVPB. 3.375 Gram(s) IV Intermittent every 8 hours  predniSONE   Tablet 10 milliGRAM(s) Oral two times a day  senna 2 Tablet(s) Oral at bedtime  sodium chloride 1 Gram(s) Oral daily  sodium chloride 0.9% lock flush 3 milliLiter(s) IV Push every 8 hours  traMADol 25 milliGRAM(s) Oral <User Schedule>  vancomycin  IVPB 1000 milliGRAM(s) IV Intermittent every 24 hours    MEDICATIONS  (PRN):  dextrose 40% Gel 15 Gram(s) Oral once PRN Blood Glucose LESS THAN 70 milliGRAM(s)/deciliter  dextrose 40% Gel 15 Gram(s) Oral once PRN Blood Glucose LESS THAN 70 milliGRAM(s)/deciliter  glucagon  Injectable 1 milliGRAM(s) IntraMuscular once PRN Glucose LESS THAN 70 milligrams/deciliter  polyethylene glycol 3350 17 Gram(s) Oral daily PRN Constipation      ITEMS UNCHECKED ARE NOT PRESENT    PRESENT SYMPTOMS: [ ]Unable to obtain due to poor mentation   Source if other than patient:  [ ]Family   [ ]Team     Pain (Impact on QOL):  Unable to move  Location: right shoulder  Minimal acceptable level (0-10 scale):    5        Aggravating factors: movement  Quality: sharp  Radiation: down arm  Severity (0-10 scale): 8   Timing: intermittent    Dyspnea:                           [ ]Mild [ ]Moderate [x ]Severe  Anxiety:                             [ ]Mild [x ]Moderate [ ]Severe  Fatigue:                             [ ]Mild [ ]Moderate [x]Severe  Nausea:                             [ x]Mild [ ]Moderate [ ]Severe  Loss of appetite:              [ ]Mild [ ]Moderate [x]Severe  Constipation:                    [ ]Mild [ x]Moderate [ ]Severe    PAIN AD Score:	  [email or web address]/painad.pdf (Ctrl + left click to view)    Other Symptoms:  [ x]All other review of systems negative     Karnofsky Performance Score/Palliative Performance Status Version 2:     30    %    http://palliative.info/resource_material/PPSv2.pdf  PHYSICAL EXAM:  Vital Signs Last 24 Hrs  T(C): 36.5 (25 Sep 2018 05:43), Max: 36.5 (25 Sep 2018 05:43)  T(F): 97.7 (25 Sep 2018 05:43), Max: 97.7 (25 Sep 2018 05:43)  HR: 92 (25 Sep 2018 07:03) (88 - 97)  BP: 159/88 (25 Sep 2018 05:43) (117/82 - 165/89)  BP(mean): --  RR: 18 (25 Sep 2018 05:43) (18 - 18)  SpO2: 100% (25 Sep 2018 07:03) (95% - 100%) I&O's Summary   GENERAL:  [ ]Alert  [ ]Oriented x   [x ]Lethargic  [ ]Cachexia  [ ]Unarousable  [ ]Verbal  [ ]Non-Verbal    Behavioral:   [x ] Anxiety  [ ] Delirium [ ] Agitation [ ] Other    HEENT:  [ ]Normal   [x ]Dry mouth   [ ]ET Tube/Trach  [ ]Oral lesions    PULMONARY:   [ ]Clear [ ]Tachypnea  [ ]Audible excessive secretions   [ x]Rhonchi        [ ]Right [ ]Left [x]Bilateral  [ ]Crackles        [ ]Right [ ]Left [ ]Bilateral  [ ]Wheezing     [ ]Right [ ]Left [ ]Bilateral    CARDIOVASCULAR:    [ x]Regular [ ]Irregular [ ]Tachy  [ ]Blane [ ]Murmur [ ]Other    GASTROINTESTINAL:  [ x]Soft  [ ]Distended   [x ]+BS  [ x]Non tender [ ]Tender  [ ]PEG [ ]OGT/ NGT   Last BM:  GENITOURINARY:  [ ]Normal [ ] Incontinent   [ ]Oliguria/Anuria   [ ]Alcaraz    MUSCULOSKELETAL:   [ ]Normal   [ ]Weakness  [x ]Bed/Wheelchair bound [ ]Edema    NEUROLOGIC:   [x ]No focal deficits  [ ] Cognitive impairment  [ ] Dysphagia [ ]Dysarthria [ ] Paresis [ ]Other     SKIN:   [ x]Normal   [ ]Pressure ulcer(s)  [ ]Rash    CRITICAL CARE:  [ ] Shock Present  [ ]Septic [ ]Cardiogenic [ ]Neurologic [ ]Hypovolemic  [ ]  Vasopressors [ ]  Inotropes   [x ] Respiratory failure present  [x ] Acute  [ ] Chronic [ x] Hypoxic  [ ] Hypercarbic [ ] Other  [ ] Other organ failure     LABS:                        8.8    10.99 )-----------( 435      ( 25 Sep 2018 06:50 )             29.2   09-25    136  |  95<L>  |  18  ----------------------------<  133<H>  3.9   |  30  |  0.70    Ca    8.9      25 Sep 2018 06:50          RADIOLOGY & ADDITIONAL STUDIES:    Protein Calorie Malnutrition Present: x[ ] yes [ ] no  [x ] PPSV2 < or = 30%  [x ] significant weight loss [ x] poor nutritional intake [ ] anasarca [x ] catabolic state Albumin, Serum: 3.0 g/dL (09-21-18 @ 14:00)  Artificial Nutrition [ ]     REFERRALS:   [ ]Chaplaincy  [ ] Hospice  [ ]Child Life  [ ]Social Work  [ ]Case management [ ]Holistic Therapy   Goals of Care Document:

## 2018-09-25 DIAGNOSIS — G89.3 NEOPLASM RELATED PAIN (ACUTE) (CHRONIC): ICD-10-CM

## 2018-09-25 DIAGNOSIS — Z51.5 ENCOUNTER FOR PALLIATIVE CARE: ICD-10-CM

## 2018-09-25 DIAGNOSIS — J96.01 ACUTE RESPIRATORY FAILURE WITH HYPOXIA: ICD-10-CM

## 2018-09-25 DIAGNOSIS — C34.11 MALIGNANT NEOPLASM OF UPPER LOBE, RIGHT BRONCHUS OR LUNG: ICD-10-CM

## 2018-09-25 LAB
BUN SERPL-MCNC: 18 MG/DL — SIGNIFICANT CHANGE UP (ref 7–23)
CALCIUM SERPL-MCNC: 8.9 MG/DL — SIGNIFICANT CHANGE UP (ref 8.4–10.5)
CHLORIDE SERPL-SCNC: 95 MMOL/L — LOW (ref 98–107)
CO2 SERPL-SCNC: 30 MMOL/L — SIGNIFICANT CHANGE UP (ref 22–31)
CREAT SERPL-MCNC: 0.7 MG/DL — SIGNIFICANT CHANGE UP (ref 0.5–1.3)
GLUCOSE BLDC GLUCOMTR-MCNC: 118 MG/DL — HIGH (ref 70–99)
GLUCOSE BLDC GLUCOMTR-MCNC: 120 MG/DL — HIGH (ref 70–99)
GLUCOSE BLDC GLUCOMTR-MCNC: 127 MG/DL — HIGH (ref 70–99)
GLUCOSE BLDC GLUCOMTR-MCNC: 133 MG/DL — HIGH (ref 70–99)
GLUCOSE BLDC GLUCOMTR-MCNC: 35 MG/DL — CRITICAL LOW (ref 70–99)
GLUCOSE BLDC GLUCOMTR-MCNC: 38 MG/DL — CRITICAL LOW (ref 70–99)
GLUCOSE SERPL-MCNC: 133 MG/DL — HIGH (ref 70–99)
HCT VFR BLD CALC: 29.2 % — LOW (ref 34.5–45)
HGB BLD-MCNC: 8.8 G/DL — LOW (ref 11.5–15.5)
MCHC RBC-ENTMCNC: 25.9 PG — LOW (ref 27–34)
MCHC RBC-ENTMCNC: 30.1 % — LOW (ref 32–36)
MCV RBC AUTO: 85.9 FL — SIGNIFICANT CHANGE UP (ref 80–100)
NRBC # FLD: 0 — SIGNIFICANT CHANGE UP
PLATELET # BLD AUTO: 435 K/UL — HIGH (ref 150–400)
PMV BLD: 8.9 FL — SIGNIFICANT CHANGE UP (ref 7–13)
POTASSIUM SERPL-MCNC: 3.9 MMOL/L — SIGNIFICANT CHANGE UP (ref 3.5–5.3)
POTASSIUM SERPL-SCNC: 3.9 MMOL/L — SIGNIFICANT CHANGE UP (ref 3.5–5.3)
RBC # BLD: 3.4 M/UL — LOW (ref 3.8–5.2)
RBC # FLD: 16.9 % — HIGH (ref 10.3–14.5)
SODIUM SERPL-SCNC: 136 MMOL/L — SIGNIFICANT CHANGE UP (ref 135–145)
WBC # BLD: 10.99 K/UL — HIGH (ref 3.8–10.5)
WBC # FLD AUTO: 10.99 K/UL — HIGH (ref 3.8–10.5)

## 2018-09-25 PROCEDURE — 99232 SBSQ HOSP IP/OBS MODERATE 35: CPT | Mod: GC

## 2018-09-25 PROCEDURE — 99232 SBSQ HOSP IP/OBS MODERATE 35: CPT

## 2018-09-25 RX ORDER — METOPROLOL TARTRATE 50 MG
5 TABLET ORAL EVERY 6 HOURS
Qty: 0 | Refills: 0 | Status: DISCONTINUED | OUTPATIENT
Start: 2018-09-25 | End: 2018-09-26

## 2018-09-25 RX ORDER — ACETAMINOPHEN 500 MG
1000 TABLET ORAL ONCE
Qty: 0 | Refills: 0 | Status: COMPLETED | OUTPATIENT
Start: 2018-09-25 | End: 2018-09-25

## 2018-09-25 RX ORDER — FAMOTIDINE 10 MG/ML
20 INJECTION INTRAVENOUS DAILY
Qty: 0 | Refills: 0 | Status: DISCONTINUED | OUTPATIENT
Start: 2018-09-25 | End: 2018-09-26

## 2018-09-25 RX ORDER — DEXTROSE 50 % IN WATER 50 %
25 SYRINGE (ML) INTRAVENOUS ONCE
Qty: 0 | Refills: 0 | Status: COMPLETED | OUTPATIENT
Start: 2018-09-25 | End: 2018-09-25

## 2018-09-25 RX ADMIN — GABAPENTIN 400 MILLIGRAM(S): 400 CAPSULE ORAL at 13:29

## 2018-09-25 RX ADMIN — Medication 1000 MILLIGRAM(S): at 17:43

## 2018-09-25 RX ADMIN — TRAMADOL HYDROCHLORIDE 25 MILLIGRAM(S): 50 TABLET ORAL at 05:48

## 2018-09-25 RX ADMIN — Medication 400 MILLIGRAM(S): at 23:52

## 2018-09-25 RX ADMIN — Medication 6 UNIT(S): at 09:13

## 2018-09-25 RX ADMIN — Medication 25 GRAM(S): at 12:17

## 2018-09-25 RX ADMIN — PIPERACILLIN AND TAZOBACTAM 25 GRAM(S): 4; .5 INJECTION, POWDER, LYOPHILIZED, FOR SOLUTION INTRAVENOUS at 14:07

## 2018-09-25 RX ADMIN — SODIUM CHLORIDE 3 MILLILITER(S): 9 INJECTION INTRAMUSCULAR; INTRAVENOUS; SUBCUTANEOUS at 23:46

## 2018-09-25 RX ADMIN — SODIUM CHLORIDE 3 MILLILITER(S): 9 INJECTION INTRAMUSCULAR; INTRAVENOUS; SUBCUTANEOUS at 14:06

## 2018-09-25 RX ADMIN — Medication 400 MILLIGRAM(S): at 16:54

## 2018-09-25 RX ADMIN — GABAPENTIN 400 MILLIGRAM(S): 400 CAPSULE ORAL at 05:49

## 2018-09-25 RX ADMIN — Medication 250 MILLIGRAM(S): at 19:03

## 2018-09-25 RX ADMIN — Medication 30 MILLIGRAM(S): at 05:48

## 2018-09-25 RX ADMIN — Medication 50 MILLIGRAM(S): at 05:48

## 2018-09-25 RX ADMIN — PIPERACILLIN AND TAZOBACTAM 25 GRAM(S): 4; .5 INJECTION, POWDER, LYOPHILIZED, FOR SOLUTION INTRAVENOUS at 05:50

## 2018-09-25 RX ADMIN — BUDESONIDE AND FORMOTEROL FUMARATE DIHYDRATE 2 PUFF(S): 160; 4.5 AEROSOL RESPIRATORY (INHALATION) at 11:00

## 2018-09-25 RX ADMIN — Medication 5 MILLIGRAM(S): at 23:58

## 2018-09-25 RX ADMIN — Medication 10 MILLIGRAM(S): at 05:48

## 2018-09-25 NOTE — PROGRESS NOTE ADULT - ASSESSMENT
· Assessment	  86 F with above hx with worsening SOB despite oral abx as out-pt. possible HCAP vs mucus plugging vs progression of dz     Problem/Plan - 1:  ·  Problem: Impending Resp Failure:      Pul f/up noted.  On abx for HCAP  BIPAP  NPO  IV solumedrol  Pul had extensive discussion with family regarding worsening cancer and patient is dying.    CAD:  Cardio f/up noted.    Dw family. Family is upset because of patient's worsening condition. Pt is DNR.

## 2018-09-25 NOTE — PROGRESS NOTE ADULT - SUBJECTIVE AND OBJECTIVE BOX
CHIEF COMPLAINT: Dyspnea    Interval Events: Patient had episode of dyspnea with desaturation this morning, improved with bipap, however SpO2 never went below 90%    REVIEW OF SYSTEMS:  Constitutional: [ ] negative [ ] fevers [ ] chills [ ] weight loss [ ] weight gain  HEENT: [ ] negative [ ] dry eyes [ ] eye irritation [ ] postnasal drip [ ] nasal congestion  CV: [ ] negative  [ ] chest pain [ ] orthopnea [ ] palpitations [ ] murmur  Resp: [ ] negative [ ] cough [ ] shortness of breath [ ] dyspnea [ ] wheezing [ ] sputum [ ] hemoptysis  GI: [ ] negative [ ] nausea [ ] vomiting [ ] diarrhea [ ] constipation [ ] abd pain [ ] dysphagia   : [ ] negative [ ] dysuria [ ] nocturia [ ] hematuria [ ] increased urinary frequency  Musculoskeletal: [ ] negative [ ] back pain [ ] myalgias [ ] arthralgias [ ] fracture  Skin: [ ] negative [ ] rash [ ] itch  Neurological: [ ] negative [ ] headache [ ] dizziness [ ] syncope [ ] weakness [ ] numbness  Psychiatric: [ ] negative [ ] anxiety [ ] depression  Endocrine: [ ] negative [ ] diabetes [ ] thyroid problem  Hematologic/Lymphatic: [ ] negative [ ] anemia [ ] bleeding problem  Allergic/Immunologic: [ ] negative [ ] itchy eyes [ ] nasal discharge [ ] hives [ ] angioedema  [ ] All other systems negative  [ X] Unable to assess ROS because patient not very communicative    OBJECTIVE:  ICU Vital Signs Last 24 Hrs  T(C): 36.5 (25 Sep 2018 05:43), Max: 36.5 (25 Sep 2018 05:43)  T(F): 97.7 (25 Sep 2018 05:43), Max: 97.7 (25 Sep 2018 05:43)  HR: 90 (25 Sep 2018 11:25) (88 - 97)  BP: 159/88 (25 Sep 2018 05:43) (142/61 - 165/89)  RR: 18 (25 Sep 2018 05:43) (18 - 18)  SpO2: 97% (25 Sep 2018 11:25) (95% - 100%)        POCT Blood Glucose.: 133 mg/dL (25 Sep 2018 12:37)      PHYSICAL EXAM:  General:  Ill appearing elderly woman, laying in bed  Respiratory: Increased effort on nasal cannula. Somewhat improved with bipap  diffuse rhonchi throughout. Significant upper airway sounds  Cardiovascular:  rate regular, normal s1 and s2    HOSPITAL MEDICATIONS:  MEDICATIONS  (STANDING):  acetaminophen   Tablet .. 975 milliGRAM(s) Oral every 8 hours  buDESOnide 160 MICROgram(s)/formoterol 4.5 MICROgram(s) Inhaler 2 Puff(s) Inhalation two times a day  dextrose 5%. 1000 milliLiter(s) (50 mL/Hr) IV Continuous <Continuous>  dextrose 50% Injectable 12.5 Gram(s) IV Push once  dextrose 50% Injectable 25 Gram(s) IV Push once  dextrose 50% Injectable 25 Gram(s) IV Push once  docusate sodium 100 milliGRAM(s) Oral three times a day  famotidine    Tablet 20 milliGRAM(s) Oral daily  fenofibrate Tablet 145 milliGRAM(s) Oral daily  gabapentin 400 milliGRAM(s) Oral three times a day  influenza   Vaccine 0.5 milliLiter(s) IntraMuscular once  insulin glargine Injectable (LANTUS) 16 Unit(s) SubCutaneous at bedtime  insulin lispro (HumaLOG) corrective regimen sliding scale   SubCutaneous three times a day before meals  insulin lispro Injectable (HumaLOG) 6 Unit(s) SubCutaneous three times a day before meals  lidocaine   Patch 3 Patch Transdermal daily  metoprolol tartrate 50 milliGRAM(s) Oral two times a day  NIFEdipine XL 30 milliGRAM(s) Oral daily  NIFEdipine XL 60 milliGRAM(s) Oral at bedtime  piperacillin/tazobactam IVPB. 3.375 Gram(s) IV Intermittent every 8 hours  predniSONE   Tablet 10 milliGRAM(s) Oral two times a day  senna 2 Tablet(s) Oral at bedtime  sodium chloride 1 Gram(s) Oral daily  sodium chloride 0.9% lock flush 3 milliLiter(s) IV Push every 8 hours  traMADol 25 milliGRAM(s) Oral <User Schedule>  vancomycin  IVPB 1000 milliGRAM(s) IV Intermittent every 24 hours    MEDICATIONS  (PRN):  dextrose 40% Gel 15 Gram(s) Oral once PRN Blood Glucose LESS THAN 70 milliGRAM(s)/deciliter  dextrose 40% Gel 15 Gram(s) Oral once PRN Blood Glucose LESS THAN 70 milliGRAM(s)/deciliter  glucagon  Injectable 1 milliGRAM(s) IntraMuscular once PRN Glucose LESS THAN 70 milligrams/deciliter  polyethylene glycol 3350 17 Gram(s) Oral daily PRN Constipation      LABS:                        8.8    10.99 )-----------( 435      ( 25 Sep 2018 06:50 )             29.2     Hgb Trend: 8.8<--, 8.7<--, 8.1<--, 9.4<--  09-25    136  |  95<L>  |  18  ----------------------------<  133<H>  3.9   |  30  |  0.70    Ca    8.9      25 Sep 2018 06:50      Creatinine Trend: 0.70<--, 0.73<--, 0.76<--, 0.78<--, 0.84<--, 0.78<--      Arterial Blood Gas:  09-23 @ 21:50  7.45/41/168/28/98.9/4.2  ABG lactate: --

## 2018-09-25 NOTE — PROGRESS NOTE ADULT - SUBJECTIVE AND OBJECTIVE BOX
Cardiovascular Disease Progress Note    Overnight events: No acute events overnight.  still with episodes of sob requiring bipap. no cp/palps/pnd/prthopnea  Otherwise review of systems negative    Objective Findings:  T(C): 36.5 (09-25-18 @ 05:43), Max: 36.5 (09-25-18 @ 05:43)  HR: 92 (09-25-18 @ 07:03) (88 - 97)  BP: 159/88 (09-25-18 @ 05:43) (117/82 - 165/89)  RR: 18 (09-25-18 @ 05:43) (18 - 18)  SpO2: 100% (09-25-18 @ 07:03) (95% - 100%)  Wt(kg): --  Daily     Daily       Physical Exam:  Gen: NAD  HEENT: EOMI  CV: RRR, normal S1 + S2, no m/r/g  Lungs: CTAB  Abd: soft, non-tender  Ext: No edema    Telemetry:    Laboratory Data:                        8.8    10.99 )-----------( 435      ( 25 Sep 2018 06:50 )             29.2     09-25    136  |  95<L>  |  18  ----------------------------<  133<H>  3.9   |  30  |  0.70    Ca    8.9      25 Sep 2018 06:50                Inpatient Medications:  MEDICATIONS  (STANDING):  acetaminophen   Tablet .. 975 milliGRAM(s) Oral every 8 hours  buDESOnide 160 MICROgram(s)/formoterol 4.5 MICROgram(s) Inhaler 2 Puff(s) Inhalation two times a day  dextrose 5%. 1000 milliLiter(s) (50 mL/Hr) IV Continuous <Continuous>  dextrose 50% Injectable 12.5 Gram(s) IV Push once  dextrose 50% Injectable 25 Gram(s) IV Push once  dextrose 50% Injectable 25 Gram(s) IV Push once  docusate sodium 100 milliGRAM(s) Oral three times a day  famotidine    Tablet 20 milliGRAM(s) Oral daily  fenofibrate Tablet 145 milliGRAM(s) Oral daily  gabapentin 400 milliGRAM(s) Oral three times a day  influenza   Vaccine 0.5 milliLiter(s) IntraMuscular once  insulin glargine Injectable (LANTUS) 16 Unit(s) SubCutaneous at bedtime  insulin lispro (HumaLOG) corrective regimen sliding scale   SubCutaneous three times a day before meals  insulin lispro Injectable (HumaLOG) 6 Unit(s) SubCutaneous three times a day before meals  lidocaine   Patch 3 Patch Transdermal daily  metoprolol tartrate 50 milliGRAM(s) Oral two times a day  NIFEdipine XL 30 milliGRAM(s) Oral daily  NIFEdipine XL 60 milliGRAM(s) Oral at bedtime  piperacillin/tazobactam IVPB. 3.375 Gram(s) IV Intermittent every 8 hours  predniSONE   Tablet 10 milliGRAM(s) Oral two times a day  senna 2 Tablet(s) Oral at bedtime  sodium chloride 1 Gram(s) Oral daily  sodium chloride 0.9% lock flush 3 milliLiter(s) IV Push every 8 hours  traMADol 25 milliGRAM(s) Oral <User Schedule>  vancomycin  IVPB 1000 milliGRAM(s) IV Intermittent every 24 hours      Assessment:  86 F with PMH of lung adenoca (dx around 2010, s/p L VATS, XRT of TERESA/RUL last in July 2016), COPD with emphysema, htn, and dm2, recently admitted with shoulder pain secondary to zoster and incidental e hemorrhagic brain met now presents with acute hypoxic respiratory failure, found to be RVP and significant right lung collapse/atelectasis    Plan:  1) hypoxic respiratory failure   -appreciate pulm recs. currently being treated for HCAP  -chest PT  -bronchodilators  -maintain euvolemia  -f/u cultures and infectious workup  -no evidence of CO2 retention on ABG    2) HTN, APC's  -no evidence of atrial fibrillation. cont to hold AC  -c/w metoprolol as tolerates  -c/w nifdepine as dosed  -cont to hold hctz 2/2 hyponatremia    3) hyponatremia  -likely multifactorial including tea/toast diet and SIADH  -appreciate renal recs  -improving       Over 25 minutes spent on total encounter; more than 50% of the visit was spent counseling and/or coordinating care by the attending physician.      Slick York MD   Cardiovascular Disease  [phone]

## 2018-09-25 NOTE — PROGRESS NOTE ADULT - ATTENDING COMMENTS
Agree with above. Recovery to pre-illness state of health is very unlikely due to multiple factors causing patient morbidity.  At this point, would recommend to focus on patient's comfort. Recommendation were discussed with patient daughter.

## 2018-09-25 NOTE — CHART NOTE - NSCHARTNOTEFT_GEN_A_CORE
At length discussion with daughter Nicole, [employer] Nursing Admin Abyb regarding patients care. Patient is presently in no acute respiratory distress on bipap, pt alert, following commands. Writer spoke with pulmonary fellow Dr. Wagoner; plan per pulm to c/w abx for 7 days to treat HCAP. Patient's symptoms could be related to disease progression. Daughter explained by writer that patient is still being treated for infection and immediate resolution of symptoms, sob, may take longer give patient's age and underlying lung Cancer. Daughter verbalized understanding in reasons for a delay resolution. Dr. Vidales made aware and updated on conservation with daughter. Palliative team, Dr. Brenner saw and spoke with daughter Nicole earlier and a family meeting at 4pm tomorrow 9/26 was set up. At length discussion with daughter NicolePARK Nursing Admin Abby regarding patients care. Patient is presently in no acute respiratory distress on bipap, pt alert, following commands. Writer spoke with pulmonary fellow Dr. Wagoner; plan per pulm to c/w abx for 7 days to treat HCAP. Patient's symptoms could be related to disease progression. Daughter explained by writer that patient is still being treated for infection and immediate resolution of symptoms, sob, may take longer give patient's age and underlying lung Cancer. Daughter verbalized understanding in reasons for a delay resolution. During this discussion, writer made aware that DAVID Jolly was administering dextrose ivp for finger stick 35 as patient was given 6units of humalog earlier. FS frannie to 135 after dextrose administration. Dr. Vidales made aware and updated on conservation with daughter. Palliative team, Dr. Brenner saw and spoke with daughter Nicole earlier and a family meeting at 4pm tomorrow 9/26 was set up.

## 2018-09-25 NOTE — PROGRESS NOTE ADULT - SUBJECTIVE AND OBJECTIVE BOX
SUBJECTIVE AND OBJECTIVE:  Worsening respiratory failure, likely mucous plugging.  INTERVAL HPI/OVERNIGHT EVENTS:  Worsening respiratory failure.  Continues to have pain.    DNR on chart: Yes    Allergies    ACE inhibitors (Angioedema)    Intolerances    MEDICATIONS  (STANDING):  acetaminophen   Tablet .. 975 milliGRAM(s) Oral every 8 hours  buDESOnide 160 MICROgram(s)/formoterol 4.5 MICROgram(s) Inhaler 2 Puff(s) Inhalation two times a day  dextrose 5%. 1000 milliLiter(s) (50 mL/Hr) IV Continuous <Continuous>  dextrose 50% Injectable 12.5 Gram(s) IV Push once  dextrose 50% Injectable 25 Gram(s) IV Push once  dextrose 50% Injectable 25 Gram(s) IV Push once  docusate sodium 100 milliGRAM(s) Oral three times a day  famotidine    Tablet 20 milliGRAM(s) Oral daily  fenofibrate Tablet 145 milliGRAM(s) Oral daily  gabapentin 400 milliGRAM(s) Oral three times a day  influenza   Vaccine 0.5 milliLiter(s) IntraMuscular once  insulin glargine Injectable (LANTUS) 16 Unit(s) SubCutaneous at bedtime  insulin lispro (HumaLOG) corrective regimen sliding scale   SubCutaneous three times a day before meals  insulin lispro Injectable (HumaLOG) 6 Unit(s) SubCutaneous three times a day before meals  lidocaine   Patch 3 Patch Transdermal daily  metoprolol tartrate 50 milliGRAM(s) Oral two times a day  NIFEdipine XL 30 milliGRAM(s) Oral daily  NIFEdipine XL 60 milliGRAM(s) Oral at bedtime  piperacillin/tazobactam IVPB. 3.375 Gram(s) IV Intermittent every 8 hours  predniSONE   Tablet 10 milliGRAM(s) Oral two times a day  senna 2 Tablet(s) Oral at bedtime  sodium chloride 1 Gram(s) Oral daily  sodium chloride 0.9% lock flush 3 milliLiter(s) IV Push every 8 hours  traMADol 25 milliGRAM(s) Oral <User Schedule>  vancomycin  IVPB 1000 milliGRAM(s) IV Intermittent every 24 hours    MEDICATIONS  (PRN):  dextrose 40% Gel 15 Gram(s) Oral once PRN Blood Glucose LESS THAN 70 milliGRAM(s)/deciliter  dextrose 40% Gel 15 Gram(s) Oral once PRN Blood Glucose LESS THAN 70 milliGRAM(s)/deciliter  glucagon  Injectable 1 milliGRAM(s) IntraMuscular once PRN Glucose LESS THAN 70 milligrams/deciliter  polyethylene glycol 3350 17 Gram(s) Oral daily PRN Constipation      ITEMS UNCHECKED ARE NOT PRESENT    PRESENT SYMPTOMS: [ ]Unable to obtain due to poor mentation   Source if other than patient:  [ ]Family   [ ]Team     Pain (Impact on QOL):  Unable to move  Location: right shoulder  Minimal acceptable level (0-10 scale):    5        Aggravating factors: movement  Quality: sharp  Radiation: down arm  Severity (0-10 scale): 8   Timing: intermittent    Dyspnea:                           [ ]Mild [ ]Moderate [x ]Severe  Anxiety:                             [ ]Mild [x ]Moderate [ ]Severe  Fatigue:                             [ ]Mild [ ]Moderate [x]Severe  Nausea:                             [ x]Mild [ ]Moderate [ ]Severe  Loss of appetite:              [ ]Mild [ ]Moderate [x]Severe  Constipation:                    [ ]Mild [ x]Moderate [ ]Severe    PAIN AD Score:	  [email or web address].[email or web address] (Ctrl + left click to view)    Other Symptoms:  [ x]All other review of systems negative     Karnofsky Performance Score/Palliative Performance Status Version 2:     30    %    http://palliative.info/resource_material/[email or web address]  PHYSICAL EXAM:  Vital Signs Last 24 Hrs  T(C): 36.5 (25 Sep 2018 05:43), Max: 36.5 (25 Sep 2018 05:43)  T(F): 97.7 (25 Sep 2018 05:43), Max: 97.7 (25 Sep 2018 05:43)  HR: 92 (25 Sep 2018 07:03) (88 - 97)  BP: 159/88 (25 Sep 2018 05:43) (117/82 - 165/89)  BP(mean): --  RR: 18 (25 Sep 2018 05:43) (18 - 18)  SpO2: 100% (25 Sep 2018 07:03) (95% - 100%) I&O's Summary   GENERAL:  [ ]Alert  [ ]Oriented x   [x ]Lethargic  [ ]Cachexia  [ ]Unarousable  [ ]Verbal  [ ]Non-Verbal    Behavioral:   [x ] Anxiety  [ ] Delirium [ ] Agitation [ ] Other    HEENT:  [ ]Normal   [x ]Dry mouth   [ ]ET Tube/Trach  [ ]Oral lesions    PULMONARY:   [ ]Clear [ ]Tachypnea  [ ]Audible excessive secretions   [ x]Rhonchi        [ ]Right [ ]Left [x]Bilateral  [ ]Crackles        [ ]Right [ ]Left [ ]Bilateral  [ ]Wheezing     [ ]Right [ ]Left [ ]Bilateral    CARDIOVASCULAR:    [ x]Regular [ ]Irregular [ ]Tachy  [ ]Blane [ ]Murmur [ ]Other    GASTROINTESTINAL:  [ x]Soft  [ ]Distended   [x ]+BS  [ x]Non tender [ ]Tender  [ ]PEG [ ]OGT/ NGT   Last BM:  GENITOURINARY:  [ ]Normal [ ] Incontinent   [ ]Oliguria/Anuria   [ ]Alcaraz    MUSCULOSKELETAL:   [ ]Normal   [ ]Weakness  [x ]Bed/Wheelchair bound [ ]Edema    NEUROLOGIC:   [x ]No focal deficits  [ ] Cognitive impairment  [ ] Dysphagia [ ]Dysarthria [ ] Paresis [ ]Other     SKIN:   [ x]Normal   [ ]Pressure ulcer(s)  [ ]Rash    CRITICAL CARE:  [ ] Shock Present  [ ]Septic [ ]Cardiogenic [ ]Neurologic [ ]Hypovolemic  [ ]  Vasopressors [ ]  Inotropes   [x ] Respiratory failure present  [x ] Acute  [ ] Chronic [ x] Hypoxic  [ ] Hypercarbic [ ] Other  [ ] Other organ failure     LABS:                        8.8    10.99 )-----------( 435      ( 25 Sep 2018 06:50 )             29.2   09-25    136  |  95<L>  |  18  ----------------------------<  133<H>  3.9   |  30  |  0.70    Ca    8.9      25 Sep 2018 06:50          RADIOLOGY & ADDITIONAL STUDIES:    Protein Calorie Malnutrition Present: x[ ] yes [ ] no  [x ] PPSV2 < or = 30%  [x ] significant weight loss [ x] poor nutritional intake [ ] anasarca [x ] catabolic state Albumin, Serum: 3.0 g/dL (09-21-18 @ 14:00)  Artificial Nutrition [ ]     REFERRALS:   [ ]Chaplaincy  [ ] Hospice  [ ]Child Life  [ ]Social Work  [ ]Case management [ ]Holistic Therapy   Goals of Care Document:

## 2018-09-25 NOTE — PROGRESS NOTE ADULT - SUBJECTIVE AND OBJECTIVE BOX
Patient is a 86y old  Female who presents with a chief complaint of SOB (25 Sep 2018 13:05)      SUBJECTIVE / OVERNIGHT EVENTS:  Lethargic/Unable to take po  On BIPAP    MEDICATIONS  (STANDING):  acetaminophen  IVPB .. 1000 milliGRAM(s) IV Intermittent once  buDESOnide 160 MICROgram(s)/formoterol 4.5 MICROgram(s) Inhaler 2 Puff(s) Inhalation two times a day  dextrose 5%. 1000 milliLiter(s) (50 mL/Hr) IV Continuous <Continuous>  dextrose 50% Injectable 12.5 Gram(s) IV Push once  dextrose 50% Injectable 25 Gram(s) IV Push once  dextrose 50% Injectable 25 Gram(s) IV Push once  docusate sodium 100 milliGRAM(s) Oral three times a day  famotidine  IVPB 20 milliGRAM(s) IV Intermittent daily  fenofibrate Tablet 145 milliGRAM(s) Oral daily  gabapentin 400 milliGRAM(s) Oral three times a day  influenza   Vaccine 0.5 milliLiter(s) IntraMuscular once  insulin glargine Injectable (LANTUS) 16 Unit(s) SubCutaneous at bedtime  insulin lispro (HumaLOG) corrective regimen sliding scale   SubCutaneous three times a day before meals  insulin lispro Injectable (HumaLOG) 6 Unit(s) SubCutaneous three times a day before meals  lidocaine   Patch 3 Patch Transdermal daily  methylPREDNISolone sodium succinate Injectable 10 milliGRAM(s) IV Push daily  metoprolol tartrate Injectable 5 milliGRAM(s) IV Push every 6 hours  NIFEdipine XL 30 milliGRAM(s) Oral daily  NIFEdipine XL 60 milliGRAM(s) Oral at bedtime  piperacillin/tazobactam IVPB. 3.375 Gram(s) IV Intermittent every 8 hours  senna 2 Tablet(s) Oral at bedtime  sodium chloride 1 Gram(s) Oral daily  sodium chloride 0.9% lock flush 3 milliLiter(s) IV Push every 8 hours  vancomycin  IVPB 1000 milliGRAM(s) IV Intermittent every 24 hours    MEDICATIONS  (PRN):  dextrose 40% Gel 15 Gram(s) Oral once PRN Blood Glucose LESS THAN 70 milliGRAM(s)/deciliter  dextrose 40% Gel 15 Gram(s) Oral once PRN Blood Glucose LESS THAN 70 milliGRAM(s)/deciliter  glucagon  Injectable 1 milliGRAM(s) IntraMuscular once PRN Glucose LESS THAN 70 milligrams/deciliter  polyethylene glycol 3350 17 Gram(s) Oral daily PRN Constipation        CAPILLARY BLOOD GLUCOSE      POCT Blood Glucose.: 127 mg/dL (25 Sep 2018 21:41)  POCT Blood Glucose.: 120 mg/dL (25 Sep 2018 17:02)  POCT Blood Glucose.: 133 mg/dL (25 Sep 2018 12:37)  POCT Blood Glucose.: 38 mg/dL (25 Sep 2018 12:07)  POCT Blood Glucose.: 35 mg/dL (25 Sep 2018 11:59)  POCT Blood Glucose.: 118 mg/dL (25 Sep 2018 09:07)    I&O's Summary      PHYSICAL EXAM:    NECK: Supple, No JVD  CHEST/LUNG: Clear to auscultation bilaterally; No wheezing.  HEART: Regular rate and rhythm; No murmurs, rubs, or gallops  ABDOMEN: Soft, Nontender, Nondistended; Bowel sounds present  EXTREMITIES:   No clubbing, cyanosis, or edema  NEUROLOGY: Lethargic/On BIPAP  SKIN: No rashes    LABS:                        8.8    10.99 )-----------( 435      ( 25 Sep 2018 06:50 )             29.2     09-25    136  |  95<L>  |  18  ----------------------------<  133<H>  3.9   |  30  |  0.70    Ca    8.9      25 Sep 2018 06:50              CAPILLARY BLOOD GLUCOSE      POCT Blood Glucose.: 127 mg/dL (25 Sep 2018 21:41)  POCT Blood Glucose.: 120 mg/dL (25 Sep 2018 17:02)  POCT Blood Glucose.: 133 mg/dL (25 Sep 2018 12:37)  POCT Blood Glucose.: 38 mg/dL (25 Sep 2018 12:07)  POCT Blood Glucose.: 35 mg/dL (25 Sep 2018 11:59)  POCT Blood Glucose.: 118 mg/dL (25 Sep 2018 09:07)    09-21 @ 16:49  Culture-urine --  Culture results --  method type --  Organism --  Organism Identification --  Specimen source BLOOD PERIPHERAL           09-21 @ 16:49  Culture blood   NO ORGANISMS ISOLATED  NO ORGANISMS ISOLATED AT 96 HOURS  Culture results --  Gram stain --  Gram stain blood --  Method type --  Organism --  Organism identification --  Specimen source BLOOD PERIPHERAL      RADIOLOGY & ADDITIONAL TESTS:    Imaging Personally Reviewed:    Consultant(s) Notes Reviewed:      Care Discussed with Consultants/Other Providers:

## 2018-09-25 NOTE — PROGRESS NOTE ADULT - ASSESSMENT
Multifactorial dyspnea, 86F with advanced cancer. Has not been able to get to outpatient RT due to debility.     Several things contributing to her dyspnea include the worsening lung cancer, pneumonia, muscle weakness from chronic steroid use. Has been immobile - in/out of hospital for the past many weeks.     Dr Maloney and I had an extended conversation with patient's daughter, Nicole at the bedside. We discussed the cancer, pneumonia, and muscle weakness.   We explained that there is not curative treatment for her lung cancer and she is likely to get worse from it over the next weeks to months.   She said that she would not want her mother to suffer and that both she and her mother know that she is dying.     We agree with her thought that we should focus on her comfort and appreciate palliative care input.     Recs  Would start low-dose morphine, 0.5mg for dyspnea and titrate for effect and somnolence. Would continue full antibiotic course. She may do better with decreased dyspnea, tolerate being on nasal cannula.   Can continue bipap for comfort.   Happy to be a part of any interdisciplinary meeting with primary teams regarding course of care and palliative care options.       Clair Wagoner MD  Pulmonary/Critical Care Fellow  page: [phone]

## 2018-09-26 ENCOUNTER — TRANSCRIPTION ENCOUNTER (OUTPATIENT)
Age: 83
End: 2018-09-26

## 2018-09-26 LAB
BACTERIA BLD CULT: SIGNIFICANT CHANGE UP
BACTERIA BLD CULT: SIGNIFICANT CHANGE UP
BUN SERPL-MCNC: 14 MG/DL — SIGNIFICANT CHANGE UP (ref 7–23)
CALCIUM SERPL-MCNC: 9.2 MG/DL — SIGNIFICANT CHANGE UP (ref 8.4–10.5)
CHLORIDE SERPL-SCNC: 93 MMOL/L — LOW (ref 98–107)
CO2 SERPL-SCNC: 31 MMOL/L — SIGNIFICANT CHANGE UP (ref 22–31)
CREAT SERPL-MCNC: 0.57 MG/DL — SIGNIFICANT CHANGE UP (ref 0.5–1.3)
GLUCOSE BLDC GLUCOMTR-MCNC: 108 MG/DL — HIGH (ref 70–99)
GLUCOSE BLDC GLUCOMTR-MCNC: 206 MG/DL — HIGH (ref 70–99)
GLUCOSE BLDC GLUCOMTR-MCNC: 306 MG/DL — HIGH (ref 70–99)
GLUCOSE BLDC GLUCOMTR-MCNC: 90 MG/DL — SIGNIFICANT CHANGE UP (ref 70–99)
GLUCOSE SERPL-MCNC: 81 MG/DL — SIGNIFICANT CHANGE UP (ref 70–99)
HCT VFR BLD CALC: 29.3 % — LOW (ref 34.5–45)
HGB BLD-MCNC: 8.7 G/DL — LOW (ref 11.5–15.5)
MCHC RBC-ENTMCNC: 25.8 PG — LOW (ref 27–34)
MCHC RBC-ENTMCNC: 29.7 % — LOW (ref 32–36)
MCV RBC AUTO: 86.9 FL — SIGNIFICANT CHANGE UP (ref 80–100)
NRBC # FLD: 0 — SIGNIFICANT CHANGE UP
PLATELET # BLD AUTO: 390 K/UL — SIGNIFICANT CHANGE UP (ref 150–400)
PMV BLD: 8.9 FL — SIGNIFICANT CHANGE UP (ref 7–13)
POTASSIUM SERPL-MCNC: 3.8 MMOL/L — SIGNIFICANT CHANGE UP (ref 3.5–5.3)
POTASSIUM SERPL-SCNC: 3.8 MMOL/L — SIGNIFICANT CHANGE UP (ref 3.5–5.3)
RBC # BLD: 3.37 M/UL — LOW (ref 3.8–5.2)
RBC # FLD: 16.8 % — HIGH (ref 10.3–14.5)
SODIUM SERPL-SCNC: 136 MMOL/L — SIGNIFICANT CHANGE UP (ref 135–145)
WBC # BLD: 10.94 K/UL — HIGH (ref 3.8–10.5)
WBC # FLD AUTO: 10.94 K/UL — HIGH (ref 3.8–10.5)

## 2018-09-26 PROCEDURE — 99497 ADVNCD CARE PLAN 30 MIN: CPT | Mod: 25

## 2018-09-26 PROCEDURE — 99498 ADVNCD CARE PLAN ADDL 30 MIN: CPT | Mod: 25

## 2018-09-26 PROCEDURE — 99233 SBSQ HOSP IP/OBS HIGH 50: CPT

## 2018-09-26 RX ORDER — ACETAMINOPHEN 500 MG
650 TABLET ORAL EVERY 6 HOURS
Qty: 0 | Refills: 0 | Status: DISCONTINUED | OUTPATIENT
Start: 2018-09-26 | End: 2018-09-26

## 2018-09-26 RX ORDER — ONDANSETRON 8 MG/1
4 TABLET, FILM COATED ORAL ONCE
Qty: 0 | Refills: 0 | Status: COMPLETED | OUTPATIENT
Start: 2018-09-26 | End: 2018-09-26

## 2018-09-26 RX ORDER — HYDROMORPHONE HYDROCHLORIDE 2 MG/ML
0.25 INJECTION INTRAMUSCULAR; INTRAVENOUS; SUBCUTANEOUS ONCE
Qty: 0 | Refills: 0 | Status: DISCONTINUED | OUTPATIENT
Start: 2018-09-26 | End: 2018-09-26

## 2018-09-26 RX ORDER — HYDROMORPHONE HYDROCHLORIDE 2 MG/ML
0.2 INJECTION INTRAMUSCULAR; INTRAVENOUS; SUBCUTANEOUS
Qty: 0 | Refills: 0 | Status: DISCONTINUED | OUTPATIENT
Start: 2018-09-26 | End: 2018-09-27

## 2018-09-26 RX ORDER — METOPROLOL TARTRATE 50 MG
50 TABLET ORAL
Qty: 0 | Refills: 0 | Status: DISCONTINUED | OUTPATIENT
Start: 2018-09-26 | End: 2018-09-28

## 2018-09-26 RX ORDER — TRAMADOL HYDROCHLORIDE 50 MG/1
25 TABLET ORAL
Qty: 0 | Refills: 0 | Status: DISCONTINUED | OUTPATIENT
Start: 2018-09-26 | End: 2018-09-27

## 2018-09-26 RX ORDER — FUROSEMIDE 40 MG
40 TABLET ORAL ONCE
Qty: 0 | Refills: 0 | Status: COMPLETED | OUTPATIENT
Start: 2018-09-26 | End: 2018-09-26

## 2018-09-26 RX ORDER — ACETAMINOPHEN 500 MG
975 TABLET ORAL EVERY 8 HOURS
Qty: 0 | Refills: 0 | Status: DISCONTINUED | OUTPATIENT
Start: 2018-09-26 | End: 2018-09-30

## 2018-09-26 RX ORDER — IPRATROPIUM/ALBUTEROL SULFATE 18-103MCG
3 AEROSOL WITH ADAPTER (GRAM) INHALATION EVERY 6 HOURS
Qty: 0 | Refills: 0 | Status: DISCONTINUED | OUTPATIENT
Start: 2018-09-26 | End: 2018-09-30

## 2018-09-26 RX ORDER — TRAMADOL HYDROCHLORIDE 50 MG/1
25 TABLET ORAL
Qty: 0 | Refills: 0 | Status: DISCONTINUED | OUTPATIENT
Start: 2018-09-26 | End: 2018-09-26

## 2018-09-26 RX ORDER — FAMOTIDINE 10 MG/ML
20 INJECTION INTRAVENOUS DAILY
Qty: 0 | Refills: 0 | Status: DISCONTINUED | OUTPATIENT
Start: 2018-09-27 | End: 2018-09-28

## 2018-09-26 RX ORDER — ACETAMINOPHEN 500 MG
1000 TABLET ORAL ONCE
Qty: 0 | Refills: 0 | Status: COMPLETED | OUTPATIENT
Start: 2018-09-26 | End: 2018-09-26

## 2018-09-26 RX ADMIN — Medication 100 MILLIGRAM(S): at 20:56

## 2018-09-26 RX ADMIN — INSULIN GLARGINE 16 UNIT(S): 100 INJECTION, SOLUTION SUBCUTANEOUS at 23:05

## 2018-09-26 RX ADMIN — HYDROMORPHONE HYDROCHLORIDE 0.2 MILLIGRAM(S): 2 INJECTION INTRAMUSCULAR; INTRAVENOUS; SUBCUTANEOUS at 18:52

## 2018-09-26 RX ADMIN — SODIUM CHLORIDE 3 MILLILITER(S): 9 INJECTION INTRAMUSCULAR; INTRAVENOUS; SUBCUTANEOUS at 15:16

## 2018-09-26 RX ADMIN — Medication 40 MILLIGRAM(S): at 11:15

## 2018-09-26 RX ADMIN — Medication 3 MILLILITER(S): at 20:36

## 2018-09-26 RX ADMIN — ONDANSETRON 4 MILLIGRAM(S): 8 TABLET, FILM COATED ORAL at 12:44

## 2018-09-26 RX ADMIN — Medication 400 MILLIGRAM(S): at 08:27

## 2018-09-26 RX ADMIN — HYDROMORPHONE HYDROCHLORIDE 0.25 MILLIGRAM(S): 2 INJECTION INTRAMUSCULAR; INTRAVENOUS; SUBCUTANEOUS at 12:59

## 2018-09-26 RX ADMIN — Medication 5 MILLIGRAM(S): at 06:45

## 2018-09-26 RX ADMIN — PIPERACILLIN AND TAZOBACTAM 25 GRAM(S): 4; .5 INJECTION, POWDER, LYOPHILIZED, FOR SOLUTION INTRAVENOUS at 15:04

## 2018-09-26 RX ADMIN — TRAMADOL HYDROCHLORIDE 25 MILLIGRAM(S): 50 TABLET ORAL at 16:42

## 2018-09-26 RX ADMIN — Medication 5 MILLIGRAM(S): at 12:56

## 2018-09-26 RX ADMIN — PIPERACILLIN AND TAZOBACTAM 25 GRAM(S): 4; .5 INJECTION, POWDER, LYOPHILIZED, FOR SOLUTION INTRAVENOUS at 00:27

## 2018-09-26 RX ADMIN — Medication 250 MILLIGRAM(S): at 20:57

## 2018-09-26 RX ADMIN — Medication 60 MILLIGRAM(S): at 20:57

## 2018-09-26 RX ADMIN — GABAPENTIN 400 MILLIGRAM(S): 400 CAPSULE ORAL at 21:07

## 2018-09-26 RX ADMIN — Medication 1000 MILLIGRAM(S): at 09:15

## 2018-09-26 RX ADMIN — PIPERACILLIN AND TAZOBACTAM 25 GRAM(S): 4; .5 INJECTION, POWDER, LYOPHILIZED, FOR SOLUTION INTRAVENOUS at 06:56

## 2018-09-26 RX ADMIN — BUDESONIDE AND FORMOTEROL FUMARATE DIHYDRATE 2 PUFF(S): 160; 4.5 AEROSOL RESPIRATORY (INHALATION) at 20:59

## 2018-09-26 RX ADMIN — HYDROMORPHONE HYDROCHLORIDE 0.2 MILLIGRAM(S): 2 INJECTION INTRAMUSCULAR; INTRAVENOUS; SUBCUTANEOUS at 14:51

## 2018-09-26 RX ADMIN — TRAMADOL HYDROCHLORIDE 25 MILLIGRAM(S): 50 TABLET ORAL at 15:58

## 2018-09-26 RX ADMIN — TRAMADOL HYDROCHLORIDE 25 MILLIGRAM(S): 50 TABLET ORAL at 20:57

## 2018-09-26 RX ADMIN — HYDROMORPHONE HYDROCHLORIDE 0.25 MILLIGRAM(S): 2 INJECTION INTRAMUSCULAR; INTRAVENOUS; SUBCUTANEOUS at 12:44

## 2018-09-26 RX ADMIN — HYDROMORPHONE HYDROCHLORIDE 0.2 MILLIGRAM(S): 2 INJECTION INTRAMUSCULAR; INTRAVENOUS; SUBCUTANEOUS at 09:07

## 2018-09-26 RX ADMIN — Medication 1000 MILLIGRAM(S): at 00:30

## 2018-09-26 RX ADMIN — Medication 10 MILLIGRAM(S): at 06:56

## 2018-09-26 RX ADMIN — Medication 3 MILLILITER(S): at 15:39

## 2018-09-26 RX ADMIN — Medication 50 MILLIGRAM(S): at 18:47

## 2018-09-26 RX ADMIN — SODIUM CHLORIDE 3 MILLILITER(S): 9 INJECTION INTRAMUSCULAR; INTRAVENOUS; SUBCUTANEOUS at 06:56

## 2018-09-26 RX ADMIN — GABAPENTIN 400 MILLIGRAM(S): 400 CAPSULE ORAL at 15:04

## 2018-09-26 RX ADMIN — TRAMADOL HYDROCHLORIDE 25 MILLIGRAM(S): 50 TABLET ORAL at 21:20

## 2018-09-26 RX ADMIN — HYDROMORPHONE HYDROCHLORIDE 0.2 MILLIGRAM(S): 2 INJECTION INTRAMUSCULAR; INTRAVENOUS; SUBCUTANEOUS at 16:36

## 2018-09-26 RX ADMIN — SODIUM CHLORIDE 3 MILLILITER(S): 9 INJECTION INTRAMUSCULAR; INTRAVENOUS; SUBCUTANEOUS at 20:59

## 2018-09-26 RX ADMIN — ONDANSETRON 4 MILLIGRAM(S): 8 TABLET, FILM COATED ORAL at 08:10

## 2018-09-26 NOTE — CHART NOTE - NSCHARTNOTEFT_GEN_A_CORE
Pt with significant improvement from earlier this AM  when evaluated with Dr. Ferrell. Pt off bipap since 1230pm, maintain O2 sat with venti mask. Tolerated low dose dilaudid at 1244pm. Able to take PO Neurontin. Family asking for PO tylenol at this time

## 2018-09-26 NOTE — PROGRESS NOTE ADULT - SUBJECTIVE AND OBJECTIVE BOX
_________________________________________________________________________________________  ========>>  M E D I C A L   A T T E N D I N G    F O L L O W  U P  N O T E  <<=========  -----------------------------------------------------------------------------------------------------    - Patient seen and examined by me approximately thirty minutes ago.  - In summary,  CLIVE HENRY is a 86y year old woman who originally presented with SOB  - Patient today overall doing ok, comfortable, eating OK.      noted events over th past couple of days and discussed with covering attending and NP, Dtrs and pt.. pt's respiratory status worsened, remains on Antibiotics for possible HCAP, on and off Bipap for SOB, not eating much..        pain better controlled though     ==================>> REVIEW OF SYSTEM <<=================    GEN: no fever, no chills, pain better controlled   RESP: + SOB, + occasional cough, no significant sputum  CVS: no chest pain, no palpitations, no edema  GI: no abdominal pain, no nausea, no constipation, no diarrhea  : no dysuria, no frequency, no hematuria  Neuro: no headache, no dizziness  Derm : no itching, no rash    ==================>> PHYSICAL EXAM <<=================    GEN: A&O X 3 , NAD , comfortable  HEENT: NCAT, PERRL, mucosa dry, hoarse voice, hearing intact  Neck: supple , no JVD  CVS: S1S2 , Iregular   PULM: + scattered rhonchi and rales, minimal wheezing   ABD.: soft. non tender, non distended  Extrem: no edema   PSYCH : normal mood,  not anxious      ==================>> MEDICATIONS <<====================    MEDICATIONS  (STANDING):  ALBUTerol/ipratropium for Nebulization 3 milliLiter(s) Nebulizer every 6 hours  buDESOnide 160 MICROgram(s)/formoterol 4.5 MICROgram(s) Inhaler 2 Puff(s) Inhalation two times a day  dextrose 5%. 1000 milliLiter(s) (50 mL/Hr) IV Continuous <Continuous>  dextrose 50% Injectable 12.5 Gram(s) IV Push once  dextrose 50% Injectable 25 Gram(s) IV Push once  dextrose 50% Injectable 25 Gram(s) IV Push once  docusate sodium 100 milliGRAM(s) Oral three times a day  famotidine  IVPB 20 milliGRAM(s) IV Intermittent daily  fenofibrate Tablet 145 milliGRAM(s) Oral daily  gabapentin 400 milliGRAM(s) Oral three times a day  influenza   Vaccine 0.5 milliLiter(s) IntraMuscular once  insulin glargine Injectable (LANTUS) 16 Unit(s) SubCutaneous at bedtime  insulin lispro (HumaLOG) corrective regimen sliding scale   SubCutaneous three times a day before meals  insulin lispro Injectable (HumaLOG) 6 Unit(s) SubCutaneous three times a day before meals  lidocaine   Patch 3 Patch Transdermal daily  methylPREDNISolone sodium succinate Injectable 10 milliGRAM(s) IV Push daily  metoprolol tartrate Injectable 5 milliGRAM(s) IV Push every 6 hours  NIFEdipine XL 30 milliGRAM(s) Oral daily  NIFEdipine XL 60 milliGRAM(s) Oral at bedtime  piperacillin/tazobactam IVPB. 3.375 Gram(s) IV Intermittent every 8 hours  senna 2 Tablet(s) Oral at bedtime  sodium chloride 1 Gram(s) Oral daily  sodium chloride 0.9% lock flush 3 milliLiter(s) IV Push every 8 hours  vancomycin  IVPB 1000 milliGRAM(s) IV Intermittent every 24 hours    MEDICATIONS  (PRN):  dextrose 40% Gel 15 Gram(s) Oral once PRN Blood Glucose LESS THAN 70 milliGRAM(s)/deciliter  dextrose 40% Gel 15 Gram(s) Oral once PRN Blood Glucose LESS THAN 70 milliGRAM(s)/deciliter  glucagon  Injectable 1 milliGRAM(s) IntraMuscular once PRN Glucose LESS THAN 70 milligrams/deciliter  polyethylene glycol 3350 17 Gram(s) Oral daily PRN Constipation    ==================>> VITAL SIGNS <<==================    T(C): 36.2 (09-26-18 @ 06:38), Max: 36.6 (09-25-18 @ 12:56)  HR: 111 (09-26-18 @ 10:30) (82 - 111)  BP: 145/87 (09-26-18 @ 10:30) (134/98 - 173/73)  RR: 24 (09-26-18 @ 10:30) (18 - 24)  SpO2: 100% (09-26-18 @ 10:30) (98% - 100%)    POCT Blood Glucose.: 90 mg/dL (26 Sep 2018 07:16)  POCT Blood Glucose.: 127 mg/dL (25 Sep 2018 21:41)  POCT Blood Glucose.: 120 mg/dL (25 Sep 2018 17:02)  POCT Blood Glucose.: 133 mg/dL (25 Sep 2018 12:37)  POCT Blood Glucose.: 38 mg/dL (25 Sep 2018 12:07)  POCT Blood Glucose.: 35 mg/dL (25 Sep 2018 11:59)     ==================>> LAB AND IMAGING <<==================                        8.7    10.94 )-----------( 390      ( 26 Sep 2018 06:37 )             29.3        WBC count:   10.94 <<== ,  10.99 <<== ,  12.21 <<== ,  12.10 <<== ,  11.60 <<==   Hemoglobin:   8.7 <<==,  8.8 <<==,  8.7 <<==,  8.1 <<==,  9.4 <<==    136  |  93<L>  |  14  ----------------------------<  81  3.8   |  31  |  0.57    Ca    9.2      26 Sep 2018 06:37    HgA1C: 9.8  (09-05-18)            _______________________  C U L T U R E S :    Culture - Blood (collected 21 Sep 2018 16:49)  Source: BLOOD VENOUS  Preliminary Report (25 Sep 2018 16:49):    NO ORGANISMS ISOLATED    NO ORGANISMS ISOLATED AT 96 HOURS    Culture - Blood (collected 21 Sep 2018 16:49)  Source: BLOOD PERIPHERAL  Preliminary Report (25 Sep 2018 16:49):    NO ORGANISMS ISOLATED    NO ORGANISMS ISOLATED AT 96 HOURS    ___________________________________________________________________________________  ===============>>  A S S E S S M E N T   A N D   P L A N <<===============  ------------------------------------------------------------------------------------------    · Assessment		  · Assessment	  86 F with above hx with worsening SOB despite oral abx as out-pt. possible HCAP vs mucus plugging vs progression of dz     Problem/Plan - 1:  ·  Problem: Impending Resp Failure:  worsening respiratory status due to viral URI, HCAP, and metastatic cancer       Pul and palliative f/up noted.  On abx for HCAP  BIPAP + lasix X1 today and wean off Bipap as able   NPO >> diet as able if off Bipap given hypoglycemia and lack of nutrition   IV and inhaled steroids as ordered  pt's family to meet with palliative today     CAD:  Cardio f/up noted.  continue IV lopressor as needed  has not been able to take much PO due to being on Bipap     Dw family. Family and pt, all questions and concerns addressed.      family is aware of pt's poor prognosis due to cancer . Pt is DNR.  pt wants comfort measures     --------------------------------------------  Case discussed as above  Education given on findings and plan of care  ___________________________  H. GEN Ferrell.  Pager: [phone]

## 2018-09-26 NOTE — PROGRESS NOTE ADULT - PROBLEM SELECTOR PLAN 3
Currently on Lidoderm, Tylenol, Ultram, Neurontin.  Neurontin 400mg TID.  Dilaudid 0.2mg IV Q3 hours prn.  Bowel regimen.
Currently on Lidoderm, Tylenol, Ultram, Neurontin.  Neurontin 400mg TID.  Dilaudid 0.5mg IV Q3 hours prn.  Bowel regimen.
Currently on Lidoderm, Tylenol, Ultram, Neurontin.  Can increase Neurontin to 400mg TID.  Dilaudid 0.5mg IV Q3 hours prn.  Bowel regimen.

## 2018-09-26 NOTE — PROGRESS NOTE ADULT - SUBJECTIVE AND OBJECTIVE BOX
Cardiovascular Disease Progress Note    Overnight events: No acute events overnight.  persistent hypoxia and sob requiring supplemental o2. this morning patient is c/o nausea and sob. efforts are being made to focus on comfort. family deciding on hospice care.    Otherwise review of systems negative    Objective Findings:  T(C): 36.2 (09-26-18 @ 06:38), Max: 36.6 (09-25-18 @ 12:56)  HR: 89 (09-26-18 @ 07:21) (82 - 99)  BP: 173/73 (09-26-18 @ 06:38) (134/98 - 173/73)  RR: 24 (09-26-18 @ 06:38) (18 - 24)  SpO2: 98% (09-26-18 @ 07:21) (97% - 100%)  Wt(kg): --  Daily     Daily       Physical Exam:  Gen: NAD  HEENT: EOMI  CV: RRR, normal S1 + S2, no m/r/g  Lungs: CTAB  Abd: soft, non-tender  Ext: No edema      Laboratory Data:                        8.7    10.94 )-----------( 390      ( 26 Sep 2018 06:37 )             29.3     09-25    136  |  95<L>  |  18  ----------------------------<  133<H>  3.9   |  30  |  0.70    Ca    8.9      25 Sep 2018 06:50          Inpatient Medications:  MEDICATIONS  (STANDING):  acetaminophen  IVPB .. 1000 milliGRAM(s) IV Intermittent once  buDESOnide 160 MICROgram(s)/formoterol 4.5 MICROgram(s) Inhaler 2 Puff(s) Inhalation two times a day  dextrose 5%. 1000 milliLiter(s) (50 mL/Hr) IV Continuous <Continuous>  dextrose 50% Injectable 12.5 Gram(s) IV Push once  dextrose 50% Injectable 25 Gram(s) IV Push once  dextrose 50% Injectable 25 Gram(s) IV Push once  docusate sodium 100 milliGRAM(s) Oral three times a day  famotidine  IVPB 20 milliGRAM(s) IV Intermittent daily  fenofibrate Tablet 145 milliGRAM(s) Oral daily  gabapentin 400 milliGRAM(s) Oral three times a day  influenza   Vaccine 0.5 milliLiter(s) IntraMuscular once  insulin glargine Injectable (LANTUS) 16 Unit(s) SubCutaneous at bedtime  insulin lispro (HumaLOG) corrective regimen sliding scale   SubCutaneous three times a day before meals  insulin lispro Injectable (HumaLOG) 6 Unit(s) SubCutaneous three times a day before meals  lidocaine   Patch 3 Patch Transdermal daily  methylPREDNISolone sodium succinate Injectable 10 milliGRAM(s) IV Push daily  metoprolol tartrate Injectable 5 milliGRAM(s) IV Push every 6 hours  NIFEdipine XL 30 milliGRAM(s) Oral daily  NIFEdipine XL 60 milliGRAM(s) Oral at bedtime  piperacillin/tazobactam IVPB. 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 Overall with poor prognosis and unlikely to have meaningful recovery of pulmonary status.    Plan:  1) hypoxic respiratory failure   -appreciate pulm recs. currently being treated for HCAP  -chest PT  -bronchodilators  -maintain euvolemia  -f/u cultures and infectious workup  -no evidence of CO2 retention on ABG  -f/u palliative recs. efforts are being made to focus on comfort and symptom control    2) HTN, APC's, HFpEF  -no evidence of atrial fibrillation. cont to hold AC  -c/w metoprolol as tolerates  -c/w nifdepine as dosed  -cont to hold hctz 2/2 hyponatremia  -appears euvolemic    3) hyponatremia  -likely multifactorial including tea/toast diet and SIADH  -appreciate renal recs  -improving     Over 25 minutes spent on total encounter; more than 50% of the visit was spent counseling and/or coordinating care by the attending physician.      Slick York MD   Cardiovascular Disease  [phone]

## 2018-09-26 NOTE — PROGRESS NOTE ADULT - PROBLEM SELECTOR PLAN 4
45 minutes spent with patients three daughters and their husbands at bedside.  Patient unable to participate.  Discussed multifactorial nature of patients decompensation, as well as the fact that patient is at end of life.   Addressed symptom management, goals of care and next steps, given that patient is not a candidate acutely for disease modifying interventions.  Family is in agreement with hospice care.  Patient is DNR.  Goals are to minimize aggressive interventions and to focus on comfort.
Patient currently DNR.  Patients daughter is upset about mothers current condition, lack of improvement and overall prognosis.   She was informed about brain mets and possibility of adrenal met.   She is struggling with the idea that the treatment being provided is only prolonging her mothers death and not improving her overall condition.  Family meeting with three daughter 9/26/18 at 4 pm
Patient currently DNR.  Scheduled family meeting with 3 daughter on 9/26 to discuss GOC.

## 2018-09-26 NOTE — PROGRESS NOTE ADULT - PROBLEM SELECTOR PLAN 2
Currently on BIAPAP.  Likely secondary to combination of emphysema, lung cancer, mucous plugging secondary to poor mobilization.  Can start Dilaudid o.5mg IV Q3 hours prn, as will help with pain and air hunger.
Currently on BIAPAP.  Likely secondary to combination of emphysema, lung cancer, mucous plugging secondary to poor mobilization.  Dilaudid o.2mg IV Q3 hours prn, as will help with pain and air hunger.  Attempting to discontinue BIPAP if patient can tolerate.
Currently on BIAPAP.  Likely secondary to combination of emphysema, lung cancer, mucous plugging secondary to poor mobilization.  Can start Dilaudid o.5mg IV Q3 hours prn, as will help with pain and air hunger.

## 2018-09-26 NOTE — PROGRESS NOTE ADULT - PROBLEM SELECTOR PROBLEM 4
Advanced care planning/counseling discussion
Encounter for palliative care
Encounter for palliative care

## 2018-09-26 NOTE — GOALS OF CARE CONVERSATION - PERSONAL ADVANCE DIRECTIVE - CONVERSATION DETAILS
[facility]    Information regarding hospice care, services, eligibility and consents given to pt's 3 daughters.   Daughters are all hopeful pt can be cared for at home with support from hospice care. They also wish to hire a nurse (possibly LPN) to provide care around the clock.      also apprised of the status of this referral.     Consents for hospice have not yet been signed. Family has the hospice folder and they wish to review before signing.

## 2018-09-26 NOTE — PROGRESS NOTE ADULT - PROBLEM SELECTOR PROBLEM 1
Malignant neoplasm of upper lobe of right lung

## 2018-09-26 NOTE — DISCHARGE NOTE ADULT - HOSPITAL COURSE
86 F with above hx with worsening SOB despite oral abx as out-pt. possible HCAP vs mucus plugging vs progression of dz  Completing 7 days of antibitiocs    course***

## 2018-09-26 NOTE — PROGRESS NOTE ADULT - SUBJECTIVE AND OBJECTIVE BOX
NEPHROLOGY - NSN    Patient seen and examined.    MEDICATIONS  (STANDING):  ALBUTerol/ipratropium for Nebulization 3 milliLiter(s) Nebulizer every 6 hours  buDESOnide 160 MICROgram(s)/formoterol 4.5 MICROgram(s) Inhaler 2 Puff(s) Inhalation two times a day  dextrose 5%. 1000 milliLiter(s) (50 mL/Hr) IV Continuous <Continuous>  dextrose 50% Injectable 12.5 Gram(s) IV Push once  dextrose 50% Injectable 25 Gram(s) IV Push once  dextrose 50% Injectable 25 Gram(s) IV Push once  docusate sodium 100 milliGRAM(s) Oral three times a day  famotidine  IVPB 20 milliGRAM(s) IV Intermittent daily  fenofibrate Tablet 145 milliGRAM(s) Oral daily  gabapentin 400 milliGRAM(s) Oral three times a day  HYDROmorphone  Injectable 0.25 milliGRAM(s) IV Push once  influenza   Vaccine 0.5 milliLiter(s) IntraMuscular once  insulin glargine Injectable (LANTUS) 16 Unit(s) SubCutaneous at bedtime  insulin lispro (HumaLOG) corrective regimen sliding scale   SubCutaneous three times a day before meals  insulin lispro Injectable (HumaLOG) 6 Unit(s) SubCutaneous three times a day before meals  lidocaine   Patch 3 Patch Transdermal daily  methylPREDNISolone sodium succinate Injectable 10 milliGRAM(s) IV Push daily  metoprolol tartrate Injectable 5 milliGRAM(s) IV Push every 6 hours  NIFEdipine XL 30 milliGRAM(s) Oral daily  NIFEdipine XL 60 milliGRAM(s) Oral at bedtime  ondansetron Injectable 4 milliGRAM(s) IV Push once  piperacillin/tazobactam IVPB. 3.375 Gram(s) IV Intermittent every 8 hours  senna 2 Tablet(s) Oral at bedtime  sodium chloride 1 Gram(s) Oral daily  sodium chloride 0.9% lock flush 3 milliLiter(s) IV Push every 8 hours  vancomycin  IVPB 1000 milliGRAM(s) IV Intermittent every 24 hours    VITALS:  T(C): , Max: 36.6 (09-25-18 @ 12:56)  T(F): , Max: 97.8 (09-25-18 @ 12:56)  HR: 106 (09-26-18 @ 12:42)  BP: 135/74 (09-26-18 @ 12:42)  BP(mean): --  RR: 20 (09-26-18 @ 12:42)  SpO2: 100% (09-26-18 @ 12:42)  Wt(kg): --  I and O's:        REVIEW OF SYSTEMS:  Denies any nausea, vomiting, diarrhea, fever or chills. Denies chest pain, SOB, focal weakness, hematuria or dysuria. Good oral intake and denies fatigue or weakness. All other pertinent systems are reviewed and are negative.     PHYSICAL EXAM:  Constitutional: NAD  Respiratory: CTA B/L  Cardiovascular: S1 and S2, RRR  Gastrointestinal: + BS, soft, NT, ND  Extremities: No peripheral edema  Neurological: AAO x 3, CN 2-12 intact  : No Alcaraz  Access: Not applicable    LABS:                        8.7    10.94 )-----------( 390      ( 26 Sep 2018 06:37 )             29.3     09-26    136  |  93<L>  |  14  ----------------------------<  81  3.8   |  31  |  0.57    Ca    9.2      26 Sep 2018 06:37        Urine Studies:        RADIOLOGY & ADDITIONAL STUDIES:      ASSESSMENT    RECOMMEND    Pascale Byers NP  [facility],   [phone] NEPHROLOGY - NSN    Patient seen and examined.    MEDICATIONS  (STANDING):  ALBUTerol/ipratropium for Nebulization 3 milliLiter(s) Nebulizer every 6 hours  buDESOnide 160 MICROgram(s)/formoterol 4.5 MICROgram(s) Inhaler 2 Puff(s) Inhalation two times a day  dextrose 5%. 1000 milliLiter(s) (50 mL/Hr) IV Continuous <Continuous>  dextrose 50% Injectable 12.5 Gram(s) IV Push once  dextrose 50% Injectable 25 Gram(s) IV Push once  dextrose 50% Injectable 25 Gram(s) IV Push once  docusate sodium 100 milliGRAM(s) Oral three times a day  famotidine  IVPB 20 milliGRAM(s) IV Intermittent daily  fenofibrate Tablet 145 milliGRAM(s) Oral daily  gabapentin 400 milliGRAM(s) Oral three times a day  HYDROmorphone  Injectable 0.25 milliGRAM(s) IV Push once  influenza   Vaccine 0.5 milliLiter(s) IntraMuscular once  insulin glargine Injectable (LANTUS) 16 Unit(s) SubCutaneous at bedtime  insulin lispro (HumaLOG) corrective regimen sliding scale   SubCutaneous three times a day before meals  insulin lispro Injectable (HumaLOG) 6 Unit(s) SubCutaneous three times a day before meals  lidocaine   Patch 3 Patch Transdermal daily  methylPREDNISolone sodium succinate Injectable 10 milliGRAM(s) IV Push daily  metoprolol tartrate Injectable 5 milliGRAM(s) IV Push every 6 hours  NIFEdipine XL 30 milliGRAM(s) Oral daily  NIFEdipine XL 60 milliGRAM(s) Oral at bedtime  ondansetron Injectable 4 milliGRAM(s) IV Push once  piperacillin/tazobactam IVPB. 3.375 Gram(s) IV Intermittent every 8 hours  senna 2 Tablet(s) Oral at bedtime  sodium chloride 1 Gram(s) Oral daily  sodium chloride 0.9% lock flush 3 milliLiter(s) IV Push every 8 hours  vancomycin  IVPB 1000 milliGRAM(s) IV Intermittent every 24 hours    VITALS:  T(C): , Max: 36.6 (09-25-18 @ 12:56)  T(F): , Max: 97.8 (09-25-18 @ 12:56)  HR: 106 (09-26-18 @ 12:42)  BP: 135/74 (09-26-18 @ 12:42)  BP(mean): --  RR: 20 (09-26-18 @ 12:42)  SpO2: 100% (09-26-18 @ 12:42)  Wt(kg): --  I and O's:        REVIEW OF SYSTEMS:  Denies any nausea, vomiting, diarrhea, fever or chills. Denies chest pain, SOB, focal weakness, hematuria or dysuria. Good oral intake and denies fatigue or weakness. All other pertinent systems are reviewed and are negative.     PHYSICAL EXAM:  Constitutional: NAD  Respiratory: CTA B/L  Cardiovascular: S1 and S2, RRR  Gastrointestinal: + BS, soft, NT, ND  Extremities: No peripheral edema  Neurological: AAO x 3, CN 2-12 intact  : No Alcaraz  Access: Not applicable    LABS:                        8.7    10.94 )-----------( 390      ( 26 Sep 2018 06:37 )             29.3     09-26    136  |  93<L>  |  14  ----------------------------<  81  3.8   |  31  |  0.57    Ca    9.2      26 Sep 2018 06:37    ASSESSMENT/RECOMMEND  Pt is a 85 yo elderly female c hx lung cancer HTN DM pw + rhinovirus  SOB and issues with the clearance of her secretions  Hyponatremia  Shingles pain     Plan:  -Hyponatremia, continue salt tabs as ordered. HCTZ on hold. Encourage PO food intake and start Glucerna shakes. Monitor BMP  -IV antibiotics and pulmonary toileting as per pulmonary  -Change diet to Dysphagia 3 c honey thickened liquids  -Pain control seems to be a constant issue.  Will increase the gabapentin to 4000g po tid    Pascale Byers NP  [facility]  [phone] NEPHROLOGY - NSN    Patient seen and examined.  NAD.    MEDICATIONS  (STANDING):  ALBUTerol/ipratropium for Nebulization 3 milliLiter(s) Nebulizer every 6 hours  buDESOnide 160 MICROgram(s)/formoterol 4.5 MICROgram(s) Inhaler 2 Puff(s) Inhalation two times a day  dextrose 5%. 1000 milliLiter(s) (50 mL/Hr) IV Continuous <Continuous>  dextrose 50% Injectable 12.5 Gram(s) IV Push once  dextrose 50% Injectable 25 Gram(s) IV Push once  dextrose 50% Injectable 25 Gram(s) IV Push once  docusate sodium 100 milliGRAM(s) Oral three times a day  famotidine  IVPB 20 milliGRAM(s) IV Intermittent daily  fenofibrate Tablet 145 milliGRAM(s) Oral daily  gabapentin 400 milliGRAM(s) Oral three times a day  HYDROmorphone  Injectable 0.25 milliGRAM(s) IV Push once  influenza   Vaccine 0.5 milliLiter(s) IntraMuscular once  insulin glargine Injectable (LANTUS) 16 Unit(s) SubCutaneous at bedtime  insulin lispro (HumaLOG) corrective regimen sliding scale   SubCutaneous three times a day before meals  insulin lispro Injectable (HumaLOG) 6 Unit(s) SubCutaneous three times a day before meals  lidocaine   Patch 3 Patch Transdermal daily  methylPREDNISolone sodium succinate Injectable 10 milliGRAM(s) IV Push daily  metoprolol tartrate Injectable 5 milliGRAM(s) IV Push every 6 hours  NIFEdipine XL 30 milliGRAM(s) Oral daily  NIFEdipine XL 60 milliGRAM(s) Oral at bedtime  ondansetron Injectable 4 milliGRAM(s) IV Push once  piperacillin/tazobactam IVPB. 3.375 Gram(s) IV Intermittent every 8 hours  senna 2 Tablet(s) Oral at bedtime  sodium chloride 1 Gram(s) Oral daily  sodium chloride 0.9% lock flush 3 milliLiter(s) IV Push every 8 hours  vancomycin  IVPB 1000 milliGRAM(s) IV Intermittent every 24 hours    VITALS:  T(C): , Max: 36.6 (09-25-18 @ 12:56)  T(F): , Max: 97.8 (09-25-18 @ 12:56)  HR: 106 (09-26-18 @ 12:42)  BP: 135/74 (09-26-18 @ 12:42)  BP(mean): --  RR: 20 (09-26-18 @ 12:42)  SpO2: 100% (09-26-18 @ 12:42)  Wt(kg): --  I and O's:    PHYSICAL EXAM:  Constitutional: NAD, frail  Respiratory: coarse B/L  Cardiovascular: S1 and S2, RRR  Gastrointestinal: + BS, soft, NT, ND  Extremities: No peripheral edema  Neurological: forgetful   : No Alcaraz  Access: Not applicable    LABS:                        8.7    10.94 )-----------( 390      ( 26 Sep 2018 06:37 )             29.3     09-26    136  |  93<L>  |  14  ----------------------------<  81  3.8   |  31  |  0.57    Ca    9.2      26 Sep 2018 06:37    ASSESSMENT/RECOMMEND  Pt is a 85 yo elderly female c hx lung cancer HTN DM pw + rhinovirus  SOB and issues with the clearance of her secretions  Hyponatremia - stable - did not receive NACL yesterday due to NPO status  Shingles pain - improving  Prognosis guarded     -Hyponatremia, continue salt tabs as ordered. HCTZ on hold  -f/u palliative input Re: goals of care   -IV antibiotics per primary team   -NIV per pulmonary   -continue Dysphagia 3 c honey thickened liquids if able; Encourage PO food intake and continue Glucerna shakes.  -continue gabapentin to 400 mg po tid    D/W patients family members - her daughter Nicole wants to speak with Dr. Shabazz specifically to hear his recommendations - the message was passed along to Dr. Shabazz.     Pascale Byers NP  [facility]  [phone]

## 2018-09-26 NOTE — PROGRESS NOTE ADULT - PROBLEM SELECTOR PLAN 1
Patient is s/p VATS and RT.  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  Supportive care.  Given ECOG of 4, unlikely to be a good candidate for disease modifying interventions.

## 2018-09-26 NOTE — PROGRESS NOTE ADULT - SUBJECTIVE AND OBJECTIVE BOX
SUBJECTIVE AND OBJECTIVE: Still with shortness of breath.  Anxiety and right shoulder pain.  INTERVAL HPI/OVERNIGHT EVENTS:  Taken off of BIPAP in the morning.  Utilizing IV Dilaudid for shortness of breath  DNR on chart: Yes    Allergies    ACE inhibitors (Angioedema)    Intolerances    MEDICATIONS  (STANDING):  ALBUTerol/ipratropium for Nebulization 3 milliLiter(s) Nebulizer every 6 hours  buDESOnide 160 MICROgram(s)/formoterol 4.5 MICROgram(s) Inhaler 2 Puff(s) Inhalation two times a day  dextrose 5%. 1000 milliLiter(s) (50 mL/Hr) IV Continuous <Continuous>  dextrose 50% Injectable 12.5 Gram(s) IV Push once  dextrose 50% Injectable 25 Gram(s) IV Push once  dextrose 50% Injectable 25 Gram(s) IV Push once  docusate sodium 100 milliGRAM(s) Oral three times a day  famotidine    Tablet 20 milliGRAM(s) Oral daily  fenofibrate Tablet 145 milliGRAM(s) Oral daily  gabapentin 400 milliGRAM(s) Oral three times a day  influenza   Vaccine 0.5 milliLiter(s) IntraMuscular once  insulin glargine Injectable (LANTUS) 16 Unit(s) SubCutaneous at bedtime  insulin lispro (HumaLOG) corrective regimen sliding scale   SubCutaneous three times a day before meals  insulin lispro Injectable (HumaLOG) 6 Unit(s) SubCutaneous three times a day before meals  lidocaine   Patch 3 Patch Transdermal daily  metoprolol tartrate 50 milliGRAM(s) Oral two times a day  NIFEdipine XL 30 milliGRAM(s) Oral daily  NIFEdipine XL 60 milliGRAM(s) Oral at bedtime  piperacillin/tazobactam IVPB. 3.375 Gram(s) IV Intermittent every 8 hours  potassium chloride   Powder 40 milliEquivalent(s) Oral once  predniSONE   Tablet 10 milliGRAM(s) Oral two times a day  senna 2 Tablet(s) Oral at bedtime  sodium chloride 1 Gram(s) Oral daily  sodium chloride 0.9% lock flush 3 milliLiter(s) IV Push every 8 hours  traMADol 25 milliGRAM(s) Oral <User Schedule>  vancomycin  IVPB 1000 milliGRAM(s) IV Intermittent every 24 hours    MEDICATIONS  (PRN):  acetaminophen   Tablet .. 975 milliGRAM(s) Oral every 8 hours PRN Moderate Pain (4 - 6)  dextrose 40% Gel 15 Gram(s) Oral once PRN Blood Glucose LESS THAN 70 milliGRAM(s)/deciliter  dextrose 40% Gel 15 Gram(s) Oral once PRN Blood Glucose LESS THAN 70 milliGRAM(s)/deciliter  glucagon  Injectable 1 milliGRAM(s) IntraMuscular once PRN Glucose LESS THAN 70 milligrams/deciliter  HYDROmorphone  Injectable 0.2 milliGRAM(s) IV Push every 3 hours PRN Severe Pain (7 - 10)  polyethylene glycol 3350 17 Gram(s) Oral daily PRN Constipation      ITEMS UNCHECKED ARE NOT PRESENT    PRESENT SYMPTOMS: [ ]Unable to obtain due to poor mentation   Source if other than patient:  [ ]Family   [ ]Team     Pain (Impact on QOL):  Difficulty with movement  Location: right shoulder  Minimal acceptable level (0-10 scale):   5         Aggravating factors: movement  Quality: sharp  Radiation: down arm  Severity (0-10 scale):  8  Timing: Intermittent    Dyspnea:                           [ ]Mild [x ]Moderate [ ]Severe  Anxiety:                             [x ]Mild [ ]Moderate [ ]Severe  Fatigue:                             [ ]Mild [ ]Moderate [x ]Severe  Nausea:                             [x ]Mild [ ]Moderate [ ]Severe  Loss of appetite:              [ ]Mild [ ]Moderate [x ]Severe  Constipation:                    [x ]Mild [ ]Moderate [ ]Severe    PAIN AD Score:	  [email or web address] (Ctrl + left click to view)    Other Symptoms:  [ ]All other review of systems negative     Karnofsky Performance Score/Palliative Performance Status Version 2:     30    %    http://palliative.info/resource_material/[email or web address].pdf  PHYSICAL EXAM:  Vital Signs Last 24 Hrs  T(C): 36.4 (27 Sep 2018 05:33), Max: 37 (27 Sep 2018 00:52)  T(F): 97.6 (27 Sep 2018 05:33), Max: 98.6 (27 Sep 2018 00:52)  HR: 90 (27 Sep 2018 10:22) (64 - 122)  BP: 131/57 (27 Sep 2018 05:33) (123/66 - 157/76)  BP(mean): --  RR: 18 (27 Sep 2018 05:33) (16 - 22)  SpO2: 94% (27 Sep 2018 10:22) (94% - 100%) I&O's Summary   GENERAL:  [x ]Alert  [x ]Oriented x  2 [ ]Lethargic  [x ]Cachexia  [ ]Unarousable  [ ]Verbal  [ ]Non-Verbal    Behavioral:   [ x] Anxiety  [ ] Delirium [ ] Agitation [ ] Other    HEENT:  [ ]Normal   [x ]Dry mouth   [ ]ET Tube/Trach  [ ]Oral lesions    PULMONARY:   [ ]Clear [ ]Tachypnea  [ ]Audible excessive secretions   [x ]Rhonchi        [ ]Right [ ]Left [x]Bilateral  [ ]Crackles        [ ]Right [ ]Left [ ]Bilateral  [ ]Wheezing     [ ]Right [ ]Left [ ]Bilateral    CARDIOVASCULAR:    [x ]Regular [ ]Irregular [ ]Tachy  [ ]Blane [ ]Murmur [ ]Other    GASTROINTESTINAL:  [x ]Soft  [x ]Distended   [ x]+BS  [ ]Non tender [ ]Tender  [ ]PEG [ ]OGT/ NGT   Last BM:  GENITOURINARY:  [x ]Normal [ ] Incontinent   [ ]Oliguria/Anuria   [ ]Alcaraz    MUSCULOSKELETAL:   [ ]Normal   [ ]Weakness  [x ]Bed/Wheelchair bound [ ]Edema    NEUROLOGIC:   [ ]No focal deficits  [ x] Cognitive impairment  [ ] Dysphagia [ ]Dysarthria [ ] Paresis [ ]Other     SKIN:   [x ]Normal   [ ]Pressure ulcer(s)  [ ]Rash    CRITICAL CARE:  [ ] Shock Present  [ ]Septic [ ]Cardiogenic [ ]Neurologic [ ]Hypovolemic  [ ]  Vasopressors [ ]  Inotropes   [ ] Respiratory failure present  [ ] Acute  [ ] Chronic [ ] Hypoxic  [ ] Hypercarbic [ ] Other  [ ] Other organ failure     LABS:                        7.4    9.24  )-----------( 337      ( 27 Sep 2018 07:12 )             24.7   09-27    137  |  92<L>  |  18  ----------------------------<  166<H>  3.3<L>   |  34<H>  |  0.78    Ca    8.6      27 Sep 2018 07:12  Phos  2.2     09-27  Mg     1.9     09-27          RADIOLOGY & ADDITIONAL STUDIES:    Protein Calorie Malnutrition Present: [ x] yes [ ] no  [x ] PPSV2 < or = 30%  [ x] significant weight loss [ x] poor nutritional intake [ ] anasarca [x ] catabolic state Albumin, Serum: 3.0 g/dL (09-21-18 @ 14:00)  Artificial Nutrition [ ]     REFERRALS:   [ ]Chaplaincy  [ ] Hospice  [ ]Child Life  [ ]Social Work  [ ]Case management [ ]Holistic Therapy   Goals of Care Document: Goals of Care Conversation - Personal Advance Directive [CRISTIAN Holley] (09-26-18 @ 19:02)

## 2018-09-26 NOTE — DISCHARGE NOTE ADULT - PATIENT PORTAL LINK FT
You can access the [facility] Patient Portal, offered by [facility], by registering with the following website: [email or web address]

## 2018-09-27 DIAGNOSIS — Z71.89 OTHER SPECIFIED COUNSELING: ICD-10-CM

## 2018-09-27 LAB
BLD GP AB SCN SERPL QL: NEGATIVE — SIGNIFICANT CHANGE UP
BUN SERPL-MCNC: 18 MG/DL — SIGNIFICANT CHANGE UP (ref 7–23)
BUN SERPL-MCNC: 18 MG/DL — SIGNIFICANT CHANGE UP (ref 7–23)
CALCIUM SERPL-MCNC: 8.6 MG/DL — SIGNIFICANT CHANGE UP (ref 8.4–10.5)
CALCIUM SERPL-MCNC: 8.6 MG/DL — SIGNIFICANT CHANGE UP (ref 8.4–10.5)
CHLORIDE SERPL-SCNC: 92 MMOL/L — LOW (ref 98–107)
CHLORIDE SERPL-SCNC: 92 MMOL/L — LOW (ref 98–107)
CO2 SERPL-SCNC: 34 MMOL/L — HIGH (ref 22–31)
CO2 SERPL-SCNC: 34 MMOL/L — HIGH (ref 22–31)
CREAT SERPL-MCNC: 0.78 MG/DL — SIGNIFICANT CHANGE UP (ref 0.5–1.3)
CREAT SERPL-MCNC: 0.78 MG/DL — SIGNIFICANT CHANGE UP (ref 0.5–1.3)
GLUCOSE BLDC GLUCOMTR-MCNC: 199 MG/DL — HIGH (ref 70–99)
GLUCOSE BLDC GLUCOMTR-MCNC: 235 MG/DL — HIGH (ref 70–99)
GLUCOSE BLDC GLUCOMTR-MCNC: 250 MG/DL — HIGH (ref 70–99)
GLUCOSE BLDC GLUCOMTR-MCNC: 280 MG/DL — HIGH (ref 70–99)
GLUCOSE BLDC GLUCOMTR-MCNC: 88 MG/DL — SIGNIFICANT CHANGE UP (ref 70–99)
GLUCOSE SERPL-MCNC: 166 MG/DL — HIGH (ref 70–99)
GLUCOSE SERPL-MCNC: 166 MG/DL — HIGH (ref 70–99)
HCT VFR BLD CALC: 24.7 % — LOW (ref 34.5–45)
HGB BLD-MCNC: 7.4 G/DL — LOW (ref 11.5–15.5)
MAGNESIUM SERPL-MCNC: 1.9 MG/DL — SIGNIFICANT CHANGE UP (ref 1.6–2.6)
MCHC RBC-ENTMCNC: 25.3 PG — LOW (ref 27–34)
MCHC RBC-ENTMCNC: 30 % — LOW (ref 32–36)
MCV RBC AUTO: 84.3 FL — SIGNIFICANT CHANGE UP (ref 80–100)
NRBC # FLD: 0 — SIGNIFICANT CHANGE UP
PHOSPHATE SERPL-MCNC: 2.2 MG/DL — LOW (ref 2.5–4.5)
PLATELET # BLD AUTO: 337 K/UL — SIGNIFICANT CHANGE UP (ref 150–400)
PMV BLD: 8.9 FL — SIGNIFICANT CHANGE UP (ref 7–13)
POTASSIUM SERPL-MCNC: 3.3 MMOL/L — LOW (ref 3.5–5.3)
POTASSIUM SERPL-MCNC: 3.3 MMOL/L — LOW (ref 3.5–5.3)
POTASSIUM SERPL-SCNC: 3.3 MMOL/L — LOW (ref 3.5–5.3)
POTASSIUM SERPL-SCNC: 3.3 MMOL/L — LOW (ref 3.5–5.3)
RBC # BLD: 2.93 M/UL — LOW (ref 3.8–5.2)
RBC # FLD: 16.8 % — HIGH (ref 10.3–14.5)
RH IG SCN BLD-IMP: POSITIVE — SIGNIFICANT CHANGE UP
SODIUM SERPL-SCNC: 137 MMOL/L — SIGNIFICANT CHANGE UP (ref 135–145)
SODIUM SERPL-SCNC: 137 MMOL/L — SIGNIFICANT CHANGE UP (ref 135–145)
WBC # BLD: 9.24 K/UL — SIGNIFICANT CHANGE UP (ref 3.8–10.5)
WBC # FLD AUTO: 9.24 K/UL — SIGNIFICANT CHANGE UP (ref 3.8–10.5)

## 2018-09-27 RX ORDER — TRAMADOL HYDROCHLORIDE 50 MG/1
25 TABLET ORAL
Qty: 0 | Refills: 0 | Status: DISCONTINUED | OUTPATIENT
Start: 2018-09-27 | End: 2018-09-28

## 2018-09-27 RX ORDER — POTASSIUM CHLORIDE 20 MEQ
40 PACKET (EA) ORAL ONCE
Qty: 0 | Refills: 0 | Status: COMPLETED | OUTPATIENT
Start: 2018-09-27 | End: 2018-09-27

## 2018-09-27 RX ORDER — GABAPENTIN 400 MG/1
600 CAPSULE ORAL THREE TIMES A DAY
Qty: 0 | Refills: 0 | Status: DISCONTINUED | OUTPATIENT
Start: 2018-09-27 | End: 2018-09-28

## 2018-09-27 RX ORDER — SODIUM,POTASSIUM PHOSPHATES 278-250MG
1 POWDER IN PACKET (EA) ORAL
Qty: 0 | Refills: 0 | Status: DISCONTINUED | OUTPATIENT
Start: 2018-09-27 | End: 2018-09-28

## 2018-09-27 RX ORDER — FUROSEMIDE 40 MG
40 TABLET ORAL ONCE
Qty: 0 | Refills: 0 | Status: COMPLETED | OUTPATIENT
Start: 2018-09-27 | End: 2018-09-27

## 2018-09-27 RX ORDER — HYDROMORPHONE HYDROCHLORIDE 2 MG/ML
0.2 INJECTION INTRAMUSCULAR; INTRAVENOUS; SUBCUTANEOUS
Qty: 0 | Refills: 0 | Status: DISCONTINUED | OUTPATIENT
Start: 2018-09-27 | End: 2018-09-28

## 2018-09-27 RX ADMIN — Medication 10 MILLIGRAM(S): at 18:50

## 2018-09-27 RX ADMIN — GABAPENTIN 400 MILLIGRAM(S): 400 CAPSULE ORAL at 13:45

## 2018-09-27 RX ADMIN — PIPERACILLIN AND TAZOBACTAM 25 GRAM(S): 4; .5 INJECTION, POWDER, LYOPHILIZED, FOR SOLUTION INTRAVENOUS at 22:08

## 2018-09-27 RX ADMIN — Medication 10 MILLIGRAM(S): at 05:41

## 2018-09-27 RX ADMIN — Medication 1 TABLET(S): at 20:27

## 2018-09-27 RX ADMIN — GABAPENTIN 400 MILLIGRAM(S): 400 CAPSULE ORAL at 05:41

## 2018-09-27 RX ADMIN — BUDESONIDE AND FORMOTEROL FUMARATE DIHYDRATE 2 PUFF(S): 160; 4.5 AEROSOL RESPIRATORY (INHALATION) at 09:03

## 2018-09-27 RX ADMIN — SENNA PLUS 2 TABLET(S): 8.6 TABLET ORAL at 20:26

## 2018-09-27 RX ADMIN — FAMOTIDINE 20 MILLIGRAM(S): 10 INJECTION INTRAVENOUS at 13:27

## 2018-09-27 RX ADMIN — Medication 50 MILLIGRAM(S): at 05:42

## 2018-09-27 RX ADMIN — Medication 6 UNIT(S): at 09:03

## 2018-09-27 RX ADMIN — TRAMADOL HYDROCHLORIDE 25 MILLIGRAM(S): 50 TABLET ORAL at 05:39

## 2018-09-27 RX ADMIN — Medication 3 MILLILITER(S): at 21:14

## 2018-09-27 RX ADMIN — Medication 30 MILLIGRAM(S): at 05:42

## 2018-09-27 RX ADMIN — SODIUM CHLORIDE 3 MILLILITER(S): 9 INJECTION INTRAMUSCULAR; INTRAVENOUS; SUBCUTANEOUS at 05:42

## 2018-09-27 RX ADMIN — BUDESONIDE AND FORMOTEROL FUMARATE DIHYDRATE 2 PUFF(S): 160; 4.5 AEROSOL RESPIRATORY (INHALATION) at 20:21

## 2018-09-27 RX ADMIN — Medication 250 MILLIGRAM(S): at 20:20

## 2018-09-27 RX ADMIN — PIPERACILLIN AND TAZOBACTAM 25 GRAM(S): 4; .5 INJECTION, POWDER, LYOPHILIZED, FOR SOLUTION INTRAVENOUS at 09:03

## 2018-09-27 RX ADMIN — SODIUM CHLORIDE 3 MILLILITER(S): 9 INJECTION INTRAMUSCULAR; INTRAVENOUS; SUBCUTANEOUS at 20:25

## 2018-09-27 RX ADMIN — TRAMADOL HYDROCHLORIDE 25 MILLIGRAM(S): 50 TABLET ORAL at 06:39

## 2018-09-27 RX ADMIN — Medication 3 MILLILITER(S): at 15:58

## 2018-09-27 RX ADMIN — TRAMADOL HYDROCHLORIDE 25 MILLIGRAM(S): 50 TABLET ORAL at 13:45

## 2018-09-27 RX ADMIN — Medication 3 MILLILITER(S): at 09:55

## 2018-09-27 RX ADMIN — INSULIN GLARGINE 16 UNIT(S): 100 INJECTION, SOLUTION SUBCUTANEOUS at 23:10

## 2018-09-27 RX ADMIN — Medication 40 MILLIEQUIVALENT(S): at 13:28

## 2018-09-27 RX ADMIN — GABAPENTIN 600 MILLIGRAM(S): 400 CAPSULE ORAL at 22:08

## 2018-09-27 RX ADMIN — Medication 100 MILLIGRAM(S): at 05:41

## 2018-09-27 RX ADMIN — Medication 60 MILLIGRAM(S): at 23:17

## 2018-09-27 RX ADMIN — PIPERACILLIN AND TAZOBACTAM 25 GRAM(S): 4; .5 INJECTION, POWDER, LYOPHILIZED, FOR SOLUTION INTRAVENOUS at 00:58

## 2018-09-27 RX ADMIN — SODIUM CHLORIDE 3 MILLILITER(S): 9 INJECTION INTRAMUSCULAR; INTRAVENOUS; SUBCUTANEOUS at 14:29

## 2018-09-27 RX ADMIN — Medication 1 TABLET(S): at 18:50

## 2018-09-27 RX ADMIN — TRAMADOL HYDROCHLORIDE 25 MILLIGRAM(S): 50 TABLET ORAL at 22:39

## 2018-09-27 RX ADMIN — TRAMADOL HYDROCHLORIDE 25 MILLIGRAM(S): 50 TABLET ORAL at 22:09

## 2018-09-27 RX ADMIN — Medication 3 MILLILITER(S): at 03:06

## 2018-09-27 RX ADMIN — Medication 2: at 20:21

## 2018-09-27 RX ADMIN — SODIUM CHLORIDE 1 GRAM(S): 9 INJECTION INTRAMUSCULAR; INTRAVENOUS; SUBCUTANEOUS at 18:50

## 2018-09-27 RX ADMIN — Medication 1: at 09:02

## 2018-09-27 RX ADMIN — HYDROMORPHONE HYDROCHLORIDE 0.2 MILLIGRAM(S): 2 INJECTION INTRAMUSCULAR; INTRAVENOUS; SUBCUTANEOUS at 13:41

## 2018-09-27 RX ADMIN — Medication 145 MILLIGRAM(S): at 13:28

## 2018-09-27 RX ADMIN — TRAMADOL HYDROCHLORIDE 25 MILLIGRAM(S): 50 TABLET ORAL at 14:00

## 2018-09-27 RX ADMIN — Medication 40 MILLIGRAM(S): at 20:22

## 2018-09-27 RX ADMIN — HYDROMORPHONE HYDROCHLORIDE 0.2 MILLIGRAM(S): 2 INJECTION INTRAMUSCULAR; INTRAVENOUS; SUBCUTANEOUS at 13:26

## 2018-09-27 RX ADMIN — Medication 50 MILLIGRAM(S): at 18:50

## 2018-09-27 NOTE — PROGRESS NOTE ADULT - ASSESSMENT
87 yo elderly female c hx lung cancer HTN DM pw + rhinovirus  SOB and issues with the clearance of her secretions  Hyponatremia - stable - did not receive NACL yesterday due to NPO status  Shingles pain - the same if not worse  Hypokalemia  Hypophosphatemia    -Hyponatremia, continue salt tabs as ordered. HCTZ on hold  -f/u palliative input Re: goals of care   -IV antibiotics per primary team   -NIV per pulmonary   -continue Dysphagia 3 c honey thickened liquids if able; Encourage PO food intake and continue Glucerna shakes.  -continue gabapentin but increase to 600 mg po ti_ IV  -K-Phos pills for 2 days  -Blood xfusion

## 2018-09-27 NOTE — PROGRESS NOTE ADULT - SUBJECTIVE AND OBJECTIVE BOX
Cardiovascular Disease Progress Note    Overnight events: No acute events overnight.  pulmonary status somewhat improved. still with some sob. no cp/pnd/orthopnea  Otherwise review of systems negative    Objective Findings:  T(C): 36.4 (09-27-18 @ 05:33), Max: 37 (09-27-18 @ 00:52)  HR: 85 (09-27-18 @ 07:38) (64 - 122)  BP: 131/57 (09-27-18 @ 05:33) (123/66 - 157/76)  RR: 18 (09-27-18 @ 05:33) (16 - 24)  SpO2: 94% (09-27-18 @ 07:38) (94% - 100%)  Wt(kg): --  Daily     Daily       Physical Exam:  Gen: NAD  HEENT: EOMI  CV: RRR, normal S1 + S2, no m/r/g  Lungs: CTAB  Abd: soft, non-tender  Ext: No edema      Laboratory Data:                        7.4    9.24  )-----------( 337      ( 27 Sep 2018 07:12 )             24.7     09-27    137  |  92<L>  |  18  ----------------------------<  166<H>  3.3<L>   |  34<H>  |  0.78    Ca    8.6      27 Sep 2018 07:12  Phos  2.2     09-27  Mg     1.9     09-27        Inpatient Medications:  MEDICATIONS  (STANDING):  ALBUTerol/ipratropium for Nebulization 3 milliLiter(s) Nebulizer every 6 hours  buDESOnide 160 MICROgram(s)/formoterol 4.5 MICROgram(s) Inhaler 2 Puff(s) Inhalation two times a day  dextrose 5%. 1000 milliLiter(s) (50 mL/Hr) IV Continuous <Continuous>  dextrose 50% Injectable 12.5 Gram(s) IV Push once  dextrose 50% Injectable 25 Gram(s) IV Push once  dextrose 50% Injectable 25 Gram(s) IV Push once  docusate sodium 100 milliGRAM(s) Oral three times a day  famotidine    Tablet 20 milliGRAM(s) Oral daily  fenofibrate Tablet 145 milliGRAM(s) Oral daily  gabapentin 400 milliGRAM(s) Oral three times a day  influenza   Vaccine 0.5 milliLiter(s) IntraMuscular once  insulin glargine Injectable (LANTUS) 16 Unit(s) SubCutaneous at bedtime  insulin lispro (HumaLOG) corrective regimen sliding scale   SubCutaneous three times a day before meals  insulin lispro Injectable (HumaLOG) 6 Unit(s) SubCutaneous three times a day before meals  lidocaine   Patch 3 Patch Transdermal daily  metoprolol tartrate 50 milliGRAM(s) Oral two times a day  NIFEdipine XL 30 milliGRAM(s) Oral daily  NIFEdipine XL 60 milliGRAM(s) Oral at bedtime  piperacillin/tazobactam IVPB. 3.375 Gram(s) IV Intermittent every 8 hours  predniSONE   Tablet 10 milliGRAM(s) Oral two times a day  senna 2 Tablet(s) Oral at bedtime  sodium chloride 1 Gram(s) Oral daily  sodium chloride 0.9% lock flush 3 milliLiter(s) IV Push every 8 hours  traMADol 25 milliGRAM(s) Oral <User Schedule>  vancomycin  IVPB 1000 milliGRAM(s) IV Intermittent every 24 hours      Assessment:  86 F with PMH of lung adenoca (dx around 2010, s/p L VATS, XRT of TERESA/RUL last in July 2016), COPD with emphysema, htn, and dm2, recently admitted with shoulder pain secondary to zoster and incidental e hemorrhagic brain met now presents with acute hypoxic respiratory failure, in the setting of possible PNA and progression of disease. Overall with poor prognosis and unlikely to have meaningful recovery of pulmonary status.    Plan:  1) hypoxic respiratory failure   -appreciate pulm recs. currently being treated for HCAP  -chest PT  -bronchodilators  -maintain euvolemia  -f/u cultures and infectious workup  -no evidence of CO2 retention on ABG  -f/u palliative recs. efforts are being made to focus on comfort and symptom control    2) HTN, APC's, HFpEF  -no evidence of atrial fibrillation. cont to hold AC  -c/w metoprolol as tolerates  -c/w nifdepine as dosed  -cont to hold hctz 2/2 hyponatremia  -appears euvolemic    3) hyponatremia  -likely multifactorial including tea/toast diet and SIADH  -appreciate renal recs  -improving         Over 25 minutes spent on total encounter; more than 50% of the visit was spent counseling and/or coordinating care by the attending physician.      Slick York MD   Cardiovascular Disease  [phone]

## 2018-09-27 NOTE — PROGRESS NOTE ADULT - SUBJECTIVE AND OBJECTIVE BOX
NEPHROLOGY-[facility] [phone]        Patient seen and examined in bed.  She was in eating  Getting blood later  Still with pain in the shoulder        MEDICATIONS  (STANDING):  ALBUTerol/ipratropium for Nebulization 3 milliLiter(s) Nebulizer every 6 hours  buDESOnide 160 MICROgram(s)/formoterol 4.5 MICROgram(s) Inhaler 2 Puff(s) Inhalation two times a day  dextrose 5%. 1000 milliLiter(s) (50 mL/Hr) IV Continuous <Continuous>  dextrose 50% Injectable 12.5 Gram(s) IV Push once  dextrose 50% Injectable 25 Gram(s) IV Push once  dextrose 50% Injectable 25 Gram(s) IV Push once  docusate sodium 100 milliGRAM(s) Oral three times a day  famotidine    Tablet 20 milliGRAM(s) Oral daily  fenofibrate Tablet 145 milliGRAM(s) Oral daily  furosemide   Injectable 40 milliGRAM(s) IV Push once  gabapentin 400 milliGRAM(s) Oral three times a day  influenza   Vaccine 0.5 milliLiter(s) IntraMuscular once  insulin glargine Injectable (LANTUS) 16 Unit(s) SubCutaneous at bedtime  insulin lispro (HumaLOG) corrective regimen sliding scale   SubCutaneous three times a day before meals  insulin lispro Injectable (HumaLOG) 6 Unit(s) SubCutaneous three times a day before meals  lidocaine   Patch 3 Patch Transdermal daily  metoprolol tartrate 50 milliGRAM(s) Oral two times a day  NIFEdipine XL 30 milliGRAM(s) Oral daily  NIFEdipine XL 60 milliGRAM(s) Oral at bedtime  piperacillin/tazobactam IVPB. 3.375 Gram(s) IV Intermittent every 8 hours  predniSONE   Tablet 10 milliGRAM(s) Oral two times a day  senna 2 Tablet(s) Oral at bedtime  sodium chloride 1 Gram(s) Oral daily  sodium chloride 0.9% lock flush 3 milliLiter(s) IV Push every 8 hours  traMADol 25 milliGRAM(s) Oral <User Schedule>  vancomycin  IVPB 1000 milliGRAM(s) IV Intermittent every 24 hours      VITAL:  T(C): , Max: 37 (09-27-18 @ 00:52)  T(F): , Max: 98.6 (09-27-18 @ 00:52)  HR: 99 (09-27-18 @ 15:58)  BP: 143/75 (09-27-18 @ 12:18)  BP(mean): --  RR: 18 (09-27-18 @ 12:18)  SpO2: 100% (09-27-18 @ 15:58)  Wt(kg): --    I and O's:        PHYSICAL EXAM:    Constitutional: cachetic  HEENT: PERRLA    Neck:  No JVD  Respiratory: Course  Cardiovascular: S1 and S2  Gastrointestinal: BS+, soft, NT/ND  Extremities: No peripheral edema  Neurological: A/O x 3, no focal deficits  Psychiatric: Normal mood, normal affect  : No Alcaraz  Skin: No rashes  Access: Not applicable    LABS:                        7.4    9.24  )-----------( 337      ( 27 Sep 2018 07:12 )             24.7     09-27    137  |  92<L>  |  18  ----------------------------<  166<H>  3.3<L>   |  34<H>  |  0.78    Ca    8.6      27 Sep 2018 07:12  Phos  2.2     09-27  Mg     1.9     09-27            Urine Studies:          RADIOLOGY & ADDITIONAL STUDIES:    < from: Xray Chest 1 View AP/PA (09.23.18 @ 22:41) >    EXAM:  XR CHEST AP OR PA 1V        PROCEDURE DATE:  Sep 23 2018         INTERPRETATION:  CLINICAL INFORMATION: Shortness breath. COPD.    TECHNIQUE: AP chest radiograph    COMPARISON: CT chest and chest radiograph performed 9/21/2018.    FINDINGS:    There is demonstration of a large right upper lung field mass,   corresponding to the right apical mass seen on recent CT. Linear   opacities in the left upper lung field are unchanged. No pleural   effusions or pneumothoraces are seen bilaterally.Status post   cholecystectomy.    IMPRESSION:    Unchanged right apical mass and left upper lobe linear opacities, as seen   on prior imaging.              BEBETO RIOS M.D., RADIOLOGY RESIDENT  This document has been electronically signed.  DAVID GUTIÉRREZ M.D., ATTENDING RADIOLOGIST  This document has been electronically signed. Sep 24 2018  1:25PM                  < end of copied text >

## 2018-09-27 NOTE — PROGRESS NOTE ADULT - SUBJECTIVE AND OBJECTIVE BOX
_________________________________________________________________________________________  ========>>  M E D I C A L   A T T E N D I N G    F O L L O W  U P  N O T E  <<=========  -----------------------------------------------------------------------------------------------------    - Patient seen and examined by me approximately sixty minutes ago.   - In summary,  CLIVE HENRY is a 86y year old woman who originally presented with SOB  - Patient today overall doing ok, comfortable, eating better, breathing better     pt weaned off Bipap and been stable on Venti mask since yesterday     ==================>> REVIEW OF SYSTEM <<=================    GEN: no fever, no chills, pain better controlled   RESP: + SOB, + occasional cough, no significant sputum  CVS: no chest pain, no palpitations, no edema  GI: no abdominal pain, no nausea, no constipation, no diarrhea  : no dysuria, no frequency, no hematuria  Neuro: no headache, no dizziness  Derm : no itching, no rash    ==================>> PHYSICAL EXAM <<=================    GEN: A&O X 3 , NAD , comfortable  HEENT: NCAT, PERRL, mucosa dry, hoarse voice, hearing intact  Neck: supple , no JVD  CVS: S1S2 , Iregular   PULM: + minimal wheezing   ABD.: soft. non tender, non distended  Extrem: no edema   PSYCH : normal mood,  not anxious      ==================>> MEDICATIONS <<====================    ALBUTerol/ipratropium for Nebulization 3 milliLiter(s) Nebulizer every 6 hours  buDESOnide 160 MICROgram(s)/formoterol 4.5 MICROgram(s) Inhaler 2 Puff(s) Inhalation two times a day  dextrose 5%. 1000 milliLiter(s) IV Continuous <Continuous>  dextrose 50% Injectable 12.5 Gram(s) IV Push once  dextrose 50% Injectable 25 Gram(s) IV Push once  dextrose 50% Injectable 25 Gram(s) IV Push once  docusate sodium 100 milliGRAM(s) Oral three times a day  famotidine    Tablet 20 milliGRAM(s) Oral daily  fenofibrate Tablet 145 milliGRAM(s) Oral daily  furosemide   Injectable 40 milliGRAM(s) IV Push once  gabapentin 400 milliGRAM(s) Oral three times a day  influenza   Vaccine 0.5 milliLiter(s) IntraMuscular once  insulin glargine Injectable (LANTUS) 16 Unit(s) SubCutaneous at bedtime  insulin lispro (HumaLOG) corrective regimen sliding scale   SubCutaneous three times a day before meals  insulin lispro Injectable (HumaLOG) 6 Unit(s) SubCutaneous three times a day before meals  lidocaine   Patch 3 Patch Transdermal daily  metoprolol tartrate 50 milliGRAM(s) Oral two times a day  NIFEdipine XL 30 milliGRAM(s) Oral daily  NIFEdipine XL 60 milliGRAM(s) Oral at bedtime  piperacillin/tazobactam IVPB. 3.375 Gram(s) IV Intermittent every 8 hours  potassium chloride   Powder 40 milliEquivalent(s) Oral once  predniSONE   Tablet 10 milliGRAM(s) Oral two times a day  senna 2 Tablet(s) Oral at bedtime  sodium chloride 1 Gram(s) Oral daily  sodium chloride 0.9% lock flush 3 milliLiter(s) IV Push every 8 hours  traMADol 25 milliGRAM(s) Oral <User Schedule>  vancomycin  IVPB 1000 milliGRAM(s) IV Intermittent every 24 hours    MEDICATIONS  (PRN):  acetaminophen   Tablet .. 975 milliGRAM(s) Oral every 8 hours PRN Moderate Pain (4 - 6)  dextrose 40% Gel 15 Gram(s) Oral once PRN Blood Glucose LESS THAN 70 milliGRAM(s)/deciliter  dextrose 40% Gel 15 Gram(s) Oral once PRN Blood Glucose LESS THAN 70 milliGRAM(s)/deciliter  glucagon  Injectable 1 milliGRAM(s) IntraMuscular once PRN Glucose LESS THAN 70 milligrams/deciliter  HYDROmorphone  Injectable 0.2 milliGRAM(s) IV Push every 3 hours PRN Severe Pain (7 - 10)  polyethylene glycol 3350 17 Gram(s) Oral daily PRN Constipation    ==================>> VITAL SIGNS <<==================    Vital Signs Last 24 Hrs  T(C): 36.8 (09-27-18 @ 12:18)  T(F): 98.2 (09-27-18 @ 12:18), Max: 98.6 (09-27-18 @ 00:52)  HR: 84 (09-27-18 @ 12:18) (64 - 122)  BP: 143/75 (09-27-18 @ 12:18)  RR: 18 (09-27-18 @ 12:18) (16 - 22)  SpO2: 99% (09-27-18 @ 12:18) (94% - 100%)      POCT Blood Glucose.: 88 mg/dL (27 Sep 2018 12:14)  POCT Blood Glucose.: 199 mg/dL (27 Sep 2018 08:39)  POCT Blood Glucose.: 306 mg/dL (26 Sep 2018 22:23)  POCT Blood Glucose.: 206 mg/dL (26 Sep 2018 17:26)     ==================>> LAB AND IMAGING <<==================                        7.4    9.24  )-----------( 337      ( 27 Sep 2018 07:12 )             24.7        Hemoglobin:   7.4 <<==,  8.7 <<==,  8.8 <<==,  8.7 <<==,  8.1 <<==    137  |  92<L>  |  18  ----------------------------<  166<H>  3.3<L>   |  34<H>  |  0.78    Sodium:   137  <==, 136  <==, 136  <==, 132  <==, 133  <==, 132  <==    Ca    8.6      27 Sep 2018 07:12  Phos  2.2     09-27  Mg     1.9     09-27    ___________________________________________________________________________________  ===============>>  A S S E S S M E N T   A N D   P L A N <<===============  ------------------------------------------------------------------------------------------    · Assessment		  · Assessment	  86 F with above hx with worsening SOB despite oral abx as out-pt. possible HCAP vs mucus plugging vs progression of dz     Problem/Plan - 1:  ·  Problem:  worsening respiratory status due to viral URI, HCAP, and metastatic cancer, now better       Pul and palliative f/up noted.  On abx for HCAP>> 7 days >> DC after today's dose   BIPAP off, monitor      PRBC + lasix X1 today for worsening symptomatic anemia of chronic disease   diet as able if remains off Bipap   IV and inhaled steroids as ordered  Dispo plan as [facility]     CAD, AAF  Cardio f/up noted.  continue IV lopressor as needed  resume and continue all PO meds as able to take PO  continue off AC    Dw family. Family and pt, all questions and concerns addressed.      family is aware of pt's poor prognosis due to cancer . Pt is DNR.  pt wants comfort measures     pt's family working on setting up home hospice: additional aid services .. >> likely DC Monday to home  --------------------------------------------  Case discussed as above  Education given on findings and plan of care  ___________________________  H. GEN Ferrell.  Pager: [phone] _________________________________________________________________________________________  ========>>  M E D I C A L   A T T E N D I N G    F O L L O W  U P  N O T E  <<=========  -----------------------------------------------------------------------------------------------------    - Patient seen and examined by me approximately sixty minutes ago.   - In summary,  CLIVE HENRY is a 86y year old woman who originally presented with SOB  - Patient today overall doing ok, comfortable, eating better, breathing better     pt weaned off Bipap and been stable on Venti mask since yesterday     ==================>> REVIEW OF SYSTEM <<=================    GEN: no fever, no chills, pain better controlled   RESP: + SOB, + occasional cough, no significant sputum  CVS: no chest pain, no palpitations, no edema  GI: no abdominal pain, no nausea, no constipation, no diarrhea  : no dysuria, no frequency, no hematuria  Neuro: no headache, no dizziness  Derm : no itching, no rash    ==================>> PHYSICAL EXAM <<=================    GEN: A&O X 3 , NAD , comfortable  HEENT: NCAT, PERRL, mucosa dry, hoarse voice, hearing intact  Neck: supple , no JVD  CVS: S1S2 , Iregular   PULM: + minimal wheezing   ABD.: soft. non tender, non distended  Extrem: no edema   PSYCH : normal mood,  not anxious      ==================>> MEDICATIONS <<====================    ALBUTerol/ipratropium for Nebulization 3 milliLiter(s) Nebulizer every 6 hours  buDESOnide 160 MICROgram(s)/formoterol 4.5 MICROgram(s) Inhaler 2 Puff(s) Inhalation two times a day  dextrose 5%. 1000 milliLiter(s) IV Continuous <Continuous>  dextrose 50% Injectable 12.5 Gram(s) IV Push once  dextrose 50% Injectable 25 Gram(s) IV Push once  dextrose 50% Injectable 25 Gram(s) IV Push once  docusate sodium 100 milliGRAM(s) Oral three times a day  famotidine    Tablet 20 milliGRAM(s) Oral daily  fenofibrate Tablet 145 milliGRAM(s) Oral daily  furosemide   Injectable 40 milliGRAM(s) IV Push once  gabapentin 400 milliGRAM(s) Oral three times a day  influenza   Vaccine 0.5 milliLiter(s) IntraMuscular once  insulin glargine Injectable (LANTUS) 16 Unit(s) SubCutaneous at bedtime  insulin lispro (HumaLOG) corrective regimen sliding scale   SubCutaneous three times a day before meals  insulin lispro Injectable (HumaLOG) 6 Unit(s) SubCutaneous three times a day before meals  lidocaine   Patch 3 Patch Transdermal daily  metoprolol tartrate 50 milliGRAM(s) Oral two times a day  NIFEdipine XL 30 milliGRAM(s) Oral daily  NIFEdipine XL 60 milliGRAM(s) Oral at bedtime  piperacillin/tazobactam IVPB. 3.375 Gram(s) IV Intermittent every 8 hours  potassium chloride   Powder 40 milliEquivalent(s) Oral once  predniSONE   Tablet 10 milliGRAM(s) Oral two times a day  senna 2 Tablet(s) Oral at bedtime  sodium chloride 1 Gram(s) Oral daily  sodium chloride 0.9% lock flush 3 milliLiter(s) IV Push every 8 hours  traMADol 25 milliGRAM(s) Oral <User Schedule>  vancomycin  IVPB 1000 milliGRAM(s) IV Intermittent every 24 hours    MEDICATIONS  (PRN):  acetaminophen   Tablet .. 975 milliGRAM(s) Oral every 8 hours PRN Moderate Pain (4 - 6)  dextrose 40% Gel 15 Gram(s) Oral once PRN Blood Glucose LESS THAN 70 milliGRAM(s)/deciliter  dextrose 40% Gel 15 Gram(s) Oral once PRN Blood Glucose LESS THAN 70 milliGRAM(s)/deciliter  glucagon  Injectable 1 milliGRAM(s) IntraMuscular once PRN Glucose LESS THAN 70 milligrams/deciliter  HYDROmorphone  Injectable 0.2 milliGRAM(s) IV Push every 3 hours PRN Severe Pain (7 - 10)  polyethylene glycol 3350 17 Gram(s) Oral daily PRN Constipation    ==================>> VITAL SIGNS <<==================    Vital Signs Last 24 Hrs  T(C): 36.8 (09-27-18 @ 12:18)  T(F): 98.2 (09-27-18 @ 12:18), Max: 98.6 (09-27-18 @ 00:52)  HR: 84 (09-27-18 @ 12:18) (64 - 122)  BP: 143/75 (09-27-18 @ 12:18)  RR: 18 (09-27-18 @ 12:18) (16 - 22)  SpO2: 99% (09-27-18 @ 12:18) (94% - 100%)      POCT Blood Glucose.: 88 mg/dL (27 Sep 2018 12:14)  POCT Blood Glucose.: 199 mg/dL (27 Sep 2018 08:39)  POCT Blood Glucose.: 306 mg/dL (26 Sep 2018 22:23)  POCT Blood Glucose.: 206 mg/dL (26 Sep 2018 17:26)     ==================>> LAB AND IMAGING <<==================                        7.4    9.24  )-----------( 337      ( 27 Sep 2018 07:12 )             24.7        Hemoglobin:   7.4 <<==,  8.7 <<==,  8.8 <<==,  8.7 <<==,  8.1 <<==    137  |  92<L>  |  18  ----------------------------<  166<H>  3.3<L>   |  34<H>  |  0.78    Sodium:   137  <==, 136  <==, 136  <==, 132  <==, 133  <==, 132  <==    Ca    8.6      27 Sep 2018 07:12  Phos  2.2     09-27  Mg     1.9     09-27    ___________________________________________________________________________________  ===============>>  A S S E S S M E N T   A N D   P L A N <<===============  ------------------------------------------------------------------------------------------    · Assessment		  · Assessment	  86 F with above hx with worsening SOB despite oral abx as out-pt. possible HCAP vs mucus plugging vs progression of dz     Problem/Plan - 1:  ·  Problem:  worsening respiratory status due to viral URI, HCAP, and metastatic cancer, now better       Pul and palliative f/up noted.  On abx for HCAP>> 7 days >> DC after today's dose   BIPAP off, monitor      PRBC + lasix X1 today for worsening symptomatic anemia of chronic disease   diet as able if remains off Bipap   IV and inhaled steroids as ordered  Dispo plan as bellow     monitor hypokalemia/ hyponatremia and supplement as needed    CAD, AAF  Cardio f/up noted.  continue IV lopressor as needed  resume and continue all PO meds as able to take PO  continue off AC    Dw family. Family and pt, all questions and concerns addressed.      family is aware of pt's poor prognosis due to cancer . Pt is DNR.  pt wants comfort measures     pt's family working on setting up home hospice: additional aid services .. >> likely DC Monday to home  --------------------------------------------  Case discussed as above  Education given on findings and plan of care  ___________________________  H. GEN Ferrell.  Pager: [phone]

## 2018-09-28 LAB
BASOPHILS # BLD AUTO: 0.01 K/UL — SIGNIFICANT CHANGE UP (ref 0–0.2)
BASOPHILS NFR BLD AUTO: 0.1 % — SIGNIFICANT CHANGE UP (ref 0–2)
BUN SERPL-MCNC: 23 MG/DL — SIGNIFICANT CHANGE UP (ref 7–23)
CALCIUM SERPL-MCNC: 8.9 MG/DL — SIGNIFICANT CHANGE UP (ref 8.4–10.5)
CHLORIDE SERPL-SCNC: 91 MMOL/L — LOW (ref 98–107)
CO2 SERPL-SCNC: 32 MMOL/L — HIGH (ref 22–31)
CREAT SERPL-MCNC: 0.9 MG/DL — SIGNIFICANT CHANGE UP (ref 0.5–1.3)
EOSINOPHIL # BLD AUTO: 0 K/UL — SIGNIFICANT CHANGE UP (ref 0–0.5)
EOSINOPHIL NFR BLD AUTO: 0 % — SIGNIFICANT CHANGE UP (ref 0–6)
GLUCOSE BLDC GLUCOMTR-MCNC: 191 MG/DL — HIGH (ref 70–99)
GLUCOSE BLDC GLUCOMTR-MCNC: 302 MG/DL — HIGH (ref 70–99)
GLUCOSE BLDC GLUCOMTR-MCNC: 73 MG/DL — SIGNIFICANT CHANGE UP (ref 70–99)
GLUCOSE SERPL-MCNC: 242 MG/DL — HIGH (ref 70–99)
HCT VFR BLD CALC: 32.1 % — LOW (ref 34.5–45)
HGB BLD-MCNC: 9.6 G/DL — LOW (ref 11.5–15.5)
IMM GRANULOCYTES # BLD AUTO: 0.08 # — SIGNIFICANT CHANGE UP
IMM GRANULOCYTES NFR BLD AUTO: 0.9 % — SIGNIFICANT CHANGE UP (ref 0–1.5)
LYMPHOCYTES # BLD AUTO: 0.5 K/UL — LOW (ref 1–3.3)
LYMPHOCYTES # BLD AUTO: 5.4 % — LOW (ref 13–44)
MCHC RBC-ENTMCNC: 26.1 PG — LOW (ref 27–34)
MCHC RBC-ENTMCNC: 29.9 % — LOW (ref 32–36)
MCV RBC AUTO: 87.2 FL — SIGNIFICANT CHANGE UP (ref 80–100)
MONOCYTES # BLD AUTO: 0.6 K/UL — SIGNIFICANT CHANGE UP (ref 0–0.9)
MONOCYTES NFR BLD AUTO: 6.5 % — SIGNIFICANT CHANGE UP (ref 2–14)
NEUTROPHILS # BLD AUTO: 8.1 K/UL — HIGH (ref 1.8–7.4)
NEUTROPHILS NFR BLD AUTO: 87.1 % — HIGH (ref 43–77)
NRBC # FLD: 0 — SIGNIFICANT CHANGE UP
PLATELET # BLD AUTO: 319 K/UL — SIGNIFICANT CHANGE UP (ref 150–400)
PMV BLD: 8.9 FL — SIGNIFICANT CHANGE UP (ref 7–13)
POTASSIUM SERPL-MCNC: 4 MMOL/L — SIGNIFICANT CHANGE UP (ref 3.5–5.3)
POTASSIUM SERPL-SCNC: 4 MMOL/L — SIGNIFICANT CHANGE UP (ref 3.5–5.3)
RBC # BLD: 3.68 M/UL — LOW (ref 3.8–5.2)
RBC # FLD: 16.3 % — HIGH (ref 10.3–14.5)
SODIUM SERPL-SCNC: 136 MMOL/L — SIGNIFICANT CHANGE UP (ref 135–145)
WBC # BLD: 9.29 K/UL — SIGNIFICANT CHANGE UP (ref 3.8–10.5)
WBC # FLD AUTO: 9.29 K/UL — SIGNIFICANT CHANGE UP (ref 3.8–10.5)

## 2018-09-28 PROCEDURE — 99233 SBSQ HOSP IP/OBS HIGH 50: CPT

## 2018-09-28 RX ORDER — PANTOPRAZOLE SODIUM 20 MG/1
40 TABLET, DELAYED RELEASE ORAL DAILY
Qty: 0 | Refills: 0 | Status: DISCONTINUED | OUTPATIENT
Start: 2018-09-29 | End: 2018-09-29

## 2018-09-28 RX ORDER — FUROSEMIDE 40 MG
20 TABLET ORAL DAILY
Qty: 0 | Refills: 0 | Status: DISCONTINUED | OUTPATIENT
Start: 2018-09-29 | End: 2018-09-28

## 2018-09-28 RX ORDER — HYDROMORPHONE HYDROCHLORIDE 2 MG/ML
0.2 INJECTION INTRAMUSCULAR; INTRAVENOUS; SUBCUTANEOUS
Qty: 0 | Refills: 0 | Status: DISCONTINUED | OUTPATIENT
Start: 2018-09-28 | End: 2018-09-29

## 2018-09-28 RX ORDER — FUROSEMIDE 40 MG
20 TABLET ORAL ONCE
Qty: 0 | Refills: 0 | Status: COMPLETED | OUTPATIENT
Start: 2018-09-28 | End: 2018-09-28

## 2018-09-28 RX ORDER — METOPROLOL TARTRATE 50 MG
2.5 TABLET ORAL EVERY 12 HOURS
Qty: 0 | Refills: 0 | Status: DISCONTINUED | OUTPATIENT
Start: 2018-09-28 | End: 2018-09-30

## 2018-09-28 RX ORDER — ACETYLCYSTEINE 200 MG/ML
4 VIAL (ML) MISCELLANEOUS ONCE
Qty: 0 | Refills: 0 | Status: DISCONTINUED | OUTPATIENT
Start: 2018-09-28 | End: 2018-09-28

## 2018-09-28 RX ORDER — DEXTROSE 50 % IN WATER 50 %
12.5 SYRINGE (ML) INTRAVENOUS ONCE
Qty: 0 | Refills: 0 | Status: COMPLETED | OUTPATIENT
Start: 2018-09-28 | End: 2018-09-28

## 2018-09-28 RX ORDER — FUROSEMIDE 40 MG
20 TABLET ORAL DAILY
Qty: 0 | Refills: 0 | Status: DISCONTINUED | OUTPATIENT
Start: 2018-09-29 | End: 2018-09-30

## 2018-09-28 RX ADMIN — SODIUM CHLORIDE 1 GRAM(S): 9 INJECTION INTRAMUSCULAR; INTRAVENOUS; SUBCUTANEOUS at 12:27

## 2018-09-28 RX ADMIN — TRAMADOL HYDROCHLORIDE 25 MILLIGRAM(S): 50 TABLET ORAL at 12:25

## 2018-09-28 RX ADMIN — Medication 16 MILLIGRAM(S): at 22:52

## 2018-09-28 RX ADMIN — Medication 100 MILLIGRAM(S): at 05:17

## 2018-09-28 RX ADMIN — HYDROMORPHONE HYDROCHLORIDE 0.2 MILLIGRAM(S): 2 INJECTION INTRAMUSCULAR; INTRAVENOUS; SUBCUTANEOUS at 19:04

## 2018-09-28 RX ADMIN — Medication 10 MILLIGRAM(S): at 05:17

## 2018-09-28 RX ADMIN — BUDESONIDE AND FORMOTEROL FUMARATE DIHYDRATE 2 PUFF(S): 160; 4.5 AEROSOL RESPIRATORY (INHALATION) at 09:00

## 2018-09-28 RX ADMIN — Medication 3 MILLILITER(S): at 21:34

## 2018-09-28 RX ADMIN — HYDROMORPHONE HYDROCHLORIDE 0.2 MILLIGRAM(S): 2 INJECTION INTRAMUSCULAR; INTRAVENOUS; SUBCUTANEOUS at 16:06

## 2018-09-28 RX ADMIN — Medication 3 MILLILITER(S): at 15:24

## 2018-09-28 RX ADMIN — Medication 20 MILLIGRAM(S): at 15:46

## 2018-09-28 RX ADMIN — Medication 20 MILLIGRAM(S): at 09:49

## 2018-09-28 RX ADMIN — Medication 6 UNIT(S): at 12:29

## 2018-09-28 RX ADMIN — Medication 30 MILLIGRAM(S): at 05:17

## 2018-09-28 RX ADMIN — GABAPENTIN 600 MILLIGRAM(S): 400 CAPSULE ORAL at 12:27

## 2018-09-28 RX ADMIN — GABAPENTIN 600 MILLIGRAM(S): 400 CAPSULE ORAL at 05:18

## 2018-09-28 RX ADMIN — HYDROMORPHONE HYDROCHLORIDE 0.2 MILLIGRAM(S): 2 INJECTION INTRAMUSCULAR; INTRAVENOUS; SUBCUTANEOUS at 23:37

## 2018-09-28 RX ADMIN — Medication 1: at 08:59

## 2018-09-28 RX ADMIN — Medication 50 MILLIGRAM(S): at 05:17

## 2018-09-28 RX ADMIN — Medication 1 TABLET(S): at 09:00

## 2018-09-28 RX ADMIN — Medication 1 TABLET(S): at 12:27

## 2018-09-28 RX ADMIN — Medication 4: at 12:28

## 2018-09-28 RX ADMIN — HYDROMORPHONE HYDROCHLORIDE 0.2 MILLIGRAM(S): 2 INJECTION INTRAMUSCULAR; INTRAVENOUS; SUBCUTANEOUS at 12:47

## 2018-09-28 RX ADMIN — SODIUM CHLORIDE 3 MILLILITER(S): 9 INJECTION INTRAMUSCULAR; INTRAVENOUS; SUBCUTANEOUS at 05:15

## 2018-09-28 RX ADMIN — TRAMADOL HYDROCHLORIDE 25 MILLIGRAM(S): 50 TABLET ORAL at 05:45

## 2018-09-28 RX ADMIN — Medication 145 MILLIGRAM(S): at 12:27

## 2018-09-28 RX ADMIN — FAMOTIDINE 20 MILLIGRAM(S): 10 INJECTION INTRAVENOUS at 12:27

## 2018-09-28 RX ADMIN — TRAMADOL HYDROCHLORIDE 25 MILLIGRAM(S): 50 TABLET ORAL at 05:17

## 2018-09-28 RX ADMIN — Medication 3 MILLILITER(S): at 04:08

## 2018-09-28 RX ADMIN — SODIUM CHLORIDE 3 MILLILITER(S): 9 INJECTION INTRAMUSCULAR; INTRAVENOUS; SUBCUTANEOUS at 12:29

## 2018-09-28 RX ADMIN — Medication 3 MILLILITER(S): at 09:22

## 2018-09-28 RX ADMIN — SODIUM CHLORIDE 3 MILLILITER(S): 9 INJECTION INTRAMUSCULAR; INTRAVENOUS; SUBCUTANEOUS at 22:55

## 2018-09-28 NOTE — DIETITIAN INITIAL EVALUATION ADULT. - NS AS NUTRI INTERV MEALS SNACK
Continue consistent carbohydrate diet as POCT are high. Per pt's family request, recommend to change fluids consistency to nectar thick. Encourage PO intake and honor food preferences as able. Monitor weights, labs, BM's, skin integrity, p.o. intake.

## 2018-09-28 NOTE — DIETITIAN INITIAL EVALUATION ADULT. - PROBLEM SELECTOR PLAN 1
likely multifactorial in setting of lung ca, underlying chronic COPD, and possible PNA  with cough and phlegm and recent hospitalization agree with HCAP coverage - does not meet sepsis criteria   mild leukocytosis and borderline rectal temp, f/u bcx  agree with CT chest to evaluate lung process better - possible mucus plugging as after expectoration pt feels much improved - Chest PT, consider pulm eval.   low concern for COPD exacerbation although on chronic steroids - no worsening hypoxemia, or wheezing / retention - cw home nebs, O2  elevated Pro-bnp but clinically appears euvolemic, will hold off on further escalation of diuresis - cw home lasix  at risk for PE, whoever, multiple reasons for SOB at this time and poor AC candidate in setting of brain mass with bleed, will therefore not pursue PE diagnosis as this will not   f/u RVP

## 2018-09-28 NOTE — PROGRESS NOTE ADULT - SUBJECTIVE AND OBJECTIVE BOX
Cardiovascular Disease Progress Note    Overnight events: No acute events overnight. overall improved compared to yesterday. still requiring supplemental o2. no cp/sob at present   Otherwise review of systems negative    Objective Findings:  T(C): 37.1 (09-28-18 @ 05:08), Max: 37.1 (09-28-18 @ 05:08)  HR: 88 (09-28-18 @ 07:29) (82 - 108)  BP: 149/78 (09-28-18 @ 05:08) (128/59 - 157/68)  RR: 18 (09-28-18 @ 05:08) (17 - 20)  SpO2: 96% (09-28-18 @ 07:29) (94% - 100%)  Wt(kg): --  Daily     Daily       Physical Exam:  Gen: NAD  HEENT: EOMI  CV: RRR, normal S1 + S2, no m/r/g  Lungs: CTAB  Abd: soft, non-tender  Ext: No edema      Laboratory Data:                        9.6    9.29  )-----------( 319      ( 28 Sep 2018 05:45 )             32.1     09-28    136  |  91<L>  |  23  ----------------------------<  242<H>  4.0   |  32<H>  |  0.90    Ca    8.9      28 Sep 2018 05:45  Phos  2.2     09-27  Mg     1.9     09-27                Inpatient Medications:  MEDICATIONS  (STANDING):  ALBUTerol/ipratropium for Nebulization 3 milliLiter(s) Nebulizer every 6 hours  buDESOnide 160 MICROgram(s)/formoterol 4.5 MICROgram(s) Inhaler 2 Puff(s) Inhalation two times a day  dextrose 5%. 1000 milliLiter(s) (50 mL/Hr) IV Continuous <Continuous>  dextrose 50% Injectable 12.5 Gram(s) IV Push once  dextrose 50% Injectable 25 Gram(s) IV Push once  dextrose 50% Injectable 25 Gram(s) IV Push once  docusate sodium 100 milliGRAM(s) Oral three times a day  famotidine    Tablet 20 milliGRAM(s) Oral daily  fenofibrate Tablet 145 milliGRAM(s) Oral daily  furosemide    Tablet 20 milliGRAM(s) Oral once  furosemide    Tablet 20 milliGRAM(s) Oral daily  gabapentin 600 milliGRAM(s) Oral three times a day  influenza   Vaccine 0.5 milliLiter(s) IntraMuscular once  insulin glargine Injectable (LANTUS) 16 Unit(s) SubCutaneous at bedtime  insulin lispro (HumaLOG) corrective regimen sliding scale   SubCutaneous three times a day before meals  insulin lispro Injectable (HumaLOG) 6 Unit(s) SubCutaneous three times a day before meals  lidocaine   Patch 3 Patch Transdermal daily  metoprolol tartrate 50 milliGRAM(s) Oral two times a day  NIFEdipine XL 30 milliGRAM(s) Oral daily  NIFEdipine XL 60 milliGRAM(s) Oral at bedtime  potassium acid phosphate/sodium acid phosphate tablet (K-PHOS No. 2) 1 Tablet(s) Oral four times a day with meals  predniSONE   Tablet 10 milliGRAM(s) Oral two times a day  senna 2 Tablet(s) Oral at bedtime  sodium chloride 1 Gram(s) Oral daily  sodium chloride 0.9% lock flush 3 milliLiter(s) IV Push every 8 hours  traMADol 25 milliGRAM(s) Oral <User Schedule>      Assessment:  86 F with PMH of lung adenoca (dx around 2010, s/p L VATS, XRT of TERESA/RUL last in July 2016), COPD with emphysema, htn, and dm2, recently admitted with shoulder pain secondary to zoster and incidental e hemorrhagic brain met now presents with acute hypoxic respiratory failure, in the setting of possible PNA and progression of disease. Overall with poor prognosis and unlikely to have meaningful recovery of pulmonary status.    Plan:  1) hypoxic respiratory failure   -appreciate pulm recs. currently being treated for HCAP  -chest PT  -bronchodilators  -maintain euvolemia - will start on lasix 20mg PO daily, need to monitor carefully for signs of contraction alkalosis as this can potentiate hypercapnea and subsequently worsen her hypoxia  -f/u cultures and infectious workup  -no evidence of CO2 retention on ABG  -f/u palliative recs. efforts are being made to focus on comfort and symptom control    2) HTN, APC's, HFpEF  -no evidence of atrial fibrillation. cont to hold AC  -c/w metoprolol as tolerates  -c/w nifdepine as dosed  -will start lasix 20mg po daily to help maintain euvolemia and possibly improve her oxygenation    3) hyponatremia  -likely multifactorial including tea/toast diet and SIADH  -appreciate renal recs  -improving         Over 35 minutes spent on total encounter; more than 50% of the visit was spent counseling and/or coordinating care by the attending physician.      Slick York MD   Cardiovascular Disease  [phone]

## 2018-09-28 NOTE — CHART NOTE - NSCHARTNOTEFT_GEN_A_CORE
NUTRITION SERVICES     Upon Nutritional Assessment by the Registered Dietitian your patient was determined to meet criteria/ has evidence of the following diagnosis/diagnoses:  [ ] Mild Protein Calorie Malnutrition   [ ] Moderate Protein Calorie Malnutrition   [ x] Severe Protein Calorie Malnutrition   [ ] Unspecified Protein Calorie Malnutrition   [ ] Underweight / BMI <19  [ ] Morbid Obesity / BMI >40    Findings as based on:  •  Comprehensive nutritional assessment and consultation    Please refer to Initial Dietitian Evaluation via documents section of [employer] EMR for further recommendations.

## 2018-09-28 NOTE — DIETITIAN INITIAL EVALUATION ADULT. - PHYSICAL APPEARANCE
underweight/Full nutrition focused physical exam not appropriate at this time. Severe muscle wasting in temples, clavicles, and shoulders. Severe fat loss visible in orbital pads, and buccal region.

## 2018-09-28 NOTE — PROVIDER CONTACT NOTE (OTHER) - ASSESSMENT
Pt is resting, sating at 97% on venti mask. Having some "twitching movements" as per family. Famil is concerned

## 2018-09-28 NOTE — PROGRESS NOTE ADULT - SUBJECTIVE AND OBJECTIVE BOX
_________________________________________________________________________________________  ========>>  M E D I C A L   A T T E N D I N G    F O L L O W  U P  N O T E  <<=========  -----------------------------------------------------------------------------------------------------    - Patient seen and examined by me approximately sixty minutes ago.   - In summary,  CLIVE HENRY is a 86y year old woman who originally presented with SOB  - Patient today overall doing ok, comfortable, eating better, breathing better, more alert      pt weaned off Venti mask and tolerating NC    ==================>> REVIEW OF SYSTEM <<=================    GEN: no fever, no chills, pain better controlled   RESP: + SOB, + occasional cough, no significant sputum  CVS: no chest pain, no palpitations, no edema  GI: no abdominal pain, no nausea, no constipation, no diarrhea  : no dysuria, no frequency, no hematuria  Neuro: no headache, no dizziness  Derm : no itching, no rash    ==================>> PHYSICAL EXAM <<=================    GEN: A&O X 3 , NAD , comfortable  HEENT: NCAT, PERRL, mucosa dry, hoarse voice, hearing intact  Neck: supple , no JVD  CVS: S1S2 , Iregular   PULM: + mild wheezing, rhonchi, congested   ABD.: soft. non tender, non distended  Extrem: no edema   PSYCH : normal mood,  not anxious      ==================>> MEDICATIONS <<====================    ALBUTerol/ipratropium for Nebulization 3 milliLiter(s) Nebulizer every 6 hours  buDESOnide 160 MICROgram(s)/formoterol 4.5 MICROgram(s) Inhaler 2 Puff(s) Inhalation two times a day  dextrose 5%. 1000 milliLiter(s) IV Continuous <Continuous>  dextrose 50% Injectable 12.5 Gram(s) IV Push once  dextrose 50% Injectable 25 Gram(s) IV Push once  dextrose 50% Injectable 25 Gram(s) IV Push once  docusate sodium 100 milliGRAM(s) Oral three times a day  famotidine    Tablet 20 milliGRAM(s) Oral daily  fenofibrate Tablet 145 milliGRAM(s) Oral daily  furosemide    Tablet 20 milliGRAM(s) Oral daily  gabapentin 600 milliGRAM(s) Oral three times a day  influenza   Vaccine 0.5 milliLiter(s) IntraMuscular once  insulin glargine Injectable (LANTUS) 16 Unit(s) SubCutaneous at bedtime  insulin lispro (HumaLOG) corrective regimen sliding scale   SubCutaneous three times a day before meals  insulin lispro Injectable (HumaLOG) 6 Unit(s) SubCutaneous three times a day before meals  lidocaine   Patch 3 Patch Transdermal daily  metoprolol tartrate 50 milliGRAM(s) Oral two times a day  NIFEdipine XL 30 milliGRAM(s) Oral daily  NIFEdipine XL 60 milliGRAM(s) Oral at bedtime  potassium acid phosphate/sodium acid phosphate tablet (K-PHOS No. 2) 1 Tablet(s) Oral four times a day with meals  predniSONE   Tablet 10 milliGRAM(s) Oral two times a day  senna 2 Tablet(s) Oral at bedtime  sodium chloride 1 Gram(s) Oral daily  sodium chloride 0.9% lock flush 3 milliLiter(s) IV Push every 8 hours  traMADol 25 milliGRAM(s) Oral <User Schedule>    MEDICATIONS  (PRN):  acetaminophen   Tablet .. 975 milliGRAM(s) Oral every 8 hours PRN Moderate Pain (4 - 6)  dextrose 40% Gel 15 Gram(s) Oral once PRN Blood Glucose LESS THAN 70 milliGRAM(s)/deciliter  dextrose 40% Gel 15 Gram(s) Oral once PRN Blood Glucose LESS THAN 70 milliGRAM(s)/deciliter  glucagon  Injectable 1 milliGRAM(s) IntraMuscular once PRN Glucose LESS THAN 70 milligrams/deciliter  HYDROmorphone  Injectable 0.2 milliGRAM(s) IV Push every 3 hours PRN Severe Pain (7 - 10)  polyethylene glycol 3350 17 Gram(s) Oral daily PRN Constipation    ==================>> VITAL SIGNS <<==================    Vital Signs Last 24 Hrs  T(C): 36.8 (09-28-18 @ 09:37)  T(F): 98.2 (09-28-18 @ 09:37), Max: 98.7 (09-28-18 @ 05:08)  HR: 86 (09-28-18 @ 11:07) (82 - 108)  BP: 157/77 (09-28-18 @ 09:37)  RR: 19 (09-28-18 @ 09:37) (17 - 20)  SpO2: 97% (09-28-18 @ 11:07) (92% - 100%)      POCT Blood Glucose.: 191 mg/dL (28 Sep 2018 08:36)  POCT Blood Glucose.: 250 mg/dL (27 Sep 2018 22:29)  POCT Blood Glucose.: 235 mg/dL (27 Sep 2018 19:33)  POCT Blood Glucose.: 280 mg/dL (27 Sep 2018 17:19)  POCT Blood Glucose.: 88 mg/dL (27 Sep 2018 12:14)     ==================>> LAB AND IMAGING <<==================                        9.6    9.29  )-----------( 319      ( 28 Sep 2018 05:45 )             32.1        Hemoglobin:   9.6 <<==,  7.4 <<==,  8.7 <<==,  8.8 <<==,  8.7 <<==    136  |  91<L>  |  23  ----------------------------<  242<H>  4.0   |  32<H>  |  0.90    Ca    8.9      28 Sep 2018 05:45  Phos  2.2     09-27  Mg     1.9     09-27    ___________________________________________________________________________________  ===============>>  A S S E S S M E N T   A N D   P L A N <<===============  ------------------------------------------------------------------------------------------    · Assessment		  · Assessment	  86 F with above hx with worsening SOB despite oral abx as out-pt. possible HCAP vs mucus plugging vs progression of dz     Problem/Plan - 1:  ·  Problem:  worsening respiratory status due to viral URI, HCAP, COPD, and metastatic cancer, now better   observe off abx  O2 via NC     post PRBC + lasix X1 yesterday  diet as able   Continue Current medications otherwise and monitor.   Dispo plan as bellow     CAD, AF  Cardio f/up noted.  continue IV lopressor as needed  resume and continue all PO meds as able to take PO  continue off AC    Dw family. Family and pt, all questions and concerns addressed.      family is aware of pt's poor prognosis due to cancer . Pt is DNR.  pt wants comfort measures     pt's family working on setting up home hospice: additional aid services .. >> likely DC Monday to home  --------------------------------------------  Case discussed as above  Education given on findings and plan of care  ___________________________  H. GEN Ferrell.  Pager: [phone]

## 2018-09-28 NOTE — GOALS OF CARE CONVERSATION - PERSONAL ADVANCE DIRECTIVE - CONVERSATION DETAILS
Family meeting with this RN and KAYODE Renner regarding pt discharge; family has not agreed to sign consents until they meet with Dr. Arevalo for last discussion r/t pt options.   Daughter Gia stated that she will meet Hospice again after they are comfortable that they have explored all alternatives. [facility] contact information left with family.

## 2018-09-28 NOTE — DIETITIAN INITIAL EVALUATION ADULT. - DIET TYPE
Blake 3x daily; Ensure Enlive 3x daily (1050 kyra and 60 gm protein)/dysphagia 2, mechanical , soft, honey consistency fld/consistent carbohydrate (no snacks)

## 2018-09-28 NOTE — DIETITIAN INITIAL EVALUATION ADULT. - OTHER INFO
Pt seen for Length Of Stay initial evaluation. Pt is a 85 y/o F admitted with worsening SOB and respiratory failure. Pt with lung cancer s/p RT with hemorrhagic brain mets. Per previous RD note, pt weighed 139.7 pounds on 9/4. Current weight (9/22) is 121.4 pounds. Estimated wt loss is 13% body weight in ~3 weeks. Pt seen for Length Of Stay initial evaluation. Pt is a 85 y/o F admitted with worsening SOB and respiratory failure. Pt with lung cancer s/p RT with hemorrhagic brain mets. Per patient's daughter, pt is consuming 1-2 packets of Blake/day mixed with water and 1-2 Ensure Enlive daily. Family is bringing food from home for pt. She has fair PO, about 50%, for each meal.  Family is requesting nectar thick liquids for pt. No GI distress (nausea/vomiting/diarrhea/constipation.) Per previous RD note, pt weighed 139.7 pounds on 9/4. Current weight (9/22) is 121.4 pounds. Estimated wt loss is 13% body weight in ~3 weeks.

## 2018-09-28 NOTE — CHART NOTE - NSCHARTNOTEFT_GEN_A_CORE
Pt lethargic unable to take oral medications. All essential supportive medication changed to IV. Discussed with Pt's daughter who are presence at the bedside, agree that goal is for comfort care at this point.

## 2018-09-28 NOTE — PROGRESS NOTE ADULT - SUBJECTIVE AND OBJECTIVE BOX
NEPHROLOGY - NSN    Patient seen and examined.    MEDICATIONS  (STANDING):  ALBUTerol/ipratropium for Nebulization 3 milliLiter(s) Nebulizer every 6 hours  buDESOnide 160 MICROgram(s)/formoterol 4.5 MICROgram(s) Inhaler 2 Puff(s) Inhalation two times a day  dextrose 5%. 1000 milliLiter(s) (50 mL/Hr) IV Continuous <Continuous>  dextrose 50% Injectable 12.5 Gram(s) IV Push once  dextrose 50% Injectable 25 Gram(s) IV Push once  dextrose 50% Injectable 25 Gram(s) IV Push once  docusate sodium 100 milliGRAM(s) Oral three times a day  famotidine    Tablet 20 milliGRAM(s) Oral daily  fenofibrate Tablet 145 milliGRAM(s) Oral daily  furosemide    Tablet 20 milliGRAM(s) Oral once  furosemide    Tablet 20 milliGRAM(s) Oral daily  gabapentin 600 milliGRAM(s) Oral three times a day  influenza   Vaccine 0.5 milliLiter(s) IntraMuscular once  insulin glargine Injectable (LANTUS) 16 Unit(s) SubCutaneous at bedtime  insulin lispro (HumaLOG) corrective regimen sliding scale   SubCutaneous three times a day before meals  insulin lispro Injectable (HumaLOG) 6 Unit(s) SubCutaneous three times a day before meals  lidocaine   Patch 3 Patch Transdermal daily  metoprolol tartrate 50 milliGRAM(s) Oral two times a day  NIFEdipine XL 30 milliGRAM(s) Oral daily  NIFEdipine XL 60 milliGRAM(s) Oral at bedtime  potassium acid phosphate/sodium acid phosphate tablet (K-PHOS No. 2) 1 Tablet(s) Oral four times a day with meals  predniSONE   Tablet 10 milliGRAM(s) Oral two times a day  senna 2 Tablet(s) Oral at bedtime  sodium chloride 1 Gram(s) Oral daily  sodium chloride 0.9% lock flush 3 milliLiter(s) IV Push every 8 hours  traMADol 25 milliGRAM(s) Oral <User Schedule>    VITALS:  T(C): , Max: 37.1 (09-28-18 @ 05:08)  T(F): , Max: 98.7 (09-28-18 @ 05:08)  HR: 88 (09-28-18 @ 07:29)  BP: 149/78 (09-28-18 @ 05:08)  BP(mean): --  RR: 18 (09-28-18 @ 05:08)  SpO2: 96% (09-28-18 @ 07:29)  Wt(kg): --  I and O's:        REVIEW OF SYSTEMS:  Full ROS done and were negative unless otherwise indicated in HPI/assessment.     PHYSICAL EXAM:  Constitutional: NAD  Respiratory: CTA B/L  Cardiovascular: S1 and S2, RRR  Gastrointestinal: + BS, soft, NT, ND  Extremities: No peripheral edema  Neurological: AAO x 3, CN 2-12 intact  : No Alcaraz  Access: Not applicable    LABS:                        9.6    9.29  )-----------( 319      ( 28 Sep 2018 05:45 )             32.1     09-28    136  |  91<L>  |  23  ----------------------------<  242<H>  4.0   |  32<H>  |  0.90    Ca    8.9      28 Sep 2018 05:45  Phos  2.2     09-27  Mg     1.9     09-27        Urine Studies:        RADIOLOGY & ADDITIONAL STUDIES:      ASSESSMENT/RECOMMEND  87 yo elderly female c hx lung cancer HTN DM pw + rhinovirus  SOB and issues with the clearance of her secretions  Hyponatremia - stable - did not receive NACL yesterday due to NPO status  Shingles pain - the same if not worse  Hypokalemia  Hypophosphatemia    -Hyponatremia, continue salt tabs as ordered. HCTZ on hold  -f/u palliative input Re: goals of care   -IV antibiotics per primary team   -NIV per pulmonary   -continue Dysphagia 3 c honey thickened liquids if able; Encourage PO food intake and continue Glucerna shakes.  -continue gabapentin but increase to 600 mg po ti_ IV  -K-Phos pills for 2 days  -Blood xfusion    Pascale Byers, NP  [facility]  [phone] NEPHROLOGY - NSN    Patient seen and examined.  Patient undergoing nebulizer treatment.   She is in no acute distress at this time  Aide at bedside    MEDICATIONS  (STANDING):  ALBUTerol/ipratropium for Nebulization 3 milliLiter(s) Nebulizer every 6 hours  buDESOnide 160 MICROgram(s)/formoterol 4.5 MICROgram(s) Inhaler 2 Puff(s) Inhalation two times a day  dextrose 5%. 1000 milliLiter(s) (50 mL/Hr) IV Continuous <Continuous>  dextrose 50% Injectable 12.5 Gram(s) IV Push once  dextrose 50% Injectable 25 Gram(s) IV Push once  dextrose 50% Injectable 25 Gram(s) IV Push once  docusate sodium 100 milliGRAM(s) Oral three times a day  famotidine    Tablet 20 milliGRAM(s) Oral daily  fenofibrate Tablet 145 milliGRAM(s) Oral daily  furosemide    Tablet 20 milliGRAM(s) Oral once  furosemide    Tablet 20 milliGRAM(s) Oral daily  gabapentin 600 milliGRAM(s) Oral three times a day  influenza   Vaccine 0.5 milliLiter(s) IntraMuscular once  insulin glargine Injectable (LANTUS) 16 Unit(s) SubCutaneous at bedtime  insulin lispro (HumaLOG) corrective regimen sliding scale   SubCutaneous three times a day before meals  insulin lispro Injectable (HumaLOG) 6 Unit(s) SubCutaneous three times a day before meals  lidocaine   Patch 3 Patch Transdermal daily  metoprolol tartrate 50 milliGRAM(s) Oral two times a day  NIFEdipine XL 30 milliGRAM(s) Oral daily  NIFEdipine XL 60 milliGRAM(s) Oral at bedtime  potassium acid phosphate/sodium acid phosphate tablet (K-PHOS No. 2) 1 Tablet(s) Oral four times a day with meals  predniSONE   Tablet 10 milliGRAM(s) Oral two times a day  senna 2 Tablet(s) Oral at bedtime  sodium chloride 1 Gram(s) Oral daily  sodium chloride 0.9% lock flush 3 milliLiter(s) IV Push every 8 hours  traMADol 25 milliGRAM(s) Oral <User Schedule>    VITALS:  T(C): , Max: 37.1 (09-28-18 @ 05:08)  T(F): , Max: 98.7 (09-28-18 @ 05:08)  HR: 88 (09-28-18 @ 07:29)  BP: 149/78 (09-28-18 @ 05:08)  BP(mean): --  RR: 18 (09-28-18 @ 05:08)  SpO2: 96% (09-28-18 @ 07:29)  Wt(kg): --  I and O's:    REVIEW OF SYSTEMS:  Full ROS done and were negative unless otherwise indicated in HPI/assessment.     PHYSICAL EXAM:  Constitutional: NAD, frail/cachectic   Respiratory: coarse B/L  Cardiovascular: S1 and S2, RRR  Gastrointestinal: + BS, soft, NT, ND  Extremities: No peripheral edema  Neurological: forgetful  : No Alcaraz  Access: Not applicable    LABS:                        9.6    9.29  )-----------( 319      ( 28 Sep 2018 05:45 )             32.1     09-28    136  |  91<L>  |  23  ----------------------------<  242<H>  4.0   |  32<H>  |  0.90    Ca    8.9      28 Sep 2018 05:45  Phos  2.2     09-27  Mg     1.9     09-27    ASSESSMENT  85 yo elderly female c hx lung cancer HTN DM pw + rhinovirus  SOB and issues with the clearance of her secretions  Hyponatremia - stable/improving  Shingles pain - persistent  Hypokalemia - improving  Hypophosphatemia - supplemented  Anemia - s/p PRBC     RECOMMEND  -continue salt tabs as ordered.   -HCTZ on hold; started on furosemide 20 mg po daily  -continue Dysphagia 3 c honey thickened liquids; Glucerna shakes  -continue gabapentin 600 mg po tid  -K-Phos as ordered   -a/w palliative/hospice measures     D/W personal aide at bedside     Pascale Byers NP  [facility]  [phone]

## 2018-09-29 LAB
BUN SERPL-MCNC: 27 MG/DL — HIGH (ref 7–23)
CALCIUM SERPL-MCNC: 9.1 MG/DL — SIGNIFICANT CHANGE UP (ref 8.4–10.5)
CHLORIDE SERPL-SCNC: 91 MMOL/L — LOW (ref 98–107)
CO2 SERPL-SCNC: 32 MMOL/L — HIGH (ref 22–31)
CREAT SERPL-MCNC: 0.99 MG/DL — SIGNIFICANT CHANGE UP (ref 0.5–1.3)
GLUCOSE BLDC GLUCOMTR-MCNC: 142 MG/DL — HIGH (ref 70–99)
GLUCOSE BLDC GLUCOMTR-MCNC: 146 MG/DL — HIGH (ref 70–99)
GLUCOSE BLDC GLUCOMTR-MCNC: 90 MG/DL — SIGNIFICANT CHANGE UP (ref 70–99)
GLUCOSE SERPL-MCNC: 118 MG/DL — HIGH (ref 70–99)
HCT VFR BLD CALC: 32.3 % — LOW (ref 34.5–45)
HGB BLD-MCNC: 9.3 G/DL — LOW (ref 11.5–15.5)
MCHC RBC-ENTMCNC: 26 PG — LOW (ref 27–34)
MCHC RBC-ENTMCNC: 28.8 % — LOW (ref 32–36)
MCV RBC AUTO: 90.2 FL — SIGNIFICANT CHANGE UP (ref 80–100)
NRBC # FLD: 0 — SIGNIFICANT CHANGE UP
PLATELET # BLD AUTO: 350 K/UL — SIGNIFICANT CHANGE UP (ref 150–400)
PMV BLD: 9.1 FL — SIGNIFICANT CHANGE UP (ref 7–13)
POTASSIUM SERPL-MCNC: 4.4 MMOL/L — SIGNIFICANT CHANGE UP (ref 3.5–5.3)
POTASSIUM SERPL-SCNC: 4.4 MMOL/L — SIGNIFICANT CHANGE UP (ref 3.5–5.3)
RBC # BLD: 3.58 M/UL — LOW (ref 3.8–5.2)
RBC # FLD: 16.3 % — HIGH (ref 10.3–14.5)
SODIUM SERPL-SCNC: 138 MMOL/L — SIGNIFICANT CHANGE UP (ref 135–145)
WBC # BLD: 11.65 K/UL — HIGH (ref 3.8–10.5)
WBC # FLD AUTO: 11.65 K/UL — HIGH (ref 3.8–10.5)

## 2018-09-29 RX ORDER — HYDROMORPHONE HYDROCHLORIDE 2 MG/ML
0.2 INJECTION INTRAMUSCULAR; INTRAVENOUS; SUBCUTANEOUS
Qty: 0 | Refills: 0 | Status: DISCONTINUED | OUTPATIENT
Start: 2018-09-29 | End: 2018-09-29

## 2018-09-29 RX ORDER — BUDESONIDE, MICRONIZED 100 %
0.5 POWDER (GRAM) MISCELLANEOUS EVERY 12 HOURS
Qty: 0 | Refills: 0 | Status: DISCONTINUED | OUTPATIENT
Start: 2018-09-29 | End: 2018-09-30

## 2018-09-29 RX ORDER — HYDROMORPHONE HYDROCHLORIDE 2 MG/ML
0.4 INJECTION INTRAMUSCULAR; INTRAVENOUS; SUBCUTANEOUS
Qty: 0 | Refills: 0 | Status: DISCONTINUED | OUTPATIENT
Start: 2018-09-29 | End: 2018-09-30

## 2018-09-29 RX ORDER — HYDROMORPHONE HYDROCHLORIDE 2 MG/ML
0.2 INJECTION INTRAMUSCULAR; INTRAVENOUS; SUBCUTANEOUS EVERY 6 HOURS
Qty: 0 | Refills: 0 | Status: DISCONTINUED | OUTPATIENT
Start: 2018-09-29 | End: 2018-09-30

## 2018-09-29 RX ORDER — HYDROMORPHONE HYDROCHLORIDE 2 MG/ML
0.4 INJECTION INTRAMUSCULAR; INTRAVENOUS; SUBCUTANEOUS
Qty: 0 | Refills: 0 | Status: DISCONTINUED | OUTPATIENT
Start: 2018-09-29 | End: 2018-09-29

## 2018-09-29 RX ORDER — HYDROMORPHONE HYDROCHLORIDE 2 MG/ML
0.5 INJECTION INTRAMUSCULAR; INTRAVENOUS; SUBCUTANEOUS ONCE
Qty: 0 | Refills: 0 | Status: DISCONTINUED | OUTPATIENT
Start: 2018-09-29 | End: 2018-09-29

## 2018-09-29 RX ADMIN — Medication 20 MILLIGRAM(S): at 05:15

## 2018-09-29 RX ADMIN — HYDROMORPHONE HYDROCHLORIDE 0.2 MILLIGRAM(S): 2 INJECTION INTRAMUSCULAR; INTRAVENOUS; SUBCUTANEOUS at 01:14

## 2018-09-29 RX ADMIN — Medication 3 MILLILITER(S): at 22:43

## 2018-09-29 RX ADMIN — HYDROMORPHONE HYDROCHLORIDE 0.5 MILLIGRAM(S): 2 INJECTION INTRAMUSCULAR; INTRAVENOUS; SUBCUTANEOUS at 12:20

## 2018-09-29 RX ADMIN — HYDROMORPHONE HYDROCHLORIDE 0.2 MILLIGRAM(S): 2 INJECTION INTRAMUSCULAR; INTRAVENOUS; SUBCUTANEOUS at 08:14

## 2018-09-29 RX ADMIN — HYDROMORPHONE HYDROCHLORIDE 0.4 MILLIGRAM(S): 2 INJECTION INTRAMUSCULAR; INTRAVENOUS; SUBCUTANEOUS at 17:24

## 2018-09-29 RX ADMIN — Medication 12.5 MILLILITER(S): at 00:17

## 2018-09-29 RX ADMIN — SODIUM CHLORIDE 3 MILLILITER(S): 9 INJECTION INTRAMUSCULAR; INTRAVENOUS; SUBCUTANEOUS at 21:33

## 2018-09-29 RX ADMIN — Medication 3 MILLILITER(S): at 04:08

## 2018-09-29 RX ADMIN — Medication 0.5 MILLIGRAM(S): at 22:40

## 2018-09-29 RX ADMIN — HYDROMORPHONE HYDROCHLORIDE 0.2 MILLIGRAM(S): 2 INJECTION INTRAMUSCULAR; INTRAVENOUS; SUBCUTANEOUS at 18:41

## 2018-09-29 RX ADMIN — HYDROMORPHONE HYDROCHLORIDE 0.2 MILLIGRAM(S): 2 INJECTION INTRAMUSCULAR; INTRAVENOUS; SUBCUTANEOUS at 04:30

## 2018-09-29 RX ADMIN — PANTOPRAZOLE SODIUM 40 MILLIGRAM(S): 20 TABLET, DELAYED RELEASE ORAL at 11:25

## 2018-09-29 RX ADMIN — Medication 3 MILLILITER(S): at 10:11

## 2018-09-29 RX ADMIN — HYDROMORPHONE HYDROCHLORIDE 0.2 MILLIGRAM(S): 2 INJECTION INTRAMUSCULAR; INTRAVENOUS; SUBCUTANEOUS at 15:23

## 2018-09-29 RX ADMIN — Medication 0.5 MILLIGRAM(S): at 13:08

## 2018-09-29 RX ADMIN — SODIUM CHLORIDE 3 MILLILITER(S): 9 INJECTION INTRAMUSCULAR; INTRAVENOUS; SUBCUTANEOUS at 12:16

## 2018-09-29 RX ADMIN — SODIUM CHLORIDE 3 MILLILITER(S): 9 INJECTION INTRAMUSCULAR; INTRAVENOUS; SUBCUTANEOUS at 05:10

## 2018-09-29 RX ADMIN — BUDESONIDE AND FORMOTEROL FUMARATE DIHYDRATE 2 PUFF(S): 160; 4.5 AEROSOL RESPIRATORY (INHALATION) at 08:15

## 2018-09-29 RX ADMIN — HYDROMORPHONE HYDROCHLORIDE 0.2 MILLIGRAM(S): 2 INJECTION INTRAMUSCULAR; INTRAVENOUS; SUBCUTANEOUS at 00:00

## 2018-09-29 RX ADMIN — HYDROMORPHONE HYDROCHLORIDE 0.4 MILLIGRAM(S): 2 INJECTION INTRAMUSCULAR; INTRAVENOUS; SUBCUTANEOUS at 20:21

## 2018-09-29 RX ADMIN — Medication 16 MILLIGRAM(S): at 05:16

## 2018-09-29 RX ADMIN — HYDROMORPHONE HYDROCHLORIDE 0.2 MILLIGRAM(S): 2 INJECTION INTRAMUSCULAR; INTRAVENOUS; SUBCUTANEOUS at 11:24

## 2018-09-29 RX ADMIN — Medication 2.5 MILLIGRAM(S): at 05:16

## 2018-09-29 NOTE — PROGRESS NOTE ADULT - SUBJECTIVE AND OBJECTIVE BOX
_________________________________________________________________________________________  ========>>  M E D I C A L   A T T E N D I N G    F O L L O W  U P  N O T E  <<=========  -----------------------------------------------------------------------------------------------------    - Patient seen and examined by me approximately sixty minutes ago.   - In summary,  CLIVE HENRY is a 86y year old woman who originally presented with SOB      reportedly pt took a turn for the worse yesterday afternoon, with increased work of breathing, discomfort,.. family decded to focus on comfort, keep off Bipap, already IV Dilaudid started by team ( I was made aware of these now and conformed with pt's Dtrs at bedside)         pt more lethargic today but at times agitated, having some difficulty breathing, uneasy     ==================>> REVIEW OF SYSTEM <<=================    limited ROS due to AMS    ==================>> PHYSICAL EXAM <<=================    GEN: responsive and awakens but overall lethargic, at times a bit agitated  HEENT: NCAT, mucosa dry, hearing intact  Neck: supple , no JVD  CVS: S1S2 , Iregular   PULM: ++ increased wheezing and decreased air flow  ABD.: soft. non tender, non distended  Extrem: no edema        ==================>> MEDICATIONS <<====================    ALBUTerol/ipratropium for Nebulization 3 milliLiter(s) Nebulizer every 6 hours  buDESOnide   0.5 milliGRAM(s) Respule 0.5 milliGRAM(s) Inhalation every 12 hours  dextrose 5%. 1000 milliLiter(s) IV Continuous <Continuous>  dextrose 50% Injectable 12.5 Gram(s) IV Push once  dextrose 50% Injectable 25 Gram(s) IV Push once  dextrose 50% Injectable 25 Gram(s) IV Push once  furosemide   Injectable 20 milliGRAM(s) IV Push daily  HYDROmorphone  Injectable 0.2 milliGRAM(s) IV Push every 6 hours  insulin lispro (HumaLOG) corrective regimen sliding scale   SubCutaneous three times a day before meals  methylPREDNISolone sodium succinate Injectable 16 milliGRAM(s) IV Push daily  metoprolol tartrate Injectable 2.5 milliGRAM(s) IV Push every 12 hours  sodium chloride 0.9% lock flush 3 milliLiter(s) IV Push every 8 hours    MEDICATIONS  (PRN):  acetaminophen   Tablet .. 975 milliGRAM(s) Oral every 8 hours PRN Moderate Pain (4 - 6)  dextrose 40% Gel 15 Gram(s) Oral once PRN Blood Glucose LESS THAN 70 milliGRAM(s)/deciliter  dextrose 40% Gel 15 Gram(s) Oral once PRN Blood Glucose LESS THAN 70 milliGRAM(s)/deciliter  glucagon  Injectable 1 milliGRAM(s) IntraMuscular once PRN Glucose LESS THAN 70 milligrams/deciliter  HYDROmorphone  Injectable 0.2 milliGRAM(s) IV Push every 2 hours PRN pain  HYDROmorphone  Injectable 0.2 milliGRAM(s) IV Push every 2 hours PRN shortness of breath/ labored breathing    ==================>> VITAL SIGNS <<==================    Vital Signs Last 24 Hrs  T(C): 37.1 (09-29-18 @ 08:18)  T(F): 98.8 (09-29-18 @ 08:18), Max: 99.4 (09-29-18 @ 00:59)  HR: 62 (09-29-18 @ 12:21) (50 - 133)  BP: 138/71 (09-29-18 @ 12:21)  RR: 15 (09-29-18 @ 12:21) (11 - 26)  SpO2: 98% (09-29-18 @ 12:21) (91% - 100%)      POCT Blood Glucose.: 146 mg/dL (29 Sep 2018 12:03)  POCT Blood Glucose.: 142 mg/dL (29 Sep 2018 08:46)  POCT Blood Glucose.: 90 mg/dL (29 Sep 2018 00:14)  POCT Blood Glucose.: 73 mg/dL (28 Sep 2018 22:44)     ==================>> LAB AND IMAGING <<==================                        9.3    11.65 )-----------( 350      ( 29 Sep 2018 05:20 )             32.3     Hemoglobin:   9.3 <<==,  9.6 <<==,  7.4 <<==,  8.7 <<==,  8.8 <<==    138  |  91<L>  |  27<H>  ----------------------------<  118<H>  4.4   |  32<H>  |  0.99    Ca    9.1      29 Sep 2018 05:20    ___________________________________________________________________________________  ===============>>  A S S E S S M E N T   A N D   P L A N <<===============  ------------------------------------------------------------------------------------------    · Assessment		  · Assessment	  86 F with above hx with worsening SOB in pt with progression of cancer      Problem/Plan - 1:  ·  Problem:  worsening respiratory status due to viral URI, HCAP, COPD, and metastatic cancer  observe off abx  O2 via venti mask / nonrebreather as needed   nebs ATC, budesonide, on IV steroids as well   meds simplified   Dilaudid for comfort per palliative team ( called, to follow up )   no further blood work  pt is DNR, focus on comfort measures only as per pt's Dtrs at bedside    CAD, AF, pt tachycardic due to discomfort   continue IV lopressor as above    Dw family. Family and pt, all questions and concerns addressed.      family is aware of pt's poor prognosis due to cancer . Pt is DNR.  pt wants comfort measures     D/C plan on hold given worsening condition   prognosis poor-grave   --------------------------------------------  Case discussed as above and with PA  ___________________________  HLima Ferrell D.O.  Pager: [phone]

## 2018-09-29 NOTE — CHART NOTE - NSCHARTNOTEFT_GEN_A_CORE
Family and attending at bedside - Concern for pain not being controlled and difficulty with breathing - Called palliative who made appropriate adjustments - Family is aware of patient's current state having a poor prognosis and they understand complete comfort measures at this time

## 2018-09-29 NOTE — CHART NOTE - NSCHARTNOTEFT_GEN_A_CORE
Was paged by primary team due to concern of uncontrolled symptoms of pain and SOB overnight. In past 24 hrs pt received a total of BTD x 7 of Dilaudid 0.2. Recommend starting 0.2mg IV q/ 6 hrs ATC and 0.2mg IV PRN for SOB and pain. Will assess in pm for symptom control. Plan discussed with primary team, orders in place. Please page for further question or concerns. Was paged by primary team due to concern of uncontrolled symptoms of pain and SOB overnight. In past 24 hrs pt received a total of BTD x 7 of Dilaudid 0.2. Recommend starting 0.2mg IV q/ 6 hrs ATC and 0.2mg IV q2 PRN for SOB and pain. Will assess in pm for symptom control. Plan discussed with primary team, orders in place. Please page for further question or concerns.

## 2018-09-30 VITALS — OXYGEN SATURATION: 92 %

## 2018-09-30 LAB
BUN SERPL-MCNC: 47 MG/DL — HIGH (ref 7–23)
CALCIUM SERPL-MCNC: 9.1 MG/DL — SIGNIFICANT CHANGE UP (ref 8.4–10.5)
CHLORIDE SERPL-SCNC: 90 MMOL/L — LOW (ref 98–107)
CO2 SERPL-SCNC: 24 MMOL/L — SIGNIFICANT CHANGE UP (ref 22–31)
CREAT SERPL-MCNC: 1.13 MG/DL — SIGNIFICANT CHANGE UP (ref 0.5–1.3)
GLUCOSE SERPL-MCNC: 242 MG/DL — HIGH (ref 70–99)
HCT VFR BLD CALC: 34.8 % — SIGNIFICANT CHANGE UP (ref 34.5–45)
HGB BLD-MCNC: 9.6 G/DL — LOW (ref 11.5–15.5)
MCHC RBC-ENTMCNC: 25.5 PG — LOW (ref 27–34)
MCHC RBC-ENTMCNC: 27.6 % — LOW (ref 32–36)
MCV RBC AUTO: 92.6 FL — SIGNIFICANT CHANGE UP (ref 80–100)
NRBC # FLD: 0 — SIGNIFICANT CHANGE UP
PLATELET # BLD AUTO: 464 K/UL — HIGH (ref 150–400)
PMV BLD: 9 FL — SIGNIFICANT CHANGE UP (ref 7–13)
POTASSIUM SERPL-MCNC: 4.5 MMOL/L — SIGNIFICANT CHANGE UP (ref 3.5–5.3)
POTASSIUM SERPL-SCNC: 4.5 MMOL/L — SIGNIFICANT CHANGE UP (ref 3.5–5.3)
RBC # BLD: 3.76 M/UL — LOW (ref 3.8–5.2)
RBC # FLD: 16.3 % — HIGH (ref 10.3–14.5)
SODIUM SERPL-SCNC: 141 MMOL/L — SIGNIFICANT CHANGE UP (ref 135–145)
WBC # BLD: 16.62 K/UL — HIGH (ref 3.8–10.5)
WBC # FLD AUTO: 16.62 K/UL — HIGH (ref 3.8–10.5)

## 2018-09-30 RX ADMIN — HYDROMORPHONE HYDROCHLORIDE 0.2 MILLIGRAM(S): 2 INJECTION INTRAMUSCULAR; INTRAVENOUS; SUBCUTANEOUS at 17:19

## 2018-09-30 RX ADMIN — HYDROMORPHONE HYDROCHLORIDE 0.4 MILLIGRAM(S): 2 INJECTION INTRAMUSCULAR; INTRAVENOUS; SUBCUTANEOUS at 11:05

## 2018-09-30 RX ADMIN — HYDROMORPHONE HYDROCHLORIDE 0.4 MILLIGRAM(S): 2 INJECTION INTRAMUSCULAR; INTRAVENOUS; SUBCUTANEOUS at 08:24

## 2018-09-30 RX ADMIN — Medication 20 MILLIGRAM(S): at 05:28

## 2018-09-30 RX ADMIN — Medication 3 MILLILITER(S): at 15:45

## 2018-09-30 RX ADMIN — Medication 0.5 MILLIGRAM(S): at 09:20

## 2018-09-30 RX ADMIN — SODIUM CHLORIDE 3 MILLILITER(S): 9 INJECTION INTRAMUSCULAR; INTRAVENOUS; SUBCUTANEOUS at 05:30

## 2018-09-30 RX ADMIN — Medication 3 MILLILITER(S): at 09:19

## 2018-09-30 RX ADMIN — Medication 2.5 MILLIGRAM(S): at 05:28

## 2018-09-30 RX ADMIN — HYDROMORPHONE HYDROCHLORIDE 0.2 MILLIGRAM(S): 2 INJECTION INTRAMUSCULAR; INTRAVENOUS; SUBCUTANEOUS at 05:28

## 2018-09-30 RX ADMIN — SODIUM CHLORIDE 3 MILLILITER(S): 9 INJECTION INTRAMUSCULAR; INTRAVENOUS; SUBCUTANEOUS at 14:14

## 2018-09-30 RX ADMIN — HYDROMORPHONE HYDROCHLORIDE 0.2 MILLIGRAM(S): 2 INJECTION INTRAMUSCULAR; INTRAVENOUS; SUBCUTANEOUS at 00:01

## 2018-09-30 RX ADMIN — Medication 3 MILLILITER(S): at 04:30

## 2018-09-30 RX ADMIN — Medication 16 MILLIGRAM(S): at 05:28

## 2018-09-30 RX ADMIN — HYDROMORPHONE HYDROCHLORIDE 0.2 MILLIGRAM(S): 2 INJECTION INTRAMUSCULAR; INTRAVENOUS; SUBCUTANEOUS at 13:14

## 2018-09-30 NOTE — PROGRESS NOTE ADULT - PROVIDER SPECIALTY LIST ADULT
Cardiology
Internal Medicine
Nephrology
Palliative Care
Pulmonology
Pulmonology

## 2018-09-30 NOTE — PROGRESS NOTE ADULT - SUBJECTIVE AND OBJECTIVE BOX
_________________________________________________________________________________________  ========>>  M E D I C A L   A T T E N D I N G    F O L L O W  U P  N O T E  <<=========  -----------------------------------------------------------------------------------------------------    - Patient seen and examined by me approximately sixty minutes ago.   - In summary,  CLIVE HENRY is a 86y year old woman who originally presented with SOB      pt took a turn for the worse two nights ago and has been on increased doses of narcotics / Dilaudid and hs been overall calm per Dtr, much less responsive now since last night     ==================>> REVIEW OF SYSTEM <<=================    limited ROS due to AMS    ==================>> PHYSICAL EXAM <<=================    GEN: responsive and awakens but overall lethargic, at times a bit agitated  HEENT: NCAT, mucosa dry, hearing intact  Neck: supple , no JVD  CVS: S1S2 , Iregular   PULM: some wheezing and rhonchi   ABD.: soft. non tender, non distended  Extrem: no edema        ==================>> MEDICATIONS <<====================    ALBUTerol/ipratropium for Nebulization 3 milliLiter(s) Nebulizer every 6 hours  buDESOnide   0.5 milliGRAM(s) Respule 0.5 milliGRAM(s) Inhalation every 12 hours  dextrose 5%. 1000 milliLiter(s) IV Continuous <Continuous>  dextrose 50% Injectable 12.5 Gram(s) IV Push once  dextrose 50% Injectable 25 Gram(s) IV Push once  dextrose 50% Injectable 25 Gram(s) IV Push once  furosemide   Injectable 20 milliGRAM(s) IV Push daily  HYDROmorphone  Injectable 0.2 milliGRAM(s) IV Push every 6 hours  insulin lispro (HumaLOG) corrective regimen sliding scale   SubCutaneous three times a day before meals  methylPREDNISolone sodium succinate Injectable 16 milliGRAM(s) IV Push daily  metoprolol tartrate Injectable 2.5 milliGRAM(s) IV Push every 12 hours  sodium chloride 0.9% lock flush 3 milliLiter(s) IV Push every 8 hours    MEDICATIONS  (PRN):  acetaminophen   Tablet .. 975 milliGRAM(s) Oral every 8 hours PRN Moderate Pain (4 - 6)  dextrose 40% Gel 15 Gram(s) Oral once PRN Blood Glucose LESS THAN 70 milliGRAM(s)/deciliter  dextrose 40% Gel 15 Gram(s) Oral once PRN Blood Glucose LESS THAN 70 milliGRAM(s)/deciliter  glucagon  Injectable 1 milliGRAM(s) IntraMuscular once PRN Glucose LESS THAN 70 milligrams/deciliter  HYDROmorphone  Injectable 0.4 milliGRAM(s) IV Push every 2 hours PRN Pain  HYDROmorphone  Injectable 0.4 milliGRAM(s) IV Push every 2 hours PRN Breathing    ==================>> VITAL SIGNS <<==================    Vital Signs Last 24 Hrs  T(C): 37.1 (09-30-18 @ 05:26)  T(F): 98.7 (09-30-18 @ 05:26), Max: 98.7 (09-30-18 @ 05:26)  HR: 109 (09-30-18 @ 09:20) (59 - 110)  BP: 153/81 (09-30-18 @ 05:26)  RR: 18 (09-30-18 @ 05:26) (18 - 20)  SpO2: 94% (09-30-18 @ 11:05) (88% - 98%)       ==================>> LAB AND IMAGING <<==================                        9.6    16.62 )-----------( 464      ( 30 Sep 2018 06:30 )             34.8     WBC count:   16.62 <<== ,  11.65 <<== ,  9.29 <<== ,  9.24 <<== ,  10.94 <<==        Hemoglobin:   9.6 <<==,  9.3 <<==,  9.6 <<==,  7.4 <<==,  8.7 <<==    141  |  90<L>  |  47<H>  ----------------------------<  242<H>  4.5   |  24  |  1.13    Ca    9.1      30 Sep 2018 06:30    ___________________________________________________________________________________  ===============>>  A S S E S S M E N T   A N D   P L A N <<===============  ------------------------------------------------------------------------------------------    · Assessment		  · Assessment	  86 F with above hx with worsening SOB in pt with progression of cancer      Problem/Plan - 1:  ·  Problem:  worsening respiratory status due to viral URI, HCAP, COPD, and metastatic cancer  O2 via venti mask / nonrebreather as needed   nebs ATC, budesonide, on IV steroids as well   meds simplified   Dilaudid for comfort per palliative team   no further blood work  pt is DNR, focus on comfort measures only as per pt's Dtrs at bedside    CAD, AF, pt tachycardic due to discomfort   continue IV lopressor as above    Dw family. Family and pt, all questions and concerns addressed.      family is aware of pt's poor prognosis due to cancer . Pt is DNR.  pt wants comfort measures     D/C plan on hold given worsening condition   prognosis grave   --------------------------------------------  Case discussed as above   ___________________________  HLima Ferrell D.O.  Pager: [phone]

## 2018-09-30 NOTE — DISCHARGE NOTE FOR THE EXPIRED PATIENT - HOSPITAL COURSE
86 F with above hx with worsening SOB depsite oral abx as out-pt. possible HCAP vs mucus plugging vs progression of dz    Hypoxic Respiratory Failure / HCAP- 9/27-completed 7 day course of IV Zosyn/Vanco, CT Chest; New subtotal atelectasis of the right lower lobe with volume loss. 1.5 cm left breast nodule unchanged. Right adrenal nodule unchanged. Blood Cx- NTD, RVP: Positive Rhino Virus. Pulmonary Consulted. 9/23 - RRT called per family member request as pt having difficulty breathing. O2 sat was 97 on NC. Pt stating she's struggling to breath, placed on NRB. ABG done - wnl placed on BiPAP to assist w/SOB. d/w MAR - will s/w pulm fellow to see if pt can be accepted to RCU per family request. per pulm, stable on unit - no needfor RCU. 9/25 daughter upset with plan of care, deconditions status, c/w iv abx to complete 7 day course. post obstructive pna vs disease progression, palliative family meeting 9/26 4pm -no IV toradol 2/2 ?  hemorrhagic mets in  brain per family     Diabetes-  basal + bolus at 15 and 6 units for now and titrate as appropriate.     Lung cancer- s/p RT, no chemo, follows at [facility], progression of dz with likely hemorrhagic brain met noted on last admission, s/p Palliative meeting 9/26 4pm. Hospice--->[facility]--> family has agreed to Comfort measures     PMR (polymyalgia rheumatica)- per family on 30 mg of chronic prednisone but was discharged on 20 mg last admission, treated with Solumedrol     Essential hypertension- c/w home meds, cont to hold hctz 2/2 hyponatremia    SIADH (syndrome of inappropriate ADH production)- in setting of malignancy, on salt tabs, NA stable. Cont to hold hctz 2/2 hyponatremia. Anemia- s/p 1 unit PRBC 9/27 9/30 pt passed, was pronounced at 1858, daughters at the bedside, refused Autopsy & organ donor. Attend Dr Ferrell notified

## 2018-09-30 NOTE — PROGRESS NOTE ADULT - SUBJECTIVE AND OBJECTIVE BOX
NEPHROLOGY - NSN    Patient seen and examined.    MEDICATIONS  (STANDING):  ALBUTerol/ipratropium for Nebulization 3 milliLiter(s) Nebulizer every 6 hours  buDESOnide   0.5 milliGRAM(s) Respule 0.5 milliGRAM(s) Inhalation every 12 hours  dextrose 5%. 1000 milliLiter(s) (50 mL/Hr) IV Continuous <Continuous>  dextrose 50% Injectable 12.5 Gram(s) IV Push once  dextrose 50% Injectable 25 Gram(s) IV Push once  dextrose 50% Injectable 25 Gram(s) IV Push once  furosemide   Injectable 20 milliGRAM(s) IV Push daily  HYDROmorphone  Injectable 0.2 milliGRAM(s) IV Push every 6 hours  insulin lispro (HumaLOG) corrective regimen sliding scale   SubCutaneous three times a day before meals  methylPREDNISolone sodium succinate Injectable 16 milliGRAM(s) IV Push daily  metoprolol tartrate Injectable 2.5 milliGRAM(s) IV Push every 12 hours  sodium chloride 0.9% lock flush 3 milliLiter(s) IV Push every 8 hours    VITALS:  T(C): , Max: 37.1 (09-30-18 @ 05:26)  T(F): , Max: 98.7 (09-30-18 @ 05:26)  HR: 61 (09-30-18 @ 05:26)  BP: 153/81 (09-30-18 @ 05:26)  BP(mean): --  RR: 18 (09-30-18 @ 05:26)  SpO2: 92% (09-30-18 @ 05:26)  Wt(kg): --  I and O's:        REVIEW OF SYSTEMS:  Full ROS done and were negative unless otherwise indicated in HPI/assessment.     PHYSICAL EXAM:  Constitutional: NAD  Respiratory: CTA B/L  Cardiovascular: S1 and S2, RRR  Gastrointestinal: + BS, soft, NT, ND  Extremities: No peripheral edema  Neurological: AAO x 3, CN 2-12 intact  : No Alcaraz  Access: Not applicable    LABS:                        9.6    16.62 )-----------( 464      ( 30 Sep 2018 06:30 )             34.8     09-30    141  |  90<L>  |  47<H>  ----------------------------<  242<H>  4.5   |  24  |  1.13    Ca    9.1      30 Sep 2018 06:30        Urine Studies:        RADIOLOGY & ADDITIONAL STUDIES:      ASSESSMENT/RECOMMEND    Pascale Byers NP  [facility],   [phone] NEPHROLOGY - NSN    Patient seen and examined.  Agonal breathing noted    MEDICATIONS  (STANDING):  ALBUTerol/ipratropium for Nebulization 3 milliLiter(s) Nebulizer every 6 hours  buDESOnide   0.5 milliGRAM(s) Respule 0.5 milliGRAM(s) Inhalation every 12 hours  dextrose 5%. 1000 milliLiter(s) (50 mL/Hr) IV Continuous <Continuous>  dextrose 50% Injectable 12.5 Gram(s) IV Push once  dextrose 50% Injectable 25 Gram(s) IV Push once  dextrose 50% Injectable 25 Gram(s) IV Push once  furosemide   Injectable 20 milliGRAM(s) IV Push daily  HYDROmorphone  Injectable 0.2 milliGRAM(s) IV Push every 6 hours  insulin lispro (HumaLOG) corrective regimen sliding scale   SubCutaneous three times a day before meals  methylPREDNISolone sodium succinate Injectable 16 milliGRAM(s) IV Push daily  metoprolol tartrate Injectable 2.5 milliGRAM(s) IV Push every 12 hours  sodium chloride 0.9% lock flush 3 milliLiter(s) IV Push every 8 hours    VITALS:  T(C): , Max: 37.1 (09-30-18 @ 05:26)  T(F): , Max: 98.7 (09-30-18 @ 05:26)  HR: 61 (09-30-18 @ 05:26)  BP: 153/81 (09-30-18 @ 05:26)  BP(mean): --  RR: 18 (09-30-18 @ 05:26)  SpO2: 92% (09-30-18 @ 05:26)  Wt(kg): --  I and O's:    REVIEW OF SYSTEMS:  Full ROS done and were negative unless otherwise indicated in HPI/assessment.     PHYSICAL EXAM:  Constitutional: cachectic/frail  Respiratory: tachypneic, shallow, diminishedB/L  Cardiovascular: S1 and S2, RRR  Gastrointestinal: + BS, soft, NT, ND  Extremities: No peripheral edema  Neurological: not responsive   : No Alcaraz  Access: Not applicable    LABS:                        9.6    16.62 )-----------( 464      ( 30 Sep 2018 06:30 )             34.8     09-30    141  |  90<L>  |  47<H>  ----------------------------<  242<H>  4.5   |  24  |  1.13    Ca    9.1      30 Sep 2018 06:30      ASSESSMENT  85 yo elderly female c hx lung cancer HTN DM pw + rhinovirus  SOB and issues with the clearance of her secretions  Hyponatremia - stable/improving  Shingles pain   Hypokalemia - improved  Hypophosphatemia - last result low, subsequently supplemented  Anemia - Hgb stable   prognosis guarded    RECOMMEND  -continue furosemide to avoid hypervolemia/worsening respiratory distress  -palliative care follow up  -Daughter Nicole favors comfort measures at this time  -would defer further phlebotomy given overall prognosis    Pascale Byers NP  [facility]  [phone]

## 2018-09-30 NOTE — CHART NOTE - NSCHARTNOTEFT_GEN_A_CORE
Pt seen at bedside unresponsive to verbal, tactile stimuli. Pupils fixed and dilated. No palp carotid and radial pulses. No breath sounds, no heart sounds. Death pronounced at 18:58. Family (daughters at bedside, refusing Autopsy. Pt not an organ donar. Dr Ferrell has been notified.

## 2019-02-19 NOTE — PATIENT PROFILE ADULT. - PRO SERVICES AT [facility]
Patient is having an MRI at 7:00 am tomorrow, ordered by Azeb, and is requesting Xanax for claustrophobia to [facility]. Azeb is gone for the day. Please advise.   unsure

## 2019-04-30 ENCOUNTER — TRANSCRIPTION ENCOUNTER (OUTPATIENT)
Age: 84
End: 2019-04-30
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
## 2019-07-10 NOTE — PROGRESS NOTE ADULT - SUBJECTIVE AND OBJECTIVE BOX
Impression: Combined forms of age-related cataract, bilateral: H25.813. Plan: Cataracts account for the patient's complaints. No treatment currently recommended. The patient will monitor vision changes and contact us with any decrease in vision. Recommend refraction PRN. Follow Up:      Inverval History:    ROS:    Unobtainable becasue:  All other systems negative    fever [ ]     chills [ ]      headache  [ ]       chest pain [ ]           SOB  [ ]          abd pain  [ ]            nausea [ ]            vomiting [ ]          diarrhea [ ]  dysurea [ ]         rash  [ ]         joint swelling [ ]           edema  [ ]    Allergies  ACE inhibitors (Angioedema)        ANTIMICROBIALS:  meropenem IVPB    meropenem IVPB 1000 every 8 hours      OTHER MEDS:  acetaminophen   Tablet 650 milliGRAM(s) Oral every 6 hours PRN  acetaminophen   Tablet. 650 milliGRAM(s) Oral every 6 hours PRN  ALBUTerol/ipratropium for Nebulization 3 milliLiter(s) Nebulizer every 6 hours  aspirin enteric coated 81 milliGRAM(s) Oral daily  fenofibrate Tablet 145 milliGRAM(s) Oral daily  heparin  Injectable 5000 Unit(s) SubCutaneous every 8 hours  insulin lispro (HumaLOG) corrective regimen sliding scale   SubCutaneous three times a day before meals  insulin lispro (HumaLOG) corrective regimen sliding scale   SubCutaneous at bedtime  metoprolol     tartrate 50 milliGRAM(s) Oral every 12 hours  NIFEdipine XL 30 milliGRAM(s) Oral daily  NIFEdipine XL 60 milliGRAM(s) Oral at bedtime  pantoprazole  Injectable 40 milliGRAM(s) IV Push daily  prednisoLONE acetate 1% Suspension 1 Drop(s) Both EYES three times a day  predniSONE   Tablet 10 milliGRAM(s) Oral daily  sodium chloride 0.65% Nasal 1 Spray(s) Both Nostrils three times a day      Vital Signs Last 24 Hrs  T(C): 35.6 (28 Oct 2017 07:51), Max: 37.6 (27 Oct 2017 20:00)  T(F): 96 (28 Oct 2017 07:51), Max: 99.7 (27 Oct 2017 20:00)  HR: 93 (28 Oct 2017 09:24) (79 - 120)  BP: 158/69 (28 Oct 2017 09:00) (102/80 - 167/63)  BP(mean): 92 (28 Oct 2017 09:00) (72 - 129)  RR: 28 (28 Oct 2017 09:00) (21 - 28)  SpO2: 96% (28 Oct 2017 09:24) (87% - 97%)    Physical Exam:  General:    NAD,  non toxic, A&O x 3  HEENT:    no oropharyngeal lesions,   no LAD,   neck supple  Cardiovascular:     regular S1, S2,  no murmur  Respiratory:    clear b/l,    no wheezing  abd:     soft,   BS +,   no tenderness,    no organomegaly  :   no CVAT,  no suprapubic tenderness,   no  swenson  Musculoskletal:    no joint swelling,   no edema  Skin:    no rash  Neurologic:     no focal deficit                          9.6    10.40 )-----------( 135      ( 28 Oct 2017 02:45 )             30.1       10-28    142  |  103  |  33<H>  ----------------------------<  165<H>  3.6   |  25  |  1.14    Ca    8.5      28 Oct 2017 03:40  Phos  2.5     10-28  Mg     2.1     10-28    TPro  7.4  /  Alb  3.4  /  TBili  0.4  /  DBili  x   /  AST  25  /  ALT  22  /  AlkPhos  50  10-28          MICROBIOLOGY:  v  URINE MIDSTREAM  10-25-17 --  --  E.COLI ESBL POSITIVE      BLOOD PERIPHERAL  10-25-17 --  --  BLOOD CULTURE PCR  Escherichia coli        RADIOLOGY: Follow Up:    Inverval History:  Patient is an 87 y/o F PMH Lung CA (unknown stage or type, Dx 2010 s/p L VATS, wedge resection, RT in July), HTN, DM2 initially present with Nausea, vomiting, dysuria. Patient previously had cough productive of sputum for 2 weeks, s/p Antibiotics  . Patient felt well until 3 days ago, developed dysuria, urgency, increased frequency, progressing to suprapubic tenderness, fever, chills, nausea and vomiting. No recent travel, no sick contacts. On admission, patient found to have Pyelonephritis, with Urine Cx growing >100,000 gram negative rods. Blood cultures growing E. Coli.  Patient started on Ceftriaxone, s/p Azithromycin X 1 dose, and was doing well until yesterday  morning when she developed acute onset shortness of breath, placed on 50% NRB, and RRT called .Patient in mild Respiratory distress. Bedside US showing RLL atelectasis with moderate sized pleural effusion, and small Pleural effusion on the left side.    Decision was made to start patient on CPAP, and transfer to MICU for further management of her hypoxic respiratory failure.       ROS:     All other systems negative    fever [ ]     chills [ ]      headache  [ ]       chest pain [ ]           SOB  [x ]          abd pain  [ ]            nausea [ ]            vomiting [ ]          diarrhea [ ]  dysurea [ x]         rash  [ ]         joint swelling [ ]           edema  [ ]    Allergies  ACE inhibitors (Angioedema)        ANTIMICROBIALS:  meropenem IVPB    meropenem IVPB 1000 every 8 hours      OTHER MEDS:  acetaminophen   Tablet 650 milliGRAM(s) Oral every 6 hours PRN  acetaminophen   Tablet. 650 milliGRAM(s) Oral every 6 hours PRN  ALBUTerol/ipratropium for Nebulization 3 milliLiter(s) Nebulizer every 6 hours  aspirin enteric coated 81 milliGRAM(s) Oral daily  fenofibrate Tablet 145 milliGRAM(s) Oral daily  heparin  Injectable 5000 Unit(s) SubCutaneous every 8 hours  insulin lispro (HumaLOG) corrective regimen sliding scale   SubCutaneous three times a day before meals  insulin lispro (HumaLOG) corrective regimen sliding scale   SubCutaneous at bedtime  metoprolol     tartrate 50 milliGRAM(s) Oral every 12 hours  NIFEdipine XL 30 milliGRAM(s) Oral daily  NIFEdipine XL 60 milliGRAM(s) Oral at bedtime  pantoprazole  Injectable 40 milliGRAM(s) IV Push daily  prednisoLONE acetate 1% Suspension 1 Drop(s) Both EYES three times a day  predniSONE   Tablet 10 milliGRAM(s) Oral daily  sodium chloride 0.65% Nasal 1 Spray(s) Both Nostrils three times a day      Vital Signs Last 24 Hrs  T(C): 35.6 (28 Oct 2017 07:51), Max: 37.6 (27 Oct 2017 20:00)  T(F): 96 (28 Oct 2017 07:51), Max: 99.7 (27 Oct 2017 20:00)  HR: 93 (28 Oct 2017 09:24) (79 - 120)  BP: 158/69 (28 Oct 2017 09:00) (102/80 - 167/63)  BP(mean): 92 (28 Oct 2017 09:00) (72 - 129)  RR: 28 (28 Oct 2017 09:00) (21 - 28)  SpO2: 96% (28 Oct 2017 09:24) (87% - 97%)    Physical Exam:  General:    NAD,  non toxic, A&O x 3  HEENT:    no oropharyngeal lesions,   no LAD,   neck supple  Cardiovascular:     regular S1, S2,  no murmur  Respiratory:    clear b/l,    no wheezing  abd:     soft,   BS +,   no tenderness,    no organomegaly  :   no CVAT,  no suprapubic tenderness,   no  swenson  Musculoskletal:    no joint swelling,   no edema  Skin:    no rash  Neurologic:     no focal deficit                          9.6    10.40 )-----------( 135      ( 28 Oct 2017 02:45 )             30.1       10-28    142  |  103  |  33<H>  ----------------------------<  165<H>  3.6   |  25  |  1.14    Ca    8.5      28 Oct 2017 03:40  Phos  2.5     10-28  Mg     2.1     10-28    TPro  7.4  /  Alb  3.4  /  TBili  0.4  /  DBili  x   /  AST  25  /  ALT  22  /  AlkPhos  50  10-28          MICROBIOLOGY:  v  URINE MIDSTREAM  10-25-17 --  --  E.COLI ESBL POSITIVE      BLOOD PERIPHERAL  10-25-17 --  --  BLOOD CULTURE PCR  Escherichia coli    repeat blood cx sent       RADIOLOGY:   < from: CT Chest No Cont (10.25.17 @ 23:34) >  Spiculated masses within the bilateral upper lobes, encasing suture   material on the left, most compatible with bronchogenic malignancy.   Mediastinal lymphadenopathy.  Emphysema.    Right adrenal mass not significantly changed in size compared to previous   CT of January 11, 2010. Further characterization with MRI.    Left-sided perinephric stranding and left renal edema without   hydronephrosis or obstructing stone. Findings may be related to recent   passage of a stone. Please note that without IV contrast, pyelonephritis,   a clinical diagnosis cannot be assessed.  Colonic diverticulosis.      < end of copied text > Follow Up:    Inverval History:  Patient is an 87 y/o F PMH Lung CA (unknown stage or type, Dx 2010 s/p L VATS, wedge resection, RT in July), HTN, DM2 initially present with Nausea, vomiting, dysuria. Patient previously had cough productive of sputum for 2 weeks, s/p Antibiotics  . Patient felt well until 3 days ago, developed dysuria, urgency, increased frequency, progressing to suprapubic tenderness, fever, chills, nausea and vomiting. No recent travel, no sick contacts. On admission, patient found to have Pyelonephritis, with Urine Cx growing >100,000 gram negative rods. Blood cultures growing E. Coli.  Patient started on Ceftriaxone, s/p Azithromycin X 1 dose, and was doing well until yesterday  morning when she developed acute onset shortness of breath, placed on 50% NRB, and RRT called .Patient in mild Respiratory distress. Bedside US showing RLL atelectasis with moderate sized pleural effusion, and small Pleural effusion on the left side.    Decision was made to start patient on CPAP, and transfer to MICU for further management of her hypoxic respiratory failure.       ROS:     All other systems negative    fever [ ]     chills [ ]      headache  [ ]       chest pain [ ]           SOB  [x ]          abd pain  [ ]            nausea [ ]            vomiting [ ]          diarrhea [ ]  dysurea [ x]         rash  [ ]         joint swelling [ ]           edema  [ ]    Allergies  ACE inhibitors (Angioedema)        ANTIMICROBIALS:  meropenem IVPB    meropenem IVPB 1000 every 8 hours      OTHER MEDS:  acetaminophen   Tablet 650 milliGRAM(s) Oral every 6 hours PRN  acetaminophen   Tablet. 650 milliGRAM(s) Oral every 6 hours PRN  ALBUTerol/ipratropium for Nebulization 3 milliLiter(s) Nebulizer every 6 hours  aspirin enteric coated 81 milliGRAM(s) Oral daily  fenofibrate Tablet 145 milliGRAM(s) Oral daily  heparin  Injectable 5000 Unit(s) SubCutaneous every 8 hours  insulin lispro (HumaLOG) corrective regimen sliding scale   SubCutaneous three times a day before meals  insulin lispro (HumaLOG) corrective regimen sliding scale   SubCutaneous at bedtime  metoprolol     tartrate 50 milliGRAM(s) Oral every 12 hours  NIFEdipine XL 30 milliGRAM(s) Oral daily  NIFEdipine XL 60 milliGRAM(s) Oral at bedtime  pantoprazole  Injectable 40 milliGRAM(s) IV Push daily  prednisoLONE acetate 1% Suspension 1 Drop(s) Both EYES three times a day  predniSONE   Tablet 10 milliGRAM(s) Oral daily  sodium chloride 0.65% Nasal 1 Spray(s) Both Nostrils three times a day      Vital Signs Last 24 Hrs  T(C): 35.6 (28 Oct 2017 07:51), Max: 37.6 (27 Oct 2017 20:00)  T(F): 96 (28 Oct 2017 07:51), Max: 99.7 (27 Oct 2017 20:00)  HR: 93 (28 Oct 2017 09:24) (79 - 120)  BP: 158/69 (28 Oct 2017 09:00) (102/80 - 167/63)  BP(mean): 92 (28 Oct 2017 09:00) (72 - 129)  RR: 28 (28 Oct 2017 09:00) (21 - 28)  SpO2: 96% (28 Oct 2017 09:24) (87% - 97%)    Physical Exam:  General: pt is on a cpap, breathing is improved as compared to yesterday   HEAD/EYES: anicteric, PERRL  ENT:  supple  Cardiovascular:   S1, S2  Respiratory:  clear bilaterally  GI:  soft and non tender, mild abdominal tenderness.   :  no CVA tenderness   Musculoskeletal:  no synovitis  Neurologic:  grossly non-focal  Skin:  no rash  Lymph: no lymphadenopathy  Psychiatric:  appropriate affect  Vascular:  no phlebitis                                9.6    10.40 )-----------( 135      ( 28 Oct 2017 02:45 )             30.1       10-28    142  |  103  |  33<H>  ----------------------------<  165<H>  3.6   |  25  |  1.14    Ca    8.5      28 Oct 2017 03:40  Phos  2.5     10-28  Mg     2.1     10-28    TPro  7.4  /  Alb  3.4  /  TBili  0.4  /  DBili  x   /  AST  25  /  ALT  22  /  AlkPhos  50  10-28          MICROBIOLOGY:  v  URINE MIDSTREAM  10-25-17 --  --  E.COLI ESBL POSITIVE      BLOOD PERIPHERAL  10-25-17 --  --  BLOOD CULTURE PCR  Escherichia coli    repeat blood cx sent       RADIOLOGY:   < from: CT Chest No Cont (10.25.17 @ 23:34) >  Spiculated masses within the bilateral upper lobes, encasing suture   material on the left, most compatible with bronchogenic malignancy.   Mediastinal lymphadenopathy.  Emphysema.    Right adrenal mass not significantly changed in size compared to previous   CT of January 11, 2010. Further characterization with MRI.    Left-sided perinephric stranding and left renal edema without   hydronephrosis or obstructing stone. Findings may be related to recent   passage of a stone. Please note that without IV contrast, pyelonephritis,   a clinical diagnosis cannot be assessed.  Colonic diverticulosis.      < end of copied text >

## 2019-08-29 NOTE — DIETITIAN INITIAL EVALUATION ADULT. - PROBLEM SELECTOR PROBLEM 2
PLAN OF CARE - SHIFT NOTE    Patient is ANOx4, on RA (CPAP @ [address], refusing here in the hospital, 2L PRN), tele A-Paced with frequent PVCs, cont B/B with urinary frequency d/t lasix, UAL.  Patient's baseline Cr is ~2, this AM was 2.49 with nephrology followi Problem: RESPIRATORY - ADULT  Goal: Achieves optimal ventilation and oxygenation  Description  INTERVENTIONS:  - Assess for changes in respiratory status  - Assess for changes in mentation and behavior  - Position to facilitate oxygenation and minimize r no SOB    Interventions:   - cough/deep breath  -wean O2  -ambulation  - See additional Care Plan goals for specific interventions   Outcome: Adequate for Discharge Malignant neoplasm of upper lobe of right lung

## 2019-11-28 NOTE — ED PROVIDER NOTE - NS ED MD SHIFT CHANGE PENDING ITEMS
[facility]  Pediatric General Surgery  Progress Note    Patient Name: Daniel Ernst  MRN: [id number]  Admission Date: 11/24/2019  Hospital Length of Stay: 2 days  Attending Physician: Lynda Chau MD  Primary Care Provider: Primary Doctor No    Subjective:     Interval History: Feels better this AM.  Pain improving.  Appetite improved.  Ambulated multiple times yesterday     Post-Op Info:  Procedure(s) (LRB):  APPENDECTOMY, LAPAROSCOPIC (N/A)   3 Days Post-Op     Interval History: NAEON.  Having incisional pain.  Tolerated food but not much of an appetite.  Ambulating.  Voiding    Medications:  Continuous Infusions:   dextrose 5 % and 0.45 % NaCl with KCl 20 mEq 70 mL/hr at 11/27/19 2228     Scheduled Meds:   cefTRIAXone (ROCEPHIN) IVPB  2 g Intravenous Q24H    docusate sodium  50 mg Oral Daily    ibuprofen  600 mg Oral Q6H     PRN Meds:HYDROcodone-acetaminophen, ondansetron, sodium chloride 0.9%     Review of patient's allergies indicates:  No Known Allergies  Objective:     Vital Signs (Most Recent):  Temp: 98.1 °F (36.7 °C) (11/28/19 0348)  Pulse: (!) 48 (11/28/19 0348)  Resp: 20 (11/28/19 0348)  BP: (!) 117/57 (11/28/19 0348)  SpO2: 100 % (11/28/19 0348) Vital Signs (24h Range):  Temp:  [98.1 °F (36.7 °C)-99.2 °F (37.3 °C)] 98.1 °F (36.7 °C)  Pulse:  [48-87] 48  Resp:  [20] 20  SpO2:  [98 %-100 %] 100 %  BP: (111-125)/(56-63) 117/57     Weight: 53 kg (116 lb 13.5 oz)  There is no height or weight on file to calculate BMI.    Intake/Output - Last 3 Shifts       11/26 0700 - 11/27 0659 11/27 0700 - 11/28 0659    P.O. 750 770    I.V. (mL/kg) 2417 (45.6) 1447.2 (27.3)    IV Piggyback 50 50    Total Intake(mL/kg) 3217 (60.7) 2267.2 (42.8)    Urine (mL/kg/hr) 575 (0.5) 550 (0.4)    Total Output 575 550    Net +2642 +1717.2          Urine Occurrence 2 x 7 x          Physical Exam   Constitutional: He is oriented to person, place, and time. He appears well-developed and well-nourished. No  distress.   Cardiovascular: Normal rate.   Pulmonary/Chest: Effort normal.   Abdominal: Soft. He exhibits no distension. There is tenderness (Incisional).   Neurological: He is alert and oriented to person, place, and time.   Psychiatric: He has a normal mood and affect.         Assessment/Plan:     * Acute appendicitis with localized peritonitis  Patient is a 17yo male presenting with RLQ abdominal pain and CT evidence of a 1.4cm appendicolith and appendiceal dilation. Exam and laboratory findings consistent with acute appendicitis. Now 3 Days Post-Op from [facility] appy.      - Regular diet  - D/c fluids  - PRN pain meds  - Plan for d/c home today         Dimitry Vincent MD  Pediatric General Surgery  [facility]    __________________________________________    Pediatric Surgery Staff    I have seen and examined the patient and agree with the resident's note.      Says he is feeling better.  Tolerated liquids yesterday but got nauseated with food.  No emesis.  Out of bed yesterday.  On exam, his abdomen is soft, nondistended, minimally tender.  Incisions are dressed and dry.  White blood cell count is normal today.  If able to tolerate food, will send home.  Does not need any antibiotics for home.  Spoke spoke with his mother and she is comfortable with the plan.  Will need to follow up with Dr. Long in 2 weeks.    Lynda Chau     CT/MRI results

## 2020-06-09 NOTE — ED ADULT NURSE NOTE - ED STAT RN HANDOFF WHERE
Please give patient a referral to see Dr. Faith FREGOSO at [facility] surgery department for evaluation of cyst in her neck which is close to her thyroid  
Please make sure her referral is done  
Referral will be mailed and faxed and pt has been notified  
US thyroid have been placed 
527

## 2020-10-04 NOTE — DIETITIAN INITIAL EVALUATION ADULT. - PROBLEM SELECTOR PLAN 5
file    Stress: Not on file   Relationships    Social connections     Talks on phone: Not on file     Gets together: Not on file     Attends Faith service: Not on file     Active member of club or organization: Not on file     Attends meetings of clubs or organizations: Not on file     Relationship status: Not on file    Intimate partner violence     Fear of current or ex partner: Not on file     Emotionally abused: Not on file     Physically abused: Not on file     Forced sexual activity: Not on file   Other Topics Concern    Not on file   Social History Narrative    Not on file       Family History:   History reviewed. No pertinent family history. Medications Prior to Admission:    Prior to Admission medications    Medication Sig Start Date End Date Taking? Authorizing Provider   atorvastatin (LIPITOR) 20 MG tablet TAKE ONE TABLET BY MOUTH AT BEDTIME 3/18/19  Yes Historical Provider, MD   gemfibrozil (LOPID) 600 MG tablet Take 600 mg by mouth 2 times daily 3/18/19  Yes Historical Provider, MD   lisinopril (PRINIVIL;ZESTRIL) 20 MG tablet TAKE ONE TABLET BY MOUTH EVERY DAY 3/18/19  Yes Historical Provider, MD   metFORMIN (GLUCOPHAGE) 1000 MG tablet Take 1,000 mg by mouth 2 times daily (with meals)    Historical Provider, MD   warfarin (COUMADIN) 1 MG tablet Take 1 mg by mouth    Historical Provider, MD   bacitracin 500 UNIT/GM ointment Apply topically 2 times daily. 8/15/19   BELKIS Reddy CNP       Allergies:  Patient has no known allergies. REVIEW OF SYSTEMS:  All systems reviewed and negative except for what is in HPI. PHYSICAL EXAM:  Vitals:  BP (!) 140/87   Pulse 94   Temp 97.8 °F (36.6 °C) (Oral)   Resp 16   Ht 5' 8.5\" (1.74 m)   Wt 220 lb (99.8 kg)   SpO2 95%   BMI 32.96 kg/m²   BMI Classification: Obese (BMI 30.0-39. 9)  Pulse Ox:  SpO2  Av.3 %  Min: 95 %  Max: 96 %  Supplemental O2:      CONSTITUTIONAL:  awake, alert, cooperative, no apparent distress, and appears stated age  [de-identified]: Normocephalic, PERRLA. Dry oral mucosa  NECK: no JVD, no LAD  HEART: RRR, no murmurs, gallops, or rubs  LUNGS: clear to auscultation bilaterally, no wheezes, crackles, or rhonchi.   ABDOMEN: soft/NT/ND, positive BS  MUSCULOSKELETAL: negative for edema, +2 pulses  SKIN: intact without rash or jaundice  NEURO:  CN intact and no focal deficits    DATA:  Recent Results (from the past 24 hour(s))   Comprehensive Metabolic Panel w/ Reflex to MG    Collection Time: 10/04/20  1:00 PM   Result Value Ref Range    Sodium 137 135 - 144 mEq/L    Potassium reflex Magnesium 5.9 (H) 3.4 - 4.9 mEq/L    Chloride 90 (L) 95 - 107 mEq/L    CO2 22 20 - 31 mEq/L    Anion Gap 25 (H) 9 - 15 mEq/L    Glucose 987 (HH) 70 - 99 mg/dL    BUN 36 (H) 8 - 23 mg/dL    CREATININE 1.96 (H) 0.70 - 1.20 mg/dL    GFR Non-African American 33.3 (L) >60    GFR  40.3 (L) >60    Calcium 10.3 (H) 8.5 - 9.9 mg/dL    Total Protein 8.5 (H) 6.3 - 8.0 g/dL    Alb 4.0 3.5 - 4.6 g/dL    Total Bilirubin 0.8 (H) 0.2 - 0.7 mg/dL    Alkaline Phosphatase 103 35 - 104 U/L    ALT 55 (H) 0 - 41 U/L    AST 38 0 - 40 U/L    Globulin 4.5 (H) 2.3 - 3.5 g/dL   Lactate, Sepsis    Collection Time: 10/04/20  1:00 PM   Result Value Ref Range    Lactic Acid, Sepsis 4.4 (HH) 0.5 - 1.9 mmol/L   CBC auto differential    Collection Time: 10/04/20  1:00 PM   Result Value Ref Range    WBC 8.9 4.8 - 10.8 K/uL    RBC 5.60 4.70 - 6.10 M/uL    Hemoglobin 17.7 14.0 - 18.0 g/dL    Hematocrit 57.3 (H) 42.0 - 52.0 %    .4 (H) 80.0 - 100.0 fL    MCH 31.6 (H) 27.0 - 31.3 pg    MCHC 30.8 (L) 33.0 - 37.0 %    RDW 14.9 (H) 11.5 - 14.5 %    Platelets 664 342 - 784 K/uL    Neutrophils % 82.7 %    Lymphocytes % 11.6 %    Monocytes % 5.1 %    Eosinophils % 0.1 %    Basophils % 0.5 %    Neutrophils Absolute 7.4 (H) 1.4 - 6.5 K/uL    Lymphocytes Absolute 1.0 1.0 - 4.8 K/uL    Monocytes Absolute 0.5 0.2 - 0.8 K/uL    Eosinophils Absolute 0.0 0.0 - 0.7 K/uL    Basophils Absolute 0.0 0.0 - 0.2 K/uL   Protime-INR    Collection Time: 10/04/20  1:00 PM   Result Value Ref Range    Protime 15.5 (H) 12.3 - 14.9 sec    INR 1.2    TYPE AND SCREEN    Collection Time: 10/04/20  1:00 PM   Result Value Ref Range    ABO/Rh O POS     Antibody Screen NEG    Beta-Hydroxybutyrate    Collection Time: 10/04/20  1:00 PM   Result Value Ref Range    Beta-Hydroxybutyrate 45.9 (H) 0.2 - 2.8 mg/dL   Troponin    Collection Time: 10/04/20  1:00 PM   Result Value Ref Range    Troponin 0.061 (HH) 0.000 - 0.010 ng/mL   Urine Reflex to Culture    Collection Time: 10/04/20  1:30 PM    Specimen: Urine, clean catch   Result Value Ref Range    Color, UA Yellow Straw/Yellow    Clarity, UA Clear Clear    Glucose, Ur >=1000 (A) Negative mg/dL    Bilirubin Urine Negative Negative    Ketones, Urine 15 (A) Negative mg/dL    Specific Gravity, UA 1.036 1.005 - 1.030    Blood, Urine MODERATE (A) Negative    pH, UA 5.0 5.0 - 9.0    Protein, UA Negative Negative mg/dL    Urobilinogen, Urine 0.2 <2.0 E.U./dL    Nitrite, Urine Negative Negative    Leukocyte Esterase, Urine SMALL (A) Negative   EKG 12 lead    Collection Time: 10/04/20  1:31 PM   Result Value Ref Range    Ventricular Rate 89 BPM    Atrial Rate 89 BPM    P-R Interval 164 ms    QRS Duration 90 ms    Q-T Interval 386 ms    QTc Calculation (Bazett) 469 ms    P Axis 8 degrees    R Axis -76 degrees    T Axis 46 degrees   POCT Arterial    Collection Time: 10/04/20  1:45 PM   Result Value Ref Range    POC Sodium 136 136 - 145 mEq/L    POC Potassium 5.4 (H) 3.5 - 5.1 mEq/L    POC Chloride 102 99 - 110 mEq/L    POC Glucose >700 (HH) 60 - 115 mg/dl    POC Creatinine 2.2 (H) 0.8 - 1.3 mg/dL    GFR Non-African American 29 (A) >60    GFR  35 (A) >60    Calcium, Ion 1.16 1.12 - 1.32 mmol/L    pH, Arterial 7.366 7.350 - 7.450    pCO2, Arterial 42 35 - 45 mm Hg    pO2, Arterial 61 (HH) 75 - 108 mm Hg    HCO3, Arterial 23.9 21.0 - 29.0 mmol/L    Base Excess, Arterial -2 -3 - 3    O2 Sat, Arterial 90 (HH) 93 - 100 %    TCO2, Arterial 25 22 - 29    Lactate 2.69 (H) 0.40 - 2.00 mmol/L    POC Hematocrit 56 (H) 41 - 53 %    Hemoglobin 19.0 (H) 13.5 - 17.5 gm/dL    Sample Type ART     Performed on SEE BELOW            ASSESSMENT AND PLAN:  1) HHS       -Due to not taking his diabetic medications. Will start insulin infusion, aggressive IV hydration, frequent glucose fingersticks, check BMP, Jocelynn and magnesium every 4 hours    2) SALOME/lactic acidosis       -Resume IV hydration as above, hold nephrotoxic meds. Monitor renal function. No signs of infection. Monitor lactate level, check every 2 hours for 2 times. 3) acute metabolic encephalopathy due to HHS-currently alert oriented times 3  -Monitor, treat #1    4) obesity/medical noncompliance-educated    Awaiting RN to reconcile home medication to order them.        DVT prophylaxis         DISCHARGE PLANNING  Inpatient    Code status: No Order    Electronically signed by Sg Bell DO on 10/4/20 at 2:38 PM EDT hypertensive in ED to 199/90,   will resume home medications with hold parameters  C/w Nifedipine XR 30 mg in AM, 60 mg at night, metoprolol 25 mg BID  will hold HCTZ for now

## 2021-07-28 NOTE — ED PROVIDER NOTE - TEMPLATE, MLM
Call placed to pt who sent an e-advice to request smelling salts. She stated her daughters wedding is coming up on Sat and she knows it will be very hot outside. Just in case someone faints, she wanted an ammonia stick on hand to wake them up . I told her I didn't think we prescribe those, but I will check with our provider and give her a call back.     I spoke with our NP Gely regarding the above and she stated no we don't prescribe smelling salts. Patient was called back and informed of the above.  
VIEW ALL

## 2021-08-18 NOTE — H&P ADULT - PROBLEM/PLAN-6
DISPLAY PLAN FREE TEXT
Expected Date Of Service: 05/06/2021
Billing Type: Third-Party Bill
Bill For Surgical Tray: no

## 2021-08-27 NOTE — DIETITIAN INITIAL EVALUATION ADULT. - WEIGHT IN LBS
TRIED TO CALL PATIENT TO MAKE ER F/U; FIRST ATTEMPT; UNABLE TO REACH PT X1.            THANK YOU,        KATTY   121.4

## 2021-09-08 NOTE — PATIENT PROFILE ADULT. - NSCAFFEAMTFREQ_GEN_ALL_CORE_SD
Pt D/C ambulatory. Went over D/C instructions with pt, pt verbalized understanding and all questions were answered.  STEVO Bermudez RN  09/08/21 2358 1-2 cups/cans per day

## 2021-10-06 PROBLEM — I10 ESSENTIAL HYPERTENSION: Status: ACTIVE | Noted: 2018-04-17

## 2021-10-29 NOTE — PROGRESS NOTE ADULT - PROBLEM SELECTOR PLAN 2
likely multifactorial 2/2 PNA (completed treatment) and underlying lung cancer  Oxygenation stable on high flow today  will trial nasal cannula  Nocturnal BIPAP 136.9

## 2022-10-17 NOTE — PHYSICAL THERAPY INITIAL EVALUATION ADULT - AMBULATION SKILLS, REHAB EVAL
independent Tazorac Pregnancy And Lactation Text: This medication is not safe during pregnancy. It is unknown if this medication is excreted in breast milk.

## 2022-10-18 NOTE — DIETITIAN INITIAL EVALUATION ADULT. - PROBLEM/PLAN-1
DISPLAY PLAN FREE TEXT Xolair Counseling:  Patient informed of potential adverse effects including but not limited to fever, muscle aches, rash and allergic reactions.  The patient verbalized understanding of the proper use and possible adverse effects of Xolair.  All of the patient's questions and concerns were addressed.

## 2023-07-17 NOTE — H&P ADULT - PROBLEM SELECTOR PLAN 5
hypertensive in ED to 199/90,   will resume home medications with hold parameters  C/w Nifedipine XR 30 mg in AM, 60 mg at night, metoprolol 25 mg BID  will hold HCTZ for now 4 = No assist / stand by assistance

## 2024-12-09 NOTE — CONSULT NOTE ADULT - PROBLEM SELECTOR RECOMMENDATION 4
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   Unclear whether further disease modifying interventions are a possibility, but goals of care will need to be discussed during this hospitalization. Right LE
